# Patient Record
Sex: FEMALE | Race: WHITE | NOT HISPANIC OR LATINO | Employment: OTHER | URBAN - METROPOLITAN AREA
[De-identification: names, ages, dates, MRNs, and addresses within clinical notes are randomized per-mention and may not be internally consistent; named-entity substitution may affect disease eponyms.]

---

## 2017-10-19 ENCOUNTER — OFFICE VISIT (OUTPATIENT)
Dept: FAMILY MEDICINE | Facility: CLINIC | Age: 67
End: 2017-10-19
Payer: COMMERCIAL

## 2017-10-19 VITALS
HEART RATE: 93 BPM | DIASTOLIC BLOOD PRESSURE: 66 MMHG | SYSTOLIC BLOOD PRESSURE: 104 MMHG | WEIGHT: 182 LBS | HEIGHT: 61 IN | BODY MASS INDEX: 34.36 KG/M2 | OXYGEN SATURATION: 96 % | TEMPERATURE: 98.7 F

## 2017-10-19 DIAGNOSIS — M79.10 MYALGIA: ICD-10-CM

## 2017-10-19 DIAGNOSIS — F33.1 MODERATE RECURRENT MAJOR DEPRESSION (H): ICD-10-CM

## 2017-10-19 DIAGNOSIS — Z23 NEED FOR PROPHYLACTIC VACCINATION WITH TETANUS-DIPHTHERIA (TD): ICD-10-CM

## 2017-10-19 DIAGNOSIS — G93.32 CHRONIC FATIGUE DISORDER: Primary | ICD-10-CM

## 2017-10-19 DIAGNOSIS — Z23 NEED FOR PROPHYLACTIC VACCINATION AND INOCULATION AGAINST INFLUENZA: ICD-10-CM

## 2017-10-19 DIAGNOSIS — Z12.11 SCREEN FOR COLON CANCER: ICD-10-CM

## 2017-10-19 DIAGNOSIS — Z12.31 VISIT FOR SCREENING MAMMOGRAM: ICD-10-CM

## 2017-10-19 DIAGNOSIS — E78.5 HYPERLIPIDEMIA LDL GOAL <160: ICD-10-CM

## 2017-10-19 LAB
ALBUMIN SERPL-MCNC: 3.1 G/DL (ref 3.4–5)
ALP SERPL-CCNC: 102 U/L (ref 40–150)
ALT SERPL W P-5'-P-CCNC: 26 U/L (ref 0–50)
ANION GAP SERPL CALCULATED.3IONS-SCNC: 6 MMOL/L (ref 3–14)
AST SERPL W P-5'-P-CCNC: 12 U/L (ref 0–45)
BASOPHILS # BLD AUTO: 0 10E9/L (ref 0–0.2)
BASOPHILS NFR BLD AUTO: 0.4 %
BILIRUB SERPL-MCNC: 0.5 MG/DL (ref 0.2–1.3)
BUN SERPL-MCNC: 9 MG/DL (ref 7–30)
CALCIUM SERPL-MCNC: 9.1 MG/DL (ref 8.5–10.1)
CHLORIDE SERPL-SCNC: 102 MMOL/L (ref 94–109)
CHOLEST SERPL-MCNC: 154 MG/DL
CO2 SERPL-SCNC: 29 MMOL/L (ref 20–32)
CREAT SERPL-MCNC: 0.72 MG/DL (ref 0.52–1.04)
CRP SERPL-MCNC: 53 MG/L (ref 0–8)
DIFFERENTIAL METHOD BLD: NORMAL
EOSINOPHIL # BLD AUTO: 0.1 10E9/L (ref 0–0.7)
EOSINOPHIL NFR BLD AUTO: 0.8 %
ERYTHROCYTE [DISTWIDTH] IN BLOOD BY AUTOMATED COUNT: 12.8 % (ref 10–15)
ERYTHROCYTE [SEDIMENTATION RATE] IN BLOOD BY WESTERGREN METHOD: 58 MM/H (ref 0–30)
GFR SERPL CREATININE-BSD FRML MDRD: 81 ML/MIN/1.7M2
GLUCOSE SERPL-MCNC: 92 MG/DL (ref 70–99)
HCT VFR BLD AUTO: 39.7 % (ref 35–47)
HDLC SERPL-MCNC: 40 MG/DL
HGB BLD-MCNC: 12.8 G/DL (ref 11.7–15.7)
LDLC SERPL CALC-MCNC: 98 MG/DL
LYMPHOCYTES # BLD AUTO: 1.8 10E9/L (ref 0.8–5.3)
LYMPHOCYTES NFR BLD AUTO: 24.9 %
MAGNESIUM SERPL-MCNC: 1.9 MG/DL (ref 1.6–2.3)
MCH RBC QN AUTO: 30.5 PG (ref 26.5–33)
MCHC RBC AUTO-ENTMCNC: 32.2 G/DL (ref 31.5–36.5)
MCV RBC AUTO: 95 FL (ref 78–100)
MONOCYTES # BLD AUTO: 0.7 10E9/L (ref 0–1.3)
MONOCYTES NFR BLD AUTO: 9 %
NEUTROPHILS # BLD AUTO: 4.8 10E9/L (ref 1.6–8.3)
NEUTROPHILS NFR BLD AUTO: 64.9 %
NONHDLC SERPL-MCNC: 114 MG/DL
PLATELET # BLD AUTO: 356 10E9/L (ref 150–450)
POTASSIUM SERPL-SCNC: 3.9 MMOL/L (ref 3.4–5.3)
PROT SERPL-MCNC: 7.2 G/DL (ref 6.8–8.8)
RBC # BLD AUTO: 4.19 10E12/L (ref 3.8–5.2)
SODIUM SERPL-SCNC: 137 MMOL/L (ref 133–144)
TRIGL SERPL-MCNC: 82 MG/DL
TSH SERPL DL<=0.005 MIU/L-ACNC: 1.35 MU/L (ref 0.4–4)
WBC # BLD AUTO: 7.3 10E9/L (ref 4–11)

## 2017-10-19 PROCEDURE — 85652 RBC SED RATE AUTOMATED: CPT | Performed by: FAMILY MEDICINE

## 2017-10-19 PROCEDURE — 99203 OFFICE O/P NEW LOW 30 MIN: CPT | Mod: 25 | Performed by: FAMILY MEDICINE

## 2017-10-19 PROCEDURE — 82306 VITAMIN D 25 HYDROXY: CPT | Performed by: FAMILY MEDICINE

## 2017-10-19 PROCEDURE — 86140 C-REACTIVE PROTEIN: CPT | Performed by: FAMILY MEDICINE

## 2017-10-19 PROCEDURE — 80061 LIPID PANEL: CPT | Performed by: FAMILY MEDICINE

## 2017-10-19 PROCEDURE — 36415 COLL VENOUS BLD VENIPUNCTURE: CPT | Performed by: FAMILY MEDICINE

## 2017-10-19 PROCEDURE — 80050 GENERAL HEALTH PANEL: CPT | Performed by: FAMILY MEDICINE

## 2017-10-19 PROCEDURE — 90471 IMMUNIZATION ADMIN: CPT | Performed by: FAMILY MEDICINE

## 2017-10-19 PROCEDURE — 90686 IIV4 VACC NO PRSV 0.5 ML IM: CPT | Performed by: FAMILY MEDICINE

## 2017-10-19 PROCEDURE — 83735 ASSAY OF MAGNESIUM: CPT | Performed by: FAMILY MEDICINE

## 2017-10-19 RX ORDER — FLUOXETINE 20 MG/1
TABLET, FILM COATED ORAL EVERY MORNING
COMMUNITY
End: 2017-12-08 | Stop reason: DRUGHIGH

## 2017-10-19 RX ORDER — FLUTICASONE PROPIONATE 50 MCG
SPRAY, SUSPENSION (ML) NASAL
COMMUNITY
Start: 2013-09-16 | End: 2017-12-08

## 2017-10-19 RX ORDER — BUPROPION HYDROCHLORIDE 75 MG/1
75 TABLET ORAL 2 TIMES DAILY
Qty: 60 TABLET | Refills: 3 | Status: SHIPPED | OUTPATIENT
Start: 2017-10-19 | End: 2017-11-06

## 2017-10-19 ASSESSMENT — PAIN SCALES - GENERAL: PAINLEVEL: SEVERE PAIN (6)

## 2017-10-19 NOTE — NURSING NOTE
"Chief Complaint   Patient presents with     Establish Care     Transfer of care from [facility] in [place]       Initial /66 (BP Location: Right arm, Patient Position: Chair, Cuff Size: Adult Large)  Pulse 93  Temp 98.7  F (37.1  C) (Oral)  Ht 5' 1.25\" (1.556 m)  Wt 182 lb (82.6 kg)  SpO2 96%  Breastfeeding? No  BMI 34.11 kg/m2 Estimated body mass index is 34.11 kg/(m^2) as calculated from the following:    Height as of this encounter: 5' 1.25\" (1.556 m).    Weight as of this encounter: 182 lb (82.6 kg).  Medication Reconciliation: complete     Karan Bailey CMA    "

## 2017-10-19 NOTE — NURSING NOTE

## 2017-10-19 NOTE — PATIENT INSTRUCTIONS
How to contact your care team: [phone] Pharmacy [phone]   MD MORIS ANDERSON PA-C CHRIS JONES, PA-C NAM HO, MD JONATHAN BATES, MD ARVIN VOCAL, MD    Clinic hours M-Th 7am-7pm Fri 7am-5pm.   Urgent care M-F 11am-9pm  Sat/Sun 9am-5pm.   Pharmacy   Mon-Th:  8:00am-8pm   Fri:  8:00am-6:00pm  Sat/Sun  8:00am-5:00 pm

## 2017-10-19 NOTE — LETTER
October 23, 2017      Marixa Ann  [address] [place] [address]            Dear Marixa Ann    Your test results are attached.     The tests for inflammation are high and we need to check to see what is causing this. Please schedule a follow up appointment.     The blood sugar is normal and you do not have diabetes. The kidney and liver tests were normal. The thyroid test is normal. Your magnesium and vitamin D levels are good.     Please call to set up a follow up appointment for additional lab work and to discuss the results.       Enclosed is a copy of the results.  It was a pleasure to see you at your last appointment.    If you have any questions or concerns, please call myself or my nurse at [phone].      Sincerely,      Jackie Kovacs MD, MPH /BRITT Russo MA      Results for orders placed or performed in visit on 10/19/17   Lipid panel reflex to direct LDL   Result Value Ref Range    Cholesterol 154 <200 mg/dL    Triglycerides 82 <150 mg/dL    HDL Cholesterol 40 (L) >49 mg/dL    LDL Cholesterol Calculated 98 <100 mg/dL    Non HDL Cholesterol 114 <130 mg/dL   Comprehensive metabolic panel   Result Value Ref Range    Sodium 137 133 - 144 mmol/L    Potassium 3.9 3.4 - 5.3 mmol/L    Chloride 102 94 - 109 mmol/L    Carbon Dioxide 29 20 - 32 mmol/L    Anion Gap 6 3 - 14 mmol/L    Glucose 92 70 - 99 mg/dL    Urea Nitrogen 9 7 - 30 mg/dL    Creatinine 0.72 0.52 - 1.04 mg/dL    GFR Estimate 81 >60 mL/min/1.7m2    GFR Estimate If Black >90 >60 mL/min/1.7m2    Calcium 9.1 8.5 - 10.1 mg/dL    Bilirubin Total 0.5 0.2 - 1.3 mg/dL    Albumin 3.1 (L) 3.4 - 5.0 g/dL    Protein Total 7.2 6.8 - 8.8 g/dL    Alkaline Phosphatase 102 40 - 150 U/L    ALT 26 0 - 50 U/L    AST 12 0 - 45 U/L   TSH with free T4 reflex   Result Value Ref Range    TSH 1.35 0.40 - 4.00 mU/L   Vitamin D Deficiency   Result Value Ref Range    Vitamin D Deficiency screening 34 20 - 75 ug/L   Magnesium   Result Value Ref  Range    Magnesium 1.9 1.6 - 2.3 mg/dL   CBC with platelets differential   Result Value Ref Range    WBC 7.3 4.0 - 11.0 10e9/L    RBC Count 4.19 3.8 - 5.2 10e12/L    Hemoglobin 12.8 11.7 - 15.7 g/dL    Hematocrit 39.7 35.0 - 47.0 %    MCV 95 78 - 100 fl    MCH 30.5 26.5 - 33.0 pg    MCHC 32.2 31.5 - 36.5 g/dL    RDW 12.8 10.0 - 15.0 %    Platelet Count 356 150 - 450 10e9/L    Diff Method Automated Method     % Neutrophils 64.9 %    % Lymphocytes 24.9 %    % Monocytes 9.0 %    % Eosinophils 0.8 %    % Basophils 0.4 %    Absolute Neutrophil 4.8 1.6 - 8.3 10e9/L    Absolute Lymphocytes 1.8 0.8 - 5.3 10e9/L    Absolute Monocytes 0.7 0.0 - 1.3 10e9/L    Absolute Eosinophils 0.1 0.0 - 0.7 10e9/L    Absolute Basophils 0.0 0.0 - 0.2 10e9/L   CRP inflammation   Result Value Ref Range    CRP Inflammation 53.0 (H) 0.0 - 8.0 mg/L   Erythrocyte sedimentation rate auto   Result Value Ref Range    Sed Rate 58 (H) 0 - 30 mm/h

## 2017-10-19 NOTE — MR AVS SNAPSHOT
After Visit Summary   10/19/2017    Marixa Ann    MRN: [id number]           Patient Information     Date Of Birth          1950        Visit Information        Provider Department      10/19/2017 1:00 PM Jackie Kovacs MD [facility]        Today's Diagnoses     Chronic fatigue disorder    -  1    Myalgia        Hyperlipidemia LDL goal <160        Moderate recurrent major depression (H)        Screen for colon cancer        Visit for screening mammogram        Need for prophylactic vaccination and inoculation against influenza        Need for prophylactic vaccination with tetanus-diphtheria (TD)          Care Instructions    How to contact your care team: [phone] Pharmacy [phone]   MD MORIS ANDERSON PAOCTAVIO LAI MD JONATHAN BATES, MD ARVIN VOCAL, MD    Clinic hours M-Th 7am-7pm Fri 7am-5pm.   Urgent care M-F 11am-9pm  Sat/Sun 9am-5pm.   Pharmacy   Mon-Th:  8:00am-8pm   Fri:  8:00am-6:00pm  Sat/Sun  8:00am-5:00 pm               Follow-ups after your visit        Future tests that were ordered for you today     Open Future Orders        Priority Expected Expires Ordered    MA SCREENING DIGITAL BILAT - Future  (s+30) Routine  10/19/2018 10/19/2017    Fecal colorectal cancer screen FIT - Future (S+30) Routine 11/9/2017 11/18/2017 10/19/2017            Who to contact     If you have questions or need follow up information about today's clinic visit or your schedule please contact [facility] directly at [phone].  Normal or non-critical lab and imaging results will be communicated to you by MyChart, letter or phone within 4 business days after the clinic has received the results. If you do not hear from us within 7 days, please contact the clinic through MyChart or phone. If you have a critical or abnormal lab result, we will notify you by phone as soon as possible.  Submit refill requests  "through [employer] or call your pharmacy and they will forward the refill request to us. Please allow 3 business days for your refill to be completed.          Additional Information About Your Visit        [employer] Information     [employer] lets you send messages to your doctor, view your test results, renew your prescriptions, schedule appointments and more. To sign up, go to [email or web address] . Click on \"Log in\" on the left side of the screen, which will take you to the Welcome page. Then click on \"Sign up Now\" on the right side of the page.     You will be asked to enter the access code listed below, as well as some personal information. Please follow the directions to create your username and password.     Your access code is: [email or web address]  Expires: 2018  2:10 PM     Your access code will  in 90 days. If you need help or a new code, please call your [facility] clinic or [phone].        Care EveryWhere ID     This is your Care EveryWhere ID. This could be used by other organizations to access your [facility] medical records  [id number]        Your Vitals Were     Pulse Temperature Height Pulse Oximetry Breastfeeding? BMI (Body Mass Index)    93 98.7  F (37.1  C) (Oral) 5' 1.25\" (1.556 m) 96% No 34.11 kg/m2       Blood Pressure from Last 3 Encounters:   10/19/17 104/66    Weight from Last 3 Encounters:   10/19/17 182 lb (82.6 kg)              We Performed the Following          ADMIN VACCINE, FIRST [97191]     CBC with platelets differential     Comprehensive metabolic panel     CRP inflammation     Erythrocyte sedimentation rate auto     HC FLU VAC PRESRV FREE QUAD SPLIT VIR 3+YRS IM  [[id number]]     Lipid panel reflex to direct LDL     Magnesium     TSH with free T4 reflex     Vitamin D Deficiency          Today's Medication Changes          These changes are accurate as of: 10/19/17  2:10 PM.  If you have any questions, ask your nurse or doctor.               Start taking these medicines.        " Dose/Directions    buPROPion 75 MG tablet   Commonly known as:  WELLBUTRIN   Used for:  Moderate recurrent major depression (H)   Started by:  Jackie Kovacs MD        Dose:  75 mg   Take 1 tablet (75 mg) by mouth 2 times daily   Quantity:  60 tablet   Refills:  3            Where to get your medicines      These medications were sent to [id number] - [place], [place] - [address]  [address], [facility] [address]     Phone:  [phone]     buPROPion 75 MG tablet                Primary Care Provider Fax #    Provider Not In System [phone]                Equal Access to Services     [facility]: Hadii kenya anguiano hadkandi Somelecio, waaxda luqadaha, qaybta kaalmada adeegyada, susan saucedo . So [facility] [phone].    ATENCIÓN: Si habla español, tiene a lombardo disposición servicios gratuitos de asistencia lingüística. [facility] [phone].    We comply with applicable federal civil rights laws and [place] laws. We do not discriminate on the basis of race, color, national origin, age, disability, sex, sexual orientation, or gender identity.            Thank you!     Thank you for choosing [facility]  for your care. Our goal is always to provide you with excellent care. Hearing back from our patients is one way we can continue to improve our services. Please take a few minutes to complete the written survey that you may receive in the mail after your visit with us. Thank you!             Your Updated Medication List - Protect others around you: Learn how to safely use, store and throw away your medicines at www.disposemymeds.org.          This list is accurate as of: 10/19/17  2:10 PM.  Always use your most recent med list.                   Brand Name Dispense Instructions for use Diagnosis    buPROPion 75 MG tablet    WELLBUTRIN    60 tablet    Take 1 tablet (75 mg) by mouth 2 times daily    Moderate recurrent major depression (H)        FLUoxetine 20 MG tablet      Take by mouth every morning        fluticasone 50 MCG/ACT spray    FLONASE          MULTIVITAMIN PO           RANITIDINE HCL PO      Take by mouth daily        VITAMIN B-COMPLEX PO      Take by mouth daily        VITAMIN D-3 PO
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
## 2017-10-20 LAB — DEPRECATED CALCIDIOL+CALCIFEROL SERPL-MC: 34 UG/L (ref 20–75)

## 2017-10-22 NOTE — PROGRESS NOTES
Dear Marixa Ann,    Your test results are attached.    The tests for inflammation are high and we need to check to see what is causing this. Please schedule a follow up appointment.    The blood sugar is normal and you do not have diabetes. The kidney and liver tests were normal. The thyroid test is normal. Your magnesium and vitamin D levels are good.     Please call to set up a follow up appointment for additional lab work and to discuss the results.     Please call me if you have any questions about these test results or about your care.    Sincerely,    Jackie Kovacs MD

## 2017-11-06 ENCOUNTER — OFFICE VISIT (OUTPATIENT)
Dept: FAMILY MEDICINE | Facility: CLINIC | Age: 67
End: 2017-11-06
Payer: MEDICARE

## 2017-11-06 VITALS
OXYGEN SATURATION: 96 % | HEART RATE: 98 BPM | HEIGHT: 61 IN | TEMPERATURE: 98.6 F | DIASTOLIC BLOOD PRESSURE: 69 MMHG | WEIGHT: 177 LBS | BODY MASS INDEX: 33.42 KG/M2 | SYSTOLIC BLOOD PRESSURE: 117 MMHG

## 2017-11-06 DIAGNOSIS — M13.0 POLYARTICULAR ARTHRITIS: Primary | ICD-10-CM

## 2017-11-06 DIAGNOSIS — F33.1 MODERATE RECURRENT MAJOR DEPRESSION (H): ICD-10-CM

## 2017-11-06 DIAGNOSIS — G93.32 CHRONIC FATIGUE DISORDER: ICD-10-CM

## 2017-11-06 LAB — URATE SERPL-MCNC: 2.6 MG/DL (ref 2.6–6)

## 2017-11-06 PROCEDURE — 99214 OFFICE O/P EST MOD 30 MIN: CPT | Performed by: FAMILY MEDICINE

## 2017-11-06 PROCEDURE — 36415 COLL VENOUS BLD VENIPUNCTURE: CPT | Performed by: FAMILY MEDICINE

## 2017-11-06 PROCEDURE — 84550 ASSAY OF BLOOD/URIC ACID: CPT | Performed by: FAMILY MEDICINE

## 2017-11-06 PROCEDURE — 86038 ANTINUCLEAR ANTIBODIES: CPT | Performed by: FAMILY MEDICINE

## 2017-11-06 PROCEDURE — 86431 RHEUMATOID FACTOR QUANT: CPT | Performed by: FAMILY MEDICINE

## 2017-11-06 PROCEDURE — 86618 LYME DISEASE ANTIBODY: CPT | Performed by: FAMILY MEDICINE

## 2017-11-06 RX ORDER — FLUOXETINE 10 MG/1
10 TABLET, FILM COATED ORAL DAILY
Qty: 30 TABLET | Refills: 0 | Status: SHIPPED | OUTPATIENT
Start: 2017-11-06 | End: 2017-12-21

## 2017-11-06 RX ORDER — BUPROPION HYDROCHLORIDE 100 MG/1
100 TABLET ORAL 2 TIMES DAILY
Qty: 60 TABLET | Refills: 3 | Status: SHIPPED | OUTPATIENT
Start: 2017-11-06 | End: 2018-07-03 | Stop reason: SINTOL

## 2017-11-06 ASSESSMENT — ANXIETY QUESTIONNAIRES
1. FEELING NERVOUS, ANXIOUS, OR ON EDGE: NEARLY EVERY DAY
6. BECOMING EASILY ANNOYED OR IRRITABLE: SEVERAL DAYS
GAD7 TOTAL SCORE: 14
3. WORRYING TOO MUCH ABOUT DIFFERENT THINGS: NEARLY EVERY DAY
7. FEELING AFRAID AS IF SOMETHING AWFUL MIGHT HAPPEN: NEARLY EVERY DAY
2. NOT BEING ABLE TO STOP OR CONTROL WORRYING: MORE THAN HALF THE DAYS
5. BEING SO RESTLESS THAT IT IS HARD TO SIT STILL: NOT AT ALL
IF YOU CHECKED OFF ANY PROBLEMS ON THIS QUESTIONNAIRE, HOW DIFFICULT HAVE THESE PROBLEMS MADE IT FOR YOU TO DO YOUR WORK, TAKE CARE OF THINGS AT HOME, OR GET ALONG WITH OTHER PEOPLE: EXTREMELY DIFFICULT

## 2017-11-06 ASSESSMENT — PATIENT HEALTH QUESTIONNAIRE - PHQ9
SUM OF ALL RESPONSES TO PHQ QUESTIONS 1-9: 21
5. POOR APPETITE OR OVEREATING: MORE THAN HALF THE DAYS

## 2017-11-06 ASSESSMENT — PAIN SCALES - GENERAL: PAINLEVEL: SEVERE PAIN (6)

## 2017-11-06 NOTE — NURSING NOTE
"Chief Complaint   Patient presents with     Results     10/19/17 bloodwork further discuss     Mass     Possible cyst       Initial /69 (BP Location: Left arm, Patient Position: Chair, Cuff Size: Adult Large)  Pulse 98  Temp 98.6  F (37  C) (Oral)  Ht 5' 1.25\" (1.556 m)  Wt 177 lb (80.3 kg)  SpO2 96%  Breastfeeding? No  BMI 33.17 kg/m2 Estimated body mass index is 33.17 kg/(m^2) as calculated from the following:    Height as of this encounter: 5' 1.25\" (1.556 m).    Weight as of this encounter: 177 lb (80.3 kg).  Medication Reconciliation: complete     Karan Bailey CMA    "

## 2017-11-06 NOTE — MR AVS SNAPSHOT
After Visit Summary   11/6/2017    Marixa Ann    MRN: [id number]           Patient Information     Date Of Birth          1950        Visit Information        Provider Department      11/6/2017 1:40 PM Jackie Kovacs MD [facility]        Today's Diagnoses     Polyarticular arthritis    -  1    Chronic fatigue disorder        Moderate recurrent major depression (H)          Care Instructions    At [facility], we strive to deliver an exceptional experience to you, every time we see you.  If you receive a survey in the mail, please send us back your thoughts. We really do value your feedback.    Based on your medical history, these are the current health maintenance/preventive care services that you are due for (some may have been done at this visit.)  Health Maintenance Due   Topic Date Due     TETANUS IMMUNIZATION (SYSTEM ASSIGNED)  10/20/1970     DEPRESSION ACTION PLAN Q1 YR  10/20/1970     PHQ-9 Q6 MONTHS  10/20/1970     HEPATITIS C SCREENING  10/20/1970     COLON CANCER SCREEN (SYSTEM ASSIGNED)  10/20/2002     ADVANCE DIRECTIVE PLANNING Q5 YRS  10/20/2007     PNEUMOCOCCAL (1 of 2 - PCV13) 10/20/2017     FALL RISK ASSESSMENT  10/20/2017     DEXA SCAN SCREENING (SYSTEM ASSIGNED)  10/20/2017         Suggested websites for health information:  Www.Ryan.Imagiin. : Up to date and easily searchable information on multiple topics.  Www.medlineplus.gov : medication info, interactive tutorials, watch real surgeries online  Www.familydoctor.org : good info from the Academy of Family Physicians  Www.cdc.gov : public health info, travel advisories, epidemics (H1N1)  Www.aap.org : children's health info, normal development, vaccinations  Www.health.state.[place].us : [place] dept of health, public health issues in [place], N1N1    Your care team:                            Family Medicine Internal Medicine   MD Kyle Mancera MD Shantel Branch-Fleming, MD  "   Roselia Kauffman MD Pediatrics   OCTAVIO Ruiz, JOSEPH Delgado APRN MD Alisa Zuniga MD Deborah Mielke, MD Kim Thein, APRN [facility]      Clinic hours: Monday - Thursday 7 am-7 pm; Fridays 7 am-5 pm.   Urgent care: Monday - Friday 11 am-9 pm; Saturday and Sunday 9 am-5 pm.  Pharmacy : Monday -Thursday 8 am-8 pm; Friday 8 am-6 pm; Saturday and Sunday 9 am-5 pm.     Clinic: [phone]   Pharmacy: [phone]            Follow-ups after your visit        Follow-up notes from your care team     Return in about 2 months (around 1/6/2018) for medication follow up.      Who to contact     If you have questions or need follow up information about today's clinic visit or your schedule please contact [facility] directly at [phone].  Normal or non-critical lab and imaging results will be communicated to you by NetManagehart, letter or phone within 4 business days after the clinic has received the results. If you do not hear from us within 7 days, please contact the clinic through NetManagehart or phone. If you have a critical or abnormal lab result, we will notify you by phone as soon as possible.  Submit refill requests through Deeplink or call your pharmacy and they will forward the refill request to us. Please allow 3 business days for your refill to be completed.          Additional Information About Your Visit        Deeplink Information     Deeplink lets you send messages to your doctor, view your test results, renew your prescriptions, schedule appointments and more. To sign up, go to [email or web address] . Click on \"Log in\" on the left side of the screen, which will take you to the Welcome page. Then click on \"Sign up Now\" on the right side of the page.     You will be asked to enter the access code listed below, as well as some personal information. Please follow the directions to create your username and password.     Your access " "code is: [email or web address]  Expires: 2018  1:10 PM     Your access code will  in 90 days. If you need help or a new code, please call your [facility] clinic or [phone].        Care EveryWhere ID     This is your Care EveryWhere ID. This could be used by other organizations to access your [facility] medical records  [id number]        Your Vitals Were     Pulse Temperature Height Pulse Oximetry Breastfeeding? BMI (Body Mass Index)    98 98.6  F (37  C) (Oral) 5' 1.25\" (1.556 m) 96% No 33.17 kg/m2       Blood Pressure from Last 3 Encounters:   17 117/69   10/19/17 104/66    Weight from Last 3 Encounters:   17 177 lb (80.3 kg)   10/19/17 182 lb (82.6 kg)              We Performed the Following     Anti Nuclear Kim IgG by IFA with Reflex     Lyme Disease Kim with reflex to WB Serum     Rheumatoid factor     Uric acid          Today's Medication Changes          These changes are accurate as of: 17  8:18 PM.  If you have any questions, ask your nurse or doctor.               These medicines have changed or have updated prescriptions.        Dose/Directions    buPROPion 100 MG tablet   Commonly known as:  WELLBUTRIN   This may have changed:    - medication strength  - how much to take   Used for:  Moderate recurrent major depression (H)   Changed by:  Jackie Kovacs MD        Dose:  100 mg   Take 1 tablet (100 mg) by mouth 2 times daily   Quantity:  60 tablet   Refills:  3       * FLUoxetine 20 MG tablet   This may have changed:  Another medication with the same name was added. Make sure you understand how and when to take each.   Changed by:  Jackie Kovacs MD        Take by mouth every morning   Refills:  0       * FLUoxetine 10 MG tablet   Commonly known as:  PROzac   This may have changed:  You were already taking a medication with the same name, and this prescription was added. Make sure you understand how and when to take each.   Used for:  Moderate recurrent major depression (H) "   Changed by:  Jackie Kovacs MD        Dose:  10 mg   Take 1 tablet (10 mg) by mouth daily   Quantity:  30 tablet   Refills:  0       * Notice:  This list has 2 medication(s) that are the same as other medications prescribed for you. Read the directions carefully, and ask your doctor or other care provider to review them with you.         Where to get your medicines      These medications were sent to [id number] - [place], [place] - [address]  [address], [facility] [address]     Phone:  [phone]     buPROPion 100 MG tablet    FLUoxetine 10 MG tablet                Primary Care Provider Office Phone # Fax #    [facility] [phone]       [address]  [facility] [address]        Equal Access to Services     [facility] : Hadii kenya anguiano hadasho Sojaydaali, waaxda luqadaha, qaybta kaalmada adeegyada, susan saucedo . So [facility] [phone].    ATENCIÓN: Si habla español, tiene a lombardo disposición servicios gratuitos de asistencia lingüística. [facility] [phone].    We comply with applicable federal civil rights laws and [place] laws. We do not discriminate on the basis of race, color, national origin, age, disability, sex, sexual orientation, or gender identity.            Thank you!     Thank you for choosing [facility]  for your care. Our goal is always to provide you with excellent care. Hearing back from our patients is one way we can continue to improve our services. Please take a few minutes to complete the written survey that you may receive in the mail after your visit with us. Thank you!             Your Updated Medication List - Protect others around you: Learn how to safely use, store and throw away your medicines at www.disposemymeds.org.          This list is accurate as of: 11/6/17  8:18 PM.  Always use your most recent med list.                   Brand Name Dispense  Instructions for use Diagnosis    buPROPion 100 MG tablet    WELLBUTRIN    60 tablet    Take 1 tablet (100 mg) by mouth 2 times daily    Moderate recurrent major depression (H)       * FLUoxetine 20 MG tablet      Take by mouth every morning        * FLUoxetine 10 MG tablet    PROzac    30 tablet    Take 1 tablet (10 mg) by mouth daily    Moderate recurrent major depression (H)       fluticasone 50 MCG/ACT spray    FLONASE          MULTIVITAMIN PO           RANITIDINE HCL PO      Take by mouth daily        VITAMIN B-COMPLEX PO      Take by mouth daily        VITAMIN D-3 PO           * Notice:  This list has 2 medication(s) that are the same as other medications prescribed for you. Read the directions carefully, and ask your doctor or other care provider to review them with you.

## 2017-11-06 NOTE — PROGRESS NOTES
SUBJECTIVE:   Marixa Ann is a 67 year old female who presents to clinic today for the following health issues:    Concern:  Lab results follow up - 10/19/17 tests for inflammation high, further discuss    Concern - Leg problem  Onset: x2weeks    Description:   Patient complains of a Baker's cyst behind left knee that popped 10/29/17. New swelling side of right knee concerns there may be a possible cyst. Planning to get a left knee replacement in the near future.     Intensity: moderate    Progression of Symptoms:  Worsening, pain currently 6/10. Worst pain 10/10    Accompanying Signs & Symptoms:  Bruising, swelling    Previous history of similar problem:   yes    Precipitating factors:   Worsened by: Pressure and overuse    Alleviating factors:  Improved by: None    Therapies Tried and outcome: Tylenol extra strength max 6 tabs per day with mild relief, Resting      RESPIRATORY SYMPTOMS      Duration: 1 week    Description  nasal congestion and cough    Severity: moderate    Accompanying signs and symptoms: None    History (predisposing factors):  none    Precipitating or alleviating factors: None    Therapies tried and outcome:  rest and fluids    Depression and Anxiety Follow-Up    Status since last visit: No change    Other associated symptoms:None    Complicating factors:     Significant life event: No     Current substance abuse: None    PHQ-9 Score and MyChart F/U Questions 11/6/2017   Total Score 21   Q9: Suicide Ideation Not at all     DONOVAN-7 SCORE 11/6/2017   Total Score 14       PHQ-9  English  PHQ-9   Any Language  GAD7  Suicide Assessment Five-step Evaluation and Treatment (SAFE-T)    Chronic Pain Follow-Up       Type / Location of Pain: knees and other joints but mostly in legs  Analgesia/pain control:       Recent changes:  same      Overall control: Tolerable with discomfort  Activity level/function:      Daily activities:  Able to do light housework, cooking    Work:  Unable to work  Adverse  effects:  No  Adherance    Taking medication as directed?  Yes    Participating in other treatments: yes  Risk Factors:    Sleep:  Fair    Mood/anxiety:  No change    Recent family or social stressors:  none noted    Other aggravating factors: sedentary lifestyle  PHQ-9 SCORE 11/6/2017   Total Score 21     DONOVAN-7 SCORE 11/6/2017   Total Score 14     Encounter-Level CSA:     There are no encounter-level csa.            Problem list and histories reviewed & adjusted, as indicated.  Additional history: as documented    Patient Active Problem List   Diagnosis     Pain in joint, shoulder region     Moderate recurrent major depression (H)     Hyperlipidemia LDL goal <160     Myalgia     Chronic fatigue disorder     Past Surgical History:   Procedure Laterality Date     APPENDECTOMY  1967       Social History   Substance Use Topics     Smoking status: Never Smoker     Smokeless tobacco: Never Used     Alcohol use Not on file     History reviewed. No pertinent family history.      Current Outpatient Prescriptions   Medication Sig Dispense Refill     buPROPion (WELLBUTRIN) 100 MG tablet Take 1 tablet (100 mg) by mouth 2 times daily 60 tablet 3     FLUoxetine (PROZAC) 10 MG tablet Take 1 tablet (10 mg) by mouth daily 30 tablet 0     Multiple Vitamins-Minerals (MULTIVITAMIN PO)        Cholecalciferol (VITAMIN D-3 PO)        FLUoxetine 20 MG tablet Take by mouth every morning       fluticasone (FLONASE) 50 MCG/ACT spray        RANITIDINE HCL PO Take by mouth daily       B Complex Vitamins (VITAMIN B-COMPLEX PO) Take by mouth daily       [DISCONTINUED] buPROPion (WELLBUTRIN) 75 MG tablet Take 1 tablet (75 mg) by mouth 2 times daily 60 tablet 3     Allergies   Allergen Reactions     Dust Mites      Recent Labs   Lab Test  10/19/17   1412   LDL  98   HDL  40*   TRIG  82   ALT  26   CR  0.72   GFRESTIMATED  81   GFRESTBLACK  >90   POTASSIUM  3.9   TSH  1.35      BP Readings from Last 3 Encounters:   11/06/17 117/69   10/19/17 104/66  "   Wt Readings from Last 3 Encounters:   11/06/17 177 lb (80.3 kg)   10/19/17 182 lb (82.6 kg)                  Labs reviewed in EPIC          Reviewed and updated as needed this visit by clinical staffTobacco  Allergies  Meds  Med Hx  Surg Hx  Fam Hx  Soc Hx      Reviewed and updated as needed this visit by Provider         ROS:  Constitutional, HEENT, cardiovascular, pulmonary, gi and gu systems are negative, except as otherwise noted.      OBJECTIVE:   /69 (BP Location: Left arm, Patient Position: Chair, Cuff Size: Adult Large)  Pulse 98  Temp 98.6  F (37  C) (Oral)  Ht 5' 1.25\" (1.556 m)  Wt 177 lb (80.3 kg)  SpO2 96%  Breastfeeding? No  BMI 33.17 kg/m2  Body mass index is 33.17 kg/(m^2).  GENERAL: acutely ill, alert, well nourished, well hydrated, no distress, with cough  HENT: ear canals- normal; TMs- normal; Nose- rhinorrhea ; Mouth- no ulcers, no lesions, throat is erythematous without exudate. Sinuses without tenderness to percussion.   NECK: no tenderness, negative anterior cervical adenopathy, no asymmetry, no masses, no stiffness; thyroid- normal to palpation  RESP: lungs clear to auscultation - no rales, no rhonchi, some expiratory wheezes  CV: regular rates and rhythm, normal S1 S2, no S3 or S4 and no murmur, no click or rub -  ABDOMEN: soft, no tenderness, no  hepatosplenomegaly, no masses, normal bowel sounds  MS: extremities- no gross deformities noted, no edema  SKIN: no suspicious lesions, no rashes  PSYCH: Alert and oriented times 3; affect- normal      Diagnostic Test Results:  Results for orders placed or performed in visit on 11/06/17   Uric acid   Result Value Ref Range    Uric Acid 2.6 2.6 - 6.0 mg/dL       ASSESSMENT/PLAN:         Tobacco Cessation:   reports that she has never smoked. She has never used smokeless tobacco.      BMI:   Estimated body mass index is 33.17 kg/(m^2) as calculated from the following:    Height as of this encounter: 5' 1.25\" (1.556 m).    Weight " as of this encounter: 177 lb (80.3 kg).   Weight management plan: Discussed healthy diet and exercise guidelines and patient will follow up in 3 months in clinic to re-evaluate.            ICD-10-CM    1. Polyarticular arthritis- seems mostly in larger joints and not hands M13.0 Lyme Disease Kim with reflex to WB Serum     Rheumatoid factor     Uric acid     Anti Nuclear Kim IgG by IFA with Reflex   2. Chronic fatigue disorder R53.82 Has been going on for many years   3. Moderate recurrent major depression (H) F33.1 buPROPion (WELLBUTRIN) 100 MG tablet- increase from 75 mg twice a day to see if this continues to help with energy levels     FLUoxetine (PROZAC) 10 MG tablet- decrease to 10 mg. Tends to make her feels drunk.        CONSULTATION/REFERRAL to rheumatology depending on lab results.   FUTURE LABS:       - Schedule non-fasting labs in 2 months  FUTURE APPOINTMENTS:       - Follow-up visit in 2-3 months or sooner if any questions or concerns.   Work on weight loss  Regular exercise  See Patient Instructions    Jackie Kovacs MD  [facility]

## 2017-11-06 NOTE — PATIENT INSTRUCTIONS
At [facility], we strive to deliver an exceptional experience to you, every time we see you.  If you receive a survey in the mail, please send us back your thoughts. We really do value your feedback.    Based on your medical history, these are the current health maintenance/preventive care services that you are due for (some may have been done at this visit.)  Health Maintenance Due   Topic Date Due     TETANUS IMMUNIZATION (SYSTEM ASSIGNED)  10/20/1970     DEPRESSION ACTION PLAN Q1 YR  10/20/1970     PHQ-9 Q6 MONTHS  10/20/1970     HEPATITIS C SCREENING  10/20/1970     COLON CANCER SCREEN (SYSTEM ASSIGNED)  10/20/2002     ADVANCE DIRECTIVE PLANNING Q5 YRS  10/20/2007     PNEUMOCOCCAL (1 of 2 - PCV13) 10/20/2017     FALL RISK ASSESSMENT  10/20/2017     DEXA SCAN SCREENING (SYSTEM ASSIGNED)  10/20/2017         Suggested websites for health information:  Www.RamTiger Fitness.WyzAnt.com : Up to date and easily searchable information on multiple topics.  Www.medlineplus.gov : medication info, interactive tutorials, watch real surgeries online  Www.familydoctor.org : good info from the Academy of Family Physicians  Www.cdc.gov : public health info, travel advisories, epidemics (H1N1)  Www.aap.org : children's health info, normal development, vaccinations  Www.health.state.[place].us : [place] dept of health, public health issues in [place], N1N1    Your care team:                            Family Medicine Internal Medicine   MD Kyle Mancera MD Shantel Branch-Fleming, MD Katya Georgiev PA-C Nam Ho, MD Pediatrics   OCTAVIO Ruiz, MD Alisa Keen CNP, MD Deborah Mielke, MD Kim Thein, APRN CNP      Clinic hours: Monday - Thursday 7 am-7 pm; Fridays 7 am-5 pm.   Urgent care: Monday - Friday 11 am-9 pm; Saturday and Sunday 9 am-5 pm.  Pharmacy : Monday -Thursday 8 am-8 pm; Friday 8 am-6 pm; Saturday and Sunday 9 am-5 pm.     Clinic: [phone]   Pharmacy: [phone]

## 2017-11-06 NOTE — LETTER
November 8, 2017      Marixa Ann  [address] [facility] [address]        Dear ,    We are writing to inform you of your test results.    Your test results are attached. I am happy to let you know that they are stable and your medications can stay the same.     The tests show no rheumatoid arthritis, Lupus, lyme disease or gout. Please follow up if your symptoms are still bothering you.     Resulted Orders   Lyme Disease Kim with reflex to WB Serum   Result Value Ref Range    Lyme Disease Antibodies Serum 0.02 0.00 - 0.89      Comment:      Negative, Absence of detectable Borrelia burdorferi antibodies. A negative   result does not exclude the possibility of Borrelia burgdorferi infection. If   early Lyme disease is suspected, a second sample should be collected and   tested 2 to 4 weeks later.     Rheumatoid factor   Result Value Ref Range    Rheumatoid Factor <20 <20 IU/mL   Uric acid   Result Value Ref Range    Uric Acid 2.6 2.6 - 6.0 mg/dL   Anti Nuclear Kim IgG by IFA with Reflex   Result Value Ref Range    BARTOLO interpretation Negative NEG^Negative      Comment:                                         Reference range:  <1:40  NEGATIVE  1:40 - 1:80  BORDERLINE POSITIVE  >1:80 POSITIVE         If you have any questions or concerns, please call the clinic at the number listed above.       Sincerely,      Jackie Kovacs MD

## 2017-11-07 LAB
ANA SER QL IF: NEGATIVE
B BURGDOR IGG+IGM SER QL: 0.02 (ref 0–0.89)
RHEUMATOID FACT SER NEPH-ACNC: <20 IU/ML (ref 0–20)

## 2017-11-07 ASSESSMENT — ANXIETY QUESTIONNAIRES: GAD7 TOTAL SCORE: 14

## 2017-11-08 NOTE — PROGRESS NOTES
Dear Marixa Ann,    Your test results are attached. I am happy to let you know that they are stable and your medications can stay the same.    The tests show no rheumatoid arthritis, Lupus, lyme disease or gout. Please follow up if your symptoms are still bothering you.  Please call me if you have any questions about these test results or about your care.    Sincerely,    Jackie Kovacs MD

## 2017-11-30 ENCOUNTER — TELEPHONE (OUTPATIENT)
Dept: FAMILY MEDICINE | Facility: CLINIC | Age: 67
End: 2017-11-30

## 2017-11-30 DIAGNOSIS — M13.0 POLYARTICULAR ARTHRITIS: Primary | ICD-10-CM

## 2017-11-30 NOTE — TELEPHONE ENCOUNTER
Reason for Call:  Other     Detailed comments: Patient had labs done on 10/23/2017 and the results stated she had high inflammation. She then had labs done on 11/8/2017 to find out why there was high inflammation and received her results that do not explain anything about the inflammation. Patient would like to get a call about this.    Phone Number Patient can be reached at: Home number on file [phone] (home)    Best Time: any    Can we leave a detailed message on this number? YES    Call taken on 11/30/2017 at 3:17 PM by Dayana Huizar

## 2017-12-07 ENCOUNTER — TELEPHONE (OUTPATIENT)
Dept: FAMILY MEDICINE | Facility: CLINIC | Age: 67
End: 2017-12-07

## 2017-12-07 NOTE — TELEPHONE ENCOUNTER
"Called and spoke with patient. Below message per Dr. Kovacs reviewed with patient. Patient preferring to see [facility] [place] [phone]   [email or web address] instead of with Manuel BELLO. Scheduling phone number given to set up appointment. Patient agreed with plan.    During call, patient reported that she has had a dry cough for almost 2 months and it is not getting better. Patient is coughing so severely at times that she feels like she is going to pass out. Patient also reports urinary leakage when coughing, stating \"it is so embarrassing\". Cough started a few days after initial visit with Dr. Kovacs on 10/19/17. Patient reports coughing up clear mucus with cough. Patient denies any shortness of breath, fever, chest pain, and wheezing. Patient states it \"feels like a lump is in my chest\". Patient has been drinking lots of fluids-water and tea, and resting, all without relief of symptoms. Patient has not tried cough suppressants or throat drops as afraid may interact with medications she is taking.   Writer encouraged patient to follow up with PCP or alternative provider regarding ongoing cough and it's severity. Patient declining to see anyone else other than Dr. Kovacs as she knows her and has established with her. Patient declining to see any male physicians. Patient wanting to see Dr. Kovacs tomorrow if possible, doesn't want to wait any longer. PCP booked tomorrow, only same day available.    Huddled with provider. Ok to take same day appointment slot tomorrow. Patient contacted and scheduled for 10:40 am.  FYI to provider.    Padma Ragsdale RN  [place] [facility]      "

## 2017-12-07 NOTE — TELEPHONE ENCOUNTER
This writer attempted to contact Marixa on 12/07/17      Reason for call discuss below message from Dr. Kovacs and left message to return call.      If patient calls back:   Patient contacted by clinic RN team. Inform patient that someone from the team will contact them, document that pt called and route to care team. .        Padma Ragsdale RN

## 2017-12-07 NOTE — TELEPHONE ENCOUNTER
..Reason for Call:  Other     Detailed comments: Patient called and said she was returning a call from  A nurse here.    Phone Number Patient can be reached at: Home number on file [phone] (home)    Best Time: anytime    Can we leave a detailed message on this number? YES    Call taken on 12/7/2017 at 11:48 AM by Beltran Blas

## 2017-12-07 NOTE — TELEPHONE ENCOUNTER
I put in a rheumatology referral to our clinic and and outside group. I am not sure why there is increased inflammation, but further work up will be needed for this. Marixa can also schedule follow up with me or our internist Dr. Garcia to see what may be going on.   Jackie Kovacs MD

## 2017-12-08 ENCOUNTER — RADIANT APPOINTMENT (OUTPATIENT)
Dept: GENERAL RADIOLOGY | Facility: CLINIC | Age: 67
End: 2017-12-08
Attending: FAMILY MEDICINE
Payer: COMMERCIAL

## 2017-12-08 ENCOUNTER — OFFICE VISIT (OUTPATIENT)
Dept: FAMILY MEDICINE | Facility: CLINIC | Age: 67
End: 2017-12-08
Payer: COMMERCIAL

## 2017-12-08 VITALS
SYSTOLIC BLOOD PRESSURE: 110 MMHG | BODY MASS INDEX: 33.23 KG/M2 | OXYGEN SATURATION: 95 % | TEMPERATURE: 98.5 F | DIASTOLIC BLOOD PRESSURE: 70 MMHG | HEIGHT: 61 IN | HEART RATE: 96 BPM | WEIGHT: 176 LBS

## 2017-12-08 DIAGNOSIS — R05.3 PERSISTENT COUGH: ICD-10-CM

## 2017-12-08 DIAGNOSIS — J18.9 PNEUMONIA OF LEFT LOWER LOBE DUE TO INFECTIOUS ORGANISM: Primary | ICD-10-CM

## 2017-12-08 PROCEDURE — 71020 XR CHEST 2 VW: CPT

## 2017-12-08 PROCEDURE — 99214 OFFICE O/P EST MOD 30 MIN: CPT | Performed by: FAMILY MEDICINE

## 2017-12-08 RX ORDER — AZITHROMYCIN 250 MG/1
TABLET, FILM COATED ORAL
Qty: 6 TABLET | Refills: 0 | Status: SHIPPED | OUTPATIENT
Start: 2017-12-08 | End: 2017-12-21

## 2017-12-08 RX ORDER — BENZONATATE 200 MG/1
200 CAPSULE ORAL 3 TIMES DAILY PRN
Qty: 21 CAPSULE | Refills: 3 | Status: SHIPPED | OUTPATIENT
Start: 2017-12-08 | End: 2017-12-12

## 2017-12-08 ASSESSMENT — PAIN SCALES - GENERAL: PAINLEVEL: SEVERE PAIN (7)

## 2017-12-08 NOTE — LETTER
December 11, 2017      Marixa Ann  [address] [address] [facility] [address]      Dear Marixa Ann,    Your test results are attached. I am happy to let you know that they are stable and your medications can stay the same.    The radiologist did not see a pneumonia. You do not need a follow up chest xray. Please continue to take the antibiotics until gone.     Resulted Orders   XR Chest 2 Views    Narrative    XR CHEST 2 VW 12/8/2017 11:29 AM    COMPARISON: None.    HISTORY: Persistent cough      Impression    IMPRESSION: Cardiac silhouette and pulmonary vasculature are within  normal limits. No focal airspace disease, pleural effusion or  pneumothorax.     BHARGAVI LING MD       Please call me if you have any questions about these test results or about your care.    Sincerely,    Jackie Kovacs MD/ENE

## 2017-12-08 NOTE — MR AVS SNAPSHOT
After Visit Summary   12/8/2017    Marixa Ann    MRN: [id number]           Patient Information     Date Of Birth          1950        Visit Information        Provider Department      12/8/2017 10:40 AM Jackie Kovacs MD [facility]        Today's Diagnoses     Pneumonia of left lower lobe due to infectious organism (H)    -  1    Persistent cough          Care Instructions      Treating Pneumonia  Pneumonia is an infection of one or both of the lungs. Pneumonia:    Is usually caused by either a virus or a bacteria    Can be very serious, especially in infants, young children, and older adults. It s also serious for those with other long-term health problems or weakened immune systems.    Is sometimes treated at home and sometimes in the hospital    Antibiotic medicines  Antibiotics may be prescribed for pneumonia caused by bacteria. They may be pills (oral medicines), or shots (injections). Or they may be given by IV (intravenously) into a vein. If you are taking oral medicines at home:    Fill your prescription and start taking your medicine as soon as you can.    You will likely start to feel better in a day or 2, but don t stop taking the antibiotic.    Use a pill organizer to help you remember to take your medicine.    Let your healthcare provider know if you have side effects.    Take your medicine exactly as directed on the label. Talk to your provider or pharmacist if you have any questions.  Antiviral medicines  Antiviral medicine may be prescribed for pneumonia caused by a virus. For example, antiviral medicine may be prescribed for pneumonia caused by the flu virus. Antibiotics do not work against viruses. If you are taking antiviral medicine at home:    Fill your prescription and start taking your medicine as soon as you can.    Talk with your provider or pharmacist about possible side effects.    Take the medicine exactly as instructed.  To relieve  symptoms  There are many medicines that can help relieve symptoms of pneumonia. Some are prescription and some are over-the-counter.  Your healthcare provider may recommend:    Acetaminophen or ibuprofen to lower your fever and to lessen headache or other pain    Cough medicine to loosen mucus or to reduce coughing  Make sure you check with your healthcare provider or pharmacist before taking any over-the-counter medicines.  Special treatments  If you are hospitalized for pneumonia, you may have other therapies, including:    Inhaled medicines to help with breathing or chest congestion    Supplemental oxygen to increase low oxygen levels  Drink fluids and eat healthy  You should eat healthy to help your body fight the infection. Drinking a lot of fluids helps to replace fluids lost from fever and to loosen mucus in your chest.    Diet. Make healthy food choices, including fruits and vegetables, lean meats and other proteins, 100% whole grain and low- or no-fat dairy products.    Fluids. Drink at least 6 to 8 tall glasses a day. Water and 100% fruit or vegetable juice are best.  Get plenty of rest and sleep  You may be more tired than usual for a while. It is important to get enough sleep at night. It s also important to rest during the day. Talk with your healthcare provider if coughing or other symptoms are interfering with your sleep.  Preventing the spread of germs  The best thing you can do to prevent spreading germs is to wash your hands often. You should:    Rub your hand with soap and water for 20 to 30 seconds.    Clean in between your fingers, the backs of your hands, and around your nails.    Dry your hands on a separate towel or use paper towels.  You should also:    Keep alcohol-based hand  nearby.    Make sure you also clean surfaces that you touch. Use a product that kills all types of germs.    Stay away from others until you are feeling better.  When to call your healthcare provider  Call  "your healthcare provider if you have any of the following:    Symptoms get worse    Fever continues    Shortness of breath gets worse    Increased mucus or mucus that is darker in color    Coughing gets worse    Lips or fingers are bluish in color    Side effects from your medicine   Date Last Reviewed: 12/1/2016 2000-2017 [employer]. [address] [place] [address]. All rights reserved. This information is not intended as a substitute for professional medical care. Always follow your healthcare professional's instructions.                Follow-ups after your visit        Follow-up notes from your care team     Return in about 6 weeks (around 1/19/2018) for recheck.      Who to contact     If you have questions or need follow up information about today's clinic visit or your schedule please contact [facility] directly at [phone].  Normal or non-critical lab and imaging results will be communicated to you by Maginehart, letter or phone within 4 business days after the clinic has received the results. If you do not hear from us within 7 days, please contact the clinic through Maginehart or phone. If you have a critical or abnormal lab result, we will notify you by phone as soon as possible.  Submit refill requests through Lamiecco or call your pharmacy and they will forward the refill request to us. Please allow 3 business days for your refill to be completed.          Additional Information About Your Visit        Maginehart Information     Lamiecco lets you send messages to your doctor, view your test results, renew your prescriptions, schedule appointments and more. To sign up, go to [email or web address] . Click on \"Log in\" on the left side of the screen, which will take you to the Welcome page. Then click on \"Sign up Now\" on the right side of the page.     You will be asked to enter the access code listed below, as well as some personal information. Please follow the " "directions to create your username and password.     Your access code is: [email or web address]  Expires: 2018  1:10 PM     Your access code will  in 90 days. If you need help or a new code, please call your [place] clinic or [phone].        Care EveryWhere ID     This is your Care EveryWhere ID. This could be used by other organizations to access your [place] medical records  [id number]        Your Vitals Were     Pulse Temperature Height Pulse Oximetry Breastfeeding? BMI (Body Mass Index)    96 98.5  F (36.9  C) (Oral) 5' 1.25\" (1.556 m) 95% No 32.98 kg/m2       Blood Pressure from Last 3 Encounters:   17 110/70   17 117/69   10/19/17 104/66    Weight from Last 3 Encounters:   17 176 lb (79.8 kg)   17 177 lb (80.3 kg)   10/19/17 182 lb (82.6 kg)                 Today's Medication Changes          These changes are accurate as of: 17 11:53 AM.  If you have any questions, ask your nurse or doctor.               Start taking these medicines.        Dose/Directions    azithromycin 250 MG tablet   Commonly known as:  ZITHROMAX   Used for:  Pneumonia of left lower lobe due to infectious organism (H)   Started by:  Jackie Kovacs MD        Two tablets first day, then one tablet daily for four days.   Quantity:  6 tablet   Refills:  0       benzonatate 200 MG capsule   Commonly known as:  TESSALON   Used for:  Persistent cough   Started by:  Jackie Kovacs MD        Dose:  200 mg   Take 1 capsule (200 mg) by mouth 3 times daily as needed for cough   Quantity:  21 capsule   Refills:  3         These medicines have changed or have updated prescriptions.        Dose/Directions    FLUoxetine 10 MG tablet   Commonly known as:  PROzac   This may have changed:  Another medication with the same name was removed. Continue taking this medication, and follow the directions you see here.   Used for:  Moderate recurrent major depression (H)   Changed by:  Jackie Kovacs MD     "    Dose:  10 mg   Take 1 tablet (10 mg) by mouth daily   Quantity:  30 tablet   Refills:  0            Where to get your medicines      These medications were sent to [id number] - [place], [place] - [address]  [address], [facility] [address]     Phone:  [phone]     azithromycin 250 MG tablet    benzonatate 200 MG capsule                Primary Care Provider Office Phone # Fax #    [facility] [phone]       [address]  [facility] [address]        Equal Access to Services     [facility]: Hadii aad ku hadasho Soomaali, waaxda luqadaha, qaybta kaalmada adeegyada, waxangela idiin hayaan maria luisa kharajack saucedo . So [facility] [phone].    ATENCIÓN: Si habla español, tiene a lombardo disposición servicios gratuitos de asistencia lingüística. Llame al [phone].    We comply with applicable federal civil rights laws and [place] laws. We do not discriminate on the basis of race, color, national origin, age, disability, sex, sexual orientation, or gender identity.            Thank you!     Thank you for choosing [facility]  for your care. Our goal is always to provide you with excellent care. Hearing back from our patients is one way we can continue to improve our services. Please take a few minutes to complete the written survey that you may receive in the mail after your visit with us. Thank you!             Your Updated Medication List - Protect others around you: Learn how to safely use, store and throw away your medicines at www.disposemymeds.org.          This list is accurate as of: 12/8/17 11:53 AM.  Always use your most recent med list.                   Brand Name Dispense Instructions for use Diagnosis    azithromycin 250 MG tablet    ZITHROMAX    6 tablet    Two tablets first day, then one tablet daily for four days.    Pneumonia of left lower lobe due to infectious organism (H)       benzonatate 200 MG capsule     TESSALON    21 capsule    Take 1 capsule (200 mg) by mouth 3 times daily as needed for cough    Persistent cough       buPROPion 100 MG tablet    WELLBUTRIN    60 tablet    Take 1 tablet (100 mg) by mouth 2 times daily    Moderate recurrent major depression (H)       FLUoxetine 10 MG tablet    PROzac    30 tablet    Take 1 tablet (10 mg) by mouth daily    Moderate recurrent major depression (H)       MULTIVITAMIN PO           RANITIDINE HCL PO      Take by mouth daily        VITAMIN B-COMPLEX PO      Take by mouth daily        VITAMIN D-3 PO

## 2017-12-08 NOTE — PATIENT INSTRUCTIONS
Treating Pneumonia  Pneumonia is an infection of one or both of the lungs. Pneumonia:    Is usually caused by either a virus or a bacteria    Can be very serious, especially in infants, young children, and older adults. It s also serious for those with other long-term health problems or weakened immune systems.    Is sometimes treated at home and sometimes in the hospital    Antibiotic medicines  Antibiotics may be prescribed for pneumonia caused by bacteria. They may be pills (oral medicines), or shots (injections). Or they may be given by IV (intravenously) into a vein. If you are taking oral medicines at home:    Fill your prescription and start taking your medicine as soon as you can.    You will likely start to feel better in a day or 2, but don t stop taking the antibiotic.    Use a pill organizer to help you remember to take your medicine.    Let your healthcare provider know if you have side effects.    Take your medicine exactly as directed on the label. Talk to your provider or pharmacist if you have any questions.  Antiviral medicines  Antiviral medicine may be prescribed for pneumonia caused by a virus. For example, antiviral medicine may be prescribed for pneumonia caused by the flu virus. Antibiotics do not work against viruses. If you are taking antiviral medicine at home:    Fill your prescription and start taking your medicine as soon as you can.    Talk with your provider or pharmacist about possible side effects.    Take the medicine exactly as instructed.  To relieve symptoms  There are many medicines that can help relieve symptoms of pneumonia. Some are prescription and some are over-the-counter.  Your healthcare provider may recommend:    Acetaminophen or ibuprofen to lower your fever and to lessen headache or other pain    Cough medicine to loosen mucus or to reduce coughing  Make sure you check with your healthcare provider or pharmacist before taking any over-the-counter  medicines.  Special treatments  If you are hospitalized for pneumonia, you may have other therapies, including:    Inhaled medicines to help with breathing or chest congestion    Supplemental oxygen to increase low oxygen levels  Drink fluids and eat healthy  You should eat healthy to help your body fight the infection. Drinking a lot of fluids helps to replace fluids lost from fever and to loosen mucus in your chest.    Diet. Make healthy food choices, including fruits and vegetables, lean meats and other proteins, 100% whole grain and low- or no-fat dairy products.    Fluids. Drink at least 6 to 8 tall glasses a day. Water and 100% fruit or vegetable juice are best.  Get plenty of rest and sleep  You may be more tired than usual for a while. It is important to get enough sleep at night. It s also important to rest during the day. Talk with your healthcare provider if coughing or other symptoms are interfering with your sleep.  Preventing the spread of germs  The best thing you can do to prevent spreading germs is to wash your hands often. You should:    Rub your hand with soap and water for 20 to 30 seconds.    Clean in between your fingers, the backs of your hands, and around your nails.    Dry your hands on a separate towel or use paper towels.  You should also:    Keep alcohol-based hand  nearby.    Make sure you also clean surfaces that you touch. Use a product that kills all types of germs.    Stay away from others until you are feeling better.  When to call your healthcare provider  Call your healthcare provider if you have any of the following:    Symptoms get worse    Fever continues    Shortness of breath gets worse    Increased mucus or mucus that is darker in color    Coughing gets worse    Lips or fingers are bluish in color    Side effects from your medicine   Date Last Reviewed: 12/1/2016 2000-2017 [employer]. [address] [place] [address], [place] [address]. All rights reserved.  This information is not intended as a substitute for professional medical care. Always follow your healthcare professional's instructions.

## 2017-12-08 NOTE — NURSING NOTE
"Chief Complaint   Patient presents with     Cough     [id number]       Initial /70 (BP Location: Right arm, Patient Position: Chair, Cuff Size: Adult Regular)  Pulse 96  Temp 98.5  F (36.9  C) (Oral)  Ht 5' 1.25\" (1.556 m)  Wt 176 lb (79.8 kg)  SpO2 95%  Breastfeeding? No  BMI 32.98 kg/m2 Estimated body mass index is 32.98 kg/(m^2) as calculated from the following:    Height as of this encounter: 5' 1.25\" (1.556 m).    Weight as of this encounter: 176 lb (79.8 kg).  Medication Reconciliation: complete     Karan Bailey CMA    "

## 2017-12-08 NOTE — PROGRESS NOTES
"  SUBJECTIVE:   Marixa Ann is a 67 year old female who presents to clinic today for the following health issues:    Acute Illness   Acute illness concerns: Cough  Onset: 2 months    Fever: no     Chills/Sweats: YES- chills    Headache (location?): YES- from coughing feels like head is going to explode    Sinus Pressure:YES-accupressure    Conjunctivitis:  no    Ear Pain: no    Rhinorrhea: YES    Congestion: YES    Sore Throat: YES     Cough: YES    Wheeze: no     Decreased Appetite: YES    Nausea: no     Vomiting: no     Diarrhea:  no     Dysuria/Freq.: no     Fatigue/Achiness: YES    Other: Dry heeve and feels like going to pass out sometimes    Sick/Strep Exposure: thinks she may have caught it from someone from clinic trip to visit since started getting sick after visit     Therapies Tried and outcome: Accupressure for sinus pressure    12-7-2017 phone message  During call, patient reported that she has had a dry cough for almost 2 months and it is not getting better. Patient is coughing so severely at times that she feels like she is going to pass out. Patient also reports urinary leakage when coughing, stating \"it is so embarrassing\". Cough started a few days after initial visit with Dr. Kovacs on 10/19/17. Patient reports coughing up clear mucus with cough. Patient denies any shortness of breath, fever, chest pain, and wheezing. Patient states it \"feels like a lump is in my chest\". Patient has been drinking lots of fluids-water and tea, and resting, all without relief of symptoms. Patient has not tried cough suppressants or throat drops as afraid may interact with medications she is taking.   Writer encouraged patient to follow up with PCP or alternative provider regarding ongoing cough and it's severity. Patient declining to see anyone else other than Dr. Kovacs as she knows her and has established with her. Patient declining to see any male physicians. Patient wanting to see Dr. Kovacs tomorrow if " possible, doesn't want to wait any longer. PCP booked tomorrow, only same day available.          Problem list and histories reviewed & adjusted, as indicated.  Additional history:     Patient Active Problem List   Diagnosis     Pain in joint, shoulder region     Moderate recurrent major depression (H)     Hyperlipidemia LDL goal <160     Myalgia     Chronic fatigue disorder     Past Surgical History:   Procedure Laterality Date     APPENDECTOMY  1967       Social History   Substance Use Topics     Smoking status: Never Smoker     Smokeless tobacco: Never Used     Alcohol use Not on file     No family history on file.      Current Outpatient Prescriptions   Medication Sig Dispense Refill     azithromycin (ZITHROMAX) 250 MG tablet Two tablets first day, then one tablet daily for four days. 6 tablet 0     benzonatate (TESSALON) 200 MG capsule Take 1 capsule (200 mg) by mouth 3 times daily as needed for cough 21 capsule 3     buPROPion (WELLBUTRIN) 100 MG tablet Take 1 tablet (100 mg) by mouth 2 times daily 60 tablet 3     FLUoxetine (PROZAC) 10 MG tablet Take 1 tablet (10 mg) by mouth daily 30 tablet 0     Multiple Vitamins-Minerals (MULTIVITAMIN PO)        Cholecalciferol (VITAMIN D-3 PO)        RANITIDINE HCL PO Take by mouth daily       B Complex Vitamins (VITAMIN B-COMPLEX PO) Take by mouth daily       [DISCONTINUED] FLUoxetine 20 MG tablet Take by mouth every morning       Allergies   Allergen Reactions     Dust Mites      Recent Labs   Lab Test  10/19/17   1412   LDL  98   HDL  40*   TRIG  82   ALT  26   CR  0.72   GFRESTIMATED  81   GFRESTBLACK  >90   POTASSIUM  3.9   TSH  1.35      BP Readings from Last 3 Encounters:   12/08/17 110/70   11/06/17 117/69   10/19/17 104/66    Wt Readings from Last 3 Encounters:   12/08/17 176 lb (79.8 kg)   11/06/17 177 lb (80.3 kg)   10/19/17 182 lb (82.6 kg)                  Labs reviewed in EPIC          Reviewed and updated as needed this visit by clinical staffTobacco   "Allergies  Meds       Reviewed and updated as needed this visit by Provider         ROS:  Constitutional, HEENT, cardiovascular, pulmonary, gi and gu systems are negative, except as otherwise noted.      OBJECTIVE:   /70 (BP Location: Right arm, Patient Position: Chair, Cuff Size: Adult Regular)  Pulse 96  Temp 98.5  F (36.9  C) (Oral)  Ht 5' 1.25\" (1.556 m)  Wt 176 lb (79.8 kg)  SpO2 95%  Breastfeeding? No  BMI 32.98 kg/m2  Body mass index is 32.98 kg/(m^2).  GENERAL: acutely ill, alert, well nourished, well hydrated, moderate distress when coughing with spasm, with cough  HENT: ear canals- normal; TMs- normal; Nose- rhinorrhea ; Mouth- no ulcers, no lesions, throat is erythematous without exudate. Sinuses without tenderness to percussion.   NECK: no tenderness, negative anterior cervical adenopathy, no asymmetry, no masses, no stiffness; thyroid- normal to palpation  RESP: lungs clear to auscultation - no rales, no rhonchi, some expiratory wheezes  CV: regular rates and rhythm, normal S1 S2, no S3 or S4 and no murmur, no click or rub -  ABDOMEN: soft, no tenderness, no  hepatosplenomegaly, no masses, normal bowel sounds  MS: extremities- no gross deformities noted, no edema  SKIN: no suspicious lesions, no rashes  PSYCH: Alert and oriented times 3; affect- normal     Diagnostic Test Results:  No results found for this or any previous visit (from the past 24 hour(s)).     Chest xray shows a left lower lobe pneumonia.     ASSESSMENT/PLAN:         Tobacco Cessation:   reports that she has never smoked. She has never used smokeless tobacco.      BMI:   Estimated body mass index is 32.98 kg/(m^2) as calculated from the following:    Height as of this encounter: 5' 1.25\" (1.556 m).    Weight as of this encounter: 176 lb (79.8 kg).               ICD-10-CM    1. Pneumonia of left lower lobe due to infectious organism (H) J18.1 azithromycin (ZITHROMAX) 250 MG tablet   2. Persistent cough R05 XR Chest 2 " Views- Left lower lobe pneumonia.      benzonatate (TESSALON) 200 MG capsule three times a day as needed for cough. Symptomatic treatment with rest, fluids, tylenol and OTC cold medications as needed. Call if symptoms worse or do not improve for further evaluation and treatment. If better repeat x ray in 6 weeks.        FUTURE APPOINTMENTS:       - Follow-up visit in 6 weeks to repeat chest xray. Call in 3-4 days if not improving to change antibiotics   See Patient Instructions    Jackie Kovacs MD  [facility]     out of season (available sept 1 thru apr 2 only)

## 2017-12-11 NOTE — PROGRESS NOTES
Dear Marixa Ann,    Your test results are attached. I am happy to let you know that they are stable and your medications can stay the same.    The radiologist did not see a pneumonia. You do not need a follow up chest xray. Please continue to take the antibiotics until gone.     Please call me if you have any questions about these test results or about your care.    Sincerely,    Jackie Kovacs MD

## 2017-12-12 ENCOUNTER — TELEPHONE (OUTPATIENT)
Dept: FAMILY MEDICINE | Facility: CLINIC | Age: 67
End: 2017-12-12

## 2017-12-12 DIAGNOSIS — J18.9 PNEUMONIA OF LEFT LOWER LOBE DUE TO INFECTIOUS ORGANISM: Primary | ICD-10-CM

## 2017-12-12 DIAGNOSIS — R05.3 PERSISTENT COUGH: ICD-10-CM

## 2017-12-12 RX ORDER — BENZONATATE 200 MG/1
200 CAPSULE ORAL 3 TIMES DAILY PRN
Qty: 21 CAPSULE | Refills: 3 | Status: SHIPPED | OUTPATIENT
Start: 2017-12-12 | End: 2018-05-31

## 2017-12-12 NOTE — TELEPHONE ENCOUNTER
I ran this by our internist who thinks we should get a CT scan to see what is going on in the lungs. I will put an order to have this done this week at [facility]. If this shows a pneumonia, then we will use Levaquin.  Please call to schedule your MRI scan or CT scan at [facility] by calling [phone].   Jackie Kovacs MD

## 2017-12-12 NOTE — TELEPHONE ENCOUNTER
Reason for Call:  Medication or medication refill:    Do you use a [facility]?  Name of the pharmacy and phone number for the current request:  [id number] - [place], [place] - [address]    Name of the medication requested: benzonatate (TESSALON) 200 MG capsule    Other request: Also requesting for a new antibiotic azithromycin (ZITHROMAX) 250 MG tablet is not working she has taken them all and still feels the same.     Can we leave a detailed message on this number? YES    Phone number patient can be reached at: Home number on file [phone] (home)    Best Time: Any    Call taken on 12/12/2017 at 11:09 AM by Bailee Mondragon

## 2017-12-13 ENCOUNTER — RADIANT APPOINTMENT (OUTPATIENT)
Dept: CT IMAGING | Facility: CLINIC | Age: 67
End: 2017-12-13
Attending: FAMILY MEDICINE
Payer: COMMERCIAL

## 2017-12-13 DIAGNOSIS — R05.3 PERSISTENT COUGH: ICD-10-CM

## 2017-12-13 DIAGNOSIS — E04.2 NON-TOXIC MULTINODULAR GOITER: ICD-10-CM

## 2017-12-13 DIAGNOSIS — J18.9 PNEUMONIA OF LEFT LOWER LOBE DUE TO INFECTIOUS ORGANISM: ICD-10-CM

## 2017-12-13 DIAGNOSIS — K76.9 LIVER LESION: Primary | ICD-10-CM

## 2017-12-13 LAB — RADIOLOGIST FLAGS: NORMAL

## 2017-12-13 PROCEDURE — 71250 CT THORAX DX C-: CPT | Performed by: RADIOLOGY

## 2017-12-14 ENCOUNTER — TELEPHONE (OUTPATIENT)
Dept: FAMILY MEDICINE | Facility: CLINIC | Age: 67
End: 2017-12-14

## 2017-12-14 PROBLEM — K76.9 LIVER LESION: Status: ACTIVE | Noted: 2017-12-14

## 2017-12-14 PROBLEM — E04.2 NON-TOXIC MULTINODULAR GOITER: Status: ACTIVE | Noted: 2017-12-14

## 2017-12-14 NOTE — TELEPHONE ENCOUNTER
Re: CHEST CT  Radiology flagged for liver lesion and lung nodules  Follow up advised    Thank you

## 2017-12-15 ENCOUNTER — TELEPHONE (OUTPATIENT)
Dept: FAMILY MEDICINE | Facility: CLINIC | Age: 67
End: 2017-12-15

## 2017-12-15 NOTE — TELEPHONE ENCOUNTER
Michelle Mccarty contacted Marixa on 12/15/17 and left a message. If patient calls back please schedule appointment as soon as possible.

## 2017-12-15 NOTE — TELEPHONE ENCOUNTER
Result note completed. MRI scan ordered for follow up of liver lesions. Schedule follow up for cough and other symptoms.  Jackie Kovacs MD

## 2017-12-15 NOTE — LETTER
December 15, 2017      Marixa Ann  [id number] [facility] [address]        Dear Marixa Ann,     At [facility] we care about your health and are committed to providing quality patient care.    Which includes staying current on preventive cancer screenings.  You can increase your chances of finding and treating cancers through regular screenings.      Our records indicate you may be due for the following preventive screening(s):    Colonoscopy    Colonoscopy is recommended every ten years for everyone age 50 and older. Please take a moment to read over the enclosed information packet about colon cancer screening. We strongly urge our patient's to consider having a colonoscopy done, which is the best screening test available and only needs to be done every 10 years if normal. If you are unwilling or unable to have a colonoscopy then we recommend the annual stool testing for blood. This test is called a FIT test and it looks for blood in the stool.     To schedule an appointment or discuss this screening further, you may contact us by phone at the [facility] at [phone] or online through the patient portal/[email or web address]    If you have had any of the screenings listed above at another facility, please call us so that we may update your chart.      Your partners in health,      Quality Committee at [facility]/ENE

## 2017-12-15 NOTE — TELEPHONE ENCOUNTER
Panel Management Review      BP Readings from Last 1 Encounters:   12/08/17 110/70    , No results found for: A1C, 12/14/2017  Last Office Visit with this department: 12/14/2017    Fail List measure: colonoscopy      Patient is due/failing the following:   FIT    Action needed:   Colon cancer screening    Type of outreach:    Sent letter.    Questions for provider review:    None                                                                                                                                    Michelle Mccarty MA      Chart routed to  .

## 2017-12-15 NOTE — PROGRESS NOTES
Please call patient with results (If unable to reach patient, this may be sent as a letter instead):    Dear Marixa Ann,     The CT scan shows multiple nodules in the thyroid and some in the liver. There is no pneumonia or significant nodules in the lungs. They recommend an MRI to find out what is causing the lesions in the liver. I will put in an order for this.       Jackie Kovacs MD

## 2017-12-15 NOTE — TELEPHONE ENCOUNTER
Patient took Dr Littlejohn first available which is not until thurs 12/21  Call to advise if she should be seen earlier and help her work that in    Thank you    [phone]  Leave confirmation if she doesn't

## 2017-12-21 ENCOUNTER — OFFICE VISIT (OUTPATIENT)
Dept: FAMILY MEDICINE | Facility: CLINIC | Age: 67
End: 2017-12-21
Payer: COMMERCIAL

## 2017-12-21 VITALS
BODY MASS INDEX: 32.85 KG/M2 | DIASTOLIC BLOOD PRESSURE: 73 MMHG | WEIGHT: 174 LBS | OXYGEN SATURATION: 94 % | HEART RATE: 100 BPM | SYSTOLIC BLOOD PRESSURE: 115 MMHG | TEMPERATURE: 98.2 F | HEIGHT: 61 IN

## 2017-12-21 DIAGNOSIS — G93.32 CHRONIC FATIGUE DISORDER: ICD-10-CM

## 2017-12-21 DIAGNOSIS — R05.3 PERSISTENT COUGH FOR 3 WEEKS OR LONGER: Primary | ICD-10-CM

## 2017-12-21 DIAGNOSIS — Q44.6 CYSTIC DISEASE OF LIVER: ICD-10-CM

## 2017-12-21 PROCEDURE — 99214 OFFICE O/P EST MOD 30 MIN: CPT | Performed by: FAMILY MEDICINE

## 2017-12-21 RX ORDER — ALBUTEROL SULFATE 90 UG/1
2 AEROSOL, METERED RESPIRATORY (INHALATION) EVERY 6 HOURS PRN
Qty: 1 INHALER | Refills: 3 | Status: SHIPPED | OUTPATIENT
Start: 2017-12-21 | End: 2018-05-31

## 2017-12-21 RX ORDER — CODEINE PHOSPHATE AND GUAIFENESIN 10; 100 MG/5ML; MG/5ML
1 SOLUTION ORAL EVERY 4 HOURS PRN
Qty: 120 ML | Refills: 3 | Status: SHIPPED | OUTPATIENT
Start: 2017-12-21 | End: 2018-05-31

## 2017-12-21 ASSESSMENT — PAIN SCALES - GENERAL: PAINLEVEL: EXTREME PAIN (8)

## 2017-12-21 NOTE — PATIENT INSTRUCTIONS
At [facility], we strive to deliver an exceptional experience to you, every time we see you.  If you receive a survey in the mail, please send us back your thoughts. We really do value your feedback.    Based on your medical history, these are the current health maintenance/preventive care services that you are due for (some may have been done at this visit.)  Health Maintenance Due   Topic Date Due     TETANUS IMMUNIZATION (SYSTEM ASSIGNED)  10/20/1970     DEPRESSION ACTION PLAN Q1 YR  10/20/1970     HEPATITIS C SCREENING  10/20/1970     COLON CANCER SCREEN (SYSTEM ASSIGNED)  10/20/2002     ADVANCE DIRECTIVE PLANNING Q5 YRS  10/20/2007     DEXA SCAN SCREENING (SYSTEM ASSIGNED)  10/20/2017     PNEUMOCOCCAL (1 of 2 - PCV13) 10/20/2017         Suggested websites for health information:  Www.Jumping Nuts.[employer] : Up to date and easily searchable information on multiple topics.  Www.[employer].gov : medication info, interactive tutorials, watch real surgeries online  Www.familydoctor.org : good info from the Academy of Family Physicians  Www.cdc.gov : public health info, travel advisories, epidemics (H1N1)  Www.aap.org : children's health info, normal development, vaccinations  Www.health.[facility].us : [place] dept of health, public health issues in [place], N1N1    Your care team:                            Family Medicine Internal Medicine   MD Kyle Mancera MD Shantel Branch-Fleming, MD Katya Georgiev PA-C Nam Ho, MD Pediatrics   OCTAVIO Ruiz, MD Alisa Keen CNP, MD Deborah Mielke, MD Kim Thein, APRRODNEY [facility]      Clinic hours: Monday - Thursday 7 am-7 pm; Fridays 7 am-5 pm.   Urgent care: Monday - Friday 11 am-9 pm; Saturday and Sunday 9 am-5 pm.  Pharmacy : Monday -Thursday 8 am-8 pm; Friday 8 am-6 pm; Saturday and Sunday 9 am-5 pm.     Clinic: [phone]   Pharmacy: [phone]    Cough, Chronic, Uncertain  Cause (Adult)    Everyone has had a cough as part of the common cold, flu, or bronchitis. This kind of cough occurs along with an achy feeling, low-grade fever, nasal and sinus congestion, and a scratchy or sore throat. This usually gets better in 2 to 3 weeks. A cough that lasts longer than 3 weeks may be due to other causes.  If your cough does not improve over the next 2 weeks, further testing may be needed. Follow up with your healthcare provider as advised. Cough suppressants may be recommended. Based on your exam today, the exact cause of your cough is not certain. Below are some common causes for persistent cough.  Smokers cough  Smoker s cough doesn t go away. If you continue to smoke, it only gets worse. The cough is from irritation in the air passages. Talk to your healthcare provider about quitting. Medicines or nicotine-replacement products, like gum or the patch, may make quitting easier.  Postnasal drip  A cough that is worse at night may be due to postnasal drip. Excess mucus in the nose drains from the back of your nose to your throat. This triggers the cough reflex. Postnasal drip may be due to a sinus infection or allergy. Common allergens include dust, tobacco smoke (both inhaled and secondhand smoke), environmental pollutants, pollen, mold, pets, cleaning agents, room deodorizers, and chemical fumes. Over-the-counter antihistamines or decongestants may be helpful for allergies. A sinus infection may requires antibiotic treatment. See your healthcare provider if symptoms continue.  Medicines  Certain prescribed medicines can cause a chronic cough in some people:    ACE inhibitors for high blood pressure. These include benazepril, captopril, enalapril, fosinopril, lisinopril, quinapril, ramipril, and others.    Beta-blockers for high blood pressure and other conditions. These include propranolol, atenolol, metoprolol, nadolol, and others.  Let your healthcare provider know if you are taking any of  these.  Asthma  Cough may be the only sign of mild asthma. You may have tests to find out if asthma is causing your cough. You may also take asthma medicine on a trial basis.  Acid reflux (heartburn, GERD)  The esophagus is the tube that carries food from the mouth to the stomach. A valve at its lower end prevents stomach acids from flowing upward. If this valve does not work properly, acid from the stomach enters the esophagus. This may cause a burning pain in the upper abdomen or lower chest, belching, or cough. Symptoms are often worse when lying flat. Avoid eating or drinking before bedtime. Try using extra pillows to raise your upper body, or place 4-inch blocks under the head of your bed. You may try an over-the-counter antacid or an acid-blocking medicine such as famotidine, cimetidine, ranitidine, esomeprazole, lansoprazole, or omeprazole. Stronger medicines for this condition can be prescribed by your healthcare provider.  Follow-up care  Follow up with your healthcare provider, or as advised, if your cough does not improve. Further testing may be needed.  Note: If an X-ray was taken, a specialist will review it. You will be notified of any new findings that may affect your care.  When to seek medical advice  Call your healthcare provider right away if any of these occur:    Mild wheezing or difficulty breathing    Fever of 100.4 F (38 C) or higher, or as directed by your healthcare provider    Unexpected weight loss    Coughing up large amounts of colored sputum    Night sweats (sheets and pajamas get soaking wet)  Call 911, or get immediate medical care  Contact emergency services right away if any of these occur:    Coughing up blood    Moderate to severe trouble breathing or wheezing  Date Last Reviewed: 9/13/2015 2000-2017 [employer]. [address] [place] [address], [place] [address]. All rights reserved. This information is not intended as a substitute for professional medical care. Always  follow your healthcare professional's instructions.

## 2017-12-21 NOTE — PROGRESS NOTES
SUBJECTIVE:   Marixa Ann is a 67 year old female who presents to clinic today for the following health issues:    Concern - Cough  Onset: Persistent    Description:   Patient following up on cough and imaging results. She wants to discuss about reason for MRI order and next plan. Questions if related to tuberculosis and whooping cough. CT normal and no signs of TB or infection. May have post viral cough but nothing acute. Still having severe coughing spasms. Worse laying down or after eating.     Intensity: severe    Progression of Symptoms:  same    Accompanying Signs & Symptoms:  Passing out for few seconds    Previous history of similar problem:   yes    Precipitating factors:   Worsened by: None    Alleviating factors:  Improved by: None    Therapies Tried and outcome: Antibiotic      Hyperlipidemia Follow-Up      Rate your low fat/cholesterol diet?: good    Taking statin?  No    Other lipid medications/supplements?:  none    Depression and Anxiety Follow-Up    Status since last visit: No change    Other associated symptoms:None    Complicating factors:     Significant life event: No     Current substance abuse: None    PHQ-9 Score and MyChart F/U Questions 11/6/2017   Total Score 21   Q9: Suicide Ideation Not at all     DONOVAN-7 SCORE 11/6/2017   Total Score 14       PHQ-9  English  PHQ-9   Any Language  GAD7  Suicide Assessment Five-step Evaluation and Treatment (SAFE-T)    Hepatic cysts seen on chest CT scan but these are old and seen on CT in 2013, stable 2015 and do not need follow up. Multinodular goiter with FNA previously assessed and should be followed up with a repeat ultrasound.        Problem list and histories reviewed & adjusted, as indicated.  Additional history: as documented    Patient Active Problem List   Diagnosis     Pain in joint, shoulder region     Moderate recurrent major depression (H)     Hyperlipidemia LDL goal <160     Myalgia     Chronic fatigue disorder     Liver lesion      Non-toxic multinodular goiter     Persistent cough for 3 weeks or longer     Cystic disease of liver     Past Surgical History:   Procedure Laterality Date     APPENDECTOMY  1967       Social History   Substance Use Topics     Smoking status: Never Smoker     Smokeless tobacco: Never Used     Alcohol use Not on file     History reviewed. No pertinent family history.      Current Outpatient Prescriptions   Medication Sig Dispense Refill     albuterol (PROAIR HFA/PROVENTIL HFA/VENTOLIN HFA) 108 (90 BASE) MCG/ACT Inhaler Inhale 2 puffs into the lungs every 6 hours as needed for shortness of breath / dyspnea or wheezing 1 Inhaler 3     guaiFENesin-codeine (ROBITUSSIN AC) 100-10 MG/5ML SOLN solution Take 5 mLs by mouth every 4 hours as needed for cough 120 mL 3     benzonatate (TESSALON) 200 MG capsule Take 1 capsule (200 mg) by mouth 3 times daily as needed for cough 21 capsule 3     buPROPion (WELLBUTRIN) 100 MG tablet Take 1 tablet (100 mg) by mouth 2 times daily 60 tablet 3     Multiple Vitamins-Minerals (MULTIVITAMIN PO)        Cholecalciferol (VITAMIN D-3 PO)        RANITIDINE HCL PO Take by mouth daily       B Complex Vitamins (VITAMIN B-COMPLEX PO) Take by mouth daily       Allergies   Allergen Reactions     Dust Mites      Recent Labs   Lab Test  10/19/17   1412   LDL  98   HDL  40*   TRIG  82   ALT  26   CR  0.72   GFRESTIMATED  81   GFRESTBLACK  >90   POTASSIUM  3.9   TSH  1.35      BP Readings from Last 3 Encounters:   12/21/17 115/73   12/08/17 110/70   11/06/17 117/69    Wt Readings from Last 3 Encounters:   12/21/17 174 lb (78.9 kg)   12/08/17 176 lb (79.8 kg)   11/06/17 177 lb (80.3 kg)                  Labs reviewed in EPIC          Reviewed and updated as needed this visit by clinical staffTobacco  Allergies  Med Hx  Surg Hx  Fam Hx  Soc Hx      Reviewed and updated as needed this visit by Provider         ROS:  Constitutional, HEENT, cardiovascular, pulmonary, gi and gu systems are negative,  "except as otherwise noted.      OBJECTIVE:   /73 (BP Location: Right arm, Patient Position: Chair, Cuff Size: Adult Regular)  Pulse 100  Temp 98.2  F (36.8  C) (Oral)  Ht 5' 1.25\" (1.556 m)  Wt 174 lb (78.9 kg)  SpO2 94%  Breastfeeding? No  BMI 32.61 kg/m2  Body mass index is 32.61 kg/(m^2).  GENERAL: healthy, alert, well nourished, well hydrated, no distress, obese, coughing spasms   HENT: ear canals- normal; TMs- normal; Nose- normal; Mouth- no ulcers, no lesions, throat is clear with no erythema or exudate.   NECK: no tenderness, no adenopathy, no asymmetry, no masses, no stiffness; thyroid- normal to palpation  RESP: lungs clear to auscultation - no rales, no rhonchi, no wheezes, paroxysms of cough when taking a deep breath.   CV: regular rates and rhythm, normal S1 S2, no S3 or S4 and no murmur, no click or rub -  ABDOMEN: soft, no tenderness, no  hepatosplenomegaly, no masses, normal bowel sounds  MS: extremities- no gross deformities noted, no edema  SKIN: no suspicious lesions, no rashes  NEURO: strength and tone- normal, sensory exam- grossly normal, mentation- intact, speech- normal, reflexes- symmetric  BACK: no CVA tenderness, no paralumbar tenderness  PSYCH: Alert and oriented times 3; speech- coherent , normal rate and volume; able to articulate logical thoughts, able to abstract reason, no tangential thoughts, no hallucinations or delusions, affect- normal     Diagnostic Test Results:  Results for orders placed or performed in visit on 12/13/17   CT Chest w/o Contrast   Result Value Ref Range    Radiologist flags Liver lesion, lung nodules     Narrative    EXAMINATION: CT CHEST W/O CONTRAST, 12/13/2017 9:47 AM    CLINICAL HISTORY: ; Persistent cough; Pneumonia of left lower lobe due  to infectious organism (H)    COMPARISON: Radiograph 12/8/2015.    TECHNIQUE: CT imaging obtained through the chest without contrast.  Coronal and axial MIP reformatted images obtained.     CONTRAST:  " "none.    FINDINGS:    Lines and tubes: None.    Mediastinum: Multinodular thyroid, largest of which measures 3.2 cm on  the right. Central tracheobronchial tree is patent. Heart size is  normal. No pericardial effusion.  Normal thoracic vasculature. No  thoracic lymphadenopathy. Small hiatal hernia.    Lungs: 3 mm pulmonary nodule in the superior lingula (series 6, image  154), and a 2 mm pulmonary nodule in the superior right lower lobe  (series 6, image 135). No consolidation. No pleural effusion or  pneumothorax.    Bones and soft tissues: No suspicious bone findings.     Upper abdomen: Limited. Multiple hypodense liver lesions. The  incompletely visualized 1.8 cm lesion in the left liver lobe  demonstrates indeterminant density (series 2, image 57); the remaining  lesions are either too small to characterize by CT or have fluid  density consistent with simple hepatic cysts.      Impression    IMPRESSION:   1. No findings of active infection.  2. Indeterminate liver lesion. Recommend MRI with contrast for further  characterization.  3. Small hiatal hernia.  4. Two sub-4 mm pulmonary nodules. Per the Fleischner guidelines,  follow up can be considered in 12 months if the patient is at high  risk for developing lung cancer.    [Recommend Follow Up: Liver lesion, lung nodules]    This report will be copied to the [facility] to ensure a  provider acknowledges the finding.     I have personally reviewed the examination and initial interpretation  and I agree with the findings.    KARUNA ELLIS MD       ASSESSMENT/PLAN:         BMI:   Estimated body mass index is 32.61 kg/(m^2) as calculated from the following:    Height as of this encounter: 5' 1.25\" (1.556 m).    Weight as of this encounter: 174 lb (78.9 kg).           1. Persistent cough for 3 weeks or longer  Severe coughing without clear etiology. May be having reflux or post viral persistent coughing. Will try albuterol inhaler and codeine to see if " symptoms can be controlled. Lung specialist referral.   - albuterol (PROAIR HFA/PROVENTIL HFA/VENTOLIN HFA) 108 (90 BASE) MCG/ACT Inhaler; Inhale 2 puffs into the lungs every 6 hours as needed for shortness of breath / dyspnea or wheezing  Dispense: 1 Inhaler; Refill: 3  - PULMONARY MEDICINE REFERRAL  - guaiFENesin-codeine (ROBITUSSIN AC) 100-10 MG/5ML SOLN solution; Take 5 mLs by mouth every 4 hours as needed for cough  Dispense: 120 mL; Refill: 3    2. Chronic fatigue disorder  Has elevated inflammatory markers and fibromyalgia symptoms. Recommend rheumatology evaluation and treatment to make sure there are no other mixed connective tissue disorders.     3. Cystic disease of liver  Stable over past 4 years.       CONSULTATION/REFERRAL to orthopedics for knee, rheumatology and pulmonary  FUTURE LABS:       - Schedule fasting labs in 3 months  FUTURE APPOINTMENTS:       - Follow-up visit in 3 months or sooner if any questions or concerns.   Work on weight loss  Regular exercise  See Patient Instructions    Jackie Kovacs MD  [facility]

## 2017-12-21 NOTE — MR AVS SNAPSHOT
After Visit Summary   12/21/2017    Marixa Ann    MRN: [id number]           Patient Information     Date Of Birth          1950        Visit Information        Provider Department      12/21/2017 3:20 PM Jackie Kovacs MD [facility]        Today's Diagnoses     Persistent cough for 3 weeks or longer    -  1    Chronic fatigue disorder        Cystic disease of liver          Care Instructions    At [facility], we strive to deliver an exceptional experience to you, every time we see you.  If you receive a survey in the mail, please send us back your thoughts. We really do value your feedback.    Based on your medical history, these are the current health maintenance/preventive care services that you are due for (some may have been done at this visit.)  Health Maintenance Due   Topic Date Due     TETANUS IMMUNIZATION (SYSTEM ASSIGNED)  10/20/1970     DEPRESSION ACTION PLAN Q1 YR  10/20/1970     HEPATITIS C SCREENING  10/20/1970     COLON CANCER SCREEN (SYSTEM ASSIGNED)  10/20/2002     ADVANCE DIRECTIVE PLANNING Q5 YRS  10/20/2007     DEXA SCAN SCREENING (SYSTEM ASSIGNED)  10/20/2017     PNEUMOCOCCAL (1 of 2 - PCV13) 10/20/2017         Suggested websites for health information:  Www.[employer].net : Up to date and easily searchable information on multiple topics.  Www.medlineplus.gov : medication info, interactive tutorials, watch real surgeries online  Www.familydoctor.org : good info from the Academy of Family Physicians  Www.cdc.gov : public health info, travel advisories, epidemics (H1N1)  Www.aap.org : children's health info, normal development, vaccinations  Www.health.state.[place].us : [place] dept of health, public health issues in [place], N1N1    Your care team:                            Family Medicine Internal Medicine   MD Kyle Mancera MD Shantel Branch-Fleming, MD Katya Georgiev PA-C Nam Ho, MD Pediatrics   Emigdio Robert,  OCTAVIO Ralph, JOSEPH Shayia Danny APRN MD Alisa Zuniga MD Deborah Mielke, MD Kim Thein, APRN CNP      Clinic hours: Monday - Thursday 7 am-7 pm; Fridays 7 am-5 pm.   Urgent care: Monday - Friday 11 am-9 pm; Saturday and Sunday 9 am-5 pm.  Pharmacy : Monday -Thursday 8 am-8 pm; Friday 8 am-6 pm; Saturday and Sunday 9 am-5 pm.     Clinic: [phone]   Pharmacy: [phone]    Cough, Chronic, Uncertain Cause (Adult)    Everyone has had a cough as part of the common cold, flu, or bronchitis. This kind of cough occurs along with an achy feeling, low-grade fever, nasal and sinus congestion, and a scratchy or sore throat. This usually gets better in 2 to 3 weeks. A cough that lasts longer than 3 weeks may be due to other causes.  If your cough does not improve over the next 2 weeks, further testing may be needed. Follow up with your healthcare provider as advised. Cough suppressants may be recommended. Based on your exam today, the exact cause of your cough is not certain. Below are some common causes for persistent cough.  Smokers cough  Smoker s cough doesn t go away. If you continue to smoke, it only gets worse. The cough is from irritation in the air passages. Talk to your healthcare provider about quitting. Medicines or nicotine-replacement products, like gum or the patch, may make quitting easier.  Postnasal drip  A cough that is worse at night may be due to postnasal drip. Excess mucus in the nose drains from the back of your nose to your throat. This triggers the cough reflex. Postnasal drip may be due to a sinus infection or allergy. Common allergens include dust, tobacco smoke (both inhaled and secondhand smoke), environmental pollutants, pollen, mold, pets, cleaning agents, room deodorizers, and chemical fumes. Over-the-counter antihistamines or decongestants may be helpful for allergies. A sinus infection may requires antibiotic treatment. See your healthcare  provider if symptoms continue.  Medicines  Certain prescribed medicines can cause a chronic cough in some people:    ACE inhibitors for high blood pressure. These include benazepril, captopril, enalapril, fosinopril, lisinopril, quinapril, ramipril, and others.    Beta-blockers for high blood pressure and other conditions. These include propranolol, atenolol, metoprolol, nadolol, and others.  Let your healthcare provider know if you are taking any of these.  Asthma  Cough may be the only sign of mild asthma. You may have tests to find out if asthma is causing your cough. You may also take asthma medicine on a trial basis.  Acid reflux (heartburn, GERD)  The esophagus is the tube that carries food from the mouth to the stomach. A valve at its lower end prevents stomach acids from flowing upward. If this valve does not work properly, acid from the stomach enters the esophagus. This may cause a burning pain in the upper abdomen or lower chest, belching, or cough. Symptoms are often worse when lying flat. Avoid eating or drinking before bedtime. Try using extra pillows to raise your upper body, or place 4-inch blocks under the head of your bed. You may try an over-the-counter antacid or an acid-blocking medicine such as famotidine, cimetidine, ranitidine, esomeprazole, lansoprazole, or omeprazole. Stronger medicines for this condition can be prescribed by your healthcare provider.  Follow-up care  Follow up with your healthcare provider, or as advised, if your cough does not improve. Further testing may be needed.  Note: If an X-ray was taken, a specialist will review it. You will be notified of any new findings that may affect your care.  When to seek medical advice  Call your healthcare provider right away if any of these occur:    Mild wheezing or difficulty breathing    Fever of 100.4 F (38 C) or higher, or as directed by your healthcare provider    Unexpected weight loss    Coughing up large amounts of colored  sputum    Night sweats (sheets and pajamas get soaking wet)  Call 911, or get immediate medical care  Contact emergency services right away if any of these occur:    Coughing up blood    Moderate to severe trouble breathing or wheezing  Date Last Reviewed: 9/13/2015 2000-2017 [employer]. [address] [place] [address]. All rights reserved. This information is not intended as a substitute for professional medical care. Always follow your healthcare professional's instructions.                Follow-ups after your visit        Additional Services     PULMONARY MEDICINE REFERRAL       Your provider has referred you to: [facility]: [facility] (Adults & Pediatrics) - [place] [phone]   [email or web address]    Please be aware that coverage of these services is subject to the terms and limitations of your health insurance plan.  Call member services at your health plan with any benefit or coverage questions.      Please bring the following with you to your appointment:    (1) Any X-Rays, CTs or MRIs which have been performed.  Contact the facility where they were done to arrange for  prior to your scheduled appointment.    (2) List of current medications   (3) This referral request   (4) Any documents/labs given to you for this referral                  Follow-up notes from your care team     Return in about 3 months (around 3/21/2018).      Who to contact     If you have questions or need follow up information about today's clinic visit or your schedule please contact [facility] directly at [phone].  Normal or non-critical lab and imaging results will be communicated to you by MyChart, letter or phone within 4 business days after the clinic has received the results. If you do not hear from us within 7 days, please contact the clinic through MyChart or phone. If you have a critical or abnormal  "lab result, we will notify you by phone as soon as possible.  Submit refill requests through [employer] or call your pharmacy and they will forward the refill request to us. Please allow 3 business days for your refill to be completed.          Additional Information About Your Visit        Results Scorecardhart Information     [employer] lets you send messages to your doctor, view your test results, renew your prescriptions, schedule appointments and more. To sign up, go to [email or web address] . Click on \"Log in\" on the left side of the screen, which will take you to the Welcome page. Then click on \"Sign up Now\" on the right side of the page.     You will be asked to enter the access code listed below, as well as some personal information. Please follow the directions to create your username and password.     Your access code is: [email or web address]  Expires: 2018  1:10 PM     Your access code will  in 90 days. If you need help or a new code, please call your [facility] clinic or [phone].        Care EveryWhere ID     This is your Care EveryWhere ID. This could be used by other organizations to access your [facility] medical records  [id number]        Your Vitals Were     Pulse Temperature Height Pulse Oximetry Breastfeeding? BMI (Body Mass Index)    100 98.2  F (36.8  C) (Oral) 5' 1.25\" (1.556 m) 94% No 32.61 kg/m2       Blood Pressure from Last 3 Encounters:   17 115/73   17 110/70   17 117/69    Weight from Last 3 Encounters:   17 174 lb (78.9 kg)   17 176 lb (79.8 kg)   17 177 lb (80.3 kg)              We Performed the Following     PULMONARY MEDICINE REFERRAL          Today's Medication Changes          These changes are accurate as of: 17  4:06 PM.  If you have any questions, ask your nurse or doctor.               Start taking these medicines.        Dose/Directions    albuterol 108 (90 BASE) MCG/ACT Inhaler   Commonly known as:  PROAIR HFA/PROVENTIL HFA/VENTOLIN HFA   Used " for:  Persistent cough for 3 weeks or longer   Started by:  Jackie Kovacs MD        Dose:  2 puff   Inhale 2 puffs into the lungs every 6 hours as needed for shortness of breath / dyspnea or wheezing   Quantity:  1 Inhaler   Refills:  3       guaiFENesin-codeine 100-10 MG/5ML Soln solution   Commonly known as:  ROBITUSSIN AC   Used for:  Persistent cough for 3 weeks or longer   Started by:  Jackie Kovacs MD        Dose:  1 tsp.   Take 5 mLs by mouth every 4 hours as needed for cough   Quantity:  120 mL   Refills:  3            Where to get your medicines      These medications were sent to [id number] - [place], [place] - [address]  [address], [facility] [address]     Phone:  [phone]     albuterol 108 (90 BASE) MCG/ACT Inhaler         Some of these will need a paper prescription and others can be bought over the counter.  Ask your nurse if you have questions.     Bring a paper prescription for each of these medications     guaiFENesin-codeine 100-10 MG/5ML Soln solution                Primary Care Provider Office Phone # Fax #    [facility] [phone]       [address]  [facility] [address]        Equal Access to Services     IMAN BROWN AH: Hadii aad ku hadasho Soomaali, waaxda luqadaha, qaybta kaalmada adeegyada, waxay idiin hayaan adeeg kharajack baeza. So [facility] [phone].    ATENCIÓN: Si habla español, tiene a lombardo disposición servicios gratjacobos de asistencia lingüística. Llame al [phone].    We comply with applicable federal civil rights laws and [place] laws. We do not discriminate on the basis of race, color, national origin, age, disability, sex, sexual orientation, or gender identity.            Thank you!     Thank you for choosing [facility]  for your care. Our goal is always to provide you with excellent care. Hearing back from our patients is one way we can continue to improve our  services. Please take a few minutes to complete the written survey that you may receive in the mail after your visit with us. Thank you!             Your Updated Medication List - Protect others around you: Learn how to safely use, store and throw away your medicines at www.disposemymeds.org.          This list is accurate as of: 12/21/17  4:06 PM.  Always use your most recent med list.                   Brand Name Dispense Instructions for use Diagnosis    albuterol 108 (90 BASE) MCG/ACT Inhaler    PROAIR HFA/PROVENTIL HFA/VENTOLIN HFA    1 Inhaler    Inhale 2 puffs into the lungs every 6 hours as needed for shortness of breath / dyspnea or wheezing    Persistent cough for 3 weeks or longer       benzonatate 200 MG capsule    TESSALON    21 capsule    Take 1 capsule (200 mg) by mouth 3 times daily as needed for cough    Persistent cough       buPROPion 100 MG tablet    WELLBUTRIN    60 tablet    Take 1 tablet (100 mg) by mouth 2 times daily    Moderate recurrent major depression (H)       guaiFENesin-codeine 100-10 MG/5ML Soln solution    ROBITUSSIN AC    120 mL    Take 5 mLs by mouth every 4 hours as needed for cough    Persistent cough for 3 weeks or longer       MULTIVITAMIN PO           RANITIDINE HCL PO      Take by mouth daily        VITAMIN B-COMPLEX PO      Take by mouth daily        VITAMIN D-3 PO

## 2018-02-07 ENCOUNTER — TRANSFERRED RECORDS (OUTPATIENT)
Dept: HEALTH INFORMATION MANAGEMENT | Facility: CLINIC | Age: 68
End: 2018-02-07

## 2018-03-22 ENCOUNTER — TRANSFERRED RECORDS (OUTPATIENT)
Dept: HEALTH INFORMATION MANAGEMENT | Facility: CLINIC | Age: 68
End: 2018-03-22

## 2018-04-12 ENCOUNTER — TELEPHONE (OUTPATIENT)
Dept: FAMILY MEDICINE | Facility: CLINIC | Age: 68
End: 2018-04-12

## 2018-04-12 NOTE — TELEPHONE ENCOUNTER
Panel Management Review      BP Readings from Last 1 Encounters:   12/21/17 115/73      Last Office Visit with this department: 12/21/2017    Fail List measure: colonoscopy      Patient is due/failing the following:   COLONOSCOPY    Action needed:   Call/letter    Type of outreach:    Sent letter.    Questions for provider review:    None                                                                                                                                    Rubia Evangelista MA      Chart routed to  .

## 2018-04-12 NOTE — LETTER
[address] [address]  [phone]  Dept: [phone]      April 12, 2018      Marixa Ann  [address] [facility] [address]        Dear Marixa Ann,     At [facility] we care about your health and are committed to providing quality patient care.    Which includes staying current on preventive cancer screenings.  You can increase your chances of finding and treating cancers through regular screenings.      Our records indicate you may be due for the following preventive screening(s):    Colonoscopy    Colonoscopy is recommended every ten years for everyone age 50 and older. We strongly urge our patient's to consider having a colonoscopy done, which is the best screening test available and only needs to be done every 10 years if normal. If you are unwilling or unable to have a colonoscopy then we recommend the annual stool testing for blood. This test is called a FIT test and it looks for blood in the stool.     To schedule an appointment or discuss this screening further, you may contact us by phone at the [facility] at [phone] or online through the patient portal/[email or web address] @ [email or web address]    If you have had any of the screenings listed above at another facility, please call us so that we may update your chart.      Your partners in health,      Quality Committee at [facility]

## 2018-04-20 ENCOUNTER — OFFICE VISIT (OUTPATIENT)
Dept: FAMILY MEDICINE | Facility: CLINIC | Age: 68
End: 2018-04-20
Payer: COMMERCIAL

## 2018-04-20 DIAGNOSIS — Z23 NEED FOR PROPHYLACTIC VACCINATION AGAINST STREPTOCOCCUS PNEUMONIAE (PNEUMOCOCCUS): ICD-10-CM

## 2018-04-20 DIAGNOSIS — Z78.0 ASYMPTOMATIC POSTMENOPAUSAL STATUS: ICD-10-CM

## 2018-04-20 DIAGNOSIS — E78.5 HYPERLIPIDEMIA LDL GOAL <160: ICD-10-CM

## 2018-04-20 DIAGNOSIS — G93.32 CHRONIC FATIGUE DISORDER: ICD-10-CM

## 2018-04-20 DIAGNOSIS — Z13.5 SCREENING FOR EYE CONDITION: ICD-10-CM

## 2018-04-20 DIAGNOSIS — F33.1 MODERATE RECURRENT MAJOR DEPRESSION (H): ICD-10-CM

## 2018-04-20 DIAGNOSIS — Z11.59 NEED FOR HEPATITIS C SCREENING TEST: ICD-10-CM

## 2018-04-20 DIAGNOSIS — M19.90 INFLAMMATORY ARTHRITIS: ICD-10-CM

## 2018-04-20 DIAGNOSIS — Z00.00 NORMAL PHYSICAL EXAM, ROUTINE: Primary | ICD-10-CM

## 2018-04-20 PROCEDURE — 90670 PCV13 VACCINE IM: CPT | Performed by: FAMILY MEDICINE

## 2018-04-20 PROCEDURE — G0009 ADMIN PNEUMOCOCCAL VACCINE: HCPCS | Performed by: FAMILY MEDICINE

## 2018-04-20 PROCEDURE — 99397 PER PM REEVAL EST PAT 65+ YR: CPT | Mod: 25 | Performed by: FAMILY MEDICINE

## 2018-04-20 RX ORDER — PREDNISONE 20 MG/1
20 TABLET ORAL DAILY
Qty: 30 TABLET | Refills: 0
Start: 2018-04-20 | End: 2018-05-31

## 2018-04-20 RX ORDER — BUPROPION HYDROCHLORIDE 150 MG/1
TABLET, EXTENDED RELEASE ORAL
Qty: 60 TABLET | Refills: 3 | Status: SHIPPED | OUTPATIENT
Start: 2018-04-20 | End: 2018-05-31

## 2018-04-20 NOTE — MR AVS SNAPSHOT
After Visit Summary   4/20/2018    Marixa Ann    MRN: [id number]           Patient Information     Date Of Birth          1950        Visit Information        Provider Department      4/20/2018 3:40 PM Jackie Kovacs MD [facility]        Today's Diagnoses     Normal physical exam, routine    -  1    Asymptomatic postmenopausal status        Need for hepatitis C screening test        Need for prophylactic vaccination against Streptococcus pneumoniae (pneumococcus)        Chronic fatigue disorder        Moderate recurrent major depression (H)        Hyperlipidemia LDL goal <160        Inflammatory arthritis        Screening for eye condition          Care Instructions    At [facility], we strive to deliver an exceptional experience to you, every time we see you.  If you receive a survey in the mail, please send us back your thoughts. We really do value your feedback.    Based on your medical history, these are the current health maintenance/preventive care services that you are due for (some may have been done at this visit.)  Health Maintenance Due   Topic Date Due     TETANUS IMMUNIZATION (SYSTEM ASSIGNED)  10/20/1970     DEPRESSION ACTION PLAN Q1 YR  10/20/1970     HIV SCREEN (SYSTEM ASSIGNED)  10/20/1970     HEPATITIS C SCREENING  10/20/1970     COLON CANCER SCREEN (SYSTEM ASSIGNED)  10/20/2002     ADVANCE DIRECTIVE PLANNING Q5 YRS  10/20/2007     DEXA SCAN SCREENING (SYSTEM ASSIGNED)  10/20/2017     PNEUMOCOCCAL (1 of 2 - PCV13) 10/20/2017     PHQ-9 Q6 MONTHS  05/06/2018         Suggested websites for health information:  Www.Privepass.Denator : Up to date and easily searchable information on multiple topics.  Www.medlineplus.gov : medication info, interactive tutorials, watch real surgeries online  Www.familydoctor.org : good info from the Academy of Family Physicians  Www.cdc.gov : public health info, travel advisories, epidemics  (H1N1)  Www.aap.org : children's health info, normal development, vaccinations  Www.health.[facility].[place].us : [place] dept of health, public health issues in [place], N1N1    Your care team:                            Family Medicine Internal Medicine   MD Kyle Mancera MD Shantel Branch-Fleming, MD Katya Georgiev PA-C Nam Ho, MD Pediatrics   OCTAVIO Ruiz, JOSEPH Delgado APRN MD Alisa Zuniga MD Deborah Mielke, MD Kim Thein, APRN CNP      Clinic hours: Monday - Thursday 7 am-7 pm; Fridays 7 am-5 pm.   Urgent care: Monday - Friday 11 am-9 pm; Saturday and Sunday 9 am-5 pm.  Pharmacy : Monday -Thursday 8 am-8 pm; Friday 8 am-6 pm; Saturday and Sunday 9 am-5 pm.     Clinic: [phone]   Pharmacy: [phone]      Preventive Health Recommendations  Female Ages 65 +    Yearly exam:     See your health care provider every year in order to  o Review health changes.   o Discuss preventive care.    o Review your medicines if your doctor has prescribed any.      You no longer need a yearly Pap test unless you've had an abnormal Pap test in the past 10 years. If you have vaginal symptoms, such as bleeding or discharge, be sure to talk with your provider about a Pap test.      Every 1 to 2 years, have a mammogram.  If you are over 69, talk with your health care provider about whether or not you want to continue having screening mammograms.      Every 10 years, have a colonoscopy. Or, have a yearly FIT test (stool test). These exams will check for colon cancer.       Have a cholesterol test every 5 years, or more often if your doctor advises it.       Have a diabetes test (fasting glucose) every three years. If you are at risk for diabetes, you should have this test more often.       At age 65, have a bone density scan (DEXA) to check for osteoporosis (brittle bone disease).    Shots:    Get a flu shot each year.    Get a tetanus shot every 10 years.    Talk  to your doctor about your pneumonia vaccines. There are now two you should receive - Pneumovax (PPSV 23) and Prevnar (PCV 13).    Talk to your doctor about the shingles vaccine.    Talk to your doctor about the hepatitis B vaccine.    Nutrition:     Eat at least 5 servings of fruits and vegetables each day.      Eat whole-grain bread, whole-wheat pasta and brown rice instead of white grains and rice.      Talk to your provider about Calcium and Vitamin D.     Lifestyle    Exercise at least 150 minutes a week (30 minutes a day, 5 days a week). This will help you control your weight and prevent disease.      Limit alcohol to one drink per day.      No smoking.       Wear sunscreen to prevent skin cancer.       See your dentist twice a year for an exam and cleaning.      See your eye doctor every 1 to 2 years to screen for conditions such as glaucoma, macular degeneration, cataracts, etc           Follow-ups after your visit        Follow-up notes from your care team     Return in about 6 months (around 10/20/2018) for recheck.      Future tests that were ordered for you today     Open Future Orders        Priority Expected Expires Ordered    Hepatitis C Screen Reflex to HCV RNA Quant and Genotype Routine  4/20/2019 4/20/2018    DEXA HIP/PELVIS/SPINE - Future Routine  4/20/2019 4/20/2018            Who to contact     If you have questions or need follow up information about today's clinic visit or your schedule please contact [facility] directly at [phone].  Normal or non-critical lab and imaging results will be communicated to you by MyChart, letter or phone within 4 business days after the clinic has received the results. If you do not hear from us within 7 days, please contact the clinic through MyChart or phone. If you have a critical or abnormal lab result, we will notify you by phone as soon as possible.  Submit refill requests through [employer] or call your pharmacy and they will forward  "the refill request to us. Please allow 3 business days for your refill to be completed.          Additional Information About Your Visit        [employer] Information     [employer] lets you send messages to your doctor, view your test results, renew your prescriptions, schedule appointments and more. To sign up, go to [email or web address] . Click on \"Log in\" on the left side of the screen, which will take you to the Welcome page. Then click on \"Sign up Now\" on the right side of the page.     You will be asked to enter the access code listed below, as well as some personal information. Please follow the directions to create your username and password.     Your access code is: [email or web address]  Expires: 2018  8:33 PM     Your access code will  in 90 days. If you need help or a new code, please call your [address] clinic or [phone].        Care EveryWhere ID     This is your Care EveryWhere ID. This could be used by other organizations to access your [address] medical records  [id number]         Blood Pressure from Last 3 Encounters:   17 115/73   17 110/70   17 117/69    Weight from Last 3 Encounters:   17 174 lb (78.9 kg)   17 176 lb (79.8 kg)   17 177 lb (80.3 kg)              We Performed the Following     ADMIN: Vaccine, Initial (40889)     Pneumococcal vaccine 13 valent PCV13 IM (Prevnar) [33453]          Today's Medication Changes          These changes are accurate as of 18  8:33 PM.  If you have any questions, ask your nurse or doctor.               Start taking these medicines.        Dose/Directions    predniSONE 20 MG tablet   Commonly known as:  DELTASONE   Used for:  Inflammatory arthritis   Started by:  Jackie Kovacs MD        Dose:  20 mg   Take 1 tablet (20 mg) by mouth daily   Quantity:  30 tablet   Refills:  0         These medicines have changed or have updated prescriptions.        Dose/Directions    * buPROPion 100 MG tablet   Commonly " known as:  WELLBUTRIN   This may have changed:  Another medication with the same name was added. Make sure you understand how and when to take each.   Used for:  Moderate recurrent major depression (H)   Changed by:  Jackie Kovacs MD        Dose:  100 mg   Take 1 tablet (100 mg) by mouth 2 times daily   Quantity:  60 tablet   Refills:  3       * buPROPion 150 MG 12 hr tablet   Commonly known as:  WELLBUTRIN SR   This may have changed:  You were already taking a medication with the same name, and this prescription was added. Make sure you understand how and when to take each.   Used for:  Moderate recurrent major depression (H)   Changed by:  Jackie Kovacs MD        Take 1 tablet once daily and increase to 1 tablet twice daily after 4 to 7 days   Quantity:  60 tablet   Refills:  3       * Notice:  This list has 2 medication(s) that are the same as other medications prescribed for you. Read the directions carefully, and ask your doctor or other care provider to review them with you.         Where to get your medicines      These medications were sent to [id number] - [place], [place] - [address]  [address], [facility] [address]     Phone:  [phone]     buPROPion 150 MG 12 hr tablet         Some of these will need a paper prescription and others can be bought over the counter.  Ask your nurse if you have questions.     You don't need a prescription for these medications     predniSONE 20 MG tablet                Primary Care Provider Office Phone # Fax #    [facility] [phone]       [address]  [facility] [address]        Equal Access to Services     [facility] AH: Hadii kenya ku hadasho Soomaali, waaxda luqadaha, qaybta kaalmada adeegyada, susan collier hayreta saucedo . So [facility] [phone].    ATENCIÓN: Si habla español, tiene a lombardo disposición servicios gratuitos de asistencia lingüística. Llame al  [phone].    We comply with applicable federal civil rights laws and [place] laws. We do not discriminate on the basis of race, color, national origin, age, disability, sex, sexual orientation, or gender identity.            Thank you!     Thank you for choosing [facility]  for your care. Our goal is always to provide you with excellent care. Hearing back from our patients is one way we can continue to improve our services. Please take a few minutes to complete the written survey that you may receive in the mail after your visit with us. Thank you!             Your Updated Medication List - Protect others around you: Learn how to safely use, store and throw away your medicines at www.disposemymeds.org.          This list is accurate as of 4/20/18  8:33 PM.  Always use your most recent med list.                   Brand Name Dispense Instructions for use Diagnosis    albuterol 108 (90 Base) MCG/ACT Inhaler    PROAIR HFA/PROVENTIL HFA/VENTOLIN HFA    1 Inhaler    Inhale 2 puffs into the lungs every 6 hours as needed for shortness of breath / dyspnea or wheezing    Persistent cough for 3 weeks or longer       benzonatate 200 MG capsule    TESSALON    21 capsule    Take 1 capsule (200 mg) by mouth 3 times daily as needed for cough    Persistent cough       * buPROPion 100 MG tablet    WELLBUTRIN    60 tablet    Take 1 tablet (100 mg) by mouth 2 times daily    Moderate recurrent major depression (H)       * buPROPion 150 MG 12 hr tablet    WELLBUTRIN SR    60 tablet    Take 1 tablet once daily and increase to 1 tablet twice daily after 4 to 7 days    Moderate recurrent major depression (H)       guaiFENesin-codeine 100-10 MG/5ML Soln solution    ROBITUSSIN AC    120 mL    Take 5 mLs by mouth every 4 hours as needed for cough    Persistent cough for 3 weeks or longer       MULTIVITAMIN PO           predniSONE 20 MG tablet    DELTASONE    30 tablet    Take 1 tablet (20 mg) by mouth daily     Inflammatory arthritis       RANITIDINE HCL PO      Take by mouth daily        VITAMIN B-COMPLEX PO      Take by mouth daily        VITAMIN D-3 PO           * Notice:  This list has 2 medication(s) that are the same as other medications prescribed for you. Read the directions carefully, and ask your doctor or other care provider to review them with you.

## 2018-04-20 NOTE — NURSING NOTE
Screening Questionnaire for Adult Immunization    Are you sick today?   No   Do you have allergies to medications, food, a vaccine component or latex?   No   Have you ever had a serious reaction after receiving a vaccination?   No   Do you have a long-term health problem with heart disease, lung disease, asthma, kidney disease, metabolic disease (e.g. diabetes), anemia, or other blood disorder?   No   Do you have cancer, leukemia, HIV/AIDS, or any other immune system problem?   No   In the past 3 months, have you taken medications that affect  your immune system, such as prednisone, other steroids, or anticancer drugs; drugs for the treatment of rheumatoid arthritis, Crohn s disease, or psoriasis; or have you had radiation treatments?   Yes   Have you had a seizure, or a brain or other nervous system problem?   No   During the past year, have you received a transfusion of blood or blood     products, or been given immune (gamma) globulin or antiviral drug?   No   For women: Are you pregnant or is there a chance you could become        pregnant during the next month?   No   Have you received any vaccinations in the past 4 weeks?   No     Immunization questionnaire was positive for at least one answer.  Notified Dr. Kovacs.        Per orders of Dr. Kovacs, injection of Prevnar 13 given by Sri Deng. Patient instructed to remain in clinic for 15 minutes afterwards, and to report any adverse reaction to me immediately.       Screening performed by Sri Deng on 4/20/2018 at 4:51 PM.

## 2018-04-20 NOTE — PROGRESS NOTES
SUBJECTIVE:   Marixa Ann is a 67 year old female who presents for Preventive Visit.      Are you in the first 12 months of your Medicare Part B coverage?  Yes,  Visual Acuity:  Right Eye: 20/20   Left Eye: 20/20  Both Eyes: 20/20- eye exam with optometrist.     Healthy Habits:    Do you get at least three servings of calcium containing foods daily (dairy, green leafy vegetables, etc.)? yes    Amount of exercise or daily activities, outside of work: 7 day(s) per week    Problems taking medications regularly No    Medication side effects: No    Have you had an eye exam in the past two years? no    Do you see a dentist twice per year? no    Do you have sleep apnea, excessive snoring or daytime drowsiness?yes      Ability to successfully perform activities of daily living: Yes, no assistance needed    Home safety:  none identified     Hearing impairment: Yes, Feel that people are mumbling or not speaking clearly.    Fall risk:           COGNITIVE SCREEN  1) Repeat 3 items (Banana, Sunrise, Chair)    2) Clock draw: NORMAL  3) 3 item recall: Recalls 3 objects  Results: 3 items recalled: COGNITIVE IMPAIRMENT LESS LIKELY    Mini-CogTM Copyright S Vishal. Licensed by the author for use in [facility]; reprinted with permission (soob@[facility]). All rights reserved.            Hyperlipidemia Follow-Up      Rate your low fat/cholesterol diet?: good    Taking statin?  No    Other lipid medications/supplements?:  none    Hypertension Follow-up      Outpatient blood pressures are not being checked.    Low Salt Diet: no added salt    Depression and Anxiety Follow-Up    Status since last visit: No change    Other associated symptoms:None    Complicating factors:     Significant life event: No     Current substance abuse: None    PHQ-9 11/6/2017 4/20/2018   Total Score 21 0   Q9: Suicide Ideation Not at all Not at all     DONOVAN-7 SCORE 11/6/2017   Total Score 14       PHQ-9  English  PHQ-9   Any  Language  DONOVAN-7  Suicide Assessment Five-step Evaluation and Treatment (SAFE-T)    Reviewed and updated as needed this visit by clinical staff  Tobacco  Allergies  Meds  Med Hx  Surg Hx  Fam Hx  Soc Hx        Reviewed and updated as needed this visit by Provider        Social History   Substance Use Topics     Smoking status: Never Smoker     Smokeless tobacco: Never Used     Alcohol use Not on file       If you drink alcohol do you typically have >3 drinks per day or >7 drinks per week? No                        Today's PHQ-2 Score:   PHQ-2 ( 1999 Pfizer) 4/20/2018 11/6/2017   Q1: Little interest or pleasure in doing things 0 3   Q2: Feeling down, depressed or hopeless 0 3   PHQ-2 Score 0 6       Do you feel safe in your environment - Yes    Do you have a Health Care Directive?: No: Advance care planning reviewed with patient; information given to patient to review.    Current providers sharing in care for this patient include:   Patient Care Team:  [facility], [facility] as PCP - General    The following health maintenance items are reviewed in Epic and correct as of today:  Health Maintenance   Topic Date Due     TETANUS IMMUNIZATION (SYSTEM ASSIGNED)  10/20/1970     DEPRESSION ACTION PLAN Q1 YR  10/20/1970     HIV SCREEN (SYSTEM ASSIGNED)  10/20/1970     HEPATITIS C SCREENING  10/20/1970     COLON CANCER SCREEN (SYSTEM ASSIGNED)  10/20/2002     ADVANCE DIRECTIVE PLANNING Q5 YRS  10/20/2007     DEXA SCAN SCREENING (SYSTEM ASSIGNED)  10/20/2017     PHQ-9 Q6 MONTHS  05/06/2018     MAMMO SCREEN Q2 YR (SYSTEM ASSIGNED)  08/02/2018     FALL RISK ASSESSMENT  12/08/2018     PNEUMOCOCCAL (2 of 2 - PPSV23) 04/20/2019     LIPID SCREEN Q5 YR FEMALE (SYSTEM ASSIGNED)  10/19/2022     INFLUENZA VACCINE  Completed     Labs reviewed in EPIC  BP Readings from Last 3 Encounters:   12/21/17 115/73   12/08/17 110/70   11/06/17 117/69    Wt Readings from Last 3 Encounters:   12/21/17 174 lb (78.9 kg)   12/08/17 176 lb  (79.8 kg)   11/06/17 177 lb (80.3 kg)                  Patient Active Problem List   Diagnosis     Pain in joint, shoulder region     Moderate recurrent major depression (H)     Hyperlipidemia LDL goal <160     Myalgia     Chronic fatigue disorder     Liver lesion     Non-toxic multinodular goiter     Persistent cough for 3 weeks or longer     Cystic disease of liver     Inflammatory arthritis     Past Surgical History:   Procedure Laterality Date     APPENDECTOMY  1967       Social History   Substance Use Topics     Smoking status: Never Smoker     Smokeless tobacco: Never Used     Alcohol use Not on file     History reviewed. No pertinent family history.      Current Outpatient Prescriptions   Medication Sig Dispense Refill     albuterol (PROAIR HFA/PROVENTIL HFA/VENTOLIN HFA) 108 (90 BASE) MCG/ACT Inhaler Inhale 2 puffs into the lungs every 6 hours as needed for shortness of breath / dyspnea or wheezing 1 Inhaler 3     B Complex Vitamins (VITAMIN B-COMPLEX PO) Take by mouth daily       benzonatate (TESSALON) 200 MG capsule Take 1 capsule (200 mg) by mouth 3 times daily as needed for cough 21 capsule 3     buPROPion (WELLBUTRIN SR) 150 MG 12 hr tablet Take 1 tablet once daily and increase to 1 tablet twice daily after 4 to 7 days 60 tablet 3     buPROPion (WELLBUTRIN) 100 MG tablet Take 1 tablet (100 mg) by mouth 2 times daily 60 tablet 3     Cholecalciferol (VITAMIN D-3 PO)        guaiFENesin-codeine (ROBITUSSIN AC) 100-10 MG/5ML SOLN solution Take 5 mLs by mouth every 4 hours as needed for cough 120 mL 3     Multiple Vitamins-Minerals (MULTIVITAMIN PO)        predniSONE (DELTASONE) 20 MG tablet Take 1 tablet (20 mg) by mouth daily 30 tablet 0     RANITIDINE HCL PO Take by mouth daily       Allergies   Allergen Reactions     Dust Mites      Recent Labs   Lab Test  10/19/17   1412   LDL  98   HDL  40*   TRIG  82   ALT  26   CR  0.72   GFRESTIMATED  81   GFRESTBLACK  >90   POTASSIUM  3.9   TSH  1.35     "    Pneumonia Vaccine:Adults age 65+ who received Pneumovax (PPSV23) at 65 years or older: Should be given PCV13 > 1 year after their most recent PPSV23  Mammogram Screening: Patient over age 50, mutual decision to screen reflected in health maintenance.    ROS:  Constitutional, HEENT, cardiovascular, pulmonary, gi and gu systems are negative, except as otherwise noted.    OBJECTIVE:   There were no vitals taken for this visit. Estimated body mass index is 32.61 kg/(m^2) as calculated from the following:    Height as of 12/21/17: 5' 1.25\" (1.556 m).    Weight as of 12/21/17: 174 lb (78.9 kg).  EXAM:   GENERAL: elderly, alert, well nourished, well hydrated, no distress  HENT: ear canals- normal; TMs- normal; Nose- normal; Mouth- no ulcers, no lesions, missing dentition  NECK: no tenderness, no adenopathy, no asymmetry, no masses, no stiffness; thyroid- normal to palpation  RESP: lungs clear to auscultation - no rales, no rhonchi, no wheezes  CV: regular rates and rhythm, normal S1 S2, no S3 or S4 and no murmur, no click or rub, normal pulses  ABDOMEN: soft, no tenderness, no  hepatosplenomegaly, no masses, normal bowel sounds  MS: extremities- no gross deformities noted, no edema  SKIN: no suspicious lesions, no rashes, age related skin changes with seborrheic keratosis and no actinic keratosis.    NEURO: strength and tone- normal, sensory exam- grossly normal, reflexes- symmetric  BACK: no CVA tenderness, no paralumbar tenderness  MENTAL STATUS EXAM:  Appearance/Behavior: no apparent distress, neatly groomed, dressed appropriately for weather, appears stated age and is not frail-appearing  Speech: normal  Mood/Affect: normal affect  Insight: Excellent      ASSESSMENT / PLAN:       ICD-10-CM    1. Normal physical exam, routine Z00.00 Doing well except inflammatory arthritis and is following with rheumatology for this. Recently started on prednisone. Refused eye exam and will have this done at ophthalmology now that " "she has insurance.    2. Asymptomatic postmenopausal status Z78.0 DEXA HIP/PELVIS/SPINE - Future   3. Need for hepatitis C screening test Z11.59 Hepatitis C Screen Reflex to HCV RNA Quant and Genotype        4. Need for prophylactic vaccination against Streptococcus pneumoniae (pneumococcus) Z23 Pneumococcal vaccine 13 valent PCV13 IM (Prevnar) [83039]     ADMIN: Vaccine, Initial (30289)   5. Chronic fatigue disorder R53.82 Stable    6. Moderate recurrent major depression (H) F33.1 buPROPion (WELLBUTRIN SR) 150 MG 12 hr tablet twice a day    7. Hyperlipidemia LDL goal <160 E78.5 Stable    8. Inflammatory arthritis M19.90 predniSONE (DELTASONE) 20 MG tablet daily and follow up next week   9. Screening for eye condition Z13.5 Patient will schedule herself       End of Life Planning:  Patient currently has an advanced directive: Yes.  Practitioner is supportive of decision.    COUNSELING:  Reviewed preventive health counseling, as reflected in patient instructions       Regular exercise       Healthy diet/nutrition       Osteoporosis Prevention/Bone Health        Estimated body mass index is 32.61 kg/(m^2) as calculated from the following:    Height as of 12/21/17: 5' 1.25\" (1.556 m).    Weight as of 12/21/17: 174 lb (78.9 kg).  Weight management plan: Discussed healthy diet and exercise guidelines and patient will follow up in 3 months in clinic to re-evaluate.     reports that she has never smoked. She has never used smokeless tobacco.      Appropriate preventive services were discussed with this patient, including applicable screening as appropriate for cardiovascular disease, diabetes, osteopenia/osteoporosis, and glaucoma.  As appropriate for age/gender, discussed screening for colorectal cancer, prostate cancer, breast cancer, and cervical cancer. Checklist reviewing preventive services available has been given to the patient.    Reviewed patients plan of care and provided an AVS. The Basic Care Plan (routine " screening as documented in Health Maintenance) for Marixa meets the Care Plan requirement. This Care Plan has been established and reviewed with the Patient.    Counseling Resources:  ATP IV Guidelines  Pooled Cohorts Equation Calculator  Breast Cancer Risk Calculator  FRAX Risk Assessment  ICSI Preventive Guidelines  Dietary Guidelines for Americans, 2010  USDA's MyPlate  ASA Prophylaxis  Lung CA Screening    Jackie Kovacs MD  [facility]

## 2018-04-20 NOTE — PATIENT INSTRUCTIONS
At [facility], we strive to deliver an exceptional experience to you, every time we see you.  If you receive a survey in the mail, please send us back your thoughts. We really do value your feedback.    Based on your medical history, these are the current health maintenance/preventive care services that you are due for (some may have been done at this visit.)  Health Maintenance Due   Topic Date Due     TETANUS IMMUNIZATION (SYSTEM ASSIGNED)  10/20/1970     DEPRESSION ACTION PLAN Q1 YR  10/20/1970     HIV SCREEN (SYSTEM ASSIGNED)  10/20/1970     HEPATITIS C SCREENING  10/20/1970     COLON CANCER SCREEN (SYSTEM ASSIGNED)  10/20/2002     ADVANCE DIRECTIVE PLANNING Q5 YRS  10/20/2007     DEXA SCAN SCREENING (SYSTEM ASSIGNED)  10/20/2017     PNEUMOCOCCAL (1 of 2 - PCV13) 10/20/2017     PHQ-9 Q6 MONTHS  05/06/2018         Suggested websites for health information:  Www.[employer] : Up to date and easily searchable information on multiple topics.  Www.medlineplus.gov : medication info, interactive tutorials, watch real surgeries online  Www.familydoctor.org : good info from the Academy of Family Physicians  Www.cdc.gov : public health info, travel advisories, epidemics (H1N1)  Www.aap.org : children's health info, normal development, vaccinations  Www.health.[facility].[place].us : [place] dept of health, public health issues in [place], N1N1    Your care team:                            Family Medicine Internal Medicine   MD Kyle Mancera MD Shantel Branch-Fleming, MD Katya Georgiev PA-C Nam Ho, MD Pediatrics   OCTAVIO Ruiz, MD Alisa Keen CNP, MD Deborah Mielke, MD Kim Thein, MARRY CNP      Clinic hours: Monday - Thursday 7 am-7 pm; Fridays 7 am-5 pm.   Urgent care: Monday - Friday 11 am-9 pm; Saturday and Sunday 9 am-5 pm.  Pharmacy : Monday -Thursday 8 am-8 pm; Friday 8 am-6 pm; Saturday and Sunday 9 am-5 pm.      Clinic: [phone]   Pharmacy: [phone]      Preventive Health Recommendations  Female Ages 65 +    Yearly exam:     See your health care provider every year in order to  o Review health changes.   o Discuss preventive care.    o Review your medicines if your doctor has prescribed any.      You no longer need a yearly Pap test unless you've had an abnormal Pap test in the past 10 years. If you have vaginal symptoms, such as bleeding or discharge, be sure to talk with your provider about a Pap test.      Every 1 to 2 years, have a mammogram.  If you are over 69, talk with your health care provider about whether or not you want to continue having screening mammograms.      Every 10 years, have a colonoscopy. Or, have a yearly FIT test (stool test). These exams will check for colon cancer.       Have a cholesterol test every 5 years, or more often if your doctor advises it.       Have a diabetes test (fasting glucose) every three years. If you are at risk for diabetes, you should have this test more often.       At age 65, have a bone density scan (DEXA) to check for osteoporosis (brittle bone disease).    Shots:    Get a flu shot each year.    Get a tetanus shot every 10 years.    Talk to your doctor about your pneumonia vaccines. There are now two you should receive - Pneumovax (PPSV 23) and Prevnar (PCV 13).    Talk to your doctor about the shingles vaccine.    Talk to your doctor about the hepatitis B vaccine.    Nutrition:     Eat at least 5 servings of fruits and vegetables each day.      Eat whole-grain bread, whole-wheat pasta and brown rice instead of white grains and rice.      Talk to your provider about Calcium and Vitamin D.     Lifestyle    Exercise at least 150 minutes a week (30 minutes a day, 5 days a week). This will help you control your weight and prevent disease.      Limit alcohol to one drink per day.      No smoking.       Wear sunscreen to prevent skin cancer.       See your  dentist twice a year for an exam and cleaning.      See your eye doctor every 1 to 2 years to screen for conditions such as glaucoma, macular degeneration, cataracts, etc

## 2018-04-24 ENCOUNTER — RADIANT APPOINTMENT (OUTPATIENT)
Dept: BONE DENSITY | Facility: CLINIC | Age: 68
End: 2018-04-24
Attending: FAMILY MEDICINE
Payer: COMMERCIAL

## 2018-04-24 DIAGNOSIS — Z78.0 ASYMPTOMATIC POSTMENOPAUSAL STATUS: ICD-10-CM

## 2018-04-24 PROCEDURE — 77080 DXA BONE DENSITY AXIAL: CPT | Performed by: RADIOLOGY

## 2018-04-25 ASSESSMENT — PATIENT HEALTH QUESTIONNAIRE - PHQ9: SUM OF ALL RESPONSES TO PHQ QUESTIONS 1-9: 0

## 2018-04-25 NOTE — PROGRESS NOTES
Please call patient with results (If unable to reach patient, this may be sent as a letter instead):    The bone density scan shows osteoporosis in both hips. This puts you at increased risk for fractures. Please schedule follow up for discussion of medication options to treat osteoporosis. Risk of fracture in the next 10 years is 15%. There can also be loss of height from osteoporosis.     Dear Marixa Ann,       Jackie Kovacs MD

## 2018-04-27 ENCOUNTER — TRANSFERRED RECORDS (OUTPATIENT)
Dept: HEALTH INFORMATION MANAGEMENT | Facility: CLINIC | Age: 68
End: 2018-04-27

## 2018-05-31 ENCOUNTER — OFFICE VISIT (OUTPATIENT)
Dept: FAMILY MEDICINE | Facility: CLINIC | Age: 68
End: 2018-05-31
Payer: COMMERCIAL

## 2018-05-31 VITALS
TEMPERATURE: 98 F | HEIGHT: 61 IN | WEIGHT: 173 LBS | BODY MASS INDEX: 32.66 KG/M2 | HEART RATE: 92 BPM | SYSTOLIC BLOOD PRESSURE: 107 MMHG | RESPIRATION RATE: 20 BRPM | DIASTOLIC BLOOD PRESSURE: 68 MMHG | OXYGEN SATURATION: 97 %

## 2018-05-31 DIAGNOSIS — K21.00 GASTROESOPHAGEAL REFLUX DISEASE WITH ESOPHAGITIS: ICD-10-CM

## 2018-05-31 DIAGNOSIS — Z01.818 PREOP GENERAL PHYSICAL EXAM: Primary | ICD-10-CM

## 2018-05-31 DIAGNOSIS — E78.5 HYPERLIPIDEMIA LDL GOAL <160: ICD-10-CM

## 2018-05-31 DIAGNOSIS — K44.9 HIATAL HERNIA: ICD-10-CM

## 2018-05-31 DIAGNOSIS — H25.9 AGE-RELATED CATARACT OF BOTH EYES, UNSPECIFIED AGE-RELATED CATARACT TYPE: ICD-10-CM

## 2018-05-31 DIAGNOSIS — F33.1 MODERATE RECURRENT MAJOR DEPRESSION (H): ICD-10-CM

## 2018-05-31 DIAGNOSIS — G93.32 CHRONIC FATIGUE DISORDER: ICD-10-CM

## 2018-05-31 PROCEDURE — 99214 OFFICE O/P EST MOD 30 MIN: CPT | Performed by: FAMILY MEDICINE

## 2018-05-31 ASSESSMENT — PAIN SCALES - GENERAL: PAINLEVEL: WORST PAIN (10)

## 2018-05-31 NOTE — PATIENT INSTRUCTIONS
At [facility], we strive to deliver an exceptional experience to you, every time we see you.  If you receive a survey in the mail, please send us back your thoughts. We really do value your feedback.    Based on your medical history, these are the current health maintenance/preventive care services that you are due for (some may have been done at this visit.)  Health Maintenance Due   Topic Date Due     TETANUS IMMUNIZATION (SYSTEM ASSIGNED)  10/20/1970     DEPRESSION ACTION PLAN Q1 YR  10/20/1970     HIV SCREEN (SYSTEM ASSIGNED)  10/20/1970     HEPATITIS C SCREENING  10/20/1970     COLON CANCER SCREEN (SYSTEM ASSIGNED)  10/20/2002     ADVANCE DIRECTIVE PLANNING Q5 YRS  10/20/2007       Suggested websites for health information:  Www.[facility].org : Up to date and easily searchable information on multiple topics.  Www.medlineplus.gov : medication info, interactive tutorials, watch real surgeries online  Www.familydoctor.org : good info from the Academy of Family Physicians  Www.cdc.gov : public health info, travel advisories, epidemics (H1N1)  Www.aap.org : children's health info, normal development, vaccinations  Www.health.state[place]us : [place] dept of health, public health issues in [place], N1N1    Your care team:                            Family Medicine Internal Medicine   MD Kyle Mancera MD Shantel Branch-Fleming, MD Katya Georgiev PA-C Megan Hill, APRRODNEY Kauffman MD Pediatrics   OCTAVIO Ruiz, MD Kayley Millan APRN CNP   MD Alisa Encinas MD Deborah Mielke, MD Kim Thein, APRN [facility]      Clinic hours: Monday - Thursday 7 am-7 pm; Fridays 7 am-5 pm.   Urgent care: Monday - Friday 11 am-9 pm; Saturday and Sunday 9 am-5 pm.  Pharmacy : Monday -Thursday 8 am-8 pm; Friday 8 am-6 pm; Saturday and Sunday 9 am-5 pm.     Clinic: [phone]   Pharmacy: [phone]      Before Your Surgery      Call your surgeon if  there is any change in your health. This includes signs of a cold or flu (such as a sore throat, runny nose, cough, rash or fever).    Do not smoke, drink alcohol or take over the counter medicine (unless your surgeon or primary care doctor tells you to) for the 24 hours before and after surgery.    If you take prescribed drugs: Follow your doctor s orders about which medicines to take and which to stop until after surgery.    Eating and drinking prior to surgery: follow the instructions from your surgeon    Take a shower or bath the night before surgery. Use the soap your surgeon gave you to gently clean your skin. If you do not have soap from your surgeon, use your regular soap. Do not shave or scrub the surgery site.  Wear clean pajamas and have clean sheets on your bed.

## 2018-05-31 NOTE — PROGRESS NOTES
[facility]  [address] [address]  [phone]  Dept: [phone]    PRE-OP EVALUATION:  Today's date: 2018    Marixa Ann (: 1950) presents for pre-operative evaluation assessment as requested by Dr. Logan Daley.  She requires evaluation and anesthesia risk assessment prior to undergoing surgery/procedure for treatment of bilateral cataracts .      Proposed Surgery/ Procedure: Cataract Surgery  Date of Surgery/ Procedure: 18 Left eye, Right eye TBD  Time of Surgery/ Procedure: 10:15  Hospital/Surgical Facility: [facility]  Fax number for surgical facility: [phone]  Primary Physician: [facility]  Type of Anesthesia Anticipated:     Patient has a Health Care Directive or Living Will:  NO    1. NO - Do you have a history of heart attack, stroke, stent, bypass or surgery on an artery in the head, neck, heart or legs?  2. YES - DO YOU EVER HAVE ANY PAIN OR DISCOMFORT IN YOUR CHEST? GERD, hiatal hernia with cough  3. NO - Do you have a history of  Heart Failure?  4. NO - Are you troubled by shortness of breath when: walking on the level, up a slight hill or at night?  5. NO - Do you currently have a cold, bronchitis or other respiratory infection?  6. YES - DO YOU HAVE A COUGH, SHORTNESS OF BREATH OR WHEEZING? Allergies  7. NO - Do you sometimes get pains in the calves of your legs when you walk?  8. NO - Do you or anyone in your family have previous history of blood clots?  9. NO - Do you or does anyone in your family have a serious bleeding problem such as prolonged bleeding following surgeries or cuts?  10. NO - Have you ever had problems with anemia or been told to take iron pills?  11. NO - Have you had any abnormal blood loss such as black, tarry or bloody stools, or abnormal vaginal bleeding?  12. YES - HAVE YOU EVER HAD A BLOOD TRANSFUSION? Appendectomy  13. NO - Have you or any of your  relatives ever had problems with anesthesia?  14. YES - DO YOU HAVE SLEEP APNEA, EXCESSIVE SNORING OR DAYTIME DROWSINESS? Daytime drowsiness   15. NO - Do you have any prosthetic heart valves?  16. NO - Do you have prosthetic joints?  17. NO - Is there any chance that you may be pregnant?      HPI:     HPI related to upcoming procedure: bilateral cataracts with decreased vision and risk for closed angle glaucoma.       DEPRESSION - Patient has a long history of Depression of moderate severity requiring medication for control with recent symptoms being stable..Current symptoms of depression include none.                                                                                                                                                                                    .  HYPERLIPIDEMIA - Patient has a long history of significant Hyperlipidemia requiring medication for treatment with recent good control. Patient reports no problems or side effects with the medication.                                                                                                                                                       .    MEDICAL HISTORY:     Patient Active Problem List    Diagnosis Date Noted     Inflammatory arthritis 04/20/2018     Priority: Medium     Persistent cough for 3 weeks or longer 12/21/2017     Priority: Medium     Cystic disease of liver 12/21/2017     Priority: Medium     Liver lesion 12/14/2017     Priority: Medium     Non-toxic multinodular goiter 12/14/2017     Priority: Medium     Moderate recurrent major depression (H) 10/19/2017     Priority: Medium     Hyperlipidemia LDL goal <160 10/19/2017     Priority: Medium     Myalgia 10/19/2017     Priority: Medium     Chronic fatigue disorder 10/19/2017     Priority: Medium     Pain in joint, shoulder region 06/24/2008     Priority: Medium      No past medical history on file.  Past Surgical History:   Procedure Laterality Date     APPENDECTOMY  " 1967     Current Outpatient Prescriptions   Medication Sig Dispense Refill     albuterol (PROAIR HFA/PROVENTIL HFA/VENTOLIN HFA) 108 (90 BASE) MCG/ACT Inhaler Inhale 2 puffs into the lungs every 6 hours as needed for shortness of breath / dyspnea or wheezing 1 Inhaler 3     B Complex Vitamins (VITAMIN B-COMPLEX PO) Take by mouth daily       benzonatate (TESSALON) 200 MG capsule Take 1 capsule (200 mg) by mouth 3 times daily as needed for cough 21 capsule 3     buPROPion (WELLBUTRIN SR) 150 MG 12 hr tablet Take 1 tablet once daily and increase to 1 tablet twice daily after 4 to 7 days 60 tablet 3     buPROPion (WELLBUTRIN) 100 MG tablet Take 1 tablet (100 mg) by mouth 2 times daily 60 tablet 3     Cholecalciferol (VITAMIN D-3 PO)        guaiFENesin-codeine (ROBITUSSIN AC) 100-10 MG/5ML SOLN solution Take 5 mLs by mouth every 4 hours as needed for cough 120 mL 3     Multiple Vitamins-Minerals (MULTIVITAMIN PO)        predniSONE (DELTASONE) 20 MG tablet Take 1 tablet (20 mg) by mouth daily 30 tablet 0     RANITIDINE HCL PO Take by mouth daily       OTC products: None, except as noted above    Allergies   Allergen Reactions     Dust Mites       Latex Allergy: NO    Social History   Substance Use Topics     Smoking status: Never Smoker     Smokeless tobacco: Never Used     Alcohol use Not on file     History   Drug Use No       REVIEW OF SYSTEMS:   Constitutional, neuro, ENT, endocrine, pulmonary, cardiac, gastrointestinal, genitourinary, musculoskeletal, integument and psychiatric systems are negative, except as otherwise noted.    EXAM:   /68 (BP Location: Right arm, Patient Position: Chair, Cuff Size: Adult Regular)  Pulse 92  Temp 98  F (36.7  C) (Oral)  Resp 20  Ht 5' 1\" (1.549 m)  Wt 173 lb (78.5 kg)  LMP 05/31/2003 (Approximate)  SpO2 97%  Breastfeeding? No  BMI 32.69 kg/m2    GENERAL APPEARANCE: healthy, alert and no distress     EYES: EOMI, PERRL     HENT: ear canals and TM's normal and nose and " mouth without ulcers or lesions     NECK: no adenopathy, no asymmetry, masses, or scars and thyroid normal to palpation     RESP: lungs clear to auscultation - no rales, rhonchi or wheezes     CV: regular rates and rhythm, normal S1 S2, no S3 or S4 and no murmur, click or rub     ABDOMEN:  soft, nontender, no HSM or masses and bowel sounds normal     MS: extremities normal- no gross deformities noted, no evidence of inflammation in joints, FROM in all extremities.     SKIN: no suspicious lesions or rashes     NEURO: Normal strength and tone, sensory exam grossly normal, mentation intact and speech normal     PSYCH: mentation appears normal. and affect normal/bright     LYMPHATICS: No cervical adenopathy    DIAGNOSTICS:   No labs or EKG required for low risk surgery (cataract, skin procedure, breast biopsy, etc)    Recent Labs   Lab Test  10/19/17   1412   HGB  12.8   PLT  356   NA  137   POTASSIUM  3.9   CR  0.72        IMPRESSION:   Reason for surgery/procedure: bilateral cataracts .      Proposed Surgery/ Procedure: Cataract Surgery  Diagnosis/reason for consult: cardiac and anesthesia risk assessment     The proposed surgical procedure is considered LOW risk.    REVISED CARDIAC RISK INDEX  The patient has the following serious cardiovascular risks for perioperative complications such as (MI, PE, VFib and 3  AV Block):  No serious cardiac risks  INTERPRETATION: 0 risks: Class I (very low risk - 0.4% complication rate)    The patient has the following additional risks for perioperative complications:  No identified additional risks      ICD-10-CM    1. Preop general physical exam Z01.818 Approved for surgery and anesthesia   2. Age-related cataract of both eyes, unspecified age-related cataract type H25.9 Cataract removal and lens implant   3. Hyperlipidemia LDL goal <160 E78.5 Stable    4. Moderate recurrent major depression (H) F33.1 Doing very well   5. Chronic fatigue disorder R53.82 Followed by rheumatologist  with stable labs   6. Gastroesophageal reflux disease with esophagitis K21.0 Chronic symptoms managed with medication    7. Hiatal hernia K44.9 Stable        RECOMMENDATIONS:         --Patient is to take all scheduled medications on the day of surgery EXCEPT for modifications listed below.    APPROVAL GIVEN to proceed with proposed procedure, without further diagnostic evaluation       Signed Electronically by: Jackie Kovacs MD    Copy of this evaluation report is provided to requesting physician.    [place] Preop Guidelines    Revised Cardiac Risk Index

## 2018-05-31 NOTE — MR AVS SNAPSHOT
After Visit Summary   5/31/2018    Marixa Ann    MRN: [id number]           Patient Information     Date Of Birth          1950        Visit Information        Provider Department      5/31/2018 1:40 PM Jackie Kovacs MD [facility]        Today's Diagnoses     Preop general physical exam    -  1    Age-related cataract of both eyes, unspecified age-related cataract type        Hyperlipidemia LDL goal <160        Moderate recurrent major depression (H)        Chronic fatigue disorder        Gastroesophageal reflux disease with esophagitis        Hiatal hernia          Care Instructions    At [facility], we strive to deliver an exceptional experience to you, every time we see you.  If you receive a survey in the mail, please send us back your thoughts. We really do value your feedback.    Based on your medical history, these are the current health maintenance/preventive care services that you are due for (some may have been done at this visit.)  Health Maintenance Due   Topic Date Due     TETANUS IMMUNIZATION (SYSTEM ASSIGNED)  10/20/1970     DEPRESSION ACTION PLAN Q1 YR  10/20/1970     HIV SCREEN (SYSTEM ASSIGNED)  10/20/1970     HEPATITIS C SCREENING  10/20/1970     COLON CANCER SCREEN (SYSTEM ASSIGNED)  10/20/2002     ADVANCE DIRECTIVE PLANNING Q5 YRS  10/20/2007       Suggested websites for health information:  Www.[employer].LINY[employer]KS : Up to date and easily searchable information on multiple topics.  Www.medlineplus.gov : medication info, interactive tutorials, watch real surgeries online  Www.familydoctor.org : good info from the Academy of Family Physicians  Www.cdc.gov : public health info, travel advisories, epidemics (H1N1)  Www.aap.org : children's health info, normal development, vaccinations  Www.health.[place] : [place] dept of health, public health issues in [place], N1N1    Your care team:                            Family Medicine Internal  Medicine   MD Kyle Mancera MD Shantel Branch-Fleming, MD Katya Georgiev PA-C Megan Hill, APRN JOSEPH Kauffman, MD Pediatrics   OCTAVIO Ruiz, MD Kayley Millan APRN CNP   MD Alisa Encinas MD Deborah Mielke, MD Alyssa Chowdhury, [facility]      Clinic hours: Monday - Thursday 7 am-7 pm; Fridays 7 am-5 pm.   Urgent care: Monday - Friday 11 am-9 pm; Saturday and Sunday 9 am-5 pm.  Pharmacy : Monday -Thursday 8 am-8 pm; Friday 8 am-6 pm; Saturday and Sunday 9 am-5 pm.     Clinic: [phone]   Pharmacy: [phone]      Before Your Surgery      Call your surgeon if there is any change in your health. This includes signs of a cold or flu (such as a sore throat, runny nose, cough, rash or fever).    Do not smoke, drink alcohol or take over the counter medicine (unless your surgeon or primary care doctor tells you to) for the 24 hours before and after surgery.    If you take prescribed drugs: Follow your doctor s orders about which medicines to take and which to stop until after surgery.    Eating and drinking prior to surgery: follow the instructions from your surgeon    Take a shower or bath the night before surgery. Use the soap your surgeon gave you to gently clean your skin. If you do not have soap from your surgeon, use your regular soap. Do not shave or scrub the surgery site.  Wear clean pajamas and have clean sheets on your bed.           Follow-ups after your visit        Follow-up notes from your care team     Return in about 3 months (around 8/31/2018) for medication follow up, Physical Exam.      Who to contact     If you have questions or need follow up information about today's clinic visit or your schedule please contact [facility] [place] directly at [phone].  Normal or non-critical lab and imaging results will be communicated to you by MyChart, letter or phone within 4 business days after the clinic has  "received the results. If you do not hear from us within 7 days, please contact the clinic through Casagem or phone. If you have a critical or abnormal lab result, we will notify you by phone as soon as possible.  Submit refill requests through Casagem or call your pharmacy and they will forward the refill request to us. Please allow 3 business days for your refill to be completed.          Additional Information About Your Visit        Casagem Information     Casagem lets you send messages to your doctor, view your test results, renew your prescriptions, schedule appointments and more. To sign up, go to [email or web address] . Click on \"Log in\" on the left side of the screen, which will take you to the Welcome page. Then click on \"Sign up Now\" on the right side of the page.     You will be asked to enter the access code listed below, as well as some personal information. Please follow the directions to create your username and password.     Your access code is: [email or web address]  Expires: 2018  8:33 PM     Your access code will  in 90 days. If you need help or a new code, please call your [place] clinic or [phone].        Care EveryWhere ID     This is your Care EveryWhere ID. This could be used by other organizations to access your [place] medical records  [id number]        Your Vitals Were     Pulse Temperature Respirations Height Last Period Pulse Oximetry    92 98  F (36.7  C) (Oral) 20 5' 1\" (1.549 m) 2003 (Approximate) 97%    Breastfeeding? BMI (Body Mass Index)                No 32.69 kg/m2           Blood Pressure from Last 3 Encounters:   18 107/68   17 115/73   17 110/70    Weight from Last 3 Encounters:   18 173 lb (78.5 kg)   17 174 lb (78.9 kg)   17 176 lb (79.8 kg)              Today, you had the following     No orders found for display         Today's Medication Changes          These changes are accurate as of 18  2:27 PM.  If you have " any questions, ask your nurse or doctor.               These medicines have changed or have updated prescriptions.        Dose/Directions    buPROPion 100 MG tablet   Commonly known as:  WELLBUTRIN   This may have changed:  Another medication with the same name was removed. Continue taking this medication, and follow the directions you see here.   Used for:  Moderate recurrent major depression (H)   Changed by:  Jackie Kovacs MD        Dose:  100 mg   Take 1 tablet (100 mg) by mouth 2 times daily   Quantity:  60 tablet   Refills:  3         Stop taking these medicines if you haven't already. Please contact your care team if you have questions.     albuterol 108 (90 Base) MCG/ACT Inhaler   Commonly known as:  PROAIR HFA/PROVENTIL HFA/VENTOLIN HFA   Stopped by:  Jackie Kovacs MD           benzonatate 200 MG capsule   Commonly known as:  TESSALON   Stopped by:  Jackie Kovacs MD           guaiFENesin-codeine 100-10 MG/5ML Soln solution   Commonly known as:  ROBITUSSIN AC   Stopped by:  Jackie Kovacs MD           predniSONE 20 MG tablet   Commonly known as:  DELTASONE   Stopped by:  Jackie Kovacs MD                    Primary Care Provider Office Phone # Fax #    [facility] [phone]       [address] [facility] [address]        Equal Access to Services     [facility] AH: Hadii aad ku hadasho Soomaali, waaxda luqadaha, qaybta kaalmada adeegyada, waxay idiin hayaan maria luisa saucedo . So [facility] [phone].    ATENCIÓN: Si habla español, tiene a lombardo disposición servicios gratuitos de asistencia lingüística. Llame al [phone].    We comply with applicable federal civil rights laws and [place] laws. We do not discriminate on the basis of race, color, national origin, age, disability, sex, sexual orientation, or gender identity.            Thank you!     Thank you for choosing [facility]  for your care. Our goal is  always to provide you with excellent care. Hearing back from our patients is one way we can continue to improve our services. Please take a few minutes to complete the written survey that you may receive in the mail after your visit with us. Thank you!             Your Updated Medication List - Protect others around you: Learn how to safely use, store and throw away your medicines at www.disposemymeds.org.          This list is accurate as of 5/31/18  2:27 PM.  Always use your most recent med list.                   Brand Name Dispense Instructions for use Diagnosis    buPROPion 100 MG tablet    WELLBUTRIN    60 tablet    Take 1 tablet (100 mg) by mouth 2 times daily    Moderate recurrent major depression (H)       MULTIVITAMIN PO           RANITIDINE HCL PO      Take by mouth daily        VITAMIN B-COMPLEX PO      Take by mouth daily        VITAMIN D-3 PO

## 2018-07-03 ENCOUNTER — OFFICE VISIT (OUTPATIENT)
Dept: FAMILY MEDICINE | Facility: CLINIC | Age: 68
End: 2018-07-03
Payer: COMMERCIAL

## 2018-07-03 VITALS
WEIGHT: 176 LBS | RESPIRATION RATE: 14 BRPM | BODY MASS INDEX: 33.23 KG/M2 | TEMPERATURE: 98.7 F | OXYGEN SATURATION: 97 % | HEIGHT: 61 IN | SYSTOLIC BLOOD PRESSURE: 104 MMHG | DIASTOLIC BLOOD PRESSURE: 71 MMHG | HEART RATE: 92 BPM

## 2018-07-03 DIAGNOSIS — G93.32 CHRONIC FATIGUE DISORDER: ICD-10-CM

## 2018-07-03 DIAGNOSIS — R05.3 PERSISTENT COUGH FOR 3 WEEKS OR LONGER: ICD-10-CM

## 2018-07-03 DIAGNOSIS — H25.9 AGE-RELATED CATARACT OF BOTH EYES, UNSPECIFIED AGE-RELATED CATARACT TYPE: ICD-10-CM

## 2018-07-03 DIAGNOSIS — F33.1 MODERATE RECURRENT MAJOR DEPRESSION (H): ICD-10-CM

## 2018-07-03 DIAGNOSIS — E78.5 HYPERLIPIDEMIA LDL GOAL <160: ICD-10-CM

## 2018-07-03 DIAGNOSIS — K21.00 GASTROESOPHAGEAL REFLUX DISEASE WITH ESOPHAGITIS: ICD-10-CM

## 2018-07-03 DIAGNOSIS — Z01.818 PREOP GENERAL PHYSICAL EXAM: Primary | ICD-10-CM

## 2018-07-03 PROCEDURE — 99214 OFFICE O/P EST MOD 30 MIN: CPT | Performed by: FAMILY MEDICINE

## 2018-07-03 NOTE — PATIENT INSTRUCTIONS
Before Your Surgery      Call your surgeon if there is any change in your health. This includes signs of a cold or flu (such as a sore throat, runny nose, cough, rash or fever).    Do not smoke, drink alcohol or take over the counter medicine (unless your surgeon or primary care doctor tells you to) for the 24 hours before and after surgery.    If you take prescribed drugs: Follow your doctor s orders about which medicines to take and which to stop until after surgery.    Eating and drinking prior to surgery: follow the instructions from your surgeon    Take a shower or bath the night before surgery. Use the soap your surgeon gave you to gently clean your skin. If you do not have soap from your surgeon, use your regular soap. Do not shave or scrub the surgery site.  Wear clean pajamas and have clean sheets on your bed.     At [facility], we strive to deliver an exceptional experience to you, every time we see you.  If you receive a survey in the mail, please send us back your thoughts. We really do value your feedback.    Based on your medical history, these are the current health maintenance/preventive care services that you are due for (some may have been done at this visit.)  Health Maintenance Due   Topic Date Due     TETANUS IMMUNIZATION (SYSTEM ASSIGNED)  10/20/1970     DEPRESSION ACTION PLAN Q1 YR  10/20/1970     HIV SCREEN (SYSTEM ASSIGNED)  10/20/1970     HEPATITIS C SCREENING  10/20/1970     COLON CANCER SCREEN (SYSTEM ASSIGNED)  10/20/2002     ADVANCE DIRECTIVE PLANNING Q5 YRS  10/20/2007     MAMMO SCREEN Q2 YR (SYSTEM ASSIGNED)  08/02/2018       Suggested websites for health information:  Www.[employer].org : Up to date and easily searchable information on multiple topics.  Www.medlineplus.gov : medication info, interactive tutorials, watch real surgeries online  Www.familydoctor.org : good info from the Academy of Family Physicians  Www.cdc.gov : public health info, travel  advisories, epidemics (H1N1)  Www.aap.org : children's health info, normal development, vaccinations  Www.health.[facility].[place].us : [place] dept of health, public health issues in [place], N1N1    Your care team:                            Family Medicine Internal Medicine   MD Kyle Mancera MD Shantel Branch-Fleming, MD Katya Georgiev PA-C Megan Hill, APRN JOSEPH Kauffman MD Pediatrics   Emigdio Robert, OCTAVIO Ralph, MD Kayley Millan APRN CNP   MD Alisa Encinas MD Deborah Mielke, MD Kim Thein, APRN [facility]      Clinic hours: Monday - Thursday 7 am-7 pm; Fridays 7 am-5 pm.   Urgent care: Monday - Friday 11 am-9 pm; Saturday and Sunday 9 am-5 pm.  Pharmacy : Monday -Thursday 8 am-8 pm; Friday 8 am-6 pm; Saturday and Sunday 9 am-5 pm.     Clinic: [phone]   Pharmacy: [phone]

## 2018-07-03 NOTE — MR AVS SNAPSHOT
After Visit Summary   7/3/2018    Marixa Ann    MRN: [id number]           Patient Information     Date Of Birth          1950        Visit Information        Provider Department      7/3/2018 11:20 AM Jackie Kovacs MD [facility]        Today's Diagnoses     Preop general physical exam    -  1    Age-related cataract of both eyes, unspecified age-related cataract type        Hyperlipidemia LDL goal <160        Moderate recurrent major depression (H)        Chronic fatigue disorder        Gastroesophageal reflux disease with esophagitis        Persistent cough for 3 weeks or longer          Care Instructions      Before Your Surgery      Call your surgeon if there is any change in your health. This includes signs of a cold or flu (such as a sore throat, runny nose, cough, rash or fever).    Do not smoke, drink alcohol or take over the counter medicine (unless your surgeon or primary care doctor tells you to) for the 24 hours before and after surgery.    If you take prescribed drugs: Follow your doctor s orders about which medicines to take and which to stop until after surgery.    Eating and drinking prior to surgery: follow the instructions from your surgeon    Take a shower or bath the night before surgery. Use the soap your surgeon gave you to gently clean your skin. If you do not have soap from your surgeon, use your regular soap. Do not shave or scrub the surgery site.  Wear clean pajamas and have clean sheets on your bed.     At [facility], we strive to deliver an exceptional experience to you, every time we see you.  If you receive a survey in the mail, please send us back your thoughts. We really do value your feedback.    Based on your medical history, these are the current health maintenance/preventive care services that you are due for (some may have been done at this visit.)  Health Maintenance Due   Topic Date Due     TETANUS  IMMUNIZATION (SYSTEM ASSIGNED)  10/20/1970     DEPRESSION ACTION PLAN Q1 YR  10/20/1970     HIV SCREEN (SYSTEM ASSIGNED)  10/20/1970     HEPATITIS C SCREENING  10/20/1970     COLON CANCER SCREEN (SYSTEM ASSIGNED)  10/20/2002     ADVANCE DIRECTIVE PLANNING Q5 YRS  10/20/2007     MAMMO SCREEN Q2 YR (SYSTEM ASSIGNED)  08/02/2018       Suggested websites for health information:  Www.[employer].org : Up to date and easily searchable information on multiple topics.  Www.medlineplus.gov : medication info, interactive tutorials, watch real surgeries online  Www.familydoctor.org : good info from the Academy of Family Physicians  Www.cdc.gov : public health info, travel advisories, epidemics (H1N1)  Www.aap.org : children's health info, normal development, vaccinations  Www.health.[facility].[place].us : [place] dept of health, public health issues in [place], N1N1    Your care team:                            Family Medicine Internal Medicine   MD Kyle Mancera MD Shantel Branch-Fleming, MD Katya Georgiev PA-C Megan Hill, APRN JOSEPH Kauffman MD Pediatrics   Emigdio Robert, PAKAYDEN Ralph, MD Kayley Millan APRN CNP   MD Alisa Encinas MD Deborah Mielke, MD Kim Thein, APRN [facility]      Clinic hours: Monday - Thursday 7 am-7 pm; Fridays 7 am-5 pm.   Urgent care: Monday - Friday 11 am-9 pm; Saturday and Sunday 9 am-5 pm.  Pharmacy : Monday -Thursday 8 am-8 pm; Friday 8 am-6 pm; Saturday and Sunday 9 am-5 pm.     Clinic: [phone]   Pharmacy: [phone]              Follow-ups after your visit        Follow-up notes from your care team     Return in about 3 months (around 10/3/2018) for medication follow up.      Who to contact     If you have questions or need follow up information about today's clinic visit or your schedule please contact [facility] [place] directly at [phone].  Normal or non-critical lab and imaging results will be communicated to you by  "MyChart, letter or phone within 4 business days after the clinic has received the results. If you do not hear from us within 7 days, please contact the clinic through WeGushhart or phone. If you have a critical or abnormal lab result, we will notify you by phone as soon as possible.  Submit refill requests through HiWiFi or call your pharmacy and they will forward the refill request to us. Please allow 3 business days for your refill to be completed.          Additional Information About Your Visit        WeGushharNimbix Information     HiWiFi lets you send messages to your doctor, view your test results, renew your prescriptions, schedule appointments and more. To sign up, go to [email or web address] . Click on \"Log in\" on the left side of the screen, which will take you to the Welcome page. Then click on \"Sign up Now\" on the right side of the page.     You will be asked to enter the access code listed below, as well as some personal information. Please follow the directions to create your username and password.     Your access code is: [email or web address]  Expires: 2018  8:33 PM     Your access code will  in 90 days. If you need help or a new code, please call your [place] clinic or [phone].        Care EveryWhere ID     This is your Care EveryWhere ID. This could be used by other organizations to access your [place] medical records  [id number]        Your Vitals Were     Pulse Temperature Respirations Height Last Period Pulse Oximetry    92 98.7  F (37.1  C) (Oral) 14 5' 1\" (1.549 m) 2003 (Approximate) 97%    BMI (Body Mass Index)                   33.25 kg/m2            Blood Pressure from Last 3 Encounters:   18 104/71   18 107/68   17 115/73    Weight from Last 3 Encounters:   18 176 lb (79.8 kg)   18 173 lb (78.5 kg)   17 174 lb (78.9 kg)              Today, you had the following     No orders found for display         Today's Medication Changes          These " changes are accurate as of 7/3/18 12:07 PM.  If you have any questions, ask your nurse or doctor.               Stop taking these medicines if you haven't already. Please contact your care team if you have questions.     buPROPion 100 MG tablet   Commonly known as:  WELLBUTRIN                    Primary Care Provider Office Phone # Fax #    [facility] [phone]       [address]  [facility] [address]        Equal Access to Services     IMAN BROWN : Hadii aad ku hadasho Soomaali, waaxda luqadaha, qaybta kaalmada adeegyada, waxay idiin hayaan adeeg kharash la'reta . So [facility] [phone].    ATENCIÓN: Si habla español, tiene a lombardo disposición servicios gratuitos de asistencia lingüística. LlUC Health [phone].    We comply with applicable federal civil rights laws and [place] laws. We do not discriminate on the basis of race, color, national origin, age, disability, sex, sexual orientation, or gender identity.            Thank you!     Thank you for choosing [facility]  for your care. Our goal is always to provide you with excellent care. Hearing back from our patients is one way we can continue to improve our services. Please take a few minutes to complete the written survey that you may receive in the mail after your visit with us. Thank you!             Your Updated Medication List - Protect others around you: Learn how to safely use, store and throw away your medicines at www.disposemymeds.org.          This list is accurate as of 7/3/18 12:07 PM.  Always use your most recent med list.                   Brand Name Dispense Instructions for use Diagnosis    MULTIVITAMIN PO           RANITIDINE HCL PO      Take by mouth daily        VITAMIN B-COMPLEX PO      Take by mouth daily        VITAMIN D-3 PO

## 2018-07-03 NOTE — PROGRESS NOTES
[facility]  [address] [facility] [address]  [phone]  Dept: [phone]    PRE-OP EVALUATION:  Today's date: 7/3/2018    Marixa Ann (: 1950) presents for pre-operative evaluation assessment as requested by Dr. Logan Daley.  She requires evaluation and anesthesia risk assessment prior to undergoing surgery/procedure for treatment of bilateral cataracts .    Proposed Surgery/ Procedure: Cataract removal and lens implant left eye first  Date of Surgery/ Procedure: 07/10/2018   Time of Surgery/ Procedure: 7:00 am    Hospital/Surgical Facility: [facility]  Fax number for surgical facility:   Primary Physician: Rivera Archbold - [facility]  Type of Anesthesia Anticipated: Unknown    Patient has stopped Bupropion-patient has also noticed vision getting blurred since starting that medication. Has been diagnosis with angle closure glaucoma by surgeon.     Patient has a Health Care Directive or Living Will:  NO    1. NO - Do you have a history of heart attack, stroke, stent, bypass or surgery on an artery in the head, neck, heart or legs?  2. NO - Do you ever have any pain or discomfort in your chest?  3. NO - Do you have a history of  Heart Failure?  4. NO - Are you troubled by shortness of breath when: walking on the level, up a slight hill or at night?  5. NO - Do you currently have a cold, bronchitis or other respiratory infection?  6. NO - Do you have a cough, shortness of breath or wheezing?  7. NO - Do you sometimes get pains in the calves of your legs when you walk?  8. NO - Do you or anyone in your family have previous history of blood clots?  9. NO - Do you or does anyone in your family have a serious bleeding problem such as prolonged bleeding following surgeries or cuts?  10. NO - Have you ever had problems with anemia or been told to take iron pills?  11. NO - Have you had any abnormal blood loss such as black, tarry or bloody stools, or  abnormal vaginal bleeding?  12. NO - Have you ever had a blood transfusion?  13. NO - Have you or any of your relatives ever had problems with anesthesia?  14. NO - Do you have sleep apnea, excessive snoring or daytime drowsiness?  15. NO - Do you have any prosthetic heart valves?  16. NO - Do you have prosthetic joints?  17. NO - Is there any chance that you may be pregnant?      HPI:     HPI related to upcoming procedure: decreased vision due to cataracts both eyes. Previous surgery had to be canceled due to equipment problems and this surgery has been rescheduled.       DEPRESSION - Patient has a long history of Depression of moderate severity requiring medication for control with recent symptoms being unchanged..Current symptoms of depression include depressed mood, insomnia.                                                                                                                                                                                    .  SLEEP PROBLEM - Patient has a longstanding history of fatigue.. Patient has tried OTC medications with limited success.                                                                                                                                         .    MEDICAL HISTORY:     Patient Active Problem List    Diagnosis Date Noted     Age-related cataract of both eyes, unspecified age-related cataract type 07/03/2018     Priority: Medium     Gastroesophageal reflux disease with esophagitis 05/31/2018     Priority: Medium     Hiatal hernia 05/31/2018     Priority: Medium     Inflammatory arthritis 04/20/2018     Priority: Medium     Persistent cough for 3 weeks or longer 12/21/2017     Priority: Medium     Cystic disease of liver 12/21/2017     Priority: Medium     Liver lesion 12/14/2017     Priority: Medium     Non-toxic multinodular goiter 12/14/2017     Priority: Medium     Moderate recurrent major depression (H) 10/19/2017     Priority: Medium      "Hyperlipidemia LDL goal <160 10/19/2017     Priority: Medium     Myalgia 10/19/2017     Priority: Medium     Chronic fatigue disorder 10/19/2017     Priority: Medium     Pain in joint, shoulder region 06/24/2008     Priority: Medium      No past medical history on file.  Past Surgical History:   Procedure Laterality Date     APPENDECTOMY  1967     Current Outpatient Prescriptions   Medication Sig Dispense Refill     B Complex Vitamins (VITAMIN B-COMPLEX PO) Take by mouth daily       Cholecalciferol (VITAMIN D-3 PO)        Multiple Vitamins-Minerals (MULTIVITAMIN PO)        RANITIDINE HCL PO Take by mouth daily       OTC products: None, except as noted above    Allergies   Allergen Reactions     Dust Mites       Latex Allergy: NO    Social History   Substance Use Topics     Smoking status: Never Smoker     Smokeless tobacco: Never Used     Alcohol use Not on file     History   Drug Use No       REVIEW OF SYSTEMS:   Constitutional, neuro, ENT, endocrine, pulmonary, cardiac, gastrointestinal, genitourinary, musculoskeletal, integument and psychiatric systems are negative, except as otherwise noted. Persistent cough from GERD.    EXAM:   /71 (BP Location: Right arm, Patient Position: Chair, Cuff Size: Adult Regular)  Pulse 92  Temp 98.7  F (37.1  C) (Oral)  Resp 14  Ht 5' 1\" (1.549 m)  Wt 176 lb (79.8 kg)  LMP 05/31/2003 (Approximate)  SpO2 97%  BMI 33.25 kg/m2    GENERAL APPEARANCE: healthy, alert and no distress     EYES: EOMI, PERRL     HENT: ear canals and TM's normal and nose and mouth without ulcers or lesions     NECK: no adenopathy, no asymmetry, masses, or scars and thyroid normal to palpation     RESP: lungs clear to auscultation - no rales, rhonchi or wheezes     CV: regular rates and rhythm, normal S1 S2, no S3 or S4 and no murmur, click or rub     ABDOMEN:  soft, nontender, no HSM or masses and bowel sounds normal     MS: extremities normal- no gross deformities noted, no evidence of " inflammation in joints, FROM in all extremities.     SKIN: no suspicious lesions or rashes     NEURO: Normal strength and tone, sensory exam grossly normal, mentation intact and speech normal     PSYCH: mentation appears normal. and affect normal/bright     LYMPHATICS: No cervical adenopathy    DIAGNOSTICS:   No labs or EKG required for low risk surgery (cataract, skin procedure, breast biopsy, etc)    Recent Labs   Lab Test  10/19/17   1412   HGB  12.8   PLT  356   NA  137   POTASSIUM  3.9   CR  0.72        IMPRESSION:   Reason for surgery/procedure: bilateral cataracts/ Left cataract removal and lens implant followed by right at a later date  Diagnosis/reason for consult: cardiac and anesthesia risk assessment     The proposed surgical procedure is considered LOW risk.    REVISED CARDIAC RISK INDEX  The patient has the following serious cardiovascular risks for perioperative complications such as (MI, PE, VFib and 3  AV Block):  No serious cardiac risks  INTERPRETATION: 0 risks: Class I (very low risk - 0.4% complication rate)    The patient has the following additional risks for perioperative complications:  No identified additional risks      ICD-10-CM    1. Preop general physical exam Z01.818 Approved for procedure and anesthesia   2. Age-related cataract of both eyes, unspecified age-related cataract type H25.9 Cataract surgery   3. Hyperlipidemia LDL goal <160 E78.5 Well controlled on medications    4. Moderate recurrent major depression (H) F33.1 Stable but off medication due to side effects   5. Chronic fatigue disorder R53.82 Stable    6. Gastroesophageal reflux disease with esophagitis K21.0 Taking ranitidine for reduction of symptoms but also is causing chronic cough    7. Persistent cough for 3 weeks or longer R05 Will take Claritin prior to procedure to help prevent coughing.        RECOMMENDATIONS:     --Consult hospital rounder / IM to assist post-op medical management    --Patient is to take all  scheduled medications on the day of surgery EXCEPT for modifications listed below.    APPROVAL GIVEN to proceed with proposed procedure, without further diagnostic evaluation       Signed Electronically by: Jackie Kovacs MD    Copy of this evaluation report is provided to requesting physician.    [facility] Preop Guidelines    Revised Cardiac Risk Index

## 2018-09-04 ENCOUNTER — TELEPHONE (OUTPATIENT)
Dept: FAMILY MEDICINE | Facility: CLINIC | Age: 68
End: 2018-09-04

## 2018-09-04 NOTE — TELEPHONE ENCOUNTER
"Called and spoke with patient. Discussed message below. Patient wondering about follow up from last fall when had lab work done and had elevated ESR. Patient was referred to Rheumatology and was seen by Dr. Galloway with [facility] in April 2018 and was told no further follow up was needed. Patient has concerns about this and what she should do next. Patient also mentioning that she has been told that she needs to have left knee replacement surgery because her knee is \"bone on bone\". Patient was not happy with previous Ortho provider and would like to discuss a consult with another physician regarding her knee. Patient has questions about having knee surgery after cataract surgery this summer and wondering how long she should wait in between.  RN advised that best plan would be for her to schedule an OV with PCP to discuss all these concerns further. Patient agreed with this plan. RN assisted patient in scheduling OV for 9/13/18 at 10:40 am with Dr. Kovacs. No further questions at this time from patient.    Padma Ragsdale RN  [facility] Triage    "

## 2018-09-04 NOTE — TELEPHONE ENCOUNTER
Reason for call:  Patient reporting a symptom    Symptom or request: Symptoms    Duration (how long have symptoms been present): on going    Have you been treated for this before? Yes    Additional comments: Pt calling for she would like advisement on the blood inflammation that has been swelling in her leg. She would like to know if she needs to schedule an apt or be seen by another provider.    Phone Number patient can be reached at:  Home number on file [phone] (home)    Best Time:  anytime    Can we leave a detailed message on this number:  YES    Call taken on 9/4/2018 at 10:08 AM by Yinka Díaz

## 2018-09-13 ENCOUNTER — OFFICE VISIT (OUTPATIENT)
Dept: FAMILY MEDICINE | Facility: CLINIC | Age: 68
End: 2018-09-13
Payer: COMMERCIAL

## 2018-09-13 VITALS
BODY MASS INDEX: 33.62 KG/M2 | OXYGEN SATURATION: 98 % | TEMPERATURE: 98.2 F | DIASTOLIC BLOOD PRESSURE: 71 MMHG | WEIGHT: 178.1 LBS | HEART RATE: 91 BPM | SYSTOLIC BLOOD PRESSURE: 107 MMHG | HEIGHT: 61 IN | RESPIRATION RATE: 16 BRPM

## 2018-09-13 DIAGNOSIS — H53.8 BLURRED VISION: ICD-10-CM

## 2018-09-13 DIAGNOSIS — M17.0 PRIMARY OSTEOARTHRITIS OF BOTH KNEES: Primary | ICD-10-CM

## 2018-09-13 DIAGNOSIS — Z12.31 VISIT FOR SCREENING MAMMOGRAM: ICD-10-CM

## 2018-09-13 DIAGNOSIS — R70.0 ELEVATED SED RATE: ICD-10-CM

## 2018-09-13 DIAGNOSIS — F33.1 MODERATE RECURRENT MAJOR DEPRESSION (H): ICD-10-CM

## 2018-09-13 DIAGNOSIS — F90.0 ATTENTION DEFICIT HYPERACTIVITY DISORDER (ADHD), PREDOMINANTLY INATTENTIVE TYPE: ICD-10-CM

## 2018-09-13 DIAGNOSIS — H25.9 AGE-RELATED CATARACT OF BOTH EYES, UNSPECIFIED AGE-RELATED CATARACT TYPE: ICD-10-CM

## 2018-09-13 DIAGNOSIS — G93.32 CHRONIC FATIGUE DISORDER: ICD-10-CM

## 2018-09-13 PROBLEM — M19.90 INFLAMMATORY ARTHRITIS: Status: RESOLVED | Noted: 2018-04-20 | Resolved: 2018-09-13

## 2018-09-13 PROBLEM — R05.3 PERSISTENT COUGH FOR 3 WEEKS OR LONGER: Status: RESOLVED | Noted: 2017-12-21 | Resolved: 2018-09-13

## 2018-09-13 LAB
CRP SERPL-MCNC: 35.6 MG/L (ref 0–8)
ERYTHROCYTE [DISTWIDTH] IN BLOOD BY AUTOMATED COUNT: 14.3 % (ref 10–15)
ERYTHROCYTE [SEDIMENTATION RATE] IN BLOOD BY WESTERGREN METHOD: 47 MM/H (ref 0–30)
HCT VFR BLD AUTO: 40.7 % (ref 35–47)
HGB BLD-MCNC: 12.8 G/DL (ref 11.7–15.7)
MCH RBC QN AUTO: 28.9 PG (ref 26.5–33)
MCHC RBC AUTO-ENTMCNC: 31.4 G/DL (ref 31.5–36.5)
MCV RBC AUTO: 92 FL (ref 78–100)
PLATELET # BLD AUTO: 322 10E9/L (ref 150–450)
RBC # BLD AUTO: 4.43 10E12/L (ref 3.8–5.2)
WBC # BLD AUTO: 8.8 10E9/L (ref 4–11)

## 2018-09-13 PROCEDURE — 99214 OFFICE O/P EST MOD 30 MIN: CPT | Performed by: FAMILY MEDICINE

## 2018-09-13 PROCEDURE — 85027 COMPLETE CBC AUTOMATED: CPT | Performed by: FAMILY MEDICINE

## 2018-09-13 PROCEDURE — 36415 COLL VENOUS BLD VENIPUNCTURE: CPT | Performed by: FAMILY MEDICINE

## 2018-09-13 PROCEDURE — 85652 RBC SED RATE AUTOMATED: CPT | Performed by: FAMILY MEDICINE

## 2018-09-13 PROCEDURE — 86140 C-REACTIVE PROTEIN: CPT | Performed by: FAMILY MEDICINE

## 2018-09-13 RX ORDER — DEXTROAMPHETAMINE SACCHARATE, AMPHETAMINE ASPARTATE, DEXTROAMPHETAMINE SULFATE AND AMPHETAMINE SULFATE 2.5; 2.5; 2.5; 2.5 MG/1; MG/1; MG/1; MG/1
10 TABLET ORAL DAILY
Qty: 30 TABLET | Refills: 0 | Status: SHIPPED | OUTPATIENT
Start: 2018-09-13 | End: 2018-09-27

## 2018-09-13 ASSESSMENT — ANXIETY QUESTIONNAIRES
5. BEING SO RESTLESS THAT IT IS HARD TO SIT STILL: NOT AT ALL
GAD7 TOTAL SCORE: 10
7. FEELING AFRAID AS IF SOMETHING AWFUL MIGHT HAPPEN: MORE THAN HALF THE DAYS
1. FEELING NERVOUS, ANXIOUS, OR ON EDGE: MORE THAN HALF THE DAYS
3. WORRYING TOO MUCH ABOUT DIFFERENT THINGS: MORE THAN HALF THE DAYS
IF YOU CHECKED OFF ANY PROBLEMS ON THIS QUESTIONNAIRE, HOW DIFFICULT HAVE THESE PROBLEMS MADE IT FOR YOU TO DO YOUR WORK, TAKE CARE OF THINGS AT HOME, OR GET ALONG WITH OTHER PEOPLE: EXTREMELY DIFFICULT
2. NOT BEING ABLE TO STOP OR CONTROL WORRYING: MORE THAN HALF THE DAYS
6. BECOMING EASILY ANNOYED OR IRRITABLE: MORE THAN HALF THE DAYS

## 2018-09-13 ASSESSMENT — PAIN SCALES - GENERAL: PAINLEVEL: WORST PAIN (10)

## 2018-09-13 ASSESSMENT — PATIENT HEALTH QUESTIONNAIRE - PHQ9: 5. POOR APPETITE OR OVEREATING: NOT AT ALL

## 2018-09-13 NOTE — PROGRESS NOTES
SUBJECTIVE:   Marixa Ann is a 67 year old female who presents to clinic today for the following health issues:    Had cataract surgery and still has blurred vision from 10 feet in. Can't do anything at this time. Seeing a therapist to help with her depression and anxiety issues.     Knee Follow Up- [facility] has diagnosed severe osteoarthritis of left knee and recommended replacement in the past.       Description:   Location of pain:  Left knee and Right knee  Character of pain: sharp and constant  Pain radiation: Does not radiate  Since last visit, pain is:  unchanged  New numbness or weakness in legs, not attributed to pain:  no     Intensity: Currently 10/10    History:   Pain interferes with job: Not applicable,   Therapies tried without relief: ibuprofen and Rheumatology and pillow in the knee  Therapies tried with relief: none       Additional: Fatigue medications follow up.        Accompanying Signs & Symptoms:  Risk of Fracture:  None  Risk of Cauda Equina:  None  Risk of Infection:  None  Risk of Cancer:  None        Amount of exercise or physical activity: None    Problems taking medications regularly: No    Medication side effects: none    Diet: regular (no restrictions)        Hyperlipidemia Follow-Up      Rate your low fat/cholesterol diet?: good    Taking statin?  No    Other lipid medications/supplements?:  none    Depression and Anxiety Follow-Up    Status since last visit: No change    Other associated symptoms:None    Complicating factors:     Significant life event: Yes-  Eye surgery failed to improve her vision and she is having trouble seeing close up.      Current substance abuse: None    PHQ-9 11/6/2017 4/20/2018 9/13/2018   Total Score 21 0 23   Q9: Suicide Ideation Not at all Not at all More than half the days     DONOVAN-7 SCORE 11/6/2017 9/13/2018   Total Score 14 10     Patient is seeing a therapist now. No active plans  PHQ-9  English  PHQ-9   Any  Language  DONOVAN-7  Suicide Assessment Five-step Evaluation and Treatment (SAFE-T)    Problem list and histories reviewed & adjusted, as indicated.  Additional history: as documented    Patient Active Problem List   Diagnosis     Pain in joint, shoulder region     Moderate recurrent major depression (H)     Hyperlipidemia LDL goal <160     Myalgia     Chronic fatigue disorder     Liver lesion     Non-toxic multinodular goiter     Cystic disease of liver     Gastroesophageal reflux disease with esophagitis     Hiatal hernia     Age-related cataract of both eyes, unspecified age-related cataract type     Primary osteoarthritis of both knees     Past Surgical History:   Procedure Laterality Date     APPENDECTOMY  1967       Social History   Substance Use Topics     Smoking status: Never Smoker     Smokeless tobacco: Never Used     Alcohol use Not on file     No family history on file.      Current Outpatient Prescriptions   Medication Sig Dispense Refill     amphetamine-dextroamphetamine (ADDERALL) 10 MG per tablet Take 1 tablet (10 mg) by mouth daily 30 tablet 0     B Complex Vitamins (VITAMIN B-COMPLEX PO) Take by mouth daily       Cholecalciferol (VITAMIN D-3 PO)        Multiple Vitamins-Minerals (MULTIVITAMIN PO)        RANITIDINE HCL PO Take by mouth daily       Allergies   Allergen Reactions     Dust Mites      Recent Labs   Lab Test  10/19/17   1412   LDL  98   HDL  40*   TRIG  82   ALT  26   CR  0.72   GFRESTIMATED  81   GFRESTBLACK  >90   POTASSIUM  3.9   TSH  1.35      BP Readings from Last 3 Encounters:   09/13/18 107/71   07/03/18 104/71   05/31/18 107/68    Wt Readings from Last 3 Encounters:   09/13/18 80.8 kg (178 lb 1.6 oz)   07/03/18 79.8 kg (176 lb)   05/31/18 78.5 kg (173 lb)                  Labs reviewed in EPIC    Reviewed and updated as needed this visit by clinical staff  Tobacco  Allergies  Meds       Reviewed and updated as needed this visit by Provider         ROS:  Constitutional, HEENT,  "cardiovascular, pulmonary, gi and gu systems are negative, except as otherwise noted.    OBJECTIVE:     /71 (BP Location: Left arm, Patient Position: Chair, Cuff Size: Adult Regular)  Pulse 91  Temp 98.2  F (36.8  C) (Oral)  Resp 16  Ht 1.549 m (5' 1\")  Wt 80.8 kg (178 lb 1.6 oz)  LMP 05/31/2003 (Approximate)  SpO2 98%  Breastfeeding? No  BMI 33.65 kg/m2  Body mass index is 33.65 kg/(m^2).  GENERAL: healthy, alert, well nourished, well hydrated, no distress, obese  HENT: ear canals- normal; TMs- normal; Nose- normal; Mouth- no ulcers, no lesions, throat is clear with no erythema or exudate.   NECK: no tenderness, no adenopathy, no asymmetry, no masses, no stiffness; thyroid- normal to palpation  RESP: lungs clear to auscultation - no rales, no rhonchi, no wheezes  CV: regular rates and rhythm, normal S1 S2, no S3 or S4 and no murmur, no click or rub -  ABDOMEN: soft, no tenderness, no  hepatosplenomegaly, no masses, normal bowel sounds  MS: extremities- no gross deformities noted, no edema  SKIN: no suspicious lesions, no rashes  NEURO: strength and tone- normal, sensory exam- grossly normal, mentation- intact, speech- normal, reflexes- symmetric  BACK: no CVA tenderness, no paralumbar tenderness  PSYCH: Alert and oriented times 3; speech- coherent , normal rate and volume; able to articulate logical thoughts, able to abstract reason, no tangential thoughts, no hallucinations or delusions, affect- anxious    Diagnostic Test Results:  Results for orders placed or performed in visit on 09/13/18 (from the past 24 hour(s))   CBC with platelets   Result Value Ref Range    WBC 8.8 4.0 - 11.0 10e9/L    RBC Count 4.43 3.8 - 5.2 10e12/L    Hemoglobin 12.8 11.7 - 15.7 g/dL    Hematocrit 40.7 35.0 - 47.0 %    MCV 92 78 - 100 fl    MCH 28.9 26.5 - 33.0 pg    MCHC 31.4 (L) 31.5 - 36.5 g/dL    RDW 14.3 10.0 - 15.0 %    Platelet Count 322 150 - 450 10e9/L   Erythrocyte sedimentation rate auto   Result Value Ref " "Range    Sed Rate 47 (H) 0 - 30 mm/h       ASSESSMENT/PLAN:         Tobacco Cessation:   reports that she has never smoked. She has never used smokeless tobacco.      BMI:   Estimated body mass index is 33.65 kg/(m^2) as calculated from the following:    Height as of this encounter: 1.549 m (5' 1\").    Weight as of this encounter: 80.8 kg (178 lb 1.6 oz).   Weight management plan: Discussed healthy diet and exercise guidelines and patient will follow up in 3 months in clinic to re-evaluate.        ICD-10-CM    1. Primary osteoarthritis of both knees M17.0 ORTHOPEDICS ADULT REFERRAL for evaluation and treatment knee pain   2. Moderate recurrent major depression (H) F33.1 Seeing therapist who thinks that she should try Adderall for symptoms of ADHD.    3. Visit for screening mammogram Z12.31 Scheduled at [facility]    4. Attention deficit hyperactivity disorder (ADHD), predominantly inattentive type F90.0 amphetamine-dextroamphetamine (ADDERALL) 10 MG per tablet one a day and reassess in 1-2 months   5. Chronic fatigue disorder R53.82 CBC with platelets     CRP inflammation     Erythrocyte sedimentation rate auto   6. Blurred vision H53.8 OPHTHALMOLOGY ADULT REFERRAL   7. Age-related cataract of both eyes, unspecified age-related cataract type H25.9 NO improved after surgery       CONSULTATION/REFERRAL to orthopedics for bilateral knee pain and osteoarthritis   FUTURE LABS:       - Schedule fasting labs in 3 months  FUTURE APPOINTMENTS:       - Follow-up visit in 2-3 months or sooner if any questions or concerns.   Work on weight loss  Regular exercise  See Patient Instructions    Jackie Kovacs MD  [facility]    "

## 2018-09-13 NOTE — PATIENT INSTRUCTIONS
At [facility], we strive to deliver an exceptional experience to you, every time we see you.  If you receive a survey in the mail, please send us back your thoughts. We really do value your feedback.    Based on your medical history, these are the current health maintenance/preventive care services that you are due for (some may have been done at this visit.)  Health Maintenance Due   Topic Date Due     TETANUS IMMUNIZATION (SYSTEM ASSIGNED)  10/20/1970     DEPRESSION ACTION PLAN Q1 YR  10/20/1970     HIV SCREEN (SYSTEM ASSIGNED)  10/20/1970     HEPATITIS C SCREENING  10/20/1970     COLON CANCER SCREEN (SYSTEM ASSIGNED)  10/20/2002     ADVANCE DIRECTIVE PLANNING Q5 YRS  10/20/2007     INFLUENZA VACCINE (1) 09/01/2018     MAMMO SCREEN Q2 YR (SYSTEM ASSIGNED)  08/02/2018       Suggested websites for health information:  Www.HelpHive.Globevestor : Up to date and easily searchable information on multiple topics.  Www.Zaplox.gov : medication info, interactive tutorials, watch real surgeries online  Www.familydoctor.org : good info from the Academy of Family Physicians  Www.cdc.gov : public health info, travel advisories, epidemics (H1N1)  Www.aap.org : children's health info, normal development, vaccinations  Www.health.[facility].[place].us : [place] dept of health, public health issues in [place], N1N1    Your care team:                            Family Medicine Internal Medicine   MD Kyle Mancera MD Shantel Branch-Fleming, MD Katya Georgiev PA-C Megan Hill, MARRY Kauffman MD Pediatrics   OCTAVIO Ruiz, MD Kayley Millan APRN CNP   MD Alisa Encinas MD Deborah Mielke, MD Kim Thein, APRN CNP      Clinic hours: Monday - Thursday 7 am-7 pm; Fridays 7 am-5 pm.   Urgent care: Monday - Friday 11 am-9 pm; Saturday and Sunday 9 am-5 pm.  Pharmacy : Monday -Thursday 8 am-8 pm; Friday 8 am-6 pm; Saturday and Sunday 9 am-5 pm.     Clinic:  [phone]   Pharmacy: [phone]      Attention-Deficit/Hyperactivity Disorder (ADHD) in Adults  You ve always had trouble concentrating. Your mind wanders, and it s hard to finish tasks. As a result, you didn t do well in school. And now, you often struggle with your job. Sometimes this makes you rodriguez or depressed. These may be symptoms of attention-deficit/hyperactivity disorder (ADHD). To find out more, talk to your healthcare provider. He or she can offer guidance and support.  Symptoms  of ADHD in adults  For an adult to be diagnosed with ADHD, the symptoms must have been present since childhood. The symptoms may include:    Trouble thinking things through    Low self-esteem    Depression    Trouble holding a job    Memory problems    Problems with a marriage or relationship    Lack of discipline   What is ADHD?  Attention-deficit/hyperactivity disorder makes it hard to focus your mind. You may daydream a lot. And you may be restless much of the time. As a result, you may have trouble with detailed or boring work. And it may be hard to stick with one project for very long. You also may forget things. Or, you may miss key points during a lecture or meeting. You may even have trouble sitting through a movie or concert. At times, you may feel frustrated or angry. This can affect your relationships with others.  Who does it affect?  ADHD starts in childhood. Sometimes, your symptoms may improve as you get older. But they also may persist into your adult years. ADHD is often thought of as a  kid s problem.  That s why it s often missed in adults. In fact, many parents learn they have ADHD when their children are diagnosed.  What causes it?  The exact cause of ADHD isn t known. The disorder does run in families. Having one parent with ADHD makes it more likely you ll have it too. And the part of your brain that controls attention may be involved. Certain brain chemicals that are out of balance may also play  a role.  What can be done?  The first step is finding out if you really have ADHD. Your doctor will use special guidelines to diagnose the disorder. Most adults with ADHD are greatly helped by therapy and coaching. In some cases, your doctor may also prescribe medicine to ease your symptoms.  Resources    National Resource Center on AD/HDwww.qhry8lbra.org    Attention Deficit Disorder Associationwww.add.org   Date Last Reviewed: 1/1/2017 2000-2017 [employer]. [address] [place] [address]. All rights reserved. This information is not intended as a substitute for professional medical care. Always follow your healthcare professional's instructions.        Depression  Depression is one of the most common mental health problems today. It is not just a state of unhappiness or sadness. It is a true disease. The cause seems to be related to a decrease in chemicals that transmit signals in the brain. Having a family history of depression, alcoholism, or suicide increases the risk. Chronic illness, chronic pain, migraine headaches, and high emotional stress also increase the risk.  Depression is something we tend to recognize in others, but may have a hard time seeing in ourselves. It can show in many physical and emotional ways:    Loss of appetite    Overeating    Not being able to sleep    Sleeping too much    Tiredness not related to physical exertion    Restlessness or irritability    Slowness of movement or speech    Feeling depressed or withdrawn    Loss of interest in things you once enjoyed    Trouble concentrating, poor memory, trouble making decisions    Thoughts of harming or killing oneself, or thoughts that life is not worth living    Low self-esteem  The treatment for depression may include both medicine and psychotherapy. Antidepressants can reduce suffering and can improve the ability to function during the depressed period. Therapy can offer emotional support and help you  understand emotional factors that may be causing the depression.  Home care    Ongoing care and support help people manage this disease. Find a healthcare provider and therapist who meet your needs. Seek help when you feel like you may be getting ill.    Be kind to yourself. Make it a point to do things that you enjoy (gardening, walking in nature, going to a movie). Reward yourself for small successes.    Take care of your physical body. Eat a balanced diet (low in saturated fat and high in fruits and vegetables). Exercise at least 3 times a week for 30 minutes. Even mild-moderate exercise (like brisk walking) can make you feel better.    Don't drink alcohol, which can make depression worse.    Take medicine as prescribed.    Tell each of your healthcare providers about all of the prescription and over-the-counter medicines, vitamins, and supplements you take. Certain supplements interact with medicines and can result in dangerous side effects. Ask your pharmacist when you have questions about medicine interactions.    Talk with your family and trusted friends about your feelings and thoughts. Ask them to help you recognize behavior changes early so you can get help and, if needed, medicine can be adjusted.  Follow-up care  Follow up with your healthcare provider, or as advised.  Call 911  Call 911 if you:    Have suicidal thoughts, a suicide plan, and the means to carry out the plan; or serious thoughts of hurting someone else     Have trouble breathing    Are very confused    Feel very drowsy or have trouble awakening    Faint or lose consciousness    Have new chest pain that becomes more severe, lasts longer, or spreads into your shoulder, arm, neck, jaw, or back  When to seek medical advice  Call your healthcare provider right away if any of these happen:    Feeling extreme depression, fear, anxiety, or anger toward yourself or others    Feeling out of control    Feeling that you may try to harm yourself or  another    Hearing voices that others do not hear    Seeing things that others do not see    Can t sleep or eat for 3 days in a row    Friends or family express concern over your behavior and ask you to seek help  Date Last Reviewed: 10/1/2017    7229-5312 [employer]. [address] [place] [address]. All rights reserved. This information is not intended as a substitute for professional medical care. Always follow your healthcare professional's instructions.

## 2018-09-13 NOTE — NURSING NOTE
addressed. Mammogram decline will do elsewhere, call back with results. Healthcare packet decline    Ashely Glez MA

## 2018-09-13 NOTE — LETTER
September 17, 2018        Marixa Ann  [address] [facility] [address]        Dear Marixa,    Your test results are attached. I am happy to let you know that they are stable.    The tests for inflammation are still high but better than 1 year ago. The rheumatologist recommended a bone scan and I will put in a referral for this. Otherwise your tests look good.     Please call to schedule your Bone scan at [facility] by calling [phone].     Please call me if you have any questions about these test results or about your care.    Sincerely,      Jackie Kovacs MD/austen    Resulted Orders   CBC with platelets   Result Value Ref Range    WBC 8.8 4.0 - 11.0 10e9/L    RBC Count 4.43 3.8 - 5.2 10e12/L    Hemoglobin 12.8 11.7 - 15.7 g/dL    Hematocrit 40.7 35.0 - 47.0 %    MCV 92 78 - 100 fl    MCH 28.9 26.5 - 33.0 pg    MCHC 31.4 (L) 31.5 - 36.5 g/dL    RDW 14.3 10.0 - 15.0 %    Platelet Count 322 150 - 450 10e9/L   CRP inflammation   Result Value Ref Range    CRP Inflammation 35.6 (H) 0.0 - 8.0 mg/L   Erythrocyte sedimentation rate auto   Result Value Ref Range    Sed Rate 47 (H) 0 - 30 mm/h

## 2018-09-13 NOTE — MR AVS SNAPSHOT
After Visit Summary   9/13/2018    Marixa Ann    MRN: [id number]           Patient Information     Date Of Birth          1950        Visit Information        Provider Department      9/13/2018 10:40 AM Jackie Kovacs MD [facility]        Today's Diagnoses     Moderate recurrent major depression (H)    -  1    Visit for screening mammogram        Attention deficit hyperactivity disorder (ADHD), predominantly inattentive type        Primary osteoarthritis of both knees        Chronic fatigue disorder        Blurred vision          Care Instructions    At [facility], we strive to deliver an exceptional experience to you, every time we see you.  If you receive a survey in the mail, please send us back your thoughts. We really do value your feedback.    Based on your medical history, these are the current health maintenance/preventive care services that you are due for (some may have been done at this visit.)  Health Maintenance Due   Topic Date Due     TETANUS IMMUNIZATION (SYSTEM ASSIGNED)  10/20/1970     DEPRESSION ACTION PLAN Q1 YR  10/20/1970     HIV SCREEN (SYSTEM ASSIGNED)  10/20/1970     HEPATITIS C SCREENING  10/20/1970     COLON CANCER SCREEN (SYSTEM ASSIGNED)  10/20/2002     ADVANCE DIRECTIVE PLANNING Q5 YRS  10/20/2007     INFLUENZA VACCINE (1) 09/01/2018     MAMMO SCREEN Q2 YR (SYSTEM ASSIGNED)  08/02/2018       Suggested websites for health information:  Www.[place].org : Up to date and easily searchable information on multiple topics.  Www.medlineplus.gov : medication info, interactive tutorials, watch real surgeries online  Www.familydoctor.org : good info from the Academy of Family Physicians  Www.cdc.gov : public health info, travel advisories, epidemics (H1N1)  Www.aap.org : children's health info, normal development, vaccinations  Www.health.state.[place].us : [place] dept of health, public health issues in [place], N1N1    Your care team:                             Family Medicine Internal Medicine   MD Kyle Mancera MD Shantel Branch-Fleming, MD Katya Georgiev PA-C Megan Hill, APRRODNEY Kauffman MD Pediatrics   OCTAVIO Ruiz, MD Kayley Millan APRN CNP   MD Alisa Encinas MD Deborah Mielke, MD Kim Thein, APRN [facility]      Clinic hours: Monday - Thursday 7 am-7 pm; Fridays 7 am-5 pm.   Urgent care: Monday - Friday 11 am-9 pm; Saturday and Sunday 9 am-5 pm.  Pharmacy : Monday -Thursday 8 am-8 pm; Friday 8 am-6 pm; Saturday and Sunday 9 am-5 pm.     Clinic: [phone]   Pharmacy: [phone]      Attention-Deficit/Hyperactivity Disorder (ADHD) in Adults  You ve always had trouble concentrating. Your mind wanders, and it s hard to finish tasks. As a result, you didn t do well in school. And now, you often struggle with your job. Sometimes this makes you rodriguez or depressed. These may be symptoms of attention-deficit/hyperactivity disorder (ADHD). To find out more, talk to your healthcare provider. He or she can offer guidance and support.  Symptoms  of ADHD in adults  For an adult to be diagnosed with ADHD, the symptoms must have been present since childhood. The symptoms may include:    Trouble thinking things through    Low self-esteem    Depression    Trouble holding a job    Memory problems    Problems with a marriage or relationship    Lack of discipline   What is ADHD?  Attention-deficit/hyperactivity disorder makes it hard to focus your mind. You may daydream a lot. And you may be restless much of the time. As a result, you may have trouble with detailed or boring work. And it may be hard to stick with one project for very long. You also may forget things. Or, you may miss key points during a lecture or meeting. You may even have trouble sitting through a movie or concert. At times, you may feel frustrated or angry. This can affect your relationships with  others.  Who does it affect?  ADHD starts in childhood. Sometimes, your symptoms may improve as you get older. But they also may persist into your adult years. ADHD is often thought of as a  kid s problem.  That s why it s often missed in adults. In fact, many parents learn they have ADHD when their children are diagnosed.  What causes it?  The exact cause of ADHD isn t known. The disorder does run in families. Having one parent with ADHD makes it more likely you ll have it too. And the part of your brain that controls attention may be involved. Certain brain chemicals that are out of balance may also play a role.  What can be done?  The first step is finding out if you really have ADHD. Your doctor will use special guidelines to diagnose the disorder. Most adults with ADHD are greatly helped by therapy and coaching. In some cases, your doctor may also prescribe medicine to ease your symptoms.  Resources    National Resource Center on AD/HDwww.xkux9sfnz.org    Attention Deficit Disorder Associationwww.add.org   Date Last Reviewed: 1/1/2017 2000-2017 [employer]. [address] [place] [address]. All rights reserved. This information is not intended as a substitute for professional medical care. Always follow your healthcare professional's instructions.        Depression  Depression is one of the most common mental health problems today. It is not just a state of unhappiness or sadness. It is a true disease. The cause seems to be related to a decrease in chemicals that transmit signals in the brain. Having a family history of depression, alcoholism, or suicide increases the risk. Chronic illness, chronic pain, migraine headaches, and high emotional stress also increase the risk.  Depression is something we tend to recognize in others, but may have a hard time seeing in ourselves. It can show in many physical and emotional ways:    Loss of appetite    Overeating    Not being able to  sleep    Sleeping too much    Tiredness not related to physical exertion    Restlessness or irritability    Slowness of movement or speech    Feeling depressed or withdrawn    Loss of interest in things you once enjoyed    Trouble concentrating, poor memory, trouble making decisions    Thoughts of harming or killing oneself, or thoughts that life is not worth living    Low self-esteem  The treatment for depression may include both medicine and psychotherapy. Antidepressants can reduce suffering and can improve the ability to function during the depressed period. Therapy can offer emotional support and help you understand emotional factors that may be causing the depression.  Home care    Ongoing care and support help people manage this disease. Find a healthcare provider and therapist who meet your needs. Seek help when you feel like you may be getting ill.    Be kind to yourself. Make it a point to do things that you enjoy (gardening, walking in nature, going to a movie). Reward yourself for small successes.    Take care of your physical body. Eat a balanced diet (low in saturated fat and high in fruits and vegetables). Exercise at least 3 times a week for 30 minutes. Even mild-moderate exercise (like brisk walking) can make you feel better.    Don't drink alcohol, which can make depression worse.    Take medicine as prescribed.    Tell each of your healthcare providers about all of the prescription and over-the-counter medicines, vitamins, and supplements you take. Certain supplements interact with medicines and can result in dangerous side effects. Ask your pharmacist when you have questions about medicine interactions.    Talk with your family and trusted friends about your feelings and thoughts. Ask them to help you recognize behavior changes early so you can get help and, if needed, medicine can be adjusted.  Follow-up care  Follow up with your healthcare provider, or as advised.  Call 911  Call 911 if  you:    Have suicidal thoughts, a suicide plan, and the means to carry out the plan; or serious thoughts of hurting someone else     Have trouble breathing    Are very confused    Feel very drowsy or have trouble awakening    Faint or lose consciousness    Have new chest pain that becomes more severe, lasts longer, or spreads into your shoulder, arm, neck, jaw, or back  When to seek medical advice  Call your healthcare provider right away if any of these happen:    Feeling extreme depression, fear, anxiety, or anger toward yourself or others    Feeling out of control    Feeling that you may try to harm yourself or another    Hearing voices that others do not hear    Seeing things that others do not see    Can t sleep or eat for 3 days in a row    Friends or family express concern over your behavior and ask you to seek help  Date Last Reviewed: 10/1/2017    0817-4086 [employer]. [address] [place] [address]. All rights reserved. This information is not intended as a substitute for professional medical care. Always follow your healthcare professional's instructions.                Follow-ups after your visit        Additional Services     OPHTHALMOLOGY ADULT REFERRAL       Your provider has referred you to: [place]: [facility] [phone]   [email or web address]  UMP: [facility] [phone]   [email or web address]    Please be aware that coverage of these services is subject to the terms and limitations of your health insurance plan.  Call member services at your health plan with any benefit or coverage questions.      Please bring the following with you to your appointment:    (1) Any X-Rays, CTs or MRIs which have been performed.  Contact the facility where they were done to arrange for  prior to your scheduled appointment.    (2) List of current  medications  (3) This referral request   (4) Any documents/labs given to you for this referral            ORTHOPEDICS ADULT REFERRAL       Your provider has referred you to: FMG: [facility] [phone]    [email or web address]    Please be aware that coverage of these services is subject to the terms and limitations of your health insurance plan.  Call member services at your health plan with any benefit or coverage questions.      Please bring the following to your appointment:    >>   Any x-rays, CTs or MRIs which have been performed.  Contact the facility where they were done to arrange for  prior to your scheduled appointment.    >>   List of current medications   >>   This referral request   >>   Any documents/labs given to you for this referral                  Follow-up notes from your care team     Return in about 4 weeks (around 10/11/2018) for medication follow up.      Who to contact     If you have questions or need follow up information about today's clinic visit or your schedule please contact [facility] directly at [phone].  Normal or non-critical lab and imaging results will be communicated to you by MyChart, letter or phone within 4 business days after the clinic has received the results. If you do not hear from us within 7 days, please contact the clinic through [employer] or phone. If you have a critical or abnormal lab result, we will notify you by phone as soon as possible.  Submit refill requests through [employer] or call your pharmacy and they will forward the refill request to us. Please allow 3 business days for your refill to be completed.          Additional Information About Your Visit        [employer] Information     [employer] lets you send messages to your doctor, view your test results, renew your prescriptions, schedule appointments and more. To sign up, go to [email or web address] . Click  "on \"Log in\" on the left side of the screen, which will take you to the Welcome page. Then click on \"Sign up Now\" on the right side of the page.     You will be asked to enter the access code listed below, as well as some personal information. Please follow the directions to create your username and password.     Your access code is: [email or web address]  Expires: 2018 11:37 AM     Your access code will  in 90 days. If you need help or a new code, please call your [facility] clinic or [phone].        Care EveryWhere ID     This is your Care EveryWhere ID. This could be used by other organizations to access your [facility] medical records  [id number]        Your Vitals Were     Pulse Temperature Respirations Height Last Period Pulse Oximetry    91 98.2  F (36.8  C) (Oral) 16 5' 1\" (1.549 m) 2003 (Approximate) 98%    Breastfeeding? BMI (Body Mass Index)                No 33.65 kg/m2           Blood Pressure from Last 3 Encounters:   18 107/71   18 104/71   18 107/68    Weight from Last 3 Encounters:   18 178 lb 1.6 oz (80.8 kg)   18 176 lb (79.8 kg)   18 173 lb (78.5 kg)              We Performed the Following     CBC with platelets     CRP inflammation     Erythrocyte sedimentation rate auto     OPHTHALMOLOGY ADULT REFERRAL     ORTHOPEDICS ADULT REFERRAL          Today's Medication Changes          These changes are accurate as of 18 11:37 AM.  If you have any questions, ask your nurse or doctor.               Start taking these medicines.        Dose/Directions    amphetamine-dextroamphetamine 10 MG per tablet   Commonly known as:  ADDERALL   Used for:  Attention deficit hyperactivity disorder (ADHD), predominantly inattentive type   Started by:  Jackie Kovacs MD        Dose:  10 mg   Take 1 tablet (10 mg) by mouth daily   Quantity:  30 tablet   Refills:  0            Where to get your medicines      Some of these will need a paper prescription and others " can be bought over the counter.  Ask your nurse if you have questions.     Bring a paper prescription for each of these medications     amphetamine-dextroamphetamine 10 MG per tablet                Primary Care Provider Office Phone # Fax #    [facility] [phone]       [address]  [facility] [address]        Equal Access to Services     IMAN BROWN : Hadii aad ku hadasho Soomaali, waaxda luqadaha, qaybta kaalmada adeegyada, waxay idiin hayaan adeeg kharash la'reta . So [facility] [phone].    ATENCIÓN: Si habla español, tiene a lombardo disposición servicios gratuitos de asistencia lingüística. Gilberto al [phone].    We comply with applicable federal civil rights laws and [place] laws. We do not discriminate on the basis of race, color, national origin, age, disability, sex, sexual orientation, or gender identity.            Thank you!     Thank you for choosing [facility]  for your care. Our goal is always to provide you with excellent care. Hearing back from our patients is one way we can continue to improve our services. Please take a few minutes to complete the written survey that you may receive in the mail after your visit with us. Thank you!             Your Updated Medication List - Protect others around you: Learn how to safely use, store and throw away your medicines at www.disposemymeds.org.          This list is accurate as of 9/13/18 11:37 AM.  Always use your most recent med list.                   Brand Name Dispense Instructions for use Diagnosis    amphetamine-dextroamphetamine 10 MG per tablet    ADDERALL    30 tablet    Take 1 tablet (10 mg) by mouth daily    Attention deficit hyperactivity disorder (ADHD), predominantly inattentive type       MULTIVITAMIN PO           RANITIDINE HCL PO      Take by mouth daily        VITAMIN B-COMPLEX PO      Take by mouth daily        VITAMIN D-3 PO

## 2018-09-14 ASSESSMENT — PATIENT HEALTH QUESTIONNAIRE - PHQ9: SUM OF ALL RESPONSES TO PHQ QUESTIONS 1-9: 23

## 2018-09-14 ASSESSMENT — ANXIETY QUESTIONNAIRES: GAD7 TOTAL SCORE: 10

## 2018-09-15 NOTE — PROGRESS NOTES
Dear Marixa Ann,    Your test results are attached. I am happy to let you know that they are stable.    The tests for inflammation are still high but better than 1 year ago. The rheumatologist recommended a bone scan and I will put in a referral for this. Otherwise your tests look good.     Please call to schedule your Bone scan at [facility] by calling [phone].     Please call me if you have any questions about these test results or about your care.    Sincerely,    Jackie Kovacs MD

## 2018-09-26 ENCOUNTER — TELEPHONE (OUTPATIENT)
Dept: FAMILY MEDICINE | Facility: CLINIC | Age: 68
End: 2018-09-26

## 2018-09-26 NOTE — TELEPHONE ENCOUNTER
Reason for Call:  Other     Detailed comments: Savannah is a mental health therapist is calling to discuss patient.    Phone Number Patient can be reached at: Other phone number:    Mental Health/Savannah Lew (Other) [phone]         Best Time: any    Can we leave a detailed message on this number? YES    Call taken on 9/26/2018 at 12:43 PM by Dayana Huizar

## 2018-09-27 ENCOUNTER — TELEPHONE (OUTPATIENT)
Dept: FAMILY MEDICINE | Facility: CLINIC | Age: 68
End: 2018-09-27

## 2018-09-27 DIAGNOSIS — F90.0 ATTENTION DEFICIT HYPERACTIVITY DISORDER (ADHD), PREDOMINANTLY INATTENTIVE TYPE: ICD-10-CM

## 2018-09-27 RX ORDER — DEXTROAMPHETAMINE SACCHARATE, AMPHETAMINE ASPARTATE, DEXTROAMPHETAMINE SULFATE AND AMPHETAMINE SULFATE 2.5; 2.5; 2.5; 2.5 MG/1; MG/1; MG/1; MG/1
10 TABLET ORAL DAILY
Qty: 30 TABLET | Refills: 0 | Status: SHIPPED | OUTPATIENT
Start: 2018-09-27 | End: 2018-10-25

## 2018-09-27 NOTE — TELEPHONE ENCOUNTER
Requested Prescriptions   Pending Prescriptions Disp Refills     amphetamine-dextroamphetamine (ADDERALL) 10 MG per tablet 30 tablet 0     Sig: Take 1 tablet (10 mg) by mouth daily    There is no refill protocol information for this order        Routing refill request to provider for review/approval because:  Drug not on the [facility] refill protocol     Oriana Cesar RN, BSN

## 2018-09-27 NOTE — TELEPHONE ENCOUNTER
...Reason for Call:  prescription    Detailed comments: Patient called said she need a refill on ADDERALL     Phone Number Patient can be reached at: Home number on file [phone] (home)    Best Time: ANYTIME    Can we leave a detailed message on this number? YES    Call taken on 9/27/2018 at 12:01 PM by Beltran Blas

## 2018-09-27 NOTE — TELEPHONE ENCOUNTER
This writer attempted to contact Marixa on 09/27/18      Reason for call Rx  and left detailed message.      If patient calls back:   Relay message Percocet Rx ready for  at , bring photo ID, (read verbatim), document that pt called and close encounter        Karan Bailey

## 2018-09-27 NOTE — TELEPHONE ENCOUNTER
Written rx will be deliver to the  this afternoon for pickup after 3pm.  Gilberto Bae,  [facility]    Please call patient  to let them know the above info.  And remind the person who is picking up to bring their photo ID.  Gilberto Bae,  [facility]     NOTE: If patient/gaurdian calls the clinic before the care teams gets a chance to call them, please let pt know the above information regarding pickup times, remind them to bring a photo ID and close the encounter.  Gilberto Bae,  [facility]    FYI:  Anything completed after 2:00p will not be delivered until the next business day after 11a.   Gilberto Bae,  for [facility].

## 2018-09-28 NOTE — TELEPHONE ENCOUNTER
Number called and message left with my cell phone number to call back. Discussed ADHD diagnosis and chronic fatigue. Will start low dose Adderall and follow up for symptoms in 1 month.  Jackie Kovacs MD

## 2018-09-28 NOTE — TELEPHONE ENCOUNTER
Please try her back today she would like to talk to you    Try her cell [phone] after 1pm    Thank you

## 2018-10-25 ENCOUNTER — OFFICE VISIT (OUTPATIENT)
Dept: FAMILY MEDICINE | Facility: CLINIC | Age: 68
End: 2018-10-25
Payer: COMMERCIAL

## 2018-10-25 VITALS
OXYGEN SATURATION: 97 % | WEIGHT: 176.8 LBS | HEIGHT: 61 IN | RESPIRATION RATE: 16 BRPM | HEART RATE: 96 BPM | SYSTOLIC BLOOD PRESSURE: 111 MMHG | DIASTOLIC BLOOD PRESSURE: 73 MMHG | TEMPERATURE: 97.3 F | BODY MASS INDEX: 33.38 KG/M2

## 2018-10-25 DIAGNOSIS — F33.1 MODERATE RECURRENT MAJOR DEPRESSION (H): ICD-10-CM

## 2018-10-25 DIAGNOSIS — M94.9 DISORDER OF BONE AND CARTILAGE: ICD-10-CM

## 2018-10-25 DIAGNOSIS — M89.9 DISORDER OF BONE AND CARTILAGE: ICD-10-CM

## 2018-10-25 DIAGNOSIS — F90.0 ATTENTION DEFICIT HYPERACTIVITY DISORDER (ADHD), PREDOMINANTLY INATTENTIVE TYPE: Primary | ICD-10-CM

## 2018-10-25 DIAGNOSIS — Z12.11 SCREEN FOR COLON CANCER: ICD-10-CM

## 2018-10-25 DIAGNOSIS — Z23 NEED FOR PROPHYLACTIC VACCINATION AND INOCULATION AGAINST INFLUENZA: ICD-10-CM

## 2018-10-25 DIAGNOSIS — Z23 NEED FOR PROPHYLACTIC VACCINATION WITH TETANUS-DIPHTHERIA (TD): ICD-10-CM

## 2018-10-25 DIAGNOSIS — Z12.31 VISIT FOR SCREENING MAMMOGRAM: ICD-10-CM

## 2018-10-25 PROCEDURE — G0008 ADMIN INFLUENZA VIRUS VAC: HCPCS | Performed by: FAMILY MEDICINE

## 2018-10-25 PROCEDURE — 90662 IIV NO PRSV INCREASED AG IM: CPT | Performed by: FAMILY MEDICINE

## 2018-10-25 PROCEDURE — 99214 OFFICE O/P EST MOD 30 MIN: CPT | Mod: 25 | Performed by: FAMILY MEDICINE

## 2018-10-25 RX ORDER — DEXTROAMPHETAMINE SACCHARATE, AMPHETAMINE ASPARTATE, DEXTROAMPHETAMINE SULFATE AND AMPHETAMINE SULFATE 2.5; 2.5; 2.5; 2.5 MG/1; MG/1; MG/1; MG/1
10 TABLET ORAL 2 TIMES DAILY
Qty: 60 TABLET | Refills: 0 | Status: SHIPPED | OUTPATIENT
Start: 2018-10-25 | End: 2018-11-21

## 2018-10-25 ASSESSMENT — PAIN SCALES - GENERAL: PAINLEVEL: NO PAIN (0)

## 2018-10-25 NOTE — PROGRESS NOTES
SUBJECTIVE:   Marixa Ann is a 68 year old female who presents to clinic today for the following health issues:      Medication Followup of amphetamine-dextroamphetamine (ADDERALL) 10 MG per tablet    Taking Medication as prescribed: yes    Side Effects:  None    Medication Helping Symptoms:  Yes    Wears off in 4-5 hours then crashes a bit.        Depression and Anxiety Follow-Up    Status since last visit: Working with her therapist and this is helping    Other associated symptoms:None    Complicating factors:     Significant life event: No     Current substance abuse: None    PHQ 11/6/2017 4/20/2018 9/13/2018   PHQ-9 Total Score 21 0 23   Q9: Suicide Ideation Not at all Not at all More than half the days     DONOVAN-7 SCORE 11/6/2017 9/13/2018   Total Score 14 10       PHQ-9  English  PHQ-9   Any Language  DONOVAN-7  Suicide Assessment Five-step Evaluation and Treatment (SAFE-T)    Problem list and histories reviewed & adjusted, as indicated.  Additional history: as documented    Patient Active Problem List   Diagnosis     Pain in joint, shoulder region     Moderate recurrent major depression (H)     Hyperlipidemia LDL goal <160     Myalgia     Chronic fatigue disorder     Liver lesion     Non-toxic multinodular goiter     Cystic disease of liver     Gastroesophageal reflux disease with esophagitis     Hiatal hernia     Age-related cataract of both eyes, unspecified age-related cataract type     Primary osteoarthritis of both knees     Attention deficit hyperactivity disorder (ADHD), predominantly inattentive type     Past Surgical History:   Procedure Laterality Date     APPENDECTOMY  1967       Social History   Substance Use Topics     Smoking status: Never Smoker     Smokeless tobacco: Never Used     Alcohol use Not on file     No family history on file.      Current Outpatient Prescriptions   Medication Sig Dispense Refill     amphetamine-dextroamphetamine (ADDERALL) 10 MG per tablet Take 1 tablet (10 mg) by  "mouth 2 times daily 60 tablet 0     B Complex Vitamins (VITAMIN B-COMPLEX PO) Take by mouth daily       Cholecalciferol (VITAMIN D-3 PO)        Multiple Vitamins-Minerals (MULTIVITAMIN PO)        RANITIDINE HCL PO Take by mouth daily       Allergies   Allergen Reactions     Dust Mites      Recent Labs   Lab Test  10/19/17   1412   LDL  98   HDL  40*   TRIG  82   ALT  26   CR  0.72   GFRESTIMATED  81   GFRESTBLACK  >90   POTASSIUM  3.9   TSH  1.35      BP Readings from Last 3 Encounters:   10/25/18 111/73   09/13/18 107/71   07/03/18 104/71    Wt Readings from Last 3 Encounters:   10/25/18 176 lb 12.8 oz (80.2 kg)   09/13/18 178 lb 1.6 oz (80.8 kg)   07/03/18 176 lb (79.8 kg)                  Labs reviewed in EPIC    Reviewed and updated as needed this visit by clinical staff  Allergies       Reviewed and updated as needed this visit by Provider         ROS:  Constitutional, HEENT, cardiovascular, pulmonary, gi and gu systems are negative, except as otherwise noted.    OBJECTIVE:     /73 (BP Location: Left arm, Patient Position: Chair, Cuff Size: Adult Regular)  Pulse 96  Temp 97.3  F (36.3  C) (Oral)  Resp 16  Ht 5' 1\" (1.549 m)  Wt 176 lb 12.8 oz (80.2 kg)  LMP 05/31/2003 (Approximate)  SpO2 97%  Breastfeeding? No  BMI 33.41 kg/m2  Body mass index is 33.41 kg/(m^2).  GENERAL: healthy, alert and no distress  EYES: Eyes grossly normal to inspection, conjunctivae and sclerae normal  MS: no gross musculoskeletal defects noted, no edema  SKIN: no suspicious lesions or rashes  NEURO: Normal strength and tone, gait and speech normal  PSYCH: mentation appears normal, affect normal/bright     Diagnostic Test Results:  none     ASSESSMENT/PLAN:         Tobacco Cessation:   reports that she has never smoked. She has never used smokeless tobacco.      BMI:   Estimated body mass index is 33.41 kg/(m^2) as calculated from the following:    Height as of this encounter: 5' 1\" (1.549 m).    Weight as of this " encounter: 176 lb 12.8 oz (80.2 kg).   Weight management plan: Discussed healthy diet and exercise guidelines and patient will follow up in 3 months in clinic to re-evaluate.        ICD-10-CM    1. Attention deficit hyperactivity disorder (ADHD), predominantly inattentive type F90.0 amphetamine-dextroamphetamine (ADDERALL) 10 MG per tablet increased to twice a day to help cover for more of the day. She can hold the afternoon dose if not going out to her classes.    2. Screen for colon cancer Z12.11 Recommend follow up colonoscopy and will call [place] [facility].    3. Visit for screening mammogram Z12.31 MA SCREENING DIGITAL BILAT - Future  (s+30)   4. Need for prophylactic vaccination and inoculation against influenza Z23 FLU VACCINE, INCREASED ANTIGEN, PRESV FREE, AGE 65+ [72612]     Vaccine Administration, Initial [48546]   5. Need for prophylactic vaccination with tetanus-diphtheria (Td) Z23 Recommend follow up tetanus and does not want this today.    6. Disorder of bone and cartilage M89.9 DX Hip/Pelvis/Spine    M94.9    7. Moderate recurrent major depression (H) F33.1 Feels better.        CONSULTATION/REFERRAL to therapist for follow up as scheduled  FUTURE LABS:       - Schedule fasting labs in 3 months  FUTURE APPOINTMENTS:       - Follow-up visit in 3 months or sooner if any questions or concerns.   Regular exercise  See Patient Instructions    Jackie Kovacs MD  [facility]      Injectable Influenza Immunization Documentation    1.  Is the person to be vaccinated sick today?   No    2. Does the person to be vaccinated have an allergy to a component   of the vaccine?   No  Egg Allergy Algorithm Link    3. Has the person to be vaccinated ever had a serious reaction   to influenza vaccine in the past?   No    4. Has the person to be vaccinated ever had Guillain-Barré syndrome?   No    Form completed by Ashely Glez MA

## 2018-10-25 NOTE — PATIENT INSTRUCTIONS
At [facility], we strive to deliver an exceptional experience to you, every time we see you.  If you receive a survey in the mail, please send us back your thoughts. We really do value your feedback.    Your care team:                            Family Medicine Internal Medicine   MD Kyle Mancera MD Shantel Branch-Fleming, MD Katya Georgiev PA-C Megan Hill, APRN CNP    Chia Kauffman MD Pediatrics   Emigdio Robert, OCTAVIO Ralph, MD Kayley Millan APRN CNP   MD Alisa Encinas MD Deborah Mielke, MD Alyssa Chowdhury, APRN [facility]      Clinic hours: Monday - Thursday 7 am-7 pm; Fridays 7 am-5 pm.   Urgent care: Monday - Friday 11 am-9 pm; Saturday and Sunday 9 am-5 pm.  Pharmacy : Monday -Thursday 8 am-8 pm; Friday 8 am-6 pm; Saturday and Sunday 9 am-5 pm.     Clinic: [phone]   Pharmacy: [phone]        Attention-Deficit/Hyperactivity Disorder (ADHD) in Adults  You ve always had trouble concentrating. Your mind wanders, and it s hard to finish tasks. As a result, you didn t do well in school. And now, you often struggle with your job. Sometimes this makes you rodriguez or depressed. These may be symptoms of attention-deficit/hyperactivity disorder (ADHD). To find out more, talk to your healthcare provider. He or she can offer guidance and support.  Symptoms  of ADHD in adults  For an adult to be diagnosed with ADHD, the symptoms must have been present since childhood. The symptoms may include:    Trouble thinking things through    Low self-esteem    Depression    Trouble holding a job    Memory problems    Problems with a marriage or relationship    Lack of discipline   What is ADHD?  Attention-deficit/hyperactivity disorder makes it hard to focus your mind. You may daydream a lot. And you may be restless much of the time. As a result, you may have trouble with detailed or boring work. And it may be hard to stick with one project for very  long. You also may forget things. Or, you may miss key points during a lecture or meeting. You may even have trouble sitting through a movie or concert. At times, you may feel frustrated or angry. This can affect your relationships with others.  Who does it affect?  ADHD starts in childhood. Sometimes, your symptoms may improve as you get older. But they also may persist into your adult years. ADHD is often thought of as a  kid s problem.  That s why it s often missed in adults. In fact, many parents learn they have ADHD when their children are diagnosed.  What causes it?  The exact cause of ADHD isn t known. The disorder does run in families. Having one parent with ADHD makes it more likely you ll have it too. And the part of your brain that controls attention may be involved. Certain brain chemicals that are out of balance may also play a role.  What can be done?  The first step is finding out if you really have ADHD. Your doctor will use special guidelines to diagnose the disorder. Most adults with ADHD are greatly helped by therapy and coaching. In some cases, your doctor may also prescribe medicine to ease your symptoms.  Resources    National Resource Center on AD/HDwww.lmat9iupf.org    Attention Deficit Disorder Associationwww.add.org   Date Last Reviewed: 1/1/2017 2000-2017 The [employer]. [address] [place] [address], [place] [address]. All rights reserved. This information is not intended as a substitute for professional medical care. Always follow your healthcare professional's instructions.

## 2018-10-25 NOTE — MR AVS SNAPSHOT
After Visit Summary   10/25/2018    Marixa Ann    MRN: [id number]           Patient Information     Date Of Birth          1950        Visit Information        Provider Department      10/25/2018 1:00 PM Jackie Kovacs MD [facility]        Today's Diagnoses     Attention deficit hyperactivity disorder (ADHD), predominantly inattentive type    -  1    Screen for colon cancer        Visit for screening mammogram        Need for prophylactic vaccination and inoculation against influenza        Need for prophylactic vaccination with tetanus-diphtheria (Td)        Disorder of bone and cartilage        Moderate recurrent major depression (H)          Care Instructions    At [facility], we strive to deliver an exceptional experience to you, every time we see you.  If you receive a survey in the mail, please send us back your thoughts. We really do value your feedback.    Your care team:                            Family Medicine Internal Medicine   MD Kyle Mancera MD Shantel Branch-Fleming, MD Katya Georgiev PA-C Megan Hill, APRN CNP    Chai Kauffman MD Pediatrics   OCTAVIO Ruiz, MD Kayley Millan APRN CNP   MD Alisa Encinas MD Deborah Mielke, MD Kim Thein, APRN [facility]      Clinic hours: Monday - Thursday 7 am-7 pm; Fridays 7 am-5 pm.   Urgent care: Monday - Friday 11 am-9 pm; Saturday and Sunday 9 am-5 pm.  Pharmacy : Monday -Thursday 8 am-8 pm; Friday 8 am-6 pm; Saturday and Sunday 9 am-5 pm.     Clinic: [phone]   Pharmacy: [phone]        Attention-Deficit/Hyperactivity Disorder (ADHD) in Adults  You ve always had trouble concentrating. Your mind wanders, and it s hard to finish tasks. As a result, you didn t do well in school. And now, you often struggle with your job. Sometimes this makes you rodriguez or depressed. These may be symptoms of  attention-deficit/hyperactivity disorder (ADHD). To find out more, talk to your healthcare provider. He or she can offer guidance and support.  Symptoms  of ADHD in adults  For an adult to be diagnosed with ADHD, the symptoms must have been present since childhood. The symptoms may include:    Trouble thinking things through    Low self-esteem    Depression    Trouble holding a job    Memory problems    Problems with a marriage or relationship    Lack of discipline   What is ADHD?  Attention-deficit/hyperactivity disorder makes it hard to focus your mind. You may daydream a lot. And you may be restless much of the time. As a result, you may have trouble with detailed or boring work. And it may be hard to stick with one project for very long. You also may forget things. Or, you may miss key points during a lecture or meeting. You may even have trouble sitting through a movie or concert. At times, you may feel frustrated or angry. This can affect your relationships with others.  Who does it affect?  ADHD starts in childhood. Sometimes, your symptoms may improve as you get older. But they also may persist into your adult years. ADHD is often thought of as a  kid s problem.  That s why it s often missed in adults. In fact, many parents learn they have ADHD when their children are diagnosed.  What causes it?  The exact cause of ADHD isn t known. The disorder does run in families. Having one parent with ADHD makes it more likely you ll have it too. And the part of your brain that controls attention may be involved. Certain brain chemicals that are out of balance may also play a role.  What can be done?  The first step is finding out if you really have ADHD. Your doctor will use special guidelines to diagnose the disorder. Most adults with ADHD are greatly helped by therapy and coaching. In some cases, your doctor may also prescribe medicine to ease your symptoms.  Resources    National Resource Center on  "AD/HDwww.jski0vvmi.org    Attention Deficit Disorder Associationwww.add.org   Date Last Reviewed: 1/1/2017 2000-2017 [employer]. [address] [place] [address]. All rights reserved. This information is not intended as a substitute for professional medical care. Always follow your healthcare professional's instructions.                Follow-ups after your visit        Follow-up notes from your care team     Return in about 3 months (around 1/25/2019) for medication follow up.      Future tests that were ordered for you today     Open Future Orders        Priority Expected Expires Ordered    DX Hip/Pelvis/Spine Routine  10/25/2019 10/25/2018    MA SCREENING DIGITAL BILAT - Future  (s+30) Routine  10/25/2019 10/25/2018            Who to contact     If you have questions or need follow up information about today's clinic visit or your schedule please contact [facility] directly at [phone].  Normal or non-critical lab and imaging results will be communicated to you by [employer], letter or phone within 4 business days after the clinic has received the results. If you do not hear from us within 7 days, please contact the clinic through [employer]am[employer] or phone. If you have a critical or abnormal lab result, we will notify you by phone as soon as possible.  Submit refill requests through [employer] or call your pharmacy and they will forward the refill request to us. Please allow 3 business days for your refill to be completed.          Additional Information About Your Visit        [employer] Information     [employer] lets you send messages to your doctor, view your test results, renew your prescriptions, schedule appointments and more. To sign up, go to [email or web address] . Click on \"Log in\" on the left side of the screen, which will take you to the Welcome page. Then click on \"Sign up Now\" on the right side of the page.     You will be asked to enter the access code listed " "below, as well as some personal information. Please follow the directions to create your username and password.     Your access code is: [email or web address]  Expires: 2018 11:37 AM     Your access code will  in 90 days. If you need help or a new code, please call your [facility] clinic or [phone].        Care EveryWhere ID     This is your Care EveryWhere ID. This could be used by other organizations to access your [facility] medical records  [id number]        Your Vitals Were     Pulse Temperature Respirations Height Last Period Pulse Oximetry    96 97.3  F (36.3  C) (Oral) 16 5' 1\" (1.549 m) 2003 (Approximate) 97%    Breastfeeding? BMI (Body Mass Index)                No 33.41 kg/m2           Blood Pressure from Last 3 Encounters:   10/25/18 111/73   18 107/71   18 104/71    Weight from Last 3 Encounters:   10/25/18 176 lb 12.8 oz (80.2 kg)   18 178 lb 1.6 oz (80.8 kg)   18 176 lb (79.8 kg)              We Performed the Following     DEPRESSION ACTION PLAN (DAP)     FLU VACCINE, INCREASED ANTIGEN, PRESV FREE, AGE 65+ [73620]     Vaccine Administration, Initial [07712]          Today's Medication Changes          These changes are accurate as of 10/25/18  1:51 PM.  If you have any questions, ask your nurse or doctor.               These medicines have changed or have updated prescriptions.        Dose/Directions    amphetamine-dextroamphetamine 10 MG per tablet   Commonly known as:  ADDERALL   This may have changed:  when to take this   Used for:  Attention deficit hyperactivity disorder (ADHD), predominantly inattentive type   Changed by:  Jackie Kovacs MD        Dose:  10 mg   Take 1 tablet (10 mg) by mouth 2 times daily   Quantity:  60 tablet   Refills:  0            Where to get your medicines      Some of these will need a paper prescription and others can be bought over the counter.  Ask your nurse if you have questions.     Bring a paper prescription for each of " these medications     amphetamine-dextroamphetamine 10 MG per tablet                Primary Care Provider Office Phone # Fax #    Jackie Alyssa Kovacs [phone]       [id number] [place]  [facility] [address]        Equal Access to Services     IMAN BROWN : Hadii aad ku hadasho Soomaali, waaxda luqadaha, qaybta kaalmada adeegyada, waxay idiin hayaan adeeg kharash labrendan baeza. So [facility] [phone].    ATENCIÓN: Si habla español, tiene a lombardo disposición servicios gratuitos de asistencia lingüística. Llame al [phone].    We comply with applicable federal civil rights laws and [place] laws. We do not discriminate on the basis of race, color, national origin, age, disability, sex, sexual orientation, or gender identity.            Thank you!     Thank you for choosing [facility]  for your care. Our goal is always to provide you with excellent care. Hearing back from our patients is one way we can continue to improve our services. Please take a few minutes to complete the written survey that you may receive in the mail after your visit with us. Thank you!             Your Updated Medication List - Protect others around you: Learn how to safely use, store and throw away your medicines at www.disposemymeds.org.          This list is accurate as of 10/25/18  1:51 PM.  Always use your most recent med list.                   Brand Name Dispense Instructions for use Diagnosis    amphetamine-dextroamphetamine 10 MG per tablet    ADDERALL    60 tablet    Take 1 tablet (10 mg) by mouth 2 times daily    Attention deficit hyperactivity disorder (ADHD), predominantly inattentive type       MULTIVITAMIN PO           RANITIDINE HCL PO      Take by mouth daily        VITAMIN B-COMPLEX PO      Take by mouth daily        VITAMIN D-3 PO

## 2018-11-16 ENCOUNTER — OFFICE VISIT (OUTPATIENT)
Dept: FAMILY MEDICINE | Facility: CLINIC | Age: 68
End: 2018-11-16
Payer: COMMERCIAL

## 2018-11-16 ENCOUNTER — TELEPHONE (OUTPATIENT)
Dept: FAMILY MEDICINE | Facility: CLINIC | Age: 68
End: 2018-11-16

## 2018-11-16 VITALS
DIASTOLIC BLOOD PRESSURE: 75 MMHG | SYSTOLIC BLOOD PRESSURE: 119 MMHG | RESPIRATION RATE: 16 BRPM | TEMPERATURE: 98.1 F | WEIGHT: 176 LBS | OXYGEN SATURATION: 97 % | HEART RATE: 98 BPM | BODY MASS INDEX: 33.23 KG/M2 | HEIGHT: 61 IN

## 2018-11-16 DIAGNOSIS — S29.012A MUSCLE STRAIN OF RIGHT UPPER BACK, INITIAL ENCOUNTER: Primary | ICD-10-CM

## 2018-11-16 PROCEDURE — 99213 OFFICE O/P EST LOW 20 MIN: CPT | Performed by: NURSE PRACTITIONER

## 2018-11-16 RX ORDER — CYCLOBENZAPRINE HCL 5 MG
5 TABLET ORAL DAILY PRN
Qty: 20 TABLET | Refills: 0 | Status: SHIPPED | OUTPATIENT
Start: 2018-11-16 | End: 2019-05-29

## 2018-11-16 ASSESSMENT — PAIN SCALES - GENERAL: PAINLEVEL: WORST PAIN (10)

## 2018-11-16 NOTE — PATIENT INSTRUCTIONS
Ice or heat 15 minutes 4 times daily  Gentle stretching (see handout)  Movement is key for early relief of this type of pain  You can take the cyclobenzaprine to help loosen up the muscles-no driving or operating machinery while taking.  If worsening or not improving in 1 week, follow up with primary care provider, sooner if needed.  If you develop loss of bowel or bladder, saddle anesthesia, or foot drop, or worsening headache go to emergency department.      At [facility], we strive to deliver an exceptional experience to you, every time we see you.  If you receive a survey in the mail, please send us back your thoughts. We really do value your feedback.    Based on your medical history, these are the current health maintenance/preventive care services that you are due for (some may have been done at this visit.)  Health Maintenance Due   Topic Date Due     TETANUS IMMUNIZATION (SYSTEM ASSIGNED)  10/20/1970     HEPATITIS C SCREENING  10/20/1970     COLON CANCER SCREEN (SYSTEM ASSIGNED)  10/20/2002     ADVANCE DIRECTIVE PLANNING Q5 YRS  10/20/2007     MAMMO SCREEN Q2 YR (SYSTEM ASSIGNED)  08/02/2018     FALL RISK ASSESSMENT  12/08/2018         Suggested websites for health information:  Www.[employer].org : Up to date and easily searchable information on multiple topics.  Www.medlineplus.gov : medication info, interactive tutorials, watch real surgeries online  Www.familydoctor.org : good info from the Academy of Family Physicians  Www.cdc.gov : public health info, travel advisories, epidemics (H1N1)  Www.aap.org : children's health info, normal development, vaccinations  Www.health.state.[place].us : [place] dept of health, public health issues in [place], N1N1    Your care team:                            Family Medicine Internal Medicine   MD Kyle Mancera MD Shantel Branch-Fleming, MD Katya Georgiev PA-C Nam Ho, MD Pediatrics   OCTAVIO Ruiz, JOSEPH Delgado  MD Alisa Cardenas CNP, MD Deborah Mielke, MD Kim Thein, APRN CNP      Clinic hours: Monday - Thursday 7 am-7 pm; Fridays 7 am-5 pm.   Urgent care: Monday - Friday 11 am-9 pm; Saturday and Sunday 9 am-5 pm.  Pharmacy : Monday -Thursday 8 am-8 pm; Friday 8 am-6 pm; Saturday and Sunday 9 am-5 pm.     Clinic: [phone]   Pharmacy: [phone]    Back Pain (Acute or Chronic)    Back pain is one of the most common problems. The good news is that most people feel better in 1 to 2 weeks, and most of the rest in 1 to 2 months. Most people can remain active.  People experience and describe pain differently; not everyone is the same.    The pain can be sharp, stabbing, shooting, aching, cramping or burning.    Movement, standing, bending, lifting, sitting, or walking may worsen pain.    It can be localized to one spot or area, or it can be more generalized.    It can spread or radiate upwards, to the front, or go down your arms or legs (sciatica).    It can cause muscle spasm.  Most of the time, mechanical problems with the muscles or spine cause the pain. Mechanical problems are usually caused by an injury to the muscles or ligaments. While illness can cause back pain, it is usually not caused by a serious illness. Mechanical problems include:     Physical activity such as sports, exercise, work, or normal activity    Overexertion, lifting, pushing, pulling incorrectly or too aggressively    Sudden twisting, bending, or stretching from an accident, or accidental movement    Poor posture    Stretching or moving wrong, without noticing pain at the time    Poor coordination, lack of regular exercise (check with your doctor about this)    Spinal disc disease or arthritis    Stress  Pain can also be related to pregnancy, or illness like appendicitis, bladder or kidney infections, pelvic infections, and many other things.  Acute back pain usually gets better in 1 to 2 weeks. Back pain related to  disk disease, arthritis in the spinal joints or spinal stenosis (narrowing of the spinal canal) can become chronic and last for months or years.  Unless you had a physical injury (for example, a car accident or fall) X-rays are usually not needed for the initial evaluation of back pain. If pain continues and does not respond to medical treatment, X-rays and other tests may be needed.  Home care  Try these home care recommendations:    When in bed, try to find a position of comfort. A firm mattress is best. Try lying flat on your back with pillows under your knees. You can also try lying on your side with your knees bent up towards your chest and a pillow between your knees.    At first, do not try to stretch out the sore spots. If there is a strain, it is not like the good soreness you get after exercising without an injury. In this case, stretching may make it worse.    Avoid prolong sitting, long car rides, or travel. This puts more stress on the lower back than standing or walking.    During the first 24 to 72 hours after an acute injury or flare up of chronic back pain, apply an ice pack to the painful area for 20 minutes and then remove it for 20 minutes. Do this over a period of 60 to 90 minutes or several times a day. This will reduce swelling and pain. Wrap the ice pack in a thin towel or plastic to protect your skin.    You can start with ice, then switch to heat. Heat (hot shower, hot bath, or heating pad) reduces pain and works well for muscle spasms. Heat can be applied to the painful area for 20 minutes then remove it for 20 minutes. Do this over a period of 60 to 90 minutes or several times a day. Do not sleep on a heating pad. It can lead to skin burns or tissue damage.    You can alternate ice and heat therapy. Talk with your doctor about the best treatment for your back pain.    Therapeutic massage can help relax the back muscles without stretching them.    Be aware of safe lifting methods and do  not lift anything without stretching first.  Medicines  Talk to your doctor before using medicine, especially if you have other medical problems or are taking other medicines.    You may use over-the-counter medicine as directed on the bottle to control pain, unless another pain medicine was prescribed. If you have chronic conditions like diabetes, liver or kidney disease, stomach ulcers, or gastrointestinal bleeding, or are taking blood thinners, talk to your doctor before taking any medicine.    Be careful if you are given a prescription medicines, narcotics, or medicine for muscle spasms. They can cause drowsiness, affect your coordination, reflexes, and judgement. Do not drive or operate heavy machinery.  Follow-up care  Follow up with your healthcare provider, or as advised.   A radiologist will review any X-rays that were taken. Your provide will notify you of any new findings that may affect your care.  Call 911  Call emergency services if any of the following occur:    Trouble breathing    Confusion    Very drowsy or trouble awakening    Fainting or loss of consciousness    Rapid or very slow heart rate    Loss of bowel or bladder control  When to seek medical advice  Call your healthcare provider right away if any of these occur:     Pain becomes worse or spreads to your legs    Weakness or numbness in one or both legs    Numbness in the groin or genital area  Date Last Reviewed: 7/1/2016 2000-2017 [employer]. [address] [place] [address], [place] [address]. All rights reserved. This information is not intended as a substitute for professional medical care. Always follow your healthcare professional's instructions.        Cyclobenzaprine tablets  Brand Names: Fexmid, Flexeril  What is this medicine?  CYCLOBENZAPRINE (sye kloe ALEX za preen) is a muscle relaxer. It is used to treat muscle pain, spasms, and stiffness.  How should I use this medicine?  Take this medicine by mouth with a glass of  water. Follow the directions on the prescription label. If this medicine upsets your stomach, take it with food or milk. Take your medicine at regular intervals. Do not take it more often than directed.  Talk to your pediatrician regarding the use of this medicine in children. Special care may be needed.  What side effects may I notice from receiving this medicine?  Side effects that you should report to your doctor or health care professional as soon as possible:    allergic reactions like skin rash, itching or hives, swelling of the face, lips, or tongue    breathing problems    chest pain    fast, irregular heartbeat    hallucinations    seizures    unusually weak or tired  Side effects that usually do not require medical attention (report to your doctor or health care professional if they continue or are bothersome):    headache    nausea, vomiting  What may interact with this medicine?  Do not take this medicine with any of the following medications:    certain medicines for fungal infections like fluconazole, itraconazole, ketoconazole, posaconazole, voriconazole    cisapride    dofetilide    dronedarone    halofantrine    levomethadyl    MAOIs like Carbex, Eldepryl, Marplan, Nardil, and Parnate    narcotic medicines for cough    pimozide    thioridazine    ziprasidone  This medicine may also interact with the following medications:    alcohol    antihistamines for allergy, cough and cold    certain medicines for anxiety or sleep    certain medicines for cancer    certain medicines for depression like amitriptyline, fluoxetine, sertraline    certain medicines for infection like alfuzosin, chloroquine, clarithromycin, levofloxacin, mefloquine, pentamidine, troleandomycin    certain medicines for irregular heart beat    certain medicines for seizures like phenobarbital, primidone    contrast dyes    general anesthetics like halothane, isoflurane, methoxyflurane, propofol    local anesthetics like lidocaine,  pramoxine, tetracaine    medicines that relax muscles for surgery    narcotic medicines for pain    other medicines that prolong the QT interval (cause an abnormal heart rhythm)    phenothiazines like chlorpromazine, mesoridazine, prochlorperazine  What if I miss a dose?  If you miss a dose, take it as soon as you can. If it is almost time for your next dose, take only that dose. Do not take double or extra doses.  Where should I keep my medicine?  Keep out of the reach of children.  Store at room temperature between 15 and 30 degrees C (59 and 86 degrees F). Keep container tightly closed. Throw away any unused medicine after the expiration date.  What should I tell my health care provider before I take this medicine?  They need to know if you have any of these conditions:    heart disease, irregular heartbeat, or previous heart attack    liver disease    thyroid problem    an unusual or allergic reaction to cyclobenzaprine, tricyclic antidepressants, lactose, other medicines, foods, dyes, or preservatives    pregnant or trying to get pregnant    breast-feeding  What should I watch for while using this medicine?  Tell your doctor or health care professional if your symptoms do not start to get better or if they get worse.  You may get drowsy or dizzy. Do not drive, use machinery, or do anything that needs mental alertness until you know how this medicine affects you. Do not stand or sit up quickly, especially if you are an older patient. This reduces the risk of dizzy or fainting spells. Alcohol may interfere with the effect of this medicine. Avoid alcoholic drinks.  If you are taking another medicine that also causes drowsiness, you may have more side effects. Give your health care provider a list of all medicines you use. Your doctor will tell you how much medicine to take. Do not take more medicine than directed. Call emergency for help if you have problems breathing or unusual sleepiness.  Your mouth may get  dry. Chewing sugarless gum or sucking hard candy, and drinking plenty of water may help. Contact your doctor if the problem does not go away or is severe.  NOTE:This sheet is a summary. It may not cover all possible information. If you have questions about this medicine, talk to your doctor, pharmacist, or health care provider. Copyright  2018 [employer]

## 2018-11-16 NOTE — TELEPHONE ENCOUNTER
Informed patient needs to be seen. Scheduled patient for today with Rhiannon Higuera.    EDE Ragland MA

## 2018-11-16 NOTE — TELEPHONE ENCOUNTER
Patient should make an appointment in clinic for evaluation of the back before any medications are prescribed.    Chai Kauffman MD (covering Dr. Kovacs)

## 2018-11-16 NOTE — PROGRESS NOTES
SUBJECTIVE:   Marixa Ann is a 68 year old female who presents to clinic today for the following health issues:      Back Pain       Duration: 3 weeks        Specific cause: none    Description:   Location of pain: all over  Character of pain: dull ache, burning and intermittent  Pain radiation:none  New numbness or weakness in legs, not attributed to pain:  no     Intensity: Currently 10/10    History:   Pain interferes with job: YES  History of back problems: Yes  Any previous MRI or X-rays: Yes- at [facility].  Date 9219-5440  Sees a specialist for back pain:  No  Therapies tried without relief: acetaminophen (Tylenol), heat, rest and stretch    Alleviating factors:   Improved by: none      Precipitating factors:  Worsened by: Standing and Sitting          Accompanying Signs & Symptoms:  Risk of Fracture:  Age >64  Risk of Cauda Equina:  None  Risk of Infection:  None  Risk of Cancer:  None  Risk of Ankylosing Spondylitis:  Onset at age <35, male, AND morning back stiffness. no         68 year old female presents with the above concerns. She recently starting a painting class which has brought a lot of happiness to her; however, she has also noticed that her right mid/upper back has been getting very sore and spasming. She will often paint and then rest but it has gotten to the point where rest doesn't help and the pain and tightness is inhibiting her ability to paint. She has tried standing, sitting on a chair and stool and nothing is helping anymore. No loss of bowel or bladder. No saddle anesthesia. She's tried tylenol 500-1000 mg with 3000 mg/day. She has also tried stretching and carpal tunnel exercises. Requesting something to help muscles relax. Doesn't want to go to physical therapy yet.    Problem list and histories reviewed & adjusted, as indicated.  Additional history: as documented    Patient Active Problem List   Diagnosis     Pain in joint, shoulder region     Moderate recurrent major  "depression (H)     Hyperlipidemia LDL goal <160     Myalgia     Chronic fatigue disorder     Liver lesion     Non-toxic multinodular goiter     Cystic disease of liver     Gastroesophageal reflux disease with esophagitis     Hiatal hernia     Age-related cataract of both eyes, unspecified age-related cataract type     Primary osteoarthritis of both knees     Attention deficit hyperactivity disorder (ADHD), predominantly inattentive type     Past Surgical History:   Procedure Laterality Date     APPENDECTOMY  1967       Social History   Substance Use Topics     Smoking status: Never Smoker     Smokeless tobacco: Never Used     Alcohol use Not on file     History reviewed. No pertinent family history.      Current Outpatient Prescriptions   Medication Sig Dispense Refill     amphetamine-dextroamphetamine (ADDERALL) 10 MG per tablet Take 1 tablet (10 mg) by mouth 2 times daily 60 tablet 0     B Complex Vitamins (VITAMIN B-COMPLEX PO) Take by mouth daily       Cholecalciferol (VITAMIN D-3 PO)        cyclobenzaprine (FLEXERIL) 5 MG tablet Take 1 tablet (5 mg) by mouth daily as needed for muscle spasms 20 tablet 0     Multiple Vitamins-Minerals (MULTIVITAMIN PO)        RANITIDINE HCL PO Take by mouth daily       Allergies   Allergen Reactions     Dust Mites        Reviewed and updated as needed this visit by clinical staff  Tobacco  Allergies  Meds  Problems  Med Hx  Surg Hx  Fam Hx  Soc Hx        Reviewed and updated as needed this visit by Provider  Allergies  Meds  Problems         ROS:  Constitutional, HEENT, cardiovascular, pulmonary, GI, , musculoskeletal, neuro, skin, endocrine and psych systems are negative, except as otherwise noted.    OBJECTIVE:     /75 (BP Location: Right arm)  Pulse 98  Temp 98.1  F (36.7  C)  Resp 16  Ht 5' 1\" (1.549 m)  Wt 176 lb (79.8 kg)  LMP 05/31/2003 (Approximate)  SpO2 97%  Breastfeeding? No  BMI 33.25 kg/m2  Body mass index is 33.25 kg/(m^2).  GENERAL: " healthy, alert and no distress  NECK: no adenopathy, no asymmetry, masses, or scars and thyroid normal to palpation  RESP: lungs clear to auscultation - no rales, rhonchi or wheezes  CV: regular rate and rhythm, normal S1 S2, no S3 or S4, no murmur, click or rub, no peripheral edema and peripheral pulses strong  MS: no gross musculoskeletal defects noted, no edema  SKIN: no suspicious lesions or rashes  NEURO: Normal strength and tone, mentation intact and speech normal  BACK: no CVA tenderness, no paralumbar tenderness  PSYCH: mentation appears normal, affect normal/bright    Diagnostic Test Results:  none     ASSESSMENT/PLAN:     1. Muscle strain of right upper back, initial encounter  No red flags. Likely due to muscle deconditioning from inactivity for so long until recent reinvigoration from Adderall. Declines physical therapy right now. She will try ice/heat and use flexeril only sparingly. Chose this over methocarbamol due to beers criteria recommendation. Follow up with Dr. Kovacs in 2-4 weeks, sooner if needed  - cyclobenzaprine (FLEXERIL) 5 MG tablet; Take 1 tablet (5 mg) by mouth daily as needed for muscle spasms  Dispense: 20 tablet; Refill: 0    See Patient Instructions    Ice or heat 15 minutes 4 times daily  Gentle stretching (see handout)  Movement is key for early relief of this type of pain  You can take the cyclobenzaprine to help loosen up the muscles-no driving or operating machinery while taking.  If worsening or not improving in 1 week, follow up with primary care provider, sooner if needed.  If you develop loss of bowel or bladder, saddle anesthesia, or foot drop, or worsening headache go to emergency department.    The benefits, risks and potential side effects were discussed in detail. Black box warnings discussed as relevant. All patient questions were answered. The patient was instructed to follow up immediately if any adverse reactions develop.    Patient verbalizes understanding and  agrees with plan of care. Patient stable for discharge.      MARRY Pedraza CNP  [facility]

## 2018-11-16 NOTE — TELEPHONE ENCOUNTER
...Reason for Call:   prescription    Detailed comments: Patient called said she pulled a muscle in her back, she was wondering if she could have a script sent to [facility] in [place]. Please send a something that wont cause an interaction with the ADDERALL.    Phone Number Patient can be reached at: Home number on file [phone] (home)    Best Time: ANYTIME    Can we leave a detailed message on this number? YES    Call taken on 11/16/2018 at 11:50 AM by Beltran Blas

## 2018-11-16 NOTE — MR AVS SNAPSHOT
After Visit Summary   11/16/2018    Marixa Ann    MRN: [id number]           Patient Information     Date Of Birth          1950        Visit Information        Provider Department      11/16/2018 2:00 PM Rhiannon Higuera APRN CNP [facility]        Today's Diagnoses     Muscle strain of right upper back, initial encounter    -  1      Care Instructions    Ice or heat 15 minutes 4 times daily  Gentle stretching (see handout)  Movement is key for early relief of this type of pain  You can take the cyclobenzaprine to help loosen up the muscles-no driving or operating machinery while taking.  If worsening or not improving in 1 week, follow up with primary care provider, sooner if needed.  If you develop loss of bowel or bladder, saddle anesthesia, or foot drop, or worsening headache go to emergency department.      At [facility], we strive to deliver an exceptional experience to you, every time we see you.  If you receive a survey in the mail, please send us back your thoughts. We really do value your feedback.    Based on your medical history, these are the current health maintenance/preventive care services that you are due for (some may have been done at this visit.)  Health Maintenance Due   Topic Date Due     TETANUS IMMUNIZATION (SYSTEM ASSIGNED)  10/20/1970     HEPATITIS C SCREENING  10/20/1970     COLON CANCER SCREEN (SYSTEM ASSIGNED)  10/20/2002     ADVANCE DIRECTIVE PLANNING Q5 YRS  10/20/2007     MAMMO SCREEN Q2 YR (SYSTEM ASSIGNED)  08/02/2018     FALL RISK ASSESSMENT  12/08/2018         Suggested websites for health information:  Www.AllClear ID.Vibrow : Up to date and easily searchable information on multiple topics.  Www.medlineplus.gov : medication info, interactive tutorials, watch real surgeries online  Www.familydoctor.org : good info from the Academy of Family Physicians  Www.cdc.gov : public health info, travel advisories, epidemics  (H1N1)  Www.aap.org : children's health info, normal development, vaccinations  Www.health.[facility].[place].us : [place] dept of health, public health issues in [place], N1N1    Your care team:                            Family Medicine Internal Medicine   MD Kyle Mancera MD Shantel Branch-Fleming, MD Katya Georgiev PA-C Nam Ho, MD Pediatrics   OCTAVIO Ruiz, JOSEPH Delgado APRN MD Alisa Zuniga MD Deborah Mielke, MD Kim Thein, APRN [facility]      Clinic hours: Monday - Thursday 7 am-7 pm; Fridays 7 am-5 pm.   Urgent care: Monday - Friday 11 am-9 pm; Saturday and Sunday 9 am-5 pm.  Pharmacy : Monday -Thursday 8 am-8 pm; Friday 8 am-6 pm; Saturday and Sunday 9 am-5 pm.     Clinic: [phone]   Pharmacy: [phone]    Back Pain (Acute or Chronic)    Back pain is one of the most common problems. The good news is that most people feel better in 1 to 2 weeks, and most of the rest in 1 to 2 months. Most people can remain active.  People experience and describe pain differently; not everyone is the same.    The pain can be sharp, stabbing, shooting, aching, cramping or burning.    Movement, standing, bending, lifting, sitting, or walking may worsen pain.    It can be localized to one spot or area, or it can be more generalized.    It can spread or radiate upwards, to the front, or go down your arms or legs (sciatica).    It can cause muscle spasm.  Most of the time, mechanical problems with the muscles or spine cause the pain. Mechanical problems are usually caused by an injury to the muscles or ligaments. While illness can cause back pain, it is usually not caused by a serious illness. Mechanical problems include:     Physical activity such as sports, exercise, work, or normal activity    Overexertion, lifting, pushing, pulling incorrectly or too aggressively    Sudden twisting, bending, or stretching from an accident, or accidental movement    Poor  posture    Stretching or moving wrong, without noticing pain at the time    Poor coordination, lack of regular exercise (check with your doctor about this)    Spinal disc disease or arthritis    Stress  Pain can also be related to pregnancy, or illness like appendicitis, bladder or kidney infections, pelvic infections, and many other things.  Acute back pain usually gets better in 1 to 2 weeks. Back pain related to disk disease, arthritis in the spinal joints or spinal stenosis (narrowing of the spinal canal) can become chronic and last for months or years.  Unless you had a physical injury (for example, a car accident or fall) X-rays are usually not needed for the initial evaluation of back pain. If pain continues and does not respond to medical treatment, X-rays and other tests may be needed.  Home care  Try these home care recommendations:    When in bed, try to find a position of comfort. A firm mattress is best. Try lying flat on your back with pillows under your knees. You can also try lying on your side with your knees bent up towards your chest and a pillow between your knees.    At first, do not try to stretch out the sore spots. If there is a strain, it is not like the good soreness you get after exercising without an injury. In this case, stretching may make it worse.    Avoid prolong sitting, long car rides, or travel. This puts more stress on the lower back than standing or walking.    During the first 24 to 72 hours after an acute injury or flare up of chronic back pain, apply an ice pack to the painful area for 20 minutes and then remove it for 20 minutes. Do this over a period of 60 to 90 minutes or several times a day. This will reduce swelling and pain. Wrap the ice pack in a thin towel or plastic to protect your skin.    You can start with ice, then switch to heat. Heat (hot shower, hot bath, or heating pad) reduces pain and works well for muscle spasms. Heat can be applied to the painful area for  20 minutes then remove it for 20 minutes. Do this over a period of 60 to 90 minutes or several times a day. Do not sleep on a heating pad. It can lead to skin burns or tissue damage.    You can alternate ice and heat therapy. Talk with your doctor about the best treatment for your back pain.    Therapeutic massage can help relax the back muscles without stretching them.    Be aware of safe lifting methods and do not lift anything without stretching first.  Medicines  Talk to your doctor before using medicine, especially if you have other medical problems or are taking other medicines.    You may use over-the-counter medicine as directed on the bottle to control pain, unless another pain medicine was prescribed. If you have chronic conditions like diabetes, liver or kidney disease, stomach ulcers, or gastrointestinal bleeding, or are taking blood thinners, talk to your doctor before taking any medicine.    Be careful if you are given a prescription medicines, narcotics, or medicine for muscle spasms. They can cause drowsiness, affect your coordination, reflexes, and judgement. Do not drive or operate heavy machinery.  Follow-up care  Follow up with your healthcare provider, or as advised.   A radiologist will review any X-rays that were taken. Your provide will notify you of any new findings that may affect your care.  Call 911  Call emergency services if any of the following occur:    Trouble breathing    Confusion    Very drowsy or trouble awakening    Fainting or loss of consciousness    Rapid or very slow heart rate    Loss of bowel or bladder control  When to seek medical advice  Call your healthcare provider right away if any of these occur:     Pain becomes worse or spreads to your legs    Weakness or numbness in one or both legs    Numbness in the groin or genital area  Date Last Reviewed: 7/1/2016 2000-2017 [employer]. [address] [place] [address], [place] [address]. All rights reserved. This  information is not intended as a substitute for professional medical care. Always follow your healthcare professional's instructions.        Cyclobenzaprine tablets  Brand Names: Fexmid, Flexeril  What is this medicine?  CYCLOBENZAPRINE (sye kloe ALEX za preen) is a muscle relaxer. It is used to treat muscle pain, spasms, and stiffness.  How should I use this medicine?  Take this medicine by mouth with a glass of water. Follow the directions on the prescription label. If this medicine upsets your stomach, take it with food or milk. Take your medicine at regular intervals. Do not take it more often than directed.  Talk to your pediatrician regarding the use of this medicine in children. Special care may be needed.  What side effects may I notice from receiving this medicine?  Side effects that you should report to your doctor or health care professional as soon as possible:    allergic reactions like skin rash, itching or hives, swelling of the face, lips, or tongue    breathing problems    chest pain    fast, irregular heartbeat    hallucinations    seizures    unusually weak or tired  Side effects that usually do not require medical attention (report to your doctor or health care professional if they continue or are bothersome):    headache    nausea, vomiting  What may interact with this medicine?  Do not take this medicine with any of the following medications:    certain medicines for fungal infections like fluconazole, itraconazole, ketoconazole, posaconazole, voriconazole    cisapride    dofetilide    dronedarone    halofantrine    levomethadyl    MAOIs like Carbex, Eldepryl, Marplan, Nardil, and Parnate    narcotic medicines for cough    pimozide    thioridazine    ziprasidone  This medicine may also interact with the following medications:    alcohol    antihistamines for allergy, cough and cold    certain medicines for anxiety or sleep    certain medicines for cancer    certain medicines for depression like  amitriptyline, fluoxetine, sertraline    certain medicines for infection like alfuzosin, chloroquine, clarithromycin, levofloxacin, mefloquine, pentamidine, troleandomycin    certain medicines for irregular heart beat    certain medicines for seizures like phenobarbital, primidone    contrast dyes    general anesthetics like halothane, isoflurane, methoxyflurane, propofol    local anesthetics like lidocaine, pramoxine, tetracaine    medicines that relax muscles for surgery    narcotic medicines for pain    other medicines that prolong the QT interval (cause an abnormal heart rhythm)    phenothiazines like chlorpromazine, mesoridazine, prochlorperazine  What if I miss a dose?  If you miss a dose, take it as soon as you can. If it is almost time for your next dose, take only that dose. Do not take double or extra doses.  Where should I keep my medicine?  Keep out of the reach of children.  Store at room temperature between 15 and 30 degrees C (59 and 86 degrees F). Keep container tightly closed. Throw away any unused medicine after the expiration date.  What should I tell my health care provider before I take this medicine?  They need to know if you have any of these conditions:    heart disease, irregular heartbeat, or previous heart attack    liver disease    thyroid problem    an unusual or allergic reaction to cyclobenzaprine, tricyclic antidepressants, lactose, other medicines, foods, dyes, or preservatives    pregnant or trying to get pregnant    breast-feeding  What should I watch for while using this medicine?  Tell your doctor or health care professional if your symptoms do not start to get better or if they get worse.  You may get drowsy or dizzy. Do not drive, use machinery, or do anything that needs mental alertness until you know how this medicine affects you. Do not stand or sit up quickly, especially if you are an older patient. This reduces the risk of dizzy or fainting spells. Alcohol may interfere  "with the effect of this medicine. Avoid alcoholic drinks.  If you are taking another medicine that also causes drowsiness, you may have more side effects. Give your health care provider a list of all medicines you use. Your doctor will tell you how much medicine to take. Do not take more medicine than directed. Call emergency for help if you have problems breathing or unusual sleepiness.  Your mouth may get dry. Chewing sugarless gum or sucking hard candy, and drinking plenty of water may help. Contact your doctor if the problem does not go away or is severe.  NOTE:This sheet is a summary. It may not cover all possible information. If you have questions about this medicine, talk to your doctor, pharmacist, or health care provider. Copyright  2018 [employer]                Follow-ups after your visit        Who to contact     If you have questions or need follow up information about today's clinic visit or your schedule please contact [facility] directly at [phone].  Normal or non-critical lab and imaging results will be communicated to you by zervedhart, letter or phone within 4 business days after the clinic has received the results. If you do not hear from us within 7 days, please contact the clinic through [employer] or phone. If you have a critical or abnormal lab result, we will notify you by phone as soon as possible.  Submit refill requests through Marfeel or call your pharmacy and they will forward the refill request to us. Please allow 3 business days for your refill to be completed.          Additional Information About Your Visit        Marfeel Information     Marfeel lets you send messages to your doctor, view your test results, renew your prescriptions, schedule appointments and more. To sign up, go to [email or web address] . Click on \"Log in\" on the left side of the screen, which will take you to the Welcome page. Then click on \"Sign up Now\" on the right side of the page.     You " "will be asked to enter the access code listed below, as well as some personal information. Please follow the directions to create your username and password.     Your access code is: [email or web address]  Expires: 2018 10:37 AM     Your access code will  in 90 days. If you need help or a new code, please call your [facility] clinic or [phone].        Care EveryWhere ID     This is your Care EveryWhere ID. This could be used by other organizations to access your [facility] medical records  [id number]        Your Vitals Were     Pulse Temperature Respirations Height Last Period Pulse Oximetry    98 98.1  F (36.7  C) 16 5' 1\" (1.549 m) 2003 (Approximate) 97%    Breastfeeding? BMI (Body Mass Index)                No 33.25 kg/m2           Blood Pressure from Last 3 Encounters:   18 119/75   10/25/18 111/73   18 107/71    Weight from Last 3 Encounters:   18 176 lb (79.8 kg)   10/25/18 176 lb 12.8 oz (80.2 kg)   18 178 lb 1.6 oz (80.8 kg)              Today, you had the following     No orders found for display         Today's Medication Changes          These changes are accurate as of 18  2:48 PM.  If you have any questions, ask your nurse or doctor.               Start taking these medicines.        Dose/Directions    cyclobenzaprine 5 MG tablet   Commonly known as:  FLEXERIL   Used for:  Muscle strain of right upper back, initial encounter   Started by:  Rhiannon Higuera APRN CNP        Dose:  5 mg   Take 1 tablet (5 mg) by mouth daily as needed for muscle spasms   Quantity:  20 tablet   Refills:  0            Where to get your medicines      These medications were sent to [id number] - [place], [place] - [address]  [address] [id number]     Phone:  [phone]     cyclobenzaprine 5 MG tablet                Primary Care Provider Office Phone # Fax #    Jackie Alyssa Kovacs [phone]       Mariah [address]TE[address]  [facility] [address]        Equal Access to Services     IMAN BROWN : Hadneel aad ku hadkathieseb Somelecio, waaxda luqadaha, qaybta kajoeyjennifer crow, susan lemusvietjack baeza. So [facility] [phone].    ATENCIÓN: Si habla español, tiene a lombardo disposición servicios gratuitos de asistencia lingüística. Llame al [phone].    We comply with applicable federal civil rights laws and [place] laws. We do not discriminate on the basis of race, color, national origin, age, disability, sex, sexual orientation, or gender identity.            Thank you!     Thank you for choosing [facility]  for your care. Our goal is always to provide you with excellent care. Hearing back from our patients is one way we can continue to improve our services. Please take a few minutes to complete the written survey that you may receive in the mail after your visit with us. Thank you!             Your Updated Medication List - Protect others around you: Learn how to safely use, store and throw away your medicines at www.disposemymeds.org.          This list is accurate as of 11/16/18  2:48 PM.  Always use your most recent med list.                   Brand Name Dispense Instructions for use Diagnosis    amphetamine-dextroamphetamine 10 MG per tablet    ADDERALL    60 tablet    Take 1 tablet (10 mg) by mouth 2 times daily    Attention deficit hyperactivity disorder (ADHD), predominantly inattentive type       cyclobenzaprine 5 MG tablet    FLEXERIL    20 tablet    Take 1 tablet (5 mg) by mouth daily as needed for muscle spasms    Muscle strain of right upper back, initial encounter       MULTIVITAMIN PO           RANITIDINE HCL PO      Take by mouth daily        VITAMIN B-COMPLEX PO      Take by mouth daily        VITAMIN D-3 PO

## 2018-11-21 ENCOUNTER — TELEPHONE (OUTPATIENT)
Dept: FAMILY MEDICINE | Facility: CLINIC | Age: 68
End: 2018-11-21

## 2018-11-21 DIAGNOSIS — F90.0 ATTENTION DEFICIT HYPERACTIVITY DISORDER (ADHD), PREDOMINANTLY INATTENTIVE TYPE: ICD-10-CM

## 2018-11-21 NOTE — TELEPHONE ENCOUNTER
Reason for Call:  Medication or medication refill:    Do you use a [facility]?  Name of the pharmacy and phone number for the current request:  Written rx    Name of the medication requested: amphetamine-dextroamphetamine (ADDERALL) 10 MG per tablet    Other request: call when ready for     Can we leave a detailed message on this number? YES    Phone number patient can be reached at: Home number on file [phone] (home)    Best Time: anytime     Call taken on 11/21/2018 at 2:27 PM by Tara White

## 2018-11-21 NOTE — TELEPHONE ENCOUNTER
Requested Prescriptions   Pending Prescriptions Disp Refills     amphetamine-dextroamphetamine (ADDERALL) 10 MG tablet    Last Written Prescription Date:  10/25/18  Last Fill Quantity: 60,  # refills: 0   Last Office Visit with [facility], P or [facility] prescribing provider:  11/16/18   Future Office Visit:      60 tablet 0     Sig: Take 1 tablet (10 mg) by mouth 2 times daily    There is no refill protocol information for this order              Deni Faarax  [facility]

## 2018-11-23 RX ORDER — DEXTROAMPHETAMINE SACCHARATE, AMPHETAMINE ASPARTATE, DEXTROAMPHETAMINE SULFATE AND AMPHETAMINE SULFATE 2.5; 2.5; 2.5; 2.5 MG/1; MG/1; MG/1; MG/1
10 TABLET ORAL 2 TIMES DAILY
Qty: 60 TABLET | Refills: 0 | Status: SHIPPED | OUTPATIENT
Start: 2018-11-23 | End: 2018-12-20

## 2018-11-23 NOTE — TELEPHONE ENCOUNTER
Routing refill request to provider for review/approval because:  Drug not on the [facility] refill protocol   Thao Lam RN

## 2018-11-27 NOTE — TELEPHONE ENCOUNTER
Written rx will be deliver to the  this afternoon for pickup after 3p.  Gilberto Bae,  [facility]    Please call patient  to let them know the above info.  And remind the person who is picking up to bring their photo ID.  Gilberto Bae,  [facility]     NOTE: If patient/gaurdian calls the clinic before the care teams gets a chance to call them, please let pt know the above information regarding pickup times, remind them to bring a photo ID and close the encounter.  Gilberto Bae,  [facility]    FYI:  Anything completed after 2:00p will not be delivered until the next business day after 11a.   Gilberto Bae,  [facility].

## 2018-11-27 NOTE — TELEPHONE ENCOUNTER
Called and patient is notified of this writer's note below.  Gilberto Bae,  [facility]

## 2018-12-03 ENCOUNTER — RADIANT APPOINTMENT (OUTPATIENT)
Dept: MAMMOGRAPHY | Facility: CLINIC | Age: 68
End: 2018-12-03
Attending: FAMILY MEDICINE
Payer: COMMERCIAL

## 2018-12-03 DIAGNOSIS — Z12.31 VISIT FOR SCREENING MAMMOGRAM: ICD-10-CM

## 2018-12-03 PROCEDURE — 77067 SCR MAMMO BI INCL CAD: CPT | Performed by: STUDENT IN AN ORGANIZED HEALTH CARE EDUCATION/TRAINING PROGRAM

## 2018-12-17 ENCOUNTER — ANCILLARY PROCEDURE (OUTPATIENT)
Dept: MAMMOGRAPHY | Facility: CLINIC | Age: 68
End: 2018-12-17
Attending: PHYSICIAN ASSISTANT
Payer: COMMERCIAL

## 2018-12-17 ENCOUNTER — ANCILLARY PROCEDURE (OUTPATIENT)
Dept: ULTRASOUND IMAGING | Facility: CLINIC | Age: 68
End: 2018-12-17
Attending: PHYSICIAN ASSISTANT
Payer: COMMERCIAL

## 2018-12-17 DIAGNOSIS — R92.8 ABNORMAL MAMMOGRAM: ICD-10-CM

## 2018-12-17 PROCEDURE — 77065 DX MAMMO INCL CAD UNI: CPT | Mod: RT | Performed by: RADIOLOGY

## 2018-12-17 PROCEDURE — 76642 ULTRASOUND BREAST LIMITED: CPT | Mod: RT | Performed by: RADIOLOGY

## 2018-12-20 ENCOUNTER — MYC REFILL (OUTPATIENT)
Dept: FAMILY MEDICINE | Facility: CLINIC | Age: 68
End: 2018-12-20

## 2018-12-20 ENCOUNTER — TELEPHONE (OUTPATIENT)
Dept: FAMILY MEDICINE | Facility: CLINIC | Age: 68
End: 2018-12-20

## 2018-12-20 DIAGNOSIS — F90.0 ATTENTION DEFICIT HYPERACTIVITY DISORDER (ADHD), PREDOMINANTLY INATTENTIVE TYPE: ICD-10-CM

## 2018-12-20 RX ORDER — DEXTROAMPHETAMINE SACCHARATE, AMPHETAMINE ASPARTATE, DEXTROAMPHETAMINE SULFATE AND AMPHETAMINE SULFATE 2.5; 2.5; 2.5; 2.5 MG/1; MG/1; MG/1; MG/1
10 TABLET ORAL 2 TIMES DAILY
Qty: 60 TABLET | Refills: 0 | Status: SHIPPED | OUTPATIENT
Start: 2018-12-20 | End: 2019-01-26

## 2018-12-20 NOTE — TELEPHONE ENCOUNTER
Patient is scheduled to see me on 12-. We will discuss results of mammogram and follow up at that time.  Jackie Kovacs MD

## 2018-12-21 ENCOUNTER — OFFICE VISIT (OUTPATIENT)
Dept: FAMILY MEDICINE | Facility: CLINIC | Age: 68
End: 2018-12-21
Payer: COMMERCIAL

## 2018-12-21 VITALS
RESPIRATION RATE: 20 BRPM | SYSTOLIC BLOOD PRESSURE: 119 MMHG | BODY MASS INDEX: 32.47 KG/M2 | WEIGHT: 172 LBS | OXYGEN SATURATION: 98 % | HEART RATE: 100 BPM | HEIGHT: 61 IN | TEMPERATURE: 98.2 F | DIASTOLIC BLOOD PRESSURE: 75 MMHG

## 2018-12-21 DIAGNOSIS — M81.0 AGE-RELATED OSTEOPOROSIS WITHOUT CURRENT PATHOLOGICAL FRACTURE: Primary | ICD-10-CM

## 2018-12-21 DIAGNOSIS — R92.8 ABNORMAL MAMMOGRAM: ICD-10-CM

## 2018-12-21 DIAGNOSIS — E78.5 HYPERLIPIDEMIA LDL GOAL <160: ICD-10-CM

## 2018-12-21 DIAGNOSIS — F41.0 PANIC ATTACK: ICD-10-CM

## 2018-12-21 DIAGNOSIS — F90.0 ATTENTION DEFICIT HYPERACTIVITY DISORDER (ADHD), PREDOMINANTLY INATTENTIVE TYPE: ICD-10-CM

## 2018-12-21 DIAGNOSIS — F33.1 MODERATE RECURRENT MAJOR DEPRESSION (H): ICD-10-CM

## 2018-12-21 DIAGNOSIS — M25.511 ACUTE PAIN OF RIGHT SHOULDER: ICD-10-CM

## 2018-12-21 PROBLEM — E04.2 NON-TOXIC MULTINODULAR GOITER: Status: RESOLVED | Noted: 2017-12-14 | Resolved: 2018-12-21

## 2018-12-21 PROBLEM — M79.10 MYALGIA: Status: RESOLVED | Noted: 2017-10-19 | Resolved: 2018-12-21

## 2018-12-21 PROCEDURE — 99214 OFFICE O/P EST MOD 30 MIN: CPT | Performed by: FAMILY MEDICINE

## 2018-12-21 RX ORDER — LORAZEPAM 1 MG/1
1 TABLET ORAL EVERY 6 HOURS PRN
Qty: 4 TABLET | Refills: 0 | Status: SHIPPED | OUTPATIENT
Start: 2018-12-21 | End: 2019-02-08

## 2018-12-21 ASSESSMENT — MIFFLIN-ST. JEOR: SCORE: 1257.57

## 2018-12-21 ASSESSMENT — PAIN SCALES - GENERAL: PAINLEVEL: MODERATE PAIN (5)

## 2018-12-21 NOTE — PROGRESS NOTES
SUBJECTIVE:   Marixa Ann is a 68 year old female who presents to clinic today for the following health issues:    Concern - Discuss mammogram results and requesting medication for anxiety for upcoming biopsy appointment    Chronic Pain Follow-Up     Discuss bone density scan done April 2018 and plan. Osteoporosis and bisphosphonate's have been recommended. Patient would like to wait until she finds out about possible breast cancer and treatment. Not associated with pain in shoulder or arthritis. Increases risk of fractures discussed.    Type / Location of Pain: Shoulder pain  Analgesia/pain control:       Recent changes:  worse      Overall control: Tolerable with discomfort  Activity level/function:      Daily activities:  Able to do light housework, cooking    Work:  not applicable  Adverse effects:  No  Adherance    Taking medication as directed?  Yes    Participating in other treatments: no  Risk Factors:    Sleep:  Poor    Mood/anxiety:  slightly worsened due to biopsy    Recent family or social stressors:  none noted    Other aggravating factors: lifting arm, prolonged use of arm increase shoulder/neck pain  PHQ-9 SCORE 11/6/2017 4/20/2018 9/13/2018   PHQ-9 Total Score 21 0 23     DONOVAN-7 SCORE 11/6/2017 9/13/2018   Total Score 14 10     Encounter-Level CSA:    There are no encounter-level csa.     Patient-Level CSA:    There are no patient-level csa.             Hyperlipidemia Follow-Up      Rate your low fat/cholesterol diet?: good    Taking statin?  No    Other lipid medications/supplements?:  none      Problem list and histories reviewed & adjusted, as indicated.  Additional history: as documented    Patient Active Problem List   Diagnosis     Pain in joint, shoulder region     Moderate recurrent major depression (H)     Hyperlipidemia LDL goal <160     Chronic fatigue disorder     Liver lesion     Cystic disease of liver     Gastroesophageal reflux disease with esophagitis     Hiatal hernia      Age-related cataract of both eyes, unspecified age-related cataract type     Primary osteoarthritis of both knees     Attention deficit hyperactivity disorder (ADHD), predominantly inattentive type     Past Surgical History:   Procedure Laterality Date     APPENDECTOMY  1967       Social History     Tobacco Use     Smoking status: Never Smoker     Smokeless tobacco: Never Used   Substance Use Topics     Alcohol use: Not on file     History reviewed. No pertinent family history.      Current Outpatient Medications   Medication Sig Dispense Refill     amphetamine-dextroamphetamine (ADDERALL) 10 MG tablet Take 1 tablet (10 mg) by mouth 2 times daily 60 tablet 0     B Complex Vitamins (VITAMIN B-COMPLEX PO) Take by mouth daily       Cholecalciferol (VITAMIN D-3 PO)        cyclobenzaprine (FLEXERIL) 5 MG tablet Take 1 tablet (5 mg) by mouth daily as needed for muscle spasms 20 tablet 0     LORazepam (ATIVAN) 1 MG tablet Take 1 tablet (1 mg) by mouth every 6 hours as needed for anxiety Take one hour before procedure 4 tablet 0     Multiple Vitamins-Minerals (MULTIVITAMIN PO)        RANITIDINE HCL PO Take by mouth daily       Allergies   Allergen Reactions     Dust Mites      Recent Labs   Lab Test 10/19/17  1412   LDL 98   HDL 40*   TRIG 82   ALT 26   CR 0.72   GFRESTIMATED 81   GFRESTBLACK >90   POTASSIUM 3.9   TSH 1.35      BP Readings from Last 3 Encounters:   12/21/18 119/75   11/16/18 119/75   10/25/18 111/73    Wt Readings from Last 3 Encounters:   12/21/18 78 kg (172 lb)   11/16/18 79.8 kg (176 lb)   10/25/18 80.2 kg (176 lb 12.8 oz)                  Labs reviewed in EPIC    Reviewed and updated as needed this visit by clinical staff  Allergies  Meds       Reviewed and updated as needed this visit by Provider         ROS:  Constitutional, HEENT, cardiovascular, pulmonary, gi and gu systems are negative, except as otherwise noted.    OBJECTIVE:     /75 (BP Location: Right arm, Patient Position: Chair, Cuff  "Size: Adult Regular)   Pulse 100   Temp 98.2  F (36.8  C) (Oral)   Resp 20   Ht 1.549 m (5' 1\")   Wt 78 kg (172 lb)   LMP 05/31/2003 (Approximate)   SpO2 98%   BMI 32.50 kg/m    Body mass index is 32.5 kg/m .  GENERAL: healthy, alert, well nourished, well hydrated, no distress, obese  HENT: ear canals- normal; TMs- normal; Nose- normal; Mouth- no ulcers, no lesions, throat is clear with no erythema or exudate.   NECK: no tenderness, no adenopathy, no asymmetry, no masses, no stiffness; thyroid- normal to palpation  RESP: lungs clear to auscultation - no rales, no rhonchi, no wheezes  CV: regular rates and rhythm, normal S1 S2, no S3 or S4 and no murmur, no click or rub -  ABDOMEN: soft, no tenderness, no  hepatosplenomegaly, no masses, normal bowel sounds  MS: extremities- no gross deformities noted, no edema  SKIN: no suspicious lesions, no rashes  NEURO: strength and tone- normal, sensory exam- grossly normal, mentation- intact, speech- normal, reflexes- symmetric  BACK: no CVA tenderness, no paralumbar tenderness  PSYCH: Alert and oriented times 3; speech- coherent , normal rate and volume; able to articulate logical thoughts, able to abstract reason, no tangential thoughts, no hallucinations or delusions, affect- normal     Diagnostic Test Results:  Results for orders placed or performed in visit on 12/17/18   US Breast Right Limited 1-3 Quadrants    Narrative    Examinations: MA DIAGNOSTIC DIGITAL RIGHT, US BREAST RIGHT LIMITED 1-3  QUADRANTS, 12/17/2018 4:00 PM    Comparisons: 12/3/2018    History/Family History: Right breast screening callback. History of  left breast biopsy. Family history for breast cancer in cousin.    Breast Density: Scattered fibroglandular densities  Technique: Standard mammographic views were performed spot  compression.    Findings:     Mammogram:  In the right medial breast at posterior depth, there is an irregular  mass with spiculated margins. It measures about 17 mm in " "greatest  diameter.    Ultrasound:  Targeted, real-time ultrasound evaluation was performed by the  technologist and radiologist.  In the right breast at 2:00, 9 cm from nipple, there is an irregular  hypoechoic mass with angular margins. There is internal blood flow. It  measures 12 x 9 x 14 mm. Evaluation of the right axilla revealed  normal appearing lymph nodes.      Impression    IMPRESSION: BI-RADS CATEGORY: 5 - Highly Suggestive of Malignancy.    RECOMMENDED FOLLOW-UP: Biopsy. Contrast mammogram followed by right  breast ultrasound-guided biopsy.    The finding and recommendation were discussed with the patient, and  her , at the time of evaluation.    The patient was given the results of the examination.    DANIELA MEYERS MD       ASSESSMENT/PLAN:         Tobacco Cessation:   reports that  has never smoked. she has never used smokeless tobacco.      BMI:   Estimated body mass index is 32.5 kg/m  as calculated from the following:    Height as of this encounter: 1.549 m (5' 1\").    Weight as of this encounter: 78 kg (172 lb).   Weight management plan: Discussed healthy diet and exercise guidelines        ICD-10-CM    1. Age-related osteoporosis without current pathological fracture M81.0 Discussed Fosamax treatment for osteoporosis. Will wait until biopsy results come back next week. If chemotherapy is needed, would like to make sure no side effects. Does have a history of heart burn. Recommend Vitamin D and increased calcium in diet or Tums for calcium.   2. Acute pain of right shoulder M25.511 More muscle strain than joint issue. Discussed heat and stretching. Avoid overuse activities.    3. Abnormal mammogram R92.8 Biopsy scheduled for lesion that has high suspicion for cancer.    4. Panic attack F41.0 LORazepam (ATIVAN) 1 MG tablet- may take 1 hour before procedure to avoid panic attack.   5. Moderate recurrent major depression (H) F33.1 Stable but difficult time of year and potential diagnosis of " cancer is hard.    6. Hyperlipidemia LDL goal <160 E78.5 Stable    7. Attention deficit hyperactivity disorder (ADHD), predominantly inattentive type F90.0 OK to continue Adderall as needed for now.        FURTHER TESTING:       - Breast biopsy scheduled for next week.   CONSULTATION/REFERRAL to oncology as determined by biopsy results.  FUTURE LABS:       - Schedule fasting labs in 3 months  FUTURE APPOINTMENTS:       - Follow-up visit in 3 months or sooner if any questions or concerns.   Work on weight loss  Regular exercise  See Patient Instructions    Jackie Kovacs MD  [facility]

## 2018-12-21 NOTE — PATIENT INSTRUCTIONS
[facility] to deliver an exceptional experience to you, every time we see you.  If you receive a survey in the mail, please send us back your thoughts. We really do value your feedback.    Based on your medical history, these are the current health maintenance/preventive care services that you are due for (some may have been done at this visit.)  Health Maintenance Due   Topic Date Due     HEPATITIS C SCREENING  10/20/1970     DTAP/TDAP/TD IMMUNIZATION (1 - Tdap) 10/20/1977     COLON CANCER SCREEN (SYSTEM ASSIGNED)  10/20/2002     ZOSTER IMMUNIZATION (1 of 2) 10/20/2002     ADVANCE DIRECTIVE PLANNING Q5 YRS  10/20/2007     FALL RISK ASSESSMENT  12/08/2018         Suggested websites for health information:  Www.[employer] : Up to date and easily searchable information on multiple topics.  Www.[employer] ([employer]gov : medication info, interactive tutorials, watch real surgeries online  Www.familydoctor.org : good info from the Academy of Family Physicians  Www.cdc.gov : public health info, travel advisories, epidemics (H1N1)  Www.aap.org : children's health info, normal development, vaccinations  Www.health.[facility].[place].us : [place] dept of health, public health issues in [place], N1N1    Your care team:                            Family Medicine Internal Medicine   MD Kyle Mancera MD Shantel Branch-Fleming, MD Katya Georgiev PA-C Nam Ho, MD Pediatrics   OCTAVIO Ruiz, MD Alisa Keen CNP, MD Deborah Mielke, MD Kim Thein, APRRODNEY CNP      Clinic hours: Monday - Thursday 7 am-7 pm; Fridays 7 am-5 pm.   Urgent care: Monday - Friday 11 am-9 pm; Saturday and Sunday 9 am-5 pm.  Pharmacy : Monday -Thursday 8 am-8 pm; Friday 8 am-6 pm; Saturday and Sunday 9 am-5 pm.     Clinic: [phone]   Pharmacy: [phone]    Patient Education        Patient Education     Osteoporosis Medicines:  Bisphosphonates  Depending on your needs, your healthcare provider may prescribe medicines to prevent or treat osteoporosis.    Bisphosphonates  Several medicines make up the class of drugs known as bisphosphonates. Bisphosphonates are the most common type of medicine used to help prevent and treat bone loss. Bisphosphonates are given in pill or injectable form as an IV infusion. They must be taken exactly as directed. Benefits may include:    Reducing bone loss    Increasing bone density in the hip and spine    Reducing risk of fractures in the spine, hip, and wrist  Side effects may include:    Heartburn    Nausea    Belly (abdominal) pain    Bone or muscle pain  Taking bisphosphonates pills  Always read medicine information closely. You should not take bisphosphonates if you currently have upper gastrointestinal disease. Certain bisphosphonates must be taken:    On an empty stomach.    With a full glass of water (8 oz.) first thing in the morning.    At least 30 minutes to 1 hour before any food, drink, or other medicines.    While sitting or standing. You should not lie down for at least 30 minutes after taking the medicine.  Newer medicines can be taken weekly or monthly. Talk with your healthcare provider to find out which one is right for you.   Date Last Reviewed: 5/1/2018 2000-2018 [employer]. [address] [place] [address]. All rights reserved. This information is not intended as a substitute for professional medical care. Always follow your healthcare professional's instructions.

## 2018-12-26 ENCOUNTER — ANCILLARY PROCEDURE (OUTPATIENT)
Dept: MAMMOGRAPHY | Facility: CLINIC | Age: 68
End: 2018-12-26
Attending: PHYSICIAN ASSISTANT
Payer: COMMERCIAL

## 2018-12-26 ENCOUNTER — ANCILLARY PROCEDURE (OUTPATIENT)
Dept: ULTRASOUND IMAGING | Facility: CLINIC | Age: 68
End: 2018-12-26
Attending: PHYSICIAN ASSISTANT
Payer: COMMERCIAL

## 2018-12-26 DIAGNOSIS — R92.8 ABNORMAL MAMMOGRAM: ICD-10-CM

## 2018-12-26 PROCEDURE — 77066 DX MAMMO INCL CAD BI: CPT

## 2018-12-26 PROCEDURE — 88360 TUMOR IMMUNOHISTOCHEM/MANUAL: CPT

## 2018-12-26 PROCEDURE — 00000158 ZZHCL STATISTIC H-FISH PROCESS B/S

## 2018-12-26 PROCEDURE — 00000159 ZZHCL STATISTIC H-SEND OUTS PREP

## 2018-12-26 PROCEDURE — 88305 TISSUE EXAM BY PATHOLOGIST: CPT

## 2018-12-26 PROCEDURE — 19083 BX BREAST 1ST LESION US IMAG: CPT | Mod: RT

## 2018-12-26 PROCEDURE — 88377 M/PHMTRC ALYS ISHQUANT/SEMIQ: CPT

## 2018-12-26 RX ORDER — IOPAMIDOL 755 MG/ML
100 INJECTION, SOLUTION INTRAVASCULAR ONCE
Status: COMPLETED | OUTPATIENT
Start: 2018-12-26 | End: 2018-12-26

## 2018-12-26 RX ADMIN — IOPAMIDOL 100 ML: 755 INJECTION, SOLUTION INTRAVASCULAR at 08:36

## 2018-12-27 ENCOUNTER — TELEPHONE (OUTPATIENT)
Dept: FAMILY MEDICINE | Facility: CLINIC | Age: 68
End: 2018-12-27

## 2018-12-27 LAB — COPATH REPORT: NORMAL

## 2018-12-27 NOTE — TELEPHONE ENCOUNTER
Hi Dr. Kovacs,     This result was sent to the [facility] Provider pool in error. Because the result is a BI-RADS CATEGORY: 5, I have forwarded this result to you both in telephone encounter and in the results section to assure you have been notified.     Sincerely,     Andreas Bartlett Physician Reading Date Result Priority   Alona Pitts MD 12/26/2018       Physician Responsible for MQSA Outcome Reason    Alona Pitts MD Signed       Narrative & Impression     Examination: Bilateral dual-energy contrast-enhanced digital  diagnostic mammography with computer aided detection     Comparison: 12/17/2018, 12/3/2018     History/Family History: Suspicious mass right breast     Technique: Following the uneventful administration of intravenous  contrast, dual energy digital mammography was performed.     BREAST DENSITY: Heterogeneously dense     Findings: Avidly enhancing spiculated mass in the medial right breast  corresponding to the mammographic and ultrasound finding measuring 1.9  cm. No other suspicious area of enhancement is seen.                                                                      IMPRESSION: BI-RADS CATEGORY: 5 - Highly Suggestive of  Malignancy-Appropriate Action Should Be Take.     RECOMMENDED FOLLOW-UP: Biopsy. Ultrasound-guided core needle biopsy of  the right breast to follow.

## 2018-12-28 ENCOUNTER — TELEPHONE (OUTPATIENT)
Dept: MAMMOGRAPHY | Facility: CLINIC | Age: 68
End: 2018-12-28

## 2018-12-28 LAB — COPATH REPORT: NORMAL

## 2018-12-28 NOTE — TELEPHONE ENCOUNTER
RECORDS STATUS - ALL OTHER DIAGNOSIS      RECORDS RECEIVED FROM: Epic    DATE RECEIVED: December 28, 2018     NOTES STATUS DETAILS   OFFICE NOTE from referring provider [facility]     OFFICE NOTE from medical oncologist Epic     DISCHARGE SUMMARY from hospital [facility]     DISCHARGE REPORT from the ER [facility]     OPERATIVE REPORT None per pt     MEDICATION LIST [facility]     CLINICAL TRIAL TREATMENTS TO DATE None per pt     LABS     PATHOLOGY REPORTS None per pt     ANYTHING RELATED TO DIAGNOSIS     GENONOMIC TESTING     TYPE:     IMAGING (NEED IMAGES & REPORT)     CT SCANS     MRI     MAMMO     ULTRASOUND     PET

## 2018-12-28 NOTE — TELEPHONE ENCOUNTER
Spoke to Marixa today about her new diagnosis of Invasive Mammary Carcinoma found during her biopsy earlier this week.  We discussed the Radiologist's recommendation of surgical and oncology consultation.  Due to the size of the area, she will be meeting with Dr. Gema Diamond on Monday, December 31st at 3pm at the [facility] to discuss surgical treatment options.  Per Marixa's request an informational e-mail was sent to her with helpful websites to research her new diagnosis, link to Dr. Diamond's profile, appointment information, and additional helpful links for her.  Marixa verbalized understanding and all questions and concerns were answered at this time.

## 2018-12-30 NOTE — PROGRESS NOTES
[facility] Breast Surgery Consultation    HPI:   Marixa Ann is a 68 year old female who is seen in consultation at the request of Dr. Kovacs for evaluation of newly diagnosed right breast cancer. She had a screening mammogram on 12/3/2018 which revealed an asymmetry on the right breast. She then had diagnostic imaging which revealed a 1.2cm hypoechoic mass at 2:00, 9cm FN with spiculated margins.  She then had a contrast enhanced mammogram which revealed a 1.9cm enhancing mass in the medial right breast. She had an axillary ultrasound which revealed benign nodes. She had a biopsy of the mass which revealed a grade 1, ER/OR positive, her 2 negative invasive ductal carcinoma.     She reports no concerns prior to her mammogram on either breast. She did have a prior left breast biopsy 20 years ago which was benign. No other breast concerns. She has significant anxiety and ADHD. She spent a great deal of time explaining her anxiety and ways that she saud.     Hormonal history:  menarche 14yo, no children,  Post-menopausal, a few years? OCP use, no HRT, no fertility treatment.     Family history of breast cancer: Yes - maternal cousin in her 50s  Family history of ovarian cancer:  No  Family history of colon cancer: No  Family history of prostate cancer: No    Imaging:     Recent Results (from the past 744 hour(s))   MA SCREENING DIGITAL BILAT - Future  (s+30)    Narrative    Examination: Bilateral digital screening mammography with computer  aided detection.    Comparison: 8/2/2016 and 5/17/2012    History/Family history: No symptoms, routine screening. Cousin with  breast cancer, unknown age at diagnosis.    BREAST DENSITY: Scattered fibroglandular densities.    COMMENTS: Possible developing asymmetry right breast around 4:00  position posterior depth. No significant change on the left.      Impression    IMPRESSION: BI-RADS CATEGORY: 0 - Need Additional Imaging Evaluation  and/or Prior Mammograms for  Comparison.    RECOMMENDED FOLLOW-UP: Diagnostic Mammogram and Ultrasound    Right breast diagnostic mammogram and possible right breast  ultrasound.    Results to be sent to the patient.     I have personally reviewed the examination and initial interpretation  and I agree with the findings.    FORTINO BARRERA MD   MA Diagnostic Digital Right    Narrative    Examinations: MA DIAGNOSTIC DIGITAL RIGHT, US BREAST RIGHT LIMITED 1-3  QUADRANTS, 12/17/2018 4:00 PM    Comparisons: 12/3/2018    History/Family History: Right breast screening callback. History of  left breast biopsy. Family history for breast cancer in cousin.    Breast Density: Scattered fibroglandular densities  Technique: Standard mammographic views were performed spot  compression.    Findings:     Mammogram:  In the right medial breast at posterior depth, there is an irregular  mass with spiculated margins. It measures about 17 mm in greatest  diameter.    Ultrasound:  Targeted, real-time ultrasound evaluation was performed by the  technologist and radiologist.  In the right breast at 2:00, 9 cm from nipple, there is an irregular  hypoechoic mass with angular margins. There is internal blood flow. It  measures 12 x 9 x 14 mm. Evaluation of the right axilla revealed  normal appearing lymph nodes.      Impression    IMPRESSION: BI-RADS CATEGORY: 5 - Highly Suggestive of Malignancy.    RECOMMENDED FOLLOW-UP: Biopsy. Contrast mammogram followed by right  breast ultrasound-guided biopsy.    The finding and recommendation were discussed with the patient, and  her , at the time of evaluation.    The patient was given the results of the examination.    DANIELA MEYERS MD   US Breast Right Limited 1-3 Quadrants    Narrative    Examinations: MA DIAGNOSTIC DIGITAL RIGHT, US BREAST RIGHT LIMITED 1-3  QUADRANTS, 12/17/2018 4:00 PM    Comparisons: 12/3/2018    History/Family History: Right breast screening callback. History of  left breast biopsy. Family history for  breast cancer in cousin.    Breast Density: Scattered fibroglandular densities  Technique: Standard mammographic views were performed spot  compression.    Findings:     Mammogram:  In the right medial breast at posterior depth, there is an irregular  mass with spiculated margins. It measures about 17 mm in greatest  diameter.    Ultrasound:  Targeted, real-time ultrasound evaluation was performed by the  technologist and radiologist.  In the right breast at 2:00, 9 cm from nipple, there is an irregular  hypoechoic mass with angular margins. There is internal blood flow. It  measures 12 x 9 x 14 mm. Evaluation of the right axilla revealed  normal appearing lymph nodes.      Impression    IMPRESSION: BI-RADS CATEGORY: 5 - Highly Suggestive of Malignancy.    RECOMMENDED FOLLOW-UP: Biopsy. Contrast mammogram followed by right  breast ultrasound-guided biopsy.    The finding and recommendation were discussed with the patient, and  her , at the time of evaluation.    The patient was given the results of the examination.    DANIELA MEYERS MD MA Contrast Enhanced Digital Mammogram    Narrative    Examination: Bilateral dual-energy contrast-enhanced digital  diagnostic mammography with computer aided detection    Comparison: 12/17/2018, 12/3/2018    History/Family History: Suspicious mass right breast    Technique: Following the uneventful administration of intravenous  contrast, dual energy digital mammography was performed.    BREAST DENSITY: Heterogeneously dense    Findings: Avidly enhancing spiculated mass in the medial right breast  corresponding to the mammographic and ultrasound finding measuring 1.9  cm. No other suspicious area of enhancement is seen.      Impression    IMPRESSION: BI-RADS CATEGORY: 5 - Highly Suggestive of  Malignancy-Appropriate Action Should Be Take.    RECOMMENDED FOLLOW-UP: Biopsy. Ultrasound-guided core needle biopsy of  the right breast to follow.    JEFF LARSEN MD   US  "Breast Biopsy Core Needle Right    Addendum: 12/28/2018    Addendum: Pathology results of invasive mammary carcinoma and atypical  ductal hyperplasia are concordant with imaging.    Recommendations: Surgical consultation.    JEFF LARSEN MD      Narrative    Ultrasound guided right breast biopsy.    Comparisons: 12/17/2018    FINDINGS: Procedure, risks, benefits and alternatives were discussed  with the patient and the patient gave written and verbal consent.  Aseptic technique was utilized. Approximately 10 cc of 1% lidocaine  were utilized for local anesthesia. Under ultrasound guidance, a 14g  core needle biopsy system was utilized to sample lesion located at the  2:00 position right breast. A BiomarC (shaped liked a \"barbell\") clip  was placed. Pressure was held over this area for approximately 15  minutes. A dressing was placed and care instructions were discussed  with the patient.    Post biopsy mammogram demonstrates clip deployment.      Impression    IMPRESSION:    Uncomplicated ultrasound guided core needle biopsy  right breast.    JEFF LARSEN MD   MA Post Procedure Right    Addendum: 12/28/2018    Addendum: Pathology results of invasive mammary carcinoma and atypical  ductal hyperplasia are concordant with imaging.    Recommendations: Surgical consultation.    JEFF LARSEN MD      Narrative    Ultrasound guided right breast biopsy.    Comparisons: 12/17/2018    FINDINGS: Procedure, risks, benefits and alternatives were discussed  with the patient and the patient gave written and verbal consent.  Aseptic technique was utilized. Approximately 10 cc of 1% lidocaine  were utilized for local anesthesia. Under ultrasound guidance, a 14g  core needle biopsy system was utilized to sample lesion located at the  2:00 position right breast. A BiomarC (shaped liked a \"barbell\") clip  was placed. Pressure was held over this area for approximately 15  minutes. A dressing was placed and care " instructions were discussed  with the patient.    Post biopsy mammogram demonstrates clip deployment.      Impression    IMPRESSION:    Uncomplicated ultrasound guided core needle biopsy  right breast.    JEFF LARSEN MD       Percutaneous core needle biopsy, right:   SPECIMEN(S):   Breast needle biopsy, right, 2 o'clock, 9 cm from nipple     FINAL DIAGNOSIS:   Right breast, 2 o'clock, 9 cm from nipple, core needle biopsy:   -INVASIVE MAMMARY CARCINOMA OF NO SPECIAL TYPE (INVASIVE DUCTAL   CARCINOMA), NURYS GRADE 1   -Calcifications associated with invasive carcinoma   -Other findings: atypical ductal hyperplasia   -Invasive carcinoma is estrogen receptor positive and progesterone   receptor positive by immunohistochemistry   -HER2 FISH result will be reported separately by cytogenetics   -See comment     Past Medical History:  Reviewed  Anxiety  ADHD  GERD      Current Outpatient Medications:      amphetamine-dextroamphetamine (ADDERALL) 10 MG tablet, Take 1 tablet (10 mg) by mouth 2 times daily, Disp: 60 tablet, Rfl: 0     B Complex Vitamins (VITAMIN B-COMPLEX PO), Take by mouth daily, Disp: , Rfl:      Cholecalciferol (VITAMIN D-3 PO), , Disp: , Rfl:      cyclobenzaprine (FLEXERIL) 5 MG tablet, Take 1 tablet (5 mg) by mouth daily as needed for muscle spasms, Disp: 20 tablet, Rfl: 0     LORazepam (ATIVAN) 1 MG tablet, Take 1 tablet (1 mg) by mouth every 6 hours as needed for anxiety Take one hour before procedure, Disp: 4 tablet, Rfl: 0     Multiple Vitamins-Minerals (MULTIVITAMIN PO), , Disp: , Rfl:      RANITIDINE HCL PO, Take by mouth daily, Disp: , Rfl:     Past Surgical History:  Past Surgical History:   Procedure Laterality Date     APPENDECTOMY  1967    Cataracts surgery     Allergies   Allergen Reactions     Dust Mites         Social History:  Social History     Socioeconomic History     Marital status:      Spouse name: Not on file     Number of children: Not on file     Years of  "education: Not on file     Highest education level: Not on file   Social Needs     Financial resource strain: Not on file     Food insecurity - worry: Not on file     Food insecurity - inability: Not on file     Transportation needs - medical: Not on file     Transportation needs - non-medical: Not on file   Occupational History     Not on file   Tobacco Use     Smoking status: Never Smoker     Smokeless tobacco: Never Used   Substance and Sexual Activity     Alcohol use: Not on file     Drug use: No     Sexual activity: No     Partners: Male     Birth control/protection: None   Other Topics Concern     Parent/sibling w/ CABG, MI or angioplasty before 65F 55M? No   Social History Narrative     Not on file        ROS:  The 10 point review of systems is negative other than noted in the HPI and above.    PE:  Vitals: /70 (BP Location: Left arm, Patient Position: Sitting, Cuff Size: Adult Regular)   Pulse 60   Temp 97.9  F (36.6  C) (Oral)   Ht 1.549 m (5' 1\")   Wt 78 kg (172 lb)   LMP 05/31/2003 (Approximate)   BMI 32.50 kg/m    General appearance: well-nourished, sitting comfortably, no apparent distress  Psych: normal affect, pleasant  HEENT:  Head normocephalic and atraumatic, pupils equal and round, conjunctivae clear, mucous membranes moist, external ears and nose normal  Neck: Supple without thyromegaly or masses  Lungs: Respirations unlabored  Lymphatic: No cervical, or supraclavicular lymphadenopathy  Extremities: Without edema  Musculoskeletal:  Normal station and gait  Neurologic: nonfocal, grossly intact times four extremities, alert and oriented times three  Psychiatric: Mood and affect are appropriate  Skin: Without lesions or rashes    Breast:  A bilateral breast exam was performed in the supine position.. Bilateral breasts were palpated in a circumferential clockwise fashion including the supraclavicular and axillary areas.   Ecchymosis on right upper inner breast. No palpable mass. Breasts " are otherwise symmetrical with no skin or nipple changes.  Contour is normal.Parenchyma is soft.    Lymph:       No supraclavicular/infraclavicular adenopathy.   Axillary adenopathy: none    Assessment:  Right  breast invasive ductal carcinoma, grade 1, ER +, TX +, [id number] - measuring 1.9 cm at 2:00 and 9 cm from the nipple.      Plan:  Marixa is a 68yof who unfortunately has newly diagnosed right breast cancer.  I reviewed the imaging and pathology reports with her and her  and explained the findings.  We talked about the fact that this is invasive ductal carcinoma  that is relatively small in size and was found on screening mammogram.  We also talked about the fact that the tumor has favorable features (strongly ER/TX positive and no lymphvascular invasion) and should be quite treatable.    We next discussed the surgical options for treatment.  I described the procedures for lumpectomy with sentinel lymph node biopsy and mastectomy with sentinel lymph node biopsy, possible axillary node dissection including the details of the procedures, the risks, anesthesia and expected recovery.      I reviewed the data regarding lumpectomy and radiation vs mastectomy that shows that the local recurrence risk is slightly higher for lumpectomy and radiation vs mastectomy (3-5% vs. 1-2%), but the survival at 20 years is the same.  I advised  that lymph node biopsy is recommended whenever we are dealing with invasive breast cancer and described the procedure for sentinel lymph node biopsy.  We talked about the risk for lymphedema which is small with removal of only a few nodes, but certainly not zero.  I also explained that since this is a very small tumor and was not palpable on clinical breast exam prior to the biopsy, I would ask radiology to place a radioactive seed pre-operatively for localization.    We talked about post-lumpectomy radiation, the course and usual side effects. We discussed that with lumpectomy,  radiation is typically recommended to decrease risk of recurrence. It may be necessary following mastectomy depending on final pathology and if pancho involvement is present.     She would like to proceed with scheduling a wire localized right breast lumpectomy with SLNB at [place]. She believes her visit with Dr. Kovacs on 12/21 should suffice for a pre-operative H&P. Rehana with review and call her Wednesday to assist in scheduling.     Time spent with the patient with greater that 50% of the time in discussion was 60 minutes.    Gema Diamond MD      Please route or send letter to:  Primary Care Provider (PCP) and Referring Provider

## 2018-12-31 ENCOUNTER — OFFICE VISIT (OUTPATIENT)
Dept: SURGERY | Facility: CLINIC | Age: 68
End: 2018-12-31
Payer: COMMERCIAL

## 2018-12-31 VITALS
SYSTOLIC BLOOD PRESSURE: 140 MMHG | HEART RATE: 60 BPM | BODY MASS INDEX: 32.47 KG/M2 | TEMPERATURE: 97.9 F | WEIGHT: 172 LBS | DIASTOLIC BLOOD PRESSURE: 70 MMHG | HEIGHT: 61 IN

## 2018-12-31 DIAGNOSIS — C50.211 MALIGNANT NEOPLASM OF UPPER-INNER QUADRANT OF RIGHT BREAST IN FEMALE, ESTROGEN RECEPTOR POSITIVE (H): Primary | ICD-10-CM

## 2018-12-31 DIAGNOSIS — Z17.0 MALIGNANT NEOPLASM OF UPPER-INNER QUADRANT OF RIGHT BREAST IN FEMALE, ESTROGEN RECEPTOR POSITIVE (H): Primary | ICD-10-CM

## 2018-12-31 PROCEDURE — 99205 OFFICE O/P NEW HI 60 MIN: CPT | Performed by: SURGERY

## 2018-12-31 RX ORDER — ACETAMINOPHEN 325 MG/1
325-650 TABLET ORAL EVERY 6 HOURS PRN
COMMUNITY

## 2018-12-31 RX ORDER — CALCIUM CARBONATE 500 MG/1
1 TABLET, CHEWABLE ORAL 2 TIMES DAILY
COMMUNITY

## 2018-12-31 ASSESSMENT — MIFFLIN-ST. JEOR: SCORE: 1257.57

## 2018-12-31 NOTE — Clinical Note
Looking at next Thursday as an option. Can we do at 11am at [place] [place] or 2:30pm? If not possible on 1/10, then 1/17 is fine. Thanks!

## 2018-12-31 NOTE — NURSING NOTE
Breast Patients    BREAST PATIENTS (ALL)    1-Do you have any of the following symptoms? Lump(s) or Mass(es)  2-In which breast are you having the symptoms? right  3-Do you use hormones?  No  4-Have you had a Mammogram? Yes   5-Have you ever had a breast cyst drained? No  6-Have you ever had a breast biopsy? Yes  Side: right  Date: 12/17/2018  7-Have you ever had a Breast Cancer? Yes  Side: right  Date: 12/17/2018   8-Is there a history of Breast Cancer in your family? Yes   Relationship to you:  Maternal  Cousin-50  9-Have you ever had Ovarian Cancer? No  10-Is there a history of Ovarian Cancer in your family? No  11-Summarize your caffeine intake (i.e. coffee, tea, chocolate, soda etc.): 1- 1-1/2 cups coffee a day, and sometimes a coke.    BREAST PATIENTS (FEMALE)    12-What age did your periods begin? 15  13-Date your last menstrual period began? 92  14-Number of full-term pregnancies: 0  15-Your age when your first child was born? N/A  16-Did you nurse your children? No  17-Are you pregnant now? No  18-Have you begun menopause? Yes  Age Menopause began:  47 -52  19-Have you had either ovary removed?No  20-Do you have breast implants? No  Lyndsay Alvarado, RN, BSN

## 2019-01-02 ENCOUNTER — DOCUMENTATION ONLY (OUTPATIENT)
Dept: MAMMOGRAPHY | Facility: CLINIC | Age: 69
End: 2019-01-02

## 2019-01-02 ENCOUNTER — TELEPHONE (OUTPATIENT)
Dept: SURGERY | Facility: CLINIC | Age: 69
End: 2019-01-02

## 2019-01-02 DIAGNOSIS — C50.911 MALIGNANT NEOPLASM OF RIGHT FEMALE BREAST, UNSPECIFIED ESTROGEN RECEPTOR STATUS, UNSPECIFIED SITE OF BREAST (H): Primary | ICD-10-CM

## 2019-01-02 NOTE — TELEPHONE ENCOUNTER
Called patient after recent surgical consult with Dr. Gema Diamond on 12/31/2018.     I reviewed information with patient, and went over the plan of care. The plan is for patient to meet with her primary care provider- Dr. Kovacs on 1/4/2019 for a preop exam, and then undergo a wire localized rt. Breast lumpectomy and rt. [place] lymph node biopsy with Dr. Gema Diamond @ [facility] on 1/10/2019.      I informed Marixa that I will be mailing her some patient education materials from the [employer] titled:  What You Need To Know About Breast-Conserving Surgery, and Exercises after Breast Surgery.  Informed patient to call me with any questions or concerns, otherwise I will reach out to patient a few days prior to her surgery.  Patient verbalized understanding and agrees with the plan of care.  Lyndsay Alvarado, RN, BSN

## 2019-01-02 NOTE — TELEPHONE ENCOUNTER
Type of surgery: wire localized right breast lumpectomy and right SLN biopsy  Location of surgery: [facility]  Date and time of surgery: 01/10/19 12:30pm  Surgeon: Dr Diamond  Pre-Op Appt Date: pt to schedule  Post-Op Appt Date: pt to schedule   Packet sent out: Yes  Pre-cert/Authorization completed:  Not Applicable  Date: 01/02/19    [facility]  MAC anesthesia

## 2019-01-02 NOTE — TELEPHONE ENCOUNTER
Breast Nurse Care Coordination:  I introduced self to patient, and her -Fam, and explained my role of breast nurse coordinator. I accompanied Marixa to her surgical consultation on 12/31/2018 with Dr. Gema Diamond at the [facility].      Shawna was given the new breast cancer patient packet which includes educational material and support resources such as [employer] [facility], [employer], [facility], Fighting Cancer through Diet and Lifestyle, [employer], [employer], [facility] Cancer Support Group, and the [facility] Breast Cancer Support Group.  I also enclosed a copy of her Right breast biopsy pathology report(12/26/2018).       At the end of the consultation, we reviewed Marixa's plan of care and education.  The plan is for patient to meet with her primary care provider- Dr. Kovacs on 1/4/2019 for a preop exam, and then undergo a wire localized rt. Breast lumpectomy and rt. [place] lymph node biopsy with Dr. Gema Diamond @ [facility] on 1/10/2019.       I answered her questions and encouraged patient to call me back with any future questions or concerns.  I also informed patient that I will give her a call a few days prior to her surgery to go over the wire localization procedure and to check in if she has any questions regarding her upcoming surgery.   Marixa has my contact information, and knows to contact me in the future with any questions or concerns.     Lyndsay Alvarado, RN, BSN  Breast Nurse Coordinator  [facility]  [phone]

## 2019-01-03 ENCOUNTER — PRE VISIT (OUTPATIENT)
Dept: ONCOLOGY | Facility: CLINIC | Age: 69
End: 2019-01-03

## 2019-01-04 ENCOUNTER — OFFICE VISIT (OUTPATIENT)
Dept: FAMILY MEDICINE | Facility: CLINIC | Age: 69
End: 2019-01-04
Payer: COMMERCIAL

## 2019-01-04 VITALS
HEART RATE: 96 BPM | BODY MASS INDEX: 32.47 KG/M2 | OXYGEN SATURATION: 97 % | TEMPERATURE: 98 F | HEIGHT: 61 IN | DIASTOLIC BLOOD PRESSURE: 77 MMHG | SYSTOLIC BLOOD PRESSURE: 119 MMHG | RESPIRATION RATE: 18 BRPM | WEIGHT: 172 LBS

## 2019-01-04 DIAGNOSIS — F41.9 ANXIETY DISORDER, UNSPECIFIED TYPE: ICD-10-CM

## 2019-01-04 DIAGNOSIS — C50.211 MALIGNANT NEOPLASM OF UPPER-INNER QUADRANT OF RIGHT BREAST IN FEMALE, ESTROGEN RECEPTOR POSITIVE (H): ICD-10-CM

## 2019-01-04 DIAGNOSIS — G93.32 CHRONIC FATIGUE DISORDER: ICD-10-CM

## 2019-01-04 DIAGNOSIS — F33.1 MODERATE RECURRENT MAJOR DEPRESSION (H): ICD-10-CM

## 2019-01-04 DIAGNOSIS — Z01.818 PREOP GENERAL PHYSICAL EXAM: Primary | ICD-10-CM

## 2019-01-04 DIAGNOSIS — F90.0 ATTENTION DEFICIT HYPERACTIVITY DISORDER (ADHD), PREDOMINANTLY INATTENTIVE TYPE: ICD-10-CM

## 2019-01-04 DIAGNOSIS — Z17.0 MALIGNANT NEOPLASM OF UPPER-INNER QUADRANT OF RIGHT BREAST IN FEMALE, ESTROGEN RECEPTOR POSITIVE (H): ICD-10-CM

## 2019-01-04 LAB
ANION GAP SERPL CALCULATED.3IONS-SCNC: 5 MMOL/L (ref 3–14)
BUN SERPL-MCNC: 11 MG/DL (ref 7–30)
CALCIUM SERPL-MCNC: 9.1 MG/DL (ref 8.5–10.1)
CHLORIDE SERPL-SCNC: 106 MMOL/L (ref 94–109)
CO2 SERPL-SCNC: 30 MMOL/L (ref 20–32)
CREAT SERPL-MCNC: 0.69 MG/DL (ref 0.52–1.04)
CRP SERPL-MCNC: 22.2 MG/L (ref 0–8)
ERYTHROCYTE [DISTWIDTH] IN BLOOD BY AUTOMATED COUNT: 14.6 % (ref 10–15)
ERYTHROCYTE [SEDIMENTATION RATE] IN BLOOD BY WESTERGREN METHOD: 40 MM/H (ref 0–30)
GFR SERPL CREATININE-BSD FRML MDRD: >90 ML/MIN/{1.73_M2}
GLUCOSE SERPL-MCNC: 91 MG/DL (ref 70–99)
HCT VFR BLD AUTO: 41 % (ref 35–47)
HGB BLD-MCNC: 12.8 G/DL (ref 11.7–15.7)
MAGNESIUM SERPL-MCNC: 2 MG/DL (ref 1.6–2.3)
MCH RBC QN AUTO: 28.8 PG (ref 26.5–33)
MCHC RBC AUTO-ENTMCNC: 31.2 G/DL (ref 31.5–36.5)
MCV RBC AUTO: 92 FL (ref 78–100)
PLATELET # BLD AUTO: 338 10E9/L (ref 150–450)
POTASSIUM SERPL-SCNC: 4 MMOL/L (ref 3.4–5.3)
RBC # BLD AUTO: 4.45 10E12/L (ref 3.8–5.2)
SODIUM SERPL-SCNC: 141 MMOL/L (ref 133–144)
WBC # BLD AUTO: 8.1 10E9/L (ref 4–11)

## 2019-01-04 PROCEDURE — 80048 BASIC METABOLIC PNL TOTAL CA: CPT | Performed by: FAMILY MEDICINE

## 2019-01-04 PROCEDURE — 99214 OFFICE O/P EST MOD 30 MIN: CPT | Performed by: FAMILY MEDICINE

## 2019-01-04 PROCEDURE — 85652 RBC SED RATE AUTOMATED: CPT | Performed by: FAMILY MEDICINE

## 2019-01-04 PROCEDURE — 36415 COLL VENOUS BLD VENIPUNCTURE: CPT | Performed by: FAMILY MEDICINE

## 2019-01-04 PROCEDURE — 86140 C-REACTIVE PROTEIN: CPT | Performed by: FAMILY MEDICINE

## 2019-01-04 PROCEDURE — 85027 COMPLETE CBC AUTOMATED: CPT | Performed by: FAMILY MEDICINE

## 2019-01-04 PROCEDURE — 83735 ASSAY OF MAGNESIUM: CPT | Performed by: FAMILY MEDICINE

## 2019-01-04 PROCEDURE — 93000 ELECTROCARDIOGRAM COMPLETE: CPT | Performed by: FAMILY MEDICINE

## 2019-01-04 ASSESSMENT — MIFFLIN-ST. JEOR: SCORE: 1257.57

## 2019-01-04 ASSESSMENT — PAIN SCALES - GENERAL: PAINLEVEL: NO PAIN (1)

## 2019-01-04 NOTE — PROGRESS NOTES
[address] [address]  [phone]  Dept: [phone]    PRE-OP EVALUATION:  Today's date: 2019    Marixa Ann (: 1950) presents for pre-operative evaluation assessment as requested by Dr. Gema Diamond.  She requires evaluation and anesthesia risk assessment prior to undergoing surgery/procedure for treatment of Right breast cancer .    Proposed Surgery/ Procedure: Wire Localized Right Breast Lumpectomy with Right SLNB  Date of Surgery/ Procedure: 1/10/19  Time of Surgery/ Procedure: 12:30pm  Hospital/Surgical Facility: [facility]  Fax number for surgical facility: NA  Primary Physician: Jackie Kovacs  Type of Anesthesia Anticipated: MAC    Patient has a Health Care Directive or Living Will:  NO    1. NO - Do you have a history of heart attack, stroke, stent, bypass or surgery on an artery in the head, neck, heart or legs?  2. NO - Do you ever have any pain or discomfort in your chest?  3. NO - Do you have a history of  Heart Failure?  4. NO - Are you troubled by shortness of breath when: walking on the level, up a slight hill or at night?  5. YES - DO YOU CURRENTLY HAVE A COLD, BRONCHITIS OR OTHER RESPIRATORY INFECTION? Recovering from a upper respiratory infection with some cough  6. YES - DO YOU HAVE A COUGH, SHORTNESS OF BREATH OR WHEEZING? Recent cold  7. NO - Do you sometimes get pains in the calves of your legs when you walk?  8. NO - Do you or anyone in your family have previous history of blood clots?  9. NO - Do you or does anyone in your family have a serious bleeding problem such as prolonged bleeding following surgeries or cuts?  10. NO - Have you ever had problems with anemia or been told to take iron pills?  11. NO - Have you had any abnormal blood loss such as black, tarry or bloody stools, or abnormal vaginal bleeding?  12. NO - Have you ever had a blood transfusion?  13. NO - Have you or  any of your relatives ever had problems with anesthesia?  14. YES - DO YOU HAVE SLEEP APNEA, EXCESSIVE SNORING OR DAYTIME DROWSINESS? Some chronic fatigue symptoms and insomnia.   15. NO - Do you have any prosthetic heart valves?  16. NO - Do you have prosthetic joints?  17. NO - Is there any chance that you may be pregnant?      HPI:     HPI related to upcoming procedure: newly diagnosed right breast cancer. She had a screening mammogram on 12/3/2018 which revealed an asymmetry on the right breast. She then had diagnostic imaging which revealed a 1.2cm hypoechoic mass at 2:00, 9cm FN with spiculated margins.  She then had a contrast enhanced mammogram which revealed a 1.9cm enhancing mass in the medial right breast. She had an axillary ultrasound which revealed benign nodes. She had a biopsy of the mass which revealed a grade 1, ER/MN positive, her 2 negative invasive ductal carcinoma. Surgery evaluation and recommendations for lumpectomy and radiation treatment agreed upon by patient.       DEPRESSION - Patient has a long history of Depression of moderate severity requiring medication for control with recent symptoms being stable..Current symptoms of depression include none.                                                                                                                                                                                    .    MEDICAL HISTORY:     Patient Active Problem List    Diagnosis Date Noted     Malignant neoplasm of upper-inner quadrant of right breast in female, estrogen receptor positive (H) 01/04/2019     Priority: Medium     Attention deficit hyperactivity disorder (ADHD), predominantly inattentive type 10/25/2018     Priority: Medium     Primary osteoarthritis of both knees 09/13/2018     Priority: Medium     Age-related cataract of both eyes, unspecified age-related cataract type 07/03/2018     Priority: Medium     Gastroesophageal reflux disease with esophagitis  05/31/2018     Priority: Medium     Hiatal hernia 05/31/2018     Priority: Medium     Cystic disease of liver 12/21/2017     Priority: Medium     Liver lesion 12/14/2017     Priority: Medium     Moderate recurrent major depression (H) 10/19/2017     Priority: Medium     Hyperlipidemia LDL goal <160 10/19/2017     Priority: Medium     Chronic fatigue disorder 10/19/2017     Priority: Medium     Pain in joint, shoulder region 06/24/2008     Priority: Medium      History reviewed. No pertinent past medical history.  Past Surgical History:   Procedure Laterality Date     APPENDECTOMY  1967     Current Outpatient Medications   Medication Sig Dispense Refill     acetaminophen (TYLENOL) 325 MG tablet Take 325-650 mg by mouth every 6 hours as needed for mild pain       amphetamine-dextroamphetamine (ADDERALL) 10 MG tablet Take 1 tablet (10 mg) by mouth 2 times daily 60 tablet 0     B Complex Vitamins (VITAMIN B-COMPLEX PO) Take by mouth daily       calcium carbonate (TUMS) 500 MG chewable tablet Take 1 chew tab by mouth 2 times daily       Cholecalciferol (VITAMIN D-3 PO)        cyclobenzaprine (FLEXERIL) 5 MG tablet Take 1 tablet (5 mg) by mouth daily as needed for muscle spasms 20 tablet 0     LORazepam (ATIVAN) 1 MG tablet Take 1 tablet (1 mg) by mouth every 6 hours as needed for anxiety Take one hour before procedure 4 tablet 0     Multiple Vitamins-Minerals (MULTIVITAMIN PO)        RANITIDINE HCL PO Take by mouth daily       OTC products: None, except as noted above    Allergies   Allergen Reactions     Dust Mites      Milk [Lac Bovis] Diarrhea      Latex Allergy: NO    Social History     Tobacco Use     Smoking status: Never Smoker     Smokeless tobacco: Never Used   Substance Use Topics     Alcohol use: Not on file     History   Drug Use No       REVIEW OF SYSTEMS:   Constitutional, neuro, ENT, endocrine, pulmonary, cardiac, gastrointestinal, genitourinary, musculoskeletal, integument and psychiatric systems are  "negative, except as otherwise noted.    EXAM:   /77 (BP Location: Right arm, Patient Position: Chair, Cuff Size: Adult Regular)   Pulse 96   Temp 98  F (36.7  C) (Oral)   Resp 18   Ht 1.549 m (5' 1\")   Wt 78 kg (172 lb)   LMP 05/31/2003 (Approximate)   SpO2 97%   BMI 32.50 kg/m      GENERAL APPEARANCE: healthy, alert and no distress     EYES: EOMI, PERRL     HENT: ear canals and TM's normal and nose and mouth without ulcers or lesions     NECK: no adenopathy, no asymmetry, masses, or scars and thyroid normal to palpation     RESP: lungs clear to auscultation - no rales, rhonchi or wheezes     CV: regular rates and rhythm, normal S1 S2, no S3 or S4 and no murmur, click or rub     ABDOMEN:  soft, nontender, no HSM or masses and bowel sounds normal     MS: extremities normal- no gross deformities noted, no evidence of inflammation in joints, FROM in all extremities.     SKIN: no suspicious lesions or rashes     NEURO: Normal strength and tone, sensory exam grossly normal, mentation intact and speech normal     PSYCH: mentation appears normal. and affect normal/bright     LYMPHATICS: No cervical adenopathy    DIAGNOSTICS:   EKG: appears normal, NSR, normal axis, normal intervals except short VT interval, no acute ST/T changes c/w ischemia, no LVH by voltage criteria, unchanged from previous tracings    Recent Labs   Lab Test 09/13/18  1141 10/19/17  1412   HGB 12.8 12.8    356   NA  --  137   POTASSIUM  --  3.9   CR  --  0.72        IMPRESSION:   Reason for surgery/procedure: Right breast cancer .    Proposed Surgery/ Procedure: Wire Localized Right Breast Lumpectomy with Right SLNB  Diagnosis/reason for consult: cardiac and anesthesia risk assessment     The proposed surgical procedure is considered LOW risk.    REVISED CARDIAC RISK INDEX  The patient has the following serious cardiovascular risks for perioperative complications such as (MI, PE, VFib and 3  AV Block):  No serious cardiac " risks  INTERPRETATION: 0 risks: Class I (very low risk - 0.4% complication rate)    The patient has the following additional risks for perioperative complications:  No identified additional risks      ICD-10-CM    1. Preop general physical exam Z01.818 EKG 12-lead complete w/read - Clinics- short MN interval. Approved for surgery and anesthesia   2. Malignant neoplasm of upper-inner quadrant of right breast in female, estrogen receptor positive (H) C50.211 Lumpectomy and lymph node biopsy followed by radiation    Z17.0    3. Moderate recurrent major depression (H) F33.1 stable   4. Chronic fatigue disorder R53.82 Basic metabolic panel     CBC with platelets     CRP inflammation     Erythrocyte sedimentation rate auto     Magnesium   5. Attention deficit hyperactivity disorder (ADHD), predominantly inattentive type F90.0 On Adderall    6. Anxiety disorder, unspecified type F41.9 Plans to take dose of Lorazepam 1 hour before procedure.        RECOMMENDATIONS:         --Patient is to take all scheduled medications on the day of surgery EXCEPT for modifications listed below.    APPROVAL GIVEN to proceed with proposed procedure, without further diagnostic evaluation       Signed Electronically by: Jackie Kovacs MD    Copy of this evaluation report is provided to requesting physician.    [place] Preop Guidelines    Revised Cardiac Risk Index

## 2019-01-04 NOTE — PATIENT INSTRUCTIONS
At [facility], we strive to deliver an exceptional experience to you, every time we see you.  If you receive a survey in the mail, please send us back your thoughts. We really do value your feedback.    Based on your medical history, these are the current health maintenance/preventive care services that you are due for (some may have been done at this visit.)  Health Maintenance Due   Topic Date Due     HEPATITIS C SCREENING  10/20/1970     DTAP/TDAP/TD IMMUNIZATION (1 - Tdap) 10/20/1977     COLON CANCER SCREEN (SYSTEM ASSIGNED)  10/20/2002     ZOSTER IMMUNIZATION (1 of 2) 10/20/2002     ADVANCE DIRECTIVE PLANNING Q5 YRS  10/20/2007     FALL RISK ASSESSMENT  12/08/2018         Suggested websites for health information:  Www.[employer] : Up to date and easily searchable information on multiple topics.  Www.[employer].gov : medication info, interactive tutorials, watch real surgeries online  Www.familydoctor.org : good info from the Academy of Family Physicians  Www.cdc.gov : public health info, travel advisories, epidemics (H1N1)  Www.aap.org : children's health info, normal development, vaccinations  Www.health[facility].us : [place] dept of health, public health issues in [place], N1N1    Your care team:                            Family Medicine Internal Medicine   MD Kyle Mancera MD Shantel Branch-Fleming, MD Katya Georgiev PA-C Nam Ho, MD Pediatrics   OCTAVIO Ruiz, MD Alisa Keen CNP, MD Deborah Mielke, MD Kim Thein, APRN CNP      Clinic hours: Monday - Thursday 7 am-7 pm; Fridays 7 am-5 pm.   Urgent care: Monday - Friday 11 am-9 pm; Saturday and Sunday 9 am-5 pm.  Pharmacy : Monday -Thursday 8 am-8 pm; Friday 8 am-6 pm; Saturday and Sunday 9 am-5 pm.     Clinic: [phone]   Pharmacy: [phone]    Before Your Surgery      Call your surgeon if there is any change in your health. This includes  signs of a cold or flu (such as a sore throat, runny nose, cough, rash or fever).    Do not smoke, drink alcohol or take over the counter medicine (unless your surgeon or primary care doctor tells you to) for the 24 hours before and after surgery.    If you take prescribed drugs: Follow your doctor s orders about which medicines to take and which to stop until after surgery.    Eating and drinking prior to surgery: follow the instructions from your surgeon    Take a shower or bath the night before surgery. Use the soap your surgeon gave you to gently clean your skin. If you do not have soap from your surgeon, use your regular soap. Do not shave or scrub the surgery site.  Wear clean pajamas and have clean sheets on your bed.

## 2019-01-05 NOTE — RESULT ENCOUNTER NOTE
Dear Marixa    Your test results are attached. I am happy to let you know that they are stable.    The blood sugar is normal and you do not have diabetes. The kidneys are healthy. The tests for inflammation are getting better. Magnesium is in normal range.     Please contact me by MyChart if you have any questions about your labs or management.    Jackie Kovacs MD

## 2019-01-08 ENCOUNTER — TELEPHONE (OUTPATIENT)
Dept: MAMMOGRAPHY | Facility: CLINIC | Age: 69
End: 2019-01-08

## 2019-01-08 NOTE — TELEPHONE ENCOUNTER
Breast Nurse Care Coordination follow up call placed to patient prior to her upcoming breast surgery.  Patient was recently diagnosed with Rt. Breast Cancer and is scheduled for a Right Breast Mastectomy with Rt. SLNBX with Dr. Gema Diamond on 1/10/2019 @ [facility].     I informed patient that she will be getting a call in the next day or two from someone in the surgery department who will go over all the details on when to stop eating, drinking and all the details on checking in for surgery.      We briefly discussed the wire localization procedure, and I informed patient to bring her supportive sports bra, and loose comfortable clothing the day of surgery.      I answered patients questions and informed Marixa to call me back with other questions or concerns.  Patient verbalized understanding.  Lyndsay Alvarado, RN, BSN

## 2019-01-09 ENCOUNTER — ANESTHESIA EVENT (OUTPATIENT)
Dept: SURGERY | Facility: AMBULATORY SURGERY CENTER | Age: 69
End: 2019-01-09

## 2019-01-10 ENCOUNTER — ANESTHESIA (OUTPATIENT)
Dept: SURGERY | Facility: AMBULATORY SURGERY CENTER | Age: 69
End: 2019-01-10

## 2019-01-10 ENCOUNTER — OFFICE VISIT (OUTPATIENT)
Dept: SURGERY | Facility: PHYSICIAN GROUP | Age: 69
End: 2019-01-10
Payer: COMMERCIAL

## 2019-01-10 ENCOUNTER — ANCILLARY PROCEDURE (OUTPATIENT)
Dept: ULTRASOUND IMAGING | Facility: CLINIC | Age: 69
End: 2019-01-10
Payer: COMMERCIAL

## 2019-01-10 ENCOUNTER — ANCILLARY PROCEDURE (OUTPATIENT)
Dept: MAMMOGRAPHY | Facility: CLINIC | Age: 69
End: 2019-01-10
Payer: COMMERCIAL

## 2019-01-10 ENCOUNTER — ANCILLARY PROCEDURE (OUTPATIENT)
Dept: NUCLEAR MEDICINE | Facility: CLINIC | Age: 69
End: 2019-01-10
Payer: COMMERCIAL

## 2019-01-10 ENCOUNTER — HOSPITAL ENCOUNTER (OUTPATIENT)
Facility: AMBULATORY SURGERY CENTER | Age: 69
Discharge: HOME | End: 2019-01-10
Attending: SURGERY | Admitting: SURGERY
Payer: COMMERCIAL

## 2019-01-10 VITALS
HEART RATE: 74 BPM | RESPIRATION RATE: 16 BRPM | DIASTOLIC BLOOD PRESSURE: 56 MMHG | SYSTOLIC BLOOD PRESSURE: 98 MMHG | TEMPERATURE: 97.5 F | OXYGEN SATURATION: 93 %

## 2019-01-10 DIAGNOSIS — G89.18 POST-OP PAIN: Primary | ICD-10-CM

## 2019-01-10 DIAGNOSIS — C50.911 MALIGNANT NEOPLASM OF RIGHT FEMALE BREAST, UNSPECIFIED ESTROGEN RECEPTOR STATUS, UNSPECIFIED SITE OF BREAST (H): ICD-10-CM

## 2019-01-10 PROCEDURE — G8916 PT W IV AB GIVEN ON TIME: HCPCS

## 2019-01-10 PROCEDURE — 38792 RA TRACER ID OF SENTINL NODE: CPT | Performed by: RADIOLOGY

## 2019-01-10 PROCEDURE — 19285 PERQ DEV BREAST 1ST US IMAG: CPT | Mod: RT | Performed by: STUDENT IN AN ORGANIZED HEALTH CARE EDUCATION/TRAINING PROGRAM

## 2019-01-10 PROCEDURE — G8907 PT DOC NO EVENTS ON DISCHARG: HCPCS

## 2019-01-10 PROCEDURE — 38525 BIOPSY/REMOVAL LYMPH NODES: CPT | Mod: 51 | Performed by: SURGERY

## 2019-01-10 PROCEDURE — A9520 TC99 TILMANOCEPT DIAG 0.5MCI: HCPCS | Performed by: SURGERY

## 2019-01-10 PROCEDURE — 19301 PARTIAL MASTECTOMY: CPT | Mod: RT | Performed by: SURGERY

## 2019-01-10 PROCEDURE — 76098 X-RAY EXAM SURGICAL SPECIMEN: CPT | Mod: RT | Performed by: STUDENT IN AN ORGANIZED HEALTH CARE EDUCATION/TRAINING PROGRAM

## 2019-01-10 PROCEDURE — 88307 TISSUE EXAM BY PATHOLOGIST: CPT | Performed by: SURGERY

## 2019-01-10 PROCEDURE — 38500 BIOPSY/REMOVAL LYMPH NODES: CPT | Mod: RT

## 2019-01-10 PROCEDURE — 19301 PARTIAL MASTECTOMY: CPT | Mod: RT

## 2019-01-10 RX ORDER — DEXAMETHASONE SODIUM PHOSPHATE 4 MG/ML
INJECTION, SOLUTION INTRA-ARTICULAR; INTRALESIONAL; INTRAMUSCULAR; INTRAVENOUS; SOFT TISSUE PRN
Status: DISCONTINUED | OUTPATIENT
Start: 2019-01-10 | End: 2019-01-10

## 2019-01-10 RX ORDER — PROPOFOL 10 MG/ML
INJECTION, EMULSION INTRAVENOUS CONTINUOUS PRN
Status: DISCONTINUED | OUTPATIENT
Start: 2019-01-10 | End: 2019-01-10

## 2019-01-10 RX ORDER — HYDROCODONE BITARTRATE AND ACETAMINOPHEN 5; 325 MG/1; MG/1
1-2 TABLET ORAL EVERY 4 HOURS PRN
Qty: 15 TABLET | Refills: 0 | Status: SHIPPED | OUTPATIENT
Start: 2019-01-10 | End: 2019-02-08

## 2019-01-10 RX ORDER — CEFAZOLIN SODIUM 2 G/100ML
2 INJECTION, SOLUTION INTRAVENOUS
Status: COMPLETED | OUTPATIENT
Start: 2019-01-10 | End: 2019-01-10

## 2019-01-10 RX ORDER — ONDANSETRON 2 MG/ML
4 INJECTION INTRAMUSCULAR; INTRAVENOUS EVERY 30 MIN PRN
Status: DISCONTINUED | OUTPATIENT
Start: 2019-01-10 | End: 2019-01-11 | Stop reason: HOSPADM

## 2019-01-10 RX ORDER — ALBUTEROL SULFATE 0.83 MG/ML
2.5 SOLUTION RESPIRATORY (INHALATION) EVERY 4 HOURS PRN
Status: DISCONTINUED | OUTPATIENT
Start: 2019-01-10 | End: 2019-01-11 | Stop reason: HOSPADM

## 2019-01-10 RX ORDER — PROPOFOL 10 MG/ML
INJECTION, EMULSION INTRAVENOUS PRN
Status: DISCONTINUED | OUTPATIENT
Start: 2019-01-10 | End: 2019-01-10

## 2019-01-10 RX ORDER — CEFAZOLIN SODIUM 1 G/3ML
1 INJECTION, POWDER, FOR SOLUTION INTRAMUSCULAR; INTRAVENOUS SEE ADMIN INSTRUCTIONS
Status: DISCONTINUED | OUTPATIENT
Start: 2019-01-10 | End: 2019-01-11 | Stop reason: HOSPADM

## 2019-01-10 RX ORDER — NALOXONE HYDROCHLORIDE 0.4 MG/ML
.1-.4 INJECTION, SOLUTION INTRAMUSCULAR; INTRAVENOUS; SUBCUTANEOUS
Status: DISCONTINUED | OUTPATIENT
Start: 2019-01-10 | End: 2019-01-11 | Stop reason: HOSPADM

## 2019-01-10 RX ORDER — FENTANYL CITRATE 50 UG/ML
INJECTION, SOLUTION INTRAMUSCULAR; INTRAVENOUS PRN
Status: DISCONTINUED | OUTPATIENT
Start: 2019-01-10 | End: 2019-01-10

## 2019-01-10 RX ORDER — MEPERIDINE HYDROCHLORIDE 25 MG/ML
12.5 INJECTION INTRAMUSCULAR; INTRAVENOUS; SUBCUTANEOUS
Status: DISCONTINUED | OUTPATIENT
Start: 2019-01-10 | End: 2019-01-11 | Stop reason: HOSPADM

## 2019-01-10 RX ORDER — GABAPENTIN 300 MG/1
300 CAPSULE ORAL ONCE
Status: COMPLETED | OUTPATIENT
Start: 2019-01-10 | End: 2019-01-10

## 2019-01-10 RX ORDER — LIDOCAINE HYDROCHLORIDE 20 MG/ML
INJECTION, SOLUTION INFILTRATION; PERINEURAL PRN
Status: DISCONTINUED | OUTPATIENT
Start: 2019-01-10 | End: 2019-01-10

## 2019-01-10 RX ORDER — ONDANSETRON 2 MG/ML
INJECTION INTRAMUSCULAR; INTRAVENOUS PRN
Status: DISCONTINUED | OUTPATIENT
Start: 2019-01-10 | End: 2019-01-10

## 2019-01-10 RX ORDER — HYDROCODONE BITARTRATE AND ACETAMINOPHEN 5; 325 MG/1; MG/1
1 TABLET ORAL
Status: DISCONTINUED | OUTPATIENT
Start: 2019-01-10 | End: 2019-01-11 | Stop reason: HOSPADM

## 2019-01-10 RX ORDER — FENTANYL CITRATE 50 UG/ML
25-50 INJECTION, SOLUTION INTRAMUSCULAR; INTRAVENOUS
Status: DISCONTINUED | OUTPATIENT
Start: 2019-01-10 | End: 2019-01-11 | Stop reason: HOSPADM

## 2019-01-10 RX ORDER — LIDOCAINE 40 MG/G
CREAM TOPICAL
Status: DISCONTINUED | OUTPATIENT
Start: 2019-01-10 | End: 2019-01-11 | Stop reason: HOSPADM

## 2019-01-10 RX ORDER — SODIUM CHLORIDE, SODIUM LACTATE, POTASSIUM CHLORIDE, CALCIUM CHLORIDE 600; 310; 30; 20 MG/100ML; MG/100ML; MG/100ML; MG/100ML
INJECTION, SOLUTION INTRAVENOUS CONTINUOUS
Status: DISCONTINUED | OUTPATIENT
Start: 2019-01-10 | End: 2019-01-11 | Stop reason: HOSPADM

## 2019-01-10 RX ORDER — BUPIVACAINE HYDROCHLORIDE 2.5 MG/ML
INJECTION, SOLUTION INFILTRATION; PERINEURAL PRN
Status: DISCONTINUED | OUTPATIENT
Start: 2019-01-10 | End: 2019-01-10 | Stop reason: HOSPADM

## 2019-01-10 RX ORDER — HYDROMORPHONE HYDROCHLORIDE 1 MG/ML
.3-.5 INJECTION, SOLUTION INTRAMUSCULAR; INTRAVENOUS; SUBCUTANEOUS EVERY 10 MIN PRN
Status: DISCONTINUED | OUTPATIENT
Start: 2019-01-10 | End: 2019-01-11 | Stop reason: HOSPADM

## 2019-01-10 RX ORDER — OXYCODONE HYDROCHLORIDE 5 MG/1
5-10 TABLET ORAL EVERY 4 HOURS PRN
Status: DISCONTINUED | OUTPATIENT
Start: 2019-01-10 | End: 2019-01-11 | Stop reason: HOSPADM

## 2019-01-10 RX ORDER — ISOSULFAN BLUE 50 MG/5ML
INJECTION, SOLUTION SUBCUTANEOUS PRN
Status: DISCONTINUED | OUTPATIENT
Start: 2019-01-10 | End: 2019-01-10 | Stop reason: HOSPADM

## 2019-01-10 RX ORDER — ONDANSETRON 4 MG/1
4 TABLET, ORALLY DISINTEGRATING ORAL EVERY 30 MIN PRN
Status: DISCONTINUED | OUTPATIENT
Start: 2019-01-10 | End: 2019-01-11 | Stop reason: HOSPADM

## 2019-01-10 RX ORDER — ACETAMINOPHEN 325 MG/1
975 TABLET ORAL ONCE
Status: COMPLETED | OUTPATIENT
Start: 2019-01-10 | End: 2019-01-10

## 2019-01-10 RX ORDER — DEXAMETHASONE SODIUM PHOSPHATE 4 MG/ML
4 INJECTION, SOLUTION INTRA-ARTICULAR; INTRALESIONAL; INTRAMUSCULAR; INTRAVENOUS; SOFT TISSUE EVERY 10 MIN PRN
Status: DISCONTINUED | OUTPATIENT
Start: 2019-01-10 | End: 2019-01-11 | Stop reason: HOSPADM

## 2019-01-10 RX ADMIN — Medication 1 MEQ: at 10:30

## 2019-01-10 RX ADMIN — ONDANSETRON 4 MG: 2 INJECTION INTRAMUSCULAR; INTRAVENOUS at 13:03

## 2019-01-10 RX ADMIN — GABAPENTIN 300 MG: 300 CAPSULE ORAL at 11:33

## 2019-01-10 RX ADMIN — ONDANSETRON 4 MG: 4 TABLET, ORALLY DISINTEGRATING ORAL at 15:16

## 2019-01-10 RX ADMIN — Medication 100 MCG: at 13:17

## 2019-01-10 RX ADMIN — Medication 150 MCG: at 12:43

## 2019-01-10 RX ADMIN — FENTANYL CITRATE 50 MCG: 50 INJECTION, SOLUTION INTRAMUSCULAR; INTRAVENOUS at 12:21

## 2019-01-10 RX ADMIN — SODIUM CHLORIDE, SODIUM LACTATE, POTASSIUM CHLORIDE, CALCIUM CHLORIDE: 600; 310; 30; 20 INJECTION, SOLUTION INTRAVENOUS at 12:09

## 2019-01-10 RX ADMIN — OXYCODONE HYDROCHLORIDE 5 MG: 5 TABLET ORAL at 14:17

## 2019-01-10 RX ADMIN — DEXAMETHASONE SODIUM PHOSPHATE 4 MG: 4 INJECTION, SOLUTION INTRA-ARTICULAR; INTRALESIONAL; INTRAMUSCULAR; INTRAVENOUS; SOFT TISSUE at 12:28

## 2019-01-10 RX ADMIN — PROPOFOL 200 MG: 10 INJECTION, EMULSION INTRAVENOUS at 12:21

## 2019-01-10 RX ADMIN — CEFAZOLIN SODIUM 2 G: 2 INJECTION, SOLUTION INTRAVENOUS at 12:18

## 2019-01-10 RX ADMIN — LIDOCAINE HYDROCHLORIDE 3 ML: 20 INJECTION, SOLUTION INFILTRATION; PERINEURAL at 12:21

## 2019-01-10 RX ADMIN — Medication 100 MCG: at 12:29

## 2019-01-10 RX ADMIN — Medication 100 MCG: at 12:38

## 2019-01-10 RX ADMIN — Medication 100 MCG: at 13:11

## 2019-01-10 RX ADMIN — Medication 150 MCG: at 13:04

## 2019-01-10 RX ADMIN — ACETAMINOPHEN 975 MG: 325 TABLET ORAL at 11:32

## 2019-01-10 RX ADMIN — Medication 150 MCG: at 12:56

## 2019-01-10 RX ADMIN — Medication 150 MCG: at 13:26

## 2019-01-10 RX ADMIN — PROPOFOL 150 MCG/KG/MIN: 10 INJECTION, EMULSION INTRAVENOUS at 12:24

## 2019-01-10 NOTE — ANESTHESIA PREPROCEDURE EVALUATION
Anesthesia Pre-Procedure Evaluation    Patient: Marixa Ann   MRN:     [id number] Gender:   female   Age:    68 year old :      1950        Preoperative Diagnosis: RIGHT BREAST CANCER   Procedure(s):  WIRE LOCALIZED RIGHT BREAST LUMPECTOMY WITH RIGHT SLNB     History reviewed. No pertinent past medical history.   Past Surgical History:   Procedure Laterality Date     APPENDECTOMY  1967          Anesthesia Evaluation     .             ROS/MED HX    ENT/Pulmonary:  - neg pulmonary ROS     Neurologic:  - neg neurologic ROS     Cardiovascular:  - neg cardiovascular ROS   (+) Dyslipidemia, ----. : . . . :. .       METS/Exercise Tolerance:     Hematologic:  - neg hematologic  ROS       Musculoskeletal:  - neg musculoskeletal ROS (+) arthritis, , , -       GI/Hepatic:  - neg GI/hepatic ROS   (+) GERD hiatal hernia,       Renal/Genitourinary:  - ROS Renal section negative       Endo:  - neg endo ROS       Psychiatric:  - neg psychiatric ROS   (+) psychiatric history depression      Infectious Disease:  - neg infectious disease ROS       Malignancy:      - no malignancy   Other:    - neg other ROS                     PHYSICAL EXAM:   Mental Status/Neuro: A/A/O   Airway: Facies: Feasible  Mallampati: I  Mouth/Opening: Full  TM distance: > 6 cm  Neck ROM: Full   Respiratory: Auscultation: CTAB     Resp. Rate: Normal     Resp. Effort: Normal      CV: Rhythm: Regular  Rate: Age appropriate  Heart: Normal Sounds   Comments:      Dental: Normal                  Lab Results   Component Value Date    WBC 8.1 2019    HGB 12.8 2019    HCT 41.0 2019     2019    CRP 22.2 (H) 2019    SED 40 (H) 2019     2019    POTASSIUM 4.0 2019    CHLORIDE 106 2019    CO2 30 2019    BUN 11 2019    CR 0.69 2019    GLC 91 2019    JONATHON 9.1 2019    MAG 2.0 2019    ALBUMIN 3.1 (L) 10/19/2017    PROTTOTAL 7.2 10/19/2017    ALT 26 10/19/2017     "AST 12 10/19/2017    ALKPHOS 102 10/19/2017    BILITOTAL 0.5 10/19/2017    TSH 1.35 10/19/2017       Preop Vitals  BP Readings from Last 3 Encounters:   01/10/19 96/55   01/04/19 119/77   12/31/18 140/70    Pulse Readings from Last 3 Encounters:   01/04/19 96   12/31/18 60   12/21/18 100      Resp Readings from Last 3 Encounters:   01/10/19 16   01/04/19 18   12/21/18 20    SpO2 Readings from Last 3 Encounters:   01/10/19 98%   01/04/19 97%   12/21/18 98%      Temp Readings from Last 1 Encounters:   01/10/19 36.7  C (98.1  F) (Temporal)    Ht Readings from Last 1 Encounters:   01/04/19 1.549 m (5' 1\")      Wt Readings from Last 1 Encounters:   01/04/19 78 kg (172 lb)    Estimated body mass index is 32.5 kg/m  as calculated from the following:    Height as of 1/4/19: 1.549 m (5' 1\").    Weight as of 1/4/19: 78 kg (172 lb).     LDA:  Peripheral IV 12/26/18 Left (Active)   Number of days: 15       Peripheral IV 01/10/19 Left Lower forearm (Active)   Site Assessment WDL 1/10/2019 11:44 AM   Line Status Saline locked 1/10/2019 11:44 AM   Phlebitis Scale 0-->no symptoms 1/10/2019 11:44 AM   Dressing Intervention New dressing  1/10/2019 11:44 AM   Number of days: 0            Assessment:   ASA SCORE: 2    NPO Status: > 6 hours since completed Solid Foods   Documentation: H&P complete; Preop Testing complete; Consents complete   Proceeding: Proceed without further delay  Tobacco Use:  Active user of Tobacco     Plan:   Anes. Type:  General   Pre-Induction: Midazolam IV; Acetaminophen PO   Induction:  IV (Standard)   Airway: Oral ETT   Access/Monitoring: PIV   Maintenance: Balanced   Emergence: Procedure Site   Logistics: Same Day Surgery     Postop Pain/Sedation Strategy:  Standard-Options: Opioids PRN     PONV Management:  Adult Risk Factors: Female, Postop Opioids  Prevention: Ondansetron; Dexamethasone     CONSENT: Direct conversation   Plan and risks discussed with: Patient   Blood Products: Consent Deferred (Minimal " Blood Loss)                         Andrzej Hernandez MD

## 2019-01-10 NOTE — ANESTHESIA CARE TRANSFER NOTE
Patient: Marixa Ann    Procedure(s):  WIRE LOCALIZED RIGHT BREAST LUMPECTOMY WITH RIGHT SLNB    Diagnosis: RIGHT BREAST CANCER  Diagnosis Additional Information: No value filed.    Anesthesia Type:   General, ETT     Note:  Airway :Nasal Cannula  Patient transferred to:PACU  Comments: Prior to extubation, oropharynx gently suctioned, awake extubation, good aeration, SpO2 98%, equal bilateral breath sounds.  Transported to PACU on room air.  Patient awake, alert, SpO2 94% on O2 3lpm/NC in PACU.  No apparent anesthesia complications.          Vitals: (Last set prior to Anesthesia Care Transfer)    CRNA VITALS  1/10/2019 1320 - 1/10/2019 1400      1/10/2019             EKG:  NSR                Electronically Signed By: MARRY Jane CRNA  January 10, 2019  2:00 PM

## 2019-01-10 NOTE — DISCHARGE INSTRUCTIONS
[facility] - [facility]  Discharge Instructions: Post-Operative Breast Surgery    ACTIVITY    Take frequent short walks and increase your activity gradually.      Avoid strenuous physical activity or heavy lifting greater than 15-20 lbs. for 1-2 weeks with arm on the surgery side.  You may climb stairs.    Gentle rotation and stretching of your arms and shoulders will prevent joint stiffness.    You may drive without restrictions when you are not using any prescription pain medication and feel comfortable in a car.    You may return to work/school when you are comfortable without any prescription pain medication.    WOUND CARE    You may remove your outer dressing and shower 48 hours after the surgery.  Pat your incisions dry and leave them open to air.  Re-apply dressing (Band-Aids or gauze/tape) as needed for drainage.    You have steri-strips (looks like white tape) on your incisions.  You may peel off the steri-strips 2 weeks after your surgery if they have not peeled off on their own.      Do not soak your incisions in a tub or pool for 2 weeks.     Do not apply any lotions, creams, or ointments to your incisions.    A ridge under your incisions is normal and will gradually resolve.    Wear a supportive bra for 1-2 weeks, day and night.    DIET    Start with liquids, then gradually resume your regular diet as tolerated.     Drink plenty of liquids to stay hydrated.    PAIN    Expect some tenderness and discomfort at the incision site(s).  Use the prescribed pain medication at your discretion.  Expect gradual resolution of your pain over several days.    You may take ibuprofen with food (unless you have been told not to) instead of or in addition to your prescribed pain medication.  If you are taking Norco or Percocet, do not take any additional acetaminophen/APAP/Tylenol.    Do not drink alcohol or drive while you are taking pain medications.    You may apply ice to your incisions in  20 minute intervals as needed for the next 48 hours.      EXPECTATIONS    Pain medications can cause constipation.  Limit use when possible.  Take over the counter stool softener/stimulant, such as Colace or Senna, 1-2 times a day with plenty of water.  You may take a mild over the counter laxative, such as Miralax or a suppository, as needed.      You may discontinue these medications once you are having regular bowel movements and/or are no longer taking your narcotic pain medication.    Blue dye may have been used during your surgery to locate lymph nodes and can cause your urine to be blue/green for several days after surgery.  This is not a cause for concern and will resolve on its own.     RETURN APPOINTMENT    Follow up with your surgeon, Dr. Diamond, in 2 weeks.  Please call the office at [phone] to schedule your appointment.      CALL OUR OFFICE -[phone] IF YOU HAVE:     Chills or fever above 101 F.    Increased redness, warmth, or drainage at your incisions.    Significant bleeding.    Pain not relieved by your pain medication or rest.    Increasing pain after the first 48 hours.    Any other concerns or questions.    Revised October 2018      [facility]  Same-Day Surgery   Adult Discharge Orders & Instructions   For 24 hours after surgery  1. Get plenty of rest.  A responsible adult must stay with you for at least 24 hours after you leave the hospital.   2. Do not drive or use heavy equipment.  If you have weakness or tingling, don't drive or use heavy equipment until this feeling goes away.  3. Do not drink alcohol.  4. Avoid strenuous or risky activities.  Ask for help when climbing stairs.   5. You may feel lightheaded.  IF so, sit for a few minutes before standing.  Have someone help you get up.   6. If you have nausea (feel sick to your stomach): Drink only clear liquids such as apple juice, ginger ale, broth or 7-Up.  Rest may also help.  Be sure to drink  enough fluids.  Move to a regular diet as you feel able.  7. You may have a slight fever. Call the doctor if your fever is over 100 F (37.7 C) (taken under the tongue) or lasts longer than 24 hours.  8. You may have a dry mouth, a sore throat, muscle aches or trouble sleeping.  These should go away after 24 hours.  9. Do not make important or legal decisions.   Call your doctor for any of the followin.  Signs of infection (fever, growing tenderness at the surgery site, a large amount of drainage or bleeding, severe pain, foul-smelling drainage, redness, swelling).  2. It has been over 8 to 10 hours since surgery and you are still not able to urinate (pass water).  3.  Headache for over 24 hours.

## 2019-01-10 NOTE — OP NOTE
[facility] Operative Note      Pre-operative diagnosis: Right breast invasive ductal carcinoma   Post-operative diagnosis: Right breast invasive ductal carcinoma     Procedure: 1.  RIGHT WIRE LOCALIZED PARTIAL MASTECTOMY  2.  RIGHT SENTINEL LYMPH NODE BIOPSY  3.  INTRAOPERATIVE INJECTION OF METHYLENE BLUE TRACER     Surgeon: Gema Diamond MD   Assistant(s):  Krish Mcfarland PA-C       Anesthesia: General    Estimated blood loss:   15 cc     Specimens: ID Type Source Tests Collected by Time Destination   A : Right wire localized breast lumpectomy (painted per protocol) Tissue Breast, Right SURGICAL PATHOLOGY EXAM Gema Diamond MD 1/10/2019 12:52 PM    B : Right axillary sentinel lymph node #1 Tissue Lymph Node, [place] SURGICAL PATHOLOGY EXAM Gema Diamond MD 1/10/2019  1:06 PM    C : Right axillary sentinel lymph node #2 Tissue Lymph Node, [place] SURGICAL PATHOLOGY EXAM Gema Diamond MD 1/10/2019  1:15 PM    D : Right axillary sentinel lymph node #3 Tissue Lymph Node, [place] SURGICAL PATHOLOGY EXAM Gema Diamond MD 1/10/2019  1:18 PM         INDICATION:  Marixa is a 68yof with [facility] s/f anxiety who presented after a screening mammogram on 12/3/2018 revealed an asymmetry on the right breast. She then had diagnostic imaging which revealed a 1.2cm hypoechoic mass at 2:00, 9cm FN with spiculated margins.  She then had a contrast enhanced mammogram which revealed a 1.9cm enhancing mass in the medial right breast. She had an axillary ultrasound which revealed benign nodes. She had a biopsy of the mass which revealed a grade 1, ER/CA positive, her 2 negative invasive ductal carcinoma. She elected to proceed with wire localized right breast partial mastectomy with SLNB.     DESCRIPTION OF PROCEDURE: The patient was placed on the table in supine position. General anesthetic  was induced. Perioperative antibiotics were given. 2ml of lymphazurin blue dye was injected in subareolar position  within the deep dermal lymphatics. The right breast and axilla were prepped and draped in standard sterile fashion.   We used the wire placed in the [facility] as well as the post-wire mammograms to localize the area of interest. We made a transverse incision centered at the 2 o'clock position on the right breast on the upper inner aspect of her breast. We carried the incision down using electrocautery into the breast tissue and excised the area of interest, including the wire.  The mass was removed in its entirety with some surrounding benign appearing breast tissue. The posterior margin included pectoral fascia and only muscle remained. Once the mass was removed, it was oriented with Vizcaino dyes and sent to radiology who confirmed both the wire location and biopsy clip in the specimen. The specimen was bisected prior to being placed in formalin and sent to pathology.  The wound was then examined for bleeding and hemostasis was achieved using electrocautery.   Clips were placed at the 12, 3, 6, and 9 o'clock positions of the lumpectomy cavity as well as posterior and anterior.  The skin was closed with a deep dermal 3-0 vicryl and running 4-0 Monocryl subcuticular suture and steri strips.    We than began the sentinel lymph node biopsy. The patient had been injected with a radionuclear pharmaceutical preoperatively.  I injected 2ml of lymphazurin blue in the deep dermal layer behind the areola. We used the Neoprobe to localize an area of increased activity in the axilla. We made an incision in the skin overlying that area of activity. The incision was carried down into the subcutaneous tissue and into the axillary space with the Bovie electrocautery. We then localized an area of increased activity, and isolated a lymph node from surrounding tissues. The lymph node had a count of 68.  This was passed off the field as right axillary sentinel lymph node #1. There were two more small nodes excised, the highest  count was 706. These were also sent to pathology. Her nodes were very deep in her axilla along the chest wall.  The remainder of the axilla had no foci of increased radioactivity. There were no clinically positive nodes upon thorough evaluation by palpation of the axilla.   Hemostasis was assured.  I closed the clavipectoral fascia with interrupted 3-0 vicryl sutures. We then closed the incision with interrupted subcutaneous 3-0 Vicryl sutures, a running 4-0 Monocryl subcuticular suture and Steri strips. The nodes were sent to pathology for routine evaluation. The patient tolerated the procedure well.  Sponge and instrument counts were correct    Gema Diamond MD  [facility]  [phone]

## 2019-01-11 NOTE — ANESTHESIA POSTPROCEDURE EVALUATION
Anesthesia POST Procedure Evaluation    Patient: Marixa Ann   MRN:     [id number] Gender:   female   Age:    68 year old :      1950        Preoperative Diagnosis: RIGHT BREAST CANCER   Procedure(s):  WIRE LOCALIZED RIGHT BREAST LUMPECTOMY WITH RIGHT SLNB   Postop Comments: No value filed.       Anesthesia Type:  General    Reportable Event: NO     PAIN: Uncomplicated   Sign Out status: Comfortable, Well controlled pain     PONV: No PONV   Sign Out status:  No Nausea or Vomiting     Neuro/Psych: Uneventful perioperative course   Sign Out Status: Preoperative baseline; Age appropriate mentation     Airway/Resp.: Uneventful perioperative course   Sign Out Status: Non labored breathing, age appropriate RR; Resp. Status within EXPECTED Parameters     CV: Uneventful perioperative course   Sign Out status: Appropriate BP and perfusion indices; Appropriate HR/Rhythm     Disposition:   Sign Out in:  PACU  Disposition:  Phase II; Home  Recovery Course: Uneventful  Follow-Up: Not required           Last Anesthesia Record Vitals:  CRNA VITALS  1/10/2019 1320 - 1/10/2019 1420      1/10/2019             EKG:  NSR          Last PACU/Preop Vitals:  Vitals:    01/10/19 1430 01/10/19 1445 01/10/19 1500   BP:  98/51 98/56   Pulse:  70 74   Resp: 16 18 16   Temp:      SpO2:  95% 93%         Electronically Signed By: Andrzej Hernandez MD, 2019, 2:51 PM

## 2019-01-14 ENCOUNTER — TELEPHONE (OUTPATIENT)
Dept: MAMMOGRAPHY | Facility: CLINIC | Age: 69
End: 2019-01-14

## 2019-01-14 ENCOUNTER — TELEPHONE (OUTPATIENT)
Dept: FAMILY MEDICINE | Facility: CLINIC | Age: 69
End: 2019-01-14

## 2019-01-14 DIAGNOSIS — E04.1 THYROID NODULE: Primary | ICD-10-CM

## 2019-01-14 PROBLEM — F41.9 ANXIETY DISORDER, UNSPECIFIED TYPE: Status: ACTIVE | Noted: 2019-01-14

## 2019-01-14 NOTE — TELEPHONE ENCOUNTER
Called and spoke with patient. Informed of results and recommendation per Dr. Kovacs regarding thyroid nodule. Patient is asking if this was same nodule as was found back in 2015. She had an FNA done of nodules on thyroid at this time through [facility]. Records in Care Everywhere. Patient states she was told nodules were benign and not to worry. Patient wondering if this is the same nodule and if US is necessary. Would like provider to review these previous results, if has not already and confirm if still would like patient to proceed with US. Call patient back with outcome.    Padma Ragsdale RN  [facility] Triage

## 2019-01-14 NOTE — TELEPHONE ENCOUNTER
Thyroid nodule seen on CT scan in emergency department. Follow up thyroid ultrasound recommended. I put in a referral for an ultrasound. There is no rush to have this done and the order will be good for the rest of the year. Recommend having it done some time in the next 1-2 months.  Jackie Kovacs MD     Please schedule your DEXA scan or ultrasound by calling [facility] at [phone].

## 2019-01-14 NOTE — TELEPHONE ENCOUNTER
Breast Nurse Care Coordination post op call placed to patient today.    Marixa is s/p Wire Localized Right Breast Lumpectomy, Right sentinel lymph node biopsy, on 01/10/2019.  Pathology results are still pending. Informed Dr. Gema Diamond will be in touch with patient once pathology has been finalized.    Patient reports good pain control with tylenol 2-3 times per day. She has removed her dressing and the incision is clean, dry, pink and intact. Steri strips are in place.   Patient denies fevers.     Patient is tolerating fluids, eating well balanced meals, and urinating with no difficulty. She is gradually increasing her activity.    I informed patient to call me with any questions or concerns.    Patient verbalized understanding and agrees with the plan of care.    Lyndsay Alvarado RN BSN  Breast Nurse Coordinator  [facility]  [phone]

## 2019-01-16 ENCOUNTER — TELEPHONE (OUTPATIENT)
Dept: SURGERY | Facility: PHYSICIAN GROUP | Age: 69
End: 2019-01-16

## 2019-01-16 LAB — COPATH REPORT: NORMAL

## 2019-01-16 NOTE — TELEPHONE ENCOUNTER
I reviewed these results. They were benign, but we should still do a follow up thyroid us to make sure there has not been any increased size in the nodules or new nodules. It is hard to tell from the CT report.   Jackie Kovacs MD

## 2019-01-16 NOTE — TELEPHONE ENCOUNTER
Called and spoke with patient. Discussed comments below per provider. Encouraged patient to continue with recommended US for reasons noted below per provider. Patient understanding, agreed with plans. Will call and schedule thyroid US at earliest convenience. Central imaging scheduling line given #[phone].  ([facility] imaging # below is no longer valid)  No further questions at this time from patient.    Padma Ragsdale RN  [facility] Triage

## 2019-01-16 NOTE — TELEPHONE ENCOUNTER
I called Marixa with her pathology report. She is doing well. Her pathology revealed a 2.3cm invasive ductal carcinoma, ER/MN positive. Her 2 negative. Margins were negative and nodes were negative. I will have Lyndsay order an oncotype for her. I will see her in clinic next week. She is having a thyroid ultrasound and I will review in clinic with her.     Gema Diamond MD  [facility]  [phone]

## 2019-01-17 ENCOUNTER — DOCUMENTATION ONLY (OUTPATIENT)
Dept: MAMMOGRAPHY | Facility: CLINIC | Age: 69
End: 2019-01-17

## 2019-01-17 ENCOUNTER — MEDICAL CORRESPONDENCE (OUTPATIENT)
Dept: HEALTH INFORMATION MANAGEMENT | Facility: CLINIC | Age: 69
End: 2019-01-17

## 2019-01-17 NOTE — TELEPHONE ENCOUNTER
Oncotype DX Breast ordered today through [facility].  Request for specimen # from the [facility] Pathology Department.  Lyndsay Alvarado RN, BSN

## 2019-01-18 ENCOUNTER — ANCILLARY PROCEDURE (OUTPATIENT)
Dept: ULTRASOUND IMAGING | Facility: CLINIC | Age: 69
End: 2019-01-18
Payer: COMMERCIAL

## 2019-01-18 DIAGNOSIS — E04.1 THYROID NODULE: ICD-10-CM

## 2019-01-18 LAB — RADIOLOGIST FLAGS: NORMAL

## 2019-01-18 PROCEDURE — 76536 US EXAM OF HEAD AND NECK: CPT | Performed by: STUDENT IN AN ORGANIZED HEALTH CARE EDUCATION/TRAINING PROGRAM

## 2019-01-20 NOTE — RESULT ENCOUNTER NOTE
Dear Marixa    The thyroid scan shows a persistent thyroid nodule on the right side. Dr. Diamond will review the result with you at your visit this coming week. You may need a follow up fine needle biopsy and I can put the order in for this. I will forward these results to her office also.         Please contact me by Tel[facility]ort[facility]hart if you have any questions about your labs or management.    Jackie Kovacs MD

## 2019-01-24 ENCOUNTER — TELEPHONE (OUTPATIENT)
Dept: ONCOLOGY | Facility: CLINIC | Age: 69
End: 2019-01-24

## 2019-01-24 ENCOUNTER — OFFICE VISIT (OUTPATIENT)
Dept: SURGERY | Facility: CLINIC | Age: 69
End: 2019-01-24
Payer: COMMERCIAL

## 2019-01-24 ENCOUNTER — TELEPHONE (OUTPATIENT)
Dept: SURGERY | Facility: CLINIC | Age: 69
End: 2019-01-24

## 2019-01-24 DIAGNOSIS — E04.1 THYROID NODULE: Primary | ICD-10-CM

## 2019-01-24 DIAGNOSIS — C50.911 HORMONE RECEPTOR POSITIVE BREAST CANCER, RIGHT (H): ICD-10-CM

## 2019-01-24 PROCEDURE — 99024 POSTOP FOLLOW-UP VISIT: CPT | Performed by: SURGERY

## 2019-01-24 NOTE — TELEPHONE ENCOUNTER
ONCOLOGY INTAKE: Records Information      APPT INFORMATION:  Referring provider:  Dara  Referring provider s clinic:  [place] [facility]  Reason for visit/diagnosis:  Hormone receptor positive breast cancer, right (H) [C50.911]    Were the records received with the referral (via [facility])? No, internal referral    Has patient been seen for any external appt for this diagnosis (enter clinic/location)? No

## 2019-01-24 NOTE — LETTER
1/24/2019         RE: Marixa Ann  [address] [address] [facility] [address]        Dear Colleague,    Thank you for referring your patient, Marixa Ann, to the [facility]. Please see a copy of my visit note below.    [facility] Breast Surgery Postoperative Note    S: Marixa returns for two week follow up after right breast lumpectomy and SLNB. She reports she is doing well since her surgery. She has had minimal pain. She is very worried about her thyroid as the ultrasound revealed a larger nodule. She is also very worried about breast cancer recurrence. She tells me she has had 11 months of fatigue, some days she cannot get out of bed and that she is concerned about radiation worsening this.     Breasts: right breast lumpectomy incision is healing well. Axillary incision healing well. No erythema or induration.     Pathology:   FINAL DIAGNOSIS:   A. Breast, right, wire localized lumpectomy:   -INVASIVE MAMMARY CARCINOMA OF NO SPECIAL TYPE (INVASIVE DUCTAL   CARCINOMA), NURYS GRADE 1, size 24 mm   -Ductal carcinoma in situ (DCIS), nuclear grade 2, cribriform type   -DCIS is admixed with and adjacent to invasive carcinoma, and comprises   <10% of the tumor volume   -No lymphovascular invasion identified   -Margins are uninvolved by invasive carcinoma   -Invasive carcinoma is 2 mm from the nearest (inferior) margin, 2.5 mm   from the anterior and superior margins,   3.5 mm from the lateral margin, 5 mm from the posterior margin, and >5 mm   from the medial margin   -Margins are uninvolved by DCIS   -DCIS is 2 mm from the nearest (posterior) margin, 3.5 mm from the   inferior margin, 4 mm from the lateral   margin, 5 mm from the anterior margin, and >5 mm from the superior and   medial margins   -Other findings: fibrocystic change (including microcysts with apocrine   metaplasia) and usual ductal   hyperplasia   -Calcifications associated with invasive carcinoma and benign  acini   -Prior core biopsy site changes   -Invasive carcinoma is estrogen receptor positive (98%, strong intensity)   and progesterone receptor positive   (94%, strong intensity) by immunohistochemistry and is HER2 not amplified   by FISH (HER2:CEN17 ratio 1.2, see   report [id number]) (performed on prior core biopsy, see report [id number])   -See below for tumor synoptic     B. Lymph node, right axillary, sentinel #1, excision:   -One benign lymph node (0/1)     C. Lymph node, right axillary, sentinel #2, excision:   -One benign lymph node (0/1)     D. Lymph node, right axillary, sentinel #3, excision:   -One benign lymph node (0/1)     A/P  Marixa Ann is recovering from a right breast partial mastectomy with SLNB for a grade 1 invasive ductal carcinoma, 2.4cm with DCIS present. Margins are negative. ER/SD positive, Her 2 negative with three benign sentinel nodes. She is doing well. She did have a thyroid ultrasound as she has a history of a nodule on the right and prior aspiration in 2015. On her ultrasound on 1/18 there is a 4.5cm nodule on the right and repeat FNA is recommended. Orders were placed, I would have her follow up with Dr. Romero at [facility] going forward in regard to her thyroid nodule.     In regard to her breast cancer, referrals were placed to oncology and radiation oncology.   Oncotype has been ordered and results are pending. I would like to see Marixa trevino in 6 months for clinical breast exam and follow up right mammogram to establish a new baseline.      Thank you for the opportunity to help in her care.    Gema Diamond  [facility]  [phone]    Please route or send letter to:  Primary Care Provider (PCP) and Referring Provider      Again, thank you for allowing me to participate in the care of your patient.        Sincerely,        Gema Diamond MD

## 2019-01-24 NOTE — PATIENT INSTRUCTIONS
Preventive Care:    Colorectal Cancer Screening: During our visit today, we discussed that it is recommended you receive colorectal cancer screening. Please call or make an appointment with your primary care provider to discuss this. You may also call the [facility] scheduling line ([phone]) to set up a colonoscopy appointment.

## 2019-01-24 NOTE — TELEPHONE ENCOUNTER
Dr. Diamond is referring patient to Radiation Oncology. She would like the patient to see Dr. Evangelista if possible. Please call patient to schedule.    Joy Westbrook LPN

## 2019-01-24 NOTE — Clinical Note
Marixa is a breast cancer pt of mine that will likely follow up with you regarding a 4.5cm thyroid nodule. She is scheduled for FNA soon. Kiara - can you assist in reaching out to Marixa after FNA results are back to schedule her with [facility]? Thanks, Gema

## 2019-01-24 NOTE — NURSING NOTE
Marixa Ann's goals for this visit include: lumpectomy post op  She requests these members of her care team be copied on today's visit information: no    PCP: Jackie Kovacs    Referring Provider:  No referring provider defined for this encounter.    LMP 05/31/2003 (Approximate)     Do you need any medication refills at today's visit? No    Joy Westbrook LPN

## 2019-01-24 NOTE — PROGRESS NOTES
[facility] Breast Surgery Postoperative Note    S: Marixa returns for two week follow up after right breast lumpectomy and SLNB. She reports she is doing well since her surgery. She has had minimal pain. She is very worried about her thyroid as the ultrasound revealed a larger nodule. She is also very worried about breast cancer recurrence. She tells me she has had 11 months of fatigue, some days she cannot get out of bed and that she is concerned about radiation worsening this.     Breasts: right breast lumpectomy incision is healing well. Axillary incision healing well. No erythema or induration.     Pathology:   FINAL DIAGNOSIS:   A. Breast, right, wire localized lumpectomy:   -INVASIVE MAMMARY CARCINOMA OF NO SPECIAL TYPE (INVASIVE DUCTAL   CARCINOMA), NURYS GRADE 1, size 24 mm   -Ductal carcinoma in situ (DCIS), nuclear grade 2, cribriform type   -DCIS is admixed with and adjacent to invasive carcinoma, and comprises   <10% of the tumor volume   -No lymphovascular invasion identified   -Margins are uninvolved by invasive carcinoma   -Invasive carcinoma is 2 mm from the nearest (inferior) margin, 2.5 mm   from the anterior and superior margins,   3.5 mm from the lateral margin, 5 mm from the posterior margin, and >5 mm   from the medial margin   -Margins are uninvolved by DCIS   -DCIS is 2 mm from the nearest (posterior) margin, 3.5 mm from the   inferior margin, 4 mm from the lateral   margin, 5 mm from the anterior margin, and >5 mm from the superior and   medial margins   -Other findings: fibrocystic change (including microcysts with apocrine   metaplasia) and usual ductal   hyperplasia   -Calcifications associated with invasive carcinoma and benign acini   -Prior core biopsy site changes   -Invasive carcinoma is estrogen receptor positive (98%, strong intensity)   and progesterone receptor positive   (94%, strong intensity) by immunohistochemistry and is HER2 not amplified   by FISH (HER2:CEN17 ratio  1.2, see   report [id number]) (performed on prior core biopsy, see report [id number])   -See below for tumor synoptic     B. Lymph node, right axillary, sentinel #1, excision:   -One benign lymph node (0/1)     C. Lymph node, right axillary, sentinel #2, excision:   -One benign lymph node (0/1)     D. Lymph node, right axillary, sentinel #3, excision:   -One benign lymph node (0/1)     A/P  Marixa Ann is recovering from a right breast partial mastectomy with SLNB for a grade 1 invasive ductal carcinoma, 2.4cm with DCIS present. Margins are negative. ER/[place] positive, Her 2 negative with three benign sentinel nodes. She is doing well. She did have a thyroid ultrasound as she has a history of a nodule on the right and prior aspiration in 2015. On her ultrasound on 1/18 there is a 4.5cm nodule on the right and repeat FNA is recommended. Orders were placed, I would have her follow up with Dr. Romero at [facility] going forward in regard to her thyroid nodule.     In regard to her breast cancer, referrals were placed to oncology and radiation oncology.   Oncotype has been ordered and results are pending. I would like to see Marixa trevino in 6 months for clinical breast exam and follow up right mammogram to establish a new baseline.      Thank you for the opportunity to help in her care.    Gema Diamond  [facility]  [phone]    Please route or send letter to:  Primary Care Provider (PCP) and Referring Provider

## 2019-01-26 ENCOUNTER — MYC REFILL (OUTPATIENT)
Dept: FAMILY MEDICINE | Facility: CLINIC | Age: 69
End: 2019-01-26

## 2019-01-26 DIAGNOSIS — F90.0 ATTENTION DEFICIT HYPERACTIVITY DISORDER (ADHD), PREDOMINANTLY INATTENTIVE TYPE: ICD-10-CM

## 2019-01-28 NOTE — TELEPHONE ENCOUNTER
Requested Prescriptions   Pending Prescriptions Disp Refills     amphetamine-dextroamphetamine (ADDERALL) 10 MG tablet 60 tablet 0     Sig: Take 1 tablet (10 mg) by mouth 2 times daily    There is no refill protocol information for this order          amphetamine-dextroamphetamine (ADDERALL) 10 MG tablet      Last Written Prescription Date:  12/20/18  Last Fill Quantity: 60,   # refills: 0  Last Office Visit: 1/4/19  Future Office visit:       Routing refill request to provider for review/approval because:  Drug not on the [facility], P or [facility] refill protocol or controlled substance

## 2019-01-29 RX ORDER — DEXTROAMPHETAMINE SACCHARATE, AMPHETAMINE ASPARTATE, DEXTROAMPHETAMINE SULFATE AND AMPHETAMINE SULFATE 2.5; 2.5; 2.5; 2.5 MG/1; MG/1; MG/1; MG/1
10 TABLET ORAL 2 TIMES DAILY
Qty: 60 TABLET | Refills: 0 | Status: SHIPPED | OUTPATIENT
Start: 2019-01-29 | End: 2019-02-27

## 2019-01-30 NOTE — TELEPHONE ENCOUNTER
Bringing Rx for the Adderall to the  by 1:00 pm today, 1/30/19. Sent patient a My Chart message regarding this.  Chana Crespo MA/  For [employer]

## 2019-01-31 ENCOUNTER — ANCILLARY PROCEDURE (OUTPATIENT)
Dept: ULTRASOUND IMAGING | Facility: CLINIC | Age: 69
End: 2019-01-31
Payer: COMMERCIAL

## 2019-01-31 ENCOUNTER — CARE COORDINATION (OUTPATIENT)
Dept: ONCOLOGY | Facility: CLINIC | Age: 69
End: 2019-01-31

## 2019-01-31 ENCOUNTER — ONCOLOGY VISIT (OUTPATIENT)
Dept: ONCOLOGY | Facility: CLINIC | Age: 69
End: 2019-01-31
Payer: COMMERCIAL

## 2019-01-31 VITALS
WEIGHT: 172.25 LBS | BODY MASS INDEX: 32.52 KG/M2 | OXYGEN SATURATION: 95 % | HEART RATE: 96 BPM | SYSTOLIC BLOOD PRESSURE: 87 MMHG | TEMPERATURE: 98.1 F | DIASTOLIC BLOOD PRESSURE: 47 MMHG | HEIGHT: 61 IN | RESPIRATION RATE: 16 BRPM

## 2019-01-31 DIAGNOSIS — C50.211 MALIGNANT NEOPLASM OF UPPER-INNER QUADRANT OF RIGHT BREAST IN FEMALE, ESTROGEN RECEPTOR POSITIVE (H): Primary | ICD-10-CM

## 2019-01-31 DIAGNOSIS — Z17.0 MALIGNANT NEOPLASM OF UPPER-INNER QUADRANT OF RIGHT BREAST IN FEMALE, ESTROGEN RECEPTOR POSITIVE (H): Primary | ICD-10-CM

## 2019-01-31 DIAGNOSIS — E04.1 THYROID NODULE: ICD-10-CM

## 2019-01-31 PROCEDURE — 88173 CYTOPATH EVAL FNA REPORT: CPT | Performed by: RADIOLOGY

## 2019-01-31 PROCEDURE — 99205 OFFICE O/P NEW HI 60 MIN: CPT | Performed by: INTERNAL MEDICINE

## 2019-01-31 PROCEDURE — 00000102 ZZHCL STATISTIC CYTO WRIGHT STAIN TC: Performed by: RADIOLOGY

## 2019-01-31 PROCEDURE — 10005 FNA BX W/US GDN 1ST LES: CPT | Performed by: RADIOLOGY

## 2019-01-31 ASSESSMENT — MIFFLIN-ST. JEOR: SCORE: 1258.44

## 2019-01-31 ASSESSMENT — PAIN SCALES - GENERAL: PAINLEVEL: NO PAIN (1)

## 2019-01-31 NOTE — LETTER
1/31/2019         RE: Marixa Ann  [id number] [facility] [address]        Dear Colleague,    Thank you for referring your patient, Marixa Ann, to the [facility]. Please see a copy of my visit note below.    Visit Date:   01/31/2019      Dear Dr. Diamond:      Thank you for asking me to see your patient, Marixa Ann, in consultation for further evaluation and management of her recently diagnosed right breast cancer.  Please see the enclosed note for details of our visit on 01/31/2019, as well as recommendations.      HISTORY OF PRESENT ILLNESS:  Marixa Ann is a 68-year-old female who presents to the [facility] for further evaluation of right breast cancer.  The patient's history dates back to 12/03/2018 when she was undergoing a routine screening mammogram and was found to have asymmetry in the right breast.  She went on to have diagnostic imaging and was found to have a 1.2 cm hypoechoic mass at the 2 o'clock position 9 cm from the nipple with spiculated margins.  Contrast-enhanced mammogram showed a 1.9 cm enhancing mass in the medial right breast.  Axillary ultrasound showed benign lymph nodes.  Biopsy was performed which did confirm a grade 1 invasive ductal carcinoma, ER/OK positive, HER-2 negative.  The patient went on to meet with Dr. Gema Diamond, who discussed surgical options.  The patient elected for lumpectomy.  She was taken to the operating theater on 01/10/2019 and underwent right wire-localized partial mastectomy with sentinel node biopsy.  Final pathology revealed invasive ductal carcinoma, grade 1, 2.4 cm.  DCIS present, nuclear grade 2, cribriform type.  No lymphovascular invasion identified.  Margins uninvolved.  ER/OK positive, HER-2 negative.  One sentinel node negative.  Stage is pT2N0.        On today's visit, the patient states that prior to her mammogram she had not self-palpated  any breast masses, noted any skin changes.  Overall, her surgery went well.  She does have some pain at the lumpectomy site.  Otherwise, denies any fevers, chills, nausea, vomiting, chest pain, shortness of breath, cough.  No abdominal discomfort.  No new palpable masses.  Does have chronic fatigue, is quite anxious to hear about the next steps.  Remainder of comprehensive review of systems is negative.      PAST MEDICAL HISTORY:   1.  Newly diagnosed breast cancer, see above.   2.  Anxiety.   3.  ADHD.   4.  GERD.   5.  Fatigue an ongoing issue.   6. LAVONNE exposure     ALLERGIES:  VICODIN CAUSES ANAPHYLAXIS.      FAMILY HISTORY:  Father  of leukemia at 73.        GYNECOLOGIC HISTORY:  Menarche at 15.  Menopause between 40s and 50s.   0.  No prior history of hormone replacement therapy or fertility drugs.      SOCIAL HISTORY:   and accompanied by her , Isaac, today.  Does not have any children, has 2 dogs.  Denies tobacco or alcohol use.  Did computer work.      PHYSICAL EXAMINATION:   VITAL SIGNS:  Blood pressure 87/47, pulse 96, respirations 16, temperature 98.1, pulse ox 95% on room air.  Weight 172 pounds, BMI 32.56.   GENERAL:  Comfortable, in no acute distress, pleasant.   HEENT:  Atraumatic, normocephalic.  Pupils are equal, round and reactive.  Sclerae anicteric.  Oropharynx with moist mucous membranes.  No lesions, ulcers, exudate.   NECK:  Supple, full range of motion.  Trachea midline.   HEART:  Regular rate and rhythm.  Normal S1, S2.  No murmurs or gallop.  No edema.   LUNGS:  Clear to auscultation bilaterally.  No crackles or wheezes.  Normal respiratory effort.     ABDOMEN:  Positive bowel sounds.  Soft, nontender, distended, no hepatomegaly.   EXTREMITIES:  No cyanosis.  Warm.   MUSCULOSKELETAL:  No bony tenderness.   LYMPH:  No cervical, supraclavicular or axillary nodes palpable.   SKIN:  No petechia or rashes.   NEUROLOGIC:  Alert and oriented.   BREASTS:  Examined in the  "upright and supine position.  Left breast:  No dominant masses.  Nipple everted without discharge.  Right breast:  No dominant masses.  Firmness in the right axillary site.  Nipple everted without discharge.        IMAGING:  As stated in the HPI.        PATHOLOGY:  As stated in the HPI.      ASSESSMENT AND PLAN:  Marixa Ann is a 68-year-old female with newly diagnosed stage I, pT2N0 invasive ductal carcinoma of the right breast, ER/SC positive and HER-2 negative.  I discussed at length with the patient in the presence of her  regarding her findings to date.  I explained the patient is considered a stage I disease based on tumor size and negative lymph nodes.  The patient did have Oncotype DX sent off.  Unfortunately, this will probably not be back for another 10 days.  Based on the results, we would decide whether she is a candidate for chemotherapy or not.  Based on the pathology report we have right now, I think it is unlikely she will require chemotherapy; however, I would not make our final recommendations until we get the Oncotype.  In any case, we would recommend radiation and a referral has already been placed.  We will help schedule that appointment.  Following radiation, we would recommend endocrine therapy; since the patient is postmenopausal, would recommend an aromatase inhibitor.  Ideally would recommend an aromatase inhibitor; however, the patient did have a DXA scan in 04/2018 which showed osteoporosis; therefore, we would recommend tamoxifen instead.  I went on to review the potential side effects and toxicities including but not limited to hot flashes, vaginal dryness, weight gain, swelling, increases for uterine cancer and increases for thromboembolic events which can be fatal.  The patient had some concerns since she states that she is \"LAVONNE child.\"  Therefore, she does have routine pelvic exams.  I did explain to the patient that while she is on tamoxifen we would again encourage annual " pelvic exams as well as annual eye exams because of the risk of cataracts.  The patient feels she needs some time to think about it.  She did ask a number of questions regarding the aromatase inhibitor.  We did review the risks and side effects of this as well.  In any case, since the patient would not start this until after radiation, she will be given educational material to review prior to her next visit.      PLAN:   1.  Will await Oncotype DX results.  Once we have these results, we will contact the patient.  If she requires chemotherapy, she will return to clinic.  However, if she does not, she should proceed directly to radiation.   2.  The patient has had a radiation referral and we will help schedule this appointment.   3.  The patient was given educational material regarding tamoxifen.   4.  The patient should follow up with her primary regarding possibly starting a bisphosphonate.      At the end of discussion all questions addressed to the best of my ability.  The patient has verbalized understanding and agreed with plan.      Dr. Diamond, thank you for giving me the opportunity to participate in this delightful patient's care.  If you have any questions, please feel free to contact me.      Sincerely,         RIDDHI COHEN MD       Addendum: Oncotype RS- 4. Would not recommend chemotherapy. Patient to proceed with radiation. Return to see us 1-2 weeks after radiation completed.        D: 2019   T: 2019   MT: SELMA      Name:     JAILYN GUAN   MRN:      [id number]        Account:      [id number]   :      1950           Visit Date:   2019      Document: [id number]       cc: Jackie Diamond MD       Again, thank you for allowing me to participate in the care of your patient.        Sincerely,        Riddhi Cohen MD

## 2019-01-31 NOTE — NURSING NOTE
"Oncology Rooming Note    January 31, 2019 10:35 AM   Marixa Ann is a 68 year old female who presents for:    Chief Complaint   Patient presents with     Oncology Clinic Visit     New     Initial Vitals: BP (!) 87/47 (BP Location: Left arm)   Pulse 96   Temp 98.1  F (36.7  C) (Oral)   Resp 16   Ht 1.549 m (5' 0.98\")   Wt 78.1 kg (172 lb 4 oz)   LMP 05/31/2003 (Approximate)   SpO2 95%   BMI 32.56 kg/m   Estimated body mass index is 32.56 kg/m  as calculated from the following:    Height as of this encounter: 1.549 m (5' 0.98\").    Weight as of this encounter: 78.1 kg (172 lb 4 oz). Body surface area is 1.83 meters squared.  No Pain (1) Comment: Data Unavailable   Patient's last menstrual period was 05/31/2003 (approximate).  Allergies reviewed: Yes  Medications reviewed: Yes    Medications: Medication refills not needed today.  Pharmacy name entered into [facility]:    [id number] - [place], [place] - [address]  WRITTEN PRESCRIPTION REQUESTED      5 minutes for nursing intake (face to face time)     Gema Escalona LPN            "

## 2019-01-31 NOTE — PATIENT INSTRUCTIONS
Preventive Care:    Colorectal Cancer Screening: During our visit today, we discussed that it is recommended you receive colorectal cancer screening. Please call or make an appointment with your primary care provider to discuss this. You may also call the [facility] scheduling line ([phone]) to set up a colonoscopy appointment.

## 2019-01-31 NOTE — PROGRESS NOTES
RN met with patient and spouse in clinic for Tamoxifen teaching. [employer] printout on Tamoxifen (dated 1/31/19) was reviewed and provided to patient to take home. Discussed possible side effects, including but not limited to: hot flashes, skin flushing, vaginal discharge or vaginal dryness, swelling (particularly in the feet, ankles, or hands), changes in libido, mood changes, abnormal menstrual bleeding, mild nausea, weight gain, or elevation of liver enzymes.  Discussed rare but serious possible side effects of developing a secondary uterine cancer, blood clots (DVT or PE), or retinal deposits/cataracts.  Educated patient to have regular eye exams for continued eye health.  Educated patient to report any abnormal vaginal bleeding right away, as well as any signs/symptoms of blood clot (DVT - sudden onset of redness, swelling, and tenderness in an extremity) or vision changes.  Educated patient to seek immediate medical attention for sudden onset of shortness of breath and/or chest pain, which could be indicative of pulmonary embolism.       Patient and spouse verbalized understanding of all instructions and education provided.  Patient will plan to follow up with Dr. Cohen approximately 2 weeks after completing Radiation Therapy treaments, to discuss starting on the Tamoxifen at this time.  Patient will be called once we receive the results of her Oncotype testing, and understands that the final Oncotype score may possibly change the treatment plan.  Encouraged patient/spouse to call with any questions or concerns.     Maikol Milan RN, BSN, OCN  Oncology Care Coordinator  [facility]

## 2019-02-02 ENCOUNTER — TRANSFERRED RECORDS (OUTPATIENT)
Dept: HEALTH INFORMATION MANAGEMENT | Facility: CLINIC | Age: 69
End: 2019-02-02

## 2019-02-03 NOTE — PROGRESS NOTES
Visit Date:   01/31/2019      Dear Dr. Diamond:      Thank you for asking me to see your patient, Marixa Ann, in consultation for further evaluation and management of her recently diagnosed right breast cancer.  Please see the enclosed note for details of our visit on 01/31/2019, as well as recommendations.      HISTORY OF PRESENT ILLNESS:  Marixa Ann is a 68-year-old female who presents to the [facility] for further evaluation of right breast cancer.  The patient's history dates back to 12/03/2018 when she was undergoing a routine screening mammogram and was found to have asymmetry in the right breast.  She went on to have diagnostic imaging and was found to have a 1.2 cm hypoechoic mass at the 2 o'clock position 9 cm from the nipple with spiculated margins.  Contrast-enhanced mammogram showed a 1.9 cm enhancing mass in the medial right breast.  Axillary ultrasound showed benign lymph nodes.  Biopsy was performed which did confirm a grade 1 invasive ductal carcinoma, ER/OK positive, HER-2 negative.  The patient went on to meet with Dr. Gema Diamond, who discussed surgical options.  The patient elected for lumpectomy.  She was taken to the operating theater on 01/10/2019 and underwent right wire-localized partial mastectomy with sentinel node biopsy.  Final pathology revealed invasive ductal carcinoma, grade 1, 2.4 cm.  DCIS present, nuclear grade 2, cribriform type.  No lymphovascular invasion identified.  Margins uninvolved.  ER/OK positive, HER-2 negative.  One sentinel node negative.  Stage is pT2N0.        On today's visit, the patient states that prior to her mammogram she had not self-palpated any breast masses, noted any skin changes.  Overall, her surgery went well.  She does have some pain at the lumpectomy site.  Otherwise, denies any fevers, chills, nausea, vomiting, chest pain, shortness of breath, cough.  No abdominal discomfort.  No new  palpable masses.  Does have chronic fatigue, is quite anxious to hear about the next steps.  Remainder of comprehensive review of systems is negative.      PAST MEDICAL HISTORY:   1.  Newly diagnosed breast cancer, see above.   2.  Anxiety.   3.  ADHD.   4.  GERD.   5.  Fatigue an ongoing issue.   6. LAVONNE exposure     ALLERGIES:  VICODIN CAUSES ANAPHYLAXIS.      FAMILY HISTORY:  Father  of leukemia at 73.        GYNECOLOGIC HISTORY:  Menarche at 15.  Menopause between 40s and 50s.   0.  No prior history of hormone replacement therapy or fertility drugs.      SOCIAL HISTORY:   and accompanied by her , Isaac, today.  Does not have any children, has 2 dogs.  Denies tobacco or alcohol use.  Did computer work.      PHYSICAL EXAMINATION:   VITAL SIGNS:  Blood pressure 87/47, pulse 96, respirations 16, temperature 98.1, pulse ox 95% on room air.  Weight 172 pounds, BMI 32.56.   GENERAL:  Comfortable, in no acute distress, pleasant.   HEENT:  Atraumatic, normocephalic.  Pupils are equal, round and reactive.  Sclerae anicteric.  Oropharynx with moist mucous membranes.  No lesions, ulcers, exudate.   NECK:  Supple, full range of motion.  Trachea midline.   HEART:  Regular rate and rhythm.  Normal S1, S2.  No murmurs or gallop.  No edema.   LUNGS:  Clear to auscultation bilaterally.  No crackles or wheezes.  Normal respiratory effort.     ABDOMEN:  Positive bowel sounds.  Soft, nontender, distended, no hepatomegaly.   EXTREMITIES:  No cyanosis.  Warm.   MUSCULOSKELETAL:  No bony tenderness.   LYMPH:  No cervical, supraclavicular or axillary nodes palpable.   SKIN:  No petechia or rashes.   NEUROLOGIC:  Alert and oriented.   BREASTS:  Examined in the upright and supine position.  Left breast:  No dominant masses.  Nipple everted without discharge.  Right breast:  No dominant masses.  Firmness in the right axillary site.  Nipple everted without discharge.        IMAGING:  As stated in the HPI.       "  PATHOLOGY:  As stated in the HPI.      ASSESSMENT AND PLAN:  Marixa Ann is a 68-year-old female with newly diagnosed stage I, pT2N0 invasive ductal carcinoma of the right breast, ER/NC positive and HER-2 negative.  I discussed at length with the patient in the presence of her  regarding her findings to date.  I explained the patient is considered a stage I disease based on tumor size and negative lymph nodes.  The patient did have Oncotype DX sent off.  Unfortunately, this will probably not be back for another 10 days.  Based on the results, we would decide whether she is a candidate for chemotherapy or not.  Based on the pathology report we have right now, I think it is unlikely she will require chemotherapy; however, I would not make our final recommendations until we get the Oncotype.  In any case, we would recommend radiation and a referral has already been placed.  We will help schedule that appointment.  Following radiation, we would recommend endocrine therapy; since the patient is postmenopausal, would recommend an aromatase inhibitor.  Ideally would recommend an aromatase inhibitor; however, the patient did have a DXA scan in 04/2018 which showed osteoporosis; therefore, we would recommend tamoxifen instead.  I went on to review the potential side effects and toxicities including but not limited to hot flashes, vaginal dryness, weight gain, swelling, increases for uterine cancer and increases for thromboembolic events which can be fatal.  The patient had some concerns since she states that she is \"LAVONNE child.\"  Therefore, she does have routine pelvic exams.  I did explain to the patient that while she is on tamoxifen we would again encourage annual pelvic exams as well as annual eye exams because of the risk of cataracts.  The patient feels she needs some time to think about it.  She did ask a number of questions regarding the aromatase inhibitor.  We did review the risks and side effects of " this as well.  In any case, since the patient would not start this until after radiation, she will be given educational material to review prior to her next visit.      PLAN:   1.  Will await Oncotype DX results.  Once we have these results, we will contact the patient.  If she requires chemotherapy, she will return to clinic.  However, if she does not, she should proceed directly to radiation.   2.  The patient has had a radiation referral and we will help schedule this appointment.   3.  The patient was given educational material regarding tamoxifen.   4.  The patient should follow up with her primary regarding possibly starting a bisphosphonate.      At the end of discussion all questions addressed to the best of my ability.  The patient has verbalized understanding and agreed with plan.      Dr. Diamond, thank you for giving me the opportunity to participate in this delightful patient's care.  If you have any questions, please feel free to contact me.      Sincerely,         RIDDHI CHO MD       Addendum: Oncotype RS- 4. Would not recommend chemotherapy. Patient to proceed with radiation. Return to see us 1-2 weeks after radiation completed.        D: 2019   T: 2019   MT: NTS      Name:     JAILYN GUAN   MRN:      [id number]        Account:      [id number]   :      1950           Visit Date:   2019      Document: [id number]       cc: Jackie Diamond MD

## 2019-02-04 ENCOUNTER — TELEPHONE (OUTPATIENT)
Dept: MAMMOGRAPHY | Facility: CLINIC | Age: 69
End: 2019-02-04

## 2019-02-04 ENCOUNTER — PRE VISIT (OUTPATIENT)
Dept: RADIATION ONCOLOGY | Facility: CLINIC | Age: 69
End: 2019-02-04

## 2019-02-04 LAB
COPATH REPORT: NORMAL
LAB SCANNED RESULT: NORMAL

## 2019-02-04 NOTE — TELEPHONE ENCOUNTER
Date of appointment: 2/8/19   Diagnosis/reason for appointment:Breast Cancer  Referring provider/facility:Dr. Diamond  Who called:Work queue      Previous /radiation (if known):No    Records in Epic    Additional information:

## 2019-02-04 NOTE — TELEPHONE ENCOUNTER
Breast Nurse Care Coordination:  Follow up call placed to patient today to assess her needs and check in on how she is feeling a doing since recovering from her right breast lumpectomy on 1/10/2019 with Dr. Diamond.    Patient states she is doing quite well, and is very pleased with how her surgery and recovery has gone.  Patient states she has had the most energy she can remember in the past 3-4 years.      Patient states she had met with her oncologist- Dr. Sherita Cohen in , and she will be calling today to set up her Radiation Oncology visit in .      I informed patient that her Breast Oncotype Dx Recurrence Score Result is 4, so therefore treatment with chemotherapy would not be recommended due to the lack of benefit for her.    I informed patient to call me back with any questions or concerns.  Patient verbalized understanding and agrees with the plan of care. Lyndsay Alvarado, RN, BSN

## 2019-02-05 NOTE — PROGRESS NOTES
RADIATION ONCOLOGY CONSULT NOTE    Date of Visit: 2019  Patient Name: Marixa Ann  MRN: [id number]  : 1950    Marixa Ann is being seen today for initial consultation at the request of Dr. Gema Diamond for consideration of radiation therapy.    HISTORY OF PRESENT ILLNESS:  Ms. Ann is a 68 year old female with R breast cancer.    She states she had a benign L breast biopsy in the .     12/3/18: Screening MMG showed a possible developing asymmetry in the R breast, 4:00, post depth, no suspicious findings in L breast, BIRADS 0.    18: Dx MMG/US of R breast showed a 12 x 9 x 14 mm hypoechoic mass in the R breast at 2:00, 9 cm from nipple, normal axillary LN, 17 mm on MMG with spiculated margins, BIRADS 5.    18: Contrast MMG showed an avidly enhancing spiculated mass in the medial R breast measuring 1.9 cm, no other suspicious areas of enhancement. BIRADS 5.    18: R breast core biopsy showed IDC, G1, with ADH, ER/GA+, HER2 nonamplified.    18: She met with Dr. Diamond in surgery, who discussed surgical management options.    1/10/19: She underwent R wire localized lumpectomy with Dr. 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    Oncotype DX was 4. Adjuvant chemotherapy was not recommended.    19: FNA of R thyroid nodule was benign.    She met with Dr. Cohen in medical oncology, who did discuss hormonal therapy with tamoxifen due to osteoporosis, and possible bisphosphonate therapy.    Today she states she is doing well overall and has recovered from her operation. She denies any pain, swelling, or decreased ROM. She has chronic fatigue/fibromyalgia, ADHD, and hearing loss. She is postmenopausal. She is quite anxious. She had a cousin who  of breast  cancer in her 60s. Her father had leukemia. She has no other family history of cancer. She is here today with her .    CHEMOTHERAPY HISTORY: none    RADIATION THERAPY HISTORY:  none    PAST MEDICAL/SURGICAL HISTORY:  Past Medical History:   Diagnosis Date     ADHD      Breast cancer (H) 12/26/2018    Right Breast     Chronic fatigue      Fibromyalgia      Hard of hearing      Osteoporosis      Past Surgical History:   Procedure Laterality Date     APPENDECTOMY  1967     BIOPSY BREAST NEEDLE LOCALIZATION Right 1/10/2019    Procedure: WIRE LOCALIZED RIGHT BREAST LUMPECTOMY WITH RIGHT SLNB;  Surgeon: Gema Diamond MD;  Location: [facility] OR     BREAST BIOPSY, CORE RT/LT Right 12/26/2018     CATARACT IOL, RT/LT Bilateral 2018    with lens implants per patient     THYROID BIOPSY  01/31/2019       ALLERGIES:  Allergies as of 02/08/2019 - Reviewed 02/08/2019   Allergen Reaction Noted     Vicodin [hydrocodone-acetaminophen] Anaphylaxis 01/24/2019     Dust mites  10/19/2017     Milk [lac bovis] Diarrhea 12/31/2018       MEDICATIONS:  Current Outpatient Medications   Medication Sig Dispense Refill     acetaminophen (TYLENOL) 325 MG tablet Take 325-650 mg by mouth every 6 hours as needed for mild pain       amphetamine-dextroamphetamine (ADDERALL) 10 MG tablet Take 1 tablet (10 mg) by mouth 2 times daily 60 tablet 0     B Complex Vitamins (VITAMIN B-COMPLEX PO) Take by mouth daily       calcium carbonate (TUMS) 500 MG chewable tablet Take 1 chew tab by mouth 2 times daily       magnesium oxide 200 MG TABS Take 100 mg by mouth once as needed       Multiple Vitamins-Minerals (MULTIVITAMIN PO)        RANITIDINE HCL PO Take by mouth as needed        Cholecalciferol (VITAMIN D-3 PO)        cyclobenzaprine (FLEXERIL) 5 MG tablet Take 1 tablet (5 mg) by mouth daily as needed for muscle spasms (Patient not taking: Reported on 2/8/2019) 20 tablet 0        FAMILY HISTORY:  Family History   Problem Relation Age of Onset     Leukemia  "Father        SOCIAL HISTORY:  Social History     Socioeconomic History     Marital status:      Spouse name: Not on file     Number of children: Not on file     Years of education: Not on file     Highest education level: Not on file   Social Needs     Financial resource strain: Not on file     Food insecurity - worry: Not on file     Food insecurity - inability: Not on file     Transportation needs - medical: Not on file     Transportation needs - non-medical: Not on file   Occupational History     Not on file   Tobacco Use     Smoking status: Never Smoker     Smokeless tobacco: Never Used   Substance and Sexual Activity     Alcohol use: No     Frequency: Never     Drug use: No     Sexual activity: No     Partners: Male     Birth control/protection: None   Other Topics Concern     Parent/sibling w/ CABG, MI or angioplasty before 65F 55M? No   Social History Narrative     Not on file       REVIEW OF SYSTEMS: A 10-point review of systems was obtained. Pertinent findings are noted in the HPI and are otherwise unremarkable.     PHYSICAL EXAM:  VITALS: /62 (BP Location: Left arm, Patient Position: Chair, Cuff Size: Adult Regular)   Pulse 90   Temp 97.9  F (36.6  C) (Oral)   Resp 18   Ht 1.549 m (5' 0.98\")   Wt 78 kg (172 lb)   LMP 05/31/2003 (Approximate)   SpO2 99%   BMI 32.52 kg/m    GEN: appears well, in no acute distress  HEENT: normocephalic and atraumatic, EOMI, anicteric sclerae  CV: no LE edema, no JVD  RESP: normal respiration on room air, no stridor  BREAST: well healed RUIQ breast and axillary incisions. She declined a clinical breast exam today, but there are no obvious suspicious breast masses/lesions on inspection  ABDOMEN: soft, NT, ND  SKIN: normal color and turgor  MSK: moving all extremities well  LYMPHATICS: no R axillary, cervical or supraclavicular LAD  NEURO: CN II-XII grossly intact, no focal neurologic deficit  PSYCH: anxious but otherwise normal mood, affect, and " judgment    ECOG PERFORMANCE STATUS: 0    All pertinent laboratory, imaging, and pathology findings have been reviewed.     IMPRESSION AND RECOMMENDATIONS:  In summary, Ms. Ann is a 68 year old female with R breast cancer, G1, 2.4 cm, ER/NE+, neg margins, s/p lumpectomy. We reviewed her history. Standard of care for breast conservation therapy would include whole breast radiation based on multiple randomized trials and meta-analyses noting a benefit in local recurrence and breast-cancer mortality. We discussed that based on her risk features she would also be eligible for a shortened course of radiation (hypofractionation), which would involve 16 daily treatments. This has been found in randomized studies to be efficacious and without significant added toxicity in select patients as compared to standard fractionation. We also discussed that studies in elderly (age >65-70), good-risk patients have demonstrated a continued local control benefit of adjuvant radiation, but relatively low absolute risk of recurrence over a 5-10 year period and no significant improvement in overall survival (CALGB 9343, PRIME II). However, limitations of currently available data include short follow-up time. Thus, the decision to recommend adjuvant radiation should be made with consideration to individual patient's risk factors as well as overall health and anticipated life expectancy. Given her relatively young age and good health, I recommended she have adjuvant radiation therapy. She also wishes to have adjuvant RT. The risks, benefits, and logistics of radiation were reviewed. Acute toxicities include, but are not limited to, breast tenderness, skin changes, and fatigue. Long-term effects can include pneumonitis, permanent skin changes, cardiac toxicity, rib fracture, lymphedema, and secondary malignancy. She was given the opportunity to ask questions, which were answered to her satisfaction. I informed her that I would be leaving  the department next month, so I would be handing off her care to one of my colleagues afterwards. She verbalized understanding of the findings and diagnosis and was in agreement with the management plans as documented.    After this discussion, she decided to proceed with radiation therapy. Informed consent was obtained and CT simulation performed. We will plan on delivering hypofractionated radiation to the R breast (16 treatments), starting the week of 2/18. She will then follow up with medical oncology regarding hormonal therapy and possibly bisphosphonate therapy.    Beau Evangelista M.D.  Attending Physician  Radiation Oncology  Clinic Phone: [phone]  Pager #: [phone]

## 2019-02-08 ENCOUNTER — OFFICE VISIT (OUTPATIENT)
Dept: RADIATION ONCOLOGY | Facility: CLINIC | Age: 69
End: 2019-02-08
Payer: COMMERCIAL

## 2019-02-08 ENCOUNTER — APPOINTMENT (OUTPATIENT)
Dept: RADIATION ONCOLOGY | Facility: CLINIC | Age: 69
End: 2019-02-08
Payer: COMMERCIAL

## 2019-02-08 VITALS
TEMPERATURE: 97.9 F | SYSTOLIC BLOOD PRESSURE: 108 MMHG | HEIGHT: 61 IN | BODY MASS INDEX: 32.47 KG/M2 | WEIGHT: 172 LBS | DIASTOLIC BLOOD PRESSURE: 62 MMHG | RESPIRATION RATE: 18 BRPM | OXYGEN SATURATION: 99 % | HEART RATE: 90 BPM

## 2019-02-08 DIAGNOSIS — C50.211 MALIGNANT NEOPLASM OF UPPER-INNER QUADRANT OF RIGHT BREAST IN FEMALE, ESTROGEN RECEPTOR POSITIVE (H): Primary | ICD-10-CM

## 2019-02-08 DIAGNOSIS — Z17.0 MALIGNANT NEOPLASM OF UPPER-INNER QUADRANT OF RIGHT BREAST IN FEMALE, ESTROGEN RECEPTOR POSITIVE (H): Primary | ICD-10-CM

## 2019-02-08 PROCEDURE — 99205 OFFICE O/P NEW HI 60 MIN: CPT | Mod: 25 | Performed by: RADIOLOGY

## 2019-02-08 PROCEDURE — 77290 THER RAD SIMULAJ FIELD CPLX: CPT | Performed by: RADIOLOGY

## 2019-02-08 PROCEDURE — 77263 THER RADIOLOGY TX PLNG CPLX: CPT | Performed by: RADIOLOGY

## 2019-02-08 SDOH — HEALTH STABILITY: MENTAL HEALTH: HOW OFTEN DO YOU HAVE A DRINK CONTAINING ALCOHOL?: NEVER

## 2019-02-08 ASSESSMENT — MIFFLIN-ST. JEOR: SCORE: 1257.25

## 2019-02-08 ASSESSMENT — PAIN SCALES - GENERAL: PAINLEVEL: NO PAIN (1)

## 2019-02-08 NOTE — PATIENT INSTRUCTIONS
Preventive Care:    Breast Cancer Screening: During our visit today, we discussed that it is recommended you receive breast cancer screening. Please call or make an appointment with your primary care provider to discuss this with them. You may also call the [facility] scheduling line ([phone]) to set up a mammography appointment at the Breast Center within the [facility].        What to expect at your Simulation visit:    You will meet with a Radiation Therapist and other team members who will be doing a planning session called a  simulation  with you. This process will determine your daily treatment.    ~ You will lie on a flat table and have a treatment planning CT scan.  It is important during the scan to hold very still and breathe normally.    ~ Your therapist may construct a body mold to help you hold still for your treatments.    ~ If you are having treatment to the head or neck area you will be fitted with a plastic mesh mask that fits very snugly over your face and neck.     ~ Your therapist will be taking some digital photos that will go in your treatment chart.      ~Your therapist will make marks on your skin and take measurements. Your therapist may ask you about making small tattoos (a permanent small dot) over these marks.  These marks are used to position you daily for your radiation therapy treatments. Please do not wash off any marks until all of your radiation therapy treatments are complete unless you are instructed to do so by your therapist.    ~ Once the simulation is completed it can take from 3 to 10 business days before you start radiation therapy treatments.    ~ You may meet with a nurse who will go over management of treatment side effects and self care during your treatments. The nurse will help to plan care with other departments and physicians if needed.      Please contact [facility] RN with questions or concerns following today's  appointment: [phone].    Thank you!

## 2019-02-08 NOTE — NURSING NOTE
INITIAL PATIENT ASSESSMENT      Diagnosis: Breast cancer (right breast)    Prior radiation therapy: None    Prior chemotherapy: None    Prior hormonal therapy: Patient believes she may have taken oral birth control in her younger years, denies history of HRT.    Pain Eval:  Current history of pain associated with this visit:   Intensity: 1/10  Current: dull and aching  Location: right shoulder  Treatment: Tylenol po as needed    Psychosocial  Living arrangements: Lives at home in [place] with  Fam.  Fall Risk: independent   referral needs: Not needed    Advanced Directive: No, patient declines information packet  Implantable Cardiac Device: No  Authorization To Share Protected Health Information: YES - Date: form completed during clinic visit today, 2/8/2019, sent to HIM for scanning into medical chart    Onset of menarche: age 15  Onset of menopause: age 47  Abnormal vaginal bleeding/discharge: No  Are you pregnant? No  Reproductive note: Patient denies history of pregnancy.    Review of Systems     Constitutional: Positive for malaise/fatigue. Negative for chills, diaphoresis, fever and weight loss.        Patient reports she has been diagnosed with fibromyalgia and chronic fatigue.   HENT: Positive for congestion and hearing loss. Negative for ear discharge, ear pain, nosebleeds, sinus pain, sore throat and tinnitus.         Patient wears hearing aides.   Eyes: Positive for blurred vision. Negative for double vision, photophobia, pain, discharge and redness.        Patient reports slight blurred vision since her cataract procedure in 2018.   Respiratory: Negative.  Negative for stridor.    Cardiovascular: Negative.    Gastrointestinal: Negative.    Genitourinary: Negative.    Musculoskeletal: Positive for myalgias. Negative for back pain, falls and neck pain.   Skin: Negative.    Neurological: Negative.  Negative for weakness.   Endo/Heme/Allergies: Negative.    Psychiatric/Behavioral:  Negative for depression, hallucinations, memory loss, substance abuse and suicidal ideas. The patient is nervous/anxious. The patient does not have insomnia.         Patient reports she worries and stresses frequently, reports anxiety, patient also reports diagnosis of ADHD.       Nurse face-to-face time: Level 5:  over 15 min face to face time

## 2019-02-08 NOTE — LETTER
2019         RE: Marixa Ann  [address] [address] [facility] [id number]        Dear Colleague,    Thank you for referring your patient, Marixa Ann, to the [facility]. Please see a copy of my visit note below.    RADIATION ONCOLOGY CONSULT NOTE    Date of Visit: 2019  Patient Name: Marixa Ann  MRN: [id number]  : 1950    Marixa Ann is being seen today for initial consultation at the request of Dr. Gema Diamond for consideration of radiation therapy.    HISTORY OF PRESENT ILLNESS:  Ms. Ann is a 68 year old female with R breast cancer.    She states she had a benign L breast biopsy in the .     12/3/18: Screening MMG showed a possible developing asymmetry in the R breast, 4:00, post depth, no suspicious findings in L breast, BIRADS 0.    18: Dx MMG/US of R breast showed a 12 x 9 x 14 mm hypoechoic mass in the R breast at 2:00, 9 cm from nipple, normal axillary LN, 17 mm on MMG with spiculated margins, BIRADS 5.    18: Contrast MMG showed an avidly enhancing spiculated mass in the medial R breast measuring 1.9 cm, no other suspicious areas of enhancement. BIRADS 5.    18: R breast core biopsy showed IDC, G1, with ADH, ER/NV+, HER2 nonamplified.    18: She met with Dr. Diamond in surgery, who discussed surgical management options.    1/10/19: She underwent R wire localized lumpectomy with Dr. 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    Oncotype DX was 4. Adjuvant chemotherapy was not recommended.    19: FNA of R thyroid nodule was benign.    She met with Dr. Cohen in medical oncology, who did discuss hormonal therapy with tamoxifen due to osteoporosis, and possible bisphosphonate therapy.    Today she  states she is doing well overall and has recovered from her operation. She denies any pain, swelling, or decreased ROM. She has chronic fatigue/fibromyalgia, ADHD, and hearing loss. She is postmenopausal. She is quite anxious. She had a cousin who  of breast cancer in her 60s. Her father had leukemia. She has no other family history of cancer. She is here today with her .    CHEMOTHERAPY HISTORY: none    RADIATION THERAPY HISTORY:  none    PAST MEDICAL/SURGICAL HISTORY:  Past Medical History:   Diagnosis Date     ADHD      Breast cancer (H) 2018    Right Breast     Chronic fatigue      Fibromyalgia      Hard of hearing      Osteoporosis      Past Surgical History:   Procedure Laterality Date     APPENDECTOMY  1967     BIOPSY BREAST NEEDLE LOCALIZATION Right 1/10/2019    Procedure: WIRE LOCALIZED RIGHT BREAST LUMPECTOMY WITH RIGHT SLNB;  Surgeon: Gema Diamond MD;  Location: [facility] OR     BREAST BIOPSY, CORE RT/LT Right 2018     CATARACT IOL, RT/LT Bilateral 2018    with lens implants per patient     THYROID BIOPSY  2019       ALLERGIES:  Allergies as of 2019 - Reviewed 2019   Allergen Reaction Noted     Vicodin [hydrocodone-acetaminophen] Anaphylaxis 2019     Dust mites  10/19/2017     Milk [lac bovis] Diarrhea 2018       MEDICATIONS:  Current Outpatient Medications   Medication Sig Dispense Refill     acetaminophen (TYLENOL) 325 MG tablet Take 325-650 mg by mouth every 6 hours as needed for mild pain       amphetamine-dextroamphetamine (ADDERALL) 10 MG tablet Take 1 tablet (10 mg) by mouth 2 times daily 60 tablet 0     B Complex Vitamins (VITAMIN B-COMPLEX PO) Take by mouth daily       calcium carbonate (TUMS) 500 MG chewable tablet Take 1 chew tab by mouth 2 times daily       magnesium oxide 200 MG TABS Take 100 mg by mouth once as needed       Multiple Vitamins-Minerals (MULTIVITAMIN PO)        RANITIDINE HCL PO Take by mouth as needed        Cholecalciferol  "(VITAMIN D-3 PO)        cyclobenzaprine (FLEXERIL) 5 MG tablet Take 1 tablet (5 mg) by mouth daily as needed for muscle spasms (Patient not taking: Reported on 2/8/2019) 20 tablet 0        FAMILY HISTORY:  Family History   Problem Relation Age of Onset     Leukemia Father        SOCIAL HISTORY:  Social History     Socioeconomic History     Marital status:      Spouse name: Not on file     Number of children: Not on file     Years of education: Not on file     Highest education level: Not on file   Social Needs     Financial resource strain: Not on file     Food insecurity - worry: Not on file     Food insecurity - inability: Not on file     Transportation needs - medical: Not on file     Transportation needs - non-medical: Not on file   Occupational History     Not on file   Tobacco Use     Smoking status: Never Smoker     Smokeless tobacco: Never Used   Substance and Sexual Activity     Alcohol use: No     Frequency: Never     Drug use: No     Sexual activity: No     Partners: Male     Birth control/protection: None   Other Topics Concern     Parent/sibling w/ CABG, MI or angioplasty before 65F 55M? No   Social History Narrative     Not on file       REVIEW OF SYSTEMS: A 10-point review of systems was obtained. Pertinent findings are noted in the HPI and are otherwise unremarkable.     PHYSICAL EXAM:  VITALS: /62 (BP Location: Left arm, Patient Position: Chair, Cuff Size: Adult Regular)   Pulse 90   Temp 97.9  F (36.6  C) (Oral)   Resp 18   Ht 1.549 m (5' 0.98\")   Wt 78 kg (172 lb)   LMP 05/31/2003 (Approximate)   SpO2 99%   BMI 32.52 kg/m     GEN: appears well, in no acute distress  HEENT: normocephalic and atraumatic, EOMI, anicteric sclerae  CV: no LE edema, no JVD  RESP: normal respiration on room air, no stridor  BREAST: well healed RUIQ breast and axillary incisions. She declined a clinical breast exam today, but there are no obvious suspicious breast masses/lesions on " inspection  ABDOMEN: soft, NT, ND  SKIN: normal color and turgor  MSK: moving all extremities well  LYMPHATICS: no R axillary, cervical or supraclavicular LAD  NEURO: CN II-XII grossly intact, no focal neurologic deficit  PSYCH: anxious but otherwise normal mood, affect, and judgment    ECOG PERFORMANCE STATUS: 0    All pertinent laboratory, imaging, and pathology findings have been reviewed.     IMPRESSION AND RECOMMENDATIONS:  In summary, Ms. Ann is a 68 year old female with R breast cancer, G1, 2.4 cm, ER/NY+, neg margins, s/p lumpectomy. We reviewed her history. Standard of care for breast conservation therapy would include whole breast radiation based on multiple randomized trials and meta-analyses noting a benefit in local recurrence and breast-cancer mortality. We discussed that based on her risk features she would also be eligible for a shortened course of radiation (hypofractionation), which would involve 16 daily treatments. This has been found in randomized studies to be efficacious and without significant added toxicity in select patients as compared to standard fractionation. We also discussed that studies in elderly (age >65-70), good-risk patients have demonstrated a continued local control benefit of adjuvant radiation, but relatively low absolute risk of recurrence over a 5-10 year period and no significant improvement in overall survival (CALGB 9343, PRIME II). However, limitations of currently available data include short follow-up time. Thus, the decision to recommend adjuvant radiation should be made with consideration to individual patient's risk factors as well as overall health and anticipated life expectancy. Given her relatively young age and good health, I recommended she have adjuvant radiation therapy. She also wishes to have adjuvant RT. The risks, benefits, and logistics of radiation were reviewed. Acute toxicities include, but are not limited to, breast tenderness, skin changes,  and fatigue. Long-term effects can include pneumonitis, permanent skin changes, cardiac toxicity, rib fracture, lymphedema, and secondary malignancy. She was given the opportunity to ask questions, which were answered to her satisfaction. I informed her that I would be leaving the department next month, so I would be handing off her care to one of my colleagues afterwards. She verbalized understanding of the findings and diagnosis and was in agreement with the management plans as documented.    After this discussion, she decided to proceed with radiation therapy. Informed consent was obtained and CT simulation performed. We will plan on delivering hypofractionated radiation to the R breast (16 treatments), starting the week of 2/18. She will then follow up with medical oncology regarding hormonal therapy and possibly bisphosphonate therapy.    Beau Evangelista M.D.  Attending Physician  Radiation Oncology  Clinic Phone: [phone]  Pager #: [phone]            Again, thank you for allowing me to participate in the care of your patient.        Sincerely,        Beau Evangelista MD

## 2019-02-11 ENCOUNTER — TELEPHONE (OUTPATIENT)
Dept: RADIATION ONCOLOGY | Facility: CLINIC | Age: 69
End: 2019-02-11

## 2019-02-11 ENCOUNTER — TELEPHONE (OUTPATIENT)
Dept: ONCOLOGY | Facility: CLINIC | Age: 69
End: 2019-02-11

## 2019-02-11 NOTE — TELEPHONE ENCOUNTER
Contacted patient to notify of scheduled daily radiation treatments.  Informed patient of radiation treatment start date of 02/15/2019 at 1400.   Reviewed with patient first day of treatment will be 30 minutes and all remaining daily radiation treatments with be 10-20 minutes.  Informed patient that weekly visits with Dr. Evangelista or Dr. Copeland will be on Mondays during the course of radiation treatment.  Patient verbalized understanding of all information, confirmed appointment dates and times and appointment schedule mailed to patient s home.    Frida Maldonado MA

## 2019-02-11 NOTE — TELEPHONE ENCOUNTER
ONCOLOGY INTAKE: Records Information      APPT INFORMATION:  Referring provider:  Santiago  Referring provider s clinic:  [facility]  Reason for visit/diagnosis:  Breast Cancer    Were the records received with the referral (via [employer])? no    Has patient been seen for any external appt for this diagnosis (enter clinic/location)? no

## 2019-02-12 ENCOUNTER — CARE COORDINATION (OUTPATIENT)
Dept: ONCOLOGY | Facility: CLINIC | Age: 69
End: 2019-02-12

## 2019-02-12 ENCOUNTER — APPOINTMENT (OUTPATIENT)
Dept: RADIATION ONCOLOGY | Facility: CLINIC | Age: 69
End: 2019-02-12
Payer: COMMERCIAL

## 2019-02-12 PROCEDURE — 77300 RADIATION THERAPY DOSE PLAN: CPT | Performed by: RADIOLOGY

## 2019-02-12 PROCEDURE — 77334 RADIATION TREATMENT AID(S): CPT | Performed by: RADIOLOGY

## 2019-02-12 PROCEDURE — 77295 3-D RADIOTHERAPY PLAN: CPT | Performed by: RADIOLOGY

## 2019-02-12 NOTE — PROGRESS NOTES
Spoke with patient and confirmed with patient appointment to see Dr. Dan on Tuesday, March 5.  Patient states she likes to be upfront with her physicians and let them know she as ADHD and so she may communicate and process information differently or sometimes at a slower pace.  Advised that we appreciated her being upfront and also Radiation nurse, Luna sent a message to us regarding her ADHD.  She looks forward to meeting Dr. Dan and team in early March.

## 2019-02-14 NOTE — TELEPHONE ENCOUNTER
Patient scheduled with Radiation Oncology on 2/15/19.    Closing encounter.    Joy Westbrook LPN

## 2019-02-15 ENCOUNTER — APPOINTMENT (OUTPATIENT)
Dept: RADIATION ONCOLOGY | Facility: CLINIC | Age: 69
End: 2019-02-15
Payer: COMMERCIAL

## 2019-02-15 PROCEDURE — 77280 THER RAD SIMULAJ FIELD SMPL: CPT | Performed by: RADIOLOGY

## 2019-02-18 ENCOUNTER — APPOINTMENT (OUTPATIENT)
Dept: RADIATION ONCOLOGY | Facility: CLINIC | Age: 69
End: 2019-02-18
Payer: COMMERCIAL

## 2019-02-18 ENCOUNTER — OFFICE VISIT (OUTPATIENT)
Dept: RADIATION ONCOLOGY | Facility: CLINIC | Age: 69
End: 2019-02-18
Payer: COMMERCIAL

## 2019-02-18 VITALS
TEMPERATURE: 98.3 F | DIASTOLIC BLOOD PRESSURE: 62 MMHG | RESPIRATION RATE: 18 BRPM | SYSTOLIC BLOOD PRESSURE: 94 MMHG | OXYGEN SATURATION: 100 % | HEART RATE: 85 BPM | BODY MASS INDEX: 32.33 KG/M2 | WEIGHT: 171 LBS

## 2019-02-18 DIAGNOSIS — Z17.0 MALIGNANT NEOPLASM OF UPPER-INNER QUADRANT OF RIGHT BREAST IN FEMALE, ESTROGEN RECEPTOR POSITIVE (H): Primary | ICD-10-CM

## 2019-02-18 DIAGNOSIS — C50.211 MALIGNANT NEOPLASM OF UPPER-INNER QUADRANT OF RIGHT BREAST IN FEMALE, ESTROGEN RECEPTOR POSITIVE (H): Primary | ICD-10-CM

## 2019-02-18 PROCEDURE — 77412 RADIATION TX DELIVERY LVL 3: CPT | Performed by: RADIOLOGY

## 2019-02-18 PROCEDURE — 99207 ZZC DROP WITH A PROCEDURE: CPT | Performed by: RADIOLOGY

## 2019-02-18 ASSESSMENT — PAIN SCALES - GENERAL: PAINLEVEL: NO PAIN (0)

## 2019-02-18 NOTE — PATIENT INSTRUCTIONS
Please contact [facility] RN with questions or concerns following today's appointment: [phone].    Thank you!

## 2019-02-18 NOTE — PROGRESS NOTES
[facility] PHYSICIANS  SPECIALIZING IN BREAKTHROUGHS  Radiation Oncology    On Treatment Visit Note      Marixa Ann      Date: 2019   MRN: [id number]   : 1950  Diagnosis: breast cancer      Reason for Visit:  On Radiation Treatment Visit     Treatment Summary to Date  Treatment Site: right breast Current Dose: 266/4256 cGy Fractions:       Chemotherapy  Chemo concurrent with radx?: No(Dr. Cohen/Dr. Dan)    Subjective:   Began RT today. She feels that she has a cold and possible L ear infection. Otherwise no complaints.    Nursing ROS:   Nutrition Alteration  Diet Type: Patient's Preference  Nutrition Note: patient has signed up for nutrition class at [place], do believe she would benefit from one on one meeting with dietician as patient has multiple dietary questions related to chronic fatigue, radiation, high cholesterol, however patient reports insurance will not cover.  This RN sent in-basket message to Aria Gil with patient update and options for dietician visit with patient's insurance.  Skin  Skin Reaction: 0 - No changes  Skin Intervention: skin changes and skin cares reviewed, Aquaphor samples provided        Cardiovascular  Respiratory effort: 1 - Normal - without distress        Psychosocial  Mood - Anxiety: 2 - Moderate mood alteration  Mood - Depression: 0 - Normal  Pyschosocial Note: chronic fatigue at baseline  Pain Assessment  0-10 Pain Scale: 0      Objective:   BP 94/62 (BP Location: Left arm, Patient Position: Chair, Cuff Size: Adult Regular)   Pulse 85   Temp 98.3  F (36.8  C) (Oral)   Resp 18   Wt 77.6 kg (171 lb)   LMP 2003 (Approximate)   SpO2 100%   BMI 32.33 kg/m    NAD  No skin changes  Tympanic membranes clear and noninflamed bilat, no excess cerumen    Labs:  CBC RESULTS:   Recent Labs   Lab Test 19  1122   WBC 8.1   RBC 4.45   HGB 12.8   HCT 41.0   MCV 92   MCH 28.8   MCHC 31.2*   RDW 14.6         ELECTROLYTES:  Recent Labs   Lab Test 01/04/19  1122      POTASSIUM 4.0   CHLORIDE 106   JONATHON 9.1   CO2 30   BUN 11   CR 0.69   GLC 91       Assessment:    Tolerating radiation therapy well.  All questions and concerns addressed.    Plan:   1. Continue current therapy. I do not see any sign of ear infection and she is afebrile; I recommended against using hydrogen peroxide in her ear which she asked about. If symptoms persist, recommended she follow up with her PCP.   2. Use aquaphor on skin.      Mosaiq chart and setup information reviewed  Ports checked    Medication Review  Med list reviewed with patient?: Yes  Med list printed and given: Offered and declined    Educational Topic Discussed  Additional Instructions: patient scheduled for return visit with Dr. Dan on 3/5/2019 to establish oncology care  Education Instructions: radiation therapy side effects: fatigue, skin changes and skin cares      Beau Evangelista MD

## 2019-02-18 NOTE — NURSING NOTE
Teaching Flowsheet   Relevant Diagnosis: breast cancer  Teaching Topic: radiation therapy     Person(s) involved in teaching:   Patient     Motivation Level:  Asks Questions: Yes  Eager to Learn: Yes  Cooperative: Yes  Receptive (willing/able to accept information): Yes  Any cultural factors/[employer] beliefs that may influence understanding or compliance? No       Patient demonstrates understanding of the following:  Reason for the appointment, diagnosis and treatment plan: Yes  Knowledge of proper use of medications and conditions for which they are ordered (with special attention to potential side effects or drug interactions): Yes  Which situations necessitate calling provider and whom to contact: Yes       Teaching Concerns Addressed:   Comments: Radiation therapy side effects: fatigue, skin changes and skin cares     Proper use and care of  (medical equip, care aids, etc.): NA  Nutritional needs and diet plan: Yes  Pain management techniques: Yes  Wound Care: Yes  How and/when to access community resources: Yes     Instructional Materials Used/Given: Radiation therapy educational handouts: fatigue, skin changes and skin cares     Time spent with patient: 15 minutes.

## 2019-02-19 ENCOUNTER — APPOINTMENT (OUTPATIENT)
Dept: RADIATION ONCOLOGY | Facility: CLINIC | Age: 69
End: 2019-02-19
Payer: COMMERCIAL

## 2019-02-19 PROCEDURE — 77412 RADIATION TX DELIVERY LVL 3: CPT | Performed by: RADIOLOGY

## 2019-02-20 ENCOUNTER — APPOINTMENT (OUTPATIENT)
Dept: RADIATION ONCOLOGY | Facility: CLINIC | Age: 69
End: 2019-02-20
Payer: COMMERCIAL

## 2019-02-20 PROCEDURE — 77412 RADIATION TX DELIVERY LVL 3: CPT | Performed by: RADIOLOGY

## 2019-02-21 ENCOUNTER — APPOINTMENT (OUTPATIENT)
Dept: RADIATION ONCOLOGY | Facility: CLINIC | Age: 69
End: 2019-02-21
Payer: COMMERCIAL

## 2019-02-21 PROCEDURE — 77412 RADIATION TX DELIVERY LVL 3: CPT | Performed by: RADIOLOGY

## 2019-02-22 ENCOUNTER — APPOINTMENT (OUTPATIENT)
Dept: RADIATION ONCOLOGY | Facility: CLINIC | Age: 69
End: 2019-02-22
Payer: COMMERCIAL

## 2019-02-22 PROCEDURE — 77336 RADIATION PHYSICS CONSULT: CPT | Performed by: RADIOLOGY

## 2019-02-22 PROCEDURE — 77417 THER RADIOLOGY PORT IMAGE(S): CPT | Performed by: RADIOLOGY

## 2019-02-22 PROCEDURE — 77427 RADIATION TX MANAGEMENT X5: CPT | Performed by: RADIOLOGY

## 2019-02-22 PROCEDURE — 77412 RADIATION TX DELIVERY LVL 3: CPT | Performed by: RADIOLOGY

## 2019-02-25 ENCOUNTER — OFFICE VISIT (OUTPATIENT)
Dept: RADIATION ONCOLOGY | Facility: CLINIC | Age: 69
End: 2019-02-25
Payer: COMMERCIAL

## 2019-02-25 ENCOUNTER — APPOINTMENT (OUTPATIENT)
Dept: RADIATION ONCOLOGY | Facility: CLINIC | Age: 69
End: 2019-02-25
Payer: COMMERCIAL

## 2019-02-25 VITALS
BODY MASS INDEX: 32.52 KG/M2 | OXYGEN SATURATION: 99 % | SYSTOLIC BLOOD PRESSURE: 103 MMHG | DIASTOLIC BLOOD PRESSURE: 66 MMHG | HEART RATE: 99 BPM | WEIGHT: 172 LBS | TEMPERATURE: 97.3 F | RESPIRATION RATE: 18 BRPM

## 2019-02-25 DIAGNOSIS — C50.211 MALIGNANT NEOPLASM OF UPPER-INNER QUADRANT OF RIGHT BREAST IN FEMALE, ESTROGEN RECEPTOR POSITIVE (H): Primary | ICD-10-CM

## 2019-02-25 DIAGNOSIS — Z17.0 MALIGNANT NEOPLASM OF UPPER-INNER QUADRANT OF RIGHT BREAST IN FEMALE, ESTROGEN RECEPTOR POSITIVE (H): Primary | ICD-10-CM

## 2019-02-25 PROCEDURE — 99207 ZZC DROP WITH A PROCEDURE: CPT | Performed by: RADIOLOGY

## 2019-02-25 PROCEDURE — 77412 RADIATION TX DELIVERY LVL 3: CPT | Performed by: RADIOLOGY

## 2019-02-25 ASSESSMENT — PAIN SCALES - GENERAL: PAINLEVEL: NO PAIN (0)

## 2019-02-25 NOTE — PATIENT INSTRUCTIONS
Please contact [facility] RN with questions or concerns following today's appointment: [phone].    Thank you!

## 2019-02-25 NOTE — PROGRESS NOTES
[facility] PHYSICIANS  SPECIALIZING IN BREAKTHROUGHS  Radiation Oncology    On Treatment Visit Note      Marixa Ann      Date: 2019   MRN: [id number]   : 1950  Diagnosis: breast cancer      Reason for Visit:  On Radiation Treatment Visit     Treatment Summary to Date  Treatment Site: right breast Current Dose: 1596/4256 cGy Fractions:       Chemotherapy  Chemo concurrent with radx?: No(Dr. Cohen/Dr. Dan)    Subjective:     Has experienced increased fatigue this week.  No skin changes to report.    Nursing ROS:   Nutrition Alteration  Diet Type: Patient's Preference  Nutrition Note: dietician has been in contact with patient, scheduled for future nutrition class  Skin  Skin Reaction: 0 - No changes  Skin Intervention: patient using Aquaphor two times daily      Cardiovascular  Respiratory effort: 1 - Normal - without distress      Psychosocial  Mood - Anxiety: 2 - Moderate mood alteration  Mood - Depression: 0 - Normal  Pyschosocial Note: chronic fatigue at baseline  Pain Assessment  0-10 Pain Scale: 0      Objective:   /66 (BP Location: Left arm, Patient Position: Chair, Cuff Size: Adult Regular)   Pulse 99   Temp 97.3  F (36.3  C) (Oral)   Resp 18   Wt 78 kg (172 lb)   LMP 2003 (Approximate)   SpO2 99%   BMI 32.52 kg/m    Gen: Appears well, in no acute distress  Skin: No erythema    Labs:  CBC RESULTS:   Recent Labs   Lab Test 19  1122   WBC 8.1   RBC 4.45   HGB 12.8   HCT 41.0   MCV 92   MCH 28.8   MCHC 31.2*   RDW 14.6        ELECTROLYTES:  Recent Labs   Lab Test 19  1122      POTASSIUM 4.0   CHLORIDE 106   JONATHON 9.1   CO2 30   BUN 11   CR 0.69   GLC 91       Assessment:    Tolerating radiation therapy well.  All questions and concerns addressed.    Toxicities:  Fatigue: Grade 1: Fatigue relieved by rest  Dermatitis: Grade 0: No toxicity    Plan:   1. Continue current therapy.    2. Fatigue is more than her baseline.  Discussed  fluids and water intake.      Mosaiq chart and setup information reviewed  Ports checked    Medication Review  Med list reviewed with patient?: Yes  Med list printed and given: Offered and declined    Educational Topic Discussed  Additional Instructions: patient scheduled for return visit with Dr. Dan on 3/5/2019 to establish oncology care  Education Instructions: reviewed continued skin cares        Lynette Copeland MD    Please do not send letter to referring physician.

## 2019-02-26 ENCOUNTER — APPOINTMENT (OUTPATIENT)
Dept: RADIATION ONCOLOGY | Facility: CLINIC | Age: 69
End: 2019-02-26
Payer: COMMERCIAL

## 2019-02-26 PROCEDURE — 77412 RADIATION TX DELIVERY LVL 3: CPT | Performed by: RADIOLOGY

## 2019-02-27 ENCOUNTER — APPOINTMENT (OUTPATIENT)
Dept: RADIATION ONCOLOGY | Facility: CLINIC | Age: 69
End: 2019-02-27
Payer: COMMERCIAL

## 2019-02-27 ENCOUNTER — MYC REFILL (OUTPATIENT)
Dept: FAMILY MEDICINE | Facility: CLINIC | Age: 69
End: 2019-02-27

## 2019-02-27 DIAGNOSIS — F90.0 ATTENTION DEFICIT HYPERACTIVITY DISORDER (ADHD), PREDOMINANTLY INATTENTIVE TYPE: ICD-10-CM

## 2019-02-27 PROCEDURE — 77412 RADIATION TX DELIVERY LVL 3: CPT | Performed by: RADIOLOGY

## 2019-02-27 NOTE — TELEPHONE ENCOUNTER
Requested Prescriptions   Pending Prescriptions Disp Refills     amphetamine-dextroamphetamine (ADDERALL) 10 MG tablet 60 tablet 0     Sig: Take 1 tablet (10 mg) by mouth 2 times daily    There is no refill protocol information for this order          amphetamine-dextroamphetamine (ADDERALL) 10 MG tablet      Last Written Prescription Date:  1/29/19  Last Fill Quantity: 60,   # refills: 0  Last Office Visit: 1/4/19  Future Office visit:       Routing refill request to provider for review/approval because:  Drug not on the [facility], [facility] or [facility] refill protocol or controlled substance

## 2019-02-28 ENCOUNTER — APPOINTMENT (OUTPATIENT)
Dept: RADIATION ONCOLOGY | Facility: CLINIC | Age: 69
End: 2019-02-28
Payer: COMMERCIAL

## 2019-02-28 PROCEDURE — 77412 RADIATION TX DELIVERY LVL 3: CPT | Performed by: RADIOLOGY

## 2019-02-28 RX ORDER — DEXTROAMPHETAMINE SACCHARATE, AMPHETAMINE ASPARTATE, DEXTROAMPHETAMINE SULFATE AND AMPHETAMINE SULFATE 2.5; 2.5; 2.5; 2.5 MG/1; MG/1; MG/1; MG/1
10 TABLET ORAL 2 TIMES DAILY
Qty: 60 TABLET | Refills: 0 | Status: SHIPPED | OUTPATIENT
Start: 2019-02-28 | End: 2019-03-29

## 2019-03-01 ENCOUNTER — APPOINTMENT (OUTPATIENT)
Dept: RADIATION ONCOLOGY | Facility: CLINIC | Age: 69
End: 2019-03-01
Payer: COMMERCIAL

## 2019-03-01 PROCEDURE — 77412 RADIATION TX DELIVERY LVL 3: CPT | Performed by: RADIOLOGY

## 2019-03-01 PROCEDURE — 77427 RADIATION TX MANAGEMENT X5: CPT | Performed by: RADIOLOGY

## 2019-03-01 PROCEDURE — 77336 RADIATION PHYSICS CONSULT: CPT | Performed by: RADIOLOGY

## 2019-03-01 PROCEDURE — 77417 THER RADIOLOGY PORT IMAGE(S): CPT | Performed by: RADIOLOGY

## 2019-03-01 NOTE — TELEPHONE ENCOUNTER
Written rx is signed by Dr. Garcia and will be deliver to the  this afternoon for pickup after 3p.  Gilberto Bae,  [facility]

## 2019-03-03 NOTE — TELEPHONE ENCOUNTER
RECORDS STATUS - ALL OTHER DIAGNOSIS      RECORDS RECEIVED FROM: [facility] - in [facility] (internal referral)   DATE RECEIVED: 3/3/19   NOTES STATUS DETAILS   OFFICE NOTE from referring provider 1/31/19 In [facility]     OFFICE NOTE from medical oncologist 1/31/19 In [facility]     DISCHARGE SUMMARY from hospital In [facility] In [facility]     DISCHARGE REPORT from the ER In [facility] In [facility]     OPERATIVE REPORT 1/31/19 Thyroid Bx       1/10/19 [facility]         12/26/18 Breast Bx In Epic  In Epic  In Epic     MEDICATION LIST In Epic In [facility]     CLINICAL TRIAL TREATMENTS TO DATE In [facility] In [facility]     LABS     PATHOLOGY REPORTS 1/31/19 Thyroid Bx     1/10/19 [facility]         12/26/18 Breast Bx In  Epic                                                     In Epic                                                   In Epic          ANYTHING RELATED TO DIAGNOSIS In Epic In Epic     GENONOMIC TESTING     TYPE: In Epic In Epic   IMAGING (NEED IMAGES & REPORT)     CT SCANS 12/13/17 Chest In [facility]   MRI None None   MAMMO 1/10/19  12/26/18  12/17/18  12/3/18 In Epic  In [facility]  In Epic  In Epic   ULTRASOUND 1/31/19 Thyroid Bx  1/18/19 Thyroid  1/10/19 Breast  12/17/18 Breast In Epic  In Epic  In Epic  In Epic   PET None None

## 2019-03-04 ENCOUNTER — OFFICE VISIT (OUTPATIENT)
Dept: RADIATION ONCOLOGY | Facility: CLINIC | Age: 69
End: 2019-03-04
Payer: COMMERCIAL

## 2019-03-04 ENCOUNTER — APPOINTMENT (OUTPATIENT)
Dept: RADIATION ONCOLOGY | Facility: CLINIC | Age: 69
End: 2019-03-04
Payer: COMMERCIAL

## 2019-03-04 VITALS
RESPIRATION RATE: 18 BRPM | HEART RATE: 86 BPM | DIASTOLIC BLOOD PRESSURE: 58 MMHG | TEMPERATURE: 97.4 F | WEIGHT: 170 LBS | OXYGEN SATURATION: 100 % | SYSTOLIC BLOOD PRESSURE: 106 MMHG | BODY MASS INDEX: 32.14 KG/M2

## 2019-03-04 DIAGNOSIS — C50.211 MALIGNANT NEOPLASM OF UPPER-INNER QUADRANT OF RIGHT BREAST IN FEMALE, ESTROGEN RECEPTOR POSITIVE (H): Primary | ICD-10-CM

## 2019-03-04 DIAGNOSIS — Z17.0 MALIGNANT NEOPLASM OF UPPER-INNER QUADRANT OF RIGHT BREAST IN FEMALE, ESTROGEN RECEPTOR POSITIVE (H): Primary | ICD-10-CM

## 2019-03-04 PROCEDURE — 99207 ZZC DROP WITH A PROCEDURE: CPT | Performed by: RADIOLOGY

## 2019-03-04 PROCEDURE — 77412 RADIATION TX DELIVERY LVL 3: CPT | Performed by: RADIOLOGY

## 2019-03-04 ASSESSMENT — PAIN SCALES - GENERAL: PAINLEVEL: MILD PAIN (2)

## 2019-03-04 NOTE — PROGRESS NOTES
[facility] PHYSICIANS  SPECIALIZING IN BREAKTHROUGHS  Radiation Oncology    On Treatment Visit Note      Marixa Ann      Date: 3/4/2019   MRN: [id number]   : 1950  Diagnosis: breast cancer      Reason for Visit:  On Radiation Treatment Visit     Treatment Summary to Date  Treatment Site: right breast Current Dose: 2926/4256 cGy Fractions:       Chemotherapy  Chemo concurrent with radx?: No(Dr. Cohen/Dr. Dan)    Subjective:     Minimal skin changes this week.  However is having extreme fatigue, characterized by lying in bed for more than 50% of the day.  Also having difficulty with ADLs.  Currently prescribed to take Adderall 2 doses per day however is only taking 1.  Otherwise no new radiation therapy related complaints.    Nursing ROS:   Nutrition Alteration  Diet Type: Patient's Preference  Skin  Skin Note: Patient reports that her skin feel warm, intermittent nipple pain and redness.  Patient reports using aquaphor twice a day      Psychosocial  Pyschosocial Note: Patient states she feels like a zombie and all she does is sleep and pee.   Patient energy low all the time  Pain Assessment  0-10 Pain Scale: 2  Pain Note: Patient reports pain over whole body      Objective:   /58 (BP Location: Left arm, Patient Position: Sitting, Cuff Size: Adult Regular)   Pulse 86   Temp 97.4  F (36.3  C) (Oral)   Resp 18   Wt 77.1 kg (170 lb)   LMP 2003 (Approximate)   SpO2 100%   BMI 32.14 kg/m    Gen: Appears well, in no acute distress  Skin: Mild diffuse erythema over treatment field    Labs:  CBC RESULTS:   Recent Labs   Lab Test 19  1122   WBC 8.1   RBC 4.45   HGB 12.8   HCT 41.0   MCV 92   MCH 28.8   MCHC 31.2*   RDW 14.6        ELECTROLYTES:  Recent Labs   Lab Test 19  1122      POTASSIUM 4.0   CHLORIDE 106   JONATHON 9.1   CO2 30   BUN 11   CR 0.69   GLC 91       Assessment:    Tolerating radiation therapy well.  All questions and concerns  addressed.  Ms. Ann is fatigued however denies any signs/symptoms of depression or suicidal ideation.    Toxicities:  Fatigue: Grade 2: Fatigue not relieved by rest; limiting instrumental ADL  Pain: Grade 0: No toxicity  Dermatitis: Grade 1: Faint erythema or dry desquamation    Plan:   1. Continue current therapy.    2. Recommend taking dose of Adderall as previously prescribed.  3. Encouraged rest and hydration.  We will continue to follow closely.    Mosaiq chart and setup information reviewed  Ports checked    Medication Review  Med list reviewed with patient?: Yes  Med list printed and given: Offered and declined             Lynette Copeland MD    Please do not send letter to referring physician.

## 2019-03-05 ENCOUNTER — OFFICE VISIT (OUTPATIENT)
Dept: RADIATION ONCOLOGY | Facility: CLINIC | Age: 69
End: 2019-03-05
Payer: COMMERCIAL

## 2019-03-05 ENCOUNTER — ONCOLOGY VISIT (OUTPATIENT)
Dept: ONCOLOGY | Facility: CLINIC | Age: 69
End: 2019-03-05
Payer: COMMERCIAL

## 2019-03-05 ENCOUNTER — PRE VISIT (OUTPATIENT)
Dept: ONCOLOGY | Facility: CLINIC | Age: 69
End: 2019-03-05

## 2019-03-05 VITALS
TEMPERATURE: 98.2 F | HEIGHT: 61 IN | BODY MASS INDEX: 32.34 KG/M2 | SYSTOLIC BLOOD PRESSURE: 115 MMHG | WEIGHT: 171.31 LBS | HEART RATE: 109 BPM | OXYGEN SATURATION: 98 % | RESPIRATION RATE: 18 BRPM | DIASTOLIC BLOOD PRESSURE: 64 MMHG

## 2019-03-05 DIAGNOSIS — C50.211 MALIGNANT NEOPLASM OF UPPER-INNER QUADRANT OF RIGHT BREAST IN FEMALE, ESTROGEN RECEPTOR POSITIVE (H): Primary | ICD-10-CM

## 2019-03-05 DIAGNOSIS — Z17.0 MALIGNANT NEOPLASM OF UPPER-INNER QUADRANT OF RIGHT BREAST IN FEMALE, ESTROGEN RECEPTOR POSITIVE (H): Primary | ICD-10-CM

## 2019-03-05 DIAGNOSIS — M25.562 LEFT KNEE PAIN, UNSPECIFIED CHRONICITY: ICD-10-CM

## 2019-03-05 DIAGNOSIS — M81.8 OTHER OSTEOPOROSIS WITHOUT CURRENT PATHOLOGICAL FRACTURE: ICD-10-CM

## 2019-03-05 DIAGNOSIS — Z11.59 NEED FOR HEPATITIS C SCREENING TEST: ICD-10-CM

## 2019-03-05 LAB
ALBUMIN SERPL-MCNC: 3.3 G/DL (ref 3.4–5)
ALP SERPL-CCNC: 105 U/L (ref 40–150)
ALT SERPL W P-5'-P-CCNC: 21 U/L (ref 0–50)
AST SERPL W P-5'-P-CCNC: 12 U/L (ref 0–45)
BILIRUB DIRECT SERPL-MCNC: <0.1 MG/DL (ref 0–0.2)
BILIRUB SERPL-MCNC: 0.2 MG/DL (ref 0.2–1.3)
CRP SERPL-MCNC: 30.5 MG/L (ref 0–8)
D DIMER PPP FEU-MCNC: 1.8 UG/ML FEU (ref 0–0.5)
ERYTHROCYTE [SEDIMENTATION RATE] IN BLOOD BY WESTERGREN METHOD: 40 MM/H (ref 0–30)
PROT SERPL-MCNC: 7.3 G/DL (ref 6.8–8.8)

## 2019-03-05 PROCEDURE — 36415 COLL VENOUS BLD VENIPUNCTURE: CPT | Performed by: INTERNAL MEDICINE

## 2019-03-05 PROCEDURE — 80076 HEPATIC FUNCTION PANEL: CPT | Performed by: INTERNAL MEDICINE

## 2019-03-05 PROCEDURE — 99215 OFFICE O/P EST HI 40 MIN: CPT | Performed by: INTERNAL MEDICINE

## 2019-03-05 PROCEDURE — 82565 ASSAY OF CREATININE: CPT | Performed by: INTERNAL MEDICINE

## 2019-03-05 PROCEDURE — 86140 C-REACTIVE PROTEIN: CPT | Performed by: INTERNAL MEDICINE

## 2019-03-05 PROCEDURE — 86803 HEPATITIS C AB TEST: CPT | Performed by: FAMILY MEDICINE

## 2019-03-05 PROCEDURE — 85379 FIBRIN DEGRADATION QUANT: CPT | Performed by: INTERNAL MEDICINE

## 2019-03-05 PROCEDURE — 82306 VITAMIN D 25 HYDROXY: CPT | Performed by: INTERNAL MEDICINE

## 2019-03-05 PROCEDURE — 82310 ASSAY OF CALCIUM: CPT | Performed by: INTERNAL MEDICINE

## 2019-03-05 PROCEDURE — 85652 RBC SED RATE AUTOMATED: CPT | Performed by: INTERNAL MEDICINE

## 2019-03-05 PROCEDURE — 77412 RADIATION TX DELIVERY LVL 3: CPT | Performed by: RADIOLOGY

## 2019-03-05 ASSESSMENT — MIFFLIN-ST. JEOR: SCORE: 1254.19

## 2019-03-05 ASSESSMENT — PAIN SCALES - GENERAL: PAINLEVEL: NO PAIN (0)

## 2019-03-05 NOTE — LETTER
3/5/2019        RE: Marixa Ann  [id number] [facility] [id number]        Closing Chart       Sincerely,        Therapist Radiation

## 2019-03-05 NOTE — PROGRESS NOTES
Oncology Consultation:  Date on this visit: 3/5/2019    Marixa Ann  is referred by Dr.Sara Diamond for an oncology consultation. She requires evaluation for recent diagnosis of right sided breast cancer.    Primary Care Physician: Jackie Watts   Radiation Oncologist: Dr. Copeland    History Of Present Illness:  Ms. Ann is a 68 year old female with Hx of fibromyalgia and ADHD and chronic fatigue,  who presents for evaluation of recently diagnosed ER+CT+ HER2 Darlyn negative early stage right sided breast cancer. She was seen by Dr. Cohen once on 1/31 before the OncotypeDx results were available and wanted to see me for a second opinion and long term medical oncology follow-up care. She was diagnosed with breast cancer in 12/2018 when she was found to have an abnormality on a screening mammogram. This was done on 12/3/2019 and showed a possible developing asymmetry right breast around 4:00 position posterior depth in the right breast. A diagnostic mammogram confirmed a 17 mm mass in the right medial breast, posterior depth. R breast US showed In the right breast at 2:00, 9 cm from nipple, there was an irregular hypoechoic mass, 12 x 9 x 14 mm. Right axilla US scan revealed normal appearing lymph nodes. Contrast mammogram on 12/26/2018 showed a 1.9 cm enhancing mass in the right medial breast. She proceeded to undergo a core needle biopsy of the suspicious mass on 12/26/2019. this showed a Carol grade 1 invasive ductal carcinoma, ER positive in 98%, CT positive in 94% of cells, and HER-2/darlyn negative with HER2 to JCARLOS 17 ratio of 1.2.  She elected to undergo right lumpectomy and was taken to the operating room on January 10, 2019 by Dr. Diamond.  Pathology from the surgery demonstrated invasive ductal carcinoma, [place] grade 1, 2.4 cm, with associated intermediate grade DCIS.  No LVI was identified.  Margins were clear of invasive carcinoma and DCIS.  Three sentinel lymph nodes were negative  "for malignancy.  Oncotype Dx score returned low risk at 4, correlating with 3% risk of disease recurrence at 9 years with aromatase inhibitor or tamoxifen alone.  There was no benefit from chemotherapy.  She also had a DEXA scan in April 2018 which showed findings consistent with osteoporosis, with the most negative T score of -2.9 in the left femoral neck and a T score of -2.5 in the lumbar spine.  She is not on any biphosphonate so far.   She met with Dr. Copeland from radiation oncology, and has been undergoing radiation to the right chest wall, scheduled to complete on March 11, 2019.   She is on vitamin D and calcium supplementation.  She has a history of ADHD and anxiety and is on Adderall.  She has a history of bilateral cataract surgery and reports she has an artificial lens in place in both eyes.  She reports her vision in the right eye has been more cloudy recently.  She has had this happen to her left eye in the past per patient and had it cleaned out, per patient. She thinks her ophthalmologist is Dr. Santiago with [facility] but I was only able to find Dr. Haider Garces on [facility] website.  She also reports she has advanced osteoarthritis in her both knees and had an injection in her left knee before.  She anticipates she needs bilateral knee surgery especially knee replacement on the left which has been her most painful joint.  She reports she is a \"LAVONNE\" child, and is concerned about the risk  gynecologic malignancy.  She has not followed up with a gynecologist in the past couple of years.  She is very fatigued all the time and leads a sedentary lifestyle and walks very little during the day.  She also has varicose veins.  She was recently evaluated at [facility] in January 2019, with complaints of shortness of breath and cough.  This was 2 days following her right lumpectomy surgery, on 1/12/2019. At that time she had a negative troponin, normal creatinine and electrolytes, an " elevated d-dimer of 382 (upper limit of normal for reference range is 230).  She also had a CT pulmonary angiogram on January 12, 2019 which showed no evidence of pulmonary embolism.  There was mild cardiomegaly noted stable compared to CT from May 2015, stable hiatal hernia, and an asymmetric prominent right thyroid lobe with a dominant 4.1 cm thyroid nodule on the right side.  She then underwent a thyroid US (ordered by Dr. Kovacs) on 1/18/2019 which showed bilateral thyroid nodules with the largest nodule is on the right measuring up to 4.5 cm. Fine needle aspiration of the right thyroid nodule on 1/31/2019 showed findings c/w benign thyroid nodule.   She has been chronically fatigued for the last 10 years, worse in the last 4 years per patient. Her TSH was checked in 2017 and was within normal limits.   In a ddition, a complete 12 point  review of systems is negative.          Past Medical/Surgical History:  Past Medical History:   Diagnosis Date     ADHD      Breast cancer (H) 12/26/2018    Right Breast     Chronic fatigue      Fibromyalgia      Hard of hearing      Osteoporosis      Past Surgical History:   Procedure Laterality Date     APPENDECTOMY  1967     BIOPSY BREAST NEEDLE LOCALIZATION Right 1/10/2019    Procedure: WIRE LOCALIZED RIGHT BREAST LUMPECTOMY WITH RIGHT SLNB;  Surgeon: Gema Diamond MD;  Location: [facility] OR     BREAST BIOPSY, CORE RT/LT Right 12/26/2018     CATARACT IOL, RT/LT Bilateral 2018    with lens implants per patient     THYROID BIOPSY  01/31/2019       Allergies:  Allergies as of 03/05/2019 - Reviewed 03/05/2019   Allergen Reaction Noted     Vicodin [hydrocodone-acetaminophen] Anaphylaxis 01/24/2019     Dust mites  10/19/2017     Milk [lac bovis] Diarrhea 12/31/2018     Current Medications:  Current Outpatient Medications   Medication Sig Dispense Refill     acetaminophen (TYLENOL) 325 MG tablet Take 325-650 mg by mouth every 6 hours as needed for mild pain        "amphetamine-dextroamphetamine (ADDERALL) 10 MG tablet Take 1 tablet (10 mg) by mouth 2 times daily 60 tablet 0     B Complex Vitamins (VITAMIN B-COMPLEX PO) Take by mouth daily       calcium carbonate (TUMS) 500 MG chewable tablet Take 1 chew tab by mouth 2 times daily       Calcium-Magnesium-Zinc 333-133-5 MG TABS per tablet Take 1 tablet by mouth daily       Cholecalciferol (VITAMIN D-3 PO)        KRILL OIL PO Take by mouth daily       magnesium oxide 200 MG TABS Take 100 mg by mouth once as needed       Multiple Vitamins-Minerals (MULTIVITAMIN PO)        RANITIDINE HCL PO Take by mouth as needed        cyclobenzaprine (FLEXERIL) 5 MG tablet Take 1 tablet (5 mg) by mouth daily as needed for muscle spasms (Patient not taking: Reported on 2/8/2019) 20 tablet 0      Family History:  Family History   Problem Relation Age of Onset     Leukemia Father      Breast Cancer Cousin 50        Maternal Female 1st Cousin     Social History:  Social History     Socioeconomic History     Marital status:    Tobacco Use     Smoking status: Never Smoker     Smokeless tobacco: Never Used   Substance and Sexual Activity     Alcohol use: No     Frequency: Never     Drug use: No     Physical Exam:  /64 (BP Location: Left arm)   Pulse 109   Temp 98.2  F (36.8  C) (Oral)   Resp 18   Ht 1.549 m (5' 0.98\")   Wt 77.7 kg (171 lb 5 oz)   LMP 05/31/2003 (Approximate)   SpO2 98%   BMI 32.39 kg/m       GENERAL APPEARANCE: Obese middle-aged woman in no apparent distress     HENT: Mouth without ulcers or lesions     NECK: no adenopathy, no asymmetry or masses     LYMPHATICS: No cervical, supraclavicular, axillary lymphadenopathy     RESP: lungs clear to auscultation - no rales, rhonchi or wheezes     CARDIOVASCULAR: regular rates and rhythm, normal S1 S2, no S3 or S4 and no murmur.     ABDOMEN:  soft, nontender, no HSM or masses and bowel sounds normal     MUSCULOSKELETAL: extremities normal- no gross deformities noted, no " evidence of inflammation in joints, FROM in all extremities. No edema b/l LE.  Bilateral varicose veins right greater than left     SKIN: no suspicious lesions or rashes     PSYCHIATRIC: mentation appears normal and affect normal  Neurologic: Alert and oriented x3, gait intact  Breast: Right breast-incisions well-healed, skin changes consistent with radiation therapy but no skin desquamation.  No breast masses bilaterally.  No axillary lymphadenopathy bilaterally.  Laboratory/Imaging Studies  Results for orders placed or performed in visit on 03/05/19   Hepatic panel   Result Value Ref Range    Bilirubin Direct <0.1 0.0 - 0.2 mg/dL    Bilirubin Total 0.2 0.2 - 1.3 mg/dL    Albumin 3.3 (L) 3.4 - 5.0 g/dL    Protein Total 7.3 6.8 - 8.8 g/dL    Alkaline Phosphatase 105 40 - 150 U/L    ALT 21 0 - 50 U/L    AST 12 0 - 45 U/L   Erythrocyte sedimentation rate auto   Result Value Ref Range    Sed Rate 40 (H) 0 - 30 mm/h   CRP inflammation   Result Value Ref Range    CRP Inflammation 30.5 (H) 0.0 - 8.0 mg/L     Component      Latest Ref Rng & Units 1/4/2019   Sodium      133 - 144 mmol/L 141   Potassium      3.4 - 5.3 mmol/L 4.0   Chloride      94 - 109 mmol/L 106   Carbon Dioxide      20 - 32 mmol/L 30   Anion Gap      3 - 14 mmol/L 5   Glucose      70 - 99 mg/dL 91   Urea Nitrogen      7 - 30 mg/dL 11   Creatinine      0.52 - 1.04 mg/dL 0.69   GFR Estimate      >60 mL/min/1.73:m2 >90   GFR Estimate If Black      >60 mL/min/1.73:m2 >90   Calcium      8.5 - 10.1 mg/dL 9.1   WBC      4.0 - 11.0 10e9/L 8.1   RBC Count      3.8 - 5.2 10e12/L 4.45   Hemoglobin      11.7 - 15.7 g/dL 12.8   Hematocrit      35.0 - 47.0 % 41.0   MCV      78 - 100 fl 92   MCH      26.5 - 33.0 pg 28.8   MCHC      31.5 - 36.5 g/dL 31.2 (L)   RDW      10.0 - 15.0 % 14.6   Platelet Count      150 - 450 10e9/L 338     ASSESSMENT/PLAN:  Marixa is a very pleasant 68-year-old postmenopausal woman with recent diagnosis of prognostic stage IB (pT2N0)  [place] grade 1 ER+ VA+ Her2 negative invasive ductal right sided breast cancer, s/p R lumpectomy on 1/10/2019 and scheduled to complete right chest wall radiation on 3/11/2019. Oncotype Dx score returned low risk at 4, correlating with 3% risk of disease recurrence at 9 years with the use  aromatase inhibitor or tamoxifen alone. There is no benefit to systemic chemotherapy in this setting.    1. Breast cancer - we needed to make a decision regarding systemic hormonal therapy. She is not an ideal candidate for an aromatase inhibitor since she already has osteoporosis. However, she is also not a good candidate for Tamoxifen use due to her increased risk of DVT at baseline- with recently documented elevated d-dimer (she also has an elevated ESR and CRP), sedentary lifestyle, obesity and varicose veins. Additionally, she is planning to undergo b/l knee replacement in the near future which would put her at a further increased risk of DVT in the post-op period.  At this time we'll repeat d-dimer and other inflammatory markers such as CRP and ESR. These are not usually elevated in patients with  fibromyalgia. Also, check baseline LFTs.  The patient is very worried about increased risk of DVT with Tamoxifen. At the same time she is not comfortable with observation alone for her breast cancer (even though her risk of disease recurrence even without systemic hormonal therapy would be small, estimated on the order of 10% or less and we have reviewed that with the patient as well) and would like to be on systemic hormonal therapy to minimize her risk of distant breast cancer recurrence.  She does have baseline osteoporosis. We'll check her baseline vitamin D level. Calcium is within normal limits. I will communicate with Dr. Kovacs to see whether she is agreeable with Prolia therapy subq q 6 months for patient's osteoporosis. In that situation we could consider trying aromatase inhibitor with close DEXA scan monitoring.  "Aromatase inhibitor has less of a risk of an associated DVT than Tamoxifen. If patient's inflammatory markers and d-dimer are still elevated on repeat testing, then I think an aromatase inhibitor with concurrent aggressive osteoporosis treatment would be a better choice than Tamoxifen for this patient though neither is ideal.  We'll see the patient back in 3-4 weeks, after she completes radiation and the results of her workup are available, and after I have had a chance to discuss her case with Dr. Kovacs. We will make a final systemic hormonal therapy recommendations at that time. Marixa is comfortable with that plan.    2. LAVONNE daughter- She is a \"LAVONNE\" child although I did not find an increased risk of endometrial cancer in \"LAVONNE\" children such as would be the case with Tamoxifen use, but \"LAVONNE\" children have a higher risk of JENNY and cervicovaginal clear cell adenocarcinoma. Therefore, I do not think Tamoxifen use would be contraindicated in LAVONNE daughters should she need it in the future.  Yearly gynecologic examination is recommended for LAVONNE daughters and she is overdue, and we'll refer her to gynecologist for an annual pelvic exam.    3. B/L knee pain L>R, Hx of severe OA per patient- refer to orthopedic surgery  4. R eye blurry vision, Hx of b/l cataract surgery- refer to ophthalmology. She prefers to follow-up with her ophthalmologist at [facility]. Tamoxifen increases the risk of cataracts in the long term.  She will need routine serial ophthalmologic exams if she is ever on Tamoxifen in the future.     It was my pleasure to meet Marixa. She had multiple questions all of which were answered.  At the end of our visit patient verbalized understanding and concurred with the plan.    "

## 2019-03-05 NOTE — NURSING NOTE
"Oncology Rooming Note    March 5, 2019 1:02 PM   Marixa Ann is a 68 year old female who presents for:    Chief Complaint   Patient presents with     Oncology Clinic Visit     New Patient     Initial Vitals: /64 (BP Location: Left arm)   Pulse 109   Temp 98.2  F (36.8  C) (Oral)   Resp 18   Ht 1.549 m (5' 0.98\")   Wt 77.7 kg (171 lb 5 oz)   LMP 05/31/2003 (Approximate)   SpO2 98%   BMI 32.39 kg/m   Estimated body mass index is 32.39 kg/m  as calculated from the following:    Height as of this encounter: 1.549 m (5' 0.98\").    Weight as of this encounter: 77.7 kg (171 lb 5 oz). Body surface area is 1.83 meters squared.  No Pain (0) Comment: Data Unavailable   Patient's last menstrual period was 05/31/2003 (approximate).  Allergies reviewed: Yes  Medications reviewed: Yes    Medications: Medication refills not needed today.  Pharmacy name entered into [facility]:    [id number] - [place], [place] - [address]  WRITTEN PRESCRIPTION REQUESTED    Marialuisa Reed LPN              "

## 2019-03-05 NOTE — LETTER
3/5/2019         RE: Marixa Ann  [address] [facility] [address]        Dear Colleague,    Thank you for referring your patient, Marixa Ann, to the [facility]. Please see a copy of my visit note below.    Oncology Consultation:  Date on this visit: 3/5/2019    Marixa Ann  is referred by Dr.Sara Diamond for an oncology consultation. She requires evaluation for recent diagnosis of right sided breast cancer.    Primary Care Physician: Jackie Watts   Radiation Oncologist: Dr. Copeland    History Of Present Illness:  Ms. Ann is a 68 year old female with Hx of fibromyalgia and ADHD and chronic fatigue,  who presents for evaluation of recently diagnosed ER+WI+ HER2 Darlyn negative early stage right sided breast cancer. She was seen by Dr. Cohen once on 1/31 before the OncotypeDx results were available and wanted to see me for a second opinion and long term medical oncology follow-up care. She was diagnosed with breast cancer in 12/2018 when she was found to have an abnormality on a screening mammogram. This was done on 12/3/2019 and showed a possible developing asymmetry right breast around 4:00 position posterior depth in the right breast. A diagnostic mammogram confirmed a 17 mm mass in the right medial breast, posterior depth. R breast US showed In the right breast at 2:00, 9 cm from nipple, there was an irregular hypoechoic mass, 12 x 9 x 14 mm. Right axilla US scan revealed normal appearing lymph nodes. Contrast mammogram on 12/26/2018 showed a 1.9 cm enhancing mass in the right medial breast. She proceeded to undergo a core needle biopsy of the suspicious mass on 12/26/2019. this showed a Falkland grade 1 invasive ductal carcinoma, ER positive in 98%, WI positive in 94% of cells, and HER-2/darlyn negative with HER2 to JCARLOS 17 ratio of 1.2.  She elected to undergo right lumpectomy and was taken to the operating room on January 10, 2019 by   "Dara.  Pathology from the surgery demonstrated invasive ductal carcinoma, Carol grade 1, 2.4 cm, with associated intermediate grade DCIS.  No LVI was identified.  Margins were clear of invasive carcinoma and DCIS.  Three sentinel lymph nodes were negative for malignancy.  Oncotype Dx score returned low risk at 4, correlating with 3% risk of disease recurrence at 9 years with aromatase inhibitor or tamoxifen alone.  There was no benefit from chemotherapy.  She also had a DEXA scan in April 2018 which showed findings consistent with osteoporosis, with the most negative T score of -2.9 in the left femoral neck and a T score of -2.5 in the lumbar spine.  She is not on any biphosphonate so far.   She met with Dr. Copeland from radiation oncology, and has been undergoing radiation to the right chest wall, scheduled to complete on March 11, 2019.   She is on vitamin D and calcium supplementation.  She has a history of ADHD and anxiety and is on Adderall.  She has a history of bilateral cataract surgery and reports she has an artificial lens in place in both eyes.  She reports her vision in the right eye has been more cloudy recently.  She has had this happen to her left eye in the past per patient and had it cleaned out, per patient. She thinks her ophthalmologist is Dr. Santiago with [facility] but I was only able to find Dr. Haider Garces on [facility] website.  She also reports she has advanced osteoarthritis in her both knees and had an injection in her left knee before.  She anticipates she needs bilateral knee surgery especially knee replacement on the left which has been her most painful joint.  She reports she is a \"LAVONNE\" child, and is concerned about the risk  gynecologic malignancy.  She has not followed up with a gynecologist in the past couple of years.  She is very fatigued all the time and leads a sedentary lifestyle and walks very little during the day.  She also has varicose veins.  She was " recently evaluated at [facility] [place] in January 2019, with complaints of shortness of breath and cough.  This was 2 days following her right lumpectomy surgery, on 1/12/2019. At that time she had a negative troponin, normal creatinine and electrolytes, an elevated d-dimer of 382 (upper limit of normal for reference range is 230).  She also had a CT pulmonary angiogram on January 12, 2019 which showed no evidence of pulmonary embolism.  There was mild cardiomegaly noted stable compared to CT from May 2015, stable hiatal hernia, and an asymmetric prominent right thyroid lobe with a dominant 4.1 cm thyroid nodule on the right side.  She then underwent a thyroid US (ordered by Dr. Kovacs) on 1/18/2019 which showed bilateral thyroid nodules with the largest nodule is on the right measuring up to 4.5 cm. Fine needle aspiration of the right thyroid nodule on 1/31/2019 showed findings c/w benign thyroid nodule.   She has been chronically fatigued for the last 10 years, worse in the last 4 years per patient. Her TSH was checked in 2017 and was within normal limits.   In a ddition, a complete 12 point  review of systems is negative.          Past Medical/Surgical History:  Past Medical History:   Diagnosis Date     ADHD      Breast cancer (H) 12/26/2018    Right Breast     Chronic fatigue      Fibromyalgia      Hard of hearing      Osteoporosis      Past Surgical History:   Procedure Laterality Date     APPENDECTOMY  1967     BIOPSY BREAST NEEDLE LOCALIZATION Right 1/10/2019    Procedure: WIRE LOCALIZED RIGHT BREAST LUMPECTOMY WITH RIGHT SLNB;  Surgeon: Gema Diamond MD;  Location: [facility] OR     BREAST BIOPSY, CORE RT/LT Right 12/26/2018     CATARACT IOL, RT/LT Bilateral 2018    with lens implants per patient     THYROID BIOPSY  01/31/2019       Allergies:  Allergies as of 03/05/2019 - Reviewed 03/05/2019   Allergen Reaction Noted     Vicodin [hydrocodone-acetaminophen] Anaphylaxis 01/24/2019     Dust mites   "10/19/2017     Milk [lac bovis] Diarrhea 12/31/2018     Current Medications:  Current Outpatient Medications   Medication Sig Dispense Refill     acetaminophen (TYLENOL) 325 MG tablet Take 325-650 mg by mouth every 6 hours as needed for mild pain       amphetamine-dextroamphetamine (ADDERALL) 10 MG tablet Take 1 tablet (10 mg) by mouth 2 times daily 60 tablet 0     B Complex Vitamins (VITAMIN B-COMPLEX PO) Take by mouth daily       calcium carbonate (TUMS) 500 MG chewable tablet Take 1 chew tab by mouth 2 times daily       Calcium-Magnesium-Zinc 333-133-5 MG TABS per tablet Take 1 tablet by mouth daily       Cholecalciferol (VITAMIN D-3 PO)        KRILL OIL PO Take by mouth daily       magnesium oxide 200 MG TABS Take 100 mg by mouth once as needed       Multiple Vitamins-Minerals (MULTIVITAMIN PO)        RANITIDINE HCL PO Take by mouth as needed        cyclobenzaprine (FLEXERIL) 5 MG tablet Take 1 tablet (5 mg) by mouth daily as needed for muscle spasms (Patient not taking: Reported on 2/8/2019) 20 tablet 0      Family History:  Family History   Problem Relation Age of Onset     Leukemia Father      Breast Cancer Cousin 50        Maternal Female 1st Cousin     Social History:  Social History     Socioeconomic History     Marital status:    Tobacco Use     Smoking status: Never Smoker     Smokeless tobacco: Never Used   Substance and Sexual Activity     Alcohol use: No     Frequency: Never     Drug use: No     Physical Exam:  /64 (BP Location: Left arm)   Pulse 109   Temp 98.2  F (36.8  C) (Oral)   Resp 18   Ht 1.549 m (5' 0.98\")   Wt 77.7 kg (171 lb 5 oz)   LMP 05/31/2003 (Approximate)   SpO2 98%   BMI 32.39 kg/m       GENERAL APPEARANCE: Obese middle-aged woman in no apparent distress     HENT: Mouth without ulcers or lesions     NECK: no adenopathy, no asymmetry or masses     LYMPHATICS: No cervical, supraclavicular, axillary lymphadenopathy     RESP: lungs clear to auscultation - no rales, " rhonchi or wheezes     CARDIOVASCULAR: regular rates and rhythm, normal S1 S2, no S3 or S4 and no murmur.     ABDOMEN:  soft, nontender, no HSM or masses and bowel sounds normal     MUSCULOSKELETAL: extremities normal- no gross deformities noted, no evidence of inflammation in joints, FROM in all extremities. No edema b/l LE.  Bilateral varicose veins right greater than left     SKIN: no suspicious lesions or rashes     PSYCHIATRIC: mentation appears normal and affect normal  Neurologic: Alert and oriented x3, gait intact  Breast: Right breast-incisions well-healed, skin changes consistent with radiation therapy but no skin desquamation.  No breast masses bilaterally.  No axillary lymphadenopathy bilaterally.  Laboratory/Imaging Studies  Results for orders placed or performed in visit on 03/05/19   Hepatic panel   Result Value Ref Range    Bilirubin Direct <0.1 0.0 - 0.2 mg/dL    Bilirubin Total 0.2 0.2 - 1.3 mg/dL    Albumin 3.3 (L) 3.4 - 5.0 g/dL    Protein Total 7.3 6.8 - 8.8 g/dL    Alkaline Phosphatase 105 40 - 150 U/L    ALT 21 0 - 50 U/L    AST 12 0 - 45 U/L   Erythrocyte sedimentation rate auto   Result Value Ref Range    Sed Rate 40 (H) 0 - 30 mm/h   CRP inflammation   Result Value Ref Range    CRP Inflammation 30.5 (H) 0.0 - 8.0 mg/L     Component      Latest Ref Rng & Units 1/4/2019   Sodium      133 - 144 mmol/L 141   Potassium      3.4 - 5.3 mmol/L 4.0   Chloride      94 - 109 mmol/L 106   Carbon Dioxide      20 - 32 mmol/L 30   Anion Gap      3 - 14 mmol/L 5   Glucose      70 - 99 mg/dL 91   Urea Nitrogen      7 - 30 mg/dL 11   Creatinine      0.52 - 1.04 mg/dL 0.69   GFR Estimate      >60 mL/min/1.73:m2 >90   GFR Estimate If Black      >60 mL/min/1.73:m2 >90   Calcium      8.5 - 10.1 mg/dL 9.1   WBC      4.0 - 11.0 10e9/L 8.1   RBC Count      3.8 - 5.2 10e12/L 4.45   Hemoglobin      11.7 - 15.7 g/dL 12.8   Hematocrit      35.0 - 47.0 % 41.0   MCV      78 - 100 fl 92   MCH      26.5 - 33.0 pg 28.8    MCHC      31.5 - 36.5 g/dL 31.2 (L)   RDW      10.0 - 15.0 % 14.6   Platelet Count      150 - 450 10e9/L 338     ASSESSMENT/PLAN:  Marixa is a very pleasant 68-year-old postmenopausal woman with recent diagnosis of prognostic stage IB (pT2N0) Convoy grade 1 ER+ OH+ Her2 negative invasive ductal right sided breast cancer, s/p R lumpectomy on 1/10/2019 and scheduled to complete right chest wall radiation on 3/11/2019. Oncotype Dx score returned low risk at 4, correlating with 3% risk of disease recurrence at 9 years with the use  aromatase inhibitor or tamoxifen alone. There is no benefit to systemic chemotherapy in this setting.    1. Breast cancer - we needed to make a decision regarding systemic hormonal therapy. She is not an ideal candidate for an aromatase inhibitor since she already has osteoporosis. However, she is also not a good candidate for Tamoxifen use due to her increased risk of DVT at baseline- with recently documented elevated d-dimer (she also has an elevated ESR and CRP), sedentary lifestyle, obesity and varicose veins. Additionally, she is planning to undergo b/l knee replacement in the near future which would put her at a further increased risk of DVT in the post-op period.  At this time we'll repeat d-dimer and other inflammatory markers such as CRP and ESR. These are not usually elevated in patients with  fibromyalgia. Also, check baseline LFTs.  The patient is very worried about increased risk of DVT with Tamoxifen. At the same time she is not comfortable with observation alone for her breast cancer (even though her risk of disease recurrence even without systemic hormonal therapy would be small, estimated on the order of 10% or less and we have reviewed that with the patient as well) and would like to be on systemic hormonal therapy to minimize her risk of distant breast cancer recurrence.  She does have baseline osteoporosis. We'll check her baseline vitamin D level. Calcium is within  "normal limits. I will communicate with Dr. Kovacs to see whether she is agreeable with Prolia therapy subq q 6 months for patient's osteoporosis. In that situation we could consider trying aromatase inhibitor with close DEXA scan monitoring. Aromatase inhibitor has less of a risk of an associated DVT than Tamoxifen. If patient's inflammatory markers and d-dimer are still elevated on repeat testing, then I think an aromatase inhibitor with concurrent aggressive osteoporosis treatment would be a better choice than Tamoxifen for this patient though neither is ideal.  We'll see the patient back in 3-4 weeks, after she completes radiation and the results of her workup are available, and after I have had a chance to discuss her case with Dr. Kovacs. We will make a final systemic hormonal therapy recommendations at that time. Marixa is comfortable with that plan.    2. LAVONNE daughter- She is a \"LAVONNE\" child although I did not find an increased risk of endometrial cancer in \"LAVONNE\" children such as would be the case with Tamoxifen use, but \"LAVONNE\" children have a higher risk of JENNY and cervicovaginal clear cell adenocarcinoma. Therefore, I do not think Tamoxifen use would be contraindicated in LAVONNE daughters should she need it in the future.  Yearly gynecologic examination is recommended for LAVONNE daughters and she is overdue, and we'll refer her to gynecologist for an annual pelvic exam.    3. B/L knee pain L>R, Hx of severe OA per patient- refer to orthopedic surgery  4. R eye blurry vision, Hx of b/l cataract surgery- refer to ophthalmology. She prefers to follow-up with her ophthalmologist at [facility]. Tamoxifen increases the risk of cataracts in the long term.  She will need routine serial ophthalmologic exams if she is ever on Tamoxifen in the future.     It was my pleasure to meet Marixa. She had multiple questions all of which were answered.  At the end of our visit patient verbalized understanding and concurred with " the plan.      Again, thank you for allowing me to participate in the care of your patient.        Sincerely,        Sil Dan MD, MD

## 2019-03-06 ENCOUNTER — APPOINTMENT (OUTPATIENT)
Dept: RADIATION ONCOLOGY | Facility: CLINIC | Age: 69
End: 2019-03-06
Payer: COMMERCIAL

## 2019-03-06 DIAGNOSIS — Z17.0 MALIGNANT NEOPLASM OF UPPER-INNER QUADRANT OF RIGHT BREAST IN FEMALE, ESTROGEN RECEPTOR POSITIVE (H): ICD-10-CM

## 2019-03-06 DIAGNOSIS — M81.8 OTHER OSTEOPOROSIS WITHOUT CURRENT PATHOLOGICAL FRACTURE: ICD-10-CM

## 2019-03-06 DIAGNOSIS — C50.211 MALIGNANT NEOPLASM OF UPPER-INNER QUADRANT OF RIGHT BREAST IN FEMALE, ESTROGEN RECEPTOR POSITIVE (H): ICD-10-CM

## 2019-03-06 LAB — HCV AB SERPL QL IA: NONREACTIVE

## 2019-03-06 PROCEDURE — 77412 RADIATION TX DELIVERY LVL 3: CPT | Performed by: RADIOLOGY

## 2019-03-07 ENCOUNTER — TELEPHONE (OUTPATIENT)
Dept: FAMILY MEDICINE | Facility: CLINIC | Age: 69
End: 2019-03-07

## 2019-03-07 ENCOUNTER — APPOINTMENT (OUTPATIENT)
Dept: RADIATION ONCOLOGY | Facility: CLINIC | Age: 69
End: 2019-03-07
Payer: COMMERCIAL

## 2019-03-07 ENCOUNTER — ONCOLOGY VISIT (OUTPATIENT)
Dept: ONCOLOGY | Facility: CLINIC | Age: 69
End: 2019-03-07
Payer: COMMERCIAL

## 2019-03-07 ENCOUNTER — DOCUMENTATION ONLY (OUTPATIENT)
Dept: SPIRITUAL SERVICES | Facility: CLINIC | Age: 69
End: 2019-03-07

## 2019-03-07 ENCOUNTER — PATIENT OUTREACH (OUTPATIENT)
Dept: ONCOLOGY | Facility: CLINIC | Age: 69
End: 2019-03-07

## 2019-03-07 DIAGNOSIS — C50.211 MALIGNANT NEOPLASM OF UPPER-INNER QUADRANT OF RIGHT BREAST IN FEMALE, ESTROGEN RECEPTOR POSITIVE (H): ICD-10-CM

## 2019-03-07 DIAGNOSIS — Z12.11 SPECIAL SCREENING FOR MALIGNANT NEOPLASMS, COLON: Primary | ICD-10-CM

## 2019-03-07 DIAGNOSIS — M81.8 OTHER OSTEOPOROSIS WITHOUT CURRENT PATHOLOGICAL FRACTURE: ICD-10-CM

## 2019-03-07 DIAGNOSIS — M17.0 PRIMARY OSTEOARTHRITIS OF BOTH KNEES: Primary | ICD-10-CM

## 2019-03-07 DIAGNOSIS — Z17.0 MALIGNANT NEOPLASM OF UPPER-INNER QUADRANT OF RIGHT BREAST IN FEMALE, ESTROGEN RECEPTOR POSITIVE (H): ICD-10-CM

## 2019-03-07 PROBLEM — Z91.89 DES EXPOSURE IN UTERO: Status: ACTIVE | Noted: 2019-03-07

## 2019-03-07 PROBLEM — M81.0 OSTEOPOROSIS: Status: ACTIVE | Noted: 2019-03-07

## 2019-03-07 LAB
CALCIUM SERPL-MCNC: 9.6 MG/DL (ref 8.5–10.1)
CREAT SERPL-MCNC: 0.63 MG/DL (ref 0.52–1.04)
DEPRECATED CALCIDIOL+CALCIFEROL SERPL-MC: 23 UG/L (ref 20–75)
GFR SERPL CREATININE-BSD FRML MDRD: >90 ML/MIN/{1.73_M2}

## 2019-03-07 PROCEDURE — 77412 RADIATION TX DELIVERY LVL 3: CPT | Performed by: RADIOLOGY

## 2019-03-07 PROCEDURE — 99214 OFFICE O/P EST MOD 30 MIN: CPT | Performed by: INTERNAL MEDICINE

## 2019-03-07 NOTE — TELEPHONE ENCOUNTER
Colonoscopy referral to [facility] for female provider. We also have a female provider at [facility] if this is preferred.  Jackie Kovacs MD

## 2019-03-07 NOTE — LETTER
3/7/2019         RE: Marixa Ann  [address] [facility] [id number]        Dear Colleague,    Thank you for referring your patient, Marixa Ann, to the [facility]. Please see a copy of my visit note below.    Oncology Follow-up visit:  Date on this visit: 3/9/2019    Surgeon: Dr.Sara Diamond  Primary Care Physician: Jackie Watts   Radiation Oncologist: Dr. Copeland    Diagnosis:  prognostic stage IB (pT2N0) Carol grade 1 ER+ GA+ Her2 negative invasive ductal right sided breast cancer    Oncologic History:  1. Breast Cancer - She was diagnosed with breast cancer in 12/2018 when she was found to have an abnormality on a screening mammogram (12/3/2019): showed a possible developing asymmetry right breast around 4:00 position posterior depth in the right breast. A diagnostic mammogram confirmed a 17 mm mass in the right medial breast, posterior depth. R breast US showed In the right breast at 2:00, 9 cm from nipple, there was an irregular hypoechoic mass, 12 x 9 x 14 mm. Right axilla US scan revealed normal appearing lymph nodes. Contrast mammogram on 12/26/2018 showed a 1.9 cm enhancing mass in the right medial breast. She proceeded to undergo a core needle biopsy of the suspicious mass on 12/26/2019. this showed a [place]cc[place] grade 1 invasive ductal carcinoma, ER positive in 98%, GA positive in 94% of cells, and HER-2/yesica negative with HER2 to JCARLOS 17 ratio of 1.2.  She is s/p right lumpectomy on January 10, 2019 by Dr. Diamond.  Pathology from the surgery demonstrated invasive ductal carcinoma, Carol grade 1, 2.4 cm, with associated intermediate grade DCIS.  No LVI was identified.  Margins were clear of invasive carcinoma and DCIS.  Three sentinel lymph nodes were negative for malignancy.  Oncotype Dx score returned low risk at 4, correlating with 3% risk of disease recurrence at 9 years with aromatase inhibitor or tamoxifen alone.  There was no  benefit from chemotherapy. She met with Dr. Copeland from radiation oncology, and has been undergoing radiation to the right chest wall, scheduled to complete on March 11, 2019.      2. Bone Health -  She  had a DEXA scan in April 2018 which showed findings consistent with osteoporosis, with the most negative T score of -2.9 in the left femoral neck and a T score of -2.5 in the lumbar spine.      3. LAVONNE daughter  4. Thyroid nodule- incidentally noted on CTPA in 01/2019 which showed asymmetric prominent right thyroid lobe with a dominant 4.1 cm thyroid nodule on the right side. She then underwent a thyroid US (ordered by Dr. Kovacs) on 1/18/2019 which showed bilateral thyroid nodules with the largest nodule is on the right measuring up to 4.5 cm. Fine needle aspiration of the right thyroid nodule on 1/31/2019 showed findings c/w benign thyroid nodule.       History Of Present Illness:  Ms. Ann is a 68 year old female with Hx of fibromyalgia and ADHD and chronic fatigue, with recently diagnosed prognostic stage IB ER+MO+ HER2 Darlyn negative Bertha grade 1 IDC of  right  Breast, currently undergoing right chest wall radiation, who requested to be seen today since she has had significant anxiety after finding out that her inflammatory markers- ESR, CRP and d-dimer are still elevated. She is here with her .   She has a history of ADHD and anxiety and is on Adderall.     At our last visit on 3/5/19 we have repeated a d-dimer in f/up of an elevated d-dimer noted during her ED visit to [facility] in 01/2019 for evaluation of SOB. At that time, CTPA showed no e/o PE. She also had ESR and CRP recently tested and on repeat these were elevated as below:  Results for JAILYN ANN (MRN [id number]) as of 3/9/2019 16:57   Ref. Range 9/13/2018 11:41 1/4/2019 11:22 3/5/2019 14:05   Sed Rate Latest Ref Range: 0 - 30 mm/h 47 (H) 40 (H) 40 (H)   Results for JAILYN ANN (MRN [id number]) as of 3/9/2019 16:57   Ref. Range  9/13/2018 11:41 1/4/2019 11:22 3/5/2019 14:05   CRP Inflammation Latest Ref Range: 0.0 - 8.0 mg/L 35.6 (H) 22.2 (H) 30.5 (H)     D-dimer is also still elevated as below:  Results for JAILYN GUAN (MRN [id number]) as of 3/9/2019 16:57   Ref. Range 3/5/2019 14:05   D-Dimer Latest Ref Range: 0.0 - 0.50 ug/ml FEU 1.8 (H)   It was also elevated at [facility] when checked on on 1/12/2019: d-dimer of 382 (upper limit of normal for reference range is 230).   We were going to refer the patient to rheumatology but she reports that she has already seen a rheumatologist for elevated inflammatory markers. On review of her records, we were able to see that she was evaluated by Dr. Maggy Galloway, a rheumatologist at [place], in March 2018, for an elevated ESR of 58 and CRP of 53. At that time she was recommended to proceed with abdomen/pelvis CT for evaluation of a possible occult malignancy and she proceeded with it on 4/4/2018 and there were no findings suspicious for malignancy. Her chest CT (CTPA) on 1/12/2019 also showed no findings concerning for malignancy. She has never had a screening colonoscopy. Jailyn has a lot of anxiety about her lab results and is worried there could be other yet undetected malignancy in her body.   Other complaints are fatigue and pain in her knees L>R, both chronic complaints. She has been tolerating radiation therapy well.  She has been chronically fatigued for the last 10 years, worse in the last 4 years per patient.    In a ddition, a complete 12 point  review of systems is negative.          Past Medical/Surgical History:  Past Medical History:   Diagnosis Date     ADHD      Breast cancer (H) 12/26/2018    Right Breast     Chronic fatigue      Fibromyalgia      Hard of hearing      Osteoporosis    She has a history of bilateral cataract surgery and reports she has an artificial lens in place in both eyes.     Past Surgical History:   Procedure Laterality Date     APPENDECTOMY  1967      BIOPSY BREAST NEEDLE LOCALIZATION Right 1/10/2019    Procedure: WIRE LOCALIZED RIGHT BREAST LUMPECTOMY WITH RIGHT SLNB;  Surgeon: Gema Diamond MD;  Location: [facility] OR     BREAST BIOPSY, CORE RT/LT Right 12/26/2018     CATARACT IOL, RT/LT Bilateral 2018    with lens implants per patient     THYROID BIOPSY  01/31/2019     FHx and SocHx reviewed.  Allergies:  Allergies as of 03/07/2019 - Reviewed 03/05/2019   Allergen Reaction Noted     Vicodin [hydrocodone-acetaminophen] Anaphylaxis 01/24/2019     Dust mites  10/19/2017     Milk [lac bovis] Diarrhea 12/31/2018     Current Medications:  Current Outpatient Medications   Medication Sig Dispense Refill     acetaminophen (TYLENOL) 325 MG tablet Take 325-650 mg by mouth every 6 hours as needed for mild pain       amphetamine-dextroamphetamine (ADDERALL) 10 MG tablet Take 1 tablet (10 mg) by mouth 2 times daily 60 tablet 0     B Complex Vitamins (VITAMIN B-COMPLEX PO) Take by mouth daily       calcium carbonate (TUMS) 500 MG chewable tablet Take 1 chew tab by mouth 2 times daily       Calcium-Magnesium-Zinc 333-133-5 MG TABS per tablet Take 1 tablet by mouth daily       Cholecalciferol (VITAMIN D-3 PO)        cyclobenzaprine (FLEXERIL) 5 MG tablet Take 1 tablet (5 mg) by mouth daily as needed for muscle spasms (Patient not taking: Reported on 2/8/2019) 20 tablet 0     KRILL OIL PO Take by mouth daily       magnesium oxide 200 MG TABS Take 100 mg by mouth once as needed       Multiple Vitamins-Minerals (MULTIVITAMIN PO)        RANITIDINE HCL PO Take by mouth as needed         Physical Exam:  Vital signs checked with radiation therapy visit today and stable.    LMP 05/31/2003 (Approximate)     GENERAL APPEARANCE: Obese middle-aged woman, crying. Present with .       PSYCHIATRIC: mentation appears normal and affect is sad  Neurologic: Alert and oriented x3  Extremeties: No edema b/l LE   Laboratory/Imaging Studies  Results for orders placed or performed in visit on  03/05/19   Hepatic panel   Result Value Ref Range    Bilirubin Direct <0.1 0.0 - 0.2 mg/dL    Bilirubin Total 0.2 0.2 - 1.3 mg/dL    Albumin 3.3 (L) 3.4 - 5.0 g/dL    Protein Total 7.3 6.8 - 8.8 g/dL    Alkaline Phosphatase 105 40 - 150 U/L    ALT 21 0 - 50 U/L    AST 12 0 - 45 U/L   D dimer quantitative   Result Value Ref Range    D Dimer 1.8 (H) 0.0 - 0.50 ug/ml FEU   Erythrocyte sedimentation rate auto   Result Value Ref Range    Sed Rate 40 (H) 0 - 30 mm/h   CRP inflammation   Result Value Ref Range    CRP Inflammation 30.5 (H) 0.0 - 8.0 mg/L     Lab Results   Component Value Date    WBC 8.1 01/04/2019     Lab Results   Component Value Date    RBC 4.45 01/04/2019     Lab Results   Component Value Date    HGB 12.8 01/04/2019     Lab Results   Component Value Date    HCT 41.0 01/04/2019     No components found for: MCT  Lab Results   Component Value Date    MCV 92 01/04/2019     Lab Results   Component Value Date    MCH 28.8 01/04/2019     Lab Results   Component Value Date    MCHC 31.2 01/04/2019     Lab Results   Component Value Date    RDW 14.6 01/04/2019     Lab Results   Component Value Date     01/04/2019       ASSESSMENT/PLAN:  Marixa is a very pleasant 68-year-old postmenopausal woman with recent diagnosis of prognostic stage IB (pT2N0) [place] grade 1 ER+ UT+ Her2 negative invasive ductal right sided breast cancer, s/p R lumpectomy on 1/10/2019 and scheduled to complete right chest wall radiation on 3/11/2019. Oncotype Dx score returned low risk at 4, correlating with 3% risk of disease recurrence at 9 years with the use  aromatase inhibitor or tamoxifen alone. There is no benefit to systemic chemotherapy in this setting.    1. Breast cancer - I have communicated with Dr. Kovacs repeatedly regarding this patient's situation. The patient is very apprehensive about increased risk of DVT if we were to use Tamoxifen (Tamoxifen would be generally recommended in a patient with osteoporosis over an  aromatase inhibitor). I agree that Marixa is not a good candidate for Tamoxifen use due to her increased risk of DVT at baseline- repeatedly elevated d-dimer level and also persistently elevated inflammatory markers ( ESR and CRP) as well as her sedentary lifestyle, obesity and varicose veins in b/l LE.  Marixa is still interested in adjuvant systemic therapy and minimizing her risk of breast cancer recurrence is a priority to her. We have discussed aromatase inhibitors and potential side effects. Dr. Kovacs and I agree that the use of an aromatase inhibitor (anastrozole) in Marixa's situation could be attempted with an aggressive treatment of her underlying osteoporosis and close bone mineral density monitoring - please see #2. There is some additional lab workup we are waiting for to confirm that she is a candidate for Prolia use for treatment of osteoporosis and we'll plan to see her back next week, after she is done with radiation therapy, to administer Prolia and to discuss starting aromatase inhibitor in more detail. Marixa is agreeable with this plan.    2. Osteoporosis- vitamin D level pending. Would like it to be in the normal range for Prolia use. Recheck calcium and creatinine level. Continue calcium and vitamin D supplementation. We'll plan to use  Prolia 60 mg subq q 6 months for treatment of patient's osteoporosis. We'll monitor DEXA scan closely, next after a year of being on aromatase inhibitor therapy.    3. Persistently elevated ESR and CRP- seen by Dr. Galloway for evaluation of these elevated markers in the past. She was not felt to have an active rheumatologic disease although underlying polymyalgia is difficult to exclude entirely due to underlying depression, chronic fatigue and fibromyalgia. There has been no e/o occult malignancy on CT chest or CT of the chest, abdomen and pelvis. However, she has never had a screening colonoscopy and today I recommended she proceed with it to complete  her age appropriate cancer screening.  She would like a female provider to perform colonoscopy. Per my communication with Dr. Kovacs, her office will help the patient arrange that.    4. LAVONNE daughter-  Yearly gynecologic examination is recommended for LAVONNE daughters and she is overdue, and we referred her to gynecologist for an annual pelvic exam.    5. B/L knee pain L>R, Hx of severe OA per patient- she was referred to orthopedic surgery    All of Marixa's and her 's questions were answered.  At the end of our visit patient verbalized understanding and concurred with the plan.      Again, thank you for allowing me to participate in the care of your patient.        Sincerely,        Sil Dan MD, MD

## 2019-03-07 NOTE — PROGRESS NOTES
Oncology Follow-up visit:  Date on this visit: 3/9/2019    Surgeon: Dr.Sara Diamond  Primary Care Physician: Jackie Watts   Radiation Oncologist: Dr. Copeland    Diagnosis:  prognostic stage IB (pT2N0) Almira grade 1 ER+ WY+ Her2 negative invasive ductal right sided breast cancer    Oncologic History:  1. Breast Cancer - She was diagnosed with breast cancer in 12/2018 when she was found to have an abnormality on a screening mammogram (12/3/2019): showed a possible developing asymmetry right breast around 4:00 position posterior depth in the right breast. A diagnostic mammogram confirmed a 17 mm mass in the right medial breast, posterior depth. R breast US showed In the right breast at 2:00, 9 cm from nipple, there was an irregular hypoechoic mass, 12 x 9 x 14 mm. Right axilla US scan revealed normal appearing lymph nodes. Contrast mammogram on 12/26/2018 showed a 1.9 cm enhancing mass in the right medial breast. She proceeded to undergo a core needle biopsy of the suspicious mass on 12/26/2019. this showed a Almira grade 1 invasive ductal carcinoma, ER positive in 98%, WY positive in 94% of cells, and HER-2/yesica negative with HER2 to JCARLOS 17 ratio of 1.2.  She is s/p right lumpectomy on January 10, 2019 by Dr. Diamond.  Pathology from the surgery demonstrated invasive ductal carcinoma, Carol grade 1, 2.4 cm, with associated intermediate grade DCIS.  No LVI was identified.  Margins were clear of invasive carcinoma and DCIS.  Three sentinel lymph nodes were negative for malignancy.  Oncotype Dx score returned low risk at 4, correlating with 3% risk of disease recurrence at 9 years with aromatase inhibitor or tamoxifen alone.  There was no benefit from chemotherapy. She met with Dr. Copeland from radiation oncology, and has been undergoing radiation to the right chest wall, scheduled to complete on March 11, 2019.      2. Bone Health -  She  had a DEXA scan in April 2018 which showed findings  consistent with osteoporosis, with the most negative T score of -2.9 in the left femoral neck and a T score of -2.5 in the lumbar spine.      3. LAVONNE daughter  4. Thyroid nodule- incidentally noted on CTPA in 01/2019 which showed asymmetric prominent right thyroid lobe with a dominant 4.1 cm thyroid nodule on the right side. She then underwent a thyroid US (ordered by Dr. Kovacs) on 1/18/2019 which showed bilateral thyroid nodules with the largest nodule is on the right measuring up to 4.5 cm. Fine needle aspiration of the right thyroid nodule on 1/31/2019 showed findings c/w benign thyroid nodule.       History Of Present Illness:  Ms. Ann is a 68 year old female with Hx of fibromyalgia and ADHD and chronic fatigue, with recently diagnosed prognostic stage IB ER+VA+ HER2 Darlyn negative Carol grade 1 IDC of  right  Breast, currently undergoing right chest wall radiation, who requested to be seen today since she has had significant anxiety after finding out that her inflammatory markers- ESR, CRP and d-dimer are still elevated. She is here with her .   She has a history of ADHD and anxiety and is on Adderall.     At our last visit on 3/5/19 we have repeated a d-dimer in f/up of an elevated d-dimer noted during her ED visit to [facility] in 01/2019 for evaluation of SOB. At that time, CTPA showed no e/o PE. She also had ESR and CRP recently tested and on repeat these were elevated as below:  Results for JAILYN ANN (MRN [id number]) as of 3/9/2019 16:57   Ref. Range 9/13/2018 11:41 1/4/2019 11:22 3/5/2019 14:05   Sed Rate Latest Ref Range: 0 - 30 mm/h 47 (H) 40 (H) 40 (H)   Results for JAILYN ANN (MRN [id number]) as of 3/9/2019 16:57   Ref. Range 9/13/2018 11:41 1/4/2019 11:22 3/5/2019 14:05   CRP Inflammation Latest Ref Range: 0.0 - 8.0 mg/L 35.6 (H) 22.2 (H) 30.5 (H)     D-dimer is also still elevated as below:  Results for JAILYN ANN (MRN [id number]) as of 3/9/2019 16:57   Ref. Range  3/5/2019 14:05   D-Dimer Latest Ref Range: 0.0 - 0.50 ug/ml FEU 1.8 (H)   It was also elevated at [facility] when checked on on 1/12/2019: d-dimer of 382 (upper limit of normal for reference range is 230).   We were going to refer the patient to rheumatology but she reports that she has already seen a rheumatologist for elevated inflammatory markers. On review of her records, we were able to see that she was evaluated by Dr. Maggy Galloway, a rheumatologist at [facility] in March 2018, for an elevated ESR of 58 and CRP of 53. At that time she was recommended to proceed with abdomen/pelvis CT for evaluation of a possible occult malignancy and she proceeded with it on 4/4/2018 and there were no findings suspicious for malignancy. Her chest CT (CTPA) on 1/12/2019 also showed no findings concerning for malignancy. She has never had a screening colonoscopy. Marixa has a lot of anxiety about her lab results and is worried there could be other yet undetected malignancy in her body.   Other complaints are fatigue and pain in her knees L>R, both chronic complaints. She has been tolerating radiation therapy well.  She has been chronically fatigued for the last 10 years, worse in the last 4 years per patient.    In a ddition, a complete 12 point  review of systems is negative.          Past Medical/Surgical History:  Past Medical History:   Diagnosis Date     ADHD      Breast cancer (H) 12/26/2018    Right Breast     Chronic fatigue      Fibromyalgia      Hard of hearing      Osteoporosis    She has a history of bilateral cataract surgery and reports she has an artificial lens in place in both eyes.     Past Surgical History:   Procedure Laterality Date     APPENDECTOMY  1967     BIOPSY BREAST NEEDLE LOCALIZATION Right 1/10/2019    Procedure: WIRE LOCALIZED RIGHT BREAST LUMPECTOMY WITH RIGHT SLNB;  Surgeon: Gema Diamond MD;  Location: [facility] OR     BREAST BIOPSY, CORE RT/LT Right 12/26/2018     CATARACT IOL, RT/LT Bilateral 2018     with lens implants per patient     THYROID BIOPSY  01/31/2019     FHx and SocHx reviewed.  Allergies:  Allergies as of 03/07/2019 - Reviewed 03/05/2019   Allergen Reaction Noted     Vicodin [hydrocodone-acetaminophen] Anaphylaxis 01/24/2019     Dust mites  10/19/2017     Milk [lac bovis] Diarrhea 12/31/2018     Current Medications:  Current Outpatient Medications   Medication Sig Dispense Refill     acetaminophen (TYLENOL) 325 MG tablet Take 325-650 mg by mouth every 6 hours as needed for mild pain       amphetamine-dextroamphetamine (ADDERALL) 10 MG tablet Take 1 tablet (10 mg) by mouth 2 times daily 60 tablet 0     B Complex Vitamins (VITAMIN B-COMPLEX PO) Take by mouth daily       calcium carbonate (TUMS) 500 MG chewable tablet Take 1 chew tab by mouth 2 times daily       Calcium-Magnesium-Zinc 333-133-5 MG TABS per tablet Take 1 tablet by mouth daily       Cholecalciferol (VITAMIN D-3 PO)        cyclobenzaprine (FLEXERIL) 5 MG tablet Take 1 tablet (5 mg) by mouth daily as needed for muscle spasms (Patient not taking: Reported on 2/8/2019) 20 tablet 0     KRILL OIL PO Take by mouth daily       magnesium oxide 200 MG TABS Take 100 mg by mouth once as needed       Multiple Vitamins-Minerals (MULTIVITAMIN PO)        RANITIDINE HCL PO Take by mouth as needed         Physical Exam:  Vital signs checked with radiation therapy visit today and stable.    LMP 05/31/2003 (Approximate)     GENERAL APPEARANCE: Obese middle-aged woman, crying. Present with .       PSYCHIATRIC: mentation appears normal and affect is sad  Neurologic: Alert and oriented x3  Extremeties: No edema b/l LE   Laboratory/Imaging Studies  Results for orders placed or performed in visit on 03/05/19   Hepatic panel   Result Value Ref Range    Bilirubin Direct <0.1 0.0 - 0.2 mg/dL    Bilirubin Total 0.2 0.2 - 1.3 mg/dL    Albumin 3.3 (L) 3.4 - 5.0 g/dL    Protein Total 7.3 6.8 - 8.8 g/dL    Alkaline Phosphatase 105 40 - 150 U/L    ALT 21 0 - 50  U/L    AST 12 0 - 45 U/L   D dimer quantitative   Result Value Ref Range    D Dimer 1.8 (H) 0.0 - 0.50 ug/ml FEU   Erythrocyte sedimentation rate auto   Result Value Ref Range    Sed Rate 40 (H) 0 - 30 mm/h   CRP inflammation   Result Value Ref Range    CRP Inflammation 30.5 (H) 0.0 - 8.0 mg/L     Lab Results   Component Value Date    WBC 8.1 01/04/2019     Lab Results   Component Value Date    RBC 4.45 01/04/2019     Lab Results   Component Value Date    HGB 12.8 01/04/2019     Lab Results   Component Value Date    HCT 41.0 01/04/2019     No components found for: MCT  Lab Results   Component Value Date    MCV 92 01/04/2019     Lab Results   Component Value Date    MCH 28.8 01/04/2019     Lab Results   Component Value Date    MCHC 31.2 01/04/2019     Lab Results   Component Value Date    RDW 14.6 01/04/2019     Lab Results   Component Value Date     01/04/2019       ASSESSMENT/PLAN:  Marixa is a very pleasant 68-year-old postmenopausal woman with recent diagnosis of prognostic stage IB (pT2N0) Ravenel grade 1 ER+ OK+ Her2 negative invasive ductal right sided breast cancer, s/p R lumpectomy on 1/10/2019 and scheduled to complete right chest wall radiation on 3/11/2019. Oncotype Dx score returned low risk at 4, correlating with 3% risk of disease recurrence at 9 years with the use  aromatase inhibitor or tamoxifen alone. There is no benefit to systemic chemotherapy in this setting.    1. Breast cancer - I have communicated with Dr. Kovacs repeatedly regarding this patient's situation. The patient is very apprehensive about increased risk of DVT if we were to use Tamoxifen (Tamoxifen would be generally recommended in a patient with osteoporosis over an aromatase inhibitor). I agree that Marixa is not a good candidate for Tamoxifen use due to her increased risk of DVT at baseline- repeatedly elevated d-dimer level and also persistently elevated inflammatory markers ( ESR and CRP) as well as her sedentary  lifestyle, obesity and varicose veins in b/l LE.  Marixa is still interested in adjuvant systemic therapy and minimizing her risk of breast cancer recurrence is a priority to her. We have discussed aromatase inhibitors and potential side effects. Dr. Kovacs and I agree that the use of an aromatase inhibitor (anastrozole) in Marixa's situation could be attempted with an aggressive treatment of her underlying osteoporosis and close bone mineral density monitoring - please see #2. There is some additional lab workup we are waiting for to confirm that she is a candidate for Prolia use for treatment of osteoporosis and we'll plan to see her back next week, after she is done with radiation therapy, to administer Prolia and to discuss starting aromatase inhibitor in more detail. Marixa is agreeable with this plan.    2. Osteoporosis- vitamin D level pending. Would like it to be in the normal range for Prolia use. Recheck calcium and creatinine level. Continue calcium and vitamin D supplementation. We'll plan to use  Prolia 60 mg subq q 6 months for treatment of patient's osteoporosis. We'll monitor DEXA scan closely, next after a year of being on aromatase inhibitor therapy.    3. Persistently elevated ESR and CRP- seen by Dr. Galloway for evaluation of these elevated markers in the past. She was not felt to have an active rheumatologic disease although underlying polymyalgia is difficult to exclude entirely due to underlying depression, chronic fatigue and fibromyalgia. There has been no e/o occult malignancy on CT chest or CT of the chest, abdomen and pelvis. However, she has never had a screening colonoscopy and today I recommended she proceed with it to complete her age appropriate cancer screening.  She would like a female provider to perform colonoscopy. Per my communication with Dr. Kovacs, her office will help the patient arrange that.    4. LAVONNE daughter-  Yearly gynecologic examination is recommended for LAVONNE  daughters and she is overdue, and we referred her to gynecologist for an annual pelvic exam.    5. B/L knee pain L>R, Hx of severe OA per patient- she was referred to orthopedic surgery    All of Marixa's and her 's questions were answered.  At the end of our visit patient verbalized understanding and concurred with the plan.    Addendum: vitamin D level =23. We'll recommend vitamin D 2000 IU daily along with calcium 5173-8808 mg PO daily. Plan is to start Prolia on 3/11 and start anastrozole at that time. Pt will be seen by our NP on 3/11.  Addendum: She received Prolia on March 11.  Patient was seen by Dr. Fam Atkins on March 25 and had knee x-rays which showed bilateral medial and PF compartment OA.  She received bilateral corticosteroid and hyaluronic acid injections.  She is interested in cancer rehabilitation therapy, including physical therapy for her knees and we will refer at this time.    Addendum: She was seen by Dr. Zeng on 4/29 and had a normal pelvic exam and a negative pap smear. Plan is to follow her annually by gyn team.
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
## 2019-03-07 NOTE — TELEPHONE ENCOUNTER
"SPIRITUAL [facility] SERVICES Left Message Note  [facility] Oncology  Care       Reason for Contact:  Checked in with Marixa Ann  per reported concerns  on the Oncology Distress Screening tool.    Intervention: Patient was here for a radiation treatment today. Staff announced that a  was here to see her.  Patient became very concerned and voiced that she did not want to be \"evangelized\" and declined a visit.     Plan: No SH follow up recommended.     Vivi Anderson  Staff   Phone   "

## 2019-03-07 NOTE — LETTER
3/7/2019        RE: Marixa Ann  [address] [facility] [address]        Oncology Follow-up visit:  Date on this visit: 3/9/2019    Surgeon: Dr.Sara Diamond  Primary Care Physician: Jackie Watts   Radiation Oncologist: Dr. Copeland    Diagnosis:  prognostic stage IB (pT2N0) Carol grade 1 ER+ LA+ Her2 negative invasive ductal right sided breast cancer    Oncologic History:  1. Breast Cancer - She was diagnosed with breast cancer in 12/2018 when she was found to have an abnormality on a screening mammogram (12/3/2019): showed a possible developing asymmetry right breast around 4:00 position posterior depth in the right breast. A diagnostic mammogram confirmed a 17 mm mass in the right medial breast, posterior depth. R breast US showed In the right breast at 2:00, 9 cm from nipple, there was an irregular hypoechoic mass, 12 x 9 x 14 mm. Right axilla US scan revealed normal appearing lymph nodes. Contrast mammogram on 12/26/2018 showed a 1.9 cm enhancing mass in the right medial breast. She proceeded to undergo a core needle biopsy of the suspicious mass on 12/26/2019. this showed a [place] grade 1 invasive ductal carcinoma, ER positive in 98%, LA positive in 94% of cells, and HER-2/yesica negative with HER2 to JCARLOS 17 ratio of 1.2.  She is s/p right lumpectomy on January 10, 2019 by Dr. Diamond.  Pathology from the surgery demonstrated invasive ductal carcinoma, Carol grade 1, 2.4 cm, with associated intermediate grade DCIS.  No LVI was identified.  Margins were clear of invasive carcinoma and DCIS.  Three sentinel lymph nodes were negative for malignancy.  Oncotype Dx score returned low risk at 4, correlating with 3% risk of disease recurrence at 9 years with aromatase inhibitor or tamoxifen alone.  There was no benefit from chemotherapy. She met with Dr. Copeland from radiation oncology, and has been undergoing radiation to the right chest wall, scheduled to complete on  March 11, 2019.      2. Bone Health -  She  had a DEXA scan in April 2018 which showed findings consistent with osteoporosis, with the most negative T score of -2.9 in the left femoral neck and a T score of -2.5 in the lumbar spine.      3. LAVONNE daughter  4. Thyroid nodule- incidentally noted on CTPA in 01/2019 which showed asymmetric prominent right thyroid lobe with a dominant 4.1 cm thyroid nodule on the right side. She then underwent a thyroid US (ordered by Dr. Kovacs) on 1/18/2019 which showed bilateral thyroid nodules with the largest nodule is on the right measuring up to 4.5 cm. Fine needle aspiration of the right thyroid nodule on 1/31/2019 showed findings c/w benign thyroid nodule.       History Of Present Illness:  Ms. Ann is a 68 year old female with Hx of fibromyalgia and ADHD and chronic fatigue, with recently diagnosed prognostic stage IB ER+OK+ HER2 Darlyn negative Carol grade 1 IDC of  right  Breast, currently undergoing right chest wall radiation, who requested to be seen today since she has had significant anxiety after finding out that her inflammatory markers- ESR, CRP and d-dimer are still elevated. She is here with her .   She has a history of ADHD and anxiety and is on Adderall.     At our last visit on 3/5/19 we have repeated a d-dimer in f/up of an elevated d-dimer noted during her ED visit to [facility] in 01/2019 for evaluation of SOB. At that time, CTPA showed no e/o PE. She also had ESR and CRP recently tested and on repeat these were elevated as below:  Results for JAILYN ANN (MRN [id number]) as of 3/9/2019 16:57   Ref. Range 9/13/2018 11:41 1/4/2019 11:22 3/5/2019 14:05   Sed Rate Latest Ref Range: 0 - 30 mm/h 47 (H) 40 (H) 40 (H)   Results for JAILYN ANN (MRN [id number]) as of 3/9/2019 16:57   Ref. Range 9/13/2018 11:41 1/4/2019 11:22 3/5/2019 14:05   CRP Inflammation Latest Ref Range: 0.0 - 8.0 mg/L 35.6 (H) 22.2 (H) 30.5 (H)     D-dimer is also still elevated as  below:  Results for JAILYN GUAN (MRN [id number]) as of 3/9/2019 16:57   Ref. Range 3/5/2019 14:05   D-Dimer Latest Ref Range: 0.0 - 0.50 ug/ml FEU 1.8 (H)   It was also elevated at [facility] when checked on on 1/12/2019: d-dimer of 382 (upper limit of normal for reference range is 230).   We were going to refer the patient to rheumatology but she reports that she has already seen a rheumatologist for elevated inflammatory markers. On review of her records, we were able to see that she was evaluated by Dr. Maggy Galloway, a rheumatologist at [place], in March 2018, for an elevated ESR of 58 and CRP of 53. At that time she was recommended to proceed with abdomen/pelvis CT for evaluation of a possible occult malignancy and she proceeded with it on 4/4/2018 and there were no findings suspicious for malignancy. Her chest CT (CTPA) on 1/12/2019 also showed no findings concerning for malignancy. She has never had a screening colonoscopy. Jailyn has a lot of anxiety about her lab results and is worried there could be other yet undetected malignancy in her body.   Other complaints are fatigue and pain in her knees L>R, both chronic complaints. She has been tolerating radiation therapy well.  She has been chronically fatigued for the last 10 years, worse in the last 4 years per patient.    In a ddition, a complete 12 point  review of systems is negative.          Past Medical/Surgical History:  Past Medical History:   Diagnosis Date     ADHD      Breast cancer (H) 12/26/2018    Right Breast     Chronic fatigue      Fibromyalgia      Hard of hearing      Osteoporosis    She has a history of bilateral cataract surgery and reports she has an artificial lens in place in both eyes.     Past Surgical History:   Procedure Laterality Date     APPENDECTOMY  1967     BIOPSY BREAST NEEDLE LOCALIZATION Right 1/10/2019    Procedure: WIRE LOCALIZED RIGHT BREAST LUMPECTOMY WITH RIGHT SLNB;  Surgeon: Gema Diamond MD;  Location:   OR     BREAST BIOPSY, CORE RT/LT Right 12/26/2018     CATARACT IOL, RT/LT Bilateral 2018    with lens implants per patient     THYROID BIOPSY  01/31/2019     FHx and SocHx reviewed.  Allergies:  Allergies as of 03/07/2019 - Reviewed 03/05/2019   Allergen Reaction Noted     Vicodin [hydrocodone-acetaminophen] Anaphylaxis 01/24/2019     Dust mites  10/19/2017     Milk [lac bovis] Diarrhea 12/31/2018     Current Medications:  Current Outpatient Medications   Medication Sig Dispense Refill     acetaminophen (TYLENOL) 325 MG tablet Take 325-650 mg by mouth every 6 hours as needed for mild pain       amphetamine-dextroamphetamine (ADDERALL) 10 MG tablet Take 1 tablet (10 mg) by mouth 2 times daily 60 tablet 0     B Complex Vitamins (VITAMIN B-COMPLEX PO) Take by mouth daily       calcium carbonate (TUMS) 500 MG chewable tablet Take 1 chew tab by mouth 2 times daily       Calcium-Magnesium-Zinc 333-133-5 MG TABS per tablet Take 1 tablet by mouth daily       Cholecalciferol (VITAMIN D-3 PO)        cyclobenzaprine (FLEXERIL) 5 MG tablet Take 1 tablet (5 mg) by mouth daily as needed for muscle spasms (Patient not taking: Reported on 2/8/2019) 20 tablet 0     KRILL OIL PO Take by mouth daily       magnesium oxide 200 MG TABS Take 100 mg by mouth once as needed       Multiple Vitamins-Minerals (MULTIVITAMIN PO)        RANITIDINE HCL PO Take by mouth as needed         Physical Exam:  Vital signs checked with radiation therapy visit today and stable.    LMP 05/31/2003 (Approximate)     GENERAL APPEARANCE: Obese middle-aged woman, crying. Present with .       PSYCHIATRIC: mentation appears normal and affect is sad  Neurologic: Alert and oriented x3  Extremeties: No edema b/l LE   Laboratory/Imaging Studies  Results for orders placed or performed in visit on 03/05/19   Hepatic panel   Result Value Ref Range    Bilirubin Direct <0.1 0.0 - 0.2 mg/dL    Bilirubin Total 0.2 0.2 - 1.3 mg/dL    Albumin 3.3 (L) 3.4 - 5.0 g/dL     Protein Total 7.3 6.8 - 8.8 g/dL    Alkaline Phosphatase 105 40 - 150 U/L    ALT 21 0 - 50 U/L    AST 12 0 - 45 U/L   D dimer quantitative   Result Value Ref Range    D Dimer 1.8 (H) 0.0 - 0.50 ug/ml FEU   Erythrocyte sedimentation rate auto   Result Value Ref Range    Sed Rate 40 (H) 0 - 30 mm/h   CRP inflammation   Result Value Ref Range    CRP Inflammation 30.5 (H) 0.0 - 8.0 mg/L     Lab Results   Component Value Date    WBC 8.1 01/04/2019     Lab Results   Component Value Date    RBC 4.45 01/04/2019     Lab Results   Component Value Date    HGB 12.8 01/04/2019     Lab Results   Component Value Date    HCT 41.0 01/04/2019     No components found for: MCT  Lab Results   Component Value Date    MCV 92 01/04/2019     Lab Results   Component Value Date    MCH 28.8 01/04/2019     Lab Results   Component Value Date    MCHC 31.2 01/04/2019     Lab Results   Component Value Date    RDW 14.6 01/04/2019     Lab Results   Component Value Date     01/04/2019       ASSESSMENT/PLAN:  Marixa is a very pleasant 68-year-old postmenopausal woman with recent diagnosis of prognostic stage IB (pT2N0) [place] grade 1 ER+ AK+ Her2 negative invasive ductal right sided breast cancer, s/p R lumpectomy on 1/10/2019 and scheduled to complete right chest wall radiation on 3/11/2019. Oncotype Dx score returned low risk at 4, correlating with 3% risk of disease recurrence at 9 years with the use  aromatase inhibitor or tamoxifen alone. There is no benefit to systemic chemotherapy in this setting.    1. Breast cancer - I have communicated with Dr. Kovacs repeatedly regarding this patient's situation. The patient is very apprehensive about increased risk of DVT if we were to use Tamoxifen (Tamoxifen would be generally recommended in a patient with osteoporosis over an aromatase inhibitor). I agree that Marixa is not a good candidate for Tamoxifen use due to her increased risk of DVT at baseline- repeatedly elevated d-dimer level and  also persistently elevated inflammatory markers ( ESR and CRP) as well as her sedentary lifestyle, obesity and varicose veins in b/l LE.  Marixa is still interested in adjuvant systemic therapy and minimizing her risk of breast cancer recurrence is a priority to her. We have discussed aromatase inhibitors and potential side effects. Dr. Kovacs and I agree that the use of an aromatase inhibitor (anastrozole) in Marixa's situation could be attempted with an aggressive treatment of her underlying osteoporosis and close bone mineral density monitoring - please see #2. There is some additional lab workup we are waiting for to confirm that she is a candidate for Prolia use for treatment of osteoporosis and we'll plan to see her back next week, after she is done with radiation therapy, to administer Prolia and to discuss starting aromatase inhibitor in more detail. Marixa is agreeable with this plan.    2. Osteoporosis- vitamin D level pending. Would like it to be in the normal range for Prolia use. Recheck calcium and creatinine level. Continue calcium and vitamin D supplementation. We'll plan to use  Prolia 60 mg subq q 6 months for treatment of patient's osteoporosis. We'll monitor DEXA scan closely, next after a year of being on aromatase inhibitor therapy.    3. Persistently elevated ESR and CRP- seen by Dr. Galloway for evaluation of these elevated markers in the past. She was not felt to have an active rheumatologic disease although underlying polymyalgia is difficult to exclude entirely due to underlying depression, chronic fatigue and fibromyalgia. There has been no e/o occult malignancy on CT chest or CT of the chest, abdomen and pelvis. However, she has never had a screening colonoscopy and today I recommended she proceed with it to complete her age appropriate cancer screening.  She would like a female provider to perform colonoscopy. Per my communication with Dr. Kovacs, her office will help the patient  arrange that.    4. LAVONNE daughter-  Yearly gynecologic examination is recommended for LAVONNE daughters and she is overdue, and we referred her to gynecologist for an annual pelvic exam.    5. B/L knee pain L>R, Hx of severe OA per patient- she was referred to orthopedic surgery    All of Marixa's and her 's questions were answered.  At the end of our visit patient verbalized understanding and concurred with the plan.        Sincerely,        Sil Dan MD, MD

## 2019-03-07 NOTE — RESULT ENCOUNTER NOTE
Dear Marixa    Your test results are attached. I am happy to let you know that they are stable.    The test for hepatitis C is normal.    Please contact me by MyChart if you have any questions about your labs or management.    Jcakie Kovacs MD

## 2019-03-08 ENCOUNTER — APPOINTMENT (OUTPATIENT)
Dept: RADIATION ONCOLOGY | Facility: CLINIC | Age: 69
End: 2019-03-08
Payer: COMMERCIAL

## 2019-03-08 ENCOUNTER — TELEPHONE (OUTPATIENT)
Dept: ONCOLOGY | Facility: CLINIC | Age: 69
End: 2019-03-08

## 2019-03-08 ENCOUNTER — PATIENT OUTREACH (OUTPATIENT)
Dept: ONCOLOGY | Facility: CLINIC | Age: 69
End: 2019-03-08

## 2019-03-08 PROCEDURE — 77427 RADIATION TX MANAGEMENT X5: CPT | Performed by: RADIOLOGY

## 2019-03-08 PROCEDURE — 77412 RADIATION TX DELIVERY LVL 3: CPT | Performed by: RADIOLOGY

## 2019-03-08 PROCEDURE — 77336 RADIATION PHYSICS CONSULT: CPT | Performed by: RADIOLOGY

## 2019-03-08 NOTE — TELEPHONE ENCOUNTER
Marixa Ann is a 68 year old woman with diagnosis of breast cancer.  Contacted today to follow up on distress screen.      Marixa reports that she has her own therapist in the community that she follows with and does not feel any additional support is indicated at this time.      She requested that she not receive calls in the future from spiritual health or social work unless specifically requested.     MONIQUE Ritter, [facility]   Palliative Care    Pgr:[phone]  Ph: [phone]

## 2019-03-08 NOTE — PROGRESS NOTES
Wanted to add to note that patient has a therapist that she works with and she thought she had mentioned this on her initial visit.

## 2019-03-08 NOTE — PROGRESS NOTES
"Patient called into clinic to clarify why she had an appointment at 9:45 on 3/11 in the clinic.    Writer discussed with patient that this was with Lidia, our NP to review starting hormonal therapy after completion of radiation therapy for her breast cancer and to also receive the injection Prolia to help protect her bones (see note from yesterday).  She agreed with this plan.  Patient expressed concern that she had a visit from our  after her radiation treatment yesterday (this was flagged because of Oncology Distress Screening Tool) but did not want to see a  or any other support person.  Patient states, I thought I made this clear that she did not want a visit from anyone, as this makes her anxiety even higher, thinking that something is very wrong \"I thought maybe I was going to die.  Patient states she is not [employer]alism and is agnostic.  Writer explained to patient that we use a screening tool and the support staff gets flagged that this is a patient that may be in need of evaluation for emotional support.  Writer discussed with our  yesterday, and Vivi did explain how she did not get a chance to call the patient before hand.  I apologized to patient for causing her undue distress and that this was good information for us to have to consider when evaluating patients with this screening tool.  Advised would make our manager aware of this incident also.  In the end, patient was pleased with our conversation and felt at ease for the weekend.  About 10 minutes was spent on the telephone conversation.   "

## 2019-03-08 NOTE — TELEPHONE ENCOUNTER
This writer attempted to contact patient on 03/08/19      Reason for call referral and left message.        Stephany Mcgill MA

## 2019-03-11 ENCOUNTER — OFFICE VISIT (OUTPATIENT)
Dept: RADIATION ONCOLOGY | Facility: CLINIC | Age: 69
End: 2019-03-11
Payer: COMMERCIAL

## 2019-03-11 ENCOUNTER — INFUSION THERAPY VISIT (OUTPATIENT)
Dept: INFUSION THERAPY | Facility: CLINIC | Age: 69
End: 2019-03-11
Payer: COMMERCIAL

## 2019-03-11 ENCOUNTER — ONCOLOGY VISIT (OUTPATIENT)
Dept: ONCOLOGY | Facility: CLINIC | Age: 69
End: 2019-03-11
Payer: COMMERCIAL

## 2019-03-11 ENCOUNTER — APPOINTMENT (OUTPATIENT)
Dept: RADIATION ONCOLOGY | Facility: CLINIC | Age: 69
End: 2019-03-11
Payer: COMMERCIAL

## 2019-03-11 VITALS
DIASTOLIC BLOOD PRESSURE: 61 MMHG | OXYGEN SATURATION: 100 % | HEIGHT: 61 IN | RESPIRATION RATE: 18 BRPM | SYSTOLIC BLOOD PRESSURE: 104 MMHG | WEIGHT: 170.31 LBS | BODY MASS INDEX: 32.15 KG/M2 | TEMPERATURE: 98.4 F | HEART RATE: 110 BPM

## 2019-03-11 VITALS
DIASTOLIC BLOOD PRESSURE: 61 MMHG | TEMPERATURE: 98.4 F | RESPIRATION RATE: 18 BRPM | WEIGHT: 170.5 LBS | SYSTOLIC BLOOD PRESSURE: 104 MMHG | OXYGEN SATURATION: 100 % | BODY MASS INDEX: 32.23 KG/M2 | HEART RATE: 110 BPM

## 2019-03-11 DIAGNOSIS — Z17.0 MALIGNANT NEOPLASM OF UPPER-INNER QUADRANT OF RIGHT BREAST IN FEMALE, ESTROGEN RECEPTOR POSITIVE (H): ICD-10-CM

## 2019-03-11 DIAGNOSIS — C50.211 MALIGNANT NEOPLASM OF UPPER-INNER QUADRANT OF RIGHT BREAST IN FEMALE, ESTROGEN RECEPTOR POSITIVE (H): ICD-10-CM

## 2019-03-11 DIAGNOSIS — F90.0 ATTENTION DEFICIT HYPERACTIVITY DISORDER (ADHD), PREDOMINANTLY INATTENTIVE TYPE: Primary | ICD-10-CM

## 2019-03-11 DIAGNOSIS — Z17.0 MALIGNANT NEOPLASM OF UPPER-INNER QUADRANT OF RIGHT BREAST IN FEMALE, ESTROGEN RECEPTOR POSITIVE (H): Primary | ICD-10-CM

## 2019-03-11 DIAGNOSIS — C50.211 MALIGNANT NEOPLASM OF UPPER-INNER QUADRANT OF RIGHT BREAST IN FEMALE, ESTROGEN RECEPTOR POSITIVE (H): Primary | ICD-10-CM

## 2019-03-11 DIAGNOSIS — Z79.811 AROMATASE INHIBITOR USE: ICD-10-CM

## 2019-03-11 DIAGNOSIS — M81.8 OTHER OSTEOPOROSIS WITHOUT CURRENT PATHOLOGICAL FRACTURE: ICD-10-CM

## 2019-03-11 DIAGNOSIS — M81.8 OTHER OSTEOPOROSIS WITHOUT CURRENT PATHOLOGICAL FRACTURE: Primary | ICD-10-CM

## 2019-03-11 PROCEDURE — 77412 RADIATION TX DELIVERY LVL 3: CPT | Performed by: RADIOLOGY

## 2019-03-11 PROCEDURE — 99207 ZZC NO CHARGE NURSE ONLY: CPT

## 2019-03-11 PROCEDURE — 99215 OFFICE O/P EST HI 40 MIN: CPT | Mod: 25 | Performed by: NURSE PRACTITIONER

## 2019-03-11 PROCEDURE — 99207 ZZC DROP WITH A PROCEDURE: CPT | Performed by: RADIOLOGY

## 2019-03-11 PROCEDURE — 96372 THER/PROPH/DIAG INJ SC/IM: CPT | Performed by: NURSE PRACTITIONER

## 2019-03-11 RX ORDER — ANASTROZOLE 1 MG/1
1 TABLET ORAL DAILY
Qty: 30 TABLET | Refills: 1 | Status: SHIPPED | OUTPATIENT
Start: 2019-03-11 | End: 2019-04-10

## 2019-03-11 ASSESSMENT — PAIN SCALES - GENERAL
PAINLEVEL: NO PAIN (0)
PAINLEVEL: MODERATE PAIN (5)

## 2019-03-11 ASSESSMENT — MIFFLIN-ST. JEOR: SCORE: 1249.65

## 2019-03-11 NOTE — PROGRESS NOTES
[facility]  SPECIALIZING IN BREAKTHROUGHS  Radiation Oncology    On Treatment Visit Note      Marixa Ann      Date: 3/11/2019   MRN: [id number]   : 1950  Diagnosis: breast cancer      Reason for Visit:  On Radiation Treatment Visit     Treatment Summary to Date  Treatment Site: right breast Current Dose: 4256/4256 cGy Fractions:            Subjective:     Fatigue much improved this week.  Slightly more skin erythema.  In a very good mood today.    Nursing ROS:   Nutrition Alteration  Diet Type: Patient's Preference  Skin  Skin Note: Patient reports that her skin feels warm after radiation and slight redness on skin in treatment area.  Patient reports intermittent nipple pain and that her nipple is always hard.   Patient reports using aquaphor twice a day      Psychosocial  Pyschosocial Note: Patient reports low energy all the time  Pain Assessment  0-10 Pain Scale: 0  Pain Note: Patient reports no pain at today's visit, and range of motion is good.      Objective:   /61 (BP Location: Left arm, Patient Position: Sitting, Cuff Size: Adult Regular)   Pulse 110   Temp 98.4  F (36.9  C) (Oral)   Resp 18   Wt 77.3 kg (170 lb 8 oz)   LMP 2003 (Approximate)   SpO2 100%   BMI 32.23 kg/m    Gen: Appears well, in no acute distress  Skin: Mild diffuse erythema over treatment field    Labs:  CBC RESULTS:   Recent Labs   Lab Test 19  1122   WBC 8.1   RBC 4.45   HGB 12.8   HCT 41.0   MCV 92   MCH 28.8   MCHC 31.2*   RDW 14.6        ELECTROLYTES:  Recent Labs   Lab Test 19  1406 19  1122   NA  --  141   POTASSIUM  --  4.0   CHLORIDE  --  106   JONATHON 9.6 9.1   CO2  --  30   BUN  --  11   CR 0.63 0.69   GLC  --  91       Assessment:    Tolerating radiation therapy well.  All questions and concerns addressed.    Toxicities:  Fatigue: Grade 1: Fatigue relieved by rest  Pain: Grade 0: No toxicity  Dermatitis: Grade 1: Faint erythema or dry  desquamation    Plan:   1. Continue current therapy.    2. Skin care as previously directed  3. Follow up with Rad Onc in 2 months.      Mosaiq chart and setup information reviewed  Ports checked    Medication Review  Med list reviewed with patient?: Yes  Med list printed and given: Offered and declined             Lynette Copeland MD    Please do not send letter to referring physician.

## 2019-03-11 NOTE — NURSING NOTE
"Oncology Rooming Note    March 11, 2019 9:55 AM   Marixa Ann is a 68 year old female who presents for:    Chief Complaint   Patient presents with     Oncology Clinic Visit     Follow up     Initial Vitals: /61 (BP Location: Left arm)   Pulse 110   Temp 98.4  F (36.9  C) (Oral)   Resp 18   Ht 1.549 m (5' 0.98\")   Wt 77.3 kg (170 lb 5 oz)   LMP 05/31/2003 (Approximate)   SpO2 100%   BMI 32.20 kg/m   Estimated body mass index is 32.2 kg/m  as calculated from the following:    Height as of this encounter: 1.549 m (5' 0.98\").    Weight as of this encounter: 77.3 kg (170 lb 5 oz). Body surface area is 1.82 meters squared.  Moderate Pain (5) Comment: Data Unavailable   Patient's last menstrual period was 05/31/2003 (approximate).  Allergies reviewed: Yes  Medications reviewed: Yes    Medications: Medication refills not needed today.  Pharmacy name entered into [facility]:    [id number] - [place], [place] - [address]  WRITTEN PRESCRIPTION REQUESTED    Marialuisa Reed LPN              "

## 2019-03-11 NOTE — PROGRESS NOTES
Infusion Nursing Note:  Marixa Ann presents today for Prolia.    Patient seen by provider today: Yes: Lidia Parham NP   present during visit today: Not Applicable.    Note: Pt's first time to infusion, first Prolia, offered pharmacist to meet with pt but pt states she is too overwhelmed today.  Will ask to meet with pharmacist next appt if she has questions.      Intravenous Access:  No Intravenous access/labs at this visit.    Treatment Conditions:  Lab Results   Component Value Date    HGB 12.8 01/04/2019     Lab Results   Component Value Date    WBC 8.1 01/04/2019      Lab Results   Component Value Date    ANEU 4.8 10/19/2017     Lab Results   Component Value Date     01/04/2019      Lab Results   Component Value Date     01/04/2019                   Lab Results   Component Value Date    POTASSIUM 4.0 01/04/2019           Lab Results   Component Value Date    MAG 2.0 01/04/2019            Lab Results   Component Value Date    CR 0.63 03/05/2019                   Lab Results   Component Value Date    JONATHON 9.6 03/05/2019                Lab Results   Component Value Date    BILITOTAL 0.2 03/05/2019           Lab Results   Component Value Date    ALBUMIN 3.3 03/05/2019                    Lab Results   Component Value Date    ALT 21 03/05/2019           Lab Results   Component Value Date    AST 12 03/05/2019       Results reviewed, labs MET treatment parameters, ok to proceed with treatment.      Post Infusion Assessment:  Patient tolerated injection without incident.  Site patent and intact, free from redness, edema or discomfort.    Discharge Plan:   Patient discharged in stable condition accompanied by: .  Departure Mode: Ambulatory.  Pt will RTC 9/12/19 for Prolia.      Franco Dia RN

## 2019-03-11 NOTE — PROGRESS NOTES
Oncology Follow Up Visit: March 11, 2019    Oncologist: Dr Sil Dan  PCP: Jackie Kovacs    Diagnosis: Stage IB Right Breast Cancer  Marixa Ann is a 67 yo female who had abnormality at 2-4 oclock level of right breaston screening mammogram in 12/2018. Final review proved a 12 x 9 x14 mm invasive ductal carcinoma at 2 oclock to the right breast, Carol grade 1, ER/LA positive, Her2 negative with 0/3 SLN positive and edges free of disease.   Oncotype score =4 or 3% risk of disease at 9 years with hormone therapy.   Treatment:   1/10/2019 Right Lumpectomy with SLN biopsy  3/11/2019 completed right breast radiation post lumpectomy.   - choice made to not use Tamoxifen due to risk for DVT(repeatedly elevated d-dimer as well as elevated ESR and CRP) so started pt on Aromatase inhibitor with close bone evaluation    Interval History: Ms. Ann comes to clinic with hu prior to start of aromatase inhibitor and start of prolia after completion of XRT for her breast cancer.  Pt is known to have high anxiety, ADHD and she admits this and asks many questions. She is reporting that she is continuing having pain from fibromyalgia arthritis- worse in the knees. She reports eating poorly with little appetite - using premier protein drinks for much of her nutrition. She finishes XRT today and states she is tired but no issues noted with the radiation and feels this has gone very well.    Rest of comprehensive and complete ROS is reviewed and is negative.   Past Medical History:   Diagnosis Date     ADHD      Breast cancer (H) 12/26/2018    Right Breast     Chronic fatigue      Fibromyalgia      Hard of hearing      Osteoporosis      Current Outpatient Medications   Medication     acetaminophen (TYLENOL) 325 MG tablet     amphetamine-dextroamphetamine (ADDERALL) 10 MG tablet     B Complex Vitamins (VITAMIN B-COMPLEX PO)     calcium carbonate (TUMS) 500 MG chewable tablet     Calcium-Magnesium-Zinc 333-133-5  "MG TABS per tablet     Cholecalciferol (VITAMIN D-3 PO)     cyclobenzaprine (FLEXERIL) 5 MG tablet     KRILL OIL PO     magnesium oxide 200 MG TABS     Multiple Vitamins-Minerals (MULTIVITAMIN PO)     RANITIDINE HCL PO     No current facility-administered medications for this visit.      Allergies   Allergen Reactions     Vicodin [Hydrocodone-Acetaminophen] Anaphylaxis     Dust Mites      Milk [Lac Bovis] Diarrhea       Physical Exam:/61 (BP Location: Left arm)   Pulse 110   Temp 98.4  F (36.9  C) (Oral)   Resp 18   Ht 1.549 m (5' 0.98\")   Wt 77.3 kg (170 lb 5 oz)   LMP 05/31/2003 (Approximate)   SpO2 100%   BMI 32.20 kg/m     ECOG PS- 0  Constitutional: Alert, obese but moving well though obvious early knee discomfort when starting to walk. and in no distress as seated.   ENT: Eyes bright, No mouth sores  Respiratory- breathing easy, no distress - talking in full sentences  Breasts:mild redness at site of XRT- did not palpate today.  MS: Muscle tone normal, extremities normal with no edema.   Psych: Mentation appears normal and affect anxious and easy to confuse - smiling    Laboratory Results:   Results for orders placed or performed in visit on 03/06/19   Vitamin D deficiency screening   Result Value Ref Range    Vitamin D Deficiency screening 23 20 - 75 ug/L     Assessment and Plan:   Stage 1B Right Breast Cancer- Pt has now completed Right Lumpectomy and SLN biopsy with Dr Diamond in 12/2018 and will complete Radiation therapy with Dr Copeland today. Her Oncotype DX score was 4 so no chemotherapy was recommended.   This provider had an extensive visit with this pt due to ADHD and anxiety and shared written education handouts of both anastrozole and Prolia and highlighted several must know issues with each drug and took significant time to explain administration and side effects of the drugs and answer all questions. Pt state she would like to go ahead with both anastrozole and Prolia today.   She " will return in 6 weeks for medication review with no labs necessary.   Treatment summary was presented today and explained.  Osteoporosis- 4/2018 DEXA result showing T score of -2.9. Though normally we would not put on her Aromatase inhibitor but she is known to have elevated D Dimer with repeat and elevated inflammation markers with CRP and ESR elevations so decision was made to start her with AI instead of Tamoxifen due to DVT risk. Today we reviewed administration and side effects of Prolia and pt will receive her first dosing. She is taking daily calcium and additional vitamin D- has calcium supplement and uses the premier protein drinks with whey base.she will need to add small dose of vitamin D - suggested adding one vitamin D gummy daily to get in adequate vitamin D of [id number] international unit(s) daily.    Encouraged weight bearing exercises to strengthen bones. She will receive the Prolia every 6 months- assured her we will remind her of timing for the Prolia.   This was a 40 min visit with > 50% in counseling and coordinating care including education and management of concerns.    Lidia Parham,CNP

## 2019-03-12 PROBLEM — Z79.811 AROMATASE INHIBITOR USE: Status: ACTIVE | Noted: 2019-03-12

## 2019-03-25 ENCOUNTER — OFFICE VISIT (OUTPATIENT)
Dept: ORTHOPEDICS | Facility: CLINIC | Age: 69
End: 2019-03-25
Payer: COMMERCIAL

## 2019-03-25 ENCOUNTER — ANCILLARY PROCEDURE (OUTPATIENT)
Dept: GENERAL RADIOLOGY | Facility: CLINIC | Age: 69
End: 2019-03-25
Attending: FAMILY MEDICINE
Payer: COMMERCIAL

## 2019-03-25 VITALS — BODY MASS INDEX: 32.1 KG/M2 | HEIGHT: 61 IN | WEIGHT: 170 LBS

## 2019-03-25 DIAGNOSIS — M25.562 LEFT KNEE PAIN, UNSPECIFIED CHRONICITY: ICD-10-CM

## 2019-03-25 DIAGNOSIS — C50.211 MALIGNANT NEOPLASM OF UPPER-INNER QUADRANT OF RIGHT BREAST IN FEMALE, ESTROGEN RECEPTOR POSITIVE (H): ICD-10-CM

## 2019-03-25 DIAGNOSIS — M17.0 BILATERAL PRIMARY OSTEOARTHRITIS OF KNEE: ICD-10-CM

## 2019-03-25 DIAGNOSIS — M25.562 LEFT KNEE PAIN, UNSPECIFIED CHRONICITY: Primary | ICD-10-CM

## 2019-03-25 DIAGNOSIS — M81.8 OTHER OSTEOPOROSIS WITHOUT CURRENT PATHOLOGICAL FRACTURE: ICD-10-CM

## 2019-03-25 DIAGNOSIS — Z17.0 MALIGNANT NEOPLASM OF UPPER-INNER QUADRANT OF RIGHT BREAST IN FEMALE, ESTROGEN RECEPTOR POSITIVE (H): ICD-10-CM

## 2019-03-25 PROCEDURE — 99213 OFFICE O/P EST LOW 20 MIN: CPT | Mod: 25 | Performed by: FAMILY MEDICINE

## 2019-03-25 PROCEDURE — 20610 DRAIN/INJ JOINT/BURSA W/O US: CPT | Mod: 50 | Performed by: FAMILY MEDICINE

## 2019-03-25 PROCEDURE — 73562 X-RAY EXAM OF KNEE 3: CPT | Mod: RT | Performed by: RADIOLOGY

## 2019-03-25 RX ORDER — TRIAMCINOLONE ACETONIDE 40 MG/ML
80 INJECTION, SUSPENSION INTRA-ARTICULAR; INTRAMUSCULAR ONCE
Status: ACTIVE | OUTPATIENT
Start: 2019-03-25

## 2019-03-25 ASSESSMENT — PAIN SCALES - GENERAL: PAINLEVEL: MILD PAIN (3)

## 2019-03-25 ASSESSMENT — MIFFLIN-ST. JEOR: SCORE: 1248.17

## 2019-03-25 NOTE — PATIENT INSTRUCTIONS
Thanks for coming today.  Ortho/Sports Medicine Clinic  [address] [place] [address]    To schedule future appointments in Ortho Clinic, you may call [phone].    To schedule ordered imaging by your Provider: Call [facility] at [phone]    [facility] available online at:   [email or web address]    Please call if any further questions or concerns [phone] and ask for the Orthopedic Department. Clinic hours 8 am to 5 pm.    Return to clinic if symptoms worsen.

## 2019-03-25 NOTE — PROGRESS NOTES
"      [facility]  3/25/2019    Marixa Ann's chief complaint for this visit includes:  Chief Complaint   Patient presents with     Consult     left knee pain, no know injury, long standing pain, recent cancer treatments.      PCP: Jackie Kovacs    Referring Provider:  Sil Dan MD  [address] [place] [address]    Ht 1.549 m (5' 0.98\")   Wt 77.1 kg (170 lb)   LMP 05/31/2003 (Approximate)   BMI 32.14 kg/m    Mild Pain (3)       Reason for visit:     What part of your body is injured / painful?  left knee    What caused the injury /pain? No inciting event     How long ago did your injury occur or pain begin? several years ago    What are your most bothersome symptoms? Pain    How would you characterize your symptom?  aching    What makes your symptoms better? Rest    What makes your symptoms worse? Standing, Walking and Movement    Have you been previously seen for this problem? Yes, seen [facility]    Medical History:    Any recent changes to your medical history? No    Any new medication prescribed since last visit? No    Have you had surgery on this body part before? No      Review of Systems:    Do you have fever, chills, weight loss? No    Do you have any vision problems? Yes, cloudy     Do you have any chest pain or edema? No    Do you have any shortness of breath or wheezing?  No    Do you have stomach problems? No    Do you have any numbness or focal weakness? No    Do you have diabetes? No    Do you have problems with bleeding or clotting? Yes, D-dimmer is high    Do you have an rashes or other skin lesions? No         "

## 2019-03-25 NOTE — LETTER
"    3/25/2019         RE: Marixa Ann  [address] [facility] [address]        Dear Colleague,    Thank you for referring your patient, Marixa Ann, to the [facility]. Please see a copy of my visit note below.          [facility]  3/25/2019    Marixa Ann's chief complaint for this visit includes:  Chief Complaint   Patient presents with     Consult     left knee pain, no know injury, long standing pain, recent cancer treatments.      PCP: Jackie Kovacs    Referring Provider:  Sil Dan MD  [address] [place] [address]    Ht 1.549 m (5' 0.98\")   Wt 77.1 kg (170 lb)   LMP 05/31/2003 (Approximate)   BMI 32.14 kg/m     Mild Pain (3)       Reason for visit:     What part of your body is injured / painful?  left knee    What caused the injury /pain? No inciting event     How long ago did your injury occur or pain begin? several years ago    What are your most bothersome symptoms? Pain    How would you characterize your symptom?  aching    What makes your symptoms better? Rest    What makes your symptoms worse? Standing, Walking and Movement    Have you been previously seen for this problem? Yes, seen [facility]    Medical History:    Any recent changes to your medical history? No    Any new medication prescribed since last visit? No    Have you had surgery on this body part before? No      Review of Systems:    Do you have fever, chills, weight loss? No    Do you have any vision problems? Yes, cloudy     Do you have any chest pain or edema? No    Do you have any shortness of breath or wheezing?  No    Do you have stomach problems? No    Do you have any numbness or focal weakness? No    Do you have diabetes? No    Do you have problems with bleeding or clotting? Yes, D-dimmer is high    Do you have an rashes or other skin lesions? No           CHIEF COMPLAINT:  Consult (left knee pain, no know injury, long standing pain, recent cancer " treatments. )       HISTORY OF PRESENT ILLNESS  Ms. Ann is a pleasant 68 year old year old female who presents to clinic today with left knee pain.  She;s seen at the request of Dr. Dan.  Irena's left knee has been bothering her for years.  More so over the past few weeks to month with no clear injury.  She points to the inferior aspect of her patellar region, pain feels deep inside.  She feels similar symptoms in her right knee, these are.  She does have swelling at times, difficulty climbing steps.  Did seek care for her Irena left knee in the past.  She was seen and treated at [facility], she believes with a corticosteroid injection.  This was over a year ago.    Irena has a complicated medical history.  She has a history of breast cancer as well as osteoporosis.      Additional history: as documented    MEDICAL HISTORY  Patient Active Problem List   Diagnosis     Pain in joint, shoulder region     Moderate recurrent major depression (H)     Hyperlipidemia LDL goal <160     Chronic fatigue disorder     Liver lesion     Cystic disease of liver     Gastroesophageal reflux disease with esophagitis     Hiatal hernia     Age-related cataract of both eyes, unspecified age-related cataract type     Primary osteoarthritis of both knees     Attention deficit hyperactivity disorder (ADHD), predominantly inattentive type     Malignant neoplasm of upper-inner quadrant of right breast in female, estrogen receptor positive (H)     Thyroid nodule     Anxiety disorder, unspecified type     Osteoporosis     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     Aromatase inhibitor use       Current Outpatient Medications   Medication Sig Dispense Refill     acetaminophen (TYLENOL) 325 MG tablet Take 325-650 mg by mouth every 6 hours as needed for mild pain       amphetamine-dextroamphetamine (ADDERALL) 10 MG tablet Take 1 tablet (10 mg) by mouth 2 times daily 60 tablet 0     anastrozole (ARIMIDEX) 1 MG tablet Take 1 tablet (1 mg) by mouth  "daily 30 tablet 1     B Complex Vitamins (VITAMIN B-COMPLEX PO) Take by mouth daily       calcium carbonate (TUMS) 500 MG chewable tablet Take 1 chew tab by mouth 2 times daily       Calcium-Magnesium-Zinc 333-133-5 MG TABS per tablet Take 1 tablet by mouth daily       Cholecalciferol (VITAMIN D-3 PO)        cyclobenzaprine (FLEXERIL) 5 MG tablet Take 1 tablet (5 mg) by mouth daily as needed for muscle spasms (Patient not taking: Reported on 3/11/2019) 20 tablet 0     KRILL OIL PO Take by mouth daily       magnesium oxide 200 MG TABS Take 100 mg by mouth once as needed       Multiple Vitamins-Minerals (MULTIVITAMIN PO)        RANITIDINE HCL PO Take by mouth as needed          Allergies   Allergen Reactions     Vicodin [Hydrocodone-Acetaminophen] Anaphylaxis     Dust Mites      Milk [Lac Bovis] Diarrhea       Family History   Problem Relation Age of Onset     Leukemia Father      Breast Cancer Cousin 50        Maternal Female 1st Cousin       Additional medical/Social/Surgical histories reviewed in [facility] and updated as appropriate.     REVIEW OF SYSTEMS (3/25/2019)  CONSTITUTIONAL: Denies fever and weight loss  EYES: Denies acute vision changes  ENT: Denies hearing changes or difficulty swallowing  CARDIAC: Denies chest pain or edema  RESPIRATORY: Denies dyspnea, cough or wheeze  GASTROINTESTINAL: Denies abdominal pain, nausea, vomiting  MUSCULOSKELETAL: See HPI  SKIN: Denies any recent rash or lesion  NEUROLOGICAL: Denies numbness or focal weakness  PSYCHIATRIC: No history of psychiatric symptoms or problems  ENDOCRINE: Denies current diagnosis of diabetes  HEMATOLOGY: Denies episodes of easy bleeding      PHYSICAL EXAM  Vitals:    03/25/19 0954   Weight: 77.1 kg (170 lb)   Height: 1.549 m (5' 0.98\")     General  - normal appearance, in no obvious distress  CV  - normal popliteal pulse  Pulm  - normal respiratory pattern, non-labored  Musculoskeletal - left and right knee  - stance: mildly antalgic gait, genu " varum  - inspection: generalized swelling, trace effusion  - palpation: medial joint line tenderness, patella and patellar tendon non-tender, normal popliteal pulse  - ROM: 100 degrees flexion, 0 degrees extension, painful active ROM  - strength: 5/5 in flexion, 5/5 in extension  - neuro: no sensory or motor deficit  - special tests:  (-) Lachman  (-) anterior drawer  (-) Crescencio  (-) varus at 0 and 30 degrees flexion  (-) valgus at 0 and 30 degrees flexion  (-) compression test  (-) patellar apprehension  Neuro  - no sensory or motor deficit, grossly normal coordination, normal muscle tone  Skin  - no ecchymosis, erythema, warmth, or induration, no obvious rash  Psych  - interactive, appropriate, normal mood and affect             ASSESSMENT & PLAN  Ms. Ann is a 68 year old year old female who presents to clinic today with left knee pain.    I ordered and reviewed an xray of her knees which show bilateral medial and PF compartment OA.     Irena and I had a good discussion centering around the spectrum of treatment options for osteoarthritis that preclude surgery.  We discussed nonoperative treatment with pain relievers, icing, low impact exercise, and weight loss.  We also talked about the role of injection therapy with corticosteroids and hyaluronic acid, as well as the potential role of biologics.    After some discussion I did inject her knees today (see procedure note).  I do think that physical therapy is in her best interest, I am referring her.  I also recommended over the shelf inserts for.    In the future, if her pain worsens I would consider braces or MR imaging.    It was a pleasure seeing Marixa today.    PROCEDURE    Knee Injections - Intraarticular  The patient was informed of the risks and the benefits of the procedure and a written consent was signed.  The patient s left knee was prepped with chlorhexidine in sterile fashion.   40 mg of triamcinolone suspension was drawn up into a 5 mL syringe  with 2 mL of 1% lidocaine and 2 mL of 0.5% marcaine.  Injection was performed using substerile technique.  A 1.5-inch 25-gauge needle was used to enter the lateral aspect of the left knee.  Injection performed successfully without difficulty.  There were no complications. The patient tolerated the procedure well. There was negligible bleeding.   The patient s right knee was prepped with chlorhexidine in sterile fashion.   40 mg of triamcinolone suspension was drawn up into a 5 mL syringe with 2 mL of 1% lidocaine and 2 mL of 0.5% marcaine.  Injection was performed using substerile technique.  A 1.5-inch 25-gauge needle was used to enter the lateral aspect of the right knee.  Injection performed successfully without difficulty.  There were no complications. The patient tolerated the procedure well. There was negligible bleeding.   The patient was instructed to ice the knee upon leaving clinic and refrain from overuse over the next 3 days.   The patient was instructed to call or go to the emergency room with any unusual pain, swelling, redness, or if otherwise concerned.  Kenalog: NDC [id number]        Fam Atkins DO, [facility]  Primary Care Sports Medicine      Again, thank you for allowing me to participate in the care of your patient.        Sincerely,        Fam Atkins DO

## 2019-03-25 NOTE — PROGRESS NOTES
CHIEF COMPLAINT:  Consult (left knee pain, no know injury, long standing pain, recent cancer treatments. )       HISTORY OF PRESENT ILLNESS  Ms. Ann is a pleasant 68 year old year old female who presents to clinic today with left knee pain.  She;s seen at the request of Dr. Dan.  Irena's left knee has been bothering her for years.  More so over the past few weeks to month with no clear injury.  She points to the inferior aspect of her patellar region, pain feels deep inside.  She feels similar symptoms in her right knee, these are.  She does have swelling at times, difficulty climbing steps.  Did seek care for her Irena left knee in the past.  She was seen and treated at [facility], she believes with a corticosteroid injection.  This was over a year ago.    Irena has a complicated medical history.  She has a history of breast cancer as well as osteoporosis.      Additional history: as documented    MEDICAL HISTORY  Patient Active Problem List   Diagnosis     Pain in joint, shoulder region     Moderate recurrent major depression (H)     Hyperlipidemia LDL goal <160     Chronic fatigue disorder     Liver lesion     Cystic disease of liver     Gastroesophageal reflux disease with esophagitis     Hiatal hernia     Age-related cataract of both eyes, unspecified age-related cataract type     Primary osteoarthritis of both knees     Attention deficit hyperactivity disorder (ADHD), predominantly inattentive type     Malignant neoplasm of upper-inner quadrant of right breast in female, estrogen receptor positive (H)     Thyroid nodule     Anxiety disorder, unspecified type     Osteoporosis     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     Aromatase inhibitor use       Current Outpatient Medications   Medication Sig Dispense Refill     acetaminophen (TYLENOL) 325 MG tablet Take 325-650 mg by mouth every 6 hours as needed for mild pain       amphetamine-dextroamphetamine (ADDERALL) 10 MG tablet Take 1 tablet (10 mg) by mouth 2  "times daily 60 tablet 0     anastrozole (ARIMIDEX) 1 MG tablet Take 1 tablet (1 mg) by mouth daily 30 tablet 1     B Complex Vitamins (VITAMIN B-COMPLEX PO) Take by mouth daily       calcium carbonate (TUMS) 500 MG chewable tablet Take 1 chew tab by mouth 2 times daily       Calcium-Magnesium-Zinc 333-133-5 MG TABS per tablet Take 1 tablet by mouth daily       Cholecalciferol (VITAMIN D-3 PO)        cyclobenzaprine (FLEXERIL) 5 MG tablet Take 1 tablet (5 mg) by mouth daily as needed for muscle spasms (Patient not taking: Reported on 3/11/2019) 20 tablet 0     KRILL OIL PO Take by mouth daily       magnesium oxide 200 MG TABS Take 100 mg by mouth once as needed       Multiple Vitamins-Minerals (MULTIVITAMIN PO)        RANITIDINE HCL PO Take by mouth as needed          Allergies   Allergen Reactions     Vicodin [Hydrocodone-Acetaminophen] Anaphylaxis     Dust Mites      Milk [Lac Bovis] Diarrhea       Family History   Problem Relation Age of Onset     Leukemia Father      Breast Cancer Cousin 50        Maternal Female 1st Cousin       Additional medical/Social/Surgical histories reviewed in [facility] and updated as appropriate.     REVIEW OF SYSTEMS (3/25/2019)  CONSTITUTIONAL: Denies fever and weight loss  EYES: Denies acute vision changes  ENT: Denies hearing changes or difficulty swallowing  CARDIAC: Denies chest pain or edema  RESPIRATORY: Denies dyspnea, cough or wheeze  GASTROINTESTINAL: Denies abdominal pain, nausea, vomiting  MUSCULOSKELETAL: See HPI  SKIN: Denies any recent rash or lesion  NEUROLOGICAL: Denies numbness or focal weakness  PSYCHIATRIC: No history of psychiatric symptoms or problems  ENDOCRINE: Denies current diagnosis of diabetes  HEMATOLOGY: Denies episodes of easy bleeding      PHYSICAL EXAM  Vitals:    03/25/19 0954   Weight: 77.1 kg (170 lb)   Height: 1.549 m (5' 0.98\")     General  - normal appearance, in no obvious distress  CV  - normal popliteal pulse  Pulm  - normal respiratory pattern, " non-labored  Musculoskeletal - left and right knee  - stance: mildly antalgic gait, genu varum  - inspection: generalized swelling, trace effusion  - palpation: medial joint line tenderness, patella and patellar tendon non-tender, normal popliteal pulse  - ROM: 100 degrees flexion, 0 degrees extension, painful active ROM  - strength: 5/5 in flexion, 5/5 in extension  - neuro: no sensory or motor deficit  - special tests:  (-) Lachman  (-) anterior drawer  (-) Crescencio  (-) varus at 0 and 30 degrees flexion  (-) valgus at 0 and 30 degrees flexion  (-) compression test  (-) patellar apprehension  Neuro  - no sensory or motor deficit, grossly normal coordination, normal muscle tone  Skin  - no ecchymosis, erythema, warmth, or induration, no obvious rash  Psych  - interactive, appropriate, normal mood and affect             ASSESSMENT & PLAN  Ms. Ann is a 68 year old year old female who presents to clinic today with left knee pain.    I ordered and reviewed an xray of her knees which show bilateral medial and PF compartment OA.     Irena and I had a good discussion centering around the spectrum of treatment options for osteoarthritis that preclude surgery.  We discussed nonoperative treatment with pain relievers, icing, low impact exercise, and weight loss.  We also talked about the role of injection therapy with corticosteroids and hyaluronic acid, as well as the potential role of biologics.    After some discussion I did inject her knees today (see procedure note).  I do think that physical therapy is in her best interest, I am referring her.  I also recommended over the shelf inserts for.    In the future, if her pain worsens I would consider braces or MR imaging.    It was a pleasure seeing Marixa today.    PROCEDURE    Knee Injections - Intraarticular  The patient was informed of the risks and the benefits of the procedure and a written consent was signed.  The patient s left knee was prepped with chlorhexidine  in sterile fashion.   40 mg of triamcinolone suspension was drawn up into a 5 mL syringe with 2 mL of 1% lidocaine and 2 mL of 0.5% marcaine.  Injection was performed using substerile technique.  A 1.5-inch 25-gauge needle was used to enter the lateral aspect of the left knee.  Injection performed successfully without difficulty.  There were no complications. The patient tolerated the procedure well. There was negligible bleeding.   The patient s right knee was prepped with chlorhexidine in sterile fashion.   40 mg of triamcinolone suspension was drawn up into a 5 mL syringe with 2 mL of 1% lidocaine and 2 mL of 0.5% marcaine.  Injection was performed using substerile technique.  A 1.5-inch 25-gauge needle was used to enter the lateral aspect of the right knee.  Injection performed successfully without difficulty.  There were no complications. The patient tolerated the procedure well. There was negligible bleeding.   The patient was instructed to ice the knee upon leaving clinic and refrain from overuse over the next 3 days.   The patient was instructed to call or go to the emergency room with any unusual pain, swelling, redness, or if otherwise concerned.  Kenalog: NDC [id number]        Fam Atkins DO, [facility]  Primary Care Sports Medicine

## 2019-03-27 ENCOUNTER — ONCOLOGY VISIT (OUTPATIENT)
Dept: RADIATION ONCOLOGY | Facility: CLINIC | Age: 69
End: 2019-03-27

## 2019-03-28 NOTE — PROCEDURES
Radiotherapy Treatment Summary          Date of Report: 2019     PATIENT: JAILYN ANN  MEDICAL RECORD NO: [id number]  : 1950     DIAGNOSIS: C50.211 Malignant neoplasm of upper-inner quadrant of right female breast  INTENT OF RADIOTHERAPY: Cure  PATHOLOGY/STAGE: Ms. Ann is a 68 year old female with R breast cancer, G1, 2.4 cm, ER/NE+, neg margins,   s/p lumpectomy. Her pathologic stage is [id number] and she recently completed adjuvant radiation therapy to the right   breast.                                   Details of the treatments summarized below are found in records kept in the Department of Radiation Oncology at   [facility].     Treatment Summary:  Radiation Oncology - Course: 1 Protocol:   Treatment Site Current Dose Modality   From  To Elapsed Days Fx.  1 R breast             4,256 cGy 10 X     2/18/2019  3/11/2019  21 16          Dose per Fraction:       Total Dose:        266cGy   4256cGy to the whole breast         COMMENTS:                      (Acute Toxicity Profile by CTC v5.0)  Fatigue: Grade 2: Fatigue not relieved by rest; limiting instrumental ADL. Patient has a longstanding history of excessive fatigue and fibromyalgia.  Fatigue went back to her baseline after patient resumed her previously prescribed Aderall   Dermatitis: Grade 1: Faint erythema or dry desquamation relieved with skin creams     PAIN MANAGEMENT:                           Pain: Grade 0: No toxicity; No reported pain while on treatment     FOLLOW UP PLAN:                         Follow up with Rad Onc in 2 months     Staff Physician: Beau Evangelista M.D.  Physicist: Ludy Mccarty MS     CC: Sil Dan MD              Radiation Oncology [address] [address] [place] [address] Phone: [phone]

## 2019-03-29 ENCOUNTER — MYC REFILL (OUTPATIENT)
Dept: FAMILY MEDICINE | Facility: CLINIC | Age: 69
End: 2019-03-29

## 2019-03-29 DIAGNOSIS — F90.0 ATTENTION DEFICIT HYPERACTIVITY DISORDER (ADHD), PREDOMINANTLY INATTENTIVE TYPE: ICD-10-CM

## 2019-04-01 ENCOUNTER — HOSPITAL ENCOUNTER (OUTPATIENT)
Dept: OCCUPATIONAL THERAPY | Facility: CLINIC | Age: 69
Setting detail: THERAPIES SERIES
End: 2019-04-01
Attending: INTERNAL MEDICINE
Payer: COMMERCIAL

## 2019-04-01 ENCOUNTER — PATIENT OUTREACH (OUTPATIENT)
Dept: ONCOLOGY | Facility: CLINIC | Age: 69
End: 2019-04-01

## 2019-04-01 ENCOUNTER — HOSPITAL ENCOUNTER (OUTPATIENT)
Dept: PHYSICAL THERAPY | Facility: CLINIC | Age: 69
Setting detail: THERAPIES SERIES
End: 2019-04-01
Attending: INTERNAL MEDICINE
Payer: COMMERCIAL

## 2019-04-01 DIAGNOSIS — M17.0 PRIMARY OSTEOARTHRITIS OF BOTH KNEES: ICD-10-CM

## 2019-04-01 DIAGNOSIS — C50.211 MALIGNANT NEOPLASM OF UPPER-INNER QUADRANT OF RIGHT BREAST IN FEMALE, ESTROGEN RECEPTOR POSITIVE (H): ICD-10-CM

## 2019-04-01 DIAGNOSIS — Z17.0 MALIGNANT NEOPLASM OF UPPER-INNER QUADRANT OF RIGHT BREAST IN FEMALE, ESTROGEN RECEPTOR POSITIVE (H): ICD-10-CM

## 2019-04-01 PROCEDURE — 97110 THERAPEUTIC EXERCISES: CPT | Mod: GO | Performed by: OCCUPATIONAL THERAPIST

## 2019-04-01 PROCEDURE — 97110 THERAPEUTIC EXERCISES: CPT | Mod: GP | Performed by: PHYSICAL THERAPIST

## 2019-04-01 PROCEDURE — 97535 SELF CARE MNGMENT TRAINING: CPT | Mod: GO | Performed by: OCCUPATIONAL THERAPIST

## 2019-04-01 PROCEDURE — 97166 OT EVAL MOD COMPLEX 45 MIN: CPT | Mod: GO | Performed by: OCCUPATIONAL THERAPIST

## 2019-04-01 PROCEDURE — 97162 PT EVAL MOD COMPLEX 30 MIN: CPT | Mod: GP | Performed by: PHYSICAL THERAPIST

## 2019-04-01 RX ORDER — DEXTROAMPHETAMINE SACCHARATE, AMPHETAMINE ASPARTATE, DEXTROAMPHETAMINE SULFATE AND AMPHETAMINE SULFATE 2.5; 2.5; 2.5; 2.5 MG/1; MG/1; MG/1; MG/1
10 TABLET ORAL 2 TIMES DAILY
Qty: 60 TABLET | Refills: 0 | Status: SHIPPED | OUTPATIENT
Start: 2019-04-01 | End: 2019-05-01

## 2019-04-01 ASSESSMENT — 6 MINUTE WALK TEST (6MWT)
TOTAL DISTANCE WALKED (FT): 1044
TOTAL DISTANCE WALKED (METERS): 318

## 2019-04-01 NOTE — IP AVS SNAPSHOT
MRN:[id number]                      After Visit Summary   4/1/2019    Marixa Ann    MRN: [id number]           Visit Information        Provider Department      4/1/2019  1:30 PM Marixa Oakes, PT [facility]        Your next 10 appointments already scheduled    Apr 01, 2019  3:15 PM CDT  Cancer Rehab Eval with Teri Mckenzie, OT  [facility] ([facility]) [address] [address] [address]  [phone]   Apr 18, 2019 10:30 AM CDT  Office Visit with Indira Zeng, DO  [facility] ([facility]) [address] [place] [address]  [phone]   Bring a current list of meds and any records pertaining to this visit. For Physicals, please bring immunization records and any forms needing to be filled out. Please arrive 10 minutes early to complete paperwork.   Apr 22, 2019 10:45 AM CDT  Return Visit with MARRY Mathews CNP  [facility] ([facility]) [address] [place] [address]  [phone]   May 13, 2019  2:45 PM CDT  Return Visit with Alexandra Copeland MD  [facility]) [address] [place] [address]  [phone]   Jul 25, 2019 10:30 AM CDT  Return Visit with Gema Diamond MD  [facility]) [address] [place] [address]  [phone]        Further instructions from your care team       Lets try to work on you daily exercise level.    If you ride the st bike for 10 minutes that is your exercise for the day.     Then the next day walking is your exercise. You can walk at [employer] or grocery store or [facility] center (for > 5 minutes).     Tuesday (studio arts)  and Thursday (chair yoga) at [facility]    Every other Wednesday at therapist. Need to do exercise in the AM prior to  the appt.     Shoes in the house will help your knees. No particular brand is recommended since these will be your house shoes. Wear the super feet.     Irena Oakes DPT, MPT, NCS    MyChart Information    MyChart gives you secure access to your electronic health record. If you see a primary care provider, you can also send messages to your care team and make appointments. If you have questions, please call your primary care clinic.  If you do not have a primary care provider, please call [phone] and they will assist you.       Care EveryWhere ID    This is your Care EveryWhere ID. This could be used by other organizations to access your [facility] medical records  [id number]       Equal Access to Services    IMAN BROWN : Ana Hdz, kate davidson, tra crow, susan baeza. So [facility] [phone].    ATENCIÓN: Si habla español, tiene a lombardo disposición servicios gratuitos de asistencia lingüística. Llame al [phone].    We comply with applicable federal civil rights laws and [place] laws. We do not discriminate on the basis of race, color, national origin, age, disability, sex, sexual orientation, or gender identity.

## 2019-04-01 NOTE — PROGRESS NOTES
Contacted patient regarding her questions regarding referral to a dentist.  Her dentist is retiring and she wasn't sure if she wanted to see the daughter of her dentist, who was taking over the practice. Patient had not been seen by a dentist for 1.5 yeas.    Patient was seeking a recommendation from Dr. Dan.  Per Dr. Dan, she feels as long as she sees a DDS, which is probably the dentist's daughter's qualifications, and does not need any tooth extractions at this time, she is ok to proceed with Prolia.  She would just need a regular checkup with the dentist.  Most dentists are now aware of Prolia's potential side effects (or other biophosphonates).  Irena can also let her dentist know that she is on Prolia.  Dr. TADEO did not have any specific dentist recommendation.  Patient was satisfied with this.  Writer advised that she have a dental evaluation in near future.

## 2019-04-01 NOTE — DISCHARGE INSTRUCTIONS
Lets try to work on your daily exercise level.    If you ride the st bike for 10 minutes that is your exercise for the day.     Then the next day walking is your exercise. You can walk at [employer] or grocery store or  community center (for > 5 minutes).     Tuesday (studio arts)  and Thursday (chair yoga) at [facility]    Every other Wednesday at therapist. Need to do exercise in the PM after this appt. and after a nap.     Shoes in the house will help your knees. No particular brand is recommended since these will be your house shoes. Wear the super feet.     Irena Oakes DPT, MPT, NCS

## 2019-04-01 NOTE — TELEPHONE ENCOUNTER
Requested Prescriptions   Pending Prescriptions Disp Refills     amphetamine-dextroamphetamine (ADDERALL) 10 MG tablet 60 tablet 0     Sig: Take 1 tablet (10 mg) by mouth 2 times daily    There is no refill protocol information for this order        Routing refill request to provider for review/approval because:  Drug not on the [facility] refill protocol       Oriana Cesar RN, BSN

## 2019-04-01 NOTE — PROGRESS NOTES
[facility]      Outpatient Physical Therapy Evaluation  PLAN OF TREATMENT FOR OUTPATIENT REHABILITATION  (COMPLETE FOR INITIAL CLAIMS ONLY)  Patient's Last Name, First Name, M.I.  YOB: 1950  Marixa Ann                        Provider's Name  [facility] Medical Record No.  [id number]                               Onset Date:  Order date 3/26/2019   Start of Care Date: 4/1/2019     Type: Physical Therapy Medical Diagnosis:  bilateral knee osteoarthritis, cancer rehab s/p breast cancer on right                        Therapy Diagnosis:  Chronic fatigue, chronic LBP, bilateral knee pain, limited activity tolerance    Visits from SOC:  1   _________________________________________________________________________________  Plan of Treatment:      Frequency/Duration: see weekly for first month then decrease to every other week. Up to 3 months    Goals: Increase gait speed by .1 m/s for long walks to improve community mobility, HEP for knee strengthening, increase activity level at home including stairs 3 times a week.  _________________________________________________________________________________    I CERTIFY THE NEED FOR THESE SERVICES FURNISHED UNDER        THIS PLAN OF TREATMENT AND WHILE UNDER MY CARE     (Physician co-signature of this document indicates review and certification of the therapy plan).                Certification date from: 4/1/2019  Certification date to: 6/29/2019    Referring Provider: DR Corado

## 2019-04-02 NOTE — PROGRESS NOTES
[facility]          OUTPATIENT OCCUPATIONAL THERAPY  EVALUATION  PLAN OF TREATMENT FOR OUTPATIENT REHABILITATION  (COMPLETE FOR INITIAL CLAIMS ONLY)  Patient's Last Name, First Name, M.I.  YOB: 1950  Marixa Ann                        Provider's Name  [facility] Medical Record No.  [id number]                               Onset Date:     03/07/19   Start of Care Date:     04/01/19   Type:     ___PT   _X_OT   ___SLP Medical Diagnosis:     H/o chronic fatigue, osteoporosis, right breast cancer with lumpectomy and SLND 1/10/19, radiation that ended ~2 weeks ago. Pt presents with muscle fatigue and back, right shoulder pain impacting IADL including painting.                          OT Diagnosis:     decreased IND with IADLs Visits from SOC:  1   _________________________________________________________________________________  Plan of Treatment/Functional Goals:  ADL training, IADL training, Self care/Home management, Strengthening, Stretching, Therapeutic activities                    Goals  Goal Identifier: energy conservation   Goal Description: Pt will report use of 2 new energy conservation strategies and techniques to complete laundry and to engage in painting for longer durations of time.    Target Date: 04/22/19     Goal Identifier: pain  Goal Description: Due to compliance with home stretching and exercise program, pt will report pain in posterior RUQ as <2/10 at two consecutive tx sessions.  Target Date: 04/22/19                                                                                  Therapy Frequency: 1x/wk     Predicted Duration of Therapy Intervention (days/wks): x2 weeks  Teri Mckenzie, OT          I CERTIFY THE NEED FOR THESE SERVICES FURNISHED UNDER        THIS PLAN OF TREATMENT AND WHILE UNDER MY CARE     (Physician co-signature of this  document indicates review and certification of the therapy plan).                 ,    Certification date from: 04/01/19, Certification date to: 04/22/19               Referring Physician:  Sil Dan MD     Initial Assessment        See Epic Evaluation      Start Of Care Date: 04/01/19

## 2019-04-02 NOTE — PROGRESS NOTES
" 04/01/19 1446   Quick Adds   Quick Adds Certification   Type of Visit Initial Outpatient Occupational Therapy Evaluation       Present No   General Information   Start Of Care Date 04/01/19   Referring Physician Sil Dan MD   Orders Evaluate and treat as indicated   Other Orders \"Cancer Rehab\"   Orders Date 03/07/19   Medical Diagnosis H/o chronic fatigue, osteoporosis, right breast cancer with lumpectomy and SLND 1/10/19, radiation that ended ~2 weeks ago. Pt presents with muscle fatigue and back, right shoulder pain impacting IADL including painting.   Onset of Illness/Injury or Date of Surgery 03/07/19   Surgical/Medical History Reviewed Yes   Comments/Observations Pt is seeing PT to address LE strength and endurance.   Role/Living Environment   Current Community Support Family/friend caregiver  (spouse works full time)   Patient role/Employment history Retired   Community/Avocational Activities water color painting, iPad   Current Living Environment House   Number of Stairs to Enter Home flight of stairs down to basement/laundry but does not have to go downstairs   Primary Bathroom Location/Comments main level   Additional Bathroom Location/Comments has a shower chair if needed   Home/Community Accessibility Comments bedroom and bathroom are on main level   Prior Level - Transfers Independent   Prior Level - Ambulation Independent   Prior Level - ADLS Independent   Prior Responsibilities - IADL Medication management;Driving   Prior Level Comments Spouse used to complete most IADLs but has injured his hip and has difficulty going up/down stairs   Patient/family Goals Statement To be able to paint for 1 hour at a time.   Pain   Patient currently in pain Yes   Pain location neck, shoulder and back on right side inferior to scapula   Pain rating 4/10   Pain description comment at rest   System Outcome Measures   FACIT Fatigue Subscale (score out of 52). The higher the score, the " better the QOL. 10   Cognitive Status Examination   Orientation Orientation to person, place and time   Level of Consciousness Alert   Follows Commands and Answers Questions 100% of the time   Visual Perception   Visual Perception Wears glasses   Visual Perception Comments Pt reported cataract surgery that has resulted in blurriness.   Sensation   Sensation Comments occassional numbness in right digits   Posture   Posture Forward head position   Range of Motion (ROM)   ROM Comments BUE AROM WNL for all joints   Strength   Strength Comments generalized weakness and fatigue   Hand Strength   Hand Dominance Right   Coordination   Fine Motor Coordination Pt reported no concerns   Functional Mobility   Ambulation IND, slow   Transfer Skill   Level of [place]: Transfers independent   Bathing   Level of [place] - Bathing independent   Upper Body Dressing   Level of [place]: Dress Upper Body independent   Lower Body Dressing   Level of [place]: Dress Lower Body independent   Toileting   Level of [place]: Toilet independent   Grooming   Level of [place]: Grooming independent   Eating/Self-Feeding   Level of [place]: Eating independent   Activity Tolerance   Activity Tolerance poor   Planned Therapy Interventions   Planned Therapy Interventions ADL training;IADL training;Self care/Home management;Strengthening;Stretching;Therapeutic activities   Adult OT Eval Goals   OT Eval Goals (Adult) 1;2;3   OT Goal 1   Goal Identifier energy conservation    Goal Description Pt will report use of 2 new energy conservation strategies and techniques to complete laundry and to engage in painting for longer durations of time.     Target Date 04/22/19   OT Goal 2   Goal Identifier pain   Goal Description Due to compliance with home stretching and exercise program, pt will report pain in posterior RUQ as <2/10 at two consecutive tx sessions.   Target Date 04/22/19   Clinical Impression   Criteria for Skilled  Therapeutic Interventions Met Yes, treatment indicated   OT Diagnosis decreased IND with IADLs   Assessment of Occupational Performance 3-5 Performance Deficits   Identified Performance Deficits extensive PMH, chronic fatigue, unable to work, limited engagement in painting water colors   Clinical Decision Making (Complexity) Moderate complexity   Therapy Frequency 1x/wk   Predicted Duration of Therapy Intervention (days/wks) x2 weeks   Risks and Benefits of Treatment have been explained. Yes   Patient, Family & other staff in agreement with plan of care Yes   Clinical Impression Comments Pt presents with decreased engagement in IADLs due to pain in right posterior UQ and chronic fatigue upon cancer-related fatigue. Pt will benefit from continued skilled OT intervention to address fatigue and pain to engage in painting for longer durations of time.     Therapy Certification   Certification date from 04/01/19   Certification date to 04/22/19   Total Evaluation Time   OT Eval, Moderate Complexity Minutes (80248) 25

## 2019-04-11 ENCOUNTER — MYC REFILL (OUTPATIENT)
Dept: ONCOLOGY | Facility: CLINIC | Age: 69
End: 2019-04-11

## 2019-04-11 DIAGNOSIS — C50.211 MALIGNANT NEOPLASM OF UPPER-INNER QUADRANT OF RIGHT BREAST IN FEMALE, ESTROGEN RECEPTOR POSITIVE (H): ICD-10-CM

## 2019-04-11 DIAGNOSIS — Z17.0 MALIGNANT NEOPLASM OF UPPER-INNER QUADRANT OF RIGHT BREAST IN FEMALE, ESTROGEN RECEPTOR POSITIVE (H): ICD-10-CM

## 2019-04-11 RX ORDER — ANASTROZOLE 1 MG/1
1 TABLET ORAL DAILY
Qty: 90 TABLET | Refills: 1 | Status: CANCELLED | OUTPATIENT
Start: 2019-04-11

## 2019-04-17 ENCOUNTER — HOSPITAL ENCOUNTER (OUTPATIENT)
Dept: PHYSICAL THERAPY | Facility: CLINIC | Age: 69
Setting detail: THERAPIES SERIES
End: 2019-04-17
Attending: INTERNAL MEDICINE
Payer: COMMERCIAL

## 2019-04-17 PROCEDURE — 97110 THERAPEUTIC EXERCISES: CPT | Mod: GP | Performed by: PHYSICAL THERAPIST

## 2019-04-22 ENCOUNTER — ONCOLOGY VISIT (OUTPATIENT)
Dept: ONCOLOGY | Facility: CLINIC | Age: 69
End: 2019-04-22
Attending: NURSE PRACTITIONER
Payer: COMMERCIAL

## 2019-04-22 VITALS
TEMPERATURE: 98.5 F | WEIGHT: 170.44 LBS | OXYGEN SATURATION: 98 % | DIASTOLIC BLOOD PRESSURE: 71 MMHG | SYSTOLIC BLOOD PRESSURE: 104 MMHG | HEART RATE: 108 BPM | RESPIRATION RATE: 20 BRPM | BODY MASS INDEX: 32.18 KG/M2 | HEIGHT: 61 IN

## 2019-04-22 DIAGNOSIS — Z17.0 MALIGNANT NEOPLASM OF UPPER-INNER QUADRANT OF RIGHT BREAST IN FEMALE, ESTROGEN RECEPTOR POSITIVE (H): ICD-10-CM

## 2019-04-22 DIAGNOSIS — F90.0 ATTENTION DEFICIT HYPERACTIVITY DISORDER (ADHD), PREDOMINANTLY INATTENTIVE TYPE: ICD-10-CM

## 2019-04-22 DIAGNOSIS — M81.8 OTHER OSTEOPOROSIS WITHOUT CURRENT PATHOLOGICAL FRACTURE: ICD-10-CM

## 2019-04-22 DIAGNOSIS — Z79.811 USE OF AROMATASE INHIBITORS: Primary | ICD-10-CM

## 2019-04-22 DIAGNOSIS — C50.211 MALIGNANT NEOPLASM OF UPPER-INNER QUADRANT OF RIGHT BREAST IN FEMALE, ESTROGEN RECEPTOR POSITIVE (H): ICD-10-CM

## 2019-04-22 PROCEDURE — 99214 OFFICE O/P EST MOD 30 MIN: CPT | Performed by: NURSE PRACTITIONER

## 2019-04-22 ASSESSMENT — MIFFLIN-ST. JEOR: SCORE: 1250.22

## 2019-04-22 ASSESSMENT — PAIN SCALES - GENERAL: PAINLEVEL: SEVERE PAIN (7)

## 2019-04-22 NOTE — NURSING NOTE
"Oncology Rooming Note    April 22, 2019 10:35 AM   Marixa Ann is a 68 year old female who presents for:    Chief Complaint   Patient presents with     Oncology Clinic Visit     Follow up     Initial Vitals: /71 (BP Location: Left arm)   Pulse 108   Temp 98.5  F (36.9  C) (Oral)   Resp 20   Ht 1.549 m (5' 0.98\")   Wt 77.3 kg (170 lb 7 oz)   LMP 05/31/2003 (Approximate)   SpO2 98%   BMI 32.22 kg/m   Estimated body mass index is 32.22 kg/m  as calculated from the following:    Height as of this encounter: 1.549 m (5' 0.98\").    Weight as of this encounter: 77.3 kg (170 lb 7 oz). Body surface area is 1.82 meters squared.  Severe Pain (7) Comment: Data Unavailable   Patient's last menstrual period was 05/31/2003 (approximate).  Allergies reviewed: Yes  Medications reviewed: Yes    Medications: Medication refills not needed today.  Pharmacy name entered into [facility]:    [id number] - [place], [place] - [address]  WRITTEN PRESCRIPTION REQUESTED      Gema Escalona LPN            "

## 2019-04-22 NOTE — PROGRESS NOTES
Oncology Follow Up Visit: April 22, 2019     Oncologist: Dr Sil Dan  PCP: Jackie Kovacs    Diagnosis: Stage IB Right Breast Cancer  Marixa Ann is a 69 yo female who had abnormality at 2-4 oclock level of right breaston screening mammogram in 12/2018. Final review proved a 12 x 9 x14 mm invasive ductal carcinoma at 2 oclock to the right breast, Moseley grade 1, ER/MI positive, Her2 negative with 0/3 SLN positive and edges free of disease.   Oncotype score =4 or 3% risk of disease at 9 years with hormone therapy.   Treatment:   1/10/2019 Right Lumpectomy with SLN biopsy  3/11/2019 completed right breast radiation post lumpectomy.   - choice made to not use Tamoxifen due to risk for DVT(repeatedly elevated d-dimer as well as elevated ESR and CRP) so started pt on Aromatase inhibitor with close bone evaluation    Interval History: Ms. Ann comes to clinic alone for follow up to her aromatase inhibitor use for her breast cancer. Pt reports her vision has noted some blurriness and went to ophthalmologist to be told she has the start of macular changes- pt is afraid that the AI or Tamoxifen would hasten the process( mother became blind from this). She otherwise denies adverse side effects but has noted few hot flashes though no new joint pain- has chronic pains with previous issues. She is participating in chair yoga and Physical therapy she is also aware that she has chronic fatigue and irregular bowels . She has known ADHD and is taking Adderall.    Rest of comprehensive and complete ROS is reviewed and is negative.   Past Medical History:   Diagnosis Date     ADHD      Breast cancer (H) 12/26/2018    Right Breast     Chronic fatigue      Fibromyalgia      Hard of hearing      Osteoporosis      Current Outpatient Medications   Medication     acetaminophen (TYLENOL) 325 MG tablet     amphetamine-dextroamphetamine (ADDERALL) 10 MG tablet     anastrozole (ARIMIDEX) 1 MG tablet     B Complex  "Vitamins (VITAMIN B-COMPLEX PO)     calcium carbonate (TUMS) 500 MG chewable tablet     Calcium-Magnesium-Zinc 333-133-5 MG TABS per tablet     Cholecalciferol (VITAMIN D-3 PO)     cyclobenzaprine (FLEXERIL) 5 MG tablet     KRILL OIL PO     magnesium oxide 200 MG TABS     Multiple Vitamins-Minerals (MULTIVITAMIN PO)     RANITIDINE HCL PO     Current Facility-Administered Medications   Medication     triamcinolone (KENALOG-40) injection 80 mg     Allergies   Allergen Reactions     Vicodin [Hydrocodone-Acetaminophen] Anaphylaxis     Dust Mites      Milk [Lac Bovis] Diarrhea       Physical Exam:/71 (BP Location: Left arm)   Pulse 108   Temp 98.5  F (36.9  C) (Oral)   Resp 20   Ht 1.549 m (5' 0.98\")   Wt 77.3 kg (170 lb 7 oz)   LMP 05/31/2003 (Approximate)   SpO2 98%   BMI 32.22 kg/m     ECOG PS- 0  Constitutional: Alert, obese but moving well- in no distress as seated.   ENT: Eyes bright, No mouth sores  Respiratory- breathing easy, no distress - talking in full sentences  Breasts:redness is now resolved.   MS: Muscle tone normal, extremities normal with no edema.   Psych: Mentation appears normal and affect anxious- asking many questions    Laboratory Results: No labs taken today.   Assessment and Plan:   Stage 1B Right Breast Cancer- Pt has now completed Right Lumpectomy and SLN biopsy, Radiation therapy. She began endocrine therapy with    anastrozole since has high risk for DVTs and therefore moved to AI. Today she is sharing that she is concerned that the medication is causing macular degeneration-reviewed that it may hasten cataracts but is not listed as affecting this. Suggested she should meet with another ophthalmologist and get a second opinion.   She will continue the anastrozole.   She will return in 3 months for review with no labs necessary. Will get hepatic panel with next visit in July.   Osteoporosis- 4/2018 DEXA result showing T score of -2.9 or osteoporosis. Pt was started on Prolia. " Today she verifies she is on adequate calcium and vitamin D daily.  Encouraged weight bearing exercises to strengthen bones. She will receive the Prolia every 6 months-next due 9/12/2019.   Anxiety with ADHD- Pt on Adderall.Admits she does ask many questions but is needing much reassurance.   This was a 30 min visit with > 50% in counseling and coordinating care including education and management of concerns.    Lidia Parham,CNP

## 2019-04-22 NOTE — LETTER
4/22/2019         RE: Marixa Ann  [address] [facility] [address]        Dear Colleague,    Thank you for referring your patient, Marixa Ann, to the [facility]. Please see a copy of my visit note below.    Oncology Follow Up Visit: April 22, 2019     Oncologist: Dr Sil Dan  PCP: Jackie Kovacs    Diagnosis: Stage IB Right Breast Cancer  Marixa Ann is a 69 yo female who had abnormality at 2-4 oclock level of right breaston screening mammogram in 12/2018. Final review proved a 12 x 9 x14 mm invasive ductal carcinoma at 2 oclock to the right breast, Carol grade 1, ER/AK positive, Her2 negative with 0/3 SLN positive and edges free of disease.   Oncotype score =4 or 3% risk of disease at 9 years with hormone therapy.   Treatment:   1/10/2019 Right Lumpectomy with SLN biopsy  3/11/2019 completed right breast radiation post lumpectomy.   - choice made to not use Tamoxifen due to risk for DVT(repeatedly elevated d-dimer as well as elevated ESR and CRP) so started pt on Aromatase inhibitor with close bone evaluation    Interval History: Ms. Ann comes to clinic alone for follow up to her aromatase inhibitor use for her breast cancer. Pt reports her vision has noted some blurriness and went to ophthalmologist to be told she has the start of macular changes- pt is afraid that the AI or Tamoxifen would hasten the process( mother became blind from this). She otherwise denies adverse side effects but has noted few hot flashes though no new joint pain- has chronic pains with previous issues. She is participating in chair yoga and Physical therapy she is also aware that she has chronic fatigue and irregular bowels . She has known ADHD and is taking Adderall.    Rest of comprehensive and complete ROS is reviewed and is negative.   Past Medical History:   Diagnosis Date     ADHD      Breast cancer (H) 12/26/2018    Right Breast     Chronic fatigue       "Fibromyalgia      Hard of hearing      Osteoporosis      Current Outpatient Medications   Medication     acetaminophen (TYLENOL) 325 MG tablet     amphetamine-dextroamphetamine (ADDERALL) 10 MG tablet     anastrozole (ARIMIDEX) 1 MG tablet     B Complex Vitamins (VITAMIN B-COMPLEX PO)     calcium carbonate (TUMS) 500 MG chewable tablet     Calcium-Magnesium-Zinc 333-133-5 MG TABS per tablet     Cholecalciferol (VITAMIN D-3 PO)     cyclobenzaprine (FLEXERIL) 5 MG tablet     KRILL OIL PO     magnesium oxide 200 MG TABS     Multiple Vitamins-Minerals (MULTIVITAMIN PO)     RANITIDINE HCL PO     Current Facility-Administered Medications   Medication     triamcinolone (KENALOG-40) injection 80 mg     Allergies   Allergen Reactions     Vicodin [Hydrocodone-Acetaminophen] Anaphylaxis     Dust Mites      Milk [Lac Bovis] Diarrhea       Physical Exam:/71 (BP Location: Left arm)   Pulse 108   Temp 98.5  F (36.9  C) (Oral)   Resp 20   Ht 1.549 m (5' 0.98\")   Wt 77.3 kg (170 lb 7 oz)   LMP 05/31/2003 (Approximate)   SpO2 98%   BMI 32.22 kg/m      ECOG PS- 0  Constitutional: Alert, obese but moving well- in no distress as seated.   ENT: Eyes bright, No mouth sores  Respiratory- breathing easy, no distress - talking in full sentences  Breasts:redness is now resolved.   MS: Muscle tone normal, extremities normal with no edema.   Psych: Mentation appears normal and affect anxious- asking many questions    Laboratory Results: No labs taken today.   Assessment and Plan:   Stage 1B Right Breast Cancer- Pt has now completed Right Lumpectomy and SLN biopsy, Radiation therapy. She began endocrine therapy with    anastrozole since has high risk for DVTs and therefore moved to . Today she is sharing that she is concerned that the medication is causing macular degeneration-reviewed that it may hasten cataracts but is not listed as affecting this. Suggested she should meet with another ophthalmologist and get a second opinion. "   She will continue the anastrozole.   She will return in 3 months for review with no labs necessary. Will get hepatic panel with next visit in July.   Osteoporosis- 4/2018 DEXA result showing T score of -2.9 or osteoporosis. Pt was started on Prolia. Today she verifies she is on adequate calcium and vitamin D daily.  Encouraged weight bearing exercises to strengthen bones. She will receive the Prolia every 6 months-next due 9/12/2019.   Anxiety with ADHD- Pt on Adderall.Admits she does ask many questions but is needing much reassurance.   This was a 30 min visit with > 50% in counseling and coordinating care including education and management of concerns.    Lidia Parham CNP    Again, thank you for allowing me to participate in the care of your patient.        Sincerely,        Lidia Parham, OCHOA, APRN CNP

## 2019-04-24 ENCOUNTER — HOSPITAL ENCOUNTER (OUTPATIENT)
Dept: PHYSICAL THERAPY | Facility: CLINIC | Age: 69
Setting detail: THERAPIES SERIES
End: 2019-04-24
Attending: INTERNAL MEDICINE
Payer: COMMERCIAL

## 2019-04-24 PROCEDURE — 97110 THERAPEUTIC EXERCISES: CPT | Mod: GP | Performed by: PHYSICAL THERAPIST

## 2019-04-24 NOTE — DISCHARGE INSTRUCTIONS
Getting out of the house is exercise for you. Try to do more walking with grocery cart for exercise.     I think doing the stationary bike three times a week for 10-20 minutes is good exercise.    Chair yoga for 15-30 minutes a couple times a week.     Irena Oakes DPT, MPT, NCS

## 2019-04-24 NOTE — IP AVS SNAPSHOT
MRN:[id number]                      After Visit Summary   4/24/2019    Marixa Ann    MRN: [id number]           Visit Information        Provider Department      4/24/2019 11:15 AM Marixa Oakes [facility]        Your next 10 appointments already scheduled    Apr 25, 2019  2:40 PM CDT  [facility] [address] [facility] [id number] [phone]   Bring a current list of meds and any records pertaining to this visit.  For Physicals, please bring immunization records and any forms needing to be filled out. Please arrive 10 minutes early to complete paperwork.     Apr 29, 2019  1:30 PM CDT  [facility] Visit with Indira Zeng DO  [facility] [address] [facility] [address]  [phone]   Bring a current list of meds and any records pertaining to this visit.  For Physicals, please bring immunization records and any forms needing to be filled out. Please arrive 10 minutes early to complete paperwork.     May 01, 2019  1:00 PM CDT  Cancer Rehab Treatment with Marixa Okaes PT  [facility] ([facility] [address] [facility] [address]  [phone]      May 13, 2019  2:45 PM CDT  Return Visit with Alexandra Copeland MD  [facility] [address] [facility] [address]  [phone]      Jul 25, 2019 10:30 AM CDT  Return Visit with Gema Diamond MD  [facility] ([facility]) [address] [facility] [address]  [phone]      Jul 26, 2019  2:15 PM CDT  LAB with LAB ONC [facility] ([facility]) [address] [facility] [address]  [phone]   Please do not eat 10-12 hours before your  appointment if you are coming in fasting for labs on lipids, cholesterol, or glucose (sugar). Does not apply to pregnant women. Water, tea and black coffee (with nothing added) is okay. Do not drink other fluids, diet soda or gum. If you have concerns about taking your medications, please send a message by clicking on Secure Messaging, Message Your Care Team.     Jul 26, 2019  3:00 PM CDT  [facility] with Sil Dan [facility]  [facility] [address] [place] [address] [phone]           Further instructions from your care team       Getting out of the house is exercise for you. Try to do more walking with grocery cart for exercise.     I think doing the stationary bike three times a week for 10-20 minutes is good exercise.    Chair yoga for 15-30 minutes a couple times a week.     Irena Oakes DPT, MPT, NCS        MyChart Information    [email or web address] gives you secure access to your electronic health record. If you see a primary care provider, you can also send messages to your care team and make appointments. If you have questions, please call your primary care clinic.  If you do not have a primary care provider, please call [phone] and they will assist you.       Care EveryWhere ID    This is your Care EveryWhere ID. This could be used by other organizations to access your [place] medical records  [id number]       Equal Access to Services    IMAN BROWN : Hadii kenya angel Somelecio, waaxda luqadaha, qaybta kaalmasusan mcgill. So [facility] [phone].    ATENCIÓN: Si habla español, tiene a lombardo disposición servicios gratuitos de asistencia lingüística. Gilberto al [phone].    We comply with applicable federal civil rights laws and [place] laws. We do not discriminate on the basis of race, color, national origin, age, disability, sex, sexual orientation, or gender identity.

## 2019-04-25 ENCOUNTER — OFFICE VISIT (OUTPATIENT)
Dept: FAMILY MEDICINE | Facility: CLINIC | Age: 69
End: 2019-04-25
Payer: COMMERCIAL

## 2019-04-25 VITALS
HEIGHT: 61 IN | DIASTOLIC BLOOD PRESSURE: 77 MMHG | BODY MASS INDEX: 31.91 KG/M2 | SYSTOLIC BLOOD PRESSURE: 115 MMHG | TEMPERATURE: 98.4 F | WEIGHT: 169 LBS | OXYGEN SATURATION: 95 % | HEART RATE: 114 BPM | RESPIRATION RATE: 18 BRPM

## 2019-04-25 DIAGNOSIS — F41.9 ANXIETY DISORDER, UNSPECIFIED TYPE: ICD-10-CM

## 2019-04-25 DIAGNOSIS — F33.1 MODERATE RECURRENT MAJOR DEPRESSION (H): ICD-10-CM

## 2019-04-25 DIAGNOSIS — Z12.11 SCREEN FOR COLON CANCER: ICD-10-CM

## 2019-04-25 DIAGNOSIS — F90.0 ATTENTION DEFICIT HYPERACTIVITY DISORDER (ADHD), PREDOMINANTLY INATTENTIVE TYPE: ICD-10-CM

## 2019-04-25 DIAGNOSIS — G93.32 CHRONIC FATIGUE DISORDER: ICD-10-CM

## 2019-04-25 DIAGNOSIS — E78.5 HYPERLIPIDEMIA LDL GOAL <160: ICD-10-CM

## 2019-04-25 DIAGNOSIS — H35.30 MACULAR DEGENERATION (SENILE) OF RETINA: Primary | ICD-10-CM

## 2019-04-25 PROCEDURE — 99214 OFFICE O/P EST MOD 30 MIN: CPT | Performed by: FAMILY MEDICINE

## 2019-04-25 ASSESSMENT — ANXIETY QUESTIONNAIRES
2. NOT BEING ABLE TO STOP OR CONTROL WORRYING: NEARLY EVERY DAY
7. FEELING AFRAID AS IF SOMETHING AWFUL MIGHT HAPPEN: NEARLY EVERY DAY
IF YOU CHECKED OFF ANY PROBLEMS ON THIS QUESTIONNAIRE, HOW DIFFICULT HAVE THESE PROBLEMS MADE IT FOR YOU TO DO YOUR WORK, TAKE CARE OF THINGS AT HOME, OR GET ALONG WITH OTHER PEOPLE: VERY DIFFICULT
1. FEELING NERVOUS, ANXIOUS, OR ON EDGE: NOT AT ALL
3. WORRYING TOO MUCH ABOUT DIFFERENT THINGS: NOT AT ALL
6. BECOMING EASILY ANNOYED OR IRRITABLE: MORE THAN HALF THE DAYS
GAD7 TOTAL SCORE: 8
5. BEING SO RESTLESS THAT IT IS HARD TO SIT STILL: NOT AT ALL

## 2019-04-25 ASSESSMENT — PAIN SCALES - GENERAL: PAINLEVEL: NO PAIN (0)

## 2019-04-25 ASSESSMENT — MIFFLIN-ST. JEOR: SCORE: 1243.7

## 2019-04-25 ASSESSMENT — PATIENT HEALTH QUESTIONNAIRE - PHQ9
SUM OF ALL RESPONSES TO PHQ QUESTIONS 1-9: 22
5. POOR APPETITE OR OVEREATING: NOT AT ALL
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
## 2019-04-25 NOTE — PATIENT INSTRUCTIONS
At [facility], we strive to deliver an exceptional experience to you, every time we see you.  If you receive a survey in the mail, please send us back your thoughts. We really do value your feedback.    Based on your medical history, these are the current health maintenance/preventive care services that you are due for (some may have been done at this visit.)  Health Maintenance Due   Topic Date Due     DTAP/TDAP/TD IMMUNIZATION (1 - Tdap) 10/20/1977     COLON CANCER SCREEN (SYSTEM ASSIGNED)  10/20/2002     ZOSTER IMMUNIZATION (1 of 2) 10/20/2002     ADVANCE DIRECTIVE PLANNING Q5 YRS  10/20/2007     PHQ-9 Q6 MONTHS  03/13/2019     MEDICARE ANNUAL WELLNESS VISIT  04/20/2019         Suggested websites for health information:  Www.[employer] : Up to date and easily searchable information on multiple topics.  Www.[employer].gov : medication info, interactive tutorials, watch real surgeries online  Www.familydoctor.org : good info from the Academy of Family Physicians  Www.cdc.gov : public health info, travel advisories, epidemics (H1N1)  Www.aap.org : children's health info, normal development, vaccinations  Www.health.[facility].[place].us : [place] dept of health, public health issues in [place], N1N1    Your care team:                            Family Medicine Internal Medicine   MD Kyle Mancera MD Shantel Branch-Fleming, MD Katya Georgiev PA-C Nam Ho, MD Pediatrics   OCTAVIO Ruiz, MD Alisa Keen CNP, MD Deborah Mielke, MD Kim Thein, APRN CNP      Clinic hours: Monday - Thursday 7 am-7 pm; Fridays 7 am-5 pm.   Urgent care: Monday - Friday 11 am-9 pm; Saturday and Sunday 9 am-5 pm.  Pharmacy : Monday -Thursday 8 am-8 pm; Friday 8 am-6 pm; Saturday and Sunday 9 am-5 pm.     Clinic: [phone]   Pharmacy: [phone]      Patient Education     What Is Age-Related Macular Degeneration (AMD)?    Age-related  macular degeneration (AMD) is an eye disease. AMD is the leading cause of vision loss in adults over age 50. It almost always affects both eyes. The macula (the part of the eye that controls your central, detailed vision) becomes damaged. Central vision becomes limited. However, side vision remains clear. There are two types of macular degeneration: dry and wet.     Dry macular degeneration       Wet macular degeneration      Dry macular degeneration  Dry is the most common type of macular degeneration. In the early stages, changes in vision may be hard to notice. Over time, your central vision may slowly worsen. You may notice wavy lines and blank spots in the center of your vision. Colors may look dim. There is no way to restore vision lost from dry macular degeneration. But you need to monitor it because it can turn into wet macular degeneration.  Wet macular degeneration  Wet macular degeneration is less common, but more serious. Vision loss may be faster and more noticeable. You may suddenly see dark spots, blank spots, wavy lines, and dim colors in the center of your vision. If wet macular degeneration is caught early, certain treatments, such as injections, photodynamic therapy, or laser surgery, may help to slow further vision loss or even improve vision.  Date Last Reviewed: 10/1/2017    3587-3640 [employer]. [address] [place] [address], [place] [address]. All rights reserved. This information is not intended as a substitute for professional medical care. Always follow your healthcare professional's instructions.

## 2019-04-26 ASSESSMENT — ANXIETY QUESTIONNAIRES: GAD7 TOTAL SCORE: 8

## 2019-04-28 ENCOUNTER — MYC MEDICAL ADVICE (OUTPATIENT)
Dept: ORTHOPEDICS | Facility: CLINIC | Age: 69
End: 2019-04-28

## 2019-04-28 ENCOUNTER — TELEPHONE (OUTPATIENT)
Dept: FAMILY MEDICINE | Facility: CLINIC | Age: 69
End: 2019-04-28

## 2019-04-28 DIAGNOSIS — H35.30 MACULAR DEGENERATION (SENILE) OF RETINA: Primary | ICD-10-CM

## 2019-04-29 ENCOUNTER — OFFICE VISIT (OUTPATIENT)
Dept: OBGYN | Facility: CLINIC | Age: 69
End: 2019-04-29
Attending: INTERNAL MEDICINE
Payer: COMMERCIAL

## 2019-04-29 VITALS
SYSTOLIC BLOOD PRESSURE: 107 MMHG | WEIGHT: 170.1 LBS | HEART RATE: 109 BPM | BODY MASS INDEX: 33.4 KG/M2 | HEIGHT: 60 IN | DIASTOLIC BLOOD PRESSURE: 64 MMHG | OXYGEN SATURATION: 98 %

## 2019-04-29 DIAGNOSIS — M62.838 SPASM OF MUSCLE: Primary | ICD-10-CM

## 2019-04-29 DIAGNOSIS — C50.211 MALIGNANT NEOPLASM OF UPPER-INNER QUADRANT OF RIGHT BREAST IN FEMALE, ESTROGEN RECEPTOR POSITIVE (H): ICD-10-CM

## 2019-04-29 DIAGNOSIS — Z91.89 DES EXPOSURE IN UTERO: ICD-10-CM

## 2019-04-29 DIAGNOSIS — Z17.0 MALIGNANT NEOPLASM OF UPPER-INNER QUADRANT OF RIGHT BREAST IN FEMALE, ESTROGEN RECEPTOR POSITIVE (H): ICD-10-CM

## 2019-04-29 PROCEDURE — G0123 SCREEN CERV/VAG THIN LAYER: HCPCS | Performed by: OBSTETRICS & GYNECOLOGY

## 2019-04-29 PROCEDURE — 99202 OFFICE O/P NEW SF 15 MIN: CPT | Performed by: OBSTETRICS & GYNECOLOGY

## 2019-04-29 PROCEDURE — 87624 HPV HI-RISK TYP POOLED RSLT: CPT | Performed by: OBSTETRICS & GYNECOLOGY

## 2019-04-29 RX ORDER — CYCLOBENZAPRINE HCL 5 MG
5 TABLET ORAL DAILY PRN
Qty: 20 TABLET | Refills: 1 | Status: SHIPPED | OUTPATIENT
Start: 2019-04-29 | End: 2020-07-09

## 2019-04-29 ASSESSMENT — MIFFLIN-ST. JEOR: SCORE: 1237.04
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
## 2019-04-29 NOTE — TELEPHONE ENCOUNTER
This writer attempted to contact pt on 04/29/19      Reason for call New occupational therapy order placed and unable to leave message.      If patient calls back:   Relay message New order was placed for occupational therapy low vision assessment, are there any questions?, (read verbatim), document that pt called and close encounter        Chanell Remy

## 2019-04-29 NOTE — TELEPHONE ENCOUNTER
New order placed for low vision assessment with occupational therapy. Please call patient to see if she has any questions.   Jackie Kovacs MD

## 2019-04-29 NOTE — PATIENT INSTRUCTIONS
If you have any questions regarding your visit, Please contact your care team.    [facility]: [phone]      [facility] HOURS TELEPHONE NUMBER   Indira DO Azucena.    RADHA Tracy -    SALENA Fraga       Monday, Wednesday, Thursday and Friday, [place]  8:30a.m-5:00 p.m   [facility]  [address] [place] [address]  [phone] ask for [facility]    Imaging [phone]       Urgent Care locations:    [facility] Saturday and Sunday   9 am - 5 pm    Monday-Friday   12 pm - 8 pm  Saturday and Sunday   9 am - 5 pm   [phone]     [facility] Labor and Delivery:  [phone]    If you need a medication refill, please contact your pharmacy. Please allow 3 business days for your refill to be completed.  As always, Thank you for trusting us with your healthcare needs!

## 2019-05-01 ENCOUNTER — MYC REFILL (OUTPATIENT)
Dept: FAMILY MEDICINE | Facility: CLINIC | Age: 69
End: 2019-05-01

## 2019-05-01 DIAGNOSIS — F90.0 ATTENTION DEFICIT HYPERACTIVITY DISORDER (ADHD), PREDOMINANTLY INATTENTIVE TYPE: ICD-10-CM

## 2019-05-01 LAB
COPATH REPORT: NORMAL
PAP: NORMAL

## 2019-05-02 ENCOUNTER — TELEPHONE (OUTPATIENT)
Dept: ORTHOPEDICS | Facility: CLINIC | Age: 69
End: 2019-05-02

## 2019-05-02 LAB
FINAL DIAGNOSIS: NORMAL
HPV HR 12 DNA CVX QL NAA+PROBE: NEGATIVE
HPV16 DNA SPEC QL NAA+PROBE: NEGATIVE
HPV18 DNA SPEC QL NAA+PROBE: NEGATIVE
SPECIMEN DESCRIPTION: NORMAL
SPECIMEN SOURCE CVX/VAG CYTO: NORMAL

## 2019-05-02 RX ORDER — DEXTROAMPHETAMINE SACCHARATE, AMPHETAMINE ASPARTATE, DEXTROAMPHETAMINE SULFATE AND AMPHETAMINE SULFATE 2.5; 2.5; 2.5; 2.5 MG/1; MG/1; MG/1; MG/1
10 TABLET ORAL 2 TIMES DAILY
Qty: 60 TABLET | Refills: 0 | Status: SHIPPED | OUTPATIENT
Start: 2019-05-02 | End: 2019-05-29

## 2019-05-02 NOTE — TELEPHONE ENCOUNTER
Financial Counselor Review for Hylan (gel) injection:    Procedure to be performed (include CPT code):Yes DRAIN/INJECT LARGE JOINT/BURSA - CPT: 84795    Diagnosis code (include ICD-10 code): M17.0    Medication order (include J code):Yes     Synvisc One (Hylan G-F 20) - .014  Euflexxa -   Supartz -   Gel One -     Coverage and patient financial responsibility information:YES    Does patient need to be contacted by Financial Counselor:YES, only if Pt is required to pay anything    Note: Do not use abbreviations and route encounter to [facility] [place] [[id number]]

## 2019-05-02 NOTE — TELEPHONE ENCOUNTER
Written rx is put on provider's desk to sign next week.  Gilberto Bae,  For [facility]

## 2019-05-02 NOTE — TELEPHONE ENCOUNTER
Requested Prescriptions   Pending Prescriptions Disp Refills     amphetamine-dextroamphetamine (ADDERALL) 10 MG tablet          Last Written Prescription Date:  04/01/19  Last Fill Quantity: 60,   # refills: 0  Last Office Visit: 04/25/19-Fermín  Future Office visit:    Next 5 appointments (look out 90 days)    May 13, 2019  2:45 PM CDT  Return Visit with Alexandra Copeland MD  [facility]) [address] [place] [id number]  [phone]   Jul 25, 2019 10:30 AM CDT  Return Visit with Gema Diamond MD  [facility]) [address] [place] [id number]  [phone]   Jul 26, 2019  3:00 PM CDT  Return Visit with Sil Dan MD  [facility]) [address] [place] [address]  [phone]           Routing refill request to provider for review/approval because:  Drug not on the [facility], UMP or [facility] refill protocol or controlled substance 60 tablet 0     Sig: Take 1 tablet (10 mg) by mouth 2 times daily       There is no refill protocol information for this order

## 2019-05-07 NOTE — TELEPHONE ENCOUNTER
Written rx is signed and will be at the  today after 3p for patient to pickup.  Gilberto Bae,  For [facility]

## 2019-05-09 NOTE — TELEPHONE ENCOUNTER
Checked with benefits at [employer] which follows Medicare guidelines and will cover Euflexxa and Gel One with no pa needed. Will be covered at 80/20 till out of pocket maximum of $3400 and of which $811.20 has been met.   Call ref. # [id number]     Called and left vm to call us back 5/9

## 2019-05-10 ENCOUNTER — TELEPHONE (OUTPATIENT)
Dept: RADIATION ONCOLOGY | Facility: CLINIC | Age: 69
End: 2019-05-10

## 2019-05-10 NOTE — TELEPHONE ENCOUNTER
Attempted to contact patient for a reminder call regarding their appointment with Dr. Copeland on 05/13/2019 at 1445 at [facility].  Voicemail was left with appointment time, date and contact information for rescheduling if needed.          Frida Maldonado MA

## 2019-05-13 ENCOUNTER — OFFICE VISIT (OUTPATIENT)
Dept: RADIATION ONCOLOGY | Facility: CLINIC | Age: 69
End: 2019-05-13
Payer: COMMERCIAL

## 2019-05-13 VITALS
SYSTOLIC BLOOD PRESSURE: 112 MMHG | DIASTOLIC BLOOD PRESSURE: 66 MMHG | BODY MASS INDEX: 33.16 KG/M2 | TEMPERATURE: 98.3 F | WEIGHT: 171.2 LBS | OXYGEN SATURATION: 94 % | HEART RATE: 105 BPM | RESPIRATION RATE: 18 BRPM

## 2019-05-13 DIAGNOSIS — Z17.0 MALIGNANT NEOPLASM OF UPPER-INNER QUADRANT OF RIGHT BREAST IN FEMALE, ESTROGEN RECEPTOR POSITIVE (H): Primary | ICD-10-CM

## 2019-05-13 DIAGNOSIS — C50.211 MALIGNANT NEOPLASM OF UPPER-INNER QUADRANT OF RIGHT BREAST IN FEMALE, ESTROGEN RECEPTOR POSITIVE (H): Primary | ICD-10-CM

## 2019-05-13 PROCEDURE — 99024 POSTOP FOLLOW-UP VISIT: CPT | Performed by: RADIOLOGY

## 2019-05-13 ASSESSMENT — PAIN SCALES - GENERAL: PAINLEVEL: MILD PAIN (3)

## 2019-05-13 NOTE — NURSING NOTE
FOLLOW-UP VISIT    Patient Name: Marixa Ann      : 1950     Age: 68 year old        ______________________________________________________________________________     Chief Complaint   Patient presents with     Cancer     Radiation Oncology return visit with Dr. Copeland     /66 (BP Location: Left arm, Patient Position: Sitting, Cuff Size: Adult Regular)   Pulse 105   Temp 98.3  F (36.8  C) (Oral)   Resp 18   Wt 77.7 kg (171 lb 3.2 oz)   LMP 2003 (Approximate)   SpO2 94%   BMI 33.16 kg/m       Date Radiation Completed: 4,256 cGy to right breast completed on 2019    Pain  Current pain:   Intensity: 3/10  Current: aching  Location: Right shoulder and wrist  Treatment: Flexeril and Tylenol as needed    Labs  Other Labs: Yes: 2019    Imaging  None        Other Appointments:     MD Name: Dr. Diamond Appointment Date: 2019   MD Name: Dr. Dan Appointment Date: 2019   MD Name: Dr. Copeland Appointment Date: 2019   Other Appointment Notes: Lab 2019          Nurse face-to-face time: Level 2:  5 min face to face time

## 2019-05-13 NOTE — PATIENT INSTRUCTIONS
Preventive Care:    Colorectal Cancer Screening: During our visit today, we discussed that it is recommended you receive colorectal cancer screening. Please call or make an appointment with your primary care provider to discuss this. You may also call the [facility] scheduling line ([phone]) to set up a colonoscopy appointment.    Please contact [facility] RN with questions or concerns following today's appointment: [phone].    Thank you!

## 2019-05-14 NOTE — PROGRESS NOTES
Dear Colleagues,  Today Marixa Ann was seen in follow up      IDENTIFICATION: Ms. Ann is a 68 year old female with R breast cancer, G1, 2.4 cm, ER/ND+, neg margins, s/p lumpectomy. Her pathologic stage is [id number] and she completed adjuvant radiation therapy to the right breast on 3/11/19.   INTERVAL HISTORY:  Marixa Ann was last seen in our clinic during her last week of treatment. While on treatment she had complaints of fatigue not relieved by rest (grade 2).  Patient has a longstanding history of excessive fatigue and fibromyalgia.  Fatigue went back to her baseline after patient resumed her previously prescribed Aderall.  She also developed diffuse skin erythema (grade 1) that was managed with skin creams.  Post treatment she states that all of her symptoms have resolved. She returns today in follow up and has no complaints. Specifically denies n/v/ha/sob/cp  REVIEW OF SYSTEMS: As per HPI, a 14-point review of systems is otherwise negative.      PHYSICAL EXAMINATION:  /66 (BP Location: Left arm, Patient Position: Sitting, Cuff Size: Adult Regular)   Pulse 105   Temp 98.3  F (36.8  C) (Oral)   Resp 18   Wt 77.7 kg (171 lb 3.2 oz)   LMP 05/31/2003 (Approximate)   SpO2 94%   BMI 33.16 kg/m    GENERAL Well-appearing woman in no acute distress.  HEENT Normocephalic, atraumatic.  Sclerae anicteric.  CVR  Regular rate and rhythm.  No murmurs, rubs, or gallops.  LUNGS Clear to auscultation bilaterally.  BREASTS Breasts are examined in the supine position.  Hyperpigmentation and dry skin noted in prior treatment field.  No erythema or desquamation. Scar well healed.  LYMPH deffered.  EXT  No clubbing, cyanosis or edema.    NEURO Gait within normal limits.  SKIN  Warm and well perfused.  See breast exam  PSYCH:         Orientation: Alert, attentive, and oriented to time, place, and person                         Affect: Affect was appropriate to the situation and showed normal range and  stability. No anxious mood                         Speech: Speech was clear with normal fluency, rate, tone, and volume.                         Memory: Although not formally assessed, immediate, recent, and remote memory appeared to be intact.                          Insight and Judgement:  demonstrates adequate awareness of the issues discussed.                         Thought: Thought patterns were coherent and logical.      IMAGING: No new imaging.        IMPRESSION/PLAN:  Marixa Ann is 68 year old female with R breast cancer, G1, 2.4 cm, ER/GA+, neg margins, s/p lumpectomy. Her pathologic stage is [id number] and she completed adjuvant radiation therapy to the right breast on 3/11/19.  She is being seen here in follow up. She has had resolution of her acute radiation induced side effects and only has some residual skin dryness and hyperpigmentation within the treatment field.  This is to be expected. She should continue to use sunblock and moisturizer in the previously treated area generously.   Additional problem list to be addressed in the following manner:  1) Systemic/hormonal treatment in postmenopausal woman: ollow up with Med Onc for discussion/management regarding hormonal therapy. Scans per Med Onc.  2) Follow up with Surg Onc as previously directed.    3) Follow up with Rad Onc in 3 months.  There was ample time for questions and all were answered to the patient's satisfaction. Thank you for allowing me to participate in the care of this pleasant patient. If you have any questions, please do not hesitate to contact my office.      Sincerely,  Lynette Copeland MD  Adjunct   Dept of Radiation Oncology  [facility]

## 2019-05-23 ENCOUNTER — TRANSFERRED RECORDS (OUTPATIENT)
Dept: HEALTH INFORMATION MANAGEMENT | Facility: CLINIC | Age: 69
End: 2019-05-23

## 2019-05-29 ENCOUNTER — OFFICE VISIT (OUTPATIENT)
Dept: FAMILY MEDICINE | Facility: CLINIC | Age: 69
End: 2019-05-29
Payer: COMMERCIAL

## 2019-05-29 ENCOUNTER — MYC REFILL (OUTPATIENT)
Dept: FAMILY MEDICINE | Facility: CLINIC | Age: 69
End: 2019-05-29

## 2019-05-29 VITALS
WEIGHT: 168 LBS | RESPIRATION RATE: 16 BRPM | HEIGHT: 60 IN | SYSTOLIC BLOOD PRESSURE: 100 MMHG | OXYGEN SATURATION: 98 % | DIASTOLIC BLOOD PRESSURE: 71 MMHG | BODY MASS INDEX: 32.98 KG/M2 | TEMPERATURE: 98.6 F | HEART RATE: 111 BPM

## 2019-05-29 DIAGNOSIS — R31.29 MICROSCOPIC HEMATURIA: ICD-10-CM

## 2019-05-29 DIAGNOSIS — C50.211 MALIGNANT NEOPLASM OF UPPER-INNER QUADRANT OF RIGHT BREAST IN FEMALE, ESTROGEN RECEPTOR POSITIVE (H): ICD-10-CM

## 2019-05-29 DIAGNOSIS — Z23 ENCOUNTER FOR IMMUNIZATION: ICD-10-CM

## 2019-05-29 DIAGNOSIS — F90.0 ATTENTION DEFICIT HYPERACTIVITY DISORDER (ADHD), PREDOMINANTLY INATTENTIVE TYPE: ICD-10-CM

## 2019-05-29 DIAGNOSIS — Z90.49 STATUS POST LAPAROSCOPIC CHOLECYSTECTOMY: Primary | ICD-10-CM

## 2019-05-29 DIAGNOSIS — Z17.0 MALIGNANT NEOPLASM OF UPPER-INNER QUADRANT OF RIGHT BREAST IN FEMALE, ESTROGEN RECEPTOR POSITIVE (H): ICD-10-CM

## 2019-05-29 LAB
ALBUMIN SERPL-MCNC: 3.1 G/DL (ref 3.4–5)
ALBUMIN UR-MCNC: NEGATIVE MG/DL
ALP SERPL-CCNC: 86 U/L (ref 40–150)
ALT SERPL W P-5'-P-CCNC: 40 U/L (ref 0–50)
ANION GAP SERPL CALCULATED.3IONS-SCNC: 4 MMOL/L (ref 3–14)
APPEARANCE UR: CLEAR
AST SERPL W P-5'-P-CCNC: 18 U/L (ref 0–45)
BACTERIA #/AREA URNS HPF: ABNORMAL /HPF
BILIRUB SERPL-MCNC: 0.4 MG/DL (ref 0.2–1.3)
BILIRUB UR QL STRIP: NEGATIVE
BUN SERPL-MCNC: 15 MG/DL (ref 7–30)
CALCIUM SERPL-MCNC: 9.3 MG/DL (ref 8.5–10.1)
CHLORIDE SERPL-SCNC: 105 MMOL/L (ref 94–109)
CO2 SERPL-SCNC: 30 MMOL/L (ref 20–32)
COLOR UR AUTO: YELLOW
CREAT SERPL-MCNC: 0.64 MG/DL (ref 0.52–1.04)
ERYTHROCYTE [DISTWIDTH] IN BLOOD BY AUTOMATED COUNT: 14.2 % (ref 10–15)
GFR SERPL CREATININE-BSD FRML MDRD: >90 ML/MIN/{1.73_M2}
GLUCOSE SERPL-MCNC: 103 MG/DL (ref 70–99)
GLUCOSE UR STRIP-MCNC: NEGATIVE MG/DL
HCT VFR BLD AUTO: 39.7 % (ref 35–47)
HGB BLD-MCNC: 12.9 G/DL (ref 11.7–15.7)
HGB UR QL STRIP: ABNORMAL
KETONES UR STRIP-MCNC: NEGATIVE MG/DL
LEUKOCYTE ESTERASE UR QL STRIP: NEGATIVE
MCH RBC QN AUTO: 29.9 PG (ref 26.5–33)
MCHC RBC AUTO-ENTMCNC: 32.5 G/DL (ref 31.5–36.5)
MCV RBC AUTO: 92 FL (ref 78–100)
NITRATE UR QL: NEGATIVE
NON-SQ EPI CELLS #/AREA URNS LPF: ABNORMAL /LPF
PH UR STRIP: 5.5 PH (ref 5–7)
PLATELET # BLD AUTO: 351 10E9/L (ref 150–450)
POTASSIUM SERPL-SCNC: 3.9 MMOL/L (ref 3.4–5.3)
PROT SERPL-MCNC: 7.2 G/DL (ref 6.8–8.8)
RBC # BLD AUTO: 4.32 10E12/L (ref 3.8–5.2)
RBC #/AREA URNS AUTO: ABNORMAL /HPF
SODIUM SERPL-SCNC: 139 MMOL/L (ref 133–144)
SOURCE: ABNORMAL
SP GR UR STRIP: 1.01 (ref 1–1.03)
UROBILINOGEN UR STRIP-ACNC: 0.2 EU/DL (ref 0.2–1)
WBC # BLD AUTO: 8.3 10E9/L (ref 4–11)
WBC #/AREA URNS AUTO: ABNORMAL /HPF

## 2019-05-29 PROCEDURE — 36415 COLL VENOUS BLD VENIPUNCTURE: CPT | Performed by: PREVENTIVE MEDICINE

## 2019-05-29 PROCEDURE — 99214 OFFICE O/P EST MOD 30 MIN: CPT | Mod: 25 | Performed by: PREVENTIVE MEDICINE

## 2019-05-29 PROCEDURE — 85027 COMPLETE CBC AUTOMATED: CPT | Performed by: PREVENTIVE MEDICINE

## 2019-05-29 PROCEDURE — G0009 ADMIN PNEUMOCOCCAL VACCINE: HCPCS | Performed by: PREVENTIVE MEDICINE

## 2019-05-29 PROCEDURE — 80053 COMPREHEN METABOLIC PANEL: CPT | Performed by: PREVENTIVE MEDICINE

## 2019-05-29 PROCEDURE — 90732 PPSV23 VACC 2 YRS+ SUBQ/IM: CPT | Performed by: PREVENTIVE MEDICINE

## 2019-05-29 PROCEDURE — 81001 URINALYSIS AUTO W/SCOPE: CPT | Performed by: PREVENTIVE MEDICINE

## 2019-05-29 ASSESSMENT — PATIENT HEALTH QUESTIONNAIRE - PHQ9
SUM OF ALL RESPONSES TO PHQ QUESTIONS 1-9: 7
5. POOR APPETITE OR OVEREATING: NOT AT ALL

## 2019-05-29 ASSESSMENT — ANXIETY QUESTIONNAIRES
3. WORRYING TOO MUCH ABOUT DIFFERENT THINGS: SEVERAL DAYS
GAD7 TOTAL SCORE: 5
2. NOT BEING ABLE TO STOP OR CONTROL WORRYING: SEVERAL DAYS
7. FEELING AFRAID AS IF SOMETHING AWFUL MIGHT HAPPEN: SEVERAL DAYS
5. BEING SO RESTLESS THAT IT IS HARD TO SIT STILL: NOT AT ALL
6. BECOMING EASILY ANNOYED OR IRRITABLE: SEVERAL DAYS
1. FEELING NERVOUS, ANXIOUS, OR ON EDGE: SEVERAL DAYS
IF YOU CHECKED OFF ANY PROBLEMS ON THIS QUESTIONNAIRE, HOW DIFFICULT HAVE THESE PROBLEMS MADE IT FOR YOU TO DO YOUR WORK, TAKE CARE OF THINGS AT HOME, OR GET ALONG WITH OTHER PEOPLE: SOMEWHAT DIFFICULT

## 2019-05-29 ASSESSMENT — MIFFLIN-ST. JEOR: SCORE: 1227.51

## 2019-05-29 ASSESSMENT — PAIN SCALES - GENERAL: PAINLEVEL: MILD PAIN (2)

## 2019-05-29 NOTE — PROGRESS NOTES
Subjective     Marixa Ann is a 68 year old female who presents to clinic today for the following health issues:  Feels super tired  Has not had a good bowel movement  Last BM small was yesterday  No blood in the BM  Urine was dark  No emesis  Fatigue+  Was sweating with minimal exertion   Slight cough  Would like to update her Pneumonia vaccine  Has been using Miralax and stool softener   No pain with urination, some increase in frequency     HPI       Hospital Follow-up Visit:    Hospital/Nursing Home/IP Rehab Facility: [facility]  Date of Admission: 05/23/2019  Date of Discharge: 05/24/2019  Reason(s) for Admission: Abdominal pain, admitted for Biliary colic and underwent Laparoscopic Cholecystectomy             Problems taking medications regularly:  Yes, Stopped medication due to surgery       Medication changes since discharge: None       Problems adhering to non-medication therapy:  None    Summary of hospitalization:  CareEverywhere information obtained and reviewed  Diagnostic Tests/Treatments reviewed.  Follow up needed: none  Other Healthcare Providers Involved in Patient s Care:         None  Update since discharge: improved.     Post Discharge Medication Reconciliation: discharge medications reconciled, continue medications without change.  Plan of care communicated with patient     Coding guidelines for this visit:  Type of Medical   Decision Making Face-to-Face Visit       within 7 Days of discharge Face-to-Face Visit        within 14 days of discharge   Moderate Complexity [id number]   High Complexity [id number]          The following is a summary from Care Everywhere:    CONSULTATION ASSESSMENT:  Biliary colic, possible early cholecystitis     PLAN/RECOMMENDATIONS:   Based on the patient's history and physical exam findings, including CT evidence of gallstones, I believe this patient would benefit from cholecystectomy. She appears to have a large stone in the body of the  gallbladder, but also a stone at/near the neck of the gallbladder which may be causing an early cholecystitis. Gave patient pathophysiology of GB disease. Discussed recommendation for a cholecystectomy. Benefits and risks discussed including bleeding, infection, change to an open approach, trocar injury to other organs, and CBD injury that would require further surgeries. Plan to proceed with laparoscopic cholecystectomy with Dr. Rehman this morning at 1100h. The patient wishes to proceed as scheduled. Questions answered.        Patient Active Problem List   Diagnosis     Pain in joint, shoulder region     Moderate recurrent major depression (H)     Hyperlipidemia LDL goal <160     Chronic fatigue disorder     Liver lesion     Cystic disease of liver     Gastroesophageal reflux disease with esophagitis     Hiatal hernia     Age-related cataract of both eyes, unspecified age-related cataract type     Primary osteoarthritis of both knees     Attention deficit hyperactivity disorder (ADHD), predominantly inattentive type     Malignant neoplasm of upper-inner quadrant of right breast in female, estrogen receptor positive (H)     Thyroid nodule     Anxiety disorder, unspecified type     Osteoporosis     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     Aromatase inhibitor use     Cervical cancer screening     Past Surgical History:   Procedure Laterality Date     APPENDECTOMY  1967     BIOPSY BREAST NEEDLE LOCALIZATION Right 1/10/2019    Procedure: WIRE LOCALIZED RIGHT BREAST LUMPECTOMY WITH RIGHT SLNB;  Surgeon: Gema Diamond MD;  Location: [facility] OR     BREAST BIOPSY, CORE RT/LT Right 12/26/2018     CATARACT IOL, RT/LT Bilateral 2018    with lens implants per patient     THYROID BIOPSY  01/31/2019       Social History     Tobacco Use     Smoking status: Never Smoker     Smokeless tobacco: Never Used   Substance Use Topics     Alcohol use: No     Frequency: Never     Family History   Problem Relation Age of Onset     Leukemia Father      Breast  "Cancer Cousin 50        Maternal Female 1st Cousin         Current Outpatient Medications   Medication Sig Dispense Refill     acetaminophen (TYLENOL) 325 MG tablet Take 325-650 mg by mouth every 6 hours as needed for mild pain       amphetamine-dextroamphetamine (ADDERALL) 10 MG tablet Take 1 tablet (10 mg) by mouth 2 times daily 60 tablet 0     anastrozole (ARIMIDEX) 1 MG tablet Take 1 tablet (1 mg) by mouth daily 90 tablet 1     B Complex Vitamins (VITAMIN B-COMPLEX PO) Take by mouth daily       calcium carbonate (TUMS) 500 MG chewable tablet Take 1 chew tab by mouth 2 times daily       Calcium-Magnesium-Zinc 333-133-5 MG TABS per tablet Take 1 tablet by mouth daily       Cholecalciferol (VITAMIN D-3 PO)        cyclobenzaprine (FLEXERIL) 5 MG tablet Take 1 tablet (5 mg) by mouth daily as needed for muscle spasms 20 tablet 1     denosumab (PROLIA) 60 MG/ML SOSY injection        KRILL OIL PO Take by mouth daily       magnesium oxide 200 MG TABS Take 100 mg by mouth once as needed       Multiple Vitamins-Minerals (MULTIVITAMIN PO)        RANITIDINE HCL PO Take by mouth as needed        Allergies   Allergen Reactions     Vicodin [Hydrocodone-Acetaminophen] Anaphylaxis     Dust Mites      Milk [Lac Bovis] Diarrhea     BP Readings from Last 3 Encounters:   05/29/19 100/71   05/13/19 112/66   04/29/19 107/64    Wt Readings from Last 3 Encounters:   05/29/19 76.2 kg (168 lb)   05/13/19 77.7 kg (171 lb 3.2 oz)   04/29/19 77.2 kg (170 lb 1.6 oz)              Reviewed and updated as needed this visit by Provider  Tobacco  Allergies  Meds  Problems  Med Hx  Surg Hx  Fam Hx         Review of Systems   ROS COMP: Constitutional, HEENT, cardiovascular, pulmonary, gi and gu systems are negative, except as otherwise noted.      Objective    /71 (BP Location: Left arm, Patient Position: Chair, Cuff Size: Adult Regular)   Pulse 111   Temp 98.6  F (37  C) (Oral)   Resp 16   Ht 1.53 m (5' 0.25\")   Wt 76.2 kg (168 lb) "   LMP 05/31/2003 (Approximate)   SpO2 98%   Breastfeeding? No   BMI 32.54 kg/m    Body mass index is 32.54 kg/m .  Physical Exam     GENERAL APPEARANCE: healthy, alert and no distress  EYES: Eyes grossly normal to inspection and conjunctivae and sclerae normal  HENT: nose and mouth without ulcers or lesions  NECK: no adenopathy and trachea midline and normal to palpation  RESP: lungs clear to auscultation - no rales, rhonchi or wheezes  CV: regular rates and rhythm, normal S1 S2, no S3 or S4 and no murmur, click or rub  ABDOMEN: soft, non-tender and no rebound or guarding, incisions appear to be healing, no erythema or drainage, bowel sounds + some bruising on the abdomen.   MS: extremities normal- no gross deformities noted and peripheral pulses normal  SKIN: no suspicious lesions or rashes  NEURO: Normal strength and tone, mentation intact and speech normal  PSYCH: mentation appears normal      Diagnostic Test Results:  Labs reviewed in Epic  Results for orders placed or performed in visit on 05/29/19 (from the past 24 hour(s))   UA reflex to Microscopic and Culture   Result Value Ref Range    Color Urine Yellow     Appearance Urine Clear     Glucose Urine Negative NEG^Negative mg/dL    Bilirubin Urine Negative NEG^Negative    Ketones Urine Negative NEG^Negative mg/dL    Specific Gravity Urine 1.015 1.003 - 1.035    Blood Urine Moderate (A) NEG^Negative    pH Urine 5.5 5.0 - 7.0 pH    Protein Albumin Urine Negative NEG^Negative mg/dL    Urobilinogen Urine 0.2 0.2 - 1.0 EU/dL    Nitrite Urine Negative NEG^Negative    Leukocyte Esterase Urine Negative NEG^Negative    Source Midstream Urine    CBC with platelets   Result Value Ref Range    WBC 8.3 4.0 - 11.0 10e9/L    RBC Count 4.32 3.8 - 5.2 10e12/L    Hemoglobin 12.9 11.7 - 15.7 g/dL    Hematocrit 39.7 35.0 - 47.0 %    MCV 92 78 - 100 fl    MCH 29.9 26.5 - 33.0 pg    MCHC 32.5 31.5 - 36.5 g/dL    RDW 14.2 10.0 - 15.0 %    Platelet Count 351 150 - 450 10e9/L  "  Urine Microscopic   Result Value Ref Range    WBC Urine 0 - 5 OTO5^0 - 5 /HPF    RBC Urine 5-10 (A) OTO2^O - 2 /HPF    Squamous Epithelial /LPF Urine Few FEW^Few /LPF    Bacteria Urine Few (A) NEG^Negative /HPF           Assessment & Plan     Marixa was seen today for abdominal pain.    Diagnoses and all orders for this visit:    Status post laparoscopic cholecystectomy  -     UA reflex to Microscopic and Culture  -     Comprehensive metabolic panel  -     CBC with platelets  -     Urine Microscopic  -Mentions fatigue and increased sweating  -UA does not show an infection, some blood seen, would recommend rechecking UA in 3-4 weeks   -Has had some constipation which may be causing abdominal discomfort. Advised prune juice, increased fiber, continue Miralax and stool softener. If not better in 2 days, please contact us     Malignant neoplasm of upper-inner quadrant of right breast in female, estrogen receptor positive (H)  -     CBC with platelets  -followed by Oncology     Encounter for immunization  -     ADMIN: Vaccine, Initial (24713)    Other orders  -     Pneumococcal vaccine 23 valent PPSV23  (Pneumovax) [37854]         BMI:   Estimated body mass index is 32.54 kg/m  as calculated from the following:    Height as of this encounter: 1.53 m (5' 0.25\").    Weight as of this encounter: 76.2 kg (168 lb).       See Patient Instructions    Return in about 1 week (around 6/5/2019), or if symptoms worsen or fail to improve.    Kamila Campos MD MPH    [facility]      "

## 2019-05-29 NOTE — NURSING NOTE
Screening Questionnaire for Adult Immunization    Are you sick today?   Don't Know   Do you have allergies to medications, food, a vaccine component or latex?   Yes   Have you ever had a serious reaction after receiving a vaccination?   No   Do you have a long-term health problem with heart disease, lung disease, asthma, kidney disease, metabolic disease (e.g. diabetes), anemia, or other blood disorder?   Don't Know   Do you have cancer, leukemia, HIV/AIDS, or any other immune system problem?   No   In the past 3 months, have you taken medications that affect  your immune system, such as prednisone, other steroids, or anticancer drugs; drugs for the treatment of rheumatoid arthritis, Crohn s disease, or psoriasis; or have you had radiation treatments?   Yes   Have you had a seizure, or a brain or other nervous system problem?   No   During the past year, have you received a transfusion of blood or blood     products, or been given immune (gamma) globulin or antiviral drug?   No   For women: Are you pregnant or is there a chance you could become        pregnant during the next month?   No   Have you received any vaccinations in the past 4 weeks?   Yes     Immunization questionnaire was positive for at least one answer.  Notified Dr. Campos.        Per orders of Dr. Campos, injection of PSV-23 given by Sri Deng. Patient instructed to remain in clinic for 15 minutes afterwards, and to report any adverse reaction to me immediately.       Screening performed by Sri Deng on 5/29/2019 at 11:12 AM.

## 2019-05-29 NOTE — PATIENT INSTRUCTIONS
At [facility], we strive to deliver an exceptional experience to you, every time we see you.  If you receive a survey in the mail, please send us back your thoughts. We really do value your feedback.    Based on your medical history, these are the current health maintenance/preventive care services that you are due for (some may have been done at this visit.)  Health Maintenance Due   Topic Date Due     ADVANCED DIRECTIVE PLANNING  1950     COLONOSCOPY  10/20/1962     DTAP/TDAP/TD IMMUNIZATION (1 - Tdap) 10/20/1977     ZOSTER IMMUNIZATION (1 of 2) 10/20/2002     MEDICARE ANNUAL WELLNESS VISIT  04/20/2019         Suggested websites for health information:  Www.[facility].org : Up to date and easily searchable information on multiple topics.  Www.medlineplus.gov : medication info, interactive tutorials, watch real surgeries online  Www.familydoctor.org : good info from the Academy of Family Physicians  Www.cdc.gov : public health info, travel advisories, epidemics (H1N1)  Www.aap.org : children's health info, normal development, vaccinations  Www.health.[facility].[place].us : [place] dept of health, public health issues in [place], N1N1    Your care team:                            Family Medicine Internal Medicine   MD Kyle Mancera MD Shantel Branch-Fleming, MD Katya Georgiev PA-C Nam Ho, MD Pediatrics   OCTAVIO Ruiz, MD Alisa Keen CNP, MD Deborah Mielke, MD Kim Thein, APRN CNP      Clinic hours: Monday - Thursday 7 am-7 pm; Fridays 7 am-5 pm.   Urgent care: Monday - Friday 11 am-9 pm; Saturday and Sunday 9 am-5 pm.  Pharmacy : Monday -Thursday 8 am-8 pm; Friday 8 am-6 pm; Saturday and Sunday 9 am-5 pm.     Clinic: [phone]   Pharmacy: [phone]

## 2019-05-29 NOTE — RESULT ENCOUNTER NOTE
Marixa,     Basic blood count did not show anemia or infection.  Urine sample did not show any infections. There was some blood seen, I would recommend we recheck this in 3-4 weeks. Lab orders are in the system and you can come in for a lab only visit.   Other labs are pending.     Please do not hesitate to call us at [phone] if you have any questions or concerns.    Thank you,    Kamila Campos MD MPH

## 2019-05-30 RX ORDER — DEXTROAMPHETAMINE SACCHARATE, AMPHETAMINE ASPARTATE, DEXTROAMPHETAMINE SULFATE AND AMPHETAMINE SULFATE 2.5; 2.5; 2.5; 2.5 MG/1; MG/1; MG/1; MG/1
10 TABLET ORAL 2 TIMES DAILY
Qty: 60 TABLET | Refills: 0 | Status: SHIPPED | OUTPATIENT
Start: 2019-05-30 | End: 2019-06-27

## 2019-05-30 ASSESSMENT — ANXIETY QUESTIONNAIRES: GAD7 TOTAL SCORE: 5

## 2019-05-30 NOTE — TELEPHONE ENCOUNTER
Requested Prescriptions   Pending Prescriptions Disp Refills     amphetamine-dextroamphetamine (ADDERALL) 10 MG tablet        Last Written Prescription Date:  05/02/19  Last Fill Quantity: 60,   # refills: 0  Last Office Visit: 05/29/19-Andres  Future Office visit:    Next 5 appointments (look out 90 days)    Jul 25, 2019 10:30 AM CDT  Return Visit with Gema Diamond MD  [facility]) [address] [place] [id number]  [phone]   Jul 26, 2019  3:00 PM CDT  Return Visit with Sil Dan MD  [facility]) [address] [place] [id number] [phone]   Aug 13, 2019  2:45 PM CDT  Return Visit with Alexandra Copeland MD  [facility]) [address] [place] [id number] [phone]           Routing refill request to provider for review/approval because:  Drug not on the [facility], U[facility] or [facility] refill protocol or controlled substance 60 tablet 0     Sig: Take 1 tablet (10 mg) by mouth 2 times daily       There is no refill protocol information for this order

## 2019-05-30 NOTE — RESULT ENCOUNTER NOTE
Marixa,     Electrolytes, non fasting glucose, kidney and liver function tests are normal.     Please do not hesitate to call us at [phone] if you have any questions or concerns.    Thank you,    Kamila Campos MD MPH

## 2019-06-06 ENCOUNTER — TRANSFERRED RECORDS (OUTPATIENT)
Dept: HEALTH INFORMATION MANAGEMENT | Facility: CLINIC | Age: 69
End: 2019-06-06

## 2019-06-17 ENCOUNTER — TELEPHONE (OUTPATIENT)
Dept: RADIATION ONCOLOGY | Facility: CLINIC | Age: 69
End: 2019-06-17

## 2019-06-17 NOTE — TELEPHONE ENCOUNTER
I left a voicemail for patient regarding upcoming appointment on 08/13/2019 this appointment needs to be reschedule due to office remodeling, Dr. Copeland will only be seeing patients on Wednesdays during this time.      Frida Maldonado MA

## 2019-06-17 NOTE — TELEPHONE ENCOUNTER
Patient's upcoming appointment was changed to 08/14/2019 at 1000 with Dr. Copeland due to office remodeling, patient repeated information and had no further questions at this time.  Appointment information was mailed to patients home address per patient request.      Frida Maldonado MA

## 2019-06-26 ENCOUNTER — TELEPHONE (OUTPATIENT)
Dept: FAMILY MEDICINE | Facility: CLINIC | Age: 69
End: 2019-06-26

## 2019-06-26 NOTE — TELEPHONE ENCOUNTER
Reason for Call:  Other     Detailed comments: Patient has a lump under her arm,  by the left breast where she had cancer, and asking if she could be worked into Dr Kovacs schedule as soon as possible?    Phone Number Patient can be reached at: Home number on file [phone] (home)    Best Time: any    Can we leave a detailed message on this number? YES    Call taken on 6/26/2019 at 11:30 AM by Dayana Huizar

## 2019-06-26 NOTE — TELEPHONE ENCOUNTER
Patient is scheduled for tomorrow at 2:40p with Dr. Kovacs.  Gilberto Bae,  [facility]    Please call patient and let patient know of the appointment.  Gilberto Bae,  [facility]

## 2019-06-27 ENCOUNTER — OFFICE VISIT (OUTPATIENT)
Dept: FAMILY MEDICINE | Facility: CLINIC | Age: 69
End: 2019-06-27
Payer: COMMERCIAL

## 2019-06-27 VITALS
HEART RATE: 104 BPM | WEIGHT: 168 LBS | TEMPERATURE: 98.1 F | RESPIRATION RATE: 16 BRPM | BODY MASS INDEX: 32.98 KG/M2 | HEIGHT: 60 IN | OXYGEN SATURATION: 99 % | DIASTOLIC BLOOD PRESSURE: 77 MMHG | SYSTOLIC BLOOD PRESSURE: 121 MMHG

## 2019-06-27 DIAGNOSIS — Z17.0 MALIGNANT NEOPLASM OF UPPER-INNER QUADRANT OF RIGHT BREAST IN FEMALE, ESTROGEN RECEPTOR POSITIVE (H): Primary | ICD-10-CM

## 2019-06-27 DIAGNOSIS — E78.5 HYPERLIPIDEMIA LDL GOAL <160: ICD-10-CM

## 2019-06-27 DIAGNOSIS — F33.1 MODERATE RECURRENT MAJOR DEPRESSION (H): ICD-10-CM

## 2019-06-27 DIAGNOSIS — Z90.49 HISTORY OF CHOLECYSTECTOMY: ICD-10-CM

## 2019-06-27 DIAGNOSIS — G93.32 CHRONIC FATIGUE DISORDER: ICD-10-CM

## 2019-06-27 DIAGNOSIS — C50.211 MALIGNANT NEOPLASM OF UPPER-INNER QUADRANT OF RIGHT BREAST IN FEMALE, ESTROGEN RECEPTOR POSITIVE (H): Primary | ICD-10-CM

## 2019-06-27 DIAGNOSIS — F90.0 ATTENTION DEFICIT HYPERACTIVITY DISORDER (ADHD), PREDOMINANTLY INATTENTIVE TYPE: ICD-10-CM

## 2019-06-27 DIAGNOSIS — I89.0 LYMPHEDEMA OF RIGHT UPPER EXTREMITY: ICD-10-CM

## 2019-06-27 DIAGNOSIS — Z79.811 AROMATASE INHIBITOR USE: ICD-10-CM

## 2019-06-27 PROCEDURE — 99214 OFFICE O/P EST MOD 30 MIN: CPT | Performed by: FAMILY MEDICINE

## 2019-06-27 RX ORDER — DEXTROAMPHETAMINE SACCHARATE, AMPHETAMINE ASPARTATE, DEXTROAMPHETAMINE SULFATE AND AMPHETAMINE SULFATE 2.5; 2.5; 2.5; 2.5 MG/1; MG/1; MG/1; MG/1
10 TABLET ORAL 2 TIMES DAILY
Qty: 60 TABLET | Refills: 0 | Status: SHIPPED | OUTPATIENT
Start: 2019-06-27 | End: 2019-08-08

## 2019-06-27 ASSESSMENT — PAIN SCALES - GENERAL: PAINLEVEL: MODERATE PAIN (5)

## 2019-06-27 ASSESSMENT — MIFFLIN-ST. JEOR: SCORE: 1227.51

## 2019-06-27 NOTE — PROGRESS NOTES
Subjective     Marixa Ann is a 68 year old female who presents to clinic today for the following health issues:    HPI   Concern - pain in right axilla and neck with swelling.   Onset: 2 weeks after gall bladder surgery     Description:   Lump near right underarm near the incision. Possible lump on left breast as well. Patient states that she has been having some problems with her right arm. When patient went in to get gall bladder removed, blood pressure was taken on right arm, patient is worried for possible lymphedema. Gardening, weeding and lifting box of paints may have been too much. Laying on stomach on left but when turns to right feels bulky and uncomfortable.     Intensity: 5/10    Progression of Symptoms:  worsening    Accompanying Signs & Symptoms:  Sore right breast, sore neck     Previous history of similar problem:   yes    Precipitating factors:   Worsened by: possible radiation     Alleviating factors:  Improved by: none     Therapies Tried and outcome: none   Hyperlipidemia Follow-Up      Are you having any of the following symptoms? (Select all that apply)  No complaints of shortness of breath, chest pain or pressure.  No increased sweating or nausea with activity.  No left-sided neck or arm pain.  No complaints of pain in calves when walking 1-2 blocks.    Are you regularly taking any medication or supplement to lower your cholesterol?   No    Are you having muscle aches or other side effects that you think could be caused by your cholesterol lowering medication?  No      Anxiety Follow-Up    How are you doing with your anxiety since your last visit? Worsened due to surgeries, breast cancer and increased pain.    Are you having other symptoms that might be associated with anxiety? No    Have you had a significant life event? Health Concerns     Are you feeling depressed? No    Do you have any concerns with your use of alcohol or other drugs? No    Social History     Tobacco Use     Smoking  status: Never Smoker     Smokeless tobacco: Never Used   Substance Use Topics     Alcohol use: No     Frequency: Never     Drug use: No     DONOVAN-7 SCORE 9/13/2018 4/25/2019 5/29/2019   Total Score 10 8 5     PHQ 9/13/2018 4/25/2019 5/29/2019   PHQ-9 Total Score 23 22 7   Q9: Thoughts of better off dead/self-harm past 2 weeks More than half the days Nearly every day Not at all           Patient Active Problem List   Diagnosis     Pain in joint, shoulder region     Moderate recurrent major depression (H)     Hyperlipidemia LDL goal <160     Chronic fatigue disorder     Liver lesion     Cystic disease of liver     Gastroesophageal reflux disease with esophagitis     Hiatal hernia     Age-related cataract of both eyes, unspecified age-related cataract type     Primary osteoarthritis of both knees     Attention deficit hyperactivity disorder (ADHD), predominantly inattentive type     Malignant neoplasm of upper-inner quadrant of right breast in female, estrogen receptor positive (H)     Thyroid nodule     Anxiety disorder, unspecified type     Osteoporosis     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     Aromatase inhibitor use     Cervical cancer screening     History of cholecystectomy     Past Surgical History:   Procedure Laterality Date     APPENDECTOMY  1967     BIOPSY BREAST NEEDLE LOCALIZATION Right 1/10/2019    Procedure: WIRE LOCALIZED RIGHT BREAST LUMPECTOMY WITH RIGHT SLNB;  Surgeon: Gema Diamond MD;  Location: [facility]     BREAST BIOPSY, CORE RT/LT Right 12/26/2018     CATARACT IOL, RT/LT Bilateral 2018    with lens implants per patient     THYROID BIOPSY  01/31/2019       Social History     Tobacco Use     Smoking status: Never Smoker     Smokeless tobacco: Never Used   Substance Use Topics     Alcohol use: No     Frequency: Never     Family History   Problem Relation Age of Onset     Leukemia Father      Breast Cancer Cousin 50        Maternal Female 1st Cousin         Current Outpatient Medications   Medication Sig  Dispense Refill     acetaminophen (TYLENOL) 325 MG tablet Take 325-650 mg by mouth every 6 hours as needed for mild pain       amphetamine-dextroamphetamine (ADDERALL) 10 MG tablet Take 1 tablet (10 mg) by mouth 2 times daily 60 tablet 0     anastrozole (ARIMIDEX) 1 MG tablet Take 1 tablet (1 mg) by mouth daily 90 tablet 1     B Complex Vitamins (VITAMIN B-COMPLEX PO) Take by mouth daily       calcium carbonate (TUMS) 500 MG chewable tablet Take 1 chew tab by mouth 2 times daily       Calcium-Magnesium-Zinc 333-133-5 MG TABS per tablet Take 1 tablet by mouth daily       Cholecalciferol (VITAMIN D-3 PO)        cyclobenzaprine (FLEXERIL) 5 MG tablet Take 1 tablet (5 mg) by mouth daily as needed for muscle spasms 20 tablet 1     denosumab (PROLIA) 60 MG/ML SOSY injection        KRILL OIL PO Take by mouth daily       magnesium oxide 200 MG TABS Take 100 mg by mouth once as needed       Multiple Vitamins-Minerals (MULTIVITAMIN PO)        RANITIDINE HCL PO Take by mouth as needed        Allergies   Allergen Reactions     Vicodin [Hydrocodone-Acetaminophen] Anaphylaxis     Dust Mites      Milk [Lac Bovis] Diarrhea     Recent Labs   Lab Test 05/29/19  1112 03/05/19  1406 03/05/19  1405 01/04/19  1122 10/19/17  1412   LDL  --   --   --   --  98   HDL  --   --   --   --  40*   TRIG  --   --   --   --  82   ALT 40  --  21  --  26   CR 0.64 0.63  --  0.69 0.72   GFRESTIMATED >90 >90  --  >90 81   GFRESTBLACK >90 >90  --  >90 >90   POTASSIUM 3.9  --   --  4.0 3.9   TSH  --   --   --   --  1.35      BP Readings from Last 3 Encounters:   06/27/19 121/77   05/29/19 100/71   05/13/19 112/66    Wt Readings from Last 3 Encounters:   06/27/19 76.2 kg (168 lb)   05/29/19 76.2 kg (168 lb)   05/13/19 77.7 kg (171 lb 3.2 oz)                      Reviewed and updated as needed this visit by Provider  Problems         Review of Systems   ROS COMP: Constitutional, HEENT, cardiovascular, pulmonary, gi and gu systems are negative, except as  "otherwise noted.      Objective    /77 (BP Location: Right arm, Patient Position: Chair, Cuff Size: Adult Regular)   Pulse 104   Temp 98.1  F (36.7  C) (Oral)   Resp 16   Ht 1.53 m (5' 0.25\")   Wt 76.2 kg (168 lb)   LMP 05/31/2003 (Approximate)   SpO2 99%   BMI 32.54 kg/m    Body mass index is 32.54 kg/m .  Physical Exam   GENERAL: healthy, alert, well nourished, well hydrated, no distress, obese  HENT: ear canals- normal; TMs- normal; Nose- normal; Mouth- no ulcers, no lesions, throat is clear with no erythema or exudate.   NECK: no tenderness, no adenopathy, no asymmetry, no masses, no stiffness; thyroid- normal to palpation  RESP: lungs clear to auscultation - no rales, no rhonchi, no wheezes  CV: regular rates and rhythm, normal S1 S2, no S3 or S4 and no murmur, no click or rub -  ABDOMEN: soft, no tenderness, no  hepatosplenomegaly, no masses, normal bowel sounds  MS: extremities- no gross deformities noted, no edema  SKIN: no suspicious lesions, no rashes  NEURO: strength and tone- normal, sensory exam- grossly normal, mentation- intact, speech- normal, reflexes- symmetric  BACK: no CVA tenderness, no paralumbar tenderness  PSYCH: Alert and oriented times 3; speech- coherent , normal rate and volume; able to articulate logical thoughts, able to abstract reason, no tangential thoughts, no hallucinations or delusions, affect- anxious  BREAST: Normal appearance symmetric with no retractions, no palpable lesions or masses, normal nipples with no discharge, no axillary adenopathy. Some mild post radiation changes in right breast without any lumps or lesions. Nipple has some crusting. Tender over incision area. Some fibrotic changes.             Assessment & Plan       ICD-10-CM    1. Malignant neoplasm of upper-inner quadrant of right breast in female, estrogen receptor positive (H) C50.211 No physical signs of recurrence but may be having post radiation changes. Will have follow up with surgery and " "oncology.    Z17.0    2. History of cholecystectomy Z90.49 Recent surgery and still having some back pain   3. Aromatase inhibitor use Z79.811 Wonders if symptoms may be caused by medication.    4. Attention deficit hyperactivity disorder (ADHD), predominantly inattentive type F90.0 amphetamine-dextroamphetamine (ADDERALL) 10 MG tablet twice a day as needed. Helps to keep symptoms under control   5. Moderate recurrent major depression (H) F33.1 Stable    6. Hyperlipidemia LDL goal <160 E78.5 stable   7. Chronic fatigue disorder R53.82 No worsening of symptoms    8. Lymphedema of right upper extremity- mild and would like to be evaluated.  I89.0 LYMPHEDEMA THERAPY REFERRAL        BMI:   Estimated body mass index is 32.54 kg/m  as calculated from the following:    Height as of this encounter: 1.53 m (5' 0.25\").    Weight as of this encounter: 76.2 kg (168 lb).   Weight management plan: Discussed healthy diet and exercise guidelines        CONSULTATION/REFERRAL to surgery and oncology as scheduled for breast cancer follow up   FUTURE LABS:       - Schedule fasting labs in 3 months  FUTURE APPOINTMENTS:       - Follow-up visit in 3 months or sooner if any questions or concerns.   Regular exercise  See Patient Instructions    Return in about 3 months (around 9/27/2019) for medication follow up, recheck, Lab Work.    Jackie Kovacs MD  [facility]        "

## 2019-06-27 NOTE — PATIENT INSTRUCTIONS
At [facility], we strive to deliver an exceptional experience to you, every time we see you.  If you receive a survey in the mail, please send us back your thoughts. We really do value your feedback.    Based on your medical history, these are the current health maintenance/preventive care services that you are due for (some may have been done at this visit.)  Health Maintenance Due   Topic Date Due     ADVANCE CARE PLANNING  1950     COLONOSCOPY  10/20/1962     DTAP/TDAP/TD IMMUNIZATION (1 - Tdap) 10/20/1977     ZOSTER IMMUNIZATION (1 of 2) 10/20/2002     MEDICARE ANNUAL WELLNESS VISIT  04/20/2019         Suggested websites for health information:  Www.[employer].org : Up to date and easily searchable information on multiple topics.  Www.medlineplus.gov : medication info, interactive tutorials, watch real surgeries online  Www.familydoctor.org : good info from the Academy of Family Physicians  Www.cdc.gov : public health info, travel advisories, epidemics (H1N1)  Www.aap.org : children's health info, normal development, vaccinations  Www.health.Replaced by [facility].[place].us : [place] dept of health, public health issues in [place], N1N1    Your care team:                            Family Medicine Internal Medicine   MD Kyle Mancera MD Shantel Branch-Fleming, MD Katya Georgiev PA-C Nam Ho, MD Pediatrics   OCTAVIO Ruiz, JOSEPH Delgado APRMD Alisa Love CNP, MD Deborah Mielke, MD Kim Thein, APRN [facility]      Clinic hours: Monday - Thursday 7 am-7 pm; Fridays 7 am-5 pm.   Urgent care: Monday - Friday 11 am-9 pm; Saturday and Sunday 9 am-5 pm.  Pharmacy : Monday -Thursday 8 am-8 pm; Friday 8 am-6 pm; Saturday and Sunday 9 am-5 pm.     Clinic: [phone]   Pharmacy: [phone]    Patient Education     Lymphedema  The lymphatic system is made up of lymph vessels and lymph nodes, which carry a fluid called lymph. Lymph consists of waste from  the cells. This fluid drains through lymph vessels under the skin to nearby lymph nodes. Lymph nodes filter waste products from the cells and kill any bacteria present before returning the lymph fluid to your blood circulation.  When the lymph vessels are damaged, lymph fluid cannot drain from tissues. This causes the lymph fluid to back up leading to swelling. This most often affects the arms or legs. Signs of lymphedema include heaviness, stiffness, or aching in an arm or leg. The limb may swell. The skin might look red. Shoes and rings may feel tight. Ankles and wrists might become less flexible.  The most common cause of damage to the lymph system is surgery or radiation for breast or testicular cancer. Other causes include repeated skin infections (cellulitis), burns, or injury to the arms or legs. It can take many years for symptoms of lymphedema to appear. Once present, lymphedema can become a chronic condition. This means the problem can be managed but not cured.   Treatment often includes use of compression garments, massage, and special exercises. Talk to your healthcare provider about these therapies and best treatment plan for you.  Home care  You can help keep the condition from getting worse. Follow all instructions you have been given. Do your exercises and wear your compression garments as recommended. Also, care for yourself as instructed.     Small skin injuries like a cut, burn, or insect bite are more likely to cause a skin infection. Take special care to avoid injury. If you have any signs of infection, call your healthcare provider right away.    Take care of your skin and nails. Moisturize dry skin. Wear protective gloves when doing chores such as gardening.     Don't wear tight clothing or jewelry on the affected arm or leg. Avoid carrying bags or other weight on the affected arm.    Save with an electric razor instead of a razor blade.    If at all possible, don t have blood pressure taken,  get injections, or have blood drawn in the affected arm.    If a leg is involved, don t cross your legs when sitting. Don't go barefoot.    Avoid hot tubs, steam rooms, and saunas.  If you are at risk for lymphedema but have not developed it, these tips can help also help prevent it. Follow your healthcare provider's instructions.  Follow-up care  Follow up with your healthcare provider, or as advised.  Lymphedema can change the appearance of your body. This can be emotionally difficult to adjust to. You may benefit from a support group where practical advice and emotional support is offered. Individual counseling is another option.  When to seek medical advice  Call your healthcare provider right away if any of these occur:    Swelling worsens    Rash, blistering, or other skin changes on the affected limb    Area of skin becomes red, painful, or warm to the touch    A wound increases in pain, becomes warm, drains pus, or radiates red streaks    Fever of 100.4 F (38 C) or higher, or as directed by your healthcare provider  Date Last Reviewed: 10/1/2016    6426-4811 [employer]. [address] [place] [address]. All rights reserved. This information is not intended as a substitute for professional medical care. Always follow your healthcare professional's instructions.

## 2019-07-05 ENCOUNTER — HOSPITAL ENCOUNTER (OUTPATIENT)
Dept: OCCUPATIONAL THERAPY | Facility: CLINIC | Age: 69
Setting detail: THERAPIES SERIES
End: 2019-07-05
Attending: FAMILY MEDICINE
Payer: COMMERCIAL

## 2019-07-05 DIAGNOSIS — I89.0 LYMPHEDEMA OF RIGHT UPPER EXTREMITY: ICD-10-CM

## 2019-07-05 PROCEDURE — 97110 THERAPEUTIC EXERCISES: CPT | Mod: GO

## 2019-07-05 PROCEDURE — 97166 OT EVAL MOD COMPLEX 45 MIN: CPT | Mod: GO

## 2019-07-05 PROCEDURE — 97535 SELF CARE MNGMENT TRAINING: CPT | Mod: GO

## 2019-07-05 NOTE — PROGRESS NOTES
"   07/05/19 1115   Quick Adds   Quick Adds Certification   Rehab Discipline   Discipline OT   Type of Visit   Type of visit Initial Edema Evaluation   General Information   Start of care 07/05/19   Referring physician Jackie Kovacs MD   Orders Evaluate and treat as indicated   Order date 06/27/19   Medical diagnosis lymphedema of right upper extremity   Edema onset 06/27/19   Edema etiology comments Patient s/p R lumpectomy with SLND (1/10/2019); XRT completed 3/11/2019, now on endocrine therapy   Pertinent history of current problem (PT: include personal factors and/or comorbidities that impact the POC; OT: include additional occupational profile info) Pertinent PMH includes recurrent major depression, chronic fatigue, cystic liver disease, OA of b/l knees, ADHD, anxiety disorder, 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 No   Have you fallen and had an injury in the past year? No   System Outcome Measures   Outcome Measures Lymphedema   Lymphedema Life Impact Scale (score range 0-72). A higher score indicates greater impairment. 18   Subjective Report   Patient report of symptoms \"I just feel like this all started after they took my blood pressure at [facility] before surgery... things just feel full, kind of painful\"   Pain   Patient currently in pain Yes   Pain comments Patient has chronic pain and fibromyalgia with chronic fatigue; rates pain today at 4/10 from R lat neck extending to fingers, R post flank inferior to scap   Cognitive Status   Orientation Orientation to person, place and time   Level of consciousness Alert " "  Follows commands and answers questions 100% of the time   Cognitive status comments Patient hyper-verbal and tangential this visit, needed frequent re-direction \"it's my ADHD, I hop around.  Just tell me to stop or interrupt me when you need to\"   Edema Exam / Assessment   Skin condition comments Skin intact, good condition with mild swelling in R axilla, lat trunk, and post humeral area, no pitting.  Scars adhered R axilla and lumpectomy site.   Girth Measurements   Girth Measurements Other  (deferred for ed)   Range of Motion   ROM comments BUE AROM WFL   Activities of Daily Living   Activities of Daily Living mod I 2/2 chronic fatigue, spouse assists with IADL, driving   Sensory   Sensory perception comments patient reports parasthesias R hand, radial nerve pattern   Planned Edema Interventions   Planned edema interventions Manual lymph drainage;Exercises;Precautions to prevent infection / exacerbation;Education;Skin care / precautions;Scar mobilization;Soft tissue mobilization;Myofascial release;Home management program development   Planned edema intervention comments recommend 1-2x/week/4 weeks   Clinical Impression   Criteria for skilled therapeutic intervention met Yes   Therapy diagnosis mild lymphedema 2/2 breast CA treatment   Influenced by the following impairments / conditions Stage 1   Assessment of Occupational Performance 3-5 Performance Deficits   Identified Performance Deficits ADHD causing inability to focus on provided information, chronic pain, impaired I/ADL, limited understanding of lymphedema symptom management   Clinical Decision Making (Complexity) Moderate complexity   Treatment Frequency 1x/week;2x/week   Treatment duration 4 weeks   Patient / family and/or staff in agreement with plan of care Yes   Risks and benefits of therapy have been explained Yes   Education Assessment   Preferred learning style Other  (patient prefers to have all information in written format)   Goals   Edema Eval " Goals 1;2;3   Goal 1   Goal identifier volume   Goal description For decreased risk of infection, skin breakdown/wounds & progressive soft-tissue fibrosis volume RUQ will be reduced to approximately that of LUQ.   Target date 09/05/19   Goal 2   Goal identifier home program   Goal description For long-term home mgmt chronic lymphedema pt/caregiver independent in home program including self-MLD, HEP, scar massage, use of compression as indicated.   Target date 09/05/19   Goal 3   Goal identifier precautions   Goal description Pt will independently verbalize the signs/symptoms of lymphedema, precautions and how to obtain a future lymphedema referral if edema persists to preserve skin integrity, prevent infection and preserve functional mobility.      Target date 09/05/19   Total Evaluation Time   OT Eval, Moderate Complexity Minutes (22592) 20   Certification   Certification date from 07/05/19   Certification date to 10/05/19   Medical Diagnosis lymphedema of right upper extremity

## 2019-07-05 NOTE — PROGRESS NOTES
[facility]        OUTPATIENT OCCUPATIONAL THERAPY EDEMA EVALUATION  PLAN OF TREATMENT FOR OUTPATIENT REHABILITATION  (COMPLETE FOR INITIAL CLAIMS ONLY)  Patient's Last Name, First Name, Marixa Guardado                           Provider s Name:   [facility] Medical Record No.  [id number]     Start of Care Date:  07/05/19   Onset Date:  06/27/19   Type:  OT   Medical Diagnosis:  lymphedema of right upper extremity   Therapy Diagnosis:  mild lymphedema 2/2 breast CA treatment Visits from SOC:  1                                     __________________________________________________________________________________   Plan of Treatment/Functional Goals:    Manual lymph drainage, Exercises, Precautions to prevent infection / exacerbation, Education, Skin care / precautions, Scar mobilization, Soft tissue mobilization, Myofascial release, Home management program development  recommend 1-2x/week/4 weeks     GOALS  1. Goal description: For decreased risk of infection, skin breakdown/wounds & progressive soft-tissue fibrosis volume RUQ will be reduced to approximately that of LUQ.       Target date: 09/05/19  2. Goal description: For long-term home mgmt chronic lymphedema pt/caregiver independent in home program including self-MLD, HEP, scar massage, use of compression as indicated.       Target date: 09/05/19  3. Goal description: Pt will independently verbalize the signs/symptoms of lymphedema, precautions and how to obtain a future lymphedema referral if edema persists to preserve skin integrity, prevent infection and preserve functional mobility.          Target date: 09/05/19              Treatment Frequency: 1x/week, 2x/week   Treatment duration: 4 weeks    Katie Saleh, OT                                    I CERT[facility] THE [facility]  UNDER        THIS PLAN OF TREATMENT AND WHILE UNDER MY CARE     (Physician co-signature of this document indicates review and certification of the therapy plan).                   Certification date from: 07/05/19       Certification date to: 10/05/19           Referring physician: Jackie Kovacs MD   Initial Assessment  See Epic Evaluation- Start of care: 07/05/19

## 2019-07-11 ENCOUNTER — HOSPITAL ENCOUNTER (OUTPATIENT)
Dept: OCCUPATIONAL THERAPY | Facility: CLINIC | Age: 69
Setting detail: THERAPIES SERIES
End: 2019-07-11
Attending: FAMILY MEDICINE
Payer: COMMERCIAL

## 2019-07-11 PROCEDURE — 97140 MANUAL THERAPY 1/> REGIONS: CPT | Mod: GO | Performed by: OCCUPATIONAL THERAPIST

## 2019-07-11 PROCEDURE — 97535 SELF CARE MNGMENT TRAINING: CPT | Mod: GO | Performed by: OCCUPATIONAL THERAPIST

## 2019-07-11 NOTE — ADDENDUM NOTE
Encounter addended by: Teri Mckenzie OT on: 7/11/2019 8:20 AM   Actions taken: Sign clinical note, Episode resolved

## 2019-07-11 NOTE — PROGRESS NOTES
Outpatient Occupational Therapy Discharge Note     Patient: Marixa Ann  : 1950    Beginning/End Dates of Reporting Period:  19 only  Referring Provider: Dr. Kovacs    Therapy Diagnosis: pain and fatigue impacting IADLs.    Client Self Report:   0      Goals:     Goal Identifier energy conservation    Goal Description Pt will report use of 2 new energy conservation strategies and techniques to complete laundry and to engage in painting for longer durations of time.     Target Date 19   Date Met      Progress:     Goal Identifier pain   Goal Description Due to compliance with home stretching and exercise program, pt will report pain in posterior RUQ as <2/10 at two consecutive tx sessions.   Target Date 19   Date Met      Progress:         Progress Toward Goals:   Not met as pt only seen for evaluation and did not return for tx.    Plan:  Discharge from therapy.    Discharge:    Reason for Discharge: Patient has failed to schedule further appointments.    Equipment Issued: 0    Discharge Plan: return to cancer rehab with new order if needed.

## 2019-07-18 ENCOUNTER — HOSPITAL ENCOUNTER (OUTPATIENT)
Dept: OCCUPATIONAL THERAPY | Facility: CLINIC | Age: 69
Setting detail: THERAPIES SERIES
End: 2019-07-18
Attending: FAMILY MEDICINE
Payer: COMMERCIAL

## 2019-07-18 PROCEDURE — 97535 SELF CARE MNGMENT TRAINING: CPT | Mod: GO | Performed by: OCCUPATIONAL THERAPIST

## 2019-07-18 PROCEDURE — 97140 MANUAL THERAPY 1/> REGIONS: CPT | Mod: GO | Performed by: OCCUPATIONAL THERAPIST

## 2019-07-23 ENCOUNTER — DOCUMENTATION ONLY (OUTPATIENT)
Dept: LAB | Facility: CLINIC | Age: 69
End: 2019-07-23

## 2019-07-25 ENCOUNTER — OFFICE VISIT (OUTPATIENT)
Dept: SURGERY | Facility: CLINIC | Age: 69
End: 2019-07-25
Payer: COMMERCIAL

## 2019-07-25 ENCOUNTER — ANCILLARY PROCEDURE (OUTPATIENT)
Dept: MAMMOGRAPHY | Facility: CLINIC | Age: 69
End: 2019-07-25
Attending: SURGERY
Payer: COMMERCIAL

## 2019-07-25 ENCOUNTER — ANCILLARY PROCEDURE (OUTPATIENT)
Dept: ULTRASOUND IMAGING | Facility: CLINIC | Age: 69
End: 2019-07-25
Attending: SURGERY
Payer: COMMERCIAL

## 2019-07-25 DIAGNOSIS — Z85.3 HISTORY OF RIGHT BREAST CANCER: Primary | ICD-10-CM

## 2019-07-25 DIAGNOSIS — Z85.3 HISTORY OF RIGHT BREAST CANCER: ICD-10-CM

## 2019-07-25 PROCEDURE — G0279 TOMOSYNTHESIS, MAMMO: HCPCS | Performed by: RADIOLOGY

## 2019-07-25 PROCEDURE — 99214 OFFICE O/P EST MOD 30 MIN: CPT | Performed by: SURGERY

## 2019-07-25 PROCEDURE — 77065 DX MAMMO INCL CAD UNI: CPT | Performed by: RADIOLOGY

## 2019-07-25 PROCEDURE — 76642 ULTRASOUND BREAST LIMITED: CPT | Mod: RT | Performed by: RADIOLOGY

## 2019-07-25 NOTE — PROGRESS NOTES
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 Margins are negative. ER/NM positive, Her 2 negative with three benign sentinel nodes. She had adjuvant radiation and completed this on 3/11/2019. She follows with Dr. Dan with oncology and is on prolia and aromatase inhibitor. She reports she has had pain in her right axilla extending into her breast since Memorial day when she had an emergent laparoscopic cholecystectomy. She has started seeing PT who is helping a great deal. She saw Dr. Kovacs who was concerned about possible lymphedema and a referral to lymphedema was placed. She is also worried about a rash on her posterior neck.     REVIEW OF SYSTEMS:  Constitutional:  Negative for chills, fatigue, fever and weight change.  Eyes:  Negative for blurred vision, eye pain and photophobia.  ENT:  Negative for hearing problems, ENT pain, congestion, rhinorrhea, epistaxis, hoarseness and dental problems.  Cardiovascular:  Negative for chest pain, palpitations, tachycardia, orthopnea and edema.  Respiratory:  Negative for cough, dyspnea and hemoptysis.  Gastrointestinal:  Negative for abdominal pain, heartburn, constipation, diarrhea and stool changes.  Musculoskeletal:  Negative for arthralgias, back pain and myalgias.  Integumentary/Breast:  See HPI.    Past Medical History:   Diagnosis Date     ADHD      Breast cancer (H) 12/26/2018    Right Breast     Chronic fatigue      Fibromyalgia      Hard of hearing      Osteoporosis      S/P radiation therapy     4,256 cGy to right breast completed on 03/11/2019. - [facility] with Dr. Copeland       Past Surgical History:   Procedure Laterality Date     APPENDECTOMY  1967     BIOPSY BREAST NEEDLE LOCALIZATION  Right 1/10/2019    Procedure: WIRE LOCALIZED RIGHT BREAST LUMPECTOMY WITH RIGHT SLNB;  Surgeon: Gema Diamond MD;  Location: [facility] OR     BREAST BIOPSY, CORE RT/LT Right 12/26/2018     CATARACT IOL, RT/LT Bilateral 2018    with lens implants per patient     THYROID BIOPSY  01/31/2019       Family History   Problem Relation Age of Onset     Leukemia Father      Breast Cancer Cousin 50        Maternal Female 1st Cousin       Social History     Tobacco Use     Smoking status: Never Smoker     Smokeless tobacco: Never Used   Substance Use Topics     Alcohol use: No     Frequency: Never       Patient Active Problem List   Diagnosis     Pain in joint, shoulder region     Moderate recurrent major depression (H)     Hyperlipidemia LDL goal <160     Chronic fatigue disorder     Liver lesion     Cystic disease of liver     Gastroesophageal reflux disease with esophagitis     Hiatal hernia     Age-related cataract of both eyes, unspecified age-related cataract type     Primary osteoarthritis of both knees     Attention deficit hyperactivity disorder (ADHD), predominantly inattentive type     Malignant neoplasm of upper-inner quadrant of right breast in female, estrogen receptor positive (H)     Thyroid nodule     Anxiety disorder, unspecified type     Osteoporosis     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     Aromatase inhibitor use     Cervical cancer screening     History of cholecystectomy     Allergies   Allergen Reactions     Vicodin [Hydrocodone-Acetaminophen] Anaphylaxis     Dust Mites      Milk [Lac Bovis] Diarrhea     Current Outpatient Medications   Medication Sig Dispense Refill     acetaminophen (TYLENOL) 325 MG tablet Take 325-650 mg by mouth every 6 hours as needed for mild pain       amphetamine-dextroamphetamine (ADDERALL) 10 MG tablet Take 1 tablet (10 mg) by mouth 2 times daily 60 tablet 0     anastrozole (ARIMIDEX) 1 MG tablet Take 1 tablet (1 mg) by mouth daily 90 tablet 1     B Complex Vitamins (VITAMIN B-COMPLEX PO)  Take by mouth daily       calcium carbonate (TUMS) 500 MG chewable tablet Take 1 chew tab by mouth 2 times daily       Calcium-Magnesium-Zinc 333-133-5 MG TABS per tablet Take 1 tablet by mouth daily       Cholecalciferol (VITAMIN D-3 PO)        cyclobenzaprine (FLEXERIL) 5 MG tablet Take 1 tablet (5 mg) by mouth daily as needed for muscle spasms 20 tablet 1     denosumab (PROLIA) 60 MG/ML SOSY injection        KRILL OIL PO Take by mouth daily       magnesium oxide 200 MG TABS Take 100 mg by mouth once as needed       Multiple Vitamins-Minerals (MULTIVITAMIN PO)        RANITIDINE HCL PO Take by mouth as needed        EXAM:  GENERAL: healthy, alert and no distress   BREAST:  The breasts appear symmetric with no overlying skin changes.  The nipples are normal bilaterally.  There is no dimpling or thickening of the skin. There is a well healed lumpectomy scar on the upper inner breast with some increased density deep to the scar. The axillary incision is well healed. She has no lymphedema on exam today. She has some tightness in the axilla with full ROM.   No mass is appreciated in either breast.  The breast tissue is generally soft. She has tenderness to palpation bilaterally.  There is no axillary or supraclavicular lymphadenopathy.  CARDIOVASCULAR:  RRR  RESPIRATORY: nonlabored breathing  NECK: Neck supple. No adenopathy. Thyroid symmetric, normal size,, Carotids without bruits.  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 Margins are negative. ER/WA positive, Her 2 negative with three benign sentinel nodes. She had adjuvant radiation and completed this on 3/11/2019. She follows with Dr. Dan with oncology and is on prolia and aromatase inhibitor.     She has no evidence  of lymphedema on exam today. I encouraged her to continue seeing PT to work on ROM with axillary tightness. She is also concerned she may have a rotator cuff injury - if PT not improving symptoms, consider seeing orthopedics for further evaluation. She had a diagnostic right breast mammogram and ultrasound today for 6 month follow up which was benign. She will see me back in 6 months for one year follow up. I recommend she have Dr. Kovacs evaluate the rash on her posterior neck or dermatology. It is not infected and there is no abscess. She is happy with our plan.       Gema Diamond MD  [facility]  [phone]        Please route or send letter to:  Primary Care Provider (PCP) and Referring Provider

## 2019-07-25 NOTE — LETTER
7/25/2019         RE: Marixa Ann  [id number] [address]        Dear Colleague,    Thank you for referring your patient, Marixa Ann, to [facility]. Please see a copy of my visit note below.    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 Margins are negative. ER/RI positive, Her 2 negative with three benign sentinel nodes. She had adjuvant radiation and completed this on 3/11/2019. She follows with Dr. Dan with oncology and is on prolia and aromatase inhibitor. She reports she has had pain in her right axilla extending into her breast since Memorial day when she had an emergent laparoscopic cholecystectomy. She has started seeing PT who is helping a great deal. She saw Dr. Kovacs who was concerned about possible lymphedema and a referral to lymphedema was placed. She is also worried about a rash on her posterior neck.     REVIEW OF SYSTEMS:  Constitutional:  Negative for chills, fatigue, fever and weight change.  Eyes:  Negative for blurred vision, eye pain and photophobia.  ENT:  Negative for hearing problems, ENT pain, congestion, rhinorrhea, epistaxis, hoarseness and dental problems.  Cardiovascular:  Negative for chest pain, palpitations, tachycardia, orthopnea and edema.  Respiratory:  Negative for cough, dyspnea and hemoptysis.  Gastrointestinal:  Negative for abdominal pain, heartburn, constipation, diarrhea and stool changes.  Musculoskeletal:  Negative for arthralgias, back pain and myalgias.  Integumentary/Breast:  See HPI.    Past Medical History:   Diagnosis Date     ADHD      Breast cancer (H) 12/26/2018    Right Breast     Chronic fatigue      Fibromyalgia      Hard of  hearing      Osteoporosis      S/P radiation therapy     4,256 cGy to right breast completed on 03/11/2019. [facility] with Dr. Copeland       Past Surgical History:   Procedure Laterality Date     APPENDECTOMY  1967     BIOPSY BREAST NEEDLE LOCALIZATION Right 1/10/2019    Procedure: WIRE LOCALIZED RIGHT BREAST LUMPECTOMY WITH RIGHT SLNB;  Surgeon: Gema Diamond MD;  Location: [facility] OR     BREAST BIOPSY, CORE RT/LT Right 12/26/2018     CATARACT IOL, RT/LT Bilateral 2018    with lens implants per patient     THYROID BIOPSY  01/31/2019       Family History   Problem Relation Age of Onset     Leukemia Father      Breast Cancer Cousin 50        Maternal Female 1st Cousin       Social History     Tobacco Use     Smoking status: Never Smoker     Smokeless tobacco: Never Used   Substance Use Topics     Alcohol use: No     Frequency: Never       Patient Active Problem List   Diagnosis     Pain in joint, shoulder region     Moderate recurrent major depression (H)     Hyperlipidemia LDL goal <160     Chronic fatigue disorder     Liver lesion     Cystic disease of liver     Gastroesophageal reflux disease with esophagitis     Hiatal hernia     Age-related cataract of both eyes, unspecified age-related cataract type     Primary osteoarthritis of both knees     Attention deficit hyperactivity disorder (ADHD), predominantly inattentive type     Malignant neoplasm of upper-inner quadrant of right breast in female, estrogen receptor positive (H)     Thyroid nodule     Anxiety disorder, unspecified type     Osteoporosis     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     Aromatase inhibitor use     Cervical cancer screening     History of cholecystectomy     Allergies   Allergen Reactions     Vicodin [Hydrocodone-Acetaminophen] Anaphylaxis     Dust Mites      Milk [Lac Bovis] Diarrhea     Current Outpatient Medications   Medication Sig Dispense Refill     acetaminophen (TYLENOL) 325 MG tablet Take 325-650 mg by mouth every 6 hours as needed for  mild pain       amphetamine-dextroamphetamine (ADDERALL) 10 MG tablet Take 1 tablet (10 mg) by mouth 2 times daily 60 tablet 0     anastrozole (ARIMIDEX) 1 MG tablet Take 1 tablet (1 mg) by mouth daily 90 tablet 1     B Complex Vitamins (VITAMIN B-COMPLEX PO) Take by mouth daily       calcium carbonate (TUMS) 500 MG chewable tablet Take 1 chew tab by mouth 2 times daily       Calcium-Magnesium-Zinc 333-133-5 MG TABS per tablet Take 1 tablet by mouth daily       Cholecalciferol (VITAMIN D-3 PO)        cyclobenzaprine (FLEXERIL) 5 MG tablet Take 1 tablet (5 mg) by mouth daily as needed for muscle spasms 20 tablet 1     denosumab (PROLIA) 60 MG/ML SOSY injection        KRILL OIL PO Take by mouth daily       magnesium oxide 200 MG TABS Take 100 mg by mouth once as needed       Multiple Vitamins-Minerals (MULTIVITAMIN PO)        RANITIDINE HCL PO Take by mouth as needed        EXAM:  GENERAL: healthy, alert and no distress   BREAST:  The breasts appear symmetric with no overlying skin changes.  The nipples are normal bilaterally.  There is no dimpling or thickening of the skin. There is a well healed lumpectomy scar on the upper inner breast with some increased density deep to the scar. The axillary incision is well healed. She has no lymphedema on exam today. She has some tightness in the axilla with full ROM.   No mass is appreciated in either breast.  The breast tissue is generally soft. She has tenderness to palpation bilaterally.  There is no axillary or supraclavicular lymphadenopathy.  CARDIOVASCULAR:  RRR  RESPIRATORY: nonlabored breathing  NECK: Neck supple. No adenopathy. Thyroid symmetric, normal size,, Carotids without bruits.  SKIN: No suspicious lesions or rashes  LYMPH: Normal cervical lymph nodes      ASSESSMENT/PLAN:   Marixa Ann is a 68 year old female who is seen in follow up for right breast partial mastectomy with SLNB on 1/10/2019 for a grade 1 invasive ductal carcinoma, 2.4cm with DCIS  present. Margins are negative. ER/TN positive, Her 2 negative with three benign sentinel nodes. She had adjuvant radiation and completed this on 3/11/2019. She follows with Dr. Dan with oncology and is on prolia and aromatase inhibitor.     She has no evidence of lymphedema on exam today. I encouraged her to continue seeing PT to work on ROM with axillary tightness. She is also concerned she may have a rotator cuff injury - if PT not improving symptoms, consider seeing orthopedics for further evaluation. She had a diagnostic right breast mammogram and ultrasound today for 6 month follow up which was benign. She will see me back in 6 months for one year follow up. I recommend she have Dr. Kovacs evaluate the rash on her posterior neck or dermatology. It is not infected and there is no abscess. She is happy with our plan.       Gema Diamond MD  [facility]  [phone]        Please route or send letter to:  Primary Care Provider (PCP) and Referring Provider        Again, thank you for allowing me to participate in the care of your patient.        Sincerely,        Gema Diamond MD

## 2019-07-25 NOTE — NURSING NOTE
Marixa Ann's goals for this visit include:   Chief Complaint   Patient presents with     RECHECK     recheck 6 months, shooting pains right armpit into nipple, dx lympadema       She requests these members of her care team be copied on today's visit information: no      PCP: Jackie Kovacs    Referring Provider:  No referring provider defined for this encounter.    LMP 05/31/2003 (Approximate)     Do you need any medication refills at today's visit? No    Joy Westbrook LPN

## 2019-07-25 NOTE — PATIENT INSTRUCTIONS
Preventive Care:    Colorectal Cancer Screening: During our visit today, we discussed that it is recommended you receive colorectal cancer screening. Please call or make an appointment with your primary care provider to discuss this. You may also call the [facility] scheduling line ([phone]) to set up a colonoscopy appointment.

## 2019-07-26 ENCOUNTER — ONCOLOGY VISIT (OUTPATIENT)
Dept: ONCOLOGY | Facility: CLINIC | Age: 69
End: 2019-07-26
Attending: NURSE PRACTITIONER
Payer: COMMERCIAL

## 2019-07-26 VITALS
RESPIRATION RATE: 20 BRPM | TEMPERATURE: 98.3 F | WEIGHT: 169.8 LBS | BODY MASS INDEX: 33.34 KG/M2 | OXYGEN SATURATION: 98 % | HEART RATE: 97 BPM | DIASTOLIC BLOOD PRESSURE: 61 MMHG | HEIGHT: 60 IN | SYSTOLIC BLOOD PRESSURE: 98 MMHG

## 2019-07-26 DIAGNOSIS — Z17.0 MALIGNANT NEOPLASM OF UPPER-INNER QUADRANT OF RIGHT BREAST IN FEMALE, ESTROGEN RECEPTOR POSITIVE (H): ICD-10-CM

## 2019-07-26 DIAGNOSIS — C50.211 MALIGNANT NEOPLASM OF UPPER-INNER QUADRANT OF RIGHT BREAST IN FEMALE, ESTROGEN RECEPTOR POSITIVE (H): ICD-10-CM

## 2019-07-26 PROCEDURE — 99215 OFFICE O/P EST HI 40 MIN: CPT | Performed by: INTERNAL MEDICINE

## 2019-07-26 RX ORDER — ANASTROZOLE 1 MG/1
1 TABLET ORAL DAILY
Qty: 90 TABLET | Refills: 1 | Status: SHIPPED | OUTPATIENT
Start: 2019-07-26 | End: 2019-07-26 | Stop reason: SINTOL

## 2019-07-26 RX ORDER — EXEMESTANE 25 MG/1
25 TABLET ORAL DAILY
Qty: 90 TABLET | Refills: 0 | Status: SHIPPED | OUTPATIENT
Start: 2019-07-26 | End: 2019-07-31

## 2019-07-26 ASSESSMENT — MIFFLIN-ST. JEOR: SCORE: 1235.46

## 2019-07-26 ASSESSMENT — PAIN SCALES - GENERAL: PAINLEVEL: EXTREME PAIN (8)

## 2019-07-26 NOTE — LETTER
7/26/2019         RE: Marixa Ann  [address] [facility] [id number]        Dear Colleague,    Thank you for referring your patient, Marixa Ann, to the [facility]. Please see a copy of my visit note below.    Please see note from 7/26/2019 visit    Oncology Follow-up visit:  Date on this visit: Jul 26, 2019      Surgeon: Dr.Sara Diamond  Primary Care Physician: Jackie Watts   Radiation Oncologist: Dr. Copeland    Diagnosis:  prognostic stage IB (pT2N0) Caliente grade 1 ER+ RI+ Her2 negative invasive ductal right sided breast cancer    Oncologic History:  1. Breast Cancer - She was diagnosed with breast cancer in 12/2018 when she was found to have an abnormality on a screening mammogram (12/3/2019): a possible developing asymmetry right breast around 4:00 position posterior depth in the right breast. A diagnostic mammogram confirmed a 17 mm mass in the right medial breast, posterior depth. R breast US showed In the right breast at 2:00, 9 cm from nipple, there was an irregular hypoechoic mass, 12 x 9 x 14 mm. Right axilla US scan revealed normal appearing lymph nodes. Contrast mammogram on 12/26/2018 showed a 1.9 cm enhancing mass in the right medial breast. She proceeded to undergo a core needle biopsy of the suspicious mass on 12/26/2019. this showed a Carol grade 1 invasive ductal carcinoma, ER positive in 98%, RI positive in 94% of cells, and HER-2/yesica negative with HER2 to JCARLOS 17 ratio of 1.2.  She is s/p right lumpectomy on January 10, 2019 by Dr. Diamond.  Pathology from the surgery demonstrated invasive ductal carcinoma, Caliente grade 1, 2.4 cm, with associated intermediate grade DCIS.  No LVI was identified.  Margins were clear of invasive carcinoma and DCIS.  Three sentinel lymph nodes were negative for malignancy.  Oncotype Dx score returned low risk at 4, correlating with 3% risk of disease recurrence at 9 years with aromatase  inhibitor or tamoxifen alone.  There was no benefit from chemotherapy. She met with Dr. Copeland from radiation oncology, and has been undergoing radiation to the right chest wall, completed on March 11, 2019.      2. Bone Health -  She  had a DEXA scan in April 2018 which showed findings consistent with osteoporosis, with the most negative T score of -2.9 in the left femoral neck and a T score of -2.5 in the lumbar spine.      3. LAVONNE daughter  4. Thyroid nodule- incidentally noted on CTPA in 01/2019 which showed asymmetric prominent right thyroid lobe with a dominant 4.1 cm thyroid nodule on the right side. She then underwent a thyroid US (ordered by Dr. Kovacs) on 1/18/2019 which showed bilateral thyroid nodules with the largest nodule is on the right measuring up to 4.5 cm. Fine needle aspiration of the right thyroid nodule on 1/31/2019 showed findings c/w benign thyroid nodule.       History Of Present Illness:  Ms. Ann (prefers to be called Cookie) is a 68 year old female with Hx of fibromyalgia and ADHD and chronic fatigue, who presents for follow-up of prognostic stage IB ER+NV+ HER2 Darlyn negative Carol grade 1 IDC of  right breast.  She has completed adjuvant radiation to the right chest wall in March 2019. She has a history of osteoporosis but after careful consideration, in view of patient's persistently elevated inflammatory markers and D-dimer, and per patient preference to be aggressive about her breast cancer treatment, and preference in favor of systemic hormonal therapy, she was started on on Anastrazole in March 2019. Her serum vitamin D level was normal and in addition to calcium and vitamin D supplementation, she was started on Prolia subq q 6 months in March 2019, for prevention of fractures and further bone mineral density loss.    She has had persistent mild elevation of inflammatory markers- ESR, CRP and d-dimer.   She had a negative for PE CTPA in March 2019. She was evaluated by Dr. Winter  Nilesh, a rheumatologist at [place], in March 2018, for an elevated ESR of 58 and CRP of 53. At that time she was recommended to proceed with abdomen/pelvis CT for evaluation of a possible occult malignancy and she proceeded with it on 4/4/2018 and there were no findings suspicious for malignancy.    She has never had a screening colonoscopy, and was recommended to proceed with colonoscopy.  Since our last visit she was seen at an outside hospital ED for evaluation of acute chest pain and acute right upper quadrant pain. I have reviewed outside medical records.  CT PA and CT abdomen and pelvis with contrast was performed on May 23, 2019 and has demonstrated no pulmonary embolism.  There was colonic diverticulosis.  There was a gallstone noted and hiatal hernia.  She underwent laparoscopic cholecystectomy by Dr. Lila Rehman on May 24, 2019.  She also underwent laparoscopic aspiration of abdominal wall fluid and lysis of adhesions at that time.  Pathology from cholecystectomy demonstrated acute on chronic cholecystitis with cholelithiasis.  Cytology from peritoneal fluid was negative for malignancy.  Today she has multiple complaints including blurry vision b/l for the past few months. She did have b/l cataract surgery in the past. She also has developed worsening joint pains in her hands and elbows which she attributes to anastrozole. She has been exercising and gardening, but joint pains have persisted and are bothersome to her. She continues to have chronic lower back pain, right shoulder pain, and b/l knee pain and plans to f/up with Dr. Atkins. She has had right sided lateral lower back pain since November 2018 and it was felt to be likely musculoskeletal since no corresponding abnormality was seen on recent CT of the chest, abdomen and pelvis, as above.   Patient was seen by Dr. Fam Atkins on March 25 for b/l knee pain  and had knee x-rays which showed bilateral medial and PF compartment OA.  She received  "bilateral corticosteroid and hyaluronic acid injections, but reports those only helped for two weeks.  She has completed PT. She also felt some \"sagginess\" in her right axilla and had seen lymphedema therapist and has been doing lymphedema exercises. She saw Dr. Diamond in f/up yesterday and was not noted to have lymphedema on exam. She also had a right diagnostic mammogram and US yesterday for evaluation of pain in her right axilla extending into her right breast following her hospitalization for cholecystitis in May 2019 and neither study showed any suspicious findings in the breat or axilla.   She has an eczematous \"spot\" on her posterior neck which has been fading now, per patient.   She has been chronically fatigued for the last 10 years, worse in the last 4 years per patient.     She has a history of ADHD and anxiety and is on Adderall.    In a ddition, a complete 12 point  review of systems is negative.          Past Medical/Surgical History:  Past Medical History:   Diagnosis Date     ADHD      Breast cancer (H) 12/26/2018    Right Breast     Chronic fatigue      Fibromyalgia      Hard of hearing      Osteoporosis      S/P radiation therapy     4,256 cGy to right breast completed on 03/11/2019. [facility] with Dr. Copeland   She has a history of bilateral cataract surgery and reports she has an artificial lens in place in both eyes.     Past Surgical History:   Procedure Laterality Date     APPENDECTOMY  1967     BIOPSY BREAST NEEDLE LOCALIZATION Right 1/10/2019    Procedure: WIRE LOCALIZED RIGHT BREAST LUMPECTOMY WITH RIGHT SLNB;  Surgeon: Gema Diamond MD;  Location: [facility] OR     BREAST BIOPSY, CORE RT/LT Right 12/26/2018     CATARACT IOL, RT/LT Bilateral 2018    with lens implants per patient     THYROID BIOPSY  01/31/2019     FHx and SocHx reviewed.  Allergies:  Allergies as of 07/26/2019 - Reviewed 06/27/2019   Allergen Reaction Noted     Vicodin [hydrocodone-acetaminophen] Anaphylaxis " "01/24/2019     Dust mites  10/19/2017     Milk [lac bovis] Diarrhea 12/31/2018     Current Medications:  Current Outpatient Medications   Medication Sig Dispense Refill     acetaminophen (TYLENOL) 325 MG tablet Take 325-650 mg by mouth every 6 hours as needed for mild pain       amphetamine-dextroamphetamine (ADDERALL) 10 MG tablet Take 1 tablet (10 mg) by mouth 2 times daily 60 tablet 0     anastrozole (ARIMIDEX) 1 MG tablet Take 1 tablet (1 mg) by mouth daily 90 tablet 1     B Complex Vitamins (VITAMIN B-COMPLEX PO) Take by mouth daily       calcium carbonate (TUMS) 500 MG chewable tablet Take 1 chew tab by mouth 2 times daily       Calcium-Magnesium-Zinc 333-133-5 MG TABS per tablet Take 1 tablet by mouth daily       Cholecalciferol (VITAMIN D-3 PO)        cyclobenzaprine (FLEXERIL) 5 MG tablet Take 1 tablet (5 mg) by mouth daily as needed for muscle spasms 20 tablet 1     denosumab (PROLIA) 60 MG/ML SOSY injection        KRILL OIL PO Take by mouth daily       magnesium oxide 200 MG TABS Take 100 mg by mouth once as needed       Multiple Vitamins-Minerals (MULTIVITAMIN PO)        RANITIDINE HCL PO Take by mouth as needed         Physical Exam:  BP 98/61 (BP Location: Left arm)   Pulse 97   Temp 98.3  F (36.8  C) (Oral)   Resp 20   Ht 1.53 m (5' 0.24\")   Wt 77 kg (169 lb 12.8 oz)   LMP 05/31/2003 (Approximate)   SpO2 98%   BMI 32.90 kg/m       LMP 05/31/2003 (Approximate)     GENERAL APPEARANCE: Middle aged woman, NAD  HEENT: no asymmetry or masses. OP: no lesions  Lymphatics: no cervical, supraclavicular or axillary lymphadenopathy b/l  CV: S1S2 reg  Resp: Lungs CTA b/l  GI: + BS, soft, NT, ND, hepatosplenomegaly difficult to evaluate secondary to body habitus       PSYCHIATRIC: mentation appears normal and affect is normal  Neurologic: Alert and oriented x3  Extremeties: No edema b/l LE   Skin: oval scaly patch on posterior neck, faintly erythematous, about one inch in diameter.    Laboratory/Imaging " Studies  Results for orders placed or performed in visit on 05/29/19   UA reflex to Microscopic and Culture   Result Value Ref Range    Color Urine Yellow     Appearance Urine Clear     Glucose Urine Negative NEG^Negative mg/dL    Bilirubin Urine Negative NEG^Negative    Ketones Urine Negative NEG^Negative mg/dL    Specific Gravity Urine 1.015 1.003 - 1.035    Blood Urine Moderate (A) NEG^Negative    pH Urine 5.5 5.0 - 7.0 pH    Protein Albumin Urine Negative NEG^Negative mg/dL    Urobilinogen Urine 0.2 0.2 - 1.0 EU/dL    Nitrite Urine Negative NEG^Negative    Leukocyte Esterase Urine Negative NEG^Negative    Source Midstream Urine    Comprehensive metabolic panel   Result Value Ref Range    Sodium 139 133 - 144 mmol/L    Potassium 3.9 3.4 - 5.3 mmol/L    Chloride 105 94 - 109 mmol/L    Carbon Dioxide 30 20 - 32 mmol/L    Anion Gap 4 3 - 14 mmol/L    Glucose 103 (H) 70 - 99 mg/dL    Urea Nitrogen 15 7 - 30 mg/dL    Creatinine 0.64 0.52 - 1.04 mg/dL    GFR Estimate >90 >60 mL/min/[1.73_m2]    GFR Estimate If Black >90 >60 mL/min/[1.73_m2]    Calcium 9.3 8.5 - 10.1 mg/dL    Bilirubin Total 0.4 0.2 - 1.3 mg/dL    Albumin 3.1 (L) 3.4 - 5.0 g/dL    Protein Total 7.2 6.8 - 8.8 g/dL    Alkaline Phosphatase 86 40 - 150 U/L    ALT 40 0 - 50 U/L    AST 18 0 - 45 U/L   CBC with platelets   Result Value Ref Range    WBC 8.3 4.0 - 11.0 10e9/L    RBC Count 4.32 3.8 - 5.2 10e12/L    Hemoglobin 12.9 11.7 - 15.7 g/dL    Hematocrit 39.7 35.0 - 47.0 %    MCV 92 78 - 100 fl    MCH 29.9 26.5 - 33.0 pg    MCHC 32.5 31.5 - 36.5 g/dL    RDW 14.2 10.0 - 15.0 %    Platelet Count 351 150 - 450 10e9/L       ASSESSMENT/PLAN:  Marixa (prefers to be called NWIX) is a very pleasant 68-year-old postmenopausal woman with recent diagnosis of prognostic stage IB (pT2N0) [place] grade 1 ER+ AZ+ Her2 negative invasive ductal right sided breast cancer, s/p R lumpectomy on 1/10/2019 and scheduled to complete right chest wall radiation on 3/11/2019.  Oncotype Dx score returned low risk at 4, correlating with 3% risk of disease recurrence at 9 years with the use  aromatase inhibitor or tamoxifen alone. There is no benefit to systemic chemotherapy in this setting. She has osteoporosis putting her at high risk of skeletal related events with an aromatase inhibitor  and also persistently elevated d-dimer and other inflammatory markers putting her at a higher risk of venous thromboembolism, but preferred to still be on systemic hormonal therapy, and started on Anastrazole in March 2019 and at the same time started on Prolia for prevention of further bone mineral density loss.    1. Breast cancer - She is having significant joint pains and stiffness in her hands and elbows on Anastrazole despite exercise. Recommend a break from an AI for two weeks- she will discontinue anastrozole and take a break from an AI for two weeks and if her joint pains improve, proceed with a different AI- exemestane. Rationale and side effects reviewed with the patient. Rx given and she is agreeable with the plan. F/up with our NP in 3 months, with labs, for reevaluation    2. Osteoporosis- vitamin D level normal. Continue calcium and vitamin D supplementation and weight bearing exercise as tolerated.  Prolia 60 mg subq q 6 months for treatment of patient's osteoporosis, next due with next visit. We'll monitor DEXA scan closely, next after a year of being on aromatase inhibitor therapy, due in April 2020.    3. Breast cancer screening- Recent R 3D mammogram/breast US for evaluation of right axillary/breast pain negative on 7/25/2019, as above. B/l screening mammogram due 12/2019.     4. LAVONNE daughter-  She was seen by Dr. Zeng on 4/29 and had a normal pelvic exam and a negative pap smear. Plan is to follow her annually by gyn team.    5. B/L knee pain, right shoulder pain, chronic lower back pain- She was reminded to schedule f/up with Dr. Fam Atkins  6. Blurry vision- not a commonly  described side effect associated with an AI. Cataracts described with 6% patients on an AI but in the long term but she has been on AI only for 3 months, and also had cataract surgery b/l. She ha been on ADDERALL since Oct 2018, per patient and it has helped her s/o anxiety and depression but blurry vision has been described with it. Recommend that she follow-up with her ophthalmologist Dr. Echeverria and she plans to do so.    7. Dermatologic- scaly eczematous patch on posterior neck, improving per patient. Apply OTC hydrocortisone cream. Patient to report if no improvement, then we'll refer to dermatology.    Priscilla had multiple questions during today's visit all of which were answered.  At the end of our visit patient verbalized understanding and concurred with the plan.      Again, thank you for allowing me to participate in the care of your patient.        Sincerely,        Sil Dan MD, MD

## 2019-07-26 NOTE — LETTER
7/26/2019        RE: Marixa Ann  [id number] [facility] [address]        Please see note from 7/26/2019 visit    Oncology Follow-up visit:  Date on this visit: Jul 26, 2019      Surgeon: Dr.Sara Diamond  Primary Care Physician: Jackie Watts   Radiation Oncologist: Dr. Copeland    Diagnosis:  prognostic stage IB (pT2N0) [place] grade 1 ER+ [place]+ Her2 negative invasive ductal right sided breast cancer    Oncologic History:  1. Breast Cancer - She was diagnosed with breast cancer in 12/2018 when she was found to have an abnormality on a screening mammogram (12/3/2019): a possible developing asymmetry right breast around 4:00 position posterior depth in the right breast. A diagnostic mammogram confirmed a 17 mm mass in the right medial breast, posterior depth. R breast US showed In the right breast at 2:00, 9 cm from nipple, there was an irregular hypoechoic mass, 12 x 9 x 14 mm. Right axilla US scan revealed normal appearing lymph nodes. Contrast mammogram on 12/26/2018 showed a 1.9 cm enhancing mass in the right medial breast. She proceeded to undergo a core needle biopsy of the suspicious mass on 12/26/2019. this showed a Carol grade 1 invasive ductal carcinoma, ER positive in 98%, WY positive in 94% of cells, and HER-2/yesica negative with HER2 to JCARLOS 17 ratio of 1.2.  She is s/p right lumpectomy on January 10, 2019 by Dr. Diamond.  Pathology from the surgery demonstrated invasive ductal carcinoma, [place] grade 1, 2.4 cm, with associated intermediate grade DCIS.  No LVI was identified.  Margins were clear of invasive carcinoma and DCIS.  Three sentinel lymph nodes were negative for malignancy.  Oncotype Dx score returned low risk at 4, correlating with 3% risk of disease recurrence at 9 years with aromatase inhibitor or tamoxifen alone.  There was no benefit from chemotherapy. She met with Dr. Copeland from radiation oncology, and has been undergoing radiation to the right  chest wall, completed on March 11, 2019.      2. Bone Health -  She  had a DEXA scan in April 2018 which showed findings consistent with osteoporosis, with the most negative T score of -2.9 in the left femoral neck and a T score of -2.5 in the lumbar spine.      3. LAVONNE daughter  4. Thyroid nodule- incidentally noted on CTPA in 01/2019 which showed asymmetric prominent right thyroid lobe with a dominant 4.1 cm thyroid nodule on the right side. She then underwent a thyroid US (ordered by Dr. Kovacs) on 1/18/2019 which showed bilateral thyroid nodules with the largest nodule is on the right measuring up to 4.5 cm. Fine needle aspiration of the right thyroid nodule on 1/31/2019 showed findings c/w benign thyroid nodule.       History Of Present Illness:  Ms. Ann (prefers to be called Cooklouis) is a 68 year old female with Hx of fibromyalgia and ADHD and chronic fatigue, who presents for follow-up of prognostic stage IB ER+SD+ HER2 Darlyn negative [place] grade 1 IDC of  right breast.  She has completed adjuvant radiation to the right chest wall in March 2019. She has a history of osteoporosis but after careful consideration, in view of patient's persistently elevated inflammatory markers and D-dimer, and per patient preference to be aggressive about her breast cancer treatment, and preference in favor of systemic hormonal therapy, she was started on on Anastrazole in March 2019. Her serum vitamin D level was normal and in addition to calcium and vitamin D supplementation, she was started on Prolia subq q 6 months in March 2019, for prevention of fractures and further bone mineral density loss.    She has had persistent mild elevation of inflammatory markers- ESR, CRP and d-dimer.   She had a negative for PE CTPA in March 2019. She was evaluated by Dr. Maggy Galloway, a rheumatologist at [place], in March 2018, for an elevated ESR of 58 and CRP of 53. At that time she was recommended to proceed with abdomen/pelvis CT  "for evaluation of a possible occult malignancy and she proceeded with it on 4/4/2018 and there were no findings suspicious for malignancy.    She has never had a screening colonoscopy, and was recommended to proceed with colonoscopy.  Since our last visit she was seen at an outside hospital ED for evaluation of acute chest pain and acute right upper quadrant pain. I have reviewed outside medical records.  CT PA and CT abdomen and pelvis with contrast was performed on May 23, 2019 and has demonstrated no pulmonary embolism.  There was colonic diverticulosis.  There was a gallstone noted and hiatal hernia.  She underwent laparoscopic cholecystectomy by Dr. Lila Rehman on May 24, 2019.  She also underwent laparoscopic aspiration of abdominal wall fluid and lysis of adhesions at that time.  Pathology from cholecystectomy demonstrated acute on chronic cholecystitis with cholelithiasis.  Cytology from peritoneal fluid was negative for malignancy.  Today she has multiple complaints including blurry vision b/l for the past few months. She did have b/l cataract surgery in the past. She also has developed worsening joint pains in her hands and elbows which she attributes to anastrozole. She has been exercising and gardening, but joint pains have persisted and are bothersome to her. She continues to have chronic lower back pain, right shoulder pain, and b/l knee pain and plans to f/up with Dr. Atkins. She has had right sided lateral lower back pain since November 2018 and it was felt to be likely musculoskeletal since no corresponding abnormality was seen on recent CT of the chest, abdomen and pelvis, as above.   Patient was seen by Dr. Fam Atkins on March 25 for b/l knee pain  and had knee x-rays which showed bilateral medial and PF compartment OA.  She received bilateral corticosteroid and hyaluronic acid injections, but reports those only helped for two weeks.  She has completed PT. She also felt some \"sagginess\" in her " "right axilla and had seen lymphedema therapist and has been doing lymphedema exercises. She saw Dr. Diamond in f/up yesterday and was not noted to have lymphedema on exam. She also had a right diagnostic mammogram and US yesterday for evaluation of pain in her right axilla extending into her right breast following her hospitalization for cholecystitis in May 2019 and neither study showed any suspicious findings in the breat or axilla.   She has an eczematous \"spot\" on her posterior neck which has been fading now, per patient.   She has been chronically fatigued for the last 10 years, worse in the last 4 years per patient.     She has a history of ADHD and anxiety and is on Adderall.    In a ddition, a complete 12 point  review of systems is negative.          Past Medical/Surgical History:  Past Medical History:   Diagnosis Date     ADHD      Breast cancer (H) 12/26/2018    Right Breast     Chronic fatigue      Fibromyalgia      Hard of hearing      Osteoporosis      S/P radiation therapy     4,256 cGy to right breast completed on 03/11/2019. [facility] with Dr. Copeland   She has a history of bilateral cataract surgery and reports she has an artificial lens in place in both eyes.     Past Surgical History:   Procedure Laterality Date     APPENDECTOMY  1967     BIOPSY BREAST NEEDLE LOCALIZATION Right 1/10/2019    Procedure: WIRE LOCALIZED RIGHT BREAST LUMPECTOMY WITH RIGHT SLNB;  Surgeon: Gema Diamond MD;  Location: [facility] OR     BREAST BIOPSY, CORE RT/LT Right 12/26/2018     CATARACT IOL, RT/LT Bilateral 2018    with lens implants per patient     THYROID BIOPSY  01/31/2019     FHx and SocHx reviewed.  Allergies:  Allergies as of 07/26/2019 - Reviewed 06/27/2019   Allergen Reaction Noted     Vicodin [hydrocodone-acetaminophen] Anaphylaxis 01/24/2019     Dust mites  10/19/2017     Milk [lac bovis] Diarrhea 12/31/2018     Current Medications:  Current Outpatient Medications   Medication Sig Dispense Refill " "    acetaminophen (TYLENOL) 325 MG tablet Take 325-650 mg by mouth every 6 hours as needed for mild pain       amphetamine-dextroamphetamine (ADDERALL) 10 MG tablet Take 1 tablet (10 mg) by mouth 2 times daily 60 tablet 0     anastrozole (ARIMIDEX) 1 MG tablet Take 1 tablet (1 mg) by mouth daily 90 tablet 1     B Complex Vitamins (VITAMIN B-COMPLEX PO) Take by mouth daily       calcium carbonate (TUMS) 500 MG chewable tablet Take 1 chew tab by mouth 2 times daily       Calcium-Magnesium-Zinc 333-133-5 MG TABS per tablet Take 1 tablet by mouth daily       Cholecalciferol (VITAMIN D-3 PO)        cyclobenzaprine (FLEXERIL) 5 MG tablet Take 1 tablet (5 mg) by mouth daily as needed for muscle spasms 20 tablet 1     denosumab (PROLIA) 60 MG/ML SOSY injection        KRILL OIL PO Take by mouth daily       magnesium oxide 200 MG TABS Take 100 mg by mouth once as needed       Multiple Vitamins-Minerals (MULTIVITAMIN PO)        RANITIDINE HCL PO Take by mouth as needed         Physical Exam:  BP 98/61 (BP Location: Left arm)   Pulse 97   Temp 98.3  F (36.8  C) (Oral)   Resp 20   Ht 1.53 m (5' 0.24\")   Wt 77 kg (169 lb 12.8 oz)   LMP 05/31/2003 (Approximate)   SpO2 98%   BMI 32.90 kg/m       LMP 05/31/2003 (Approximate)     GENERAL APPEARANCE: Middle aged woman, NAD  HEENT: no asymmetry or masses. OP: no lesions  Lymphatics: no cervical, supraclavicular or axillary lymphadenopathy b/l  CV: S1S2 reg  Resp: Lungs CTA b/l  GI: + BS, soft, NT, ND, hepatosplenomegaly difficult to evaluate secondary to body habitus       PSYCHIATRIC: mentation appears normal and affect is normal  Neurologic: Alert and oriented x3  Extremeties: No edema b/l LE   Skin: oval scaly patch on posterior neck, faintly erythematous, about one inch in diameter.    Laboratory/Imaging Studies  Results for orders placed or performed in visit on 05/29/19   UA reflex to Microscopic and Culture   Result Value Ref Range    Color Urine Yellow     Appearance " Urine Clear     Glucose Urine Negative NEG^Negative mg/dL    Bilirubin Urine Negative NEG^Negative    Ketones Urine Negative NEG^Negative mg/dL    Specific Gravity Urine 1.015 1.003 - 1.035    Blood Urine Moderate (A) NEG^Negative    pH Urine 5.5 5.0 - 7.0 pH    Protein Albumin Urine Negative NEG^Negative mg/dL    Urobilinogen Urine 0.2 0.2 - 1.0 EU/dL    Nitrite Urine Negative NEG^Negative    Leukocyte Esterase Urine Negative NEG^Negative    Source Midstream Urine    Comprehensive metabolic panel   Result Value Ref Range    Sodium 139 133 - 144 mmol/L    Potassium 3.9 3.4 - 5.3 mmol/L    Chloride 105 94 - 109 mmol/L    Carbon Dioxide 30 20 - 32 mmol/L    Anion Gap 4 3 - 14 mmol/L    Glucose 103 (H) 70 - 99 mg/dL    Urea Nitrogen 15 7 - 30 mg/dL    Creatinine 0.64 0.52 - 1.04 mg/dL    GFR Estimate >90 >60 mL/min/[1.73_m2]    GFR Estimate If Black >90 >60 mL/min/[1.73_m2]    Calcium 9.3 8.5 - 10.1 mg/dL    Bilirubin Total 0.4 0.2 - 1.3 mg/dL    Albumin 3.1 (L) 3.4 - 5.0 g/dL    Protein Total 7.2 6.8 - 8.8 g/dL    Alkaline Phosphatase 86 40 - 150 U/L    ALT 40 0 - 50 U/L    AST 18 0 - 45 U/L   CBC with platelets   Result Value Ref Range    WBC 8.3 4.0 - 11.0 10e9/L    RBC Count 4.32 3.8 - 5.2 10e12/L    Hemoglobin 12.9 11.7 - 15.7 g/dL    Hematocrit 39.7 35.0 - 47.0 %    MCV 92 78 - 100 fl    MCH 29.9 26.5 - 33.0 pg    MCHC 32.5 31.5 - 36.5 g/dL    RDW 14.2 10.0 - 15.0 %    Platelet Count 351 150 - 450 10e9/L       ASSESSMENT/PLAN:  Marixa (prefers to be called Cookie) is a very pleasant 68-year-old postmenopausal woman with recent diagnosis of prognostic stage IB (pT2N0) [place] grade 1 ER+ [place]+ Her2 negative invasive ductal right sided breast cancer, s/p R lumpectomy on 1/10/2019 and scheduled to complete right chest wall radiation on 3/11/2019. Oncotype Dx score returned low risk at 4, correlating with 3% risk of disease recurrence at 9 years with the use  aromatase inhibitor or tamoxifen alone. There is no  benefit to systemic chemotherapy in this setting. She has osteoporosis putting her at high risk of skeletal related events with an aromatase inhibitor  and also persistently elevated d-dimer and other inflammatory markers putting her at a higher risk of venous thromboembolism, but preferred to still be on systemic hormonal therapy, and started on Anastrazole in March 2019 and at the same time started on Prolia for prevention of further bone mineral density loss.    1. Breast cancer - She is having significant joint pains and stiffness in her hands and elbows on Anastrazole despite exercise. Recommend a break from an AI for two weeks- she will discontinue anastrozole and take a break from an AI for two weeks and if her joint pains improve, proceed with a different AI- exemestane. Rationale and side effects reviewed with the patient. Rx given and she is agreeable with the plan. F/up with our NP in 3 months, with labs, for reevaluation    2. Osteoporosis- vitamin D level normal. Continue calcium and vitamin D supplementation and weight bearing exercise as tolerated.  Prolia 60 mg subq q 6 months for treatment of patient's osteoporosis, next due with next visit. We'll monitor DEXA scan closely, next after a year of being on aromatase inhibitor therapy, due in April 2020.    3. Breast cancer screening- Recent R 3D mammogram/breast US for evaluation of right axillary/breast pain negative on 7/25/2019, as above. B/l screening mammogram due 12/2019.     4. LAVONNE daughter-  She was seen by Dr. Zeng on 4/29 and had a normal pelvic exam and a negative pap smear. Plan is to follow her annually by gyn team.    5. B/L knee pain, right shoulder pain, chronic lower back pain- She was reminded to schedule f/up with Dr. Fam Atkins  6. Blurry vision- not a commonly described side effect associated with an AI. Cataracts described with 6% patients on an AI but in the long term but she has been on AI only for 3 months, and also had  cataract surgery b/l. She ha been on ADDERALL since Oct 2018, per patient and it has helped her s/o anxiety and depression but blurry vision has been described with it. Recommend that she follow-up with her ophthalmologist Dr. Echeverria and she plans to do so.    7. Dermatologic- scaly eczematous patch on posterior neck, improving per patient. Apply OTC hydrocortisone cream. Patient to report if no improvement, then we'll refer to dermatology.    Priscilla had multiple questions during today's visit all of which were answered.  At the end of our visit patient verbalized understanding and concurred with the plan.        Sincerely,        Sil Dan MD, MD

## 2019-07-27 ENCOUNTER — HOSPITAL ENCOUNTER (OUTPATIENT)
Dept: OCCUPATIONAL THERAPY | Facility: CLINIC | Age: 69
Setting detail: THERAPIES SERIES
End: 2019-07-27
Attending: FAMILY MEDICINE
Payer: COMMERCIAL

## 2019-07-27 PROCEDURE — 97140 MANUAL THERAPY 1/> REGIONS: CPT | Mod: GO | Performed by: OCCUPATIONAL THERAPIST

## 2019-07-27 NOTE — PROGRESS NOTES
Oncology Follow-up visit:  Date on this visit: Jul 26, 2019      Surgeon: Dr.Sara Diamond  Primary Care Physician: Jackie Watts   Radiation Oncologist: Dr. Copeland    Diagnosis:  prognostic stage IB (pT2N0) [place] grade 1 ER+ ME+ Her2 negative invasive ductal right sided breast cancer    Oncologic History:  1. Breast Cancer - She was diagnosed with breast cancer in 12/2018 when she was found to have an abnormality on a screening mammogram (12/3/2019): a possible developing asymmetry right breast around 4:00 position posterior depth in the right breast. A diagnostic mammogram confirmed a 17 mm mass in the right medial breast, posterior depth. R breast US showed In the right breast at 2:00, 9 cm from nipple, there was an irregular hypoechoic mass, 12 x 9 x 14 mm. Right axilla US scan revealed normal appearing lymph nodes. Contrast mammogram on 12/26/2018 showed a 1.9 cm enhancing mass in the right medial breast. She proceeded to undergo a core needle biopsy of the suspicious mass on 12/26/2019. this showed a Carol grade 1 invasive ductal carcinoma, ER positive in 98%, ME positive in 94% of cells, and HER-2/yesica negative with HER2 to JCARLOS 17 ratio of 1.2.  She is s/p right lumpectomy on January 10, 2019 by Dr. Diamond.  Pathology from the surgery demonstrated invasive ductal carcinoma, Carol grade 1, 2.4 cm, with associated intermediate grade DCIS.  No LVI was identified.  Margins were clear of invasive carcinoma and DCIS.  Three sentinel lymph nodes were negative for malignancy.  Oncotype Dx score returned low risk at 4, correlating with 3% risk of disease recurrence at 9 years with aromatase inhibitor or tamoxifen alone.  There was no benefit from chemotherapy. She met with Dr. Copeland from radiation oncology, and has been undergoing radiation to the right chest wall, completed on March 11, 2019.      2. Bone Health -  She  had a DEXA scan in April 2018 which showed findings consistent with  osteoporosis, with the most negative T score of -2.9 in the left femoral neck and a T score of -2.5 in the lumbar spine.      3. LAVONNE daughter  4. Thyroid nodule- incidentally noted on CTPA in 01/2019 which showed asymmetric prominent right thyroid lobe with a dominant 4.1 cm thyroid nodule on the right side. She then underwent a thyroid US (ordered by Dr. Kovacs) on 1/18/2019 which showed bilateral thyroid nodules with the largest nodule is on the right measuring up to 4.5 cm. Fine needle aspiration of the right thyroid nodule on 1/31/2019 showed findings c/w benign thyroid nodule.       History Of Present Illness:  Ms. Ann (prefers to be called Cooklouis) is a 68 year old female with Hx of fibromyalgia and ADHD and chronic fatigue, who presents for follow-up of prognostic stage IB ER+PA+ HER2 Darlyn negative Carol grade 1 IDC of  right breast.  She has completed adjuvant radiation to the right chest wall in March 2019. She has a history of osteoporosis but after careful consideration, in view of patient's persistently elevated inflammatory markers and D-dimer, and per patient preference to be aggressive about her breast cancer treatment, and preference in favor of systemic hormonal therapy, she was started on on Anastrazole in March 2019. Her serum vitamin D level was normal and in addition to calcium and vitamin D supplementation, she was started on Prolia subq q 6 months in March 2019, for prevention of fractures and further bone mineral density loss.    She has had persistent mild elevation of inflammatory markers- ESR, CRP and d-dimer.   She had a negative for PE CTPA in March 2019. She was evaluated by Dr. Maggy Galloway, a rheumatologist at [facility], in March 2018, for an elevated ESR of 58 and CRP of 53. At that time she was recommended to proceed with abdomen/pelvis CT for evaluation of a possible occult malignancy and she proceeded with it on 4/4/2018 and there were no findings suspicious for  "malignancy.    She has never had a screening colonoscopy, and was recommended to proceed with colonoscopy.  Since our last visit she was seen at an outside hospital ED for evaluation of acute chest pain and acute right upper quadrant pain. I have reviewed outside medical records.  CT PA and CT abdomen and pelvis with contrast was performed on May 23, 2019 and has demonstrated no pulmonary embolism.  There was colonic diverticulosis.  There was a gallstone noted and hiatal hernia.  She underwent laparoscopic cholecystectomy by Dr. Lila Rehman on May 24, 2019.  She also underwent laparoscopic aspiration of abdominal wall fluid and lysis of adhesions at that time.  Pathology from cholecystectomy demonstrated acute on chronic cholecystitis with cholelithiasis.  Cytology from peritoneal fluid was negative for malignancy.  Today she has multiple complaints including blurry vision b/l for the past few months. She did have b/l cataract surgery in the past. She also has developed worsening joint pains in her hands and elbows which she attributes to anastrozole. She has been exercising and gardening, but joint pains have persisted and are bothersome to her. She continues to have chronic lower back pain, right shoulder pain, and b/l knee pain and plans to f/up with Dr. Atkins. She has had right sided lateral lower back pain since November 2018 and it was felt to be likely musculoskeletal since no corresponding abnormality was seen on recent CT of the chest, abdomen and pelvis, as above.   Patient was seen by Dr. Fam Atkins on March 25 for b/l knee pain  and had knee x-rays which showed bilateral medial and PF compartment OA.  She received bilateral corticosteroid and hyaluronic acid injections, but reports those only helped for two weeks.  She has completed PT. She also felt some \"sagginess\" in her right axilla and had seen lymphedema therapist and has been doing lymphedema exercises. She saw Dr. Diamond in f/up yesterday " "and was not noted to have lymphedema on exam. She also had a right diagnostic mammogram and US yesterday for evaluation of pain in her right axilla extending into her right breast following her hospitalization for cholecystitis in May 2019 and neither study showed any suspicious findings in the breat or axilla.   She has an eczematous \"spot\" on her posterior neck which has been fading now, per patient.   She has been chronically fatigued for the last 10 years, worse in the last 4 years per patient.     She has a history of ADHD and anxiety and is on Adderall.    In a ddition, a complete 12 point  review of systems is negative.          Past Medical/Surgical History:  Past Medical History:   Diagnosis Date     ADHD      Breast cancer (H) 12/26/2018    Right Breast     Chronic fatigue      Fibromyalgia      Hard of hearing      Osteoporosis      S/P radiation therapy     4,256 cGy to right breast completed on 03/11/2019. [facility] with Dr. Copeland   She has a history of bilateral cataract surgery and reports she has an artificial lens in place in both eyes.     Past Surgical History:   Procedure Laterality Date     APPENDECTOMY  1967     BIOPSY BREAST NEEDLE LOCALIZATION Right 1/10/2019    Procedure: WIRE LOCALIZED RIGHT BREAST LUMPECTOMY WITH RIGHT SLNB;  Surgeon: Gema Diamond MD;  Location: [facility] OR     BREAST BIOPSY, CORE RT/LT Right 12/26/2018     CATARACT IOL, RT/LT Bilateral 2018    with lens implants per patient     THYROID BIOPSY  01/31/2019     FHx and SocHx reviewed.  Allergies:  Allergies as of 07/26/2019 - Reviewed 06/27/2019   Allergen Reaction Noted     Vicodin [hydrocodone-acetaminophen] Anaphylaxis 01/24/2019     Dust mites  10/19/2017     Milk [lac bovis] Diarrhea 12/31/2018     Current Medications:  Current Outpatient Medications   Medication Sig Dispense Refill     acetaminophen (TYLENOL) 325 MG tablet Take 325-650 mg by mouth every 6 hours as needed for mild pain       " "amphetamine-dextroamphetamine (ADDERALL) 10 MG tablet Take 1 tablet (10 mg) by mouth 2 times daily 60 tablet 0     anastrozole (ARIMIDEX) 1 MG tablet Take 1 tablet (1 mg) by mouth daily 90 tablet 1     B Complex Vitamins (VITAMIN B-COMPLEX PO) Take by mouth daily       calcium carbonate (TUMS) 500 MG chewable tablet Take 1 chew tab by mouth 2 times daily       Calcium-Magnesium-Zinc 333-133-5 MG TABS per tablet Take 1 tablet by mouth daily       Cholecalciferol (VITAMIN D-3 PO)        cyclobenzaprine (FLEXERIL) 5 MG tablet Take 1 tablet (5 mg) by mouth daily as needed for muscle spasms 20 tablet 1     denosumab (PROLIA) 60 MG/ML SOSY injection        KRILL OIL PO Take by mouth daily       magnesium oxide 200 MG TABS Take 100 mg by mouth once as needed       Multiple Vitamins-Minerals (MULTIVITAMIN PO)        RANITIDINE HCL PO Take by mouth as needed         Physical Exam:  BP 98/61 (BP Location: Left arm)   Pulse 97   Temp 98.3  F (36.8  C) (Oral)   Resp 20   Ht 1.53 m (5' 0.24\")   Wt 77 kg (169 lb 12.8 oz)   LMP 05/31/2003 (Approximate)   SpO2 98%   BMI 32.90 kg/m      LMP 05/31/2003 (Approximate)     GENERAL APPEARANCE: Middle aged woman, NAD  HEENT: no asymmetry or masses. OP: no lesions  Lymphatics: no cervical, supraclavicular or axillary lymphadenopathy b/l  CV: S1S2 reg  Resp: Lungs CTA b/l  GI: + BS, soft, NT, ND, hepatosplenomegaly difficult to evaluate secondary to body habitus       PSYCHIATRIC: mentation appears normal and affect is normal  Neurologic: Alert and oriented x3  Extremeties: No edema b/l LE   Skin: oval scaly patch on posterior neck, faintly erythematous, about one inch in diameter.    Laboratory/Imaging Studies  Results for orders placed or performed in visit on 05/29/19   UA reflex to Microscopic and Culture   Result Value Ref Range    Color Urine Yellow     Appearance Urine Clear     Glucose Urine Negative NEG^Negative mg/dL    Bilirubin Urine Negative NEG^Negative    Ketones " Urine Negative NEG^Negative mg/dL    Specific Gravity Urine 1.015 1.003 - 1.035    Blood Urine Moderate (A) NEG^Negative    pH Urine 5.5 5.0 - 7.0 pH    Protein Albumin Urine Negative NEG^Negative mg/dL    Urobilinogen Urine 0.2 0.2 - 1.0 EU/dL    Nitrite Urine Negative NEG^Negative    Leukocyte Esterase Urine Negative NEG^Negative    Source Midstream Urine    Comprehensive metabolic panel   Result Value Ref Range    Sodium 139 133 - 144 mmol/L    Potassium 3.9 3.4 - 5.3 mmol/L    Chloride 105 94 - 109 mmol/L    Carbon Dioxide 30 20 - 32 mmol/L    Anion Gap 4 3 - 14 mmol/L    Glucose 103 (H) 70 - 99 mg/dL    Urea Nitrogen 15 7 - 30 mg/dL    Creatinine 0.64 0.52 - 1.04 mg/dL    GFR Estimate >90 >60 mL/min/[1.73_m2]    GFR Estimate If Black >90 >60 mL/min/[1.73_m2]    Calcium 9.3 8.5 - 10.1 mg/dL    Bilirubin Total 0.4 0.2 - 1.3 mg/dL    Albumin 3.1 (L) 3.4 - 5.0 g/dL    Protein Total 7.2 6.8 - 8.8 g/dL    Alkaline Phosphatase 86 40 - 150 U/L    ALT 40 0 - 50 U/L    AST 18 0 - 45 U/L   CBC with platelets   Result Value Ref Range    WBC 8.3 4.0 - 11.0 10e9/L    RBC Count 4.32 3.8 - 5.2 10e12/L    Hemoglobin 12.9 11.7 - 15.7 g/dL    Hematocrit 39.7 35.0 - 47.0 %    MCV 92 78 - 100 fl    MCH 29.9 26.5 - 33.0 pg    MCHC 32.5 31.5 - 36.5 g/dL    RDW 14.2 10.0 - 15.0 %    Platelet Count 351 150 - 450 10e9/L       ASSESSMENT/PLAN:  Marixa (prefers to be called Pcsso) is a very pleasant 68-year-old postmenopausal woman with recent diagnosis of prognostic stage IB (pT2N0) Carol grade 1 ER+ [place]+ Her2 negative invasive ductal right sided breast cancer, s/p R lumpectomy on 1/10/2019 and scheduled to complete right chest wall radiation on 3/11/2019. Oncotype Dx score returned low risk at 4, correlating with 3% risk of disease recurrence at 9 years with the use  aromatase inhibitor or tamoxifen alone. There is no benefit to systemic chemotherapy in this setting. She has osteoporosis putting her at high risk of skeletal  related events with an aromatase inhibitor  and also persistently elevated d-dimer and other inflammatory markers putting her at a higher risk of venous thromboembolism, but preferred to still be on systemic hormonal therapy, and started on Anastrazole in March 2019 and at the same time started on Prolia for prevention of further bone mineral density loss.    1. Breast cancer - She is having significant joint pains and stiffness in her hands and elbows on Anastrazole despite exercise. Recommend a break from an AI for two weeks- she will discontinue anastrozole and take a break from an AI for two weeks and if her joint pains improve, proceed with a different AI- exemestane. Rationale and side effects reviewed with the patient. Rx given and she is agreeable with the plan. F/up with our NP in 3 months, with labs, for reevaluation    2. Osteoporosis- vitamin D level normal. Continue calcium and vitamin D supplementation and weight bearing exercise as tolerated.  Prolia 60 mg subq q 6 months for treatment of patient's osteoporosis, next due with next visit. We'll monitor DEXA scan closely, next after a year of being on aromatase inhibitor therapy, due in April 2020.    3. Breast cancer screening- Recent R 3D mammogram/breast US for evaluation of right axillary/breast pain negative on 7/25/2019, as above. B/l screening mammogram due 12/2019.     4. LAVONNE daughter-  She was seen by Dr. Zeng on 4/29 and had a normal pelvic exam and a negative pap smear. Plan is to follow her annually by gyn team.    5. B/L knee pain, right shoulder pain, chronic lower back pain- She was reminded to schedule f/up with Dr. Fam Atkins  6. Blurry vision- not a commonly described side effect associated with an AI. Cataracts described with 6% patients on an AI but in the long term but she has been on AI only for 3 months, and also had cataract surgery b/l. She ha been on ADDERALL since Oct 2018, per patient and it has helped her s/o anxiety  and depression but blurry vision has been described with it. Recommend that she follow-up with her ophthalmologist Dr. Echeverria and she plans to do so.    7. Dermatologic- scaly eczematous patch on posterior neck, improving per patient. Apply OTC hydrocortisone cream. Patient to report if no improvement, then we'll refer to dermatology.    Priscilla had multiple questions during today's visit all of which were answered.  At the end of our visit patient verbalized understanding and concurred with the plan.    Addendum: Patient preferred to remain on anastrozole secondary to cost issue with exemestane.

## 2019-07-30 ENCOUNTER — MYC MEDICAL ADVICE (OUTPATIENT)
Dept: ONCOLOGY | Facility: CLINIC | Age: 69
End: 2019-07-30

## 2019-07-31 ENCOUNTER — HOSPITAL ENCOUNTER (OUTPATIENT)
Dept: OCCUPATIONAL THERAPY | Facility: CLINIC | Age: 69
Setting detail: THERAPIES SERIES
End: 2019-07-31
Attending: FAMILY MEDICINE
Payer: COMMERCIAL

## 2019-07-31 PROCEDURE — 97140 MANUAL THERAPY 1/> REGIONS: CPT | Mod: GO | Performed by: OCCUPATIONAL THERAPIST

## 2019-07-31 RX ORDER — ANASTROZOLE 1 MG/1
1 TABLET ORAL DAILY
Qty: 90 TABLET | Refills: 3 | Status: SHIPPED | OUTPATIENT
Start: 2019-07-31 | End: 2019-10-24

## 2019-08-01 ENCOUNTER — OFFICE VISIT (OUTPATIENT)
Dept: ORTHOPEDICS | Facility: CLINIC | Age: 69
End: 2019-08-01
Payer: COMMERCIAL

## 2019-08-01 ENCOUNTER — TELEPHONE (OUTPATIENT)
Dept: ORTHOPEDICS | Facility: CLINIC | Age: 69
End: 2019-08-01

## 2019-08-01 VITALS — HEIGHT: 60 IN | WEIGHT: 169 LBS | BODY MASS INDEX: 33.18 KG/M2

## 2019-08-01 DIAGNOSIS — G89.29 CHRONIC RIGHT SHOULDER PAIN: Primary | ICD-10-CM

## 2019-08-01 DIAGNOSIS — M54.6 CHRONIC RIGHT-SIDED THORACIC BACK PAIN: ICD-10-CM

## 2019-08-01 DIAGNOSIS — M25.511 CHRONIC RIGHT SHOULDER PAIN: Primary | ICD-10-CM

## 2019-08-01 DIAGNOSIS — G89.29 CHRONIC RIGHT-SIDED THORACIC BACK PAIN: ICD-10-CM

## 2019-08-01 PROCEDURE — 99214 OFFICE O/P EST MOD 30 MIN: CPT | Performed by: FAMILY MEDICINE

## 2019-08-01 ASSESSMENT — MIFFLIN-ST. JEOR: SCORE: 1232.05

## 2019-08-01 ASSESSMENT — PAIN SCALES - GENERAL: PAINLEVEL: MODERATE PAIN (5)

## 2019-08-01 NOTE — LETTER
"    8/1/2019         RE: Marixa Ann  [address] [address] [facility] [place] [address]        Dear Colleague,    Thank you for referring your patient, Marixa Ann, to the [facility]. Please see a copy of my visit note below.    HISTORY OF PRESENT ILLNESS  Ms. Ann is a pleasant 68 year old year old female following up with knee osteoarthritis.  She also has a new shoulder issue.  She's seen at the request of Dr. Diamond.  Brianlouis last saw me in March of this year, at that visit I injected both of her knees.  These injections helped for some time, she has also been in physical therapy for her knees and other issues.  The injections only helped her knees for a few weeks, she is interested in other treatments.  Unfortunately since last seeing me she had to have an emergency cholecystectomy.  This was successful, her postop course has been relatively uncomplicated.  Unfortunately she started to develop pain in her shoulder region.  She has general shoulder pain and pain in her inferior scapular region.  She points to these general areas.  This is worse with movement, worse with gardening, worse with holding objects.  This is her dominant arm.     PHYSICAL EXAM  Vitals:    08/01/19 1153   Weight: 76.7 kg (169 lb)   Height: 1.53 m (5' 0.25\")     General  - normal appearance, in no obvious distress  CV  - normal radial pulse  Pulm  - normal respiratory pattern, non-labored  Musculoskeletal - right shoulder  - inspection: normal bone and joint alignment, no obvious deformity, no scapular winging, no AC step-off  - palpation: mildly tender RC insertion inferior scapular border  - ROM:  painful and limited flexion and ER at end range, limited IR  - strength: 5/5  strength, 5/5 in all shoulder planes  - special tests:  (-) Speed's  (+) Neer  (+) Hawkin's  (-) Tate's  (-) [place]'s  (-) subscap lift-off  Neuro  - no sensory or motor deficit, grossly normal coordination, normal muscle " tone  Skin  - no ecchymosis, erythema, warmth, or induration, no obvious rash  Psych  - interactive, appropriate, normal mood and affect          ASSESSMENT & PLAN  Ms. Ann is a 68 year old year old female following up with bilateral knee osteoarthritis.  She also has right shoulder pain.    We had a long discussion centering around nonoperative management for both of these issues.  She is very familiar with our last discussion that we had regarding her knee osteoarthritis.  She is interested in intra-articular hyaluronic acid treatment for her knees, we can help ensure coverage and do this at her earliest convenience, if she is interested and if this is covered.    Her right shoulder pain may be secondary to a rotator cuff issue, musculoskeletal periscapular pain, or a different intrinsic issue altogether.  Her pain is not classic for intrinsic shoulder pain, however, this is not unreasonable to think that given her other historical musculoskeletal issues.  I am referring her to physical therapy, she is going to start this at her earliest convenience.  In the future we could consider diagnostic and therapeutic shoulder injections or direct imaging of her shoulder.  It is difficult to rule out alternative causes, such as oncologic or postsurgical diaphragmatic pain.    Irena is likely going to follow-up next week for intra-articular hyaluronic acid injections in her knees.    It was a pleasure seeing Marixa.    40 minutes was spent in the room, 30 of which was spent on counseling and coordination of care.        Fam Atkins DO, KENNYM      This note was constructed using Dragon dictation software, please excuse any minor errors in spelling, grammar, or syntax.      Again, thank you for allowing me to participate in the care of your patient.        Sincerely,        Fam Atkins DO

## 2019-08-01 NOTE — TELEPHONE ENCOUNTER
8/1 called and left voicemail. Instructed patient to call [phone] to schedule Please schedule  for PT with Emigdio Vazquez.    Tova Mclaughlin   Procedure    Ortho/Sports Med/Ent/Eye   [facility]   [phone]

## 2019-08-01 NOTE — PROGRESS NOTES
"HISTORY OF PRESENT ILLNESS  Ms. Ann is a pleasant 68 year old year old female following up with knee osteoarthritis.  She also has a new shoulder issue.  She's seen at the request of Dr. Diamond.  Priscilla last saw me in March of this year, at that visit I injected both of her knees.  These injections helped for some time, she has also been in physical therapy for her knees and other issues.  The injections only helped her knees for a few weeks, she is interested in other treatments.  Unfortunately since last seeing me she had to have an emergency cholecystectomy.  This was successful, her postop course has been relatively uncomplicated.  Unfortunately she started to develop pain in her shoulder region.  She has general shoulder pain and pain in her inferior scapular region.  She points to these general areas.  This is worse with movement, worse with gardening, worse with holding objects.  This is her dominant arm.     PHYSICAL EXAM  Vitals:    08/01/19 1153   Weight: 76.7 kg (169 lb)   Height: 1.53 m (5' 0.25\")     General  - normal appearance, in no obvious distress  CV  - normal radial pulse  Pulm  - normal respiratory pattern, non-labored  Musculoskeletal - right shoulder  - inspection: normal bone and joint alignment, no obvious deformity, no scapular winging, no AC step-off  - palpation: mildly tender RC insertion inferior scapular border  - ROM:  painful and limited flexion and ER at end range, limited IR  - strength: 5/5  strength, 5/5 in all shoulder planes  - special tests:  (-) Speed's  (+) Neer  (+) Hawkin's  (-) Tate's  (-) [place]'s  (-) subscap lift-off  Neuro  - no sensory or motor deficit, grossly normal coordination, normal muscle tone  Skin  - no ecchymosis, erythema, warmth, or induration, no obvious rash  Psych  - interactive, appropriate, normal mood and affect          ASSESSMENT & PLAN  Ms. Ann is a 68 year old year old female following up with bilateral knee osteoarthritis.  She " also has right shoulder pain.    We had a long discussion centering around nonoperative management for both of these issues.  She is very familiar with our last discussion that we had regarding her knee osteoarthritis.  She is interested in intra-articular hyaluronic acid treatment for her knees, we can help ensure coverage and do this at her earliest convenience, if she is interested and if this is covered.    Her right shoulder pain may be secondary to a rotator cuff issue, musculoskeletal periscapular pain, or a different intrinsic issue altogether.  Her pain is not classic for intrinsic shoulder pain, however, this is not unreasonable to think that given her other historical musculoskeletal issues.  I am referring her to physical therapy, she is going to start this at her earliest convenience.  In the future we could consider diagnostic and therapeutic shoulder injections or direct imaging of her shoulder.  It is difficult to rule out alternative causes, such as oncologic or postsurgical diaphragmatic pain.    Irena is likely going to follow-up next week for intra-articular hyaluronic acid injections in her knees.    It was a pleasure seeing Marixa.    40 minutes was spent in the room, 30 of which was spent on counseling and coordination of care.        Fam Atkins DO, CAARLENEM      This note was constructed using Dragon dictation software, please excuse any minor errors in spelling, grammar, or syntax.

## 2019-08-02 ENCOUNTER — HOSPITAL ENCOUNTER (OUTPATIENT)
Dept: OCCUPATIONAL THERAPY | Facility: CLINIC | Age: 69
Setting detail: THERAPIES SERIES
End: 2019-08-02
Attending: FAMILY MEDICINE
Payer: COMMERCIAL

## 2019-08-02 PROCEDURE — 97140 MANUAL THERAPY 1/> REGIONS: CPT | Mod: GO | Performed by: OCCUPATIONAL THERAPIST

## 2019-08-07 NOTE — TELEPHONE ENCOUNTER
Pt called back stating she did get scheduled for PT with Jackie. Please call pt with any questions.

## 2019-08-08 ENCOUNTER — MYC REFILL (OUTPATIENT)
Dept: FAMILY MEDICINE | Facility: CLINIC | Age: 69
End: 2019-08-08

## 2019-08-08 DIAGNOSIS — F90.0 ATTENTION DEFICIT HYPERACTIVITY DISORDER (ADHD), PREDOMINANTLY INATTENTIVE TYPE: ICD-10-CM

## 2019-08-08 RX ORDER — DEXTROAMPHETAMINE SACCHARATE, AMPHETAMINE ASPARTATE, DEXTROAMPHETAMINE SULFATE AND AMPHETAMINE SULFATE 2.5; 2.5; 2.5; 2.5 MG/1; MG/1; MG/1; MG/1
10 TABLET ORAL 2 TIMES DAILY
Qty: 60 TABLET | Refills: 0 | Status: SHIPPED | OUTPATIENT
Start: 2019-08-08 | End: 2019-09-07

## 2019-08-08 NOTE — TELEPHONE ENCOUNTER
Routing refill request to provider for review/approval because:  Drug not on the [facility] refill protocol     Tabitha Davison RN

## 2019-08-08 NOTE — TELEPHONE ENCOUNTER
Requested Prescriptions   Pending Prescriptions Disp Refills     amphetamine-dextroamphetamine (ADDERALL) 10 MG tablet 60 tablet 0     Sig: Take 1 tablet (10 mg) by mouth 2 times daily       There is no refill protocol information for this order        Last Written Prescription Date:  6/27/19  Last Fill Quantity: 60,  # refills: 0   Last Office Visit with G, ANTONIA or [facility] prescribing provider:  6/27/19   Future Office Visit:    Next 5 appointments (look out 90 days)    Aug 14, 2019 10:00 AM CDT  [facility]eland [facility]  [facility] [address] [place] [id number] [phone]   Oct 24, 2019 12:45 PM CDT  Return Visit with MARRY Mathews [facility] ([facility]) [address] [place] [id number] [phone]   Oct 24, 2019  2:00 PM CDT  Return Visit with GARRETT [address] [place] [address] ([facility]) [address] [place] [id number] [phone]               Deni Faarax  [facility]

## 2019-08-12 ENCOUNTER — HOSPITAL ENCOUNTER (OUTPATIENT)
Dept: OCCUPATIONAL THERAPY | Facility: CLINIC | Age: 69
Setting detail: THERAPIES SERIES
End: 2019-08-12
Attending: FAMILY MEDICINE
Payer: COMMERCIAL

## 2019-08-12 PROCEDURE — 97140 MANUAL THERAPY 1/> REGIONS: CPT | Mod: GO | Performed by: OCCUPATIONAL THERAPIST

## 2019-08-13 ENCOUNTER — PATIENT OUTREACH (OUTPATIENT)
Dept: RADIATION ONCOLOGY | Facility: CLINIC | Age: 69
End: 2019-08-13

## 2019-08-13 NOTE — PROGRESS NOTES
Patient contacted for reminder call, informed of appointment with Dr. Copeland on 8/14/2019 at 1000 at [facility].  Patient confirmed appointment date and time and had no questions at this time.        Luna No RN BSN OCN

## 2019-08-14 ENCOUNTER — OFFICE VISIT (OUTPATIENT)
Dept: RADIATION ONCOLOGY | Facility: CLINIC | Age: 69
End: 2019-08-14
Payer: COMMERCIAL

## 2019-08-14 VITALS
SYSTOLIC BLOOD PRESSURE: 110 MMHG | TEMPERATURE: 98.2 F | HEART RATE: 96 BPM | BODY MASS INDEX: 32.77 KG/M2 | DIASTOLIC BLOOD PRESSURE: 64 MMHG | OXYGEN SATURATION: 99 % | WEIGHT: 169.2 LBS | RESPIRATION RATE: 18 BRPM

## 2019-08-14 DIAGNOSIS — Z17.0 MALIGNANT NEOPLASM OF UPPER-INNER QUADRANT OF RIGHT BREAST IN FEMALE, ESTROGEN RECEPTOR POSITIVE (H): Primary | ICD-10-CM

## 2019-08-14 DIAGNOSIS — C50.211 MALIGNANT NEOPLASM OF UPPER-INNER QUADRANT OF RIGHT BREAST IN FEMALE, ESTROGEN RECEPTOR POSITIVE (H): Primary | ICD-10-CM

## 2019-08-14 PROCEDURE — 99214 OFFICE O/P EST MOD 30 MIN: CPT | Performed by: RADIOLOGY

## 2019-08-14 ASSESSMENT — PAIN SCALES - GENERAL: PAINLEVEL: MILD PAIN (2)

## 2019-08-14 NOTE — PROGRESS NOTES
Dear Colleagues,  Today Marixa Ann was seen in follow up      IDENTIFICATION: Ms. Ann is a 68 year old female with R breast cancer, G1, 2.4 cm, ER/RI+, neg margins, s/p lumpectomy. Her pathologic stage is [id number] and she completed adjuvant radiation therapy to the right breast on 3/11/19.   INTERVAL HISTORY:  Marixa Ann was last seen in our clinic 3 months ago. While on treatment she had complaints of fatigue not relieved by rest (grade 2).  Patient has a longstanding history of excessive fatigue and fibromyalgia.  Fatigue went back to her baseline after patient resumed her previously prescribed Aderall.  She also developed diffuse skin erythema (grade 1) that was managed with skin creams.  Post treatment she stated that all of her symptoms resolved. Shortly after being seen she developed right flank pain and is s/p cholecystecomy as of 5/24/19.  In addition, most recently she reported a 2 month history of right axillary pain that radiated to nipple.  Imaging was negative. She remains on Anastrazole and has side effects from it that are being managed by Med Onc. She now returns today in follow up and has no complaints. Specifically denies n/v/ha/sob/cp  REVIEW OF SYSTEMS: As per HPI, a 14-point review of systems is otherwise negative.      PHYSICAL EXAMINATION:  /64 (BP Location: Left arm, Patient Position: Chair, Cuff Size: Adult Regular)   Pulse 96   Temp 98.2  F (36.8  C) (Oral)   Resp 18   Wt 76.7 kg (169 lb 3.2 oz)   LMP 05/31/2003 (Approximate)   SpO2 99%   BMI 32.77 kg/m    GENERAL Well-appearing woman in no acute distress.  HEENT Normocephalic, atraumatic.  Sclerae anicteric.  CVR  Regular rate and rhythm.  No murmurs, rubs, or gallops.  LUNGS Clear to auscultation bilaterally.  BREASTS Breasts are examined in the supine and upright position. There is no suspicious erythema or nodularity noted in either breast.   Dry skin noted in prior treatment field, not suspicious.  No  erythema or desquamation. Scar well healed.  LYMPH No supraclavicular, infraclavicular, or axillary lymphadenopathy appreciated bilaterally  EXT  No clubbing, cyanosis or edema.    NEURO Gait within normal limits.  SKIN  Warm and well perfused.  See breast exam  PSYCH:         Orientation: Alert, attentive, and oriented to time, place, and person                         Affect: Affect was appropriate to the situation and showed normal range and stability. No anxious mood                         Speech: Speech was clear with normal fluency, rate, tone, and volume.                         Memory: Although not formally assessed, immediate, recent, and remote memory appeared to be intact.                          Insight and Judgement:  demonstrates adequate awareness of the issues discussed.                         Thought: Thought patterns were coherent and logical.      IMAGIN19 right diagnostic mmg and US: benign.       IMPRESSION/PLAN:  Marixa Ann is 68 year old female with R breast cancer, G1, 2.4 cm, ER/IN+, neg margins, s/p lumpectomy. Her pathologic stage is [id number] and she completed adjuvant radiation therapy to the right breast on 3/11/19.  She is being seen here in follow up. She has had resolution of her acute radiation induced side effects and only has some residual skin dryness within the treatment field.  This is to be expected. She should continue to use sunblock and moisturizer in the previously treated area generously.   Additional problem list to be addressed in the following manner:  1) Hormonal treatment in postmenopausal woman: Follow up with Med Onc for management regarding hormonal therapy/Anastrazole. Scans per Med Onc.  2) Follow up with Surg Onc as previously directed.    3) Follow up with Rad Onc in prn.  There was ample time for questions and all were answered to the patient's satisfaction. Thank you for allowing me to participate in the care of this pleasant patient. If you  have any questions, please do not hesitate to contact my office.      Sincerely,  Lynette Copeland MD  Adjunct   Dept of Radiation Oncology  [facility]

## 2019-08-14 NOTE — NURSING NOTE
"FOLLOW-UP VISIT    Patient Name: Marixa Ann      : 1950     Age: 68 year old        ______________________________________________________________________________     Chief Complaint   Patient presents with     Cancer     Radiation oncology return visit with Dr. Copeland     /64 (BP Location: Left arm, Patient Position: Chair, Cuff Size: Adult Regular)   Pulse 96   Temp 98.2  F (36.8  C) (Oral)   Resp 18   Wt 76.7 kg (169 lb 3.2 oz)   LMP 2003 (Approximate)   SpO2 99%   BMI 32.77 kg/m       Date Radiation Completed: 4,256 cGy to right breast completed on 3/11/2019    Pain  Currently rates pain \"2/10\", reports she has been experiencing pain from her right axillary region to right nipple with intermittent pain of right arm.  She reports this pain was reviewed with her PCP and Dr. Diamond, patient had mammogram and US completed, has also seen Dr. Dan.  Patient currently following with lymphedema therapy at [facility].  Patient reports taking Tylenol po as needed.    Labs  Other Labs: No    Imaging  Mammogram/US: 2019      Other Appointments:     MD Name: Lidia Parham NP Appointment Date: 10/24/2019   MD Name: Appointment Date:   MD Name: Appointment Date:       Nurse face-to-face time: Level 4:  15 min face to face time        "

## 2019-08-14 NOTE — PATIENT INSTRUCTIONS
Please contact [facility] RN with questions or concerns following today's appointment: [phone].    Thank you!

## 2019-08-15 ENCOUNTER — MYC REFILL (OUTPATIENT)
Dept: FAMILY MEDICINE | Facility: CLINIC | Age: 69
End: 2019-08-15

## 2019-08-15 ENCOUNTER — MYC MEDICAL ADVICE (OUTPATIENT)
Dept: FAMILY MEDICINE | Facility: CLINIC | Age: 69
End: 2019-08-15

## 2019-08-15 ENCOUNTER — THERAPY VISIT (OUTPATIENT)
Dept: PHYSICAL THERAPY | Facility: CLINIC | Age: 69
End: 2019-08-15
Attending: FAMILY MEDICINE
Payer: COMMERCIAL

## 2019-08-15 DIAGNOSIS — M54.6 RIGHT-SIDED THORACIC BACK PAIN: ICD-10-CM

## 2019-08-15 DIAGNOSIS — F90.0 ATTENTION DEFICIT HYPERACTIVITY DISORDER (ADHD), PREDOMINANTLY INATTENTIVE TYPE: ICD-10-CM

## 2019-08-15 DIAGNOSIS — G89.29 CHRONIC RIGHT SHOULDER PAIN: ICD-10-CM

## 2019-08-15 DIAGNOSIS — G89.29 CHRONIC RIGHT-SIDED THORACIC BACK PAIN: ICD-10-CM

## 2019-08-15 DIAGNOSIS — M25.511 CHRONIC RIGHT SHOULDER PAIN: ICD-10-CM

## 2019-08-15 DIAGNOSIS — M54.6 CHRONIC RIGHT-SIDED THORACIC BACK PAIN: ICD-10-CM

## 2019-08-15 PROCEDURE — 97110 THERAPEUTIC EXERCISES: CPT | Mod: GP | Performed by: PHYSICAL THERAPIST

## 2019-08-15 PROCEDURE — 97162 PT EVAL MOD COMPLEX 30 MIN: CPT | Mod: GP | Performed by: PHYSICAL THERAPIST

## 2019-08-15 PROCEDURE — 97530 THERAPEUTIC ACTIVITIES: CPT | Mod: GP | Performed by: PHYSICAL THERAPIST

## 2019-08-15 RX ORDER — DEXTROAMPHETAMINE SACCHARATE, AMPHETAMINE ASPARTATE, DEXTROAMPHETAMINE SULFATE AND AMPHETAMINE SULFATE 2.5; 2.5; 2.5; 2.5 MG/1; MG/1; MG/1; MG/1
10 TABLET ORAL 2 TIMES DAILY
Qty: 60 TABLET | Refills: 0 | Status: CANCELLED | OUTPATIENT
Start: 2019-08-15

## 2019-08-15 NOTE — PROGRESS NOTES
[facility] Initial Evaluation  Subjective:  The history is provided by the patient. No  was used.   Marixa Ann being seen for R shoulder and side pain.   Date of Onset: November 2018. Problem occurred: Possibly watercolor painting, usng ipad, gallbladder infection.  and reported as 3/10 on pain scale. General health as reported by patient is fair. Pertinent medical history includes:  Cancer, depression, fibromyalgia, osteoporosis, osteoarthritis and overweight. Other medical history details: chronic fatigue syndrome, ADD.   Other medical allergies details: Vicodin, Oxycondone, Dilaudid, Milk.  Surgeries include:  Cancer surgery and other. Other surgery history details: gallbladder .  Current medications:  Bone density. Other medications details: aderol, estrogen blocker.    Other job/home tasks details: Housework, cooking, gardening, painting.  Pain is described as burning and is constant. Pain is the same all the time. Since onset symptoms are unchanged.      Patient is Retired.   Barriers include:  None as reported by patient.  Red flags:  None as reported by patient.  Type of problem:  Thoracic spine   Condition occurred with:  Insidious onset.    Problem details: Patient reports onset of R sided symptoms from the bottom of the scapula down into the back in November 2018.  Noticed extreme pain while sitting and watercolor painting.  Also notes pain in the R shoulder and arm and episodes of the hands falling asleep.  Received PT orders 8/1/2019..   Patient reports pain:  Thoracic right side (from inferior scapula into back). Radiates to:  Hand right and lower arm right. Associated symptoms:  Tingling ( B hands). Symptoms are exacerbated by driving, rotating head and other (standing and doing dishes or cooking)                       Objective:  Standing Alignment:    Cervical/Thoracic:  Forward head and thoracic kyphosis increased                          Physical  Exam    [facility] Cervical Evaluation    Posture:  Sitting: poor  Standing: fair  Protruding Head: yes  Wry Neck: nil  Correction of Posture: better  Other Observations: Shoulder AROM: Full and equal B, NE on symptoms.  Movement Loss:  Protrusion (PRO): nil  Flexion (Flex): nil  Retraction (RET): nil  Extension (EXT): min and pain  Lateral Flexion Right (LF R): nil  Lateral Flexion Left (LF L): nil  Rotation Right (ROT R): nil and pain  Rotation Left (ROT L): nil and pain  Test Movements:      RET: During: produces  After: no worse    Repeat RET: During: produces  After: no worse  Mechanical Response: no effect  RET EXT: During: produces  After: no worse    Repeat RET EXT: During: produces  After: no worse  Mechanical Response: no effect                                                 [facility] Thoracic Evalution:    Movement Loss:  Flexion (Flex):  Nil  Extension (EXT): major  Rotation Lt (ROT L): nil  Rotation Rt (ROT R): mod    Test Movements:      Extension:  During:  No effect  After: no effect    Rep Extension:  During:  No effect  After: no effect                                                ROS    Assessment/Plan:    Patient is a 68 year old female with thoracic complaints.    Patient has the following significant findings with corresponding treatment plan.                Diagnosis 1:  Thoracic and shoulder pain  Pain -  manual therapy, directional preference exercise and home program  Decreased ROM/flexibility - manual therapy, therapeutic exercise and home program  Decreased function - therapeutic activities and home program  Impaired posture - neuro re-education, therapeutic activities and home program    Therapy Evaluation Codes:   1) History comprised of:   Personal factors that impact the plan of care:      Past/current experiences.    Comorbidity factors that impact the plan of care are:      Cancer and Fibromyalgia.     Medications impacting care: None.  2) Examination of Body Systems comprised  of:   Body structures and functions that impact the plan of care:      Thoracic Spine.   Activity limitations that impact the plan of care are:      Cooking and Standing.  3) Clinical presentation characteristics are:   Evolving/Changing.  4) Decision-Making    Moderate complexity using standardized patient assessment instrument and/or measureable assessment of functional outcome.  Cumulative Therapy Evaluation is: Moderate complexity.    Previous and current functional limitations:  (See Goal Flow Sheet for this information)    Short term and Long term goals: (See Goal Flow Sheet for this information)     Communication ability:  Patient appears to be able to clearly communicate and understand verbal and written communication and follow directions correctly.  Treatment Explanation - The following has been discussed with the patient:   RX ordered/plan of care  Anticipated outcomes  Possible risks and side effects  This patient would benefit from PT intervention to resume normal activities.   Rehab potential is good.    Frequency:  1 X week, once daily  Duration:  for 12 weeks  Discharge Plan:  Achieve all LTG.  Independent in home treatment program.  Reach maximal therapeutic benefit.    Please refer to the daily flowsheet for treatment today, total treatment time and time spent performing 1:1 timed codes.

## 2019-08-16 ENCOUNTER — MYC MEDICAL ADVICE (OUTPATIENT)
Dept: FAMILY MEDICINE | Facility: CLINIC | Age: 69
End: 2019-08-16

## 2019-08-16 ENCOUNTER — HOSPITAL ENCOUNTER (OUTPATIENT)
Dept: OCCUPATIONAL THERAPY | Facility: CLINIC | Age: 69
Setting detail: THERAPIES SERIES
End: 2019-08-16
Attending: FAMILY MEDICINE
Payer: COMMERCIAL

## 2019-08-16 PROCEDURE — 97140 MANUAL THERAPY 1/> REGIONS: CPT | Mod: GO | Performed by: OCCUPATIONAL THERAPIST

## 2019-08-16 NOTE — TELEPHONE ENCOUNTER
"Spoke with patient on the phone as was unclear what she wanted on her My Chart message. She states that she was not notified when her Adderral Rx was ready. She stated that My Chart is not an effective communication process and she states that it is \"de-humanizing\" to her. This RN told her not to use it then since it is not working for her and instead she should should call her Rx in and request the refill over the phone and then state that she will pick it up as this process may be more beneficial for her since she does not like My Chart. She stated repeatedly that she was angry that no one called her to let her know that it was ready to be picked up.Documentation on August 9th states that patient was notified via My Chart on 8/9/19 that Rx was ready. She asked this writer how in the future this would not happen again. This writer attempted to problem solve with her by giving her solutions and recommendations but she continually focused on stating that she was angry and frustrated at how she was not notified that Rx was ready.writer continued to kindly give problem solving solutions and give recommendations, but her focus was more on what happened versus how to have writer help her move forward.  she then became vocal and argumentative, writer stated that the conversation was not productive any long and that we should end the phone call. She then stated she wanted my name and was going to make a complaint against me. Writer then gave name and stated that I needed to end call as was not going to participate in an argument with her.    Tabitha Davison RN    "

## 2019-08-16 NOTE — TELEPHONE ENCOUNTER
See telephone encounter from 08/16/19. Prescription was brought to the  on 8/8/19 and ready for .      Oriana Ceasr RN, BSN, PHN

## 2019-08-19 ENCOUNTER — THERAPY VISIT (OUTPATIENT)
Dept: PHYSICAL THERAPY | Facility: CLINIC | Age: 69
End: 2019-08-19
Attending: FAMILY MEDICINE
Payer: COMMERCIAL

## 2019-08-19 ENCOUNTER — HOSPITAL ENCOUNTER (OUTPATIENT)
Dept: OCCUPATIONAL THERAPY | Facility: CLINIC | Age: 69
Setting detail: THERAPIES SERIES
End: 2019-08-19
Attending: FAMILY MEDICINE
Payer: COMMERCIAL

## 2019-08-19 DIAGNOSIS — M25.519 PAIN IN JOINT, SHOULDER REGION: ICD-10-CM

## 2019-08-19 DIAGNOSIS — M54.6 RIGHT-SIDED THORACIC BACK PAIN: ICD-10-CM

## 2019-08-19 PROCEDURE — 97140 MANUAL THERAPY 1/> REGIONS: CPT | Mod: GO | Performed by: OCCUPATIONAL THERAPIST

## 2019-08-19 PROCEDURE — 97530 THERAPEUTIC ACTIVITIES: CPT | Mod: GP | Performed by: PHYSICAL THERAPIST

## 2019-08-19 PROCEDURE — 97140 MANUAL THERAPY 1/> REGIONS: CPT | Mod: GP | Performed by: PHYSICAL THERAPIST

## 2019-08-19 PROCEDURE — 97110 THERAPEUTIC EXERCISES: CPT | Mod: GP | Performed by: PHYSICAL THERAPIST

## 2019-08-23 ENCOUNTER — HOSPITAL ENCOUNTER (OUTPATIENT)
Dept: OCCUPATIONAL THERAPY | Facility: CLINIC | Age: 69
Setting detail: THERAPIES SERIES
End: 2019-08-23
Attending: FAMILY MEDICINE
Payer: COMMERCIAL

## 2019-08-23 PROCEDURE — 97140 MANUAL THERAPY 1/> REGIONS: CPT | Mod: GO | Performed by: OCCUPATIONAL THERAPIST

## 2019-08-23 NOTE — PROGRESS NOTES
Outpatient Occupational Therapy Progress Note     Patient: Marixa Ann  : 1950    Beginning/End Dates of Reporting Period:  19 to 2019    Referring Provider: Jackie Kovacs MD    Therapy Diagnosis: lymphedema of RUE, right, lateral trunk    Client Self Report:   0    Objective Measurements:     Objective Measure: skin assessment   Details: 19: soft, mild edema of right, MEDIAL epicondyle of elbow, soft, mild, puffy edema of right axillary fold.    Objective Measure: pain   Details: Pt reproted generalized pain throughout body causing great fatigue.                   Goals:   Goal Identifier volume   Goal Description For decreased risk of infection, skin breakdown/wounds & progressive soft-tissue fibrosis volume RUQ will be reduced to approximately that of LUQ.   Target Date 19   Date Met      Progress:     Goal Identifier home program   Goal Description For long-term home mgmt chronic lymphedema pt/caregiver independent in home program including self-MLD, HEP, scar massage, use of compression as indicated.   Target Date 19   Date Met      Progress:     Goal Identifier precautions   Goal Description Pt will independently verbalize the signs/symptoms of lymphedema, precautions and how to obtain a future lymphedema referral if edema persists to preserve skin integrity, prevent infection and preserve functional mobility.      Target Date 19   Date Met      Progress:           Progress Toward Goals:   Pt making fair progress.  Total body, chronic pain from fibromyalgia makes it difficult to determine if pain in RUE is improving with MLD.  However, pt reports pain relief in RUE after MLD at all tx sessions.        Plan:  Continue therapy per current plan of care.    Discharge:  No

## 2019-08-29 ENCOUNTER — THERAPY VISIT (OUTPATIENT)
Dept: PHYSICAL THERAPY | Facility: CLINIC | Age: 69
End: 2019-08-29
Payer: COMMERCIAL

## 2019-08-29 DIAGNOSIS — M54.6 RIGHT-SIDED THORACIC BACK PAIN: ICD-10-CM

## 2019-08-29 DIAGNOSIS — M25.519 PAIN IN JOINT, SHOULDER REGION: ICD-10-CM

## 2019-08-29 PROCEDURE — 97140 MANUAL THERAPY 1/> REGIONS: CPT | Mod: GP | Performed by: PHYSICAL THERAPIST

## 2019-08-29 PROCEDURE — 97110 THERAPEUTIC EXERCISES: CPT | Mod: GP | Performed by: PHYSICAL THERAPIST

## 2019-08-30 ENCOUNTER — HOSPITAL ENCOUNTER (OUTPATIENT)
Dept: OCCUPATIONAL THERAPY | Facility: CLINIC | Age: 69
Setting detail: THERAPIES SERIES
End: 2019-08-30
Attending: FAMILY MEDICINE
Payer: COMMERCIAL

## 2019-08-30 ENCOUNTER — TELEPHONE (OUTPATIENT)
Dept: ORTHOPEDICS | Facility: CLINIC | Age: 69
End: 2019-08-30

## 2019-08-30 PROCEDURE — 97140 MANUAL THERAPY 1/> REGIONS: CPT | Mod: GO | Performed by: OCCUPATIONAL THERAPIST

## 2019-08-30 PROCEDURE — 97535 SELF CARE MNGMENT TRAINING: CPT | Mod: GO | Performed by: OCCUPATIONAL THERAPIST

## 2019-08-30 NOTE — TELEPHONE ENCOUNTER
8/30 called and left voicemail. Instructed patient to call [phone] to schedule gel knee injection with Dr. Atkins.     Please schedule as 40 minute procedure.    Tova Mclaughlin   Procedure    Ortho/Sports Med/Ent/Eye   [facility]   [phone]      ----- Message from Glenny Huynh ATC sent at 8/29/2019  3:41 PM CDT -----  Regarding: FW: knee injection  Can you call the patient and schedule her for gel injection with Dr. Atkins.

## 2019-08-31 NOTE — PROGRESS NOTES
Outpatient Occupational Therapy Progress Note     Patient: Marixa Ann  : 1950    Beginning/End Dates of Reporting Period:  18 to 2019    Referring Provider: Jackie Kovacs MD    Therapy Diagnosis: Lymphedema of RUE and right, lateral trunk    Client Self Report:   0    Objective Measurements:     Objective Measure: skin assessment   Details: 19: No edema of RUE, mild, puffy edema of right lateral trunk    Objective Measure: pain   Details: 2/10 pain in RUE                        Goals:   Goal Identifier volume   Goal Description For decreased risk of infection, skin breakdown/wounds & progressive soft-tissue fibrosis volume RUQ will be reduced to approximately that of LUQ.   Target Date 19   Date Met  19   Progress:     Goal Identifier home program   Goal Description For long-term home mgmt chronic lymphedema pt/caregiver independent in home program including self-MLD, HEP, scar massage, use of compression as indicated.   Target Date 19   Date Met  19   Progress:     Goal Identifier precautions   Goal Description Pt will independently verbalize the signs/symptoms of lymphedema, precautions and how to obtain a future lymphedema referral if edema persists to preserve skin integrity, prevent infection and preserve functional mobility.      Target Date 19   Date Met  19   Progress:     Goal Identifier pain   Goal Description Pt will report pain reduction of <1/10 in RUE due to compliance with home program.   Target Date 10/29/19   Date Met      Progress:     Goal Identifier     Goal Description     Target Date     Date Met      Progress:     Goal Identifier     Goal Description     Target Date     Date Met      Progress:     Goal Identifier     Goal Description     Target Date     Date Met      Progress:     Goal Identifier     Goal Description     Target Date     Date Met      Progress:     Progress Toward Goals:   Pt met goals 1,2,3.  Pt continues to  have discomfort, pain in RUE that has reduced from 4/10 to 2/10 with MLD and home program exercises, self MLD.  Goal #4 added. 3      Plan:  Changes to therapy plan of care: 1 more tx in 60 days.  Add goal #4    Discharge:  No

## 2019-09-05 ENCOUNTER — THERAPY VISIT (OUTPATIENT)
Dept: PHYSICAL THERAPY | Facility: CLINIC | Age: 69
End: 2019-09-05
Payer: COMMERCIAL

## 2019-09-05 ENCOUNTER — OFFICE VISIT (OUTPATIENT)
Dept: ORTHOPEDICS | Facility: CLINIC | Age: 69
End: 2019-09-05
Payer: COMMERCIAL

## 2019-09-05 DIAGNOSIS — M54.6 RIGHT-SIDED THORACIC BACK PAIN: ICD-10-CM

## 2019-09-05 DIAGNOSIS — M25.519 PAIN IN JOINT, SHOULDER REGION: ICD-10-CM

## 2019-09-05 DIAGNOSIS — M17.0 BILATERAL PRIMARY OSTEOARTHRITIS OF KNEE: Primary | ICD-10-CM

## 2019-09-05 PROCEDURE — 20610 DRAIN/INJ JOINT/BURSA W/O US: CPT | Mod: 50 | Performed by: FAMILY MEDICINE

## 2019-09-05 PROCEDURE — 97110 THERAPEUTIC EXERCISES: CPT | Mod: GP | Performed by: PHYSICAL THERAPIST

## 2019-09-05 PROCEDURE — 97140 MANUAL THERAPY 1/> REGIONS: CPT | Mod: GP | Performed by: PHYSICAL THERAPIST

## 2019-09-05 PROCEDURE — 99207 ZZC DROP WITH A PROCEDURE: CPT | Performed by: FAMILY MEDICINE

## 2019-09-05 NOTE — PROGRESS NOTES
Large Joint Injection/Arthocentesis: bilateral knee  Date/Time: 9/5/2019 3:25 PM  Performed by: Fam Atkins DO  Authorized by: Fam Atkins DO     Indications:  Pain  Needle Size:  25 G  Approach:  Lateral  Location:  Knee  Laterality:  Bilateral      Medications (Right):  30 mg cross-Linked Hyaluronate 30 MG/3ML  Medications (Left):  30 mg cross-Linked Hyaluronate 30 MG/3ML  Outcome:  Tolerated well, no immediate complications  Procedure discussed: discussed risks, benefits, and alternatives    Consent Given by:  Patient  Timeout: timeout called immediately prior to procedure    Prep: patient was prepped and draped in usual sterile fashion        Priscilla is here for bilateral HA injections.  She has bilateral knee osteoarthritis.    PROCEDURE    Knee Injections, HA - Intraarticular  The patient was informed of the risks and the benefits of the procedure and a written consent was signed.  The patient s left knee was prepped with chlorhexidine in sterile fashion.   GelOne syringe removed from packaging and inspected for integrity.  Injection was performed using substerile technique.  A 1.5-inch 22-gauge needle was used to enter the lateral aspect of the left knee.  Injection performed successfully without difficulty.  There were no complications. The patient tolerated the procedure well. There was negligible bleeding.   Attention turned to the right knee.  The patient s right knee was prepped with chlorhexidine in sterile fashion.   GelOne syringe removed from packaging and inspected for integrity.  Injection was performed using substerile technique.  A 1.5-inch 22-gauge needle was used to enter the lateral aspect of the right knee.  Injection performed successfully without difficulty.  There were no complications. The patient tolerated the procedure well. There was negligible bleeding.   The patient was instructed to ice the knee upon leaving clinic and refrain from overuse over the next 3 days.   The  patient was instructed to call or go to the emergency room with any unusual pain, swelling, redness, or if otherwise concerned.      Isaac Atkins DO CAQSM

## 2019-09-05 NOTE — TELEPHONE ENCOUNTER
Patient does not require a PA to have Gel One or euflexxa injection via her [facility] medicare insurance. Patient informed.

## 2019-09-05 NOTE — LETTER
9/5/2019         RE: Marixa Ann  [address] [facility] [id number]        Dear Colleague,    Thank you for referring your patient, Marixa Ann, to the [facility]. Please see a copy of my visit note below.    Large Joint Injection/Arthocentesis: bilateral knee  Date/Time: 9/5/2019 3:25 PM  Performed by: Fam Atkins DO  Authorized by: Fam Atkins DO     Indications:  Pain  Needle Size:  25 G  Approach:  Lateral  Location:  Knee  Laterality:  Bilateral      Medications (Right):  30 mg cross-Linked Hyaluronate 30 MG/3ML  Medications (Left):  30 mg cross-Linked Hyaluronate 30 MG/3ML  Outcome:  Tolerated well, no immediate complications  Procedure discussed: discussed risks, benefits, and alternatives    Consent Given by:  Patient  Timeout: timeout called immediately prior to procedure    Prep: patient was prepped and draped in usual sterile fashion        Priscilla is here for bilateral HA injections.  She has bilateral knee osteoarthritis.    PROCEDURE    Knee Injections, HA - Intraarticular  The patient was informed of the risks and the benefits of the procedure and a written consent was signed.  The patient s left knee was prepped with chlorhexidine in sterile fashion.   GelOne syringe removed from packaging and inspected for integrity.  Injection was performed using substerile technique.  A 1.5-inch 22-gauge needle was used to enter the lateral aspect of the left knee.  Injection performed successfully without difficulty.  There were no complications. The patient tolerated the procedure well. There was negligible bleeding.   Attention turned to the right knee.  The patient s right knee was prepped with chlorhexidine in sterile fashion.   GelOne syringe removed from packaging and inspected for integrity.  Injection was performed using substerile technique.  A 1.5-inch 22-gauge needle was used to enter the lateral aspect of the right knee.  Injection performed  successfully without difficulty.  There were no complications. The patient tolerated the procedure well. There was negligible bleeding.   The patient was instructed to ice the knee upon leaving clinic and refrain from overuse over the next 3 days.   The patient was instructed to call or go to the emergency room with any unusual pain, swelling, redness, or if otherwise concerned.      Isaac Atkins DO CAQSM        Again, thank you for allowing me to participate in the care of your patient.        Sincerely,        Fam Atkins DO

## 2019-09-07 ENCOUNTER — MYC REFILL (OUTPATIENT)
Dept: FAMILY MEDICINE | Facility: CLINIC | Age: 69
End: 2019-09-07

## 2019-09-07 DIAGNOSIS — F90.0 ATTENTION DEFICIT HYPERACTIVITY DISORDER (ADHD), PREDOMINANTLY INATTENTIVE TYPE: ICD-10-CM

## 2019-09-09 ENCOUNTER — THERAPY VISIT (OUTPATIENT)
Dept: PHYSICAL THERAPY | Facility: CLINIC | Age: 69
End: 2019-09-09
Payer: COMMERCIAL

## 2019-09-09 DIAGNOSIS — M54.6 RIGHT-SIDED THORACIC BACK PAIN: ICD-10-CM

## 2019-09-09 DIAGNOSIS — M25.519 PAIN IN JOINT, SHOULDER REGION: ICD-10-CM

## 2019-09-09 PROCEDURE — 97140 MANUAL THERAPY 1/> REGIONS: CPT | Mod: GP | Performed by: PHYSICAL THERAPIST

## 2019-09-09 PROCEDURE — 97110 THERAPEUTIC EXERCISES: CPT | Mod: GP | Performed by: PHYSICAL THERAPIST

## 2019-09-09 RX ORDER — DEXTROAMPHETAMINE SACCHARATE, AMPHETAMINE ASPARTATE, DEXTROAMPHETAMINE SULFATE AND AMPHETAMINE SULFATE 2.5; 2.5; 2.5; 2.5 MG/1; MG/1; MG/1; MG/1
10 TABLET ORAL 2 TIMES DAILY
Qty: 60 TABLET | Refills: 0 | Status: SHIPPED | OUTPATIENT
Start: 2019-09-09 | End: 2019-10-09

## 2019-09-09 NOTE — TELEPHONE ENCOUNTER
Requested Prescriptions   Pending Prescriptions Disp Refills     amphetamine-dextroamphetamine (ADDERALL) 10 MG tablet        Last Written Prescription Date:  08/08/19  Last Fill Quantity: 60,   # refills: 0  Last Office Visit: 06/27/19-[place]  Future Office visit:    Next 5 appointments (look out 90 days)    Oct 24, 2019 12:45 PM CDT  Return Visit with MARRY [facility]) [address] [place] [address] [id number] [phone]   Oct 24, 2019  2:00 PM CDT  Return Visit with [address] ([facility]) [address] [place] [id number] [phone]           Routing refill request to provider for review/approval because:  Drug not on the [facility], P or [facility] refill protocol or controlled substance 60 tablet 0     Sig: Take 1 tablet (10 mg) by mouth 2 times daily       There is no refill protocol information for this order

## 2019-09-09 NOTE — TELEPHONE ENCOUNTER
Please put RX on my desk to be signed. Please call patient tomorrow when it is ready to be picked up. I won't be in to clinic until 11 am.  Jackie Kovacs MD

## 2019-09-09 NOTE — TELEPHONE ENCOUNTER
Routing refill request to provider for review/approval because:  Drug not on the [facility] refill protocol     Oriana Cesar RN, BSN, PHN

## 2019-09-10 NOTE — TELEPHONE ENCOUNTER
Written rx will be deliver to the  this afternoon for pickup after 4p.  Gilberto Bae,  [facility]    Patient has been notified via my chart.  Gilberto Bae,  [facility]    NOTE: If patient and or guardians calls the clinic before the care teams gets a chance to call them, please notify the caller the above information regarding pickup times, remind them to bring a photo ID, document and close the encounter.  Gilberto Bae,  [facility]    FYI:  Anything completed after 2:00p will not be delivered until the next business day after 3p.   Gilberto Bae,  for [facility].

## 2019-09-12 ENCOUNTER — TELEPHONE (OUTPATIENT)
Dept: ONCOLOGY | Facility: CLINIC | Age: 69
End: 2019-09-12

## 2019-09-12 NOTE — TELEPHONE ENCOUNTER
"Priscilla came in today. She thought she had an appointment for Prolia. I let her know she did not have an appointment. It was cancelled in July. A letter/calendar was sent out. She states she did not see it in [facility] but assumed the appointment was there. She told me we don't speak her language. When I asked her what that meant, she stated she has ADHD and doesn't follow things. She stated she only get half of the messages left on her phone and claims she didn't get anything I the mail. I let her know from now-on, we would make she we got a hold of her if an appointment is changed. She then was upset about an appointment that was cancelled in July for labs. She said she needs to know why appointments are not needed and she was never told. She said she called to find out why it was cancelled and was told \"the doctor should have told you.\" When Priscilla left, I told her to have a good day and she said, \"Yeah... That's easy for you to say.\" She was not happy and even after explaining everything, she was not happy with my answers. Bárbara stepped in and told her she was the one who talked to her regarding the labs. She then thanked Bárbara for doing that??     When she left, I did give her an updated calendar with all her future appointment and I also gave her a card with our number.     Dhara Meier   "

## 2019-09-19 ENCOUNTER — THERAPY VISIT (OUTPATIENT)
Dept: PHYSICAL THERAPY | Facility: CLINIC | Age: 69
End: 2019-09-19
Payer: COMMERCIAL

## 2019-09-19 DIAGNOSIS — M25.519 PAIN IN JOINT, SHOULDER REGION: ICD-10-CM

## 2019-09-19 DIAGNOSIS — M54.6 RIGHT-SIDED THORACIC BACK PAIN: ICD-10-CM

## 2019-09-19 PROCEDURE — 97140 MANUAL THERAPY 1/> REGIONS: CPT | Mod: GP | Performed by: PHYSICAL THERAPIST

## 2019-09-19 PROCEDURE — 97110 THERAPEUTIC EXERCISES: CPT | Mod: GP | Performed by: PHYSICAL THERAPIST

## 2019-09-30 ENCOUNTER — HEALTH MAINTENANCE LETTER (OUTPATIENT)
Age: 69
End: 2019-09-30

## 2019-09-30 ENCOUNTER — THERAPY VISIT (OUTPATIENT)
Dept: PHYSICAL THERAPY | Facility: CLINIC | Age: 69
End: 2019-09-30
Payer: COMMERCIAL

## 2019-09-30 DIAGNOSIS — M25.519 PAIN IN JOINT, SHOULDER REGION: ICD-10-CM

## 2019-09-30 DIAGNOSIS — M54.6 RIGHT-SIDED THORACIC BACK PAIN: ICD-10-CM

## 2019-09-30 PROCEDURE — 97140 MANUAL THERAPY 1/> REGIONS: CPT | Mod: GP | Performed by: PHYSICAL THERAPIST

## 2019-09-30 PROCEDURE — 97110 THERAPEUTIC EXERCISES: CPT | Mod: GP | Performed by: PHYSICAL THERAPIST

## 2019-10-09 ENCOUNTER — MYC REFILL (OUTPATIENT)
Dept: FAMILY MEDICINE | Facility: CLINIC | Age: 69
End: 2019-10-09

## 2019-10-09 DIAGNOSIS — F90.0 ATTENTION DEFICIT HYPERACTIVITY DISORDER (ADHD), PREDOMINANTLY INATTENTIVE TYPE: ICD-10-CM

## 2019-10-09 NOTE — TELEPHONE ENCOUNTER
Requested Prescriptions   Pending Prescriptions Disp Refills     amphetamine-dextroamphetamine (ADDERALL) 10 MG tablet 60 tablet 0     Sig: Take 1 tablet (10 mg) by mouth 2 times daily       There is no refill protocol information for this order          amphetamine-dextroamphetamine (ADDERALL) 10 MG tablet      Last Written Prescription Date:  9/9/19  Last Fill Quantity: 60,   # refills: 0  Last Office Visit: 6/27/19  Future Office visit:    Next 5 appointments (look out 90 days)    Oct 24, 2019 12:45 PM CDT  Return Visit with MARRY Mathews [facility] ([facility]) [address] [place] [id number]  [phone]   Oct 24, 2019  2:00 PM CDT  Return Visit with [address] ([facility]) [address] [place] [id number]  [phone]   Nov 01, 2019 10:00 AM CDT  Office Visit with Jackie Kovacs MD  [facility] ([facility]) [address] [place] [id number] [phone]           Routing refill request to provider for review/approval because:  Drug not on the [facility], [facility] or [facility] refill protocol or controlled substance

## 2019-10-10 ENCOUNTER — THERAPY VISIT (OUTPATIENT)
Dept: PHYSICAL THERAPY | Facility: CLINIC | Age: 69
End: 2019-10-10
Payer: COMMERCIAL

## 2019-10-10 DIAGNOSIS — M25.519 PAIN IN JOINT, SHOULDER REGION: ICD-10-CM

## 2019-10-10 DIAGNOSIS — M54.6 RIGHT-SIDED THORACIC BACK PAIN: ICD-10-CM

## 2019-10-10 PROCEDURE — 97110 THERAPEUTIC EXERCISES: CPT | Mod: GP | Performed by: PHYSICAL THERAPIST

## 2019-10-10 PROCEDURE — 97140 MANUAL THERAPY 1/> REGIONS: CPT | Mod: GP | Performed by: PHYSICAL THERAPIST

## 2019-10-11 RX ORDER — DEXTROAMPHETAMINE SACCHARATE, AMPHETAMINE ASPARTATE, DEXTROAMPHETAMINE SULFATE AND AMPHETAMINE SULFATE 2.5; 2.5; 2.5; 2.5 MG/1; MG/1; MG/1; MG/1
10 TABLET ORAL 2 TIMES DAILY
Qty: 60 TABLET | Refills: 0 | Status: SHIPPED | OUTPATIENT
Start: 2019-10-11 | End: 2019-11-06

## 2019-10-15 ENCOUNTER — MYC MEDICAL ADVICE (OUTPATIENT)
Dept: FAMILY MEDICINE | Facility: CLINIC | Age: 69
End: 2019-10-15

## 2019-10-16 ENCOUNTER — MYC MEDICAL ADVICE (OUTPATIENT)
Dept: FAMILY MEDICINE | Facility: CLINIC | Age: 69
End: 2019-10-16

## 2019-10-16 NOTE — TELEPHONE ENCOUNTER
Routing to management to advise. Pharmacy is actually supposed to let patient know once the prescription is ready to . I am unsure if there is anything we can do on our end to fix this.       Oriana Cesar RN, BSN, PHN

## 2019-10-17 ENCOUNTER — THERAPY VISIT (OUTPATIENT)
Dept: PHYSICAL THERAPY | Facility: CLINIC | Age: 69
End: 2019-10-17
Payer: COMMERCIAL

## 2019-10-17 DIAGNOSIS — M25.519 PAIN IN JOINT, SHOULDER REGION: ICD-10-CM

## 2019-10-17 DIAGNOSIS — M54.6 RIGHT-SIDED THORACIC BACK PAIN: ICD-10-CM

## 2019-10-17 PROCEDURE — 97110 THERAPEUTIC EXERCISES: CPT | Mod: GP | Performed by: PHYSICAL THERAPIST

## 2019-10-24 ENCOUNTER — ONCOLOGY VISIT (OUTPATIENT)
Dept: ONCOLOGY | Facility: CLINIC | Age: 69
End: 2019-10-24
Payer: COMMERCIAL

## 2019-10-24 ENCOUNTER — INFUSION THERAPY VISIT (OUTPATIENT)
Dept: INFUSION THERAPY | Facility: CLINIC | Age: 69
End: 2019-10-24
Payer: COMMERCIAL

## 2019-10-24 VITALS
DIASTOLIC BLOOD PRESSURE: 75 MMHG | SYSTOLIC BLOOD PRESSURE: 105 MMHG | BODY MASS INDEX: 33.75 KG/M2 | OXYGEN SATURATION: 98 % | HEART RATE: 85 BPM | TEMPERATURE: 98.2 F | HEIGHT: 60 IN | RESPIRATION RATE: 16 BRPM | WEIGHT: 171.9 LBS

## 2019-10-24 VITALS
TEMPERATURE: 98.2 F | OXYGEN SATURATION: 98 % | SYSTOLIC BLOOD PRESSURE: 105 MMHG | HEIGHT: 60 IN | DIASTOLIC BLOOD PRESSURE: 75 MMHG | WEIGHT: 171.96 LBS | BODY MASS INDEX: 33.76 KG/M2 | HEART RATE: 85 BPM | RESPIRATION RATE: 16 BRPM

## 2019-10-24 DIAGNOSIS — Z79.811 AROMATASE INHIBITOR USE: ICD-10-CM

## 2019-10-24 DIAGNOSIS — F41.9 ANXIETY DISORDER, UNSPECIFIED TYPE: ICD-10-CM

## 2019-10-24 DIAGNOSIS — M81.8 OTHER OSTEOPOROSIS WITHOUT CURRENT PATHOLOGICAL FRACTURE: ICD-10-CM

## 2019-10-24 DIAGNOSIS — C50.211 MALIGNANT NEOPLASM OF UPPER-INNER QUADRANT OF RIGHT BREAST IN FEMALE, ESTROGEN RECEPTOR POSITIVE (H): ICD-10-CM

## 2019-10-24 DIAGNOSIS — Z17.0 MALIGNANT NEOPLASM OF UPPER-INNER QUADRANT OF RIGHT BREAST IN FEMALE, ESTROGEN RECEPTOR POSITIVE (H): ICD-10-CM

## 2019-10-24 DIAGNOSIS — Z12.31 ENCOUNTER FOR SCREENING MAMMOGRAM FOR BREAST CANCER: Primary | ICD-10-CM

## 2019-10-24 DIAGNOSIS — Z79.811 AROMATASE INHIBITOR USE: Primary | ICD-10-CM

## 2019-10-24 LAB
ALBUMIN SERPL-MCNC: 3.5 G/DL (ref 3.4–5)
ALP SERPL-CCNC: 105 U/L (ref 40–150)
ALT SERPL W P-5'-P-CCNC: 21 U/L (ref 0–50)
ANION GAP SERPL CALCULATED.3IONS-SCNC: 2 MMOL/L (ref 3–14)
AST SERPL W P-5'-P-CCNC: 19 U/L (ref 0–45)
BILIRUB SERPL-MCNC: 0.4 MG/DL (ref 0.2–1.3)
BUN SERPL-MCNC: 10 MG/DL (ref 7–30)
CALCIUM SERPL-MCNC: 9.2 MG/DL (ref 8.5–10.1)
CHLORIDE SERPL-SCNC: 109 MMOL/L (ref 94–109)
CO2 SERPL-SCNC: 30 MMOL/L (ref 20–32)
CREAT SERPL-MCNC: 0.62 MG/DL (ref 0.52–1.04)
GFR SERPL CREATININE-BSD FRML MDRD: >90 ML/MIN/{1.73_M2}
GLUCOSE SERPL-MCNC: 96 MG/DL (ref 70–99)
POTASSIUM SERPL-SCNC: 4.1 MMOL/L (ref 3.4–5.3)
PROT SERPL-MCNC: 7.2 G/DL (ref 6.8–8.8)
SODIUM SERPL-SCNC: 141 MMOL/L (ref 133–144)

## 2019-10-24 PROCEDURE — 99207 ZZC NO CHARGE NURSE ONLY: CPT

## 2019-10-24 PROCEDURE — 36415 COLL VENOUS BLD VENIPUNCTURE: CPT | Performed by: INTERNAL MEDICINE

## 2019-10-24 PROCEDURE — 96372 THER/PROPH/DIAG INJ SC/IM: CPT | Performed by: NURSE PRACTITIONER

## 2019-10-24 PROCEDURE — 80053 COMPREHEN METABOLIC PANEL: CPT | Performed by: INTERNAL MEDICINE

## 2019-10-24 PROCEDURE — 99214 OFFICE O/P EST MOD 30 MIN: CPT | Mod: 25 | Performed by: NURSE PRACTITIONER

## 2019-10-24 RX ORDER — ANASTROZOLE 1 MG/1
1 TABLET ORAL DAILY
Qty: 90 TABLET | Refills: 1 | Status: SHIPPED | OUTPATIENT
Start: 2019-10-24 | End: 2020-01-14

## 2019-10-24 ASSESSMENT — PAIN SCALES - GENERAL
PAINLEVEL: MILD PAIN (2)
PAINLEVEL: MILD PAIN (2)

## 2019-10-24 ASSESSMENT — MIFFLIN-ST. JEOR
SCORE: 1240.25
SCORE: 1239.98

## 2019-10-24 NOTE — PATIENT INSTRUCTIONS
We will see you every 3-4 months  In the first 2 years then move on to every 6 months in year 3-5 while on the anastrozole.     We will complete labs yearly if not completed by primary care.    We will redo the dexa scan or bone density test next spring.

## 2019-10-24 NOTE — LETTER
10/24/2019         RE: Marixa Ann  [address] [address] [id number]        Dear Colleague,    Thank you for referring your patient, Marixa Ann, to the [facility]. Please see a copy of my visit note below.    Oncology Follow Up Visit: October 24, 2019     Oncologist: Dr Sil Dan  PCP: Jackie Kovacs    Diagnosis: Stage IB Right Breast Cancer  Marixa Ann is a 68 yo female who had abnormality at 2-4 oclock level of right breaston screening mammogram in 12/2018. Final review proved a 12 x 9 x14 mm invasive ductal carcinoma at 2 oclock to the right breast, [place] grade 1, ER/TN positive, Her2 negative with 0/3 SLN positive and edges free of disease.   Oncotype score =4 or 3% risk of disease at 9 years with hormone therapy.   Treatment:   1/10/2019 Right Lumpectomy with SLN biopsy  3/11/2019 completed right breast radiation post lumpectomy.   - choice made to not use Tamoxifen due to risk for DVT(repeatedly elevated d-dimer as well as elevated ESR and CRP) so started pt on Aromatase inhibitor with close bone evaluation    Interval History: Ms. Ann comes to clinic alone for follow up to her aromatase inhibitor use for her breast cancer. Pt shares she has been feeling anxious as usual but continues using the Arimidex daily with side effects noted to be chills and random joint pains. She is otherwise feeling occasional breast pain at lumpectomy site but no new masses or discharge. She admits she is not active and more sedentary and worries about blood clots. She is taking additional vitamin D she admits as well as her expected calcium supplement with Vit D.   Rest of comprehensive and complete ROS is reviewed and is negative.   Past Medical History:   Diagnosis Date     ADHD      Breast cancer (H) 12/26/2018    Right Breast     Chronic fatigue      Fibromyalgia      Hard of hearing      Osteoporosis      S/P radiation therapy     4,256 cGy to right  "breast completed on 03/11/2019 - [facility] with Dr. Copeland     Current Outpatient Medications   Medication     acetaminophen (TYLENOL) 325 MG tablet     amphetamine-dextroamphetamine (ADDERALL) 10 MG tablet     anastrozole (ARIMIDEX) 1 MG tablet     B Complex Vitamins (VITAMIN B-COMPLEX PO)     calcium carbonate (TUMS) 500 MG chewable tablet     Calcium-Magnesium-Zinc 333-133-5 MG TABS per tablet     Cholecalciferol (VITAMIN D-3 PO)     cyclobenzaprine (FLEXERIL) 5 MG tablet     denosumab (PROLIA) 60 MG/ML SOSY injection     KRILL OIL PO     magnesium oxide 200 MG TABS     Multiple Vitamins-Minerals (MULTIVITAMIN PO)     RANITIDINE HCL PO     vitamin B-12 (CYANOCOBALAMIN) 100 MCG tablet     Current Facility-Administered Medications   Medication     cross-Linked Hyaluronate (GEL-ONE) injection PRSY 30 mg     cross-Linked Hyaluronate (GEL-ONE) injection PRSY 30 mg     triamcinolone (KENALOG-40) injection 80 mg     Allergies   Allergen Reactions     Vicodin [Hydrocodone-Acetaminophen] Anaphylaxis     Dilaudid [Hydromorphone] Nausea and Vomiting     Oxycodone Nausea and Vomiting     Dust Mites      Milk [Lac Bovis] Diarrhea       Physical Exam:/75 (BP Location: Left arm)   Pulse 85   Temp 98.2  F (36.8  C) (Oral)   Resp 16   Ht 1.53 m (5' 0.24\")   Wt 78 kg (171 lb 14.4 oz)   LMP 05/31/2003 (Approximate)   SpO2 98%   BMI 33.31 kg/m      ECOG PS- 0  Constitutional: Alert, obese but moving well- in no distress as seated.   ENT: Eyes bright, No mouth sores  Respiratory- breathing easy, no distress - talking in full sentences  Cardiac: Heart rate and rhythm is regular without murmurs.  Breasts:bilaterally breasts without new masses or discharge but has mild tenderness over lumpectomy site.   MS: Muscle tone normal, extremities normal with no edema.   Psych: Mentation appears normal and affect anxious- asking many questions    Laboratory Results:   Results for orders placed or performed in visit on " 10/24/19   Comprehensive metabolic panel   Result Value Ref Range    Sodium 141 133 - 144 mmol/L    Potassium 4.1 3.4 - 5.3 mmol/L    Chloride 109 94 - 109 mmol/L    Carbon Dioxide 30 20 - 32 mmol/L    Anion Gap 2 (L) 3 - 14 mmol/L    Glucose 96 70 - 99 mg/dL    Urea Nitrogen 10 7 - 30 mg/dL    Creatinine 0.62 0.52 - 1.04 mg/dL    GFR Estimate >90 >60 mL/min/[1.73_m2]    GFR Estimate If Black >90 >60 mL/min/[1.73_m2]    Calcium 9.2 8.5 - 10.1 mg/dL    Bilirubin Total 0.4 0.2 - 1.3 mg/dL    Albumin 3.5 3.4 - 5.0 g/dL    Protein Total 7.2 6.8 - 8.8 g/dL    Alkaline Phosphatase 105 40 - 150 U/L    ALT 21 0 - 50 U/L    AST 19 0 - 45 U/L     Assessment and Plan:   Stage 1B Right Breast Cancer- Pt completed right lumpectomy and SLN biopsy, radiation therapy. She began endocrine therapy with   Tamoxifen since has high risk for DVTs and therefore moved to Arimidex and pt has had no new symptoms of blood clot.she is otherwise tolerating treatment well.   She will continue the anastrozole.   She will return in 3-4 months for review with no labs necessary.  Last Mammogram was completed in2/2019 and was normal- will repeat in February and try to combine mammogram with next visit for convenience.   Has cholesterol checked yearly by PCP with know hyperlipidemia.   Osteoporosis- 4/2018 DEXA result showing T score of -2.9 equalling osteoporosis. Pt started Prolia every 6 months and is due and will continue with administration today-she verifies she is on adequate calcium and vitamin D daily.    Encouraged weight bearing exercises to strengthen bones.   Anxiety with ADHD- .Admits she does ask many questions but is needing much reassurance. Likes things in writing. Pt on Adderall  This was a 30 min visit with > 50% in counseling and coordinating care including education and management of concerns.    Lidia Parham,CNP    Again, thank you for allowing me to participate in the care of your patient.        Sincerely,        Lidia OROURKE  OCHOA Parham, APRN CNP

## 2019-10-24 NOTE — NURSING NOTE
"Oncology Rooming Note    October 24, 2019 12:48 PM   Marixa Ann is a 69 year old female who presents for:    Chief Complaint   Patient presents with     Oncology Clinic Visit     Follow up     Initial Vitals: /75 (BP Location: Left arm)   Pulse 85   Temp 98.2  F (36.8  C) (Oral)   Resp 16   Ht 1.53 m (5' 0.24\")   Wt 78 kg (171 lb 14.4 oz)   LMP 05/31/2003 (Approximate)   SpO2 98%   BMI 33.31 kg/m   Estimated body mass index is 33.31 kg/m  as calculated from the following:    Height as of this encounter: 1.53 m (5' 0.24\").    Weight as of this encounter: 78 kg (171 lb 14.4 oz). Body surface area is 1.82 meters squared.  Mild Pain (2) Comment: Data Unavailable   Patient's last menstrual period was 05/31/2003 (approximate).  Allergies reviewed: Yes  Medications reviewed: Yes    Medications: MEDICATION REFILLS NEEDED TODAY. Provider was notified.  Pharmacy name entered into [facility]:    [id number] - [place], [place] - [address]  WRITTEN PRESCRIPTION REQUESTED    Gema Escalona LPN              "

## 2019-10-24 NOTE — PROGRESS NOTES
Infusion Nursing Note:  Marixa Ann presents today for Prolia.    Patient seen by provider today: Yes: Lidia Parham CNP   present during visit today: Not Applicable.    Note: Patient assessed by JOSEPH Murillo, prior to injection. Patient is taking calcium and vitamin C. Denies upcoming invasive dental procedures.     Intravenous Access:  No Intravenous access/labs at this visit.  Labs drawn without difficulty.    Treatment Conditions:  Lab Results   Component Value Date     10/24/2019                   Lab Results   Component Value Date    POTASSIUM 4.1 10/24/2019           Lab Results   Component Value Date    MAG 2.0 01/04/2019            Lab Results   Component Value Date    CR 0.62 10/24/2019                   Lab Results   Component Value Date    JONATHON 9.2 10/24/2019                Lab Results   Component Value Date    BILITOTAL 0.4 10/24/2019           Lab Results   Component Value Date    ALBUMIN 3.5 10/24/2019                    Lab Results   Component Value Date    ALT 21 10/24/2019           Lab Results   Component Value Date    AST 19 10/24/2019       Results reviewed, labs MET treatment parameters, ok to proceed with treatment.      Post Infusion Assessment:  Patient tolerated injection without incident.  Site patent and intact, free from redness, edema or discomfort.       Discharge Plan:   Patient and/or family verbalized understanding of discharge instructions and all questions answered.  Copy of AVS reviewed with patient and/or family. Patient prefers to call for scheduling.  Patient discharged in stable condition accompanied by: self.  Departure Mode: Ambulatory.    Marialuisa Reed LPN

## 2019-10-24 NOTE — PROGRESS NOTES
Oncology Follow Up Visit: October 24, 2019     Oncologist: Dr Sil Dan  PCP: Jackie Kovacs    Diagnosis: Stage IB Right Breast Cancer  Marixa Ann is a 68 yo female who had abnormality at 2-4 oclock level of right breaston screening mammogram in 12/2018. Final review proved a 12 x 9 x14 mm invasive ductal carcinoma at 2 oclock to the right breast, [place] grade 1, ER/MD positive, Her2 negative with 0/3 SLN positive and edges free of disease.   Oncotype score =4 or 3% risk of disease at 9 years with hormone therapy.   Treatment:   1/10/2019 Right Lumpectomy with SLN biopsy  3/11/2019 completed right breast radiation post lumpectomy.   - choice made to not use Tamoxifen due to risk for DVT(repeatedly elevated d-dimer as well as elevated ESR and CRP) so started pt on Aromatase inhibitor with close bone evaluation    Interval History: Ms. Ann comes to clinic alone for follow up to her aromatase inhibitor use for her breast cancer. Pt shares she has been feeling anxious as usual but continues using the Arimidex daily with side effects noted to be chills and random joint pains. She is otherwise feeling occasional breast pain at lumpectomy site but no new masses or discharge. She admits she is not active and more sedentary and worries about blood clots. She is taking additional vitamin D she admits as well as her expected calcium supplement with Vit D.   Rest of comprehensive and complete ROS is reviewed and is negative.   Past Medical History:   Diagnosis Date     ADHD      Breast cancer (H) 12/26/2018    Right Breast     Chronic fatigue      Fibromyalgia      Hard of hearing      Osteoporosis      S/P radiation therapy     4,256 cGy to right breast completed on 03/11/2019 - [facility] with Dr. Copeland     Current Outpatient Medications   Medication     acetaminophen (TYLENOL) 325 MG tablet     amphetamine-dextroamphetamine (ADDERALL) 10 MG tablet     anastrozole (ARIMIDEX) 1 MG tablet      "B Complex Vitamins (VITAMIN B-COMPLEX PO)     calcium carbonate (TUMS) 500 MG chewable tablet     Calcium-Magnesium-Zinc 333-133-5 MG TABS per tablet     Cholecalciferol (VITAMIN D-3 PO)     cyclobenzaprine (FLEXERIL) 5 MG tablet     denosumab (PROLIA) 60 MG/ML SOSY injection     KRILL OIL PO     magnesium oxide 200 MG TABS     Multiple Vitamins-Minerals (MULTIVITAMIN PO)     RANITIDINE HCL PO     vitamin B-12 (CYANOCOBALAMIN) 100 MCG tablet     Current Facility-Administered Medications   Medication     cross-Linked Hyaluronate (GEL-ONE) injection PRSY 30 mg     cross-Linked Hyaluronate (GEL-ONE) injection PRSY 30 mg     triamcinolone (KENALOG-40) injection 80 mg     Allergies   Allergen Reactions     Vicodin [Hydrocodone-Acetaminophen] Anaphylaxis     Dilaudid [Hydromorphone] Nausea and Vomiting     Oxycodone Nausea and Vomiting     Dust Mites      Milk [Lac Bovis] Diarrhea       Physical Exam:/75 (BP Location: Left arm)   Pulse 85   Temp 98.2  F (36.8  C) (Oral)   Resp 16   Ht 1.53 m (5' 0.24\")   Wt 78 kg (171 lb 14.4 oz)   LMP 05/31/2003 (Approximate)   SpO2 98%   BMI 33.31 kg/m     ECOG PS- 0  Constitutional: Alert, obese but moving well- in no distress as seated.   ENT: Eyes bright, No mouth sores  Respiratory- breathing easy, no distress - talking in full sentences  Cardiac: Heart rate and rhythm is regular without murmurs.  Breasts:bilaterally breasts without new masses or discharge but has mild tenderness over lumpectomy site.   MS: Muscle tone normal, extremities normal with no edema.   Psych: Mentation appears normal and affect anxious- asking many questions    Laboratory Results:   Results for orders placed or performed in visit on 10/24/19   Comprehensive metabolic panel   Result Value Ref Range    Sodium 141 133 - 144 mmol/L    Potassium 4.1 3.4 - 5.3 mmol/L    Chloride 109 94 - 109 mmol/L    Carbon Dioxide 30 20 - 32 mmol/L    Anion Gap 2 (L) 3 - 14 mmol/L    Glucose 96 70 - 99 mg/dL    " Urea Nitrogen 10 7 - 30 mg/dL    Creatinine 0.62 0.52 - 1.04 mg/dL    GFR Estimate >90 >60 mL/min/[1.73_m2]    GFR Estimate If Black >90 >60 mL/min/[1.73_m2]    Calcium 9.2 8.5 - 10.1 mg/dL    Bilirubin Total 0.4 0.2 - 1.3 mg/dL    Albumin 3.5 3.4 - 5.0 g/dL    Protein Total 7.2 6.8 - 8.8 g/dL    Alkaline Phosphatase 105 40 - 150 U/L    ALT 21 0 - 50 U/L    AST 19 0 - 45 U/L     Assessment and Plan:   Stage 1B Right Breast Cancer- Pt completed right lumpectomy and SLN biopsy, radiation therapy. She began endocrine therapy with   Tamoxifen since has high risk for DVTs and therefore moved to Arimidex and pt has had no new symptoms of blood clot.she is otherwise tolerating treatment well.   She will continue the anastrozole.   She will return in 3-4 months for review with no labs necessary.  Last Mammogram was completed in2/2019 and was normal- will repeat in February and try to combine mammogram with next visit for convenience.   Has cholesterol checked yearly by PCP with know hyperlipidemia.   Osteoporosis- 4/2018 DEXA result showing T score of -2.9 equalling osteoporosis. Pt started Prolia every 6 months and is due and will continue with administration today-she verifies she is on adequate calcium and vitamin D daily.    Encouraged weight bearing exercises to strengthen bones.   Anxiety with ADHD- .Admits she does ask many questions but is needing much reassurance. Likes things in writing. Pt on Adderall  This was a 30 min visit with > 50% in counseling and coordinating care including education and management of concerns.    Lidia Parham,CNP

## 2019-11-01 ENCOUNTER — OFFICE VISIT (OUTPATIENT)
Dept: FAMILY MEDICINE | Facility: CLINIC | Age: 69
End: 2019-11-01
Payer: COMMERCIAL

## 2019-11-01 VITALS
HEIGHT: 62 IN | OXYGEN SATURATION: 100 % | HEART RATE: 104 BPM | BODY MASS INDEX: 31.65 KG/M2 | TEMPERATURE: 98.6 F | WEIGHT: 172 LBS | DIASTOLIC BLOOD PRESSURE: 51 MMHG | SYSTOLIC BLOOD PRESSURE: 95 MMHG | RESPIRATION RATE: 14 BRPM

## 2019-11-01 DIAGNOSIS — Z17.0 MALIGNANT NEOPLASM OF UPPER-INNER QUADRANT OF RIGHT BREAST IN FEMALE, ESTROGEN RECEPTOR POSITIVE (H): ICD-10-CM

## 2019-11-01 DIAGNOSIS — F90.0 ATTENTION DEFICIT HYPERACTIVITY DISORDER (ADHD), PREDOMINANTLY INATTENTIVE TYPE: Primary | ICD-10-CM

## 2019-11-01 DIAGNOSIS — C50.211 MALIGNANT NEOPLASM OF UPPER-INNER QUADRANT OF RIGHT BREAST IN FEMALE, ESTROGEN RECEPTOR POSITIVE (H): ICD-10-CM

## 2019-11-01 DIAGNOSIS — F33.1 MODERATE RECURRENT MAJOR DEPRESSION (H): ICD-10-CM

## 2019-11-01 DIAGNOSIS — E78.5 HYPERLIPIDEMIA LDL GOAL <160: ICD-10-CM

## 2019-11-01 PROCEDURE — 99214 OFFICE O/P EST MOD 30 MIN: CPT | Performed by: FAMILY MEDICINE

## 2019-11-01 RX ORDER — DEXTROAMPHETAMINE SACCHARATE, AMPHETAMINE ASPARTATE MONOHYDRATE, DEXTROAMPHETAMINE SULFATE AND AMPHETAMINE SULFATE 5; 5; 5; 5 MG/1; MG/1; MG/1; MG/1
20 CAPSULE, EXTENDED RELEASE ORAL DAILY
Qty: 30 CAPSULE | Refills: 0 | Status: SHIPPED | OUTPATIENT
Start: 2019-11-01 | End: 2019-12-09 | Stop reason: SINTOL

## 2019-11-01 ASSESSMENT — ANXIETY QUESTIONNAIRES
GAD7 TOTAL SCORE: 13
2. NOT BEING ABLE TO STOP OR CONTROL WORRYING: NEARLY EVERY DAY
3. WORRYING TOO MUCH ABOUT DIFFERENT THINGS: NEARLY EVERY DAY
1. FEELING NERVOUS, ANXIOUS, OR ON EDGE: MORE THAN HALF THE DAYS
6. BECOMING EASILY ANNOYED OR IRRITABLE: SEVERAL DAYS
7. FEELING AFRAID AS IF SOMETHING AWFUL MIGHT HAPPEN: NEARLY EVERY DAY
5. BEING SO RESTLESS THAT IT IS HARD TO SIT STILL: SEVERAL DAYS

## 2019-11-01 ASSESSMENT — PATIENT HEALTH QUESTIONNAIRE - PHQ9
SUM OF ALL RESPONSES TO PHQ QUESTIONS 1-9: 18
5. POOR APPETITE OR OVEREATING: NOT AT ALL

## 2019-11-01 ASSESSMENT — MIFFLIN-ST. JEOR: SCORE: 1268.44

## 2019-11-01 ASSESSMENT — PAIN SCALES - GENERAL: PAINLEVEL: NO PAIN (0)

## 2019-11-01 NOTE — PROGRESS NOTES
Subjective     Marixa Ann is a 69 year old female who presents to clinic today for the following health issues:    HPI     Depression and Anxiety Follow-Up    How are you doing with your depression since your last visit? Worsened ,Vision has decreased    How are you doing with your anxiety since your last visit?  Worsened     Are you having other symptoms that might be associated with depression or anxiety? No    Have you had a significant life event? Health Concerns     Do you have any concerns with your use of alcohol or other drugs? No     Feels like the gray rainy weather has been making this worse. Hard of hearing and sight is bad due to macular degeneration.    History of adverse reactions to Wellbutrin and Prozac.     Chemotherapy for cancer and stopped Adderall but restarted during radiation therapy.     Adderall dose questions.    Social History     Tobacco Use     Smoking status: Never Smoker     Smokeless tobacco: Never Used   Substance Use Topics     Alcohol use: No     Frequency: Never     Drug use: No     PHQ 9/13/2018 4/25/2019 5/29/2019   PHQ-9 Total Score 23 22 7   Q9: Thoughts of better off dead/self-harm past 2 weeks More than half the days Nearly every day Not at all     DONOVAN-7 SCORE 9/13/2018 4/25/2019 5/29/2019   Total Score 10 8 5     Last PHQ-9 11/1/2019   1.  Little interest or pleasure in doing things 2   2.  Feeling down, depressed, or hopeless 3   3.  Trouble falling or staying asleep, or sleeping too much 2   4.  Feeling tired or having little energy 2   5.  Poor appetite or overeating 1   6.  Feeling bad about yourself 3   7.  Trouble concentrating 2   8.  Moving slowly or restless 1   Q9: Thoughts of better off dead/self-harm past 2 weeks 2   PHQ-9 Total Score 18   Difficulty at work, home, or with people Very difficult     DONOVAN-7  11/1/2019   1. Feeling nervous, anxious, or on edge 2   2. Not being able to stop or control worrying 3   3. Worrying too much about different things 3    4. Trouble relaxing 0   5. Being so restless that it is hard to sit still 1   6. Becoming easily annoyed or irritable 1   7. Feeling afraid, as if something awful might happen 3   DONOVAN-7 Total Score 13   If you checked any problems, how difficult have they made it for you to do your work, take care of things at home, or get along with other people? -         Suicide Assessment Five-step Evaluation and Treatment (SAFE-T)      How many servings of fruits and vegetables do you eat daily?  2-3    On average, how many sweetened beverages do you drink each day (soda, juice, sweet tea, etc)?   2-3 per week    How many days per week do you miss taking your medication? 0    Medication Followup of  Adderall     Taking Medication as prescribed: yes    Side Effects:  None    Medication Helping Symptoms:  Yes , Would like to increase the dose . Would like another brand , Insurance doesn't cover the current brand .       Patient Active Problem List   Diagnosis     Pain in joint, shoulder region     Moderate recurrent major depression (H)     Hyperlipidemia LDL goal <160     Chronic fatigue disorder     Liver lesion     Cystic disease of liver     Gastroesophageal reflux disease with esophagitis     Hiatal hernia     Age-related cataract of both eyes, unspecified age-related cataract type     Primary osteoarthritis of both knees     Attention deficit hyperactivity disorder (ADHD), predominantly inattentive type     Malignant neoplasm of upper-inner quadrant of right breast in female, estrogen receptor positive (H)     Thyroid nodule     Anxiety disorder, unspecified type     Osteoporosis     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     Aromatase inhibitor use     Cervical cancer screening     History of cholecystectomy     Right-sided thoracic back pain     Past Surgical History:   Procedure Laterality Date     APPENDECTOMY  1967     BIOPSY BREAST NEEDLE LOCALIZATION Right 1/10/2019    Procedure: WIRE LOCALIZED RIGHT BREAST LUMPECTOMY WITH RIGHT  SLNB;  Surgeon: Gema Diamond MD;  Location: [facility] OR     BREAST BIOPSY, CORE RT/LT Right 12/26/2018     CATARACT IOL, RT/LT Bilateral 2018    with lens implants per patient     CHOLECYSTECTOMY  05/24/2019     THYROID BIOPSY  01/31/2019       Social History     Tobacco Use     Smoking status: Never Smoker     Smokeless tobacco: Never Used   Substance Use Topics     Alcohol use: No     Frequency: Never     Family History   Problem Relation Age of Onset     Leukemia Father      Breast Cancer Cousin 50        Maternal Female 1st Cousin         Current Outpatient Medications   Medication Sig Dispense Refill     acetaminophen (TYLENOL) 325 MG tablet Take 325-650 mg by mouth every 6 hours as needed for mild pain       amphetamine-dextroamphetamine (ADDERALL) 10 MG tablet Take 1 tablet (10 mg) by mouth 2 times daily 60 tablet 0     anastrozole (ARIMIDEX) 1 MG tablet Take 1 tablet (1 mg) by mouth daily 90 tablet 1     B Complex Vitamins (VITAMIN B-COMPLEX PO) Take by mouth daily       calcium carbonate (TUMS) 500 MG chewable tablet Take 1 chew tab by mouth 2 times daily       Calcium-Magnesium-Zinc 333-133-5 MG TABS per tablet Take 1 tablet by mouth daily       Cholecalciferol (VITAMIN D-3 PO) Vitamin D is included with multiple vitamin, patient does not take additional. Marjorie Crooks LPN on 7/26/2019 at 3:02 PM       cyclobenzaprine (FLEXERIL) 5 MG tablet Take 1 tablet (5 mg) by mouth daily as needed for muscle spasms 20 tablet 1     denosumab (PROLIA) 60 MG/ML SOSY injection        KRILL OIL PO Take by mouth daily       magnesium oxide 200 MG TABS Take 100 mg by mouth once as needed       Multiple Vitamins-Minerals (MULTIVITAMIN PO)        RANITIDINE HCL PO Take by mouth as needed        vitamin B-12 (CYANOCOBALAMIN) 100 MCG tablet Take 1,000 mcg by mouth daily       Allergies   Allergen Reactions     Vicodin [Hydrocodone-Acetaminophen] Anaphylaxis     Dilaudid [Hydromorphone] Nausea and Vomiting     Oxycodone Nausea  "and Vomiting     Dust Mites      Milk [Lac Bovis] Diarrhea     Recent Labs   Lab Test 10/24/19  1155 05/29/19  1112  03/05/19  1405  10/19/17  1412   LDL  --   --   --   --   --  98   HDL  --   --   --   --   --  40*   TRIG  --   --   --   --   --  82   ALT 21 40  --  21  --  26   CR 0.62 0.64   < >  --    < > 0.72   GFRESTIMATED >90 >90   < >  --    < > 81   GFRESTBLACK >90 >90   < >  --    < > >90   POTASSIUM 4.1 3.9  --   --    < > 3.9   TSH  --   --   --   --   --  1.35    < > = values in this interval not displayed.      BP Readings from Last 3 Encounters:   11/01/19 95/51   10/24/19 105/75   10/24/19 105/75    Wt Readings from Last 3 Encounters:   11/01/19 78 kg (172 lb)   10/24/19 78 kg (171 lb 15.3 oz)   10/24/19 78 kg (171 lb 14.4 oz)                    Reviewed and updated as needed this visit by Provider         Review of Systems   ROS COMP: Constitutional, HEENT, cardiovascular, pulmonary, gi and gu systems are negative, except as otherwise noted.      Objective    BP 95/51 (BP Location: Left arm, Patient Position: Sitting, Cuff Size: Adult Large)   Pulse 104   Temp 98.6  F (37  C) (Oral)   Resp 14   Ht 1.575 m (5' 2\")   Wt 78 kg (172 lb)   LMP 05/31/2003 (Approximate)   SpO2 100%   BMI 31.46 kg/m    Body mass index is 31.46 kg/m .  Physical Exam   GENERAL APPEARANCE: healthy, alert and no distress  EYES: Eyes grossly normal to inspection, PERRL and conjunctivae and sclerae normal  HENT: ear canals and TM's normal, nose and mouth without ulcers or lesions, oropharynx clear and oral mucous membranes moist  NECK: no adenopathy, no asymmetry, masses, or scars and thyroid normal to palpation  RESP: lungs clear to auscultation - no rales, rhonchi or wheezes  CV: regular rates and rhythm, normal S1 S2, no S3 or S4, no murmur, click or rub, no peripheral edema and peripheral pulses strong  ABDOMEN: soft, nontender, no hepatosplenomegaly, no masses and bowel sounds normal  MS: no musculoskeletal defects " "are noted and gait is age appropriate without ataxia  SKIN: no suspicious lesions or rashes  NEURO: Normal strength and tone, sensory exam grossly normal, mentation intact and speech normal  PSYCH: mentation appears normal and affect normal/bright     Diagnostic Test Results:  Labs reviewed in Epic  No results found for any visits on 11/01/19.        Assessment & Plan       ICD-10-CM    1. Attention deficit hyperactivity disorder (ADHD), predominantly inattentive type F90.0 amphetamine-dextroamphetamine (ADDERALL XR) 20 MG 24 hr capsule- will switch to higher long acting dose in am to see if this helps symptoms. Continue 10 mg booster dose in afternoon as needed. Has enough 10 mg dose for now.    2. Hyperlipidemia LDL goal <160 E78.5 Stable    3. Malignant neoplasm of upper-inner quadrant of right breast in female, estrogen receptor positive (H) C50.211 Finished chemotherapy, radiation, and now on Arimidex therapy. Some lymphedema.     Z17.0    Major depression related to loss of function- macular degeneration, loss of hearing. Has a therapist twice a week. History of reactions to antidepressants. 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      BMI:   Estimated body mass index is 31.46 kg/m  as calculated from the following:    Height as of this encounter: 1.575 m (5' 2\").    Weight as of this encounter: 78 kg (172 lb).   Weight management plan: Discussed healthy diet and exercise guidelines        CONSULTATION/REFERRAL to mental health  FUTURE LABS:       - Schedule fasting labs in 3 months  FUTURE APPOINTMENTS:       - Follow-up visit in 1 month to review medications.   Work on weight loss  Regular exercise  See Patient Instructions    No follow-ups on file.    Jackie Kovacs MD  [facility]    "

## 2019-11-01 NOTE — PATIENT INSTRUCTIONS
At [facility], we strive to deliver an exceptional experience to you, every time we see you. If you receive a survey, please complete it as we do value your feedback.  If you have MyChart, you can expect to receive results automatically within 24 hours of their completion.  Your provider will send a note interpreting your results as well.   If you do not have MyChart, you should receive your results in about a week by mail.    Your care team:                            Family Medicine Internal Medicine   MD Kyle Mancera MD Shantel Branch-Fleming, MD Katya Georgiev PA-C Megan Hill, APRN JOSEPH Kauffman, MD Pediatrics   Emigdio Robert, PAKAYDEN Ralph, MD Kayley Millan APRN CNP   MD Alisa Encinas MD Deborah Mielke, MD Kim Thein, APRN CNP      Clinic hours: Monday - Thursday 7 am-7 pm; Fridays 7 am-5 pm.   Urgent care: Monday - Friday 11 am-9 pm; Saturday and Sunday 9 am-5 pm.  Pharmacy : Monday -Thursday 8 am-8 pm; Friday 8 am-6 pm; Saturday and Sunday 9 am-5 pm.     Clinic: [phone]   Pharmacy: [phone]        Patient Education     Attention-Deficit/Hyperactivity Disorder (ADHD) in Adults  You ve always had trouble concentrating. Your mind wanders, and it s hard to finish tasks. As a result, you didn t do well in school. And now, you often struggle with your job. Sometimes this makes you rodriguez or depressed. These may be symptoms of attention-deficit/hyperactivity disorder (ADHD). To find out more, talk to your healthcare provider. He or she can offer guidance and support.  Symptoms  of ADHD in adults  For an adult to be diagnosed with ADHD, the symptoms must have been present since childhood. The symptoms may include:    Trouble thinking things through    Low self-esteem    Depression    Trouble holding a job    Memory problems    Problems with a marriage or relationship    Lack of discipline   What is  ADHD?  Attention-deficit/hyperactivity disorder makes it hard to focus your mind. You may daydream a lot. And you may be restless much of the time. As a result, you may have trouble with detailed or boring work. And it may be hard to stick with one project for very long. You also may forget things. Or, you may miss key points during a lecture or meeting. You may even have trouble sitting through a movie or concert. At times, you may feel frustrated or angry. This can affect your relationships with others.  Who does it affect?  ADHD starts in childhood. Sometimes, your symptoms may improve as you get older. But they also may persist into your adult years. ADHD is often thought of as a  kid s problem.  That s why it s often missed in adults. In fact, many parents learn they have ADHD when their children are diagnosed.  What causes it?  The exact cause of ADHD isn t known. The disorder does run in families. Having one parent with ADHD makes it more likely you ll have it too. And the part of your brain that controls attention may be involved. Certain brain chemicals that are out of balance may also play a role.  What can be done?  The first step is finding out if you really have ADHD. Your doctor will use special guidelines to diagnose the disorder. Most adults with ADHD are greatly helped by therapy and coaching. In some cases, your doctor may also prescribe medicine to ease your symptoms.  Resources    National Resource Center on AD/HDwww.lint8gxyf.org    Attention Deficit Disorder Associationwww.add.org   Date Last Reviewed: 1/1/2017 2000-2018 [employer]. [address] [place] [address], [place] [address]. All rights reserved. This information is not intended as a substitute for professional medical care. Always follow your healthcare professional's instructions.

## 2019-11-02 ASSESSMENT — ANXIETY QUESTIONNAIRES: GAD7 TOTAL SCORE: 13

## 2019-11-04 ENCOUNTER — OFFICE VISIT (OUTPATIENT)
Dept: OPHTHALMOLOGY | Facility: CLINIC | Age: 69
End: 2019-11-04
Payer: COMMERCIAL

## 2019-11-04 DIAGNOSIS — Z96.1 PSEUDOPHAKIA OF BOTH EYES: Primary | ICD-10-CM

## 2019-11-04 PROCEDURE — 92014 COMPRE OPH EXAM EST PT 1/>: CPT | Performed by: OPHTHALMOLOGY

## 2019-11-04 ASSESSMENT — TONOMETRY
IOP_METHOD: TONOPEN
OS_IOP_MMHG: 19
OD_IOP_MMHG: 18

## 2019-11-04 ASSESSMENT — REFRACTION_MANIFEST
OS_SPHERE: +0.50
OD_SPHERE: +0.25
OD_CYLINDER: +0.75
OD_AXIS: 020
OD_SPHERE: PLANO
OS_SPHERE: +0.75
OS_CYLINDER: +1.50
OS_CYLINDER: SPHERE
OD_AXIS: 028
OD_CYLINDER: +0.75

## 2019-11-04 ASSESSMENT — REFRACTION_WEARINGRX
OS_ADD: +2.50
OD_ADD: +2.50
SPECS_TYPE: PAL
OS_AXIS: 087
OD_CYLINDER: +0.50
OD_SPHERE: -0.50
OS_CYLINDER: +0.75
OS_SPHERE: -0.25
OD_AXIS: 086

## 2019-11-04 ASSESSMENT — VISUAL ACUITY
CORRECTION_TYPE: GLASSES
OS_CC: 20/20
OD_CC: 20/20
METHOD: SNELLEN - LINEAR

## 2019-11-04 ASSESSMENT — CONF VISUAL FIELD
OS_NORMAL: 1
OD_NORMAL: 1

## 2019-11-04 NOTE — NURSING NOTE
Chief Complaints and History of Present Illnesses   Patient presents with     Annual Eye Exam      Chief Complaint(s) and History of Present Illness(es)     Annual Eye Exam     Laterality: both eyes    Quality: blurred vision    Associated symptoms: eye pain, photophobia and tearing    Treatments tried: eye drops    Pain scale: 2/10              Comments     New patient annual eye exam both eyes.  Patient has had cataract surgery each eye performed by Dr. Daley at [facility] in 2018.  Complains she has not been able to see clearly since IOL surgery.  Sensitive to light.  Pain near tear duct on right eye.  Possible laser done to left eye after IOL surgery.  History of : IOL each eye 2018  Eye meds: Theradrops each eye TID  LORENA Velazco 11/4/2019 3:08 PM

## 2019-11-07 ENCOUNTER — TELEPHONE (OUTPATIENT)
Dept: OPHTHALMOLOGY | Facility: CLINIC | Age: 69
End: 2019-11-07

## 2019-11-07 NOTE — TELEPHONE ENCOUNTER
[facility] Call Center    Phone Message    May a detailed message be left on voicemail: yes    Reason for Call: Other: Patient called and states that when she went to  her glasses with the new eye prescription, they were not bi-focals. Patient would like call back to discuss why. Patient is hard of hearing, and thinks maybe she misheard why Juwan did not prescribe the bi-focals. Patient also said she has pain in her eye and is not sure if they went over what to do about that. Patient said best time to call back on Friday is before 11:30 and after 2. Please call to discuss.      Action Taken: Message routed to:  Adult Clinics: Eye p [phone]

## 2019-11-12 ASSESSMENT — EXTERNAL EXAM - LEFT EYE: OS_EXAM: NORMAL

## 2019-11-12 ASSESSMENT — REFRACTION_MANIFEST
METHOD_AUTOREFRACTION: 1
OS_AXIS: 109

## 2019-11-12 ASSESSMENT — EXTERNAL EXAM - RIGHT EYE: OD_EXAM: NORMAL

## 2019-11-12 ASSESSMENT — SLIT LAMP EXAM - LIDS
COMMENTS: NORMAL
COMMENTS: NORMAL

## 2019-11-20 ENCOUNTER — MYC MEDICAL ADVICE (OUTPATIENT)
Dept: FAMILY MEDICINE | Facility: CLINIC | Age: 69
End: 2019-11-20

## 2019-11-21 ENCOUNTER — THERAPY VISIT (OUTPATIENT)
Dept: PHYSICAL THERAPY | Facility: CLINIC | Age: 69
End: 2019-11-21
Payer: COMMERCIAL

## 2019-11-21 DIAGNOSIS — M54.6 RIGHT-SIDED THORACIC BACK PAIN: ICD-10-CM

## 2019-11-21 DIAGNOSIS — M25.519 PAIN IN JOINT, SHOULDER REGION: ICD-10-CM

## 2019-11-21 PROCEDURE — 97140 MANUAL THERAPY 1/> REGIONS: CPT | Mod: GP | Performed by: PHYSICAL THERAPIST

## 2019-11-21 PROCEDURE — 97110 THERAPEUTIC EXERCISES: CPT | Mod: GP | Performed by: PHYSICAL THERAPIST

## 2019-11-22 NOTE — PROGRESS NOTES
Subjective:  HPI                    Objective:  System    Physical Exam    General     ROS    Assessment/Plan:    PROGRESS  REPORT    Progress reporting period is from 8/15/2019 to 11/21/2019.  Patient has been seen for 10 visits.       SUBJECTIVE  Subjective: Patient reports an interruption in treatment d/t illness and car issues.  Has been working on exercises at home.  Patient reports continued R shoulder and scapular pain.  Pain increases with use of arms and periods of standing to cook or wash dishes.  Also notes poor sleep related to pain.  Pain currently 4/10 in the R shoulder and scapula.    Current Pain level: 4/10.      Initial Pain level: 3/10.   Changes in function:  Yes (See Goal flowsheet attached for changes in current functional level)  Adverse reaction to treatment or activity: None    OBJECTIVE  Changes noted in objective findings:    Objective: CROM: Patient demonstrates min loss flexion with increased pain into R scapula, mod loss ext with R neck pain, min loss B rotation with end range stiffness.  Shoulder ROM: Equal B in all directions.     ASSESSMENT/PLAN  Updated problem list and treatment plan: Diagnosis 1:  R shoulder/thoracic pain  Pain -  manual therapy, directional preference exercise and home program  Decreased ROM/flexibility - manual therapy, therapeutic exercise and home program  Decreased function - therapeutic activities and home program  STG/LTGs have been met or progress has been made towards goals:  Yes (See Goal flow sheet completed today.)  Assessment of Progress: The patient's condition is improving.  Self Management Plans:  Patient has been instructed in a home treatment program.  I have re-evaluated this patient and find that the nature, scope, duration and intensity of the therapy is appropriate for the medical condition of the patient.  Marixa continues to require the following intervention to meet STG and LTG's:  PT    Recommendations:  This patient would benefit from  continued therapy.     Frequency:  2 X a month, once daily  Duration:  for 1 months.  Patient will all for follow appointment.        Please refer to the daily flowsheet for treatment today, total treatment time and time spent performing 1:1 timed codes.

## 2019-12-09 ENCOUNTER — OFFICE VISIT (OUTPATIENT)
Dept: FAMILY MEDICINE | Facility: CLINIC | Age: 69
End: 2019-12-09
Payer: COMMERCIAL

## 2019-12-09 VITALS
HEART RATE: 105 BPM | SYSTOLIC BLOOD PRESSURE: 123 MMHG | HEIGHT: 62 IN | OXYGEN SATURATION: 98 % | DIASTOLIC BLOOD PRESSURE: 78 MMHG | BODY MASS INDEX: 32.57 KG/M2 | TEMPERATURE: 98.5 F | WEIGHT: 177 LBS

## 2019-12-09 DIAGNOSIS — G93.32 CHRONIC FATIGUE DISORDER: ICD-10-CM

## 2019-12-09 DIAGNOSIS — J32.0 RIGHT MAXILLARY SINUSITIS: ICD-10-CM

## 2019-12-09 DIAGNOSIS — Z17.0 MALIGNANT NEOPLASM OF UPPER-INNER QUADRANT OF RIGHT BREAST IN FEMALE, ESTROGEN RECEPTOR POSITIVE (H): ICD-10-CM

## 2019-12-09 DIAGNOSIS — F33.1 MODERATE RECURRENT MAJOR DEPRESSION (H): ICD-10-CM

## 2019-12-09 DIAGNOSIS — C50.211 MALIGNANT NEOPLASM OF UPPER-INNER QUADRANT OF RIGHT BREAST IN FEMALE, ESTROGEN RECEPTOR POSITIVE (H): ICD-10-CM

## 2019-12-09 DIAGNOSIS — F90.0 ATTENTION DEFICIT HYPERACTIVITY DISORDER (ADHD), PREDOMINANTLY INATTENTIVE TYPE: Primary | ICD-10-CM

## 2019-12-09 DIAGNOSIS — K21.00 GASTROESOPHAGEAL REFLUX DISEASE WITH ESOPHAGITIS: ICD-10-CM

## 2019-12-09 DIAGNOSIS — F41.9 ANXIETY DISORDER, UNSPECIFIED TYPE: ICD-10-CM

## 2019-12-09 PROCEDURE — 99214 OFFICE O/P EST MOD 30 MIN: CPT | Performed by: FAMILY MEDICINE

## 2019-12-09 RX ORDER — DEXTROAMPHETAMINE SACCHARATE, AMPHETAMINE ASPARTATE, DEXTROAMPHETAMINE SULFATE AND AMPHETAMINE SULFATE 3.75; 3.75; 3.75; 3.75 MG/1; MG/1; MG/1; MG/1
15 TABLET ORAL 2 TIMES DAILY
Qty: 60 TABLET | Refills: 0 | Status: SHIPPED | OUTPATIENT
Start: 2019-12-09 | End: 2020-01-05

## 2019-12-09 RX ORDER — FAMOTIDINE 20 MG/1
20 TABLET, FILM COATED ORAL 2 TIMES DAILY
Qty: 90 TABLET | Refills: 3 | Status: SHIPPED | OUTPATIENT
Start: 2019-12-09 | End: 2020-05-02

## 2019-12-09 ASSESSMENT — MIFFLIN-ST. JEOR: SCORE: 1291.12

## 2019-12-09 ASSESSMENT — PAIN SCALES - GENERAL: PAINLEVEL: MILD PAIN (2)

## 2019-12-09 NOTE — PROGRESS NOTES
Subjective     Marixa Ann is a 69 year old female who presents to clinic today for the following health issues:    HPI   Medication Followup of Adderall    Taking Medication as prescribed: No feels dose is too low    Side Effects:  Fatigue    Medication Helping Symptoms:  No      ENT Symptoms- 3 weeks ago started with Adderall dose change but not with upper respiratory infection symptoms. Intermittent symptoms long term.              Symptoms: cc Present Absent Comment   Fever/Chills  x     Fatigue  x     Muscle Aches  x     Eye Irritation  x     Sneezing  x  Some   Nasal Octaviano/Drg  x  Seems more in right nostril   Sinus Pressure/Pain  x  Right maxillary area   Loss of smell   x    Dental pain   x    Sore Throat   x    Swollen Glands   x    Ear Pain/Fullness  x     Cough  x  Thick mucus, yellow green   Wheeze   x    Chest Pain  x     Shortness of breath  x     Rash   x    Other         Symptom duration:  about 3 weeks   Symptom severity:  moderate   Treatments tried:  salt water nasal spray & Claritin   Contacts:  none     Concerned about 1 year anniversary of breast cancer diagnosis. Chest symptoms and nail changes. Increased fatigue. Follow up with oncology and mammogram current.     Depression and Anxiety Follow-Up    How are you doing with your depression since your last visit? No change    How are you doing with your anxiety since your last visit?  No change    Are you having other symptoms that might be associated with depression or anxiety? No    Have you had a significant life event? Health Concerns     Do you have any concerns with your use of alcohol or other drugs? No    Social History     Tobacco Use     Smoking status: Never Smoker     Smokeless tobacco: Never Used   Substance Use Topics     Alcohol use: No     Frequency: Never     Drug use: No     PHQ 4/25/2019 5/29/2019 11/1/2019   PHQ-9 Total Score 22 7 18   Q9: Thoughts of better off dead/self-harm past 2 weeks Nearly every day Not at all More  than half the days     DONOVAN-7 SCORE 4/25/2019 5/29/2019 11/1/2019   Total Score 8 5 13           Suicide Assessment Five-step Evaluation and Treatment (SAFE-T)      Patient Active Problem List   Diagnosis     Pain in joint, shoulder region     Moderate recurrent major depression (H)     Hyperlipidemia LDL goal <160     Chronic fatigue disorder     Liver lesion     Cystic disease of liver     Gastroesophageal reflux disease with esophagitis     Hiatal hernia     Age-related cataract of both eyes, unspecified age-related cataract type     Primary osteoarthritis of both knees     Attention deficit hyperactivity disorder (ADHD), predominantly inattentive type     Malignant neoplasm of upper-inner quadrant of right breast in female, estrogen receptor positive (H)     Thyroid nodule     Anxiety disorder, unspecified type     Osteoporosis     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     Aromatase inhibitor use     Cervical cancer screening     History of cholecystectomy     Right-sided thoracic back pain     Past Surgical History:   Procedure Laterality Date     APPENDECTOMY  1967     BIOPSY BREAST NEEDLE LOCALIZATION Right 1/10/2019    Procedure: WIRE LOCALIZED RIGHT BREAST LUMPECTOMY WITH RIGHT SLNB;  Surgeon: Gema Diamond MD;  Location: [facility] OR     BREAST BIOPSY, CORE RT/LT Right 12/26/2018     CATARACT IOL, RT/LT Bilateral 2018    with lens implants per patient     CHOLECYSTECTOMY  05/24/2019     THYROID BIOPSY  01/31/2019       Social History     Tobacco Use     Smoking status: Never Smoker     Smokeless tobacco: Never Used   Substance Use Topics     Alcohol use: No     Frequency: Never     Family History   Problem Relation Age of Onset     Leukemia Father      Breast Cancer Cousin 50        Maternal Female 1st Cousin     Macular Degeneration Mother          Current Outpatient Medications   Medication Sig Dispense Refill     acetaminophen (TYLENOL) 325 MG tablet Take 325-650 mg by mouth every 6 hours as needed for mild pain        amoxicillin-clavulanate (AUGMENTIN) 875-125 MG tablet Take 1 tablet by mouth 2 times daily 20 tablet 0     amphetamine-dextroamphetamine (ADDERALL) 15 MG tablet Take 1 tablet (15 mg) by mouth 2 times daily 60 tablet 0     anastrozole (ARIMIDEX) 1 MG tablet Take 1 tablet (1 mg) by mouth daily 90 tablet 1     B Complex Vitamins (VITAMIN B-COMPLEX PO) Take by mouth daily       calcium carbonate (TUMS) 500 MG chewable tablet Take 1 chew tab by mouth 2 times daily       Calcium-Magnesium-Zinc 333-133-5 MG TABS per tablet Take 1 tablet by mouth daily       Cholecalciferol (VITAMIN D-3 PO) Vitamin D is included with multiple vitamin, patient does not take additional. Marjorie Crooks LPN on 7/26/2019 at 3:02 PM       cyclobenzaprine (FLEXERIL) 5 MG tablet Take 1 tablet (5 mg) by mouth daily as needed for muscle spasms 20 tablet 1     denosumab (PROLIA) 60 MG/ML SOSY injection        famotidine (PEPCID) 20 MG tablet Take 1 tablet (20 mg) by mouth 2 times daily 90 tablet 3     KRILL OIL PO Take by mouth daily       magnesium oxide 200 MG TABS Take 100 mg by mouth once as needed       Multiple Vitamins-Minerals (MULTIVITAMIN PO)        vitamin B-12 (CYANOCOBALAMIN) 100 MCG tablet Take 1,000 mcg by mouth daily       Allergies   Allergen Reactions     Vicodin [Hydrocodone-Acetaminophen] Anaphylaxis     Dilaudid [Hydromorphone] Nausea and Vomiting     Oxycodone Nausea and Vomiting     Dust Mites      Milk [Lac Bovis] Diarrhea     Recent Labs   Lab Test 10/24/19  1155 05/29/19  1112  03/05/19  1405  10/19/17  1412   LDL  --   --   --   --   --  98   HDL  --   --   --   --   --  40*   TRIG  --   --   --   --   --  82   ALT 21 40  --  21  --  26   CR 0.62 0.64   < >  --    < > 0.72   GFRESTIMATED >90 >90   < >  --    < > 81   GFRESTBLACK >90 >90   < >  --    < > >90   POTASSIUM 4.1 3.9  --   --    < > 3.9   TSH  --   --   --   --   --  1.35    < > = values in this interval not displayed.      BP Readings from Last 3 Encounters:  "  12/09/19 123/78   11/01/19 95/51   10/24/19 105/75    Wt Readings from Last 3 Encounters:   12/09/19 80.3 kg (177 lb)   11/01/19 78 kg (172 lb)   10/24/19 78 kg (171 lb 15.3 oz)                      Reviewed and updated as needed this visit by Provider         Review of Systems   ROS COMP: Constitutional, HEENT, cardiovascular, pulmonary, gi and gu systems are negative, except as otherwise noted.      Objective    /78 (BP Location: Right arm, Patient Position: Chair, Cuff Size: Adult Large)   Pulse 105   Temp 98.5  F (36.9  C) (Oral)   Ht 1.575 m (5' 2\")   Wt 80.3 kg (177 lb)   LMP 05/31/2003 (Approximate)   SpO2 98%   BMI 32.37 kg/m    Body mass index is 32.37 kg/m .  Physical Exam   GENERAL APPEARANCE: healthy, alert and no distress  EYES: Eyes grossly normal to inspection, PERRL and conjunctivae and sclerae normal  HENT: ear canals and TM's normal, nose and mouth without ulcers or lesions, oropharynx clear and oral mucous membranes moist  NECK: no adenopathy, no asymmetry, masses, or scars and thyroid normal to palpation  RESP: lungs clear to auscultation - no rales, rhonchi or wheezes  CV: regular rates and rhythm, normal S1 S2, no S3 or S4, no murmur, click or rub, no peripheral edema and peripheral pulses strong  ABDOMEN: soft, nontender, no hepatosplenomegaly, no masses and bowel sounds normal  MS: no musculoskeletal defects are noted and gait is age appropriate without ataxia  SKIN: no suspicious lesions or rashes  NEURO: Normal strength and tone, sensory exam grossly normal, mentation intact and speech normal  PSYCH: mentation appears normal and affect normal/bright and less anxious than last visit.     Diagnostic Test Results:  Labs reviewed in Epic  No results found for this or any previous visit (from the past 24 hour(s)).        Assessment & Plan       ICD-10-CM    1. Attention deficit hyperactivity disorder (ADHD), predominantly inattentive type F90.0 amphetamine-dextroamphetamine " (ADDERALL) 15 MG tablet increase from 10 mg twice a day, did not tolerate extended release   2. Gastroesophageal reflux disease with esophagitis K21.0 famotidine (PEPCID) 20 MG tablet- stopped ranitidine due to recalls.    3. Right maxillary sinusitis J32.0 amoxicillin-clavulanate (AUGMENTIN) 875-125 MG tablet twice a day for 10 days. Call if not better and will gest a CT of sinuses before referral to ENT.    4. Malignant neoplasm of upper-inner quadrant of right breast in female, estrogen receptor positive (H) C50.211 Some anxiety with 1 year follow up and not knowing if symptoms may be related to metastasis. Has chronic fatigue disorder.    Z17.0    5. Moderate recurrent major depression (H) F33.1 Stable    6. Anxiety disorder, unspecified type F41.9 Increased anxiety around illness    7. Chronic fatigue disorder R53.82 Worse in winter and may be worse with breast cancer treatment.           FURTHER TESTING:       - CT sinuses if symptoms not improving  CONSULTATION/REFERRAL to oncology as scheduled.   FUTURE LABS:       - Schedule fasting labs in 3 months  FUTURE APPOINTMENTS:       - Follow-up visit in 3 months or sooner if any questions or concerns.   See Patient Instructions    Return in about 3 months (around 3/9/2020).    Jackie Kovacs MD  [facility]

## 2019-12-09 NOTE — PATIENT INSTRUCTIONS
At [facility], we strive to deliver an exceptional experience to you, every time we see you. If you receive a survey, please complete it as we do value your feedback.  If you have MyChart, you can expect to receive results automatically within 24 hours of their completion.  Your provider will send a note interpreting your results as well.   If you do not have MyChart, you should receive your results in about a week by mail.    Your care team:                            Family Medicine Internal Medicine   MD Kyle Mancera MD Shantel Branch-Fleming, MD Katya Georgiev PA-C Megan Hill, APRN CNP    Chai Kauffman, MD Pediatrics   Emigdio Robert, PALeiaC  Dagmar Ralph, MD Kayley Millan APRN CNP   MD Alisa Encinas MD Deborah Mielke, MD Kim Thein, APRN [facility]      Clinic hours: Monday - Thursday 7 am-7 pm; Fridays 7 am-5 pm.   Urgent care: Monday - Friday 11 am-9 pm; Saturday and Sunday 9 am-5 pm.  Pharmacy : Monday -Thursday 8 am-8 pm; Friday 8 am-6 pm; Saturday and Sunday 9 am-5 pm.     Clinic: [phone]   Pharmacy: [phone]        Patient Education     Sinusitis (Antibiotic Treatment)    The sinuses are air-filled spaces within the bones of the face. They connect to the inside of the nose. Sinusitis is an inflammation of the tissue that lines the sinuses. Sinusitis can occur during a cold. It can also happen due to allergies to pollens and other particles in the air. Sinusitis can cause symptoms of sinus congestion and a feeling of fullness. A sinus infection causes fever, headache, and facial pain. There is often green or yellow fluid draining from the nose or into the back of the throat (post-nasal drip). You have been given antibiotics to treat this condition.  Home care    Take the full course of antibiotics as instructed. Do not stop taking them, even when you feel better.    Drink plenty of water, hot tea, and other  liquids. This may help thin nasal mucus. It also may help your sinuses drain fluids.    Heat may help soothe painful areas of your face. Use a towel soaked in hot water. Or,  the shower and direct the warm spray onto your face. Using a vaporizer along with a menthol rub at night may also help soothe symptoms.     An expectorant with guaifenesin may help thin nasal mucus and help your sinuses drain fluids.    You can use an over-the-counter decongestant, unless a similar medicine was prescribed to you. Nasal sprays work the fastest. Use one that contains phenylephrine or oxymetazoline. First blow your nose gently. Then use the spray. Do not use these medicines more often than directed on the label. If you do, your symptoms may get worse. You may also take pills that contain pseudoephedrine. Don t use products that combine multiple medicines. This is because side effects may be increased. Read labels. You can also ask the pharmacist for help. (People with high blood pressure should not use decongestants. They can raise blood pressure.)    Over-the-counter antihistamines may help if allergies contributed to your sinusitis.      Do not use nasal rinses or irrigation during an acute sinus infection, unless your healthcare provider tells you to. Rinsing may spread the infection to other areas in your sinuses.    Use acetaminophen or ibuprofen to control pain, unless another pain medicine was prescribed to you. If you have chronic liver or kidney disease or ever had a stomach ulcer, talk with your healthcare provider before using these medicines. (Aspirin should never be taken by anyone under age 18 who is ill with a fever. It may cause severe liver damage.)    Don't smoke. This can make symptoms worse.  Follow-up care  Follow up with your healthcare provider or our staff if you are not better in 1 week.  When to seek medical advice  Call your healthcare provider if any of these occur:    Facial pain or headache  that gets worse    Stiff neck    Unusual drowsiness or confusion    Swelling of your forehead or eyelids    Vision problems, such as blurred or double vision    Fever of 100.4 F (38 C) or higher, or as directed by your healthcare provider    Seizure    Breathing problems    Symptoms don't go away in 10 days  Prevention  Here are steps you can take to help prevent an infection:    Keep good hand washing habits.    Don t have close contact with people who have sore throats, colds, or other upper respiratory infections.    Don t smoke, and stay away from secondhand smoke.    Stay up to date with of your vaccines.  Date Last Reviewed: 11/1/2017 2000-2018 [employer]. [address] [place] [address]. All rights reserved. This information is not intended as a substitute for professional medical care. Always follow your healthcare professional's instructions.           Patient Education     GERD (Adult)    The esophagus is a tube that carries food from the mouth to the stomach. A valve (the LES, lower esophageal sphincter) at the lower end of the esophagus prevents stomach acid from flowing upward. When this valve doesn't work properly, stomach contents may repeatedly flow back up (reflux) into the esophagus. This is called gastroesophageal reflux disease (GERD). GERD can irritate the esophagus. It can cause problems with pain, swallowing or breathing. In severe cases, GERD can cause recurrent pneumonia (from aspiration or breathing in particles) or other serious problems.  Symptoms of reflux include burning, pressure or sharp pain in the upper abdomen or mid to lower chest. The pain can spread to the neck, back, or shoulder. There may be belching, an acid taste in the back of the throat, chronic cough, or sore throat, or hoarseness. GERD symptoms often occur during the day after a big meal. They can also occur at night when lying down.   Home care  Lifestyle changes can help reduce symptoms. If  "needed, your healthcare provider may prescribe medicines. Symptoms often improve with treatment, but if treatment is stopped, the symptoms often return after a few months. So most persons with GERD will need to continue treatment or get treatment on and off.  Lifestyle changes    Limit or avoid fatty, fried, and spicy foods, as well as coffee, chocolate, mint, and foods with high acid content such as tomatoes and citrus fruit and juices (orange, grapefruit, lemon).    Don t eat large meals, especially at night. Frequent, smaller meals are best. Don't lie down right after eating. And don t eat anything 3 hours before going to bed.    Don't drink alcohol or smoke. As much as possible, stay away from second hand smoke.    If you are overweight, losing weight will reduce symptoms.     Don't wear tight clothing around your stomach area.    If your symptoms occur during sleep, use a foam wedge to elevate your upper body (not just your head.) Or, place 4\" blocks under the head of your bed. Or use 2 bed risers under your bedframe.  Medicines  If needed, medicines can help relieve the symptoms of GERD and prevent damage to the esophagus. Discuss a medicine plan with your healthcare provider. This may include one or more of the following medicines:    Antacids to help neutralize the normal acids in your stomach.    Acid blockers (Histamine or H2 blockers) to decrease acid production.    Acid inhibitors (proton pump inhibitors PPIs) to decrease acid production in a different way than the blockers. They may work better, but can take a little longer to take effect.  Take an antacid 30 to 60 minutes after eating and at bedtime, but not at the same time as an acid blocker.Try not to take medicines such as ibuprofen and aspirin. If you are taking aspirin for your heart or other medical reasons, talk to your healthcare provider about stopping it.  Follow-up care  Follow up with your healthcare provider or as advised by our " staff.  When to seek medical advice  Call your healthcare provider if any of the following occur:    Stomach pain gets worse or moves to the lower right abdomen (appendix area)    Chest pain appears or gets worse, or spreads to the back, neck, shoulder, or arm    An over-the-counter trial of medicine doesn't relieve your symptoms    Weight loss that can't be explained    Trouble or pain swallowing    Frequent vomiting (can t keep down liquids)    Blood in the stool or vomit (red or black in color)    Feeling weak or dizzy    Fever of 100.4 F (38 C) or higher, or as directed by your healthcare provider  Date Last Reviewed: 3/1/2018    5104-3590 [employer]. [address] [place] [address], [place] [address]. All rights reserved. This information is not intended as a substitute for professional medical care. Always follow your healthcare professional's instructions.

## 2020-01-05 ENCOUNTER — MYC REFILL (OUTPATIENT)
Dept: FAMILY MEDICINE | Facility: CLINIC | Age: 70
End: 2020-01-05

## 2020-01-05 DIAGNOSIS — F90.0 ATTENTION DEFICIT HYPERACTIVITY DISORDER (ADHD), PREDOMINANTLY INATTENTIVE TYPE: ICD-10-CM

## 2020-01-06 RX ORDER — DEXTROAMPHETAMINE SACCHARATE, AMPHETAMINE ASPARTATE, DEXTROAMPHETAMINE SULFATE AND AMPHETAMINE SULFATE 3.75; 3.75; 3.75; 3.75 MG/1; MG/1; MG/1; MG/1
15 TABLET ORAL 2 TIMES DAILY
Qty: 60 TABLET | Refills: 0 | Status: SHIPPED | OUTPATIENT
Start: 2020-01-06 | End: 2020-02-03

## 2020-01-06 NOTE — TELEPHONE ENCOUNTER
Requested Prescriptions   Pending Prescriptions Disp Refills     amphetamine-dextroamphetamine (ADDERALL) 15 MG tablet 60 tablet 0     Sig: Take 1 tablet (15 mg) by mouth 2 times daily       There is no refill protocol information for this order          amphetamine-dextroamphetamine (ADDERALL) 15 MG tablet      Last Written Prescription Date:  12/9/19  Last Fill Quantity: 60,   # refills: 0  Last Office Visit: 12/9/19  Future Office visit:    Next 5 appointments (look out 90 days)    Jan 14, 2020  2:00 PM CST  Return Visit with Sil Dan MD  [facility] ([facility]) [address] [place] [id number] [phone]   Feb 20, 2020 10:45 AM CST  Return Visit with Gema Diamond MD  [facility] ([facility]) [address] [place] [id number]  [phone]           Routing refill request to provider for review/approval because:  Drug not on the [facility], [facility] or [facility] refill protocol or controlled substance

## 2020-01-09 PROBLEM — M54.6 RIGHT-SIDED THORACIC BACK PAIN: Status: RESOLVED | Noted: 2019-08-15 | Resolved: 2020-01-09

## 2020-01-09 NOTE — PROGRESS NOTES
Patient did not return for further treatment and no additional progress was noted.  Please refer to the progress note and goal flowsheet completed on 11/21/19 for discharge information.

## 2020-01-12 NOTE — PROGRESS NOTES
Oncology Follow-up visit:  Date on this visit: Jan 14, 2020      Surgeon: Dr.Sara Diamond  Primary Care Physician: Jackie Watts   Radiation Oncologist: Dr. Copeland    Diagnosis:  prognostic stage IB (pT2N0) [place] grade 1 ER+ ND+ Her2 negative invasive ductal right sided breast cancer    Oncologic History:  1. Breast Cancer - She was diagnosed with breast cancer in 12/2018 when she was found to have an abnormality on a screening mammogram (12/3/2019): a possible developing asymmetry right breast around 4:00 position posterior depth in the right breast. A diagnostic mammogram confirmed a 17 mm mass in the right medial breast, posterior depth. R breast US showed In the right breast at 2:00, 9 cm from nipple, there was an irregular hypoechoic mass, 12 x 9 x 14 mm. Right axilla US scan revealed normal appearing lymph nodes. Contrast mammogram on 12/26/2018 showed a 1.9 cm enhancing mass in the right medial breast. She proceeded to undergo a core needle biopsy of the suspicious mass on 12/26/2019. this showed a Carol grade 1 invasive ductal carcinoma, ER positive in 98%, ND positive in 94% of cells, and HER-2/yesica negative with HER2 to JCARLOS 17 ratio of 1.2.  She is s/p right lumpectomy on January 10, 2019 by Dr. Diamond.  Pathology from the surgery demonstrated invasive ductal carcinoma, Carol grade 1, 2.4 cm, with associated intermediate grade DCIS.  No LVI was identified.  Margins were clear of invasive carcinoma and DCIS.  Three sentinel lymph nodes were negative for malignancy.  Oncotype Dx score returned low risk at 4, correlating with 3% risk of disease recurrence at 9 years with aromatase inhibitor or tamoxifen alone.  There was no benefit from chemotherapy. She met with Dr. Copeland from radiation oncology, and has been undergoing radiation to the right chest wall, completed on March 11, 2019.      2. Bone Health -  She  had a DEXA scan in April 2018 which showed findings consistent with  osteoporosis, with the most negative T score of -2.9 in the left femoral neck and a T score of -2.5 in the lumbar spine.      3. LAVONNE daughter  4. Thyroid nodule- incidentally noted on CTPA in 01/2019 which showed asymmetric prominent right thyroid lobe with a dominant 4.1 cm thyroid nodule on the right side. She then underwent a thyroid US (ordered by Dr. Kovacs) on 1/18/2019 which showed bilateral thyroid nodules with the largest nodule is on the right measuring up to 4.5 cm. Fine needle aspiration of the right thyroid nodule on 1/31/2019 showed findings c/w benign thyroid nodule.     5. She has had persistent mild elevation of inflammatory markers- ESR, CRP and d-dimer.   She had a negative for PE CTPA in March 2019. She was evaluated by Dr. Maggy Galloway, a rheumatologist at [place], in March 2018, for an elevated ESR of 58 and CRP of 53. At that time she was recommended to proceed with abdomen/pelvis CT for evaluation of a possible occult malignancy and she proceeded with it on 4/4/2018 and there were no findings suspicious for malignancy.  CT PA and CT abdomen and pelvis with contrast was performed on May 23, 2019 and has demonstrated no pulmonary embolism.        History Of Present Illness:  Ms. Ann (prefers to be called Cookie) is a 69 year old female with Hx of fibromyalgia and ADHD and chronic fatigue, who presents for follow-up of prognostic stage IB ER+FL+ HER2 Darlyn negative [place] grade 1 IDC of  right breast.  She has completed adjuvant radiation to the right chest wall in March 2019. She has a history of osteoporosis but after careful consideration, in view of patient's persistently elevated inflammatory markers and D-dimer, and per patient preference to be aggressive about her breast cancer treatment, and preference in favor of systemic hormonal therapy, she was started on on Anastrazole in March 2019. She remains on Anastrozole uneventfully. Her serum vitamin D level was normal and in  addition to calcium and vitamin D supplementation, she was started on Prolia subq q 6 months in March 2019, for prevention of fractures and further bone mineral density loss.    She had a bilateral screening mammogram with tomosynthesis earlier today which showed no suspicious findings.  She has never had a screening colonoscopy, and was previously recommended to proceed with colonoscopy.    She has been chronically fatigued for the last 10 years, worse in the last 4 years per patient.  She has also had diffuse body and muscle aches which she rates at 8/10, chronic, stable.    She has a history of ADHD and anxiety and is on Adderall.    In a ddition, a complete 12 point  review of systems is negative.          Past Medical/Surgical History:  Past Medical History:   Diagnosis Date     ADHD      Breast cancer (H) 12/26/2018    Right Breast     Chronic fatigue      Fibromyalgia      Hard of hearing      Osteoporosis      S/P radiation therapy     4,256 cGy to right breast completed on 03/11/2019 - [facility] with Dr. Copeland   She has a history of bilateral cataract surgery and reports she has an artificial lens in place in both eyes.     Past Surgical History:   Procedure Laterality Date     APPENDECTOMY  1967     BIOPSY BREAST NEEDLE LOCALIZATION Right 1/10/2019    Procedure: WIRE LOCALIZED RIGHT BREAST LUMPECTOMY WITH RIGHT SLNB;  Surgeon: Gema Diamond MD;  Location: [facility] OR     BREAST BIOPSY, CORE RT/LT Right 12/26/2018     CATARACT IOL, RT/LT Bilateral 2018    with lens implants per patient     CHOLECYSTECTOMY  05/24/2019     THYROID BIOPSY  01/31/2019     FHx and SocHx reviewed.  Allergies:  Allergies as of 01/14/2020 - Reviewed 12/09/2019   Allergen Reaction Noted     Vicodin [hydrocodone-acetaminophen] Anaphylaxis 01/24/2019     Dilaudid [hydromorphone] Nausea and Vomiting 07/26/2019     Oxycodone Nausea and Vomiting 07/26/2019     Dust mites  10/19/2017     Milk [lac bovis] Diarrhea 12/31/2018  "    Current Medications:  Current Outpatient Medications   Medication Sig Dispense Refill     acetaminophen (TYLENOL) 325 MG tablet Take 325-650 mg by mouth every 6 hours as needed for mild pain       amoxicillin-clavulanate (AUGMENTIN) 875-125 MG tablet Take 1 tablet by mouth 2 times daily 20 tablet 0     amphetamine-dextroamphetamine (ADDERALL) 15 MG tablet Take 1 tablet (15 mg) by mouth 2 times daily 60 tablet 0     anastrozole (ARIMIDEX) 1 MG tablet Take 1 tablet (1 mg) by mouth daily 90 tablet 1     B Complex Vitamins (VITAMIN B-COMPLEX PO) Take by mouth daily       calcium carbonate (TUMS) 500 MG chewable tablet Take 1 chew tab by mouth 2 times daily       Calcium-Magnesium-Zinc 333-133-5 MG TABS per tablet Take 1 tablet by mouth daily       Cholecalciferol (VITAMIN D-3 PO) Vitamin D is included with multiple vitamin, patient does not take additional. Marjorie Crooks LPN on 7/26/2019 at 3:02 PM       cyclobenzaprine (FLEXERIL) 5 MG tablet Take 1 tablet (5 mg) by mouth daily as needed for muscle spasms 20 tablet 1     denosumab (PROLIA) 60 MG/ML SOSY injection        famotidine (PEPCID) 20 MG tablet Take 1 tablet (20 mg) by mouth 2 times daily 90 tablet 3     KRILL OIL PO Take by mouth daily       magnesium oxide 200 MG TABS Take 100 mg by mouth once as needed       Multiple Vitamins-Minerals (MULTIVITAMIN PO)        vitamin B-12 (CYANOCOBALAMIN) 100 MCG tablet Take 1,000 mcg by mouth daily        Physical Exam:  /79 (BP Location: Left arm)   Pulse 102   Temp 98.1  F (36.7  C) (Oral)   Resp 18   Ht 1.575 m (5' 2.01\")   Wt 81.1 kg (178 lb 12.8 oz)   LMP 05/31/2003 (Approximate)   SpO2 95%   BMI 32.69 kg/m      LMP 05/31/2003 (Approximate)     LMP 05/31/2003 (Approximate)     GENERAL APPEARANCE: Middle aged woman, NAD  HEENT: no asymmetry or masses. OP: no lesions  Lymphatics: no cervical, supraclavicular or axillary lymphadenopathy b/l  CV: S1S2 reg  Resp: Lungs CTA b/l  GI: + BS, soft, NT, ND, " hepatosplenomegaly difficult to evaluate secondary to body habitus       PSYCHIATRIC: mentation appears normal and affect is normal  Neurologic: Alert and oriented x3  Extremeties: No edema b/l LE   Skin: oval scaly patch on posterior neck, faintly erythematous, about one inch in diameter.    Laboratory/Imaging Studies  Component      Latest Ref Rng & Units 10/24/2019   Sodium      133 - 144 mmol/L 141   Potassium      3.4 - 5.3 mmol/L 4.1   Chloride      94 - 109 mmol/L 109   Carbon Dioxide      20 - 32 mmol/L 30   Anion Gap      3 - 14 mmol/L 2 (L)   Glucose      70 - 99 mg/dL 96   Urea Nitrogen      7 - 30 mg/dL 10   Creatinine      0.52 - 1.04 mg/dL 0.62   GFR Estimate      >60 mL/min/1.73:m2 >90   GFR Estimate If Black      >60 mL/min/1.73:m2 >90   Calcium      8.5 - 10.1 mg/dL 9.2   Bilirubin Total      0.2 - 1.3 mg/dL 0.4   Albumin      3.4 - 5.0 g/dL 3.5   Protein Total      6.8 - 8.8 g/dL 7.2   Alkaline Phosphatase      40 - 150 U/L 105   ALT      0 - 50 U/L 21   AST      0 - 45 U/L 19     ASSESSMENT/PLAN:  Marixa (prefers to be called Priscilla) is a very pleasant 69-year-old postmenopausal woman with recent diagnosis of prognostic stage IB (pT2N0) [place] grade 1 ER+ UT+ Her2 negative invasive ductal right sided breast cancer, s/p R lumpectomy on 1/10/2019 and scheduled to complete right chest wall radiation on 3/11/2019. Oncotype Dx score returned low risk at 4, correlating with 3% risk of disease recurrence at 9 years with the use  aromatase inhibitor or tamoxifen alone. There is no benefit to systemic chemotherapy in this setting. She has osteoporosis putting her at high risk of skeletal related events with an aromatase inhibitor  and also persistently elevated d-dimer and other inflammatory markers putting her at a higher risk of venous thromboembolism, but preferred to still be on systemic hormonal therapy, and started on Anastrazole in March 2019 and at the same time started on Prolia for  prevention of further bone mineral density loss.    1. Breast cancer -she has been on anastrozole and will continue. F/up in 3 months with our NP, with CMP.  2. Osteoporosis- vitamin D level normal. Continue calcium and vitamin D supplementation and weight bearing exercise as tolerated.  Prolia 60 mg subq q 6 months for treatment of patient's osteoporosis, next due with next visit. We'll monitor DEXA scan closely, next after a year of being on aromatase inhibitor therapy, due in April 2020, with out next visit.    3. Breast cancer screening-  B/l 3d screening mammogram negative 01/2020. Next annual mammogram due 01/2021.  4. LAVONNE daughter-  She was seen by Dr. Zeng on 4/29/19 and had a normal pelvic exam and a negative pap smear. Plan is to follow her annually by gyn team.      Priscilla had multiple questions during today's visit all of which were answered.  At the end of our visit patient verbalized understanding and concurred with the plan.

## 2020-01-14 ENCOUNTER — ONCOLOGY VISIT (OUTPATIENT)
Dept: ONCOLOGY | Facility: CLINIC | Age: 70
End: 2020-01-14
Payer: COMMERCIAL

## 2020-01-14 ENCOUNTER — ANCILLARY PROCEDURE (OUTPATIENT)
Dept: MAMMOGRAPHY | Facility: CLINIC | Age: 70
End: 2020-01-14
Attending: NURSE PRACTITIONER
Payer: COMMERCIAL

## 2020-01-14 VITALS
HEIGHT: 62 IN | SYSTOLIC BLOOD PRESSURE: 122 MMHG | WEIGHT: 178.8 LBS | DIASTOLIC BLOOD PRESSURE: 79 MMHG | OXYGEN SATURATION: 95 % | RESPIRATION RATE: 18 BRPM | HEART RATE: 102 BPM | BODY MASS INDEX: 32.9 KG/M2 | TEMPERATURE: 98.1 F

## 2020-01-14 DIAGNOSIS — Z79.811 AROMATASE INHIBITOR USE: ICD-10-CM

## 2020-01-14 DIAGNOSIS — Z12.31 ENCOUNTER FOR SCREENING MAMMOGRAM FOR BREAST CANCER: ICD-10-CM

## 2020-01-14 DIAGNOSIS — F41.9 ANXIETY DISORDER, UNSPECIFIED TYPE: ICD-10-CM

## 2020-01-14 DIAGNOSIS — M81.8 OTHER OSTEOPOROSIS WITHOUT CURRENT PATHOLOGICAL FRACTURE: ICD-10-CM

## 2020-01-14 DIAGNOSIS — Z17.0 MALIGNANT NEOPLASM OF UPPER-INNER QUADRANT OF RIGHT BREAST IN FEMALE, ESTROGEN RECEPTOR POSITIVE (H): ICD-10-CM

## 2020-01-14 DIAGNOSIS — C50.211 MALIGNANT NEOPLASM OF UPPER-INNER QUADRANT OF RIGHT BREAST IN FEMALE, ESTROGEN RECEPTOR POSITIVE (H): ICD-10-CM

## 2020-01-14 DIAGNOSIS — E28.39 ESTROGEN DEFICIENCY: Primary | ICD-10-CM

## 2020-01-14 PROCEDURE — 77067 SCR MAMMO BI INCL CAD: CPT | Performed by: RADIOLOGY

## 2020-01-14 PROCEDURE — 77063 BREAST TOMOSYNTHESIS BI: CPT | Performed by: RADIOLOGY

## 2020-01-14 PROCEDURE — 99214 OFFICE O/P EST MOD 30 MIN: CPT | Performed by: INTERNAL MEDICINE

## 2020-01-14 RX ORDER — ANASTROZOLE 1 MG/1
1 TABLET ORAL DAILY
Qty: 90 TABLET | Refills: 1 | Status: SHIPPED | OUTPATIENT
Start: 2020-01-14 | End: 2020-03-14

## 2020-01-14 ASSESSMENT — MIFFLIN-ST. JEOR: SCORE: 1299.41

## 2020-01-14 ASSESSMENT — PAIN SCALES - GENERAL: PAINLEVEL: EXTREME PAIN (8)

## 2020-01-14 NOTE — LETTER
1/14/2020         RE: Marixa Ann  [address] [address] [facility] [facility] [id number]        Dear Colleague,    Thank you for referring your patient, Marixa Ann, to the [facility]. Please see a copy of my visit note below.    Oncology Follow-up visit:  Date on this visit: Jan 14, 2020      Surgeon: Dr.Sara Diamond  Primary Care Physician: Jackie Watts   Radiation Oncologist: Dr. Copeland    Diagnosis:  prognostic stage IB (pT2N0) [place] grade 1 ER+ [place]+ Her2 negative invasive ductal right sided breast cancer    Oncologic History:  1. Breast Cancer - She was diagnosed with breast cancer in 12/2018 when she was found to have an abnormality on a screening mammogram (12/3/2019): a possible developing asymmetry right breast around 4:00 position posterior depth in the right breast. A diagnostic mammogram confirmed a 17 mm mass in the right medial breast, posterior depth. R breast US showed In the right breast at 2:00, 9 cm from nipple, there was an irregular hypoechoic mass, 12 x 9 x 14 mm. Right axilla US scan revealed normal appearing lymph nodes. Contrast mammogram on 12/26/2018 showed a 1.9 cm enhancing mass in the right medial breast. She proceeded to undergo a core needle biopsy of the suspicious mass on 12/26/2019. this showed a [place] grade 1 invasive ductal carcinoma, ER positive in 98%, [place] positive in 94% of cells, and HER-2/yesica negative with HER2 to JCARLOS 17 ratio of 1.2.  She is s/p right lumpectomy on January 10, 2019 by Dr. Diamond.  Pathology from the surgery demonstrated invasive ductal carcinoma, Carol grade 1, 2.4 cm, with associated intermediate grade DCIS.  No LVI was identified.  Margins were clear of invasive carcinoma and DCIS.  Three sentinel lymph nodes were negative for malignancy.  Oncotype Dx score returned low risk at 4, correlating with 3% risk of disease recurrence at 9 years with aromatase inhibitor or tamoxifen alone.  There was no  benefit from chemotherapy. She met with Dr. Copeland from radiation oncology, and has been undergoing radiation to the right chest wall, completed on March 11, 2019.      2. Bone Health -  She  had a DEXA scan in April 2018 which showed findings consistent with osteoporosis, with the most negative T score of -2.9 in the left femoral neck and a T score of -2.5 in the lumbar spine.      3. LAVONNE daughter  4. Thyroid nodule- incidentally noted on CTPA in 01/2019 which showed asymmetric prominent right thyroid lobe with a dominant 4.1 cm thyroid nodule on the right side. She then underwent a thyroid US (ordered by Dr. Kovacs) on 1/18/2019 which showed bilateral thyroid nodules with the largest nodule is on the right measuring up to 4.5 cm. Fine needle aspiration of the right thyroid nodule on 1/31/2019 showed findings c/w benign thyroid nodule.     5. She has had persistent mild elevation of inflammatory markers- ESR, CRP and d-dimer.   She had a negative for PE CTPA in March 2019. She was evaluated by Dr. Maggy Galloway, a rheumatologist at [place], in March 2018, for an elevated ESR of 58 and CRP of 53. At that time she was recommended to proceed with abdomen/pelvis CT for evaluation of a possible occult malignancy and she proceeded with it on 4/4/2018 and there were no findings suspicious for malignancy.  CT PA and CT abdomen and pelvis with contrast was performed on May 23, 2019 and has demonstrated no pulmonary embolism.        History Of Present Illness:  Ms. Ann (prefers to be called Cookie) is a 69 year old female with Hx of fibromyalgia and ADHD and chronic fatigue, who presents for follow-up of prognostic stage IB ER+MT+ HER2 Darlyn negative [place] grade 1 IDC of  right breast.  She has completed adjuvant radiation to the right chest wall in March 2019. She has a history of osteoporosis but after careful consideration, in view of patient's persistently elevated inflammatory markers and D-dimer, and per  patient preference to be aggressive about her breast cancer treatment, and preference in favor of systemic hormonal therapy, she was started on on Anastrazole in March 2019. She remains on Anastrozole uneventfully. Her serum vitamin D level was normal and in addition to calcium and vitamin D supplementation, she was started on Prolia subq q 6 months in March 2019, for prevention of fractures and further bone mineral density loss.    She had a bilateral screening mammogram with tomosynthesis earlier today which showed no suspicious findings.  She has never had a screening colonoscopy, and was previously recommended to proceed with colonoscopy.    She has been chronically fatigued for the last 10 years, worse in the last 4 years per patient.  She has also had diffuse body and muscle aches which she rates at 8/10, chronic, stable.    She has a history of ADHD and anxiety and is on Adderall.    In a ddition, a complete 12 point  review of systems is negative.          Past Medical/Surgical History:  Past Medical History:   Diagnosis Date     ADHD      Breast cancer (H) 12/26/2018    Right Breast     Chronic fatigue      Fibromyalgia      Hard of hearing      Osteoporosis      S/P radiation therapy     4,256 cGy to right breast completed on 03/11/2019 - [facility] with Dr. Copeland   She has a history of bilateral cataract surgery and reports she has an artificial lens in place in both eyes.     Past Surgical History:   Procedure Laterality Date     APPENDECTOMY  1967     BIOPSY BREAST NEEDLE LOCALIZATION Right 1/10/2019    Procedure: WIRE LOCALIZED RIGHT BREAST LUMPECTOMY WITH RIGHT SLNB;  Surgeon: Gema Diamond MD;  Location: [facility] OR     BREAST BIOPSY, CORE RT/LT Right 12/26/2018     CATARACT IOL, RT/LT Bilateral 2018    with lens implants per patient     CHOLECYSTECTOMY  05/24/2019     THYROID BIOPSY  01/31/2019     FHx and SocHx reviewed.  Allergies:  Allergies as of 01/14/2020 - Reviewed 12/09/2019  "  Allergen Reaction Noted     Vicodin [hydrocodone-acetaminophen] Anaphylaxis 01/24/2019     Dilaudid [hydromorphone] Nausea and Vomiting 07/26/2019     Oxycodone Nausea and Vomiting 07/26/2019     Dust mites  10/19/2017     Milk [lac bovis] Diarrhea 12/31/2018     Current Medications:  Current Outpatient Medications   Medication Sig Dispense Refill     acetaminophen (TYLENOL) 325 MG tablet Take 325-650 mg by mouth every 6 hours as needed for mild pain       amoxicillin-clavulanate (AUGMENTIN) 875-125 MG tablet Take 1 tablet by mouth 2 times daily 20 tablet 0     amphetamine-dextroamphetamine (ADDERALL) 15 MG tablet Take 1 tablet (15 mg) by mouth 2 times daily 60 tablet 0     anastrozole (ARIMIDEX) 1 MG tablet Take 1 tablet (1 mg) by mouth daily 90 tablet 1     B Complex Vitamins (VITAMIN B-COMPLEX PO) Take by mouth daily       calcium carbonate (TUMS) 500 MG chewable tablet Take 1 chew tab by mouth 2 times daily       Calcium-Magnesium-Zinc 333-133-5 MG TABS per tablet Take 1 tablet by mouth daily       Cholecalciferol (VITAMIN D-3 PO) Vitamin D is included with multiple vitamin, patient does not take additional. Marjorie Crooks LPN on 7/26/2019 at 3:02 PM       cyclobenzaprine (FLEXERIL) 5 MG tablet Take 1 tablet (5 mg) by mouth daily as needed for muscle spasms 20 tablet 1     denosumab (PROLIA) 60 MG/ML SOSY injection        famotidine (PEPCID) 20 MG tablet Take 1 tablet (20 mg) by mouth 2 times daily 90 tablet 3     KRILL OIL PO Take by mouth daily       magnesium oxide 200 MG TABS Take 100 mg by mouth once as needed       Multiple Vitamins-Minerals (MULTIVITAMIN PO)        vitamin B-12 (CYANOCOBALAMIN) 100 MCG tablet Take 1,000 mcg by mouth daily        Physical Exam:  /79 (BP Location: Left arm)   Pulse 102   Temp 98.1  F (36.7  C) (Oral)   Resp 18   Ht 1.575 m (5' 2.01\")   Wt 81.1 kg (178 lb 12.8 oz)   LMP 05/31/2003 (Approximate)   SpO2 95%   BMI 32.69 kg/m       LMP 05/31/2003 (Approximate) "     LMP 05/31/2003 (Approximate)     GENERAL APPEARANCE: Middle aged woman, NAD  HEENT: no asymmetry or masses. OP: no lesions  Lymphatics: no cervical, supraclavicular or axillary lymphadenopathy b/l  CV: S1S2 reg  Resp: Lungs CTA b/l  GI: + BS, soft, NT, ND, hepatosplenomegaly difficult to evaluate secondary to body habitus       PSYCHIATRIC: mentation appears normal and affect is normal  Neurologic: Alert and oriented x3  Extremeties: No edema b/l LE   Skin: oval scaly patch on posterior neck, faintly erythematous, about one inch in diameter.    Laboratory/Imaging Studies  Component      Latest Ref Rng & Units 10/24/2019   Sodium      133 - 144 mmol/L 141   Potassium      3.4 - 5.3 mmol/L 4.1   Chloride      94 - 109 mmol/L 109   Carbon Dioxide      20 - 32 mmol/L 30   Anion Gap      3 - 14 mmol/L 2 (L)   Glucose      70 - 99 mg/dL 96   Urea Nitrogen      7 - 30 mg/dL 10   Creatinine      0.52 - 1.04 mg/dL 0.62   GFR Estimate      >60 mL/min/1.73:m2 >90   GFR Estimate If Black      >60 mL/min/1.73:m2 >90   Calcium      8.5 - 10.1 mg/dL 9.2   Bilirubin Total      0.2 - 1.3 mg/dL 0.4   Albumin      3.4 - 5.0 g/dL 3.5   Protein Total      6.8 - 8.8 g/dL 7.2   Alkaline Phosphatase      40 - 150 U/L 105   ALT      0 - 50 U/L 21   AST      0 - 45 U/L 19     ASSESSMENT/PLAN:  Marixa (prefers to be called [email or web address]) is a very pleasant 69-year-old postmenopausal woman with recent diagnosis of prognostic stage IB (pT2N0) [place] grade 1 ER+ [place]+ Her2 negative invasive ductal right sided breast cancer, s/p R lumpectomy on 1/10/2019 and scheduled to complete right chest wall radiation on 3/11/2019. Oncotype Dx score returned low risk at 4, correlating with 3% risk of disease recurrence at 9 years with the use  aromatase inhibitor or tamoxifen alone. There is no benefit to systemic chemotherapy in this setting. She has osteoporosis putting her at high risk of skeletal related events with an aromatase inhibitor  and also  persistently elevated d-dimer and other inflammatory markers putting her at a higher risk of venous thromboembolism, but preferred to still be on systemic hormonal therapy, and started on Anastrazole in March 2019 and at the same time started on Prolia for prevention of further bone mineral density loss.    1. Breast cancer -she has been on anastrozole and will continue. F/up in 3 months with our NP, with CMP.  2. Osteoporosis- vitamin D level normal. Continue calcium and vitamin D supplementation and weight bearing exercise as tolerated.  Prolia 60 mg subq q 6 months for treatment of patient's osteoporosis, next due with next visit. We'll monitor DEXA scan closely, next after a year of being on aromatase inhibitor therapy, due in April 2020, with out next visit.    3. Breast cancer screening-  B/l 3d screening mammogram negative 01/2020. Next annual mammogram due 01/2021.  4. LAVONNE daughter-  She was seen by Dr. Zeng on 4/29/19 and had a normal pelvic exam and a negative pap smear. Plan is to follow her annually by gyn team.      Priscilla had multiple questions during today's visit all of which were answered.  At the end of our visit patient verbalized understanding and concurred with the plan.        Again, thank you for allowing me to participate in the care of your patient.        Sincerely,        Sil Dan MD, MD

## 2020-01-14 NOTE — NURSING NOTE
"Oncology Rooming Note    January 14, 2020 2:00 PM   Marixa Ann is a 69 year old female who presents for:    Chief Complaint   Patient presents with     Oncology Clinic Visit     Follow Up     Initial Vitals: /79 (BP Location: Left arm)   Pulse 102   Temp 98.1  F (36.7  C) (Oral)   Resp 18   Ht 1.575 m (5' 2.01\")   Wt 81.1 kg (178 lb 12.8 oz)   LMP 05/31/2003 (Approximate)   SpO2 95%   BMI 32.69 kg/m   Estimated body mass index is 32.69 kg/m  as calculated from the following:    Height as of this encounter: 1.575 m (5' 2.01\").    Weight as of this encounter: 81.1 kg (178 lb 12.8 oz). Body surface area is 1.88 meters squared.  Extreme Pain (8) Comment: Data Unavailable   Patient's last menstrual period was 05/31/2003 (approximate).  Allergies reviewed: Yes  Medications reviewed: Yes    Medications: Medication refills not needed today.  Pharmacy name entered into EPIC:    WRITTEN PRESCRIPTION REQUESTED  [id number] - [place], [place] - [address] LINA Reed, LPN              "

## 2020-01-15 ENCOUNTER — PATIENT OUTREACH (OUTPATIENT)
Dept: CARE COORDINATION | Facility: CLINIC | Age: 70
End: 2020-01-15

## 2020-01-15 NOTE — PROGRESS NOTES
"Oncology Distress Screening Follow-up  Clinical Social Work  [facility]    Identified Concern and Score From Distress Screenin. How concerned are you about your ability to eat?   0    2. How concerned are you about unintended weight loss or your current weight?   0    3. How concerned are you about feeling depressed or very sad?   8Abnormal     4. How concerned are you about feeling anxious or very scared?   8Abnormal     5. Do you struggle with the loss of meaning and stefany in your life?       Not at all    6. How concerned are you about work and home life issues that may be affected by your cancer?   8Abnormal   lack of energy    7. How concerned are you about knowing what resources are available to help you?   0  Does not want any resources from us    8. Do you currently have what you would describe as Alevism or spiritual struggles?                      Date of Distress Screenin20      Intervention/Education Provided: DESIREE called and spoke to Priscilla this afternoon regarding her elevated distress score. DESIREE introduced self and reason for call. Initially, Priscilla was a bit resistant to the call, asking several times if I was going to be charging her for the discussion and reiterating that she has her own therapist. DESIREE made it clear that she was not going to be billing Priscilla for this conversation, but simply wanted to offer support and check in about any needs following her appointment yesterday. With that settled, Priscilla become much more open and spoke for quite some time about her frustrations and feelings about [facility] and her experience within it. Overall, she made clear her feeling that the system treats people like just things to deal with and not like people. She reports her frustration that \"you have a policy that you can only communicate with people through My Chart.\" DESIREE worked to empathize and validate Priscilla's feelings, while also checking in with her about whether someone had told her that was " "a policy, she did not report she was actually told it was policy. Priscilla spoke about a number of interactions she has had with staff throughout the system, within oncology and other departments, that left her feeling negatively about coming in. DESIREE apologized that Priscilla was left feeling this way and discussed utilizing the Patient Relations Department as a means to hopefully get some of those concerns addressed. She reports she has contacted Patient Relations in the past and isn't sure she wants to do it again. She feels that they too, just give her a canned response and she worries, \"I'll get a reputation as a trouble maker and the providers won't want to deal with me.\" DESIREE assured Priscilla that Patient Relations wants those phone calls in order to work on making change at the systemic level, as Priscilla was discussing, and assured her that they don't share the details and source of those calls with the care teams if that is her concern. Prsicilla did say she appreciated knowing that. DESIREE offered to intervene as well and said she could work with Dr. Dan's care coordinator to encourage phone calls instead of mychart messages. Priscilla stated then that she actually likes being able to go back and reference those messages, so she doesn't want to actually stop getting the mychart messages. In the end, DESIREE acknowledged that she was sorry Priscilla was left feeling so unheard and unsupported, but also emphasized that the oncology team really does care about our patients and wants her to have a positive and supportive experience here. DESIREE reiterated Cookies option to share the experience with patient relations, but she wasn't interested at this time. DESIREE offered to give Priscilla her contact information and advised she was welcome to contact  at anytime. Priscilla declined. DESIREE advised that Priscilla could call clinic to get connected to  at anytime.      Follow-up Required:    No follow up planned at this time. DESIREE will remain available " as needed and appropriate.       MONIQUE Bettencourt, [facility]  Clinical , Adult Oncology  Pager: [phone]  Phone: [phone]

## 2020-01-20 ENCOUNTER — TELEPHONE (OUTPATIENT)
Dept: FAMILY MEDICINE | Facility: CLINIC | Age: 70
End: 2020-01-20

## 2020-01-20 NOTE — TELEPHONE ENCOUNTER
Panel Management Review   One phone call and send letter if unable to reach them or Gada Grouphart message and send letter if not read after 2 weeks (You will get a message to your inbasket)          Health Maintenance Due   Topic Date Due     ADVANCE CARE PLANNING  1950     COLONOSCOPY  10/20/1962     ZOSTER IMMUNIZATION (1 of 2) 10/20/2002     MEDICARE ANNUAL WELLNESS VISIT  04/20/2019     FALL RISK ASSESSMENT  02/08/2020        Fail List measure:         Patient is due/failing the following:   COLONOSCOPY    Action needed:   none    Type of outreach:    Sent letter.    Questions for provider review:    None                                                                                                                              Marry Ragland MA

## 2020-01-20 NOTE — LETTER
January 20, 2020    Marixa Ann  [id number] [address] [place] [address]    Dear Marixa Ann,     At [facility] we care about your health and are committed to providing quality patient care.    Which includes staying current on preventive cancer screenings.  You can increase your chances of finding and treating cancers through regular screenings.      Our records indicate you may be due for the following preventive screening(s):    Colonoscopy    Colonoscopy is recommended every ten years for everyone age 50 and older. We strongly urge our patient's to consider having a colonoscopy done, which is the best screening test available and only needs to be done every 10 years if normal. If you are unwilling or unable to have a colonoscopy then we recommend the annual stool testing for blood. This test is called a FIT test and it looks for blood in the stool.     To schedule an appointment for your colonoscopy, please see the attached referral.     To schedule an appointment or discuss this screening further, you may contact us by phone at the [facility] at [phone] or online through the patient [email or web address]    If you have had any of the screenings listed above at another facility, please call us so that we may update your chart.      Your partners in health,      Quality Committee at [facility]

## 2020-02-03 ENCOUNTER — MYC REFILL (OUTPATIENT)
Dept: FAMILY MEDICINE | Facility: CLINIC | Age: 70
End: 2020-02-03

## 2020-02-03 DIAGNOSIS — F90.0 ATTENTION DEFICIT HYPERACTIVITY DISORDER (ADHD), PREDOMINANTLY INATTENTIVE TYPE: ICD-10-CM

## 2020-02-03 RX ORDER — DEXTROAMPHETAMINE SACCHARATE, AMPHETAMINE ASPARTATE, DEXTROAMPHETAMINE SULFATE AND AMPHETAMINE SULFATE 3.75; 3.75; 3.75; 3.75 MG/1; MG/1; MG/1; MG/1
15 TABLET ORAL 2 TIMES DAILY
Qty: 60 TABLET | Refills: 0 | Status: SHIPPED | OUTPATIENT
Start: 2020-02-03 | End: 2020-02-25

## 2020-02-03 NOTE — TELEPHONE ENCOUNTER
Requested Prescriptions   Pending Prescriptions Disp Refills     amphetamine-dextroamphetamine (ADDERALL) 15 MG tablet        Last Written Prescription Date:  01/06/2020  Last Fill Quantity: 60,   # refills: 0  Last Office Visit: 12/09/19-[place]  Future Office visit:    Next 5 appointments (look out 90 days)    Feb 20, 2020 10:45 AM CST  Return Visit with Gema Diamond MD  [facility]) [address] [place] [id number]  [phone]   Apr 16, 2020 12:45 PM CDT  Return Visit with MARRY Mathews CNP  [facility]) [address] [place] [id number]  [phone]   Apr 16, 2020  2:00 PM CDT  Return Visit with GARRETT [address] [place] [address] ([facility]) [address] [place] [id number]  [phone]           Routing refill request to provider for review/approval because:  Drug not on the [facility], [facility] or [facility] refill protocol or controlled substance 60 tablet 0     Sig: Take 1 tablet (15 mg) by mouth 2 times daily       There is no refill protocol information for this order

## 2020-02-25 ENCOUNTER — MYC REFILL (OUTPATIENT)
Dept: FAMILY MEDICINE | Facility: CLINIC | Age: 70
End: 2020-02-25

## 2020-02-25 DIAGNOSIS — F90.0 ATTENTION DEFICIT HYPERACTIVITY DISORDER (ADHD), PREDOMINANTLY INATTENTIVE TYPE: ICD-10-CM

## 2020-02-26 RX ORDER — DEXTROAMPHETAMINE SACCHARATE, AMPHETAMINE ASPARTATE, DEXTROAMPHETAMINE SULFATE AND AMPHETAMINE SULFATE 3.75; 3.75; 3.75; 3.75 MG/1; MG/1; MG/1; MG/1
15 TABLET ORAL 2 TIMES DAILY
Qty: 60 TABLET | Refills: 0 | Status: SHIPPED | OUTPATIENT
Start: 2020-03-01 | End: 2020-05-02

## 2020-02-26 NOTE — TELEPHONE ENCOUNTER
Requested Prescriptions   Pending Prescriptions Disp Refills     amphetamine-dextroamphetamine (ADDERALL) 15 MG tablet        Last Written Prescription Date:  02/03/2020  Last Fill Quantity: 60,   # refills: 0  Last Office Visit: 12/09/19-[place]  Future Office visit:    Next 5 appointments (look out 90 days)    Apr 23, 2020  1:45 PM CDT  Return Visit with MARRY Mathews CNP  [facility] ([facility]) [address] [place] [id number] [phone]           Routing refill request to provider for review/approval because:  Drug not on the [facility], [facility] or [facility] refill protocol or controlled substance 60 tablet 0     Sig: Take 1 tablet (15 mg) by mouth 2 times daily       There is no refill protocol information for this order

## 2020-03-14 ENCOUNTER — MYC REFILL (OUTPATIENT)
Dept: ONCOLOGY | Facility: CLINIC | Age: 70
End: 2020-03-14

## 2020-03-14 DIAGNOSIS — C50.211 MALIGNANT NEOPLASM OF UPPER-INNER QUADRANT OF RIGHT BREAST IN FEMALE, ESTROGEN RECEPTOR POSITIVE (H): ICD-10-CM

## 2020-03-14 DIAGNOSIS — Z17.0 MALIGNANT NEOPLASM OF UPPER-INNER QUADRANT OF RIGHT BREAST IN FEMALE, ESTROGEN RECEPTOR POSITIVE (H): ICD-10-CM

## 2020-03-15 ENCOUNTER — HEALTH MAINTENANCE LETTER (OUTPATIENT)
Age: 70
End: 2020-03-15

## 2020-03-16 RX ORDER — ANASTROZOLE 1 MG/1
1 TABLET ORAL DAILY
Qty: 90 TABLET | Refills: 1 | Status: SHIPPED | OUTPATIENT
Start: 2020-03-16 | End: 2020-08-06

## 2020-05-02 ENCOUNTER — MYC REFILL (OUTPATIENT)
Dept: FAMILY MEDICINE | Facility: CLINIC | Age: 70
End: 2020-05-02

## 2020-05-02 DIAGNOSIS — F90.0 ATTENTION DEFICIT HYPERACTIVITY DISORDER (ADHD), PREDOMINANTLY INATTENTIVE TYPE: ICD-10-CM

## 2020-05-04 RX ORDER — DEXTROAMPHETAMINE SACCHARATE, AMPHETAMINE ASPARTATE, DEXTROAMPHETAMINE SULFATE AND AMPHETAMINE SULFATE 3.75; 3.75; 3.75; 3.75 MG/1; MG/1; MG/1; MG/1
15 TABLET ORAL 2 TIMES DAILY
Qty: 60 TABLET | Refills: 0 | Status: SHIPPED | OUTPATIENT
Start: 2020-05-04 | End: 2020-06-02

## 2020-05-04 NOTE — TELEPHONE ENCOUNTER
Requested Prescriptions   Pending Prescriptions Disp Refills     amphetamine-dextroamphetamine (ADDERALL) 15 MG tablet 60 tablet 0     Sig: Take 1 tablet (15 mg) by mouth 2 times daily       There is no refill protocol information for this order        Routing refill request to provider for review/approval because:  Drug not on the [facility] refill protocol     Oriana Cesar RN, BSN, PHN

## 2020-05-15 ENCOUNTER — VIRTUAL VISIT (OUTPATIENT)
Dept: FAMILY MEDICINE | Facility: CLINIC | Age: 70
End: 2020-05-15
Payer: COMMERCIAL

## 2020-05-15 ENCOUNTER — OFFICE VISIT (OUTPATIENT)
Dept: URGENT CARE | Facility: URGENT CARE | Age: 70
End: 2020-05-15
Payer: COMMERCIAL

## 2020-05-15 VITALS
HEART RATE: 88 BPM | DIASTOLIC BLOOD PRESSURE: 70 MMHG | TEMPERATURE: 98.6 F | OXYGEN SATURATION: 97 % | SYSTOLIC BLOOD PRESSURE: 115 MMHG

## 2020-05-15 DIAGNOSIS — R23.8 VESICULAR RASH: ICD-10-CM

## 2020-05-15 DIAGNOSIS — L03.113 CELLULITIS OF RIGHT UPPER EXTREMITY: ICD-10-CM

## 2020-05-15 DIAGNOSIS — Z85.3 PERSONAL HISTORY OF MALIGNANT NEOPLASM OF BREAST: ICD-10-CM

## 2020-05-15 DIAGNOSIS — M79.89 SWELLING OF RIGHT HAND: Primary | ICD-10-CM

## 2020-05-15 DIAGNOSIS — L03.113 CELLULITIS OF RIGHT HAND: Primary | ICD-10-CM

## 2020-05-15 DIAGNOSIS — F33.1 MODERATE RECURRENT MAJOR DEPRESSION (H): ICD-10-CM

## 2020-05-15 PROCEDURE — 99214 OFFICE O/P EST MOD 30 MIN: CPT | Mod: 25 | Performed by: PHYSICIAN ASSISTANT

## 2020-05-15 PROCEDURE — 96372 THER/PROPH/DIAG INJ SC/IM: CPT | Performed by: PHYSICIAN ASSISTANT

## 2020-05-15 PROCEDURE — 99207 ZZC NO BILLABLE SERVICE THIS VISIT: CPT | Performed by: FAMILY MEDICINE

## 2020-05-15 RX ORDER — CEFTRIAXONE SODIUM 1 G
1 VIAL (EA) INJECTION ONCE
Status: COMPLETED | OUTPATIENT
Start: 2020-05-15 | End: 2020-05-15

## 2020-05-15 RX ORDER — CEPHALEXIN 500 MG/1
500 CAPSULE ORAL 3 TIMES DAILY
Qty: 21 CAPSULE | Refills: 0 | Status: SHIPPED | OUTPATIENT
Start: 2020-05-15 | End: 2020-05-22

## 2020-05-15 RX ORDER — MUPIROCIN 20 MG/G
OINTMENT TOPICAL 2 TIMES DAILY
Qty: 15 G | Refills: 0 | Status: SHIPPED | OUTPATIENT
Start: 2020-05-15 | End: 2020-05-22

## 2020-05-15 RX ADMIN — Medication 1 G: at 18:12

## 2020-05-15 ASSESSMENT — PAIN SCALES - GENERAL: PAINLEVEL: SEVERE PAIN (7)

## 2020-05-15 NOTE — PATIENT INSTRUCTIONS
Patient Education     Chemical Burn of the Skin  Your skin has been burned by a chemical. Chemicals on the skin may cause only mild irritation and redness. Or they may cause deep tissue injury. How serious the burn is depends on:    What kind of chemical it was    How diluted it was    How long it was on your skin  Home care  The following guidelines will help you care for your burn at home:    You may put a towel soaked in ice water on the affected area. Do this 3 to 4 times a day to ease pain or swelling.    If a bandage was put on, change it every day. Watch for the warning signs of infection listed below. If the wound is open, put an antibiotic ointment on it each day to prevent infection.    You may use over-the-counter medicine to control pain, unless another medicine was prescribed. If you have chronic liver or kidney disease, talk with your healthcare provider before using acetaminophen or ibuprofen. Also talk with your provider if you've had a stomach ulcer or GI bleeding.    You may use over-the-counter medicine for itching. Try not to scratch or pick at the wound.    Wear loose-fitting clothing.    Protect your wound from the sun.  Follow-up care  Follow up with your healthcare provider, or as advised.  When to seek medical advice  Call your healthcare provider right away if any of these occur:    Swelling or pain gets worse    Redness gets worse    Fluid or pus drains from the burn area    Fever of 100.4 F (38 C) or higher, or as directed by your healthcare provider    Wound doesn't heal    Nausea or vomiting   Date Last Reviewed: 12/1/2016 2000-2019 [employer]. [address] [place] [address], [place] [address]. All rights reserved. This information is not intended as a substitute for professional medical care. Always follow your healthcare professional's instructions.           Patient Education     Chemical Exposure to the Skin  Your skin has been exposed to a chemical. The effects of  chemicals on the skin range from mild irritation and redness to a severe burn. The seriousness of the injury depends on the type of chemical, how concentrated it was, and how long it was on your skin.  It is common to have some irritation for 24 hours after the exposure. This is the case even if the exposure was mild. If the exposure was more serious, be sure to follow up with your healthcare provider as directed.  Home care    You can put a cool compress on the affected area 3 to 4 times a day. You can make a cool compress by using a towel soaked in ice water. This will help reduce pain and swelling.    If a dressing was applied, change it every 24 hours and watch for the warning signs listed below.    You may use acetaminophen or ibuprofen to control pain unless another pain medicine was prescribed. If you have liver disease, or if you have ever had a stomach ulcer or gastrointestinal bleeding, or if you are taking blood-thinning medicines, talk with your healthcare provider before using these pain medicines.    Try not to pick, scratch, or rub at your injury. This may make the injury worse or cause an infection.  Follow-up care  Follow up with your healthcare provider, or as advised.  When to seek medical advice  Call your healthcare provider right away if any of the following occur:    Increased swelling or pain    Severe blistering    Increasing redness    Fluid drainage from the skin  Call 911  Call 911 if any of these occur:    Trouble breathing or swallowing    Extreme drowsiness or trouble awakening    Severe confusion    Seizures    Fainting  Date Last Reviewed: 2/1/2017 2000-2019 [employer]. [address] [place] [address]. All rights reserved. This information is not intended as a substitute for professional medical care. Always follow your healthcare professional's instructions.

## 2020-05-15 NOTE — PROGRESS NOTES
S: 68 yo female is here for right greater than left hand swelling and redness.  She states 3 weeks ago she used some Borax  to get rid of some mold.  The Borax actually fell inside the gloves.  In addition to that right around the same time she cut herself on some el[place] bush branches and thorns.  She denies any fever.  She is status post right mastectomy and right lymph node dissection.  She states she has not had any issues with lymphedema for over 1 year.  She states feels this is a little different.  Last tetanus shot may/2019.  She is been in isolation due to COVID pandemic in her house since January.  She is very nervous about being here today.  She is worried she will become septic.  She denies any cough, sore throat, fever, nasal congestion.    Allergies   Allergen Reactions     Vicodin [Hydrocodone-Acetaminophen] Anaphylaxis     Dilaudid [Hydromorphone] Nausea and Vomiting     Oxycodone Nausea and Vomiting     Dust Mites      Milk [Lac Bovis] Diarrhea       Past Medical History:   Diagnosis Date     ADHD      Breast cancer (H) 12/26/2018    Right Breast     Chronic fatigue      Fibromyalgia      Hard of hearing      Osteoporosis      S/P radiation therapy     4,256 cGy to right breast completed on 03/11/2019 - [facility] with Dr. Copeland       acetaminophen (TYLENOL) 325 MG tablet, Take 325-650 mg by mouth every 6 hours as needed for mild pain  amphetamine-dextroamphetamine (ADDERALL) 15 MG tablet, Take 1 tablet (15 mg) by mouth 2 times daily  anastrozole (ARIMIDEX) 1 MG tablet, Take 1 tablet (1 mg) by mouth daily  B Complex Vitamins (VITAMIN B-COMPLEX PO), Take by mouth daily  calcium carbonate (TUMS) 500 MG chewable tablet, Take 1 chew tab by mouth 2 times daily  Calcium-Magnesium-Zinc 333-133-5 MG TABS per tablet, Take 1 tablet by mouth daily  Cholecalciferol (VITAMIN D-3 PO), Vitamin D is included with multiple vitamin, patient does not take additional. Marjorie Crooks LPN on 7/26/2019 at 3:02  PM  cyclobenzaprine (FLEXERIL) 5 MG tablet, Take 1 tablet (5 mg) by mouth daily as needed for muscle spasms  denosumab (PROLIA) 60 MG/ML SOSY injection,   famotidine (PEPCID) 20 MG tablet, Take 1 tablet (20 mg) by mouth 2 times daily  KRILL OIL PO, Take by mouth daily  magnesium oxide 200 MG TABS, Take 100 mg by mouth once as needed  Multiple Vitamins-Minerals (MULTIVITAMIN PO),   vitamin B-12 (CYANOCOBALAMIN) 100 MCG tablet, Take 1,000 mcg by mouth daily    cross-Linked Hyaluronate (GEL-ONE) injection PRSY 30 mg  cross-Linked Hyaluronate (GEL-ONE) injection PRSY 30 mg  triamcinolone (KENALOG-40) injection 80 mg        Social History     Tobacco Use     Smoking status: Never Smoker     Smokeless tobacco: Never Used   Substance Use Topics     Alcohol use: No     Frequency: Never       ROS:  CONSTITUTIONAL: Negative for fatigue or fever.  EYES: Negative for eye problems.  ENT: neg for ST.  RESP: neg for SOB  CV: Negative for chest pains.  GI: Negative for vomiting.  MUSCULOSKELETAL:  As above.  NEUROLOGIC: Negative for headaches.  SKIN: as above.  PSYCH: Normal mentation for age.    OBJECTIVE:  /70   Pulse 88   Temp 98.6  F (37  C)   LMP 05/31/2003 (Approximate)   SpO2 97%     LMP 05/31/2003 (Approximate)   GENERAL APPEARANCE: Healthy, alert and no distress.  EYES:Conjunctiva/sclera clear.  NECK: Supple, nontender, no lymphadenopathy.  RESP: Lungs clear to auscultation - no rales, rhonchi or wheezes  CV: Regular rate and rhythm, normal S1 S2, no murmur noted.  NEURO: Awake, alert    SKIN: Right hand and distal forearm are with mild to moderate swelling.  She does have some tenderness and redness across the MCP joints of her hand.  I also noticed a little redness over the volar forearm.  She has tiny excoriations and scratch marks over the knuckles and also at the inferior radial dorsal hand.  Good palpable radial pulse.  Sensation to soft touch intact.  Right shoulder range of motion is intact without  pain.        ASSESSMENT:     ICD-10-CM    1. Cellulitis of right hand  L03.113 cefTRIAXone (ROCEPHIN) injection 1 g     cephALEXin (KEFLEX) 500 MG capsule     mupirocin (BACTROBAN) 2 % external ointment   2. Personal history of malignant neoplasm of breast  Z85.3            PLAN: Likely cellulitis rather than chemical burn. Many tiny cracks in the skin. No reaction to IM rocephin after 20 minutes. Warm soaks in Dreft or Epson salts daily for 5- 10 minutes.  Advised she not use any Borax until this has completely resolved.  Recheck 5 days if no improvement.  Sooner as needed.  Start Keflex tomorrow. Topical Bactroban.  I have discussed clinical findings with patient.  Side effects of medications discussed.  Symptomatic care is discussed.  I have discussed the possibility of  worsening symptoms and indication to RTC or go to the ER if they occur.  All questions are answered, patient indicates understanding of these issues and is in agreement with plan.   Patient care instructions are discussed/given at the end of visit.   Lots of rest and fluids.    Sole Ng PA-C

## 2020-05-15 NOTE — NURSING NOTE
Clinic Administered Medication Documentation      Injectable Medication Documentation    Patient was given Ceftriaxone Sodium (Rocephin). Prior to medication administration, verified patients identity using patient s name and date of birth. Please see MAR and medication order for additional information. Patient instructed to remain in clinic for 15 minutes and report any adverse reaction to staff immediately .      Was entire vial of medication used? Yes  Vial/Syringe: Single dose vial  Expiration Date:  04/21  Was this medication supplied by the patient? No     Jesica Hills CMA

## 2020-06-02 ENCOUNTER — MYC REFILL (OUTPATIENT)
Dept: FAMILY MEDICINE | Facility: CLINIC | Age: 70
End: 2020-06-02

## 2020-06-02 ENCOUNTER — MYC REFILL (OUTPATIENT)
Dept: OBGYN | Facility: CLINIC | Age: 70
End: 2020-06-02

## 2020-06-02 DIAGNOSIS — F90.0 ATTENTION DEFICIT HYPERACTIVITY DISORDER (ADHD), PREDOMINANTLY INATTENTIVE TYPE: ICD-10-CM

## 2020-06-02 DIAGNOSIS — M62.838 SPASM OF MUSCLE: ICD-10-CM

## 2020-06-03 RX ORDER — CYCLOBENZAPRINE HCL 5 MG
5 TABLET ORAL DAILY PRN
Qty: 20 TABLET | OUTPATIENT
Start: 2020-06-03

## 2020-06-03 NOTE — TELEPHONE ENCOUNTER
Requested Prescriptions   Pending Prescriptions Disp Refills     amphetamine-dextroamphetamine (ADDERALL) 15 MG tablet 60 tablet 0     Sig: Take 1 tablet (15 mg) by mouth 2 times daily       There is no refill protocol information for this order        Routing refill request to provider for review/approval because:  Drug not on the [facility] [place]     Oriana Cesar RN, BSN, PHN

## 2020-06-03 NOTE — TELEPHONE ENCOUNTER
cyclobenzaprine (FLEXERIL) 5 MG tablet      Last Written Prescription Date:  4/29/19  Last Fill Quantity: 20,   # refills: 1  Last Office Visit: 4/29/2019  Future Office visit:       Routing refill request to provider for review/approval because:  Drug not on the [facility], [facility] or [facility] refill protocol or controlled substance  Pt hasn't been seen in over a year, 4/29/19.    Rx was last prescribed over a year ago, 4/29/19, for a 2 month supply.    Philly Johnston RN

## 2020-06-03 NOTE — TELEPHONE ENCOUNTER
Rx request for Flexeril was sent to Dr. Zeng.  Dr. Zeng denied the request.    Philyl Johnston RN

## 2020-06-04 RX ORDER — DEXTROAMPHETAMINE SACCHARATE, AMPHETAMINE ASPARTATE, DEXTROAMPHETAMINE SULFATE AND AMPHETAMINE SULFATE 3.75; 3.75; 3.75; 3.75 MG/1; MG/1; MG/1; MG/1
15 TABLET ORAL 2 TIMES DAILY
Qty: 60 TABLET | Refills: 0 | Status: SHIPPED | OUTPATIENT
Start: 2020-06-04 | End: 2020-07-09

## 2020-07-09 ENCOUNTER — MYC REFILL (OUTPATIENT)
Dept: FAMILY MEDICINE | Facility: CLINIC | Age: 70
End: 2020-07-09

## 2020-07-09 ENCOUNTER — MYC REFILL (OUTPATIENT)
Dept: OBGYN | Facility: CLINIC | Age: 70
End: 2020-07-09

## 2020-07-09 DIAGNOSIS — M62.838 SPASM OF MUSCLE: ICD-10-CM

## 2020-07-09 DIAGNOSIS — F90.0 ATTENTION DEFICIT HYPERACTIVITY DISORDER (ADHD), PREDOMINANTLY INATTENTIVE TYPE: ICD-10-CM

## 2020-07-10 RX ORDER — CYCLOBENZAPRINE HCL 5 MG
5 TABLET ORAL DAILY PRN
Qty: 20 TABLET | Refills: 1 | Status: SHIPPED | OUTPATIENT
Start: 2020-07-10 | End: 2021-09-17

## 2020-07-10 RX ORDER — DEXTROAMPHETAMINE SACCHARATE, AMPHETAMINE ASPARTATE, DEXTROAMPHETAMINE SULFATE AND AMPHETAMINE SULFATE 3.75; 3.75; 3.75; 3.75 MG/1; MG/1; MG/1; MG/1
15 TABLET ORAL 2 TIMES DAILY
Qty: 60 TABLET | Refills: 0 | Status: SHIPPED | OUTPATIENT
Start: 2020-07-10 | End: 2020-08-09

## 2020-07-10 NOTE — TELEPHONE ENCOUNTER
Requested Prescriptions   Pending Prescriptions Disp Refills     amphetamine-dextroamphetamine (ADDERALL) 15 MG tablet 60 tablet 0     Sig: Take 1 tablet (15 mg) by mouth 2 times daily       There is no refill protocol information for this order        Routing refill request to provider for review/approval because:  Drug not on the [facility] [place]ill protocol     Oriana Cesar RN, BSN, PHN

## 2020-07-10 NOTE — TELEPHONE ENCOUNTER
cyclobenzaprine (FLEXERIL) 5 MG tablet      Last Written Prescription Date:  4/29/19  Last Fill Quantity: 20,   # refills: 1  Last Office Visit: 4/29/19  Future Office visit:       Routing refill request to provider for review/approval because:  Drug not on the [facility], [facility] or [facility] refill protocol or controlled substance    Philly Johnston RN

## 2020-07-22 ENCOUNTER — MYC REFILL (OUTPATIENT)
Dept: ONCOLOGY | Facility: CLINIC | Age: 70
End: 2020-07-22

## 2020-07-22 DIAGNOSIS — C50.211 MALIGNANT NEOPLASM OF UPPER-INNER QUADRANT OF RIGHT BREAST IN FEMALE, ESTROGEN RECEPTOR POSITIVE (H): ICD-10-CM

## 2020-07-22 DIAGNOSIS — Z17.0 MALIGNANT NEOPLASM OF UPPER-INNER QUADRANT OF RIGHT BREAST IN FEMALE, ESTROGEN RECEPTOR POSITIVE (H): ICD-10-CM

## 2020-07-22 RX ORDER — ANASTROZOLE 1 MG/1
1 TABLET ORAL DAILY
Qty: 90 TABLET | Refills: 1 | Status: CANCELLED | OUTPATIENT
Start: 2020-07-22

## 2020-08-06 ENCOUNTER — ANCILLARY PROCEDURE (OUTPATIENT)
Dept: BONE DENSITY | Facility: CLINIC | Age: 70
End: 2020-08-06
Attending: INTERNAL MEDICINE
Payer: COMMERCIAL

## 2020-08-06 ENCOUNTER — INFUSION THERAPY VISIT (OUTPATIENT)
Dept: INFUSION THERAPY | Facility: CLINIC | Age: 70
End: 2020-08-06
Payer: COMMERCIAL

## 2020-08-06 ENCOUNTER — ONCOLOGY VISIT (OUTPATIENT)
Dept: ONCOLOGY | Facility: CLINIC | Age: 70
End: 2020-08-06
Payer: COMMERCIAL

## 2020-08-06 VITALS
DIASTOLIC BLOOD PRESSURE: 65 MMHG | OXYGEN SATURATION: 96 % | HEART RATE: 89 BPM | SYSTOLIC BLOOD PRESSURE: 114 MMHG | TEMPERATURE: 97.9 F | BODY MASS INDEX: 33.38 KG/M2 | WEIGHT: 182.54 LBS | RESPIRATION RATE: 16 BRPM

## 2020-08-06 VITALS
SYSTOLIC BLOOD PRESSURE: 114 MMHG | WEIGHT: 182.5 LBS | DIASTOLIC BLOOD PRESSURE: 65 MMHG | TEMPERATURE: 97.9 F | BODY MASS INDEX: 33.37 KG/M2 | RESPIRATION RATE: 16 BRPM | HEART RATE: 89 BPM | OXYGEN SATURATION: 96 %

## 2020-08-06 DIAGNOSIS — R53.83 FATIGUE, UNSPECIFIED TYPE: ICD-10-CM

## 2020-08-06 DIAGNOSIS — N95.9 UNSPECIFIED MENOPAUSAL AND PERIMENOPAUSAL DISORDER: ICD-10-CM

## 2020-08-06 DIAGNOSIS — T45.1X5A HOT FLASHES RELATED TO AROMATASE INHIBITOR THERAPY: ICD-10-CM

## 2020-08-06 DIAGNOSIS — Z17.0 MALIGNANT NEOPLASM OF UPPER-INNER QUADRANT OF RIGHT BREAST IN FEMALE, ESTROGEN RECEPTOR POSITIVE (H): ICD-10-CM

## 2020-08-06 DIAGNOSIS — Z12.31 ENCOUNTER FOR SCREENING MAMMOGRAM FOR BREAST CANCER: ICD-10-CM

## 2020-08-06 DIAGNOSIS — M89.9 DISORDER OF BONE: ICD-10-CM

## 2020-08-06 DIAGNOSIS — R53.83 FATIGUE, UNSPECIFIED TYPE: Primary | ICD-10-CM

## 2020-08-06 DIAGNOSIS — Z79.811 AROMATASE INHIBITOR USE: Primary | ICD-10-CM

## 2020-08-06 DIAGNOSIS — M81.8 OTHER OSTEOPOROSIS WITHOUT CURRENT PATHOLOGICAL FRACTURE: ICD-10-CM

## 2020-08-06 DIAGNOSIS — Z79.811 AROMATASE INHIBITOR USE: ICD-10-CM

## 2020-08-06 DIAGNOSIS — Z79.811 USE OF AROMATASE INHIBITORS: ICD-10-CM

## 2020-08-06 DIAGNOSIS — C50.211 MALIGNANT NEOPLASM OF UPPER-INNER QUADRANT OF RIGHT BREAST IN FEMALE, ESTROGEN RECEPTOR POSITIVE (H): ICD-10-CM

## 2020-08-06 DIAGNOSIS — R23.2 HOT FLASHES RELATED TO AROMATASE INHIBITOR THERAPY: ICD-10-CM

## 2020-08-06 DIAGNOSIS — F19.982 DRUG-INDUCED INSOMNIA (H): ICD-10-CM

## 2020-08-06 DIAGNOSIS — E28.39 ESTROGEN DEFICIENCY: ICD-10-CM

## 2020-08-06 DIAGNOSIS — F41.9 ANXIETY: ICD-10-CM

## 2020-08-06 LAB
ALBUMIN SERPL-MCNC: 3.2 G/DL (ref 3.4–5)
ALP SERPL-CCNC: 107 U/L (ref 40–150)
ALT SERPL W P-5'-P-CCNC: 26 U/L (ref 0–50)
ANION GAP SERPL CALCULATED.3IONS-SCNC: 3 MMOL/L (ref 3–14)
AST SERPL W P-5'-P-CCNC: 17 U/L (ref 0–45)
BILIRUB DIRECT SERPL-MCNC: <0.1 MG/DL (ref 0–0.2)
BILIRUB SERPL-MCNC: 0.2 MG/DL (ref 0.2–1.3)
BUN SERPL-MCNC: 20 MG/DL (ref 7–30)
CALCIUM SERPL-MCNC: 9 MG/DL (ref 8.5–10.1)
CHLORIDE SERPL-SCNC: 109 MMOL/L (ref 94–109)
CO2 SERPL-SCNC: 30 MMOL/L (ref 20–32)
CREAT SERPL-MCNC: 0.76 MG/DL (ref 0.52–1.04)
GFR SERPL CREATININE-BSD FRML MDRD: 80 ML/MIN/{1.73_M2}
GLUCOSE SERPL-MCNC: 89 MG/DL (ref 70–99)
POTASSIUM SERPL-SCNC: 4 MMOL/L (ref 3.4–5.3)
PROT SERPL-MCNC: 7 G/DL (ref 6.8–8.8)
SODIUM SERPL-SCNC: 142 MMOL/L (ref 133–144)
TSH SERPL DL<=0.005 MIU/L-ACNC: 3.28 MU/L (ref 0.4–4)

## 2020-08-06 PROCEDURE — 82248 BILIRUBIN DIRECT: CPT | Performed by: INTERNAL MEDICINE

## 2020-08-06 PROCEDURE — 77080 DXA BONE DENSITY AXIAL: CPT | Performed by: RADIOLOGY

## 2020-08-06 PROCEDURE — 80053 COMPREHEN METABOLIC PANEL: CPT | Performed by: INTERNAL MEDICINE

## 2020-08-06 PROCEDURE — 77081 DXA BONE DENSITY APPENDICULR: CPT | Mod: 59 | Performed by: RADIOLOGY

## 2020-08-06 PROCEDURE — 36415 COLL VENOUS BLD VENIPUNCTURE: CPT | Performed by: INTERNAL MEDICINE

## 2020-08-06 PROCEDURE — 96372 THER/PROPH/DIAG INJ SC/IM: CPT | Performed by: NURSE PRACTITIONER

## 2020-08-06 PROCEDURE — 99214 OFFICE O/P EST MOD 30 MIN: CPT | Mod: 25 | Performed by: NURSE PRACTITIONER

## 2020-08-06 PROCEDURE — 99207 ZZC NO CHARGE NURSE ONLY: CPT

## 2020-08-06 PROCEDURE — 84443 ASSAY THYROID STIM HORMONE: CPT | Performed by: NURSE PRACTITIONER

## 2020-08-06 RX ORDER — ANASTROZOLE 1 MG/1
1 TABLET ORAL DAILY
Qty: 90 TABLET | Refills: 1 | Status: SHIPPED | OUTPATIENT
Start: 2020-08-06 | End: 2020-10-22

## 2020-08-06 RX ORDER — GABAPENTIN 300 MG/1
300 CAPSULE ORAL AT BEDTIME
Qty: 30 CAPSULE | Refills: 2 | Status: SHIPPED | OUTPATIENT
Start: 2020-08-06 | End: 2021-02-08

## 2020-08-06 ASSESSMENT — PAIN SCALES - GENERAL: PAINLEVEL: NO PAIN (0)

## 2020-08-06 NOTE — PROGRESS NOTES
Oncology Follow Up Visit: August 6, 2020     Oncologist: Dr Sil Dan  PCP: Jackie Kovacs    Diagnosis: Stage IB Right Breast Cancer  Marixa Ann is a 70 yo female who had abnormality at 2-4 oclock level of right breaston screening mammogram in 12/2018. Final review proved a 12 x 9 x14 mm invasive ductal carcinoma at 2 oclock to the right breast, Limon grade 1, ER/MD positive, Her2 negative with 0/3 SLN positive and edges free of disease.   Oncotype score =4 or 3% risk of disease at 9 years with hormone therapy.   Treatment:   1/10/2019 Right Lumpectomy with SLN biopsy  3/11/2019 completed right breast radiation post lumpectomy.   - choice made to not use Tamoxifen due to risk for DVT(repeatedly elevated d-dimer as well as elevated ESR and CRP) so started pt on Aromatase inhibitor with close bone evaluation    Interval History: Ms. Ann comes to clinic alone for follow up to her aromatase inhibitor use for her breast cancer- requesting face to face visit due to anxiety. Pt shares she has been taking her anastrozole daily without fail and has noted some hot flashes/chills on a regular basis worse at night after she takes her anastrozole in the evening.  She is also seeing a an increase in the arthritis especially her hands and not sure if it is related to the AI but has been using acetaminophen 2-3 times daily to help with this.  She does complain about some weakness but says she has been outside doing a lot of gardening as well with no falls.  Her biggest complaint is that of extreme fatigue and she is using Adderall due to chronic fatigue syndrome which she says does get her through the day.  Patient is quite anxious and reports having nightmares of the cancer returning and this being her last summer-she has seen a counselor in her past.  She also has recurrent heartburn and is using famotidine(read omeprazole causes dementia so we will not use this) but is stating this is not always  covering her heartburn.  States bowel and bladder is her normal and she has not had any new fevers nausea shortness of breath vaginal dryness or other new concerns.   Rest of comprehensive and complete ROS is reviewed and is negative.   Past Medical History:   Diagnosis Date     ADHD      Breast cancer (H) 12/26/2018    Right Breast     Chronic fatigue      Fibromyalgia      Hard of hearing      Osteoporosis      S/P radiation therapy     4,256 cGy to right breast completed on 03/11/2019 - [facility] with Dr. Copeland     Current Outpatient Medications   Medication     acetaminophen (TYLENOL) 325 MG tablet     amphetamine-dextroamphetamine (ADDERALL) 15 MG tablet     anastrozole (ARIMIDEX) 1 MG tablet     B Complex Vitamins (VITAMIN B-COMPLEX PO)     calcium carbonate (TUMS) 500 MG chewable tablet     Calcium-Magnesium-Zinc 333-133-5 MG TABS per tablet     Cholecalciferol (VITAMIN D-3 PO)     cyclobenzaprine (FLEXERIL) 5 MG tablet     denosumab (PROLIA) 60 MG/ML SOSY injection     famotidine (PEPCID) 20 MG tablet     KRILL OIL PO     magnesium oxide 200 MG TABS     Multiple Vitamins-Minerals (MULTIVITAMIN PO)     vitamin B-12 (CYANOCOBALAMIN) 100 MCG tablet     Current Facility-Administered Medications   Medication     cross-Linked Hyaluronate (GEL-ONE) injection PRSY 30 mg     cross-Linked Hyaluronate (GEL-ONE) injection PRSY 30 mg     triamcinolone (KENALOG-40) injection 80 mg     Allergies   Allergen Reactions     Vicodin [Hydrocodone-Acetaminophen] Anaphylaxis     Dilaudid [Hydromorphone] Nausea and Vomiting     Oxycodone Nausea and Vomiting     Dust Mites      Milk [Lac Bovis] Diarrhea       Physical Exam:/65 (BP Location: Left arm)   Pulse 89   Temp 97.9  F (36.6  C) (Oral)   Resp 16   Wt 82.8 kg (182 lb 8 oz)   LMP 05/31/2003 (Approximate)   SpO2 96%   BMI 33.37 kg/m    Constitutional: Alert, overweight, and appears anxious  ENT: Eyes bright, No mouth sores  Neck: Supple, No  adenopathy.Thyroid symmetric  Cardiac: Heart rate and rhythm is regular and strong without murmur  Respiratory: Breathing easy. Lung sounds clear to auscultation  Breasts:tender under right breast and axilla scars but no new masses noted to either breast or axilla. No discharge or abnormal appearance.   Abdomen: Soft, non-tender, BS normal. No masses or organomegaly  MS: Muscle tone normal, extremities normal with no edema.   Skin: No suspicious lesions or rashes  Neuro: Sensory grossly WNL, gait normal.   Lymph: Normal ant/post cervical, axillary, supraclavicular nodes  Psych: Mentation appears normal and affect anxious but states she appreciates the touch of the exam today.     Laboratory Results:   Results for orders placed or performed in visit on 08/06/20   Comprehensive metabolic panel     Status: Abnormal   Result Value Ref Range    Sodium 142 133 - 144 mmol/L    Potassium 4.0 3.4 - 5.3 mmol/L    Chloride 109 94 - 109 mmol/L    Carbon Dioxide 30 20 - 32 mmol/L    Anion Gap 3 3 - 14 mmol/L    Glucose 89 70 - 99 mg/dL    Urea Nitrogen 20 7 - 30 mg/dL    Creatinine 0.76 0.52 - 1.04 mg/dL    GFR Estimate 80 >60 mL/min/[1.73_m2]    GFR Estimate If Black >90 >60 mL/min/[1.73_m2]    Calcium 9.0 8.5 - 10.1 mg/dL    Bilirubin Total 0.2 0.2 - 1.3 mg/dL    Albumin 3.2 (L) 3.4 - 5.0 g/dL    Protein Total 7.0 6.8 - 8.8 g/dL    Alkaline Phosphatase 107 40 - 150 U/L    ALT 26 0 - 50 U/L    AST 17 0 - 45 U/L   Bilirubin direct     Status: None   Result Value Ref Range    Bilirubin Direct <0.1 0.0 - 0.2 mg/dL   TSH with free T4 reflex     Status: None   Result Value Ref Range    TSH 3.28 0.40 - 4.00 mU/L     Assessment and Plan:   Stage 1B Right Breast Cancer- Pt completed right lumpectomy and SLN biopsy, radiation therapy. She began endocrine therapy with   Tamoxifen since has high risk for DVTs and therefore moved to [facility] and pt has had no new symptoms of blood clot.she is otherwise tolerating treatment well.   She will  continue the anastrozole- renewed today.   She will return in 4 months for review with CMP.  Last Mammogram was completed in 2/2020 and was normal- will repeat in February   Use of AI- Has cholesterol checked yearly by PCP with know hyperlipidemia. Hot flashes have continued to interrupt sleep and are worse at night. Suggested she may trial gabapentin 300 mg nightly to help with hot flashes and may help with sleep- no interaction with her Adderall.   Osteoporosis- 4/2018 DEXA result showing T score of -2.9 equalling osteoporosis- repeat DEXA scan is going to be competed today- we will follow up with results. Pt started Prolia every 6 months and is due in September. Pt confirms she is on adequate calcium and vitamin D daily.    Encouraged weight bearing exercises to strengthen bones- given praise for her gardening activity this summer.   Fatigue with known ADD- fatigue is worse she feels- checked TSH and was normal today. Pt continues on Adderall for support and energy. May be related to the AI or exacerbated by pandemic and unrest concerns. Pt is appearing functional today.   Pt was seen on a

## 2020-08-06 NOTE — NURSING NOTE
"Oncology Rooming Note    August 6, 2020 1:02 PM   Marixa Ann is a 69 year old female who presents for:    Chief Complaint   Patient presents with     Oncology Clinic Visit     Follow up     Initial Vitals: /65 (BP Location: Left arm)   Pulse 89   Temp 97.9  F (36.6  C) (Oral)   Resp 16   Wt 82.8 kg (182 lb 8 oz)   LMP 05/31/2003 (Approximate)   SpO2 96%   BMI 33.37 kg/m   Estimated body mass index is 33.37 kg/m  as calculated from the following:    Height as of 1/14/20: 1.575 m (5' 2.01\").    Weight as of this encounter: 82.8 kg (182 lb 8 oz). Body surface area is 1.9 meters squared.  No Pain (0) Comment: Data Unavailable   Patient's last menstrual period was 05/31/2003 (approximate).  Allergies reviewed: Yes  Medications reviewed: Yes    Medications: MEDICATION REFILLS NEEDED TODAY. Provider was notified.  Pharmacy name entered into EPIC:    WRITTEN PRESCRIPTION REQUESTED  [id number] - [place], [place] - [address]    Clinical concerns: Extreme fatigue and heartburn       Marjorie Crooks LPN              "

## 2020-08-06 NOTE — PROGRESS NOTES
Infusion Nursing Note:  Marixa CARLYLE Ann presents today for Prolia injection.  Patient seen by provider today: Yes: Lidia Parham CNP   present during visit today: Not Applicable.    Note: N/A.    Intravenous Access:  No Intravenous access/labs at this visit.    Treatment Conditions:  Lab Results   Component Value Date     08/06/2020                   Lab Results   Component Value Date    POTASSIUM 4.0 08/06/2020           Lab Results   Component Value Date    MAG 2.0 01/04/2019            Lab Results   Component Value Date    CR 0.76 08/06/2020                   Lab Results   Component Value Date    JONATHON 9.0 08/06/2020                Lab Results   Component Value Date    BILITOTAL 0.2 08/06/2020           Lab Results   Component Value Date    ALBUMIN 3.2 08/06/2020                    Lab Results   Component Value Date    ALT 26 08/06/2020           Lab Results   Component Value Date    AST 17 08/06/2020       Results reviewed, labs MET treatment parameters, ok to proceed with treatment.      Post Infusion Assessment:  Patient tolerated injection without incident.  Site patent and intact, free from redness, edema or discomfort.       Discharge Plan:   Patient discharged in stable condition accompanied by: self.  Departure Mode: Ambulatory.    Marjorie Crooks LPN

## 2020-08-06 NOTE — LETTER
8/6/2020         RE: Marixa Ann  [id number] [address] [facility] [address]        Dear Colleague,    Thank you for referring your patient, Marixa Ann, to the [facility]. Please see a copy of my visit note below.    Oncology Follow Up Visit: August 6, 2020     Oncologist: Dr Sil Dan  PCP: Jackie Kovacs    Diagnosis: Stage IB Right Breast Cancer  Marixa Ann is a 68 yo female who had abnormality at 2-4 oclock level of right breaston screening mammogram in 12/2018. Final review proved a 12 x 9 x14 mm invasive ductal carcinoma at 2 oclock to the right breast, Carol grade 1, ER/NC positive, Her2 negative with 0/3 SLN positive and edges free of disease.   Oncotype score =4 or 3% risk of disease at 9 years with hormone therapy.   Treatment:   1/10/2019 Right Lumpectomy with SLN biopsy  3/11/2019 completed right breast radiation post lumpectomy.   - choice made to not use Tamoxifen due to risk for DVT(repeatedly elevated d-dimer as well as elevated ESR and CRP) so started pt on Aromatase inhibitor with close bone evaluation    Interval History: Ms. Ann comes to clinic alone for follow up to her aromatase inhibitor use for her breast cancer- requesting face to face visit due to anxiety. Pt shares she has been taking her anastrozole daily without fail and has noted some hot flashes/chills on a regular basis worse at night after she takes her anastrozole in the evening.  She is also seeing a an increase in the arthritis especially her hands and not sure if it is related to the AI but has been using acetaminophen 2-3 times daily to help with this.  She does complain about some weakness but says she has been outside doing a lot of gardening as well with no falls.  Her biggest complaint is that of extreme fatigue and she is using Adderall due to chronic fatigue syndrome which she says does get her through the day.  Patient is quite anxious and reports having  nightmares of the cancer returning and this being her last summer-she has seen a counselor in her past.  She also has recurrent heartburn and is using famotidine(read omeprazole causes dementia so we will not use this) but is stating this is not always covering her heartburn.  States bowel and bladder is her normal and she has not had any new fevers nausea shortness of breath vaginal dryness or other new concerns.   Rest of comprehensive and complete ROS is reviewed and is negative.   Past Medical History:   Diagnosis Date     ADHD      Breast cancer (H) 12/26/2018    Right Breast     Chronic fatigue      Fibromyalgia      Hard of hearing      Osteoporosis      S/P radiation therapy     4,256 cGy to right breast completed on 03/11/2019 - [facility] with Dr. Copeland     Current Outpatient Medications   Medication     acetaminophen (TYLENOL) 325 MG tablet     amphetamine-dextroamphetamine (ADDERALL) 15 MG tablet     anastrozole (ARIMIDEX) 1 MG tablet     B Complex Vitamins (VITAMIN B-COMPLEX PO)     calcium carbonate (TUMS) 500 MG chewable tablet     Calcium-Magnesium-Zinc 333-133-5 MG TABS per tablet     Cholecalciferol (VITAMIN D-3 PO)     cyclobenzaprine (FLEXERIL) 5 MG tablet     denosumab (PROLIA) 60 MG/ML SOSY injection     famotidine (PEPCID) 20 MG tablet     KRILL OIL PO     magnesium oxide 200 MG TABS     Multiple Vitamins-Minerals (MULTIVITAMIN PO)     vitamin B-12 (CYANOCOBALAMIN) 100 MCG tablet     Current Facility-Administered Medications   Medication     cross-Linked Hyaluronate (GEL-ONE) injection PRSY 30 mg     cross-Linked Hyaluronate (GEL-ONE) injection PRSY 30 mg     triamcinolone (KENALOG-40) injection 80 mg     Allergies   Allergen Reactions     Vicodin [Hydrocodone-Acetaminophen] Anaphylaxis     Dilaudid [Hydromorphone] Nausea and Vomiting     Oxycodone Nausea and Vomiting     Dust Mites      Milk [Lac Bovis] Diarrhea       Physical Exam:/65 (BP Location: Left arm)   Pulse 89    Temp 97.9  F (36.6  C) (Oral)   Resp 16   Wt 82.8 kg (182 lb 8 oz)   LMP 05/31/2003 (Approximate)   SpO2 96%   BMI 33.37 kg/m    Constitutional: Alert, overweight, and appears anxious  ENT: Eyes bright, No mouth sores  Neck: Supple, No adenopathy.Thyroid symmetric  Cardiac: Heart rate and rhythm is regular and strong without murmur  Respiratory: Breathing easy. Lung sounds clear to auscultation  Breasts:tender under right breast and axilla scars but no new masses noted to either breast or axilla. No discharge or abnormal appearance.   Abdomen: Soft, non-tender, BS normal. No masses or organomegaly  MS: Muscle tone normal, extremities normal with no edema.   Skin: No suspicious lesions or rashes  Neuro: Sensory grossly WNL, gait normal.   Lymph: Normal ant/post cervical, axillary, supraclavicular nodes  Psych: Mentation appears normal and affect anxious but states she appreciates the touch of the exam today.     Laboratory Results:   Results for orders placed or performed in visit on 08/06/20   Comprehensive metabolic panel     Status: Abnormal   Result Value Ref Range    Sodium 142 133 - 144 mmol/L    Potassium 4.0 3.4 - 5.3 mmol/L    Chloride 109 94 - 109 mmol/L    Carbon Dioxide 30 20 - 32 mmol/L    Anion Gap 3 3 - 14 mmol/L    Glucose 89 70 - 99 mg/dL    Urea Nitrogen 20 7 - 30 mg/dL    Creatinine 0.76 0.52 - 1.04 mg/dL    GFR Estimate 80 >60 mL/min/[1.73_m2]    GFR Estimate If Black >90 >60 mL/min/[1.73_m2]    Calcium 9.0 8.5 - 10.1 mg/dL    Bilirubin Total 0.2 0.2 - 1.3 mg/dL    Albumin 3.2 (L) 3.4 - 5.0 g/dL    Protein Total 7.0 6.8 - 8.8 g/dL    Alkaline Phosphatase 107 40 - 150 U/L    ALT 26 0 - 50 U/L    AST 17 0 - 45 U/L   Bilirubin direct     Status: None   Result Value Ref Range    Bilirubin Direct <0.1 0.0 - 0.2 mg/dL   TSH with free T4 reflex     Status: None   Result Value Ref Range    TSH 3.28 0.40 - 4.00 mU/L     Assessment and Plan:   Stage 1B Right Breast Cancer- Pt completed right lumpectomy  and SLN biopsy, radiation therapy. She began endocrine therapy with   Tamoxifen since has high risk for DVTs and therefore moved to [place] and pt has had no new symptoms of blood clot.she is otherwise tolerating treatment well.   She will continue the anastrozole- renewed today.   She will return in 4 months for review with CMP.  Last Mammogram was completed in 2/2020 and was normal- will repeat in February   Use of AI- Has cholesterol checked yearly by PCP with know hyperlipidemia. Hot flashes have continued to interrupt sleep and are worse at night. Suggested she may trial gabapentin 300 mg nightly to help with hot flashes and may help with sleep- no interaction with her Adderall.   Osteoporosis- 4/2018 DEXA result showing T score of -2.9 equalling osteoporosis- repeat DEXA scan is going to be competed today- we will follow up with results. Pt started Prolia every 6 months and is due in September. Pt confirms she is on adequate calcium and vitamin D daily.    Encouraged weight bearing exercises to strengthen bones- given praise for her gardening activity this summer.   Fatigue with known ADD- fatigue is worse she feels- checked TSH and was normal today. Pt continues on Adderall for support and energy. May be related to the AI or exacerbated by pandemic and unrest concerns. Pt is appearing functional today.   Pt was seen on a       Again, thank you for allowing me to participate in the care of your patient.        Sincerely,        Lidia Parham, NP, APRN CNP

## 2020-08-09 ENCOUNTER — MYC REFILL (OUTPATIENT)
Dept: FAMILY MEDICINE | Facility: CLINIC | Age: 70
End: 2020-08-09

## 2020-08-09 DIAGNOSIS — F90.0 ATTENTION DEFICIT HYPERACTIVITY DISORDER (ADHD), PREDOMINANTLY INATTENTIVE TYPE: ICD-10-CM

## 2020-08-10 RX ORDER — DEXTROAMPHETAMINE SACCHARATE, AMPHETAMINE ASPARTATE, DEXTROAMPHETAMINE SULFATE AND AMPHETAMINE SULFATE 3.75; 3.75; 3.75; 3.75 MG/1; MG/1; MG/1; MG/1
15 TABLET ORAL 2 TIMES DAILY
Qty: 60 TABLET | Refills: 0 | Status: SHIPPED | OUTPATIENT
Start: 2020-08-10 | End: 2020-09-07

## 2020-09-07 ENCOUNTER — MYC REFILL (OUTPATIENT)
Dept: FAMILY MEDICINE | Facility: CLINIC | Age: 70
End: 2020-09-07

## 2020-09-07 DIAGNOSIS — F90.0 ATTENTION DEFICIT HYPERACTIVITY DISORDER (ADHD), PREDOMINANTLY INATTENTIVE TYPE: ICD-10-CM

## 2020-09-08 RX ORDER — DEXTROAMPHETAMINE SACCHARATE, AMPHETAMINE ASPARTATE, DEXTROAMPHETAMINE SULFATE AND AMPHETAMINE SULFATE 3.75; 3.75; 3.75; 3.75 MG/1; MG/1; MG/1; MG/1
15 TABLET ORAL 2 TIMES DAILY
Qty: 60 TABLET | Refills: 0 | Status: SHIPPED | OUTPATIENT
Start: 2020-09-08 | End: 2020-10-04

## 2020-09-28 ENCOUNTER — OFFICE VISIT (OUTPATIENT)
Dept: ORTHOPEDICS | Facility: CLINIC | Age: 70
End: 2020-09-28
Payer: COMMERCIAL

## 2020-09-28 ENCOUNTER — ALLIED HEALTH/NURSE VISIT (OUTPATIENT)
Dept: PEDIATRICS | Facility: CLINIC | Age: 70
End: 2020-09-28
Payer: COMMERCIAL

## 2020-09-28 DIAGNOSIS — M17.0 OSTEOARTHRITIS OF BOTH KNEES, UNSPECIFIED OSTEOARTHRITIS TYPE: ICD-10-CM

## 2020-09-28 DIAGNOSIS — M54.12 CERVICAL RADICULOPATHY: Primary | ICD-10-CM

## 2020-09-28 DIAGNOSIS — Z23 NEED FOR PROPHYLACTIC VACCINATION AND INOCULATION AGAINST INFLUENZA: ICD-10-CM

## 2020-09-28 DIAGNOSIS — Z23 NEED FOR VACCINATION: ICD-10-CM

## 2020-09-28 PROCEDURE — G0008 ADMIN INFLUENZA VIRUS VAC: HCPCS

## 2020-09-28 PROCEDURE — 20610 DRAIN/INJ JOINT/BURSA W/O US: CPT | Mod: 50 | Performed by: FAMILY MEDICINE

## 2020-09-28 PROCEDURE — 90662 IIV NO PRSV INCREASED AG IM: CPT

## 2020-09-28 PROCEDURE — 99213 OFFICE O/P EST LOW 20 MIN: CPT | Mod: 25 | Performed by: FAMILY MEDICINE

## 2020-09-28 NOTE — PROGRESS NOTES
CHIEF COMPLAINT:  RECHECK (bilateral knee gel injections, left shoulder pain, )       HISTORY OF PRESENT ILLNESS  Ms. Ann is a pleasant 69 year old female who presents to clinic today with left shoulder and arm pain.  Priscilla has had left arm pain for the past few months, possibly more.  No clear inciting event that she can recall.  She feels pain that travels down from her neck past her elbow, down to her hand at times.  This is a numb and tingly sensation, also shooting and burning pain.  Sometimes the pain will settle into her shoulder.  She does not feel the pain whenever she is engaging in range of motion activities, sometimes she feels it when sleeping, although sometimes this is random.  She has tried exercise, analgesics, topical treatments.    She also is here for bilateral knee injections.  She does have a history of bilateral knee osteoarthritis, she has had injections in her knees in the past.  These were hyaluronic acid injections that she has done well with.      Additional history: as documented    MEDICAL HISTORY  Patient Active Problem List   Diagnosis     Moderate recurrent major depression (H)     Hyperlipidemia LDL goal <160     Chronic fatigue disorder     Liver lesion     Cystic disease of liver     Gastroesophageal reflux disease with esophagitis     Hiatal hernia     Age-related cataract of both eyes, unspecified age-related cataract type     Primary osteoarthritis of both knees     Attention deficit hyperactivity disorder (ADHD), predominantly inattentive type     Malignant neoplasm of upper-inner quadrant of right breast in female, estrogen receptor positive (H)     Thyroid nodule     Anxiety disorder, unspecified type     Osteoporosis     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     Aromatase inhibitor use     Cervical cancer screening     History of cholecystectomy       Current Outpatient Medications   Medication Sig Dispense Refill     acetaminophen (TYLENOL) 325 MG tablet Take 325-650 mg by mouth every  6 hours as needed for mild pain       amphetamine-dextroamphetamine (ADDERALL) 15 MG tablet Take 1 tablet (15 mg) by mouth 2 times daily 60 tablet 0     anastrozole (ARIMIDEX) 1 MG tablet Take 1 tablet (1 mg) by mouth daily 90 tablet 1     B Complex Vitamins (VITAMIN B-COMPLEX PO) Take by mouth daily       calcium carbonate (TUMS) 500 MG chewable tablet Take 1 chew tab by mouth 2 times daily       Calcium-Magnesium-Zinc 333-133-5 MG TABS per tablet Take 1 tablet by mouth daily       Cholecalciferol (VITAMIN D-3 PO) Vitamin D is included with multiple vitamin, patient does not take additional. Marjorie Crooks LPN on 7/26/2019 at 3:02 PM       cyclobenzaprine (FLEXERIL) 5 MG tablet Take 1 tablet (5 mg) by mouth daily as needed for muscle spasms 20 tablet 1     denosumab (PROLIA) 60 MG/ML SOSY injection        famotidine (PEPCID) 20 MG tablet Take 1 tablet (20 mg) by mouth 2 times daily 180 tablet 0     gabapentin (NEURONTIN) 300 MG capsule Take 1 capsule (300 mg) by mouth At Bedtime 30 capsule 2     KRILL OIL PO Take by mouth daily       magnesium oxide 200 MG TABS Take 100 mg by mouth once as needed       Multiple Vitamins-Minerals (MULTIVITAMIN PO)        vitamin B-12 (CYANOCOBALAMIN) 100 MCG tablet Take 1,000 mcg by mouth daily         Allergies   Allergen Reactions     Vicodin [Hydrocodone-Acetaminophen] Anaphylaxis     Dilaudid [Hydromorphone] Nausea and Vomiting     Oxycodone Nausea and Vomiting     Dust Mites      Milk [Lac Bovis] Diarrhea       Family History   Problem Relation Age of Onset     Leukemia Father      Breast Cancer Cousin 50        Maternal Female 1st Cousin     Macular Degeneration Mother        Additional medical/Social/Surgical histories reviewed in [facility] and updated as appropriate.        PHYSICAL EXAM           ASSESSMENT & PLAN  Ms. Ann is a 69 year old female who presents to clinic today with left arm pain, she is also following up with bilateral knee osteoarthritis.    Her symptoms in  her arm do seem relatable to a nerve impingement, likely a cervical radiculopathy.  Given her failure of conservative activities as far I am ordering MR imaging of her cervical spine.  She can have this done at her earliest convenience.  I will get in touch with her with the results.    With regards to her knees we did decide to repeat her hyaluronic acid injections today (see procedure note).    It was a pleasure seeing Priscilla today.    Fam Atkins DO, [facility]  Primary Care Sports Medicine      This note was constructed using Dragon dictation software, please excuse any minor errors in spelling, grammar, or syntax.  Scribed by Glenny Huynh ATC for Dr. Atkins on 9/28/20 at 2:30 PM, based on the providers statements to me.           [facility] FOLLOW-UP VISIT 9/28/2020    Marixa Ann's chief complaint for this visit includes:  Chief Complaint   Patient presents with     RECHECK     bilateral knee gel injections, left shoulder pain,      PCP: Jackie Kovacs    Interval History:     Follow up reason: bilateral knee gel one injection     Following Therapeutic Plan: Yes     Pain: Worsening    Function: Worsening    Medical History:    Any recent changes to your medical history? No    Any new medication prescribed since last visit? No    Review of Systems:    Do you have fever, chills, weight loss? No    Do you have any vision problems? No    Do you have any chest pain or edema? No    Do you have any shortness of breath or wheezing?  No    Do you have stomach problems? No    Do you have any urinary track issues? No    Do you have any numbness or focal weakness? No    Do you have diabetes? No    Do you have problems with bleeding or clotting? No    Do you have an rashes or other skin lesions? No    Large Joint Injection/Arthocentesis: bilateral knee    Date/Time: 9/28/2020 2:25 PM  Performed by: Fam Atkins DO  Authorized by: Fam Atkins DO     Indications:  Pain  Needle Size:  22  G  Guidance: landmark guided    Approach:  Lateral  Location:  Knee  Laterality:  Bilateral      Medications (Right):  30 mg cross-Linked Hyaluronate 30 MG/3ML  Medications (Left):  30 mg cross-Linked Hyaluronate 30 MG/3ML  Outcome:  Tolerated well, no immediate complications  Procedure discussed: discussed risks, benefits, and alternatives    Consent Given by:  Patient  Timeout: timeout called immediately prior to procedure    Prep: patient was prepped and draped in usual sterile fashion       Knee Injections with Hyaluronic Acid    The patient was informed of the risks and the benefits of the procedure and a written consent was signed.    The patient's righ knee was prepped with chlorhexidine in sterile fashion.   Gel One solution removed from packaging and inspected for consistency with regards to volume and packaging standard.  Injection was performed using sterile technique.  A 1.5-inch 22-gauge needle was used to enter the lateral aspect of the right knee.  Injection performed without difficulty.  There were no complications. The patient tolerated the procedure well. There was negligible bleeding.     The patient s left knee was prepped with chlorhexidine in sterile fashion.   Gel One solution removed from packaging and inspected for consistency with regards to volume and packaging standard.  Injection was performed using sterile technique.  A 1.5-inch 22-gauge needle was used to enter the lateral aspect of the left knee.  Injection performed without difficulty.  There were no complications. The patient tolerated the procedure well. There was negligible bleeding.     The patient was instructed to ice the knee upon leaving clinic and refrain from overuse over the next 3 days.   The patient was instructed to call or go to the emergency room with any unusual pain, swelling, redness, or if otherwise concerned.  A follow up appointment will be scheduled to evaluate response to the injection, and to assess range of  motion and pain.

## 2020-09-28 NOTE — PROGRESS NOTES
Prior to immunization administration, verified patients identity using patient s name and date of birth. Please see Immunization Activity for additional information.     Screening Questionnaire for Adult Immunization    Are you sick today?   No   Do you have allergies to medications, food, a vaccine component or latex?   No   Have you ever had a serious reaction after receiving a vaccination?   No   Do you have a long-term health problem with heart, lung, kidney, or metabolic disease (e.g., diabetes), asthma, a blood disorder, no spleen, complement component deficiency, a cochlear implant, or a spinal fluid leak?  Are you on long-term aspirin therapy?   No   Do you have cancer, leukemia, HIV/AIDS, or any other immune system problem?   No   Do you have a parent, brother, or sister with an immune system problem?   No   In the past 3 months, have you taken medications that affect  your immune system, such as prednisone, other steroids, or anticancer drugs; drugs for the treatment of rheumatoid arthritis, Crohn s disease, or psoriasis; or have you had radiation treatments?   No   Have you had a seizure, or a brain or other nervous system problem?   No   During the past year, have you received a transfusion of blood or blood    products, or been given immune (gamma) globulin or antiviral drug?   No   For women: Are you pregnant or is there a chance you could become       pregnant during the next month?   No   Have you received any vaccinations in the past 4 weeks?   No     Immunization questionnaire answers were all negative.        Per orders of  , injection of Shingrix given by Diane Richard CMA. Patient instructed to remain in clinic for 15 minutes afterwards, and to report any adverse reaction to me immediately.       Screening performed by Diane Richard CMA on 9/28/2020 at 1:54 PM.

## 2020-09-28 NOTE — LETTER
9/28/2020         RE: Marixa Ann  [id number] [address] [facility] [address]        Dear Colleague,    Thank you for referring your patient, Marixa Ann, to the [facility]. Please see a copy of my visit note below.    CHIEF COMPLAINT:  RECHECK (bilateral knee gel injections, left shoulder pain, )       HISTORY OF PRESENT ILLNESS  Ms. Ann is a pleasant 69 year old female who presents to clinic today with left shoulder and arm pain.  Priscilla has had left arm pain for the past few months, possibly more.  No clear inciting event that she can recall.  She feels pain that travels down from her neck past her elbow, down to her hand at times.  This is a numb and tingly sensation, also shooting and burning pain.  Sometimes the pain will settle into her shoulder.  She does not feel the pain whenever she is engaging in range of motion activities, sometimes she feels it when sleeping, although sometimes this is random.  She has tried exercise, analgesics, topical treatments.    She also is here for bilateral knee injections.  She does have a history of bilateral knee osteoarthritis, she has had injections in her knees in the past.  These were hyaluronic acid injections that she has done well with.      Additional history: as documented    MEDICAL HISTORY  Patient Active Problem List   Diagnosis     Moderate recurrent major depression (H)     Hyperlipidemia LDL goal <160     Chronic fatigue disorder     Liver lesion     Cystic disease of liver     Gastroesophageal reflux disease with esophagitis     Hiatal hernia     Age-related cataract of both eyes, unspecified age-related cataract type     Primary osteoarthritis of both knees     Attention deficit hyperactivity disorder (ADHD), predominantly inattentive type     Malignant neoplasm of upper-inner quadrant of right breast in female, estrogen receptor positive (H)     Thyroid nodule     Anxiety disorder, unspecified type      Osteoporosis     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     Aromatase inhibitor use     Cervical cancer screening     History of cholecystectomy       Current Outpatient Medications   Medication Sig Dispense Refill     acetaminophen (TYLENOL) 325 MG tablet Take 325-650 mg by mouth every 6 hours as needed for mild pain       amphetamine-dextroamphetamine (ADDERALL) 15 MG tablet Take 1 tablet (15 mg) by mouth 2 times daily 60 tablet 0     anastrozole (ARIMIDEX) 1 MG tablet Take 1 tablet (1 mg) by mouth daily 90 tablet 1     B Complex Vitamins (VITAMIN B-COMPLEX PO) Take by mouth daily       calcium carbonate (TUMS) 500 MG chewable tablet Take 1 chew tab by mouth 2 times daily       Calcium-Magnesium-Zinc 333-133-5 MG TABS per tablet Take 1 tablet by mouth daily       Cholecalciferol (VITAMIN D-3 PO) Vitamin D is included with multiple vitamin, patient does not take additional. Marjorie Crooks LPN on 7/26/2019 at 3:02 PM       cyclobenzaprine (FLEXERIL) 5 MG tablet Take 1 tablet (5 mg) by mouth daily as needed for muscle spasms 20 tablet 1     denosumab (PROLIA) 60 MG/ML SOSY injection        famotidine (PEPCID) 20 MG tablet Take 1 tablet (20 mg) by mouth 2 times daily 180 tablet 0     gabapentin (NEURONTIN) 300 MG capsule Take 1 capsule (300 mg) by mouth At Bedtime 30 capsule 2     KRILL OIL PO Take by mouth daily       magnesium oxide 200 MG TABS Take 100 mg by mouth once as needed       Multiple Vitamins-Minerals (MULTIVITAMIN PO)        vitamin B-12 (CYANOCOBALAMIN) 100 MCG tablet Take 1,000 mcg by mouth daily         Allergies   Allergen Reactions     Vicodin [Hydrocodone-Acetaminophen] Anaphylaxis     Dilaudid [Hydromorphone] Nausea and Vomiting     Oxycodone Nausea and Vomiting     Dust Mites      Milk [Lac Bovis] Diarrhea       Family History   Problem Relation Age of Onset     Leukemia Father      Breast Cancer Cousin 50        Maternal Female 1st Cousin     Macular Degeneration Mother        Additional medical/Social/Surgical  histories reviewed in [facility] and updated as appropriate.        PHYSICAL EXAM           ASSESSMENT & PLAN  Ms. Ann is a 69 year old female who presents to clinic today with left arm pain, she is also following up with bilateral knee osteoarthritis.    Her symptoms in her arm do seem relatable to a nerve impingement, likely a cervical radiculopathy.  Given her failure of conservative activities as far I am ordering MR imaging of her cervical spine.  She can have this done at her earliest convenience.  I will get in touch with her with the results.    With regards to her knees we did decide to repeat her hyaluronic acid injections today (see procedure note).    It was a pleasure seeing Cookie today.    Fam Atkins DO, CAQSM  Primary Care Sports Medicine      This note was constructed using Dragon dictation software, please excuse any minor errors in spelling, grammar, or syntax.  Scribed by Glenny Huynh ATC for Dr. Atkins on 9/28/20 at 2:30 PM, based on the providers statements to me.           [facility] FOLLOW-UP VISIT 9/28/2020    Marixa Ann's chief complaint for this visit includes:  Chief Complaint   Patient presents with     RECHECK     bilateral knee gel injections, left shoulder pain,      PCP: Jackie Kovacs    Interval History:     Follow up reason: bilateral knee gel one injection     Following Therapeutic Plan: Yes     Pain: Worsening    Function: Worsening    Medical History:    Any recent changes to your medical history? No    Any new medication prescribed since last visit? No    Review of Systems:    Do you have fever, chills, weight loss? No    Do you have any vision problems? No    Do you have any chest pain or edema? No    Do you have any shortness of breath or wheezing?  No    Do you have stomach problems? No    Do you have any urinary track issues? No    Do you have any numbness or focal weakness? No    Do you have diabetes? No    Do you have problems with bleeding or  clotting? No    Do you have an rashes or other skin lesions? No    Large Joint Injection/Arthocentesis: bilateral knee    Date/Time: 9/28/2020 2:25 PM  Performed by: Fam Atkins DO  Authorized by: Fam Atkins DO     Indications:  Pain  Needle Size:  22 G  Guidance: landmark guided    Approach:  Lateral  Location:  Knee  Laterality:  Bilateral      Medications (Right):  30 mg cross-Linked Hyaluronate 30 MG/3ML  Medications (Left):  30 mg cross-Linked Hyaluronate 30 MG/3ML  Outcome:  Tolerated well, no immediate complications  Procedure discussed: discussed risks, benefits, and alternatives    Consent Given by:  Patient  Timeout: timeout called immediately prior to procedure    Prep: patient was prepped and draped in usual sterile fashion       Knee Injections with Hyaluronic Acid    The patient was informed of the risks and the benefits of the procedure and a written consent was signed.    The patient's righ knee was prepped with chlorhexidine in sterile fashion.   Gel One solution removed from packaging and inspected for consistency with regards to volume and packaging standard.  Injection was performed using sterile technique.  A 1.5-inch 22-gauge needle was used to enter the lateral aspect of the right knee.  Injection performed without difficulty.  There were no complications. The patient tolerated the procedure well. There was negligible bleeding.     The patient s left knee was prepped with chlorhexidine in sterile fashion.   Gel One solution removed from packaging and inspected for consistency with regards to volume and packaging standard.  Injection was performed using sterile technique.  A 1.5-inch 22-gauge needle was used to enter the lateral aspect of the left knee.  Injection performed without difficulty.  There were no complications. The patient tolerated the procedure well. There was negligible bleeding.     The patient was instructed to ice the knee upon leaving clinic and refrain from  overuse over the next 3 days.   The patient was instructed to call or go to the emergency room with any unusual pain, swelling, redness, or if otherwise concerned.  A follow up appointment will be scheduled to evaluate response to the injection, and to assess range of motion and pain.              Again, thank you for allowing me to participate in the care of your patient.        Sincerely,        Fam Atkins, DO

## 2020-10-04 ENCOUNTER — MYC REFILL (OUTPATIENT)
Dept: FAMILY MEDICINE | Facility: CLINIC | Age: 70
End: 2020-10-04

## 2020-10-04 DIAGNOSIS — F90.0 ATTENTION DEFICIT HYPERACTIVITY DISORDER (ADHD), PREDOMINANTLY INATTENTIVE TYPE: ICD-10-CM

## 2020-10-06 NOTE — TELEPHONE ENCOUNTER
Routing refill request to provider for review/approval because:  Drug not on the [facility] refill protocol   Thao Lam RN  [facility]

## 2020-10-07 RX ORDER — DEXTROAMPHETAMINE SACCHARATE, AMPHETAMINE ASPARTATE, DEXTROAMPHETAMINE SULFATE AND AMPHETAMINE SULFATE 3.75; 3.75; 3.75; 3.75 MG/1; MG/1; MG/1; MG/1
15 TABLET ORAL 2 TIMES DAILY
Qty: 60 TABLET | Refills: 0 | Status: SHIPPED | OUTPATIENT
Start: 2020-10-07 | End: 2020-11-09

## 2020-10-22 ENCOUNTER — MYC REFILL (OUTPATIENT)
Dept: FAMILY MEDICINE | Facility: CLINIC | Age: 70
End: 2020-10-22

## 2020-10-22 ENCOUNTER — MYC REFILL (OUTPATIENT)
Dept: ONCOLOGY | Facility: CLINIC | Age: 70
End: 2020-10-22

## 2020-10-22 DIAGNOSIS — Z17.0 MALIGNANT NEOPLASM OF UPPER-INNER QUADRANT OF RIGHT BREAST IN FEMALE, ESTROGEN RECEPTOR POSITIVE (H): ICD-10-CM

## 2020-10-22 DIAGNOSIS — K21.00 GASTROESOPHAGEAL REFLUX DISEASE WITH ESOPHAGITIS: ICD-10-CM

## 2020-10-22 DIAGNOSIS — C50.211 MALIGNANT NEOPLASM OF UPPER-INNER QUADRANT OF RIGHT BREAST IN FEMALE, ESTROGEN RECEPTOR POSITIVE (H): ICD-10-CM

## 2020-10-23 RX ORDER — FAMOTIDINE 20 MG/1
20 TABLET, FILM COATED ORAL 2 TIMES DAILY
Qty: 180 TABLET | Refills: 1 | Status: SHIPPED | OUTPATIENT
Start: 2020-10-23 | End: 2021-04-01

## 2020-10-26 RX ORDER — ANASTROZOLE 1 MG/1
1 TABLET ORAL DAILY
Qty: 90 TABLET | Refills: 1 | Status: SHIPPED | OUTPATIENT
Start: 2020-10-26 | End: 2021-02-08

## 2020-11-09 ENCOUNTER — MYC REFILL (OUTPATIENT)
Dept: FAMILY MEDICINE | Facility: CLINIC | Age: 70
End: 2020-11-09

## 2020-11-09 ENCOUNTER — MYC MEDICAL ADVICE (OUTPATIENT)
Dept: FAMILY MEDICINE | Facility: CLINIC | Age: 70
End: 2020-11-09

## 2020-11-09 DIAGNOSIS — F90.0 ATTENTION DEFICIT HYPERACTIVITY DISORDER (ADHD), PREDOMINANTLY INATTENTIVE TYPE: ICD-10-CM

## 2020-11-10 RX ORDER — DEXTROAMPHETAMINE SACCHARATE, AMPHETAMINE ASPARTATE, DEXTROAMPHETAMINE SULFATE AND AMPHETAMINE SULFATE 3.75; 3.75; 3.75; 3.75 MG/1; MG/1; MG/1; MG/1
15 TABLET ORAL 2 TIMES DAILY
Qty: 60 TABLET | Refills: 0 | Status: SHIPPED | OUTPATIENT
Start: 2020-11-10 | End: 2020-12-29

## 2020-11-13 ENCOUNTER — VIRTUAL VISIT (OUTPATIENT)
Dept: FAMILY MEDICINE | Facility: CLINIC | Age: 70
End: 2020-11-13
Payer: COMMERCIAL

## 2020-11-13 DIAGNOSIS — D23.30 DERMAL NEVUS OF FACE: Primary | ICD-10-CM

## 2020-11-13 PROCEDURE — 99213 OFFICE O/P EST LOW 20 MIN: CPT | Mod: 95 | Performed by: FAMILY MEDICINE

## 2020-11-13 NOTE — PROGRESS NOTES
"Marixa Ann is a 70 year old female who is being evaluated via a billable video visit.      The patient has been notified of following:     \"This video visit will be conducted via a call between you and your physician/provider. We have found that certain health care needs can be provided without the need for an in-person physical exam.  This service lets us provide the care you need with a video conversation.  If a prescription is necessary we can send it directly to your pharmacy.  If lab work is needed we can place an order for that and you can then stop by our lab to have the test done at a later time.    Video visits are billed at different rates depending on your insurance coverage.  Please reach out to your insurance provider with any questions.    If during the course of the call the physician/provider feels a video visit is not appropriate, you will not be charged for this service.\"    Patient has given verbal consent for Video visit? Yes  How would you like to obtain your AVS? MyChart  If you are dropped from the video visit, the video invite should be resent to: Text to cell phone: [phone]  Will anyone else be joining your video visit? No    Subjective     Marixa Ann is a 70 year old female who presents today via video visit for the following health issues:    HPI     Skin Lesion  Onset/Duration: 3-4 months  Description  Location: left side of face  Color: brown and skin colored  Border description: evenly pigmented, raised  Character: round  Itching: no  Bleeding:  no  Intensity:  mild  Progression of Symptoms:  constant  Accompanying signs and symptoms:   Bleeding: no  Scaling: no  Excessive sun exposure/tanning: YES  Sunscreen used: no  History:           Any previous history of skin cancer: no  Any family history of melanoma: no  Previous episodes of similar lesion: no  Precipitating or alleviating factors: none  Therapies tried and outcome: none         Video Start Time: 11:34    ADHD " "follow up. Symptom improved on medication and new branded generic is working very well. Will send [employer] message with brand name.     Review of Systems   Constitutional, HEENT, cardiovascular, pulmonary, gi and gu systems are negative, except as otherwise noted.      Objective           Vitals:  No vitals were obtained today due to virtual visit.    Physical Exam     GENERAL: Healthy, alert and no distress  EYES: Eyes grossly normal to inspection.  No discharge or erythema, or obvious scleral/conjunctival abnormalities.  RESP: No audible wheeze, cough, or visible cyanosis.  No visible retractions or increased work of breathing.    SKIN: Visible skin clear. No significant rash, abnormal pigmentation or lesions.  NEURO: Cranial nerves grossly intact.  Mentation and speech appropriate for age.  PSYCH: Mentation appears normal, affect normal/bright, judgement and insight intact, normal speech and appearance well-groomed.              Assessment & Plan     Dermal nevus of face  Discussed benign appearance of the lesion. History of breast cancer and is very worried about skin cancer. History of sun exposure in past while living in [place].   - DERMATOLOGY ADULT REFERRAL       BMI:   Estimated body mass index is 33.38 kg/m  as calculated from the following:    Height as of 1/14/20: 1.575 m (5' 2.01\").    Weight as of 8/6/20: 82.8 kg (182 lb 8.7 oz).   Weight management plan: Patient was referred to their PCP to discuss a diet and exercise plan.         FUTURE APPOINTMENTS:       - Follow-up for annual visit or as needed  See Patient Instructions    No follow-ups on file.    Jackie Kovacs MD  [facility]      Video-Visit Details    Type of service:  Video Visit    Video End Time:11:51 Some of this time was spent just discussing adjustments to COVID and catching up.     Originating Location (pt. Location): Home    Distant Location (provider location):  [facility]" MARIANELA     Platform used for Video Visit: Isaiah

## 2020-12-29 ENCOUNTER — MYC REFILL (OUTPATIENT)
Dept: FAMILY MEDICINE | Facility: CLINIC | Age: 70
End: 2020-12-29

## 2020-12-29 DIAGNOSIS — F90.0 ATTENTION DEFICIT HYPERACTIVITY DISORDER (ADHD), PREDOMINANTLY INATTENTIVE TYPE: ICD-10-CM

## 2020-12-29 NOTE — TELEPHONE ENCOUNTER
Routing refill request to provider for review/approval because:  Drug not on the [facility] refill protocol     Padma Ragsdale RN  [facility]

## 2020-12-30 RX ORDER — DEXTROAMPHETAMINE SACCHARATE, AMPHETAMINE ASPARTATE, DEXTROAMPHETAMINE SULFATE AND AMPHETAMINE SULFATE 3.75; 3.75; 3.75; 3.75 MG/1; MG/1; MG/1; MG/1
15 TABLET ORAL 2 TIMES DAILY
Qty: 60 TABLET | Refills: 0 | Status: SHIPPED | OUTPATIENT
Start: 2020-12-30 | End: 2021-01-21

## 2021-01-21 ENCOUNTER — MYC REFILL (OUTPATIENT)
Dept: FAMILY MEDICINE | Facility: CLINIC | Age: 71
End: 2021-01-21

## 2021-01-21 DIAGNOSIS — F90.0 ATTENTION DEFICIT HYPERACTIVITY DISORDER (ADHD), PREDOMINANTLY INATTENTIVE TYPE: ICD-10-CM

## 2021-01-21 RX ORDER — DEXTROAMPHETAMINE SACCHARATE, AMPHETAMINE ASPARTATE, DEXTROAMPHETAMINE SULFATE AND AMPHETAMINE SULFATE 3.75; 3.75; 3.75; 3.75 MG/1; MG/1; MG/1; MG/1
15 TABLET ORAL 2 TIMES DAILY
Qty: 60 TABLET | Refills: 0 | Status: SHIPPED | OUTPATIENT
Start: 2021-01-21 | End: 2021-03-31

## 2021-01-21 NOTE — TELEPHONE ENCOUNTER
Routing refill request to provider for review/approval because:  Drug not on the [facility] refill protocol     Anuradha POSADAN, RN

## 2021-01-24 RX ORDER — ANASTROZOLE 1 MG/1
1 TABLET ORAL DAILY
Qty: 90 TABLET | Refills: 0 | Status: SHIPPED | OUTPATIENT
Start: 2021-01-24 | End: 2021-04-27

## 2021-01-26 ENCOUNTER — MYC REFILL (OUTPATIENT)
Dept: ONCOLOGY | Facility: CLINIC | Age: 71
End: 2021-01-26

## 2021-01-26 DIAGNOSIS — Z17.0 MALIGNANT NEOPLASM OF UPPER-INNER QUADRANT OF RIGHT BREAST IN FEMALE, ESTROGEN RECEPTOR POSITIVE (H): ICD-10-CM

## 2021-01-26 DIAGNOSIS — C50.211 MALIGNANT NEOPLASM OF UPPER-INNER QUADRANT OF RIGHT BREAST IN FEMALE, ESTROGEN RECEPTOR POSITIVE (H): ICD-10-CM

## 2021-01-26 RX ORDER — ANASTROZOLE 1 MG/1
1 TABLET ORAL DAILY
Qty: 90 TABLET | Refills: 0 | Status: CANCELLED | OUTPATIENT
Start: 2021-01-26

## 2021-02-05 ENCOUNTER — DOCUMENTATION ONLY (OUTPATIENT)
Dept: LAB | Facility: CLINIC | Age: 71
End: 2021-02-05

## 2021-02-05 DIAGNOSIS — K76.9 LIVER LESION: ICD-10-CM

## 2021-02-05 DIAGNOSIS — M81.8 OTHER OSTEOPOROSIS WITHOUT CURRENT PATHOLOGICAL FRACTURE: Primary | ICD-10-CM

## 2021-02-05 DIAGNOSIS — C50.211 MALIGNANT NEOPLASM OF UPPER-INNER QUADRANT OF RIGHT BREAST IN FEMALE, ESTROGEN RECEPTOR POSITIVE (H): ICD-10-CM

## 2021-02-05 DIAGNOSIS — Z17.0 MALIGNANT NEOPLASM OF UPPER-INNER QUADRANT OF RIGHT BREAST IN FEMALE, ESTROGEN RECEPTOR POSITIVE (H): ICD-10-CM

## 2021-02-08 ENCOUNTER — ANCILLARY PROCEDURE (OUTPATIENT)
Dept: MAMMOGRAPHY | Facility: CLINIC | Age: 71
End: 2021-02-08
Attending: NURSE PRACTITIONER
Payer: COMMERCIAL

## 2021-02-08 ENCOUNTER — INFUSION THERAPY VISIT (OUTPATIENT)
Dept: INFUSION THERAPY | Facility: CLINIC | Age: 71
End: 2021-02-08
Payer: COMMERCIAL

## 2021-02-08 ENCOUNTER — VIRTUAL VISIT (OUTPATIENT)
Dept: ONCOLOGY | Facility: CLINIC | Age: 71
End: 2021-02-08
Attending: NURSE PRACTITIONER
Payer: COMMERCIAL

## 2021-02-08 VITALS
RESPIRATION RATE: 18 BRPM | HEART RATE: 117 BPM | BODY MASS INDEX: 34.28 KG/M2 | WEIGHT: 186.3 LBS | HEIGHT: 62 IN | DIASTOLIC BLOOD PRESSURE: 58 MMHG | TEMPERATURE: 98.2 F | SYSTOLIC BLOOD PRESSURE: 132 MMHG | OXYGEN SATURATION: 95 %

## 2021-02-08 VITALS
DIASTOLIC BLOOD PRESSURE: 58 MMHG | TEMPERATURE: 98.2 F | OXYGEN SATURATION: 95 % | RESPIRATION RATE: 18 BRPM | HEART RATE: 117 BPM | SYSTOLIC BLOOD PRESSURE: 132 MMHG | HEIGHT: 61 IN | BODY MASS INDEX: 35.17 KG/M2 | WEIGHT: 186.29 LBS

## 2021-02-08 DIAGNOSIS — F19.982 DRUG-INDUCED INSOMNIA (H): ICD-10-CM

## 2021-02-08 DIAGNOSIS — Z17.0 MALIGNANT NEOPLASM OF UPPER-INNER QUADRANT OF RIGHT BREAST IN FEMALE, ESTROGEN RECEPTOR POSITIVE (H): ICD-10-CM

## 2021-02-08 DIAGNOSIS — M81.8 OTHER OSTEOPOROSIS WITHOUT CURRENT PATHOLOGICAL FRACTURE: ICD-10-CM

## 2021-02-08 DIAGNOSIS — T45.1X5A HOT FLASHES RELATED TO AROMATASE INHIBITOR THERAPY: ICD-10-CM

## 2021-02-08 DIAGNOSIS — K76.9 LIVER LESION: ICD-10-CM

## 2021-02-08 DIAGNOSIS — C50.211 MALIGNANT NEOPLASM OF UPPER-INNER QUADRANT OF RIGHT BREAST IN FEMALE, ESTROGEN RECEPTOR POSITIVE (H): Primary | ICD-10-CM

## 2021-02-08 DIAGNOSIS — C50.211 MALIGNANT NEOPLASM OF UPPER-INNER QUADRANT OF RIGHT BREAST IN FEMALE, ESTROGEN RECEPTOR POSITIVE (H): ICD-10-CM

## 2021-02-08 DIAGNOSIS — Z17.0 MALIGNANT NEOPLASM OF UPPER-INNER QUADRANT OF RIGHT BREAST IN FEMALE, ESTROGEN RECEPTOR POSITIVE (H): Primary | ICD-10-CM

## 2021-02-08 DIAGNOSIS — Z12.31 ENCOUNTER FOR SCREENING MAMMOGRAM FOR BREAST CANCER: ICD-10-CM

## 2021-02-08 DIAGNOSIS — Z79.811 AROMATASE INHIBITOR USE: Primary | ICD-10-CM

## 2021-02-08 DIAGNOSIS — K21.00 GASTROESOPHAGEAL REFLUX DISEASE WITH ESOPHAGITIS WITHOUT HEMORRHAGE: ICD-10-CM

## 2021-02-08 DIAGNOSIS — R23.2 HOT FLASHES RELATED TO AROMATASE INHIBITOR THERAPY: ICD-10-CM

## 2021-02-08 DIAGNOSIS — F41.9 ANXIETY: ICD-10-CM

## 2021-02-08 DIAGNOSIS — Z79.811 AROMATASE INHIBITOR USE: ICD-10-CM

## 2021-02-08 DIAGNOSIS — R53.83 FATIGUE, UNSPECIFIED TYPE: ICD-10-CM

## 2021-02-08 LAB
ALBUMIN SERPL-MCNC: 3.5 G/DL (ref 3.4–5)
ALP SERPL-CCNC: 105 U/L (ref 40–150)
ALT SERPL W P-5'-P-CCNC: 31 U/L (ref 0–50)
ANION GAP SERPL CALCULATED.3IONS-SCNC: 3 MMOL/L (ref 3–14)
AST SERPL W P-5'-P-CCNC: 22 U/L (ref 0–45)
BILIRUB SERPL-MCNC: 0.4 MG/DL (ref 0.2–1.3)
BUN SERPL-MCNC: 18 MG/DL (ref 7–30)
CALCIUM SERPL-MCNC: 9.2 MG/DL (ref 8.5–10.1)
CHLORIDE SERPL-SCNC: 108 MMOL/L (ref 94–109)
CO2 SERPL-SCNC: 30 MMOL/L (ref 20–32)
CREAT SERPL-MCNC: 0.62 MG/DL (ref 0.52–1.04)
GFR SERPL CREATININE-BSD FRML MDRD: >90 ML/MIN/{1.73_M2}
GLUCOSE SERPL-MCNC: 104 MG/DL (ref 70–99)
POTASSIUM SERPL-SCNC: 3.8 MMOL/L (ref 3.4–5.3)
PROT SERPL-MCNC: 7.2 G/DL (ref 6.8–8.8)
SODIUM SERPL-SCNC: 141 MMOL/L (ref 133–144)

## 2021-02-08 PROCEDURE — 99214 OFFICE O/P EST MOD 30 MIN: CPT | Mod: 95 | Performed by: NURSE PRACTITIONER

## 2021-02-08 PROCEDURE — 77067 SCR MAMMO BI INCL CAD: CPT | Performed by: RADIOLOGY

## 2021-02-08 PROCEDURE — 96372 THER/PROPH/DIAG INJ SC/IM: CPT | Performed by: NURSE PRACTITIONER

## 2021-02-08 PROCEDURE — 80053 COMPREHEN METABOLIC PANEL: CPT | Performed by: NURSE PRACTITIONER

## 2021-02-08 PROCEDURE — 36415 COLL VENOUS BLD VENIPUNCTURE: CPT | Performed by: NURSE PRACTITIONER

## 2021-02-08 PROCEDURE — 99207 PR NO CHARGE NURSE ONLY: CPT

## 2021-02-08 PROCEDURE — 77063 BREAST TOMOSYNTHESIS BI: CPT | Performed by: RADIOLOGY

## 2021-02-08 RX ORDER — PANTOPRAZOLE SODIUM 40 MG/1
40 TABLET, DELAYED RELEASE ORAL DAILY
Qty: 30 TABLET | Refills: 1 | Status: SHIPPED | OUTPATIENT
Start: 2021-02-08 | End: 2022-09-14

## 2021-02-08 ASSESSMENT — PAIN SCALES - GENERAL
PAINLEVEL: NO PAIN (0)
PAINLEVEL: NO PAIN (0)

## 2021-02-08 ASSESSMENT — MIFFLIN-ST. JEOR
SCORE: 1320.25
SCORE: 1320.36

## 2021-02-08 NOTE — PROGRESS NOTES
Infusion Nursing Note:  Marixa CARLYLE Ann presents today for Prolia.    Patient seen by provider today: Yes: Lidia Parham CNP   present during visit today: Not Applicable.    Note: N/A.    Intravenous Access:  No Intravenous access/labs at this visit.    Treatment Conditions:  Lab Results   Component Value Date     02/08/2021                   Lab Results   Component Value Date    POTASSIUM 3.8 02/08/2021           Lab Results   Component Value Date    MAG 2.0 01/04/2019            Lab Results   Component Value Date    CR 0.62 02/08/2021                   Lab Results   Component Value Date    JONATHON 9.2 02/08/2021                Lab Results   Component Value Date    BILITOTAL 0.4 02/08/2021           Lab Results   Component Value Date    ALBUMIN 3.5 02/08/2021                    Lab Results   Component Value Date    ALT 31 02/08/2021           Lab Results   Component Value Date    AST 22 02/08/2021       Results reviewed, labs MET treatment parameters, ok to proceed with treatment.      Post Infusion Assessment:  Patient tolerated injection without incident.  Site patent and intact, free from redness, edema or discomfort.       Discharge Plan:   Patient discharged in stable condition accompanied by: self.  Departure Mode: Ambulatory.    Marjorie Crooks LPN

## 2021-02-08 NOTE — NURSING NOTE
"Oncology Rooming Note    February 8, 2021 2:54 PM   Marixa Ann is a 70 year old female who presents for:    Chief Complaint   Patient presents with     Oncology Clinic Visit     4 month follow up     Initial Vitals: /58 (BP Location: Left arm)   Pulse 117   Temp 98.2  F (36.8  C) (Oral)   Resp 18   Ht 1.562 m (5' 1.5\")   Wt 84.5 kg (186 lb 4.8 oz)   LMP 05/31/2003 (Approximate)   SpO2 95%   BMI 34.63 kg/m   Estimated body mass index is 34.63 kg/m  as calculated from the following:    Height as of this encounter: 1.562 m (5' 1.5\").    Weight as of this encounter: 84.5 kg (186 lb 4.8 oz). Body surface area is 1.91 meters squared.  No Pain (0) Comment: Data Unavailable   Patient's last menstrual period was 05/31/2003 (approximate).  Allergies reviewed: Yes  Medications reviewed: Yes    Medications: Medication refills not needed today.  Pharmacy name entered into EPIC:    WRITTEN PRESCRIPTION REQUESTED  [id number] - [place], [place] - [address] [address], [place]              "

## 2021-02-08 NOTE — PROGRESS NOTES
Oncology Follow Up Visit:February 8, 2021     Oncologist: Dr Sil Dan  PCP: Jackie Kovacs    Diagnosis: Stage IB Right Breast Cancer  Marixa Ann is a 71 yo female who had abnormality at 2-4 oclock level of right breaston screening mammogram in 12/2018. Final review proved a 12 x 9 x14 mm invasive ductal carcinoma at 2 oclock to the right breast, [place] grade 1, ER/UT positive, Her2 negative with 0/3 SLN positive and edges free of disease.   Oncotype score =4 or 3% risk of disease at 9 years with hormone therapy.   Treatment:   1/10/2019 Right Lumpectomy with SLN biopsy  3/11/2019 completed right breast radiation post lumpectomy.   - choice made to not use Tamoxifen due to risk for DVT(repeatedly elevated d-dimer as well as elevated ESR and CRP) so started pt on Aromatase inhibitor with close bone evaluation    Interval History: Ms. Ann comes to clinic alone for review of use of Arimidex for her breast cancer.  Patient is very anxious as usual today and states she has been almost a hermit since the pandemic began but has not gotten ill yet at this time.  She does report she has more fatigue recently but no she has chronic fatigue syndrome and uses Adderall to help with symptoms.  She also has a lesion on her left cheek x4 months and she is seeing dermatology tomorrow for review.  She does report reflux noted which may be related to the anastrozole and feels that her Pepcid she is using is not helping her and is asking for a new product-sleeps in a 45 degree angle and limits her coffee daily to help with the situation.  Patient also has known osteoporosis and she does admit to taking calcium Gummies with vitamin D but admits she is not on a regular exercise program.. Denies any new breast or chest issues of concern.  No new fevers headaches bowel or bladder changes.  Rest of comprehensive and complete ROS is reviewed and is negative.   Past Medical History:   Diagnosis Date     ADHD       "Breast cancer (H) 12/26/2018    Right Breast     Chronic fatigue      Fibromyalgia      Hard of hearing      Osteoporosis      S/P radiation therapy     4,256 cGy to right breast completed on 03/11/2019 - [facility] with Dr. Copeland     Current Outpatient Medications   Medication     acetaminophen (TYLENOL) 325 MG tablet     amphetamine-dextroamphetamine (ADDERALL) 15 MG tablet     anastrozole (ARIMIDEX) 1 MG tablet     anastrozole (ARIMIDEX) 1 MG tablet     B Complex Vitamins (VITAMIN B-COMPLEX PO)     calcium carbonate (TUMS) 500 MG chewable tablet     Calcium-Magnesium-Zinc 333-133-5 MG TABS per tablet     Cholecalciferol (VITAMIN D-3 PO)     cyclobenzaprine (FLEXERIL) 5 MG tablet     denosumab (PROLIA) 60 MG/ML SOSY injection     famotidine (PEPCID) 20 MG tablet     KRILL OIL PO     magnesium oxide 200 MG TABS     Multiple Vitamins-Minerals (MULTIVITAMIN PO)     pantoprazole (PROTONIX) 40 MG EC tablet     vitamin B-12 (CYANOCOBALAMIN) 100 MCG tablet     gabapentin (NEURONTIN) 300 MG capsule     Current Facility-Administered Medications   Medication     cross-Linked Hyaluronate (GEL-ONE) injection PRSY 30 mg     cross-Linked Hyaluronate (GEL-ONE) injection PRSY 30 mg     cross-Linked Hyaluronate (GEL-ONE) injection PRSY 30 mg     cross-Linked Hyaluronate (GEL-ONE) injection PRSY 30 mg     triamcinolone (KENALOG-40) injection 80 mg     Allergies   Allergen Reactions     Vicodin [Hydrocodone-Acetaminophen] Anaphylaxis     Dilaudid [Hydromorphone] Nausea and Vomiting     Oxycodone Nausea and Vomiting     Dust Mites      Milk [Lac Bovis] Diarrhea       Physical Exam:/58 (BP Location: Left arm)   Pulse 117   Temp 98.2  F (36.8  C) (Oral)   Resp 18   Ht 1.562 m (5' 1.5\")   Wt 84.5 kg (186 lb 4.8 oz)   LMP 05/31/2003 (Approximate)   SpO2 95%   BMI 34.63 kg/m    Constitutional: Alert, overweight, and appears anxious  ENT: Eyes bright, No mouth sores  Neck: Supple, No adenopathy.  Cardiac: Heart rate " and rhythm is regular and strong without murmur  Respiratory: Breathing easy. Lung sounds clear to auscultation  Breasts:tender under right breast noted again- also with axilla scar but no new masses noted to either breast or axilla. No discharge or abnormal appearance.   Abdomen: Soft, rounded, non-tender, BS normal. No masses or organomegaly  MS: Muscle tone normal, extremities normal with no edema.   Skin: No suspicious lesions or rashes  Neuro: Sensory grossly WNL, gait normal.   Lymph: Normal ant/post cervical, axillary, supraclavicular nodes  Psych: Mentation appears normal and affect anxious but states she enjoys seeing providers in person    Laboratory Results:   Results for orders placed or performed in visit on 02/08/21   Comprehensive metabolic panel     Status: Abnormal   Result Value Ref Range    Sodium 141 133 - 144 mmol/L    Potassium 3.8 3.4 - 5.3 mmol/L    Chloride 108 94 - 109 mmol/L    Carbon Dioxide 30 20 - 32 mmol/L    Anion Gap 3 3 - 14 mmol/L    Glucose 104 (H) 70 - 99 mg/dL    Urea Nitrogen 18 7 - 30 mg/dL    Creatinine 0.62 0.52 - 1.04 mg/dL    GFR Estimate >90 >60 mL/min/[1.73_m2]    GFR Estimate If Black >90 >60 mL/min/[1.73_m2]    Calcium 9.2 8.5 - 10.1 mg/dL    Bilirubin Total 0.4 0.2 - 1.3 mg/dL    Albumin 3.5 3.4 - 5.0 g/dL    Protein Total 7.2 6.8 - 8.8 g/dL    Alkaline Phosphatase 105 40 - 150 U/L    ALT 31 0 - 50 U/L    AST 22 0 - 45 U/L   Results for orders placed or performed in visit on 02/08/21   MA Screen Bilateral w/All     Status: None    Narrative    Examination: Bilateral digital screening mammography and bilateral  digital tomosynthesis with computer aided detection.    Comparison: 1/14/2020, 7/25/2019, 1/10/2019, 12/26/2018    History/family history: No symptoms, routine screening. History of  right breast cancer, post breast conservation therapy. Family history  of breast cancer in 1 second degree relative diagnosed at age 40.    TECHNIQUE:  Tomosynthesis    BREAST  DENSITY: Heterogeneously dense.    COMMENTS: No suspicious finding.  Postsurgical changes right breast.      Impression    IMPRESSION: BI-RADS CATEGORY: 2 - Benign.    RECOMMENDED FOLLOW-UP: Annual Mammography.        Results to be sent to the patient.    ISAAK KAMINSKI MD     Assessment and Plan:   Stage 1B Right Breast Cancer- Pt completed right lumpectomy and SLN biopsy, radiation therapy. She is currently on anastrozole-has not used the aromatase noted for 2 years with goal of 5 years of use.  She had a mammogram today that was negative and patient was very relieved-we will repeat annually.  Review of labs and exam show no signs of recurrence of disease.  Patient will return again in 6 months without labs or imaging necessary.  Use of AI-patient's hot flashes have improved significantly and is not used to the gabapentin. She has cholesterol checked yearly by PCP with known hyperlipidemia.   Osteoporosis- 8/6/2020 DEXA result showing T score of -2.9 equalling osteoporosis but is stable with use of PRolia every 6 months and is overdue for infusion due to pandemic-we will schedule repeat with next visit in 6 months.  Pt confirms she using calcium and vitamin D daily.    Encouraged weight bearing exercises to strengthen bones.  Fatigue with known ADD- fatigue is worse she feels-Pt continues on Adderall for support and energy.  GERD-patient initially tried Pepcid but it was not helpful and would like a new medication so we will try pantoprazole 40 mg daily x1 month with 1 refill and suggest she follow-up with primary care especially if this is not improved over 1month time period with the pantoprozole. She is also limiting caffeine daily and sleeps in a 45 degree angle to help with reflux issues.  COVID-19 precautions-Reviewed precautions for prevention of transmission and encouraged vaccination as eligible.  The total time of this encounter amounted to 30 minutes. This time included face-to-face time spent with the  patient, prep work, ordering tests, and performing post visit documentation.  Lidia Parham,Cnp

## 2021-02-09 ENCOUNTER — OFFICE VISIT (OUTPATIENT)
Dept: DERMATOLOGY | Facility: CLINIC | Age: 71
End: 2021-02-09
Attending: FAMILY MEDICINE
Payer: COMMERCIAL

## 2021-02-09 DIAGNOSIS — D48.5 NEOPLASM OF UNCERTAIN BEHAVIOR OF SKIN: ICD-10-CM

## 2021-02-09 DIAGNOSIS — L82.1 SEBORRHEIC KERATOSIS: Primary | ICD-10-CM

## 2021-02-09 PROCEDURE — 11103 TANGNTL BX SKIN EA SEP/ADDL: CPT | Performed by: DERMATOLOGY

## 2021-02-09 PROCEDURE — 88305 TISSUE EXAM BY PATHOLOGIST: CPT | Performed by: DERMATOLOGY

## 2021-02-09 PROCEDURE — 99202 OFFICE O/P NEW SF 15 MIN: CPT | Mod: 25 | Performed by: DERMATOLOGY

## 2021-02-09 PROCEDURE — 11104 PUNCH BX SKIN SINGLE LESION: CPT | Performed by: DERMATOLOGY

## 2021-02-09 NOTE — PROGRESS NOTES
[facility] Dermatology Note  Encounter Date: Feb 9, 2021  Office Visit     Dermatology Problem List:  1. NUB, right nasal sidewall and and right temple    ____________________________________________    Assessment & Plan:     1. NUB - 3mm pearly papule right nasal side wall, BCC or other.   Shave biopsy: Location(s) right nasal side wall .  After discussion of benefits and risks including but not limited to bleeding/bruising, pain/swelling, infection, scar, incomplete removal, nerve damage/numbness, recurrence, and non-diagnostic biopsy, written consent, verbal consent and photographs were obtained. Time-out was performed. The area was cleaned with isopropyl alcohol. 0.5mL of 1% lidocaine with epinephrine was injected to obtain adequate anesthesia of each lesion. Shave biopsy was performed. Hemostasis was achieved with aluminium chloride. Vaseline and a sterile dressing were applied. The patient tolerated the procedure and no complications were noted. The patient was provided with verbal and written post care instructions.     2. NUB - L temple subcue nodule rule out malignancy, most likely cyst    Punch biopsy:  After discussion of benefits and risks including but not limited to bleeding/bruising, pain/swelling, infection, scar, incomplete removal, nerve damage/numbness, recurrence, and non-diagnostic biopsy, written consent, verbal consent and photographs were obtained. Time-out was performed. The area was cleaned with isopropyl alcohol. 0.5mL of 1% lidocaine with epinephrine was injected to obtain adequate anesthesia of the lesion. A 2 mm punch biopsy was performed.  4-0 prolene sutures were utilized to approximate the epidermal edges.  White petroleum jelly/VaselineTM and a bandage was applied to the wound.  Explicit verbal and written wound care instructions were provided.  The patient left the Dermatology Clinic in good condition. The patient was counseled to follow up for suture removal in  approximately 7 days.    3. SK, left temple inferior     Procedures Performed:   As above       Follow-up: 6 months, earlier pending biopsy results    Staff:      Scribe Disclosure:   I, García, am serving as a scribe to document services personally performed by Dr. Sherita Uribe, based on data collection and the provider's statements to me.   [id number], MEDICAL ASSISTANT       Provider Disclosure:   The documentation recorded by the scribe accurately reflects the services I personally performed and the decisions made by me.    Sherita Uribe MD    Department of Dermatology  [facility]: Phone: [phone], Fax:[phone]  [facility]: Phone: [phone], Fax: [phone]        ____________________________________________    CC: Derm Problem (spot on face, [facility])    HPI:  Ms. Marixa Ann is a(n) 70 year old female who presents today as a new patient for spot on the face. She reports it has been on the left side of the face since at least September. It is brown and skin colored. PCP felt this was benign.      Patient is otherwise feeling well, without additional concerns.   No fam hx of melanoma or  Personal hx of skin cancer.     Labs:  NA    Physical Exam:  Vitals: LMP 05/31/2003 (Approximate)   SKIN: Total skin excluding the undergarment areas was performed. The exam included the head/face, neck, both arms, chest, back, abdomen, both legs, digits and/or nails.   - 3mm pearly papule right nasal side wall   - L temple subcutaneous nodule pea sized and mobile  - Stuck on papule left temple inferior  - No other lesions of concern on areas examined.     Medications:  Current Outpatient Medications   Medication     acetaminophen (TYLENOL) 325 MG tablet     amphetamine-dextroamphetamine (ADDERALL) 15 MG tablet     anastrozole (ARIMIDEX) 1 MG tablet     B Complex Vitamins (VITAMIN B-COMPLEX PO)      calcium carbonate (TUMS) 500 MG chewable tablet     Calcium-Magnesium-Zinc 333-133-5 MG TABS per tablet     Cholecalciferol (VITAMIN D-3 PO)     cyclobenzaprine (FLEXERIL) 5 MG tablet     denosumab (PROLIA) 60 MG/ML SOSY injection     famotidine (PEPCID) 20 MG tablet     KRILL OIL PO     magnesium oxide 200 MG TABS     Multiple Vitamins-Minerals (MULTIVITAMIN PO)     pantoprazole (PROTONIX) 40 MG EC tablet     vitamin B-12 (CYANOCOBALAMIN) 100 MCG tablet     Current Facility-Administered Medications   Medication     cross-Linked Hyaluronate (GEL-ONE) injection PRSY 30 mg     cross-Linked Hyaluronate (GEL-ONE) injection PRSY 30 mg     cross-Linked Hyaluronate (GEL-ONE) injection PRSY 30 mg     cross-Linked Hyaluronate (GEL-ONE) injection PRSY 30 mg     triamcinolone (KENALOG-40) injection 80 mg      Past Medical History:   Patient Active Problem List   Diagnosis     Moderate recurrent major depression (H)     Hyperlipidemia LDL goal <160     Chronic fatigue disorder     Liver lesion     Cystic disease of liver     Gastroesophageal reflux disease with esophagitis     Hiatal hernia     Age-related cataract of both eyes, unspecified age-related cataract type     Primary osteoarthritis of both knees     Attention deficit hyperactivity disorder (ADHD), predominantly inattentive type     Malignant neoplasm of upper-inner quadrant of right breast in female, estrogen receptor positive (H)     Thyroid nodule     Anxiety disorder, unspecified type     Osteoporosis     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     Aromatase inhibitor use     Cervical cancer screening     History of cholecystectomy     Past Medical History:   Diagnosis Date     ADHD      Breast cancer (H) 12/26/2018    Right Breast     Chronic fatigue      Fibromyalgia      Hard of hearing      Osteoporosis      S/P radiation therapy     4,256 cGy to right breast completed on 03/11/2019 - [facility] with Dr. Min Kovacs MD  [address]  [place] [address] on close of this encounter.

## 2021-02-09 NOTE — NURSING NOTE
Drug Administration Record    Drug Name: Lidocaine with Epi  Dose: see MD note   Route administered: SQ  NDC #: 8060-9460-26      LOT #: -EV  SITE: see MD note   : [employer]  EXPIRATION DATE: 6/1/2021    GERARDO, MEDICAL ASSISTANT

## 2021-02-09 NOTE — LETTER
2/9/2021         RE: Marixa Ann  [address] [facility] [address]        Dear Colleague,    Thank you for referring your patient, Marixa Ann, to the [facility]. Please see a copy of my visit note below.    [facility] Dermatology Note  Encounter Date: Feb 9, 2021  Office Visit     Dermatology Problem List:  1. NUB, right nasal sidewall and and right temple    ____________________________________________    Assessment & Plan:     1. NUB - 3mm pearly papule right nasal side wall, BCC or other.   Shave biopsy: Location(s) right nasal side wall .  After discussion of benefits and risks including but not limited to bleeding/bruising, pain/swelling, infection, scar, incomplete removal, nerve damage/numbness, recurrence, and non-diagnostic biopsy, written consent, verbal consent and photographs were obtained. Time-out was performed. The area was cleaned with isopropyl alcohol. 0.5mL of 1% lidocaine with epinephrine was injected to obtain adequate anesthesia of each lesion. Shave biopsy was performed. Hemostasis was achieved with aluminium chloride. Vaseline and a sterile dressing were applied. The patient tolerated the procedure and no complications were noted. The patient was provided with verbal and written post care instructions.     2. NUB - L temple subcue nodule rule out malignancy, most likely cyst    Punch biopsy:  After discussion of benefits and risks including but not limited to bleeding/bruising, pain/swelling, infection, scar, incomplete removal, nerve damage/numbness, recurrence, and non-diagnostic biopsy, written consent, verbal consent and photographs were obtained. Time-out was performed. The area was cleaned with isopropyl alcohol. 0.5mL of 1% lidocaine with epinephrine was injected to obtain adequate anesthesia of the lesion. A 2 mm punch biopsy was performed.  4-0 prolene sutures were utilized to approximate the epidermal  edges.  White petroleum jelly/VaselineTM and a bandage was applied to the wound.  Explicit verbal and written wound care instructions were provided.  The patient left the Dermatology Clinic in good condition. The patient was counseled to follow up for suture removal in approximately 7 days.    3. SK, left temple inferior     Procedures Performed:   As above       Follow-up: 6 months, earlier pending biopsy results    Staff:      Scribe Disclosure:   I, García, am serving as a scribe to document services personally performed by Dr. Sherita Uribe, based on data collection and the provider's statements to me.   [id number], MEDICAL ASSISTANT       Provider Disclosure:   The documentation recorded by the scribe accurately reflects the services I personally performed and the decisions made by me.    Sherita Uribe MD    Department of Dermatology  [facility]: Phone: [phone], Fax:[phone]  [facility]: Phone: [phone], Fax: [phone]        ____________________________________________    CC: Derm Problem (spot on face, [facility])    HPI:  Ms. Marxia Ann is a(n) 70 year old female who presents today as a new patient for spot on the face. She reports it has been on the left side of the face since at least September. It is brown and skin colored. PCP felt this was benign.      Patient is otherwise feeling well, without additional concerns.   No fam hx of melanoma or  Personal hx of skin cancer.     Labs:  NA    Physical Exam:  Vitals: LMP 05/31/2003 (Approximate)   SKIN: Total skin excluding the undergarment areas was performed. The exam included the head/face, neck, both arms, chest, back, abdomen, both legs, digits and/or nails.   - 3mm pearly papule right nasal side wall   - L temple subcutaneous nodule pea sized and mobile  - Stuck on papule left temple inferior  - No other lesions of concern  on areas examined.     Medications:  Current Outpatient Medications   Medication     acetaminophen (TYLENOL) 325 MG tablet     amphetamine-dextroamphetamine (ADDERALL) 15 MG tablet     anastrozole (ARIMIDEX) 1 MG tablet     B Complex Vitamins (VITAMIN B-COMPLEX PO)     calcium carbonate (TUMS) 500 MG chewable tablet     Calcium-Magnesium-Zinc 333-133-5 MG TABS per tablet     Cholecalciferol (VITAMIN D-3 PO)     cyclobenzaprine (FLEXERIL) 5 MG tablet     denosumab (PROLIA) 60 MG/ML SOSY injection     famotidine (PEPCID) 20 MG tablet     KRILL OIL PO     magnesium oxide 200 MG TABS     Multiple Vitamins-Minerals (MULTIVITAMIN PO)     pantoprazole (PROTONIX) 40 MG EC tablet     vitamin B-12 (CYANOCOBALAMIN) 100 MCG tablet     Current Facility-Administered Medications   Medication     cross-Linked Hyaluronate (GEL-ONE) injection PRSY 30 mg     cross-Linked Hyaluronate (GEL-ONE) injection PRSY 30 mg     cross-Linked Hyaluronate (GEL-ONE) injection PRSY 30 mg     cross-Linked Hyaluronate (GEL-ONE) injection PRSY 30 mg     triamcinolone (KENALOG-40) injection 80 mg      Past Medical History:   Patient Active Problem List   Diagnosis     Moderate recurrent major depression (H)     Hyperlipidemia LDL goal <160     Chronic fatigue disorder     Liver lesion     Cystic disease of liver     Gastroesophageal reflux disease with esophagitis     Hiatal hernia     Age-related cataract of both eyes, unspecified age-related cataract type     Primary osteoarthritis of both knees     Attention deficit hyperactivity disorder (ADHD), predominantly inattentive type     Malignant neoplasm of upper-inner quadrant of right breast in female, estrogen receptor positive (H)     Thyroid nodule     Anxiety disorder, unspecified type     Osteoporosis     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     Aromatase inhibitor use     Cervical cancer screening     History of cholecystectomy     Past Medical History:   Diagnosis Date     ADHD      Breast cancer (H) 12/26/2018     Right Breast     Chronic fatigue      Fibromyalgia      Hard of hearing      Osteoporosis      S/P radiation therapy     4,256 cGy to right breast completed on 03/11/2019 - [facility] with Dr. Min Kovacs MD  [address] [place] [address] on close of this encounter.        Again, thank you for allowing me to participate in the care of your patient.        Sincerely,        Sherita Uribe MD

## 2021-02-09 NOTE — PATIENT INSTRUCTIONS

## 2021-02-09 NOTE — NURSING NOTE
Marixa Ann's goals for this visit include:   Chief Complaint   Patient presents with     Derm Problem     spot on face, [facility]       She requests these members of her care team be copied on today's visit information: no    PCP: Jackie Kovacs    Referring Provider:  Jackie Kovacs MD  [address] [place] [address]    [facility] 05/31/2003 (Approximate)     Do you need any medication refills at today's visit? No    Joy Westbrook LPN

## 2021-02-12 LAB — COPATH REPORT: NORMAL

## 2021-02-16 ENCOUNTER — ALLIED HEALTH/NURSE VISIT (OUTPATIENT)
Dept: NURSING | Facility: CLINIC | Age: 71
End: 2021-02-16
Payer: COMMERCIAL

## 2021-02-16 DIAGNOSIS — Z48.02 VISIT FOR SUTURE REMOVAL: Primary | ICD-10-CM

## 2021-02-16 PROCEDURE — 99207 PR NO CHARGE NURSE ONLY: CPT | Performed by: DERMATOLOGY

## 2021-02-16 NOTE — NURSING NOTE
"Results reviewed with patient and .  Patient expressed anxiety over the diagnosis as she has undergone treatment for breast cancer.  I reviewed the diagnosis and Mohs procedure in detail, reviewed what to expect following the surgery and scar appearance.  Patient expressed relief after conversation and stated \"I feel so much better.\"  Due to anxiety she is asking for something to calm her prior to the procedure.  I did discuss the possibility of Dr. Saini prescribed Xanax or Valium and that this will be further discussed at her consult appt on the 22nd.  I notified her that a  will be needed.  Mohs consult scheduled for 2/22 and procedure scheduled for 2/24.  SALENA Cisneros, Dale GAMBOA MD sent to [facility] [place]             I would prefer a virtual consult for this case. Thank you.    Associated Results    Dermatological path order and indications  Order: [id number]  Status:  Final result   Visible to patient:  Yes (MyChart) Dx:  Neoplasm of uncertain behavior of skin  Component 7d ago   Copath Report Patient Name: JAILYN GUAN   MR#: [id number]   Specimen #: [id number]   Collected: 2/9/2021   Received: 2/10/2021   Reported: 2/12/2021 15:29   Ordering Phy(s): DADA STRICKLAND     For improved result formatting, select 'View Enhanced Report Format' under    Linked Documents section.     SPECIMEN(S):   A: Skin, right nasal side wall, shave   B: Skinl, left temple, punch     FINAL DIAGNOSIS:   A. Skin, right nasal side wall, shave:   - Basal cell carcinoma, nodular type, extending to the deep margin - (see   description)     B. Skin, left temple, punch:   - Osteoma cutis              "

## 2021-02-16 NOTE — NURSING NOTE
Marixa Ann comes into clinic today at the request of Dr. Uribe Ordering Provider for suture removal.      Dermatology Suture Removal Record  S: Marixa presents to the clinic today for  removal of suture.  The patient has had the suture in place for 7 days.    There has been no history of infection or drainage.    O: 1 suture is seen located on the left temple.  The wound is healing well with no signs of infection.    A: Suture removal.    P:  The suture was easily removed today.  Routine wound care discussed.  The patient will follow up as scheduled.      This service provided today was under the supervising provider of the day Dr. Uribe, who was available if needed.    Celina Ramirez RN

## 2021-02-22 ENCOUNTER — OFFICE VISIT (OUTPATIENT)
Dept: DERMATOLOGY | Facility: CLINIC | Age: 71
End: 2021-02-22
Payer: COMMERCIAL

## 2021-02-22 DIAGNOSIS — F41.9 ANXIETY DUE TO INVASIVE PROCEDURE: Primary | ICD-10-CM

## 2021-02-22 DIAGNOSIS — C44.311 BASAL CELL CARCINOMA (BCC) OF RIGHT NASAL SIDEWALL: ICD-10-CM

## 2021-02-22 PROCEDURE — 99212 OFFICE O/P EST SF 10 MIN: CPT | Performed by: DERMATOLOGY

## 2021-02-22 RX ORDER — ALPRAZOLAM 0.5 MG
TABLET ORAL
Qty: 5 TABLET | Refills: 0 | Status: SHIPPED | OUTPATIENT
Start: 2021-02-22 | End: 2021-09-17

## 2021-02-22 ASSESSMENT — PAIN SCALES - GENERAL: PAINLEVEL: NO PAIN (0)

## 2021-02-22 NOTE — LETTER
2/22/2021         RE: Marixa Ann  [address] [address] [facility] [address]        Dear Colleague,    Thank you for referring your patient, Marixa Ann, to the [facility]. Please see a copy of my visit note below.    [facility] Dermatology Note    Dermatology Problem List:  1.BCC, right nasal sidewall. Schedule Mohs.     Encounter Date: Feb 22, 2021    CC:  Chief Complaint   Patient presents with     Derm Problem     Cookie is here today for a consult for right nasal sidewall BCC       History of Present Illness:  Ms. Marixa Ann is a 70 year old female who presents today for a Mohs consultation regarding a BCC on the right nasal sidewall.     She is very nervous about the procedure. She is worried about the appearance of the resulting scar and her long term health risk.   She has been doing wound care as instructed after the biopsy.     The patient is otherwise feeling well. There are no other skin concerns at this time.      Past Medical History:   Patient Active Problem List   Diagnosis     Moderate recurrent major depression (H)     Hyperlipidemia LDL goal <160     Chronic fatigue disorder     Liver lesion     Cystic disease of liver     Gastroesophageal reflux disease with esophagitis     Hiatal hernia     Age-related cataract of both eyes, unspecified age-related cataract type     Primary osteoarthritis of both knees     Attention deficit hyperactivity disorder (ADHD), predominantly inattentive type     Malignant neoplasm of upper-inner quadrant of right breast in female, estrogen receptor positive (H)     Thyroid nodule     Anxiety disorder, unspecified type     Osteoporosis     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     Aromatase inhibitor use     Cervical cancer screening     History of cholecystectomy     Past Medical History:   Diagnosis Date     ADHD      Breast cancer (H) 12/26/2018    Right Breast     Chronic fatigue      Fibromyalgia      Hard  of hearing      Osteoporosis      S/P radiation therapy     4,256 cGy to right breast completed on 03/11/2019 - [facility] with Dr. Copeland     Past Surgical History:   Procedure Laterality Date     APPENDECTOMY  1967     BIOPSY BREAST NEEDLE LOCALIZATION Right 1/10/2019    Procedure: WIRE LOCALIZED RIGHT BREAST LUMPECTOMY WITH RIGHT SLNB;  Surgeon: Gema Diamond MD;  Location: [facility] OR     BREAST BIOPSY, CORE RT/LT Right 12/26/2018     CATARACT IOL, RT/LT Bilateral 2018    with lens implants per patient     CHOLECYSTECTOMY  05/24/2019     THYROID BIOPSY  01/31/2019       Social History:  Social History     Socioeconomic History     Marital status:      Spouse name: None     Number of children: None     Years of education: None     Highest education level: None   Occupational History     None   Social Needs     Financial resource strain: None     Food insecurity     Worry: None     Inability: None     Transportation needs     Medical: None     Non-medical: None   Tobacco Use     Smoking status: Never Smoker     Smokeless tobacco: Never Used   Substance and Sexual Activity     Alcohol use: No     Frequency: Never     Drug use: No     Sexual activity: Not Currently     Partners: Male     Birth control/protection: None   Lifestyle     Physical activity     Days per week: None     Minutes per session: None     Stress: None   Relationships     Social connections     Talks on phone: None     Gets together: None     Attends Restorationist service: None     Active member of club or organization: None     Attends meetings of clubs or organizations: None     Relationship status: None     Intimate partner violence     Fear of current or ex partner: None     Emotionally abused: None     Physically abused: None     Forced sexual activity: None   Other Topics Concern     Parent/sibling w/ CABG, MI or angioplasty before 65F 55M? No   Social History Narrative     None       Family History:  Family History   Problem  Relation Age of Onset     Leukemia Father      Breast Cancer Cousin 50        Maternal Female 1st Cousin     Macular Degeneration Mother         Medications:  Current Outpatient Medications   Medication Sig Dispense Refill     acetaminophen (TYLENOL) 325 MG tablet Take 325-650 mg by mouth every 6 hours as needed for mild pain       ALPRAZolam (XANAX) 0.5 MG tablet Take one tablet by mouth the night before surgery for sleep, you may take one tablet by mouth about 30 minutes before your surgery, bring the remaining tablets to clinic for dispensing according to clinic protocol. 5 tablet 0     amphetamine-dextroamphetamine (ADDERALL) 15 MG tablet Take 1 tablet (15 mg) by mouth 2 times daily 60 tablet 0     anastrozole (ARIMIDEX) 1 MG tablet Take 1 tablet (1 mg) by mouth daily 90 tablet 0     B Complex Vitamins (VITAMIN B-COMPLEX PO) Take by mouth daily       calcium carbonate (TUMS) 500 MG chewable tablet Take 1 chew tab by mouth 2 times daily       Calcium-Magnesium-Zinc 333-133-5 MG TABS per tablet Take 1 tablet by mouth daily       Cholecalciferol (VITAMIN D-3 PO) Vitamin D is included with multiple vitamin, patient does not take additional. Marjorie Crooks LPN on 7/26/2019 at 3:02 PM       cyclobenzaprine (FLEXERIL) 5 MG tablet Take 1 tablet (5 mg) by mouth daily as needed for muscle spasms 20 tablet 1     denosumab (PROLIA) 60 MG/ML SOSY injection        KRILL OIL PO Take by mouth daily       magnesium oxide 200 MG TABS Take 100 mg by mouth once as needed       Multiple Vitamins-Minerals (MULTIVITAMIN PO)        pantoprazole (PROTONIX) 40 MG EC tablet Take 1 tablet (40 mg) by mouth daily 30 tablet 1     vitamin B-12 (CYANOCOBALAMIN) 100 MCG tablet Take 1,000 mcg by mouth daily       famotidine (PEPCID) 20 MG tablet Take 1 tablet (20 mg) by mouth 2 times daily 180 tablet 1     mupirocin (BACTROBAN) 2 % external ointment Use 2 times a day to affected area. 22 g 0       Allergies   Allergen Reactions     Vicodin  [Hydrocodone-Acetaminophen] Anaphylaxis     Dilaudid [Hydromorphone] Nausea and Vomiting     Oxycodone Nausea and Vomiting     Dust Mites      Milk [Lac Bovis] Diarrhea       Review of Systems:  -Skin Establ Pt: The patient denies any new rash, pruritus, or lesions that are symptomatic, changing or bleeding, except as per HPI.  -Constitutional: The patient is feeling generally well.    Physical exam:  Vitals: LMP 05/31/2003 (Approximate)   GEN: This is a well developed, well-nourished female in no acute distress, in a pleasant mood.    SKIN: Focused examination of the nose was performed.  - right nasal sidewall, ~6mm erosion with hemorrhagic crust  - No other lesions of concern on areas examined.       Impression/Plan:  1. BCC, right nasal sidewall    Reviewed pathology report and nature of diagnosis.     Risks, benefits, and process of Mohs micrographic surgery were discussed including possibility of damage to surrounding anatomical structures and infection. Patient is comfortable proceeding with the surgery; consent form was obtained.     No indication for pre-op antibiotics.    Pre-op written instructions provided.     Xanax prescription provided for anxiety.     Consent form signed today.     Follow-up as scheduled for [facility].     Staff Involved:  Dale Saini DO    Department of Dermatology  [facility]: Phone: [phone], Fax:[phone]  [facility]: Phone: [phone], Fax: [phone]          Again, thank you for allowing me to participate in the care of your patient.        Sincerely,        Dale Saini MD

## 2021-02-22 NOTE — NURSING NOTE
Marixa Ann's goals for this visit include:   Chief Complaint   Patient presents with     Derm Problem     Priscilla is here today for a consult for right nasal sidewall BCC       She requests these members of her care team be copied on today's visit information:     PCP: Jackie Kovacs    Referring Provider:  No referring provider defined for this encounter.    LMP 05/31/2003 (Approximate)     Do you need any medication refills at today's visit? No    Lara Riley LPN

## 2021-02-22 NOTE — PATIENT INSTRUCTIONS
Mohs Cutaneous Micrographic Surgery Unit  Dale Saini DO      Pre-Surgical Instruction Sheet    1. Expect to be here majority of the morning.  The Mohs surgical procedure can be an extensive surgery requiring multiple stages. Each stage may take 1-2 hours.    ? You may eat breakfast  ? You may bring snacks or beverages with you  ? Take all normal medications morning of surgery -- unless otherwise indicated by your physician  ? If you have an artificial heart valve, or joint replacement within two years, we will prescribe an antibiotic. Please take one hour prior to surgery.    2. You will need a  to be with you if your surgical site is close to your eyes. You can get swelling/bruising immediately after surgery and will go home with a big bulky bandage that could obstruct your view of driving safely.    3. Wear comfortable clothing -- preferably a button down shirt or a loose fitted shirt. (to avoid pulling over pressure bandage off when you get home) If your surgery site is on your leg, try wearing loose fitted pants. If your surgery site is on your arm, try wearing a short-sleeve shirt.    4. Bring reading material or other items to help pass time. We do have internet access available if you own a laptop, iPad, etc.)    5. Women - do NOT wear make-up. We will be washing it off anyone needing surgery on the face    6. Do NOT stop blood thinning medication unless instructed to do so by your surgeon. This will include: Warfarin, Coumadin, Jantoven, Aspirin, Plavix and Pradaxa. If you are taking Warfarin or Coumadin, please have your INR blood test results sent to our office no more than 7 days prior to your surgery.     7. Tylenol is a good alternative to take for headaches and pain, and can be taken throughout your surgery and postoperative recovery.    8. If your surgery is on your trunk, arms, hands, legs, feet, fingernail or a toenail, please wash the area with Hibiclens, an over-the-counter antiseptic  soap, the night before and morning of your surgery.     If you have any questions, please feel free to contact us.    Phone numbers:  During business hours (M-F 8:00-4:30 p.m.)  [facility]:  [phone] - select option 1 for appt. desk  [phone] - select option 3 for nurse triage line  [facility]:   [phone] - goes straight to call center   ---------------------------------------------------------  Evenings/Weekends/Holidays  Hospital - [phone]   TTY for hearing bsjloldx-[phone]  *Ask  to page dermatologist on-call  Emergency Dwbu-[phone]  TTY for hearing impaired- [phone]

## 2021-02-24 ENCOUNTER — OFFICE VISIT (OUTPATIENT)
Dept: DERMATOLOGY | Facility: CLINIC | Age: 71
End: 2021-02-24
Payer: COMMERCIAL

## 2021-02-24 VITALS — HEART RATE: 84 BPM | SYSTOLIC BLOOD PRESSURE: 117 MMHG | DIASTOLIC BLOOD PRESSURE: 78 MMHG

## 2021-02-24 DIAGNOSIS — C44.311 BASAL CELL CARCINOMA (BCC) OF RIGHT NASAL SIDEWALL: Primary | ICD-10-CM

## 2021-02-24 PROCEDURE — 17311 MOHS 1 STAGE H/N/HF/G: CPT | Performed by: DERMATOLOGY

## 2021-02-24 PROCEDURE — 12051 INTMD RPR FACE/MM 2.5 CM/<: CPT | Performed by: DERMATOLOGY

## 2021-02-24 ASSESSMENT — PAIN SCALES - GENERAL: PAINLEVEL: NO PAIN (0)

## 2021-02-24 NOTE — PROGRESS NOTES
[facility] Dermatologic Surgery [facility] [place] Procedure Note      Date of Service:  Feb 24, 2021  Surgery: Mohs micrographic surgery    Case 1  Repair Type: intermediate  Repair Size: 2.2 cm  Suture Material: Fast Absorbing Gut 5-0  Tumor Type: BCC - Basal cell carcinoma  Location: right nasal sidewall  Derm-Path Accession #: [id number]  PreOp Size: 0.5X0.5 cm  PostOp Size: 0.8X0.6 cm  Mohs Accession #: [id number]  Level of Defect: Fascia       Procedure:  We discussed the principles of treatment and most likely complications including scarring, bleeding, infection, swelling, pain, crusting, nerve damage, large wound,  incomplete excision, wound dehiscence,  nerve damage, recurrence, and a second procedure may be recommended to obtain the best cosmetic or functional result.    Informed consent was obtained and the patient underwent the procedure as follows:  The patient was placed supine on the operating table.  The cancer was identified, outlined with a marker, and verified by the patient.  The entire surgical field was prepped with Hibiclens.  The surgical site was anesthetized using Lidocaine 1% with epi 1:100,000.      The area of clinically apparent tumor was debulked. The layer of tissue was then surgically excised using a #15 blade and was then transferred onto a specimen sheet maintaining the orientation of the specimen. Hemostasis was obtained using bipolar electrocoagulation. The wound site was then covered with a dressing while the tissue samples were processed for examination.    The excised tissue was transported to the Mohs histology laboratory maintaining the tissue orientation.  The tissue specimen was relaxed so that the entire surgical margin was in a a single horizontal plane for sectioning and inked for precise mapping.  A precise reference map was drawn to reflect the sectioning of the specimen, colored inking of the margins, and orientation on the patient. The tissue was processed  using horizontal sectioning of the base and continuous peripheral margins.  The histopathologic sections were reviewed in conjunction with the reference map.    Total blocks: 1    Total slides:  1    There were no cancer cells visualized on examination, therefore Mohs surgery was complete.      REPAIR: An intermediate layered linear closure was selected as the procedure which would maximally preserve both function and cosmesis.    After the excision of the tumor, the area was undermined. Hemostasis was obtained with bipolar electrocoagulation.  Closure was oriented so that the wound was in the patient's natural skin tension lines. The subcutaneous and dermal layers were then closed with 5-0 monocryl buried vertical mattress sutures. The epidermis was then carefully approximated along the length of the wound using 5-0 Fast Absorbing Gut simple running sutures.     Estimated blood loss was less than 10 ml for all surgical sites. A sterile pressure dressing was applied and wound care instructions, with a written handout, were given. The patient was discharged from the Dermatologic Surgery Center alert and ambulatory.    Follow-up in virtual visit 2 weeks.     I personally performed the procedures today.    Dale Saini DO    Department of Dermatology  [facility]: Phone: [phone], Fax:[phone]  [facility]: Phone: [phone], Fax: [phone]    Care and Laboratory Testing Performed at:  [facility]   Dermatology Clinic  [address] [place] [address]

## 2021-02-24 NOTE — NURSING NOTE
The following medication was given:     MEDICATION:  Lidocaine with epinephrine 1% 1:716021  ROUTE: SQ  SITE: see procedure note  DOSE: 4cc  LOT #: -[id number]  : Hospira  EXPIRATION DATE: 6/2021  NDC#: 5151-1257-61   Was there drug waste? 2cc  Multi-dose vial: Yes    Mirna Higgins LPN  February 24, 2021    Vaseline and pressure dressing applied to Mohs site on right nasal sidewall.  Wound care instructions reviewed with patient and AVS provided.  Patient verbalized understanding. No further questions or concerns at this time.      Mirna Higgins LPN

## 2021-02-24 NOTE — NURSING NOTE
Marixa Ann's goals for this visit include:   Chief Complaint   Patient presents with     Derm Problem     Priscilla is here today for MOHS on her right nasal sidewall due to BCC       She requests these members of her care team be copied on today's visit information:     PCP: Jackie Kovacs    Referring Provider:  No referring provider defined for this encounter.    /78 (BP Location: Left arm, Patient Position: Sitting, Cuff Size: Adult Regular)   Pulse 84   LMP 05/31/2003 (Approximate)     Do you need any medication refills at today's visit? No    Lara Riley LPN

## 2021-02-24 NOTE — PATIENT INSTRUCTIONS
MOHS Wound Care Instructions  I will experience scar, altered skin color, bleeding, swelling, pain, crusting and redness. I may experience altered sensation. Risks are excessive bleeding, infection, muscle weakness, thick (hypertrophic or keloidal) scar, and recurrence,. A second procedure may be recommended to obtain the best cosmetic or functional result.  Possible complications of any surgical procedure are bleeding, infection, scarring, alteration in skin color and sensation, muscle weakness in the area, wound dehiscence or seperation, or recurrence of the lesion or disease. On occasion, after healing, a secondary procedure or revision may be recommended in order to obtain the best cosmetic or functional result.   After your surgery, a pressure bandage will be placed over the area that has sutures. This will help prevent bleeding. Please follow these instructions as they will help you to prevent complications as your wound heals.  For the First 48 hours After Surgery:  1. Leave the pressure bandage on and keep it dry. If it should come loose, you may retape it, but do not take it off.  2. Relax and take it easy. Do not do any vigorous exercise, heavy lifting, or bending forward. This could cause the wound to bleed.  3. Post-operative pain is usually mild. You may take plain or extra strength Tylenol every 4 hours as needed (do not take more than 4,000mg in one day). Do not take any medicine that contains aspirin, ibuprofen or motrin unless you have been recommended these by a doctor.  Avoid alcohol and vitamin E as these may increase your tendency to bleed.  4. You may put an ice pack around the bandaged area for 20 minutes every 2-3 hours. This may help reduce swelling, bruising, and pain. Make sure the ice pack is waterproof so that the pressure bandage does not get wet.   5. You may see a small amount of drainage or blood on your pressure bandage. This is normal. However, if drainage or bleeding continues or  saturates the bandage, you will need to apply firm pressure over the bandage with a washcloth for 15 minutes. If bleeding continues after applying pressure for 15 minutes then go to the nearest emergency room.  48 Hours After Surgery  Carefully remove the bandage and start daily wound care and dressing changes. You may also now shower and get the wound wet. Wash wound with a mild soap and water.  Use caution when washing the wound. Be gentle and do not let the forceful shower stream hit the wound directly.  PAT dry.  Daily Wound Care:  1. Wash wound with a mild soap and water.  Use caution when washing the wound, be gentle and do not let the forceful shower stream hit the wound directly.  2. PAT DRY.  3. Apply Vaseline (from a new container or tube) over the suture line with a Q-tip. It is very important to keep the wound continuously moist, as wounds heal best in a moist environment.  4.  Keep the site covered until sutures are dissolved, you can cover it with a Telfa (non-stick) dressing and tape or a band-aid.    5. If you are unable to keep wound covered, you must apply Vaseline every 2 - 3 hours (while awake) to ensure it is being kept moist for optimal healing. A dressing overnight is recommended to keep the area moist.   Call Us If:  1. You have pain that is not controlled with Tylenol.  2. You have signs or symptoms of an infection, such as: fever over 100 degrees F, redness, warmth, or foul-smelling or yellow/creamy drainage from the wound.  Who should I call with questions?    [facility]: [phone]     [facility]: [phone]    For urgent needs outside of business hours call the [facility] at [phone] and ask for the dermatology resident on call

## 2021-02-24 NOTE — LETTER
2/24/2021         RE: Marixa Ann  [address] [facility] [address]        Dear Colleague,    Thank you for referring your patient, Marixa Ann, to the [facility]. Please see a copy of my visit note below.    [facility] Dermatologic Surgery Clinic [place] Procedure Note      Date of Service:  Feb 24, 2021  Surgery: Mohs micrographic surgery    Case 1  Repair Type: intermediate  Repair Size: 2.2 cm  Suture Material: Fast Absorbing Gut 5-0  Tumor Type: BCC - Basal cell carcinoma  Location: right nasal sidewall  Derm-Path Accession #: [id number]  PreOp Size: 0.5X0.5 cm  PostOp Size: 0.8X0.6 cm  Mohs Accession #: [id number]  Level of Defect: Fascia       Procedure:  We discussed the principles of treatment and most likely complications including scarring, bleeding, infection, swelling, pain, crusting, nerve damage, large wound,  incomplete excision, wound dehiscence,  nerve damage, recurrence, and a second procedure may be recommended to obtain the best cosmetic or functional result.    Informed consent was obtained and the patient underwent the procedure as follows:  The patient was placed supine on the operating table.  The cancer was identified, outlined with a marker, and verified by the patient.  The entire surgical field was prepped with Hibiclens.  The surgical site was anesthetized using Lidocaine 1% with epi 1:100,000.      The area of clinically apparent tumor was debulked. The layer of tissue was then surgically excised using a #15 blade and was then transferred onto a specimen sheet maintaining the orientation of the specimen. Hemostasis was obtained using bipolar electrocoagulation. The wound site was then covered with a dressing while the tissue samples were processed for examination.    The excised tissue was transported to the Mohs histology laboratory maintaining the tissue orientation.  The tissue specimen was relaxed so that the entire  surgical margin was in a a single horizontal plane for sectioning and inked for precise mapping.  A precise reference map was drawn to reflect the sectioning of the specimen, colored inking of the margins, and orientation on the patient. The tissue was processed using horizontal sectioning of the base and continuous peripheral margins.  The histopathologic sections were reviewed in conjunction with the reference map.    Total blocks: 1    Total slides:  1    There were no cancer cells visualized on examination, therefore Mohs surgery was complete.      REPAIR: An intermediate layered linear closure was selected as the procedure which would maximally preserve both function and cosmesis.    After the excision of the tumor, the area was undermined. Hemostasis was obtained with bipolar electrocoagulation.  Closure was oriented so that the wound was in the patient's natural skin tension lines. The subcutaneous and dermal layers were then closed with 5-0 monocryl buried vertical mattress sutures. The epidermis was then carefully approximated along the length of the wound using 5-0 Fast Absorbing Gut simple running sutures.     Estimated blood loss was less than 10 ml for all surgical sites. A sterile pressure dressing was applied and wound care instructions, with a written handout, were given. The patient was discharged from the Dermatologic Surgery Center alert and ambulatory.    Follow-up in virtual visit 2 weeks.     I personally performed the procedures today.    Dale Saini DO    Department of Dermatology  [facility]: Phone: [phone], Fax:[phone]  [facility]: Phone: [phone], Fax: [phone]    Care and Laboratory Testing Performed at:  [facility]   Dermatology Clinic  [address] [place] [address]            Again, thank you for allowing me to  participate in the care of your patient.        Sincerely,        Dale Saini MD

## 2021-03-04 ENCOUNTER — MYC MEDICAL ADVICE (OUTPATIENT)
Dept: DERMATOLOGY | Facility: CLINIC | Age: 71
End: 2021-03-04

## 2021-03-04 ENCOUNTER — VIRTUAL VISIT (OUTPATIENT)
Dept: DERMATOLOGY | Facility: CLINIC | Age: 71
End: 2021-03-04
Payer: COMMERCIAL

## 2021-03-04 DIAGNOSIS — Z85.828 HISTORY OF BASAL CELL CARCINOMA OF SKIN: Primary | ICD-10-CM

## 2021-03-04 PROCEDURE — 99024 POSTOP FOLLOW-UP VISIT: CPT | Mod: 95 | Performed by: DERMATOLOGY

## 2021-03-04 NOTE — PROGRESS NOTES
[facility] Dermatology Note  Encounter Date: Mar 4, 2021  Store-and-Forward and Telephone ([phone]). Location of teledermatologist: [facility].  Start time: 1423. End time: 1435.    Dermatology Problem List:  1. BCC, right nasal sidewall, s/p Mohs and linear repair; 2/24/21    ____________________________________________    Assessment & Plan:     # BCC, right nasal sidewall  S/p Mohs surgery and linear repair.   Wound healing appropriately.   Start scar massage after 1 month.   Ok to treat as normal skin after incision is healed over with new pink skin.     Procedures Performed:    None    Staff:     Dale Saini DO    Department of Dermatology  [facility]: Phone: [phone], Fax:[phone]  [facility]: Phone: [phone], Fax: [phone]    ____________________________________________    CC: RECHECK (1 week s/p MOHS)    HPI:  Ms. Marixa Ann is a(n) 70 year old female who presents today for follow-up  for Mohs surgery and linear repair right nasal sidewall.     Patient reports the wound is looking better daily.  There is minimal pain and discomfort.  There is less weeping appearing on the bandages.     Patient is otherwise feeling well, without additional skin concerns.    Labs Reviewed:  N/A    Physical Exam:  Vitals: LMP 05/31/2003 (Approximate)   SKIN: Teledermatology photos were reviewed; image quality and interpretability: acceptable. Image date: 3/4/21.  -Pink poorly defined patch in the area of surgical incision  - No other lesions of concern on areas examined.     Medications:  Current Outpatient Medications   Medication     acetaminophen (TYLENOL) 325 MG tablet     amphetamine-dextroamphetamine (ADDERALL) 15 MG tablet     anastrozole (ARIMIDEX) 1 MG tablet     B Complex Vitamins (VITAMIN B-COMPLEX PO)     calcium  carbonate (TUMS) 500 MG chewable tablet     Calcium-Magnesium-Zinc 333-133-5 MG TABS per tablet     Cholecalciferol (VITAMIN D-3 PO)     cyclobenzaprine (FLEXERIL) 5 MG tablet     denosumab (PROLIA) 60 MG/ML SOSY injection     KRILL OIL PO     magnesium oxide 200 MG TABS     Multiple Vitamins-Minerals (MULTIVITAMIN PO)     pantoprazole (PROTONIX) 40 MG EC tablet     vitamin B-12 (CYANOCOBALAMIN) 100 MCG tablet     ALPRAZolam (XANAX) 0.5 MG tablet     famotidine (PEPCID) 20 MG tablet     mupirocin (BACTROBAN) 2 % external ointment     Current Facility-Administered Medications   Medication     cross-Linked Hyaluronate (GEL-ONE) injection PRSY 30 mg     cross-Linked Hyaluronate (GEL-ONE) injection PRSY 30 mg     cross-Linked Hyaluronate (GEL-ONE) injection PRSY 30 mg     cross-Linked Hyaluronate (GEL-ONE) injection PRSY 30 mg     triamcinolone (KENALOG-40) injection 80 mg      Past Medical/Surgical History:   Patient Active Problem List   Diagnosis     Moderate recurrent major depression (H)     Hyperlipidemia LDL goal <160     Chronic fatigue disorder     Liver lesion     Cystic disease of liver     Gastroesophageal reflux disease with esophagitis     Hiatal hernia     Age-related cataract of both eyes, unspecified age-related cataract type     Primary osteoarthritis of both knees     Attention deficit hyperactivity disorder (ADHD), predominantly inattentive type     Malignant neoplasm of upper-inner quadrant of right breast in female, estrogen receptor positive (H)     Thyroid nodule     Anxiety disorder, unspecified type     Osteoporosis     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     Aromatase inhibitor use     Cervical cancer screening     History of cholecystectomy     Past Medical History:   Diagnosis Date     ADHD      Breast cancer (H) 12/26/2018    Right Breast     Chronic fatigue      Fibromyalgia      Hard of hearing      Osteoporosis      S/P radiation therapy     4,256 cGy to right breast completed on 03/11/2019 [facility]  [facility] with Dr. Copeland       Teledermatology Nurse Call Patients:     Are you  in the [facility] at the time of the encounter? Yes    Today's visit will be billed to you and your insurance.    A teledermatology visit is not as thorough as an in-person visit and the quality of the photograph sent may not be of the same quality as that taken by the dermatology clinic.    Crystal Doe LPN

## 2021-03-04 NOTE — LETTER
3/4/2021         RE: Marixa Ann  [address] [address] [facility] [address]        Dear Colleague,    Thank you for referring your patient, Marixa Ann, to the [facility]. Please see a copy of my visit note below.    [facility] Dermatology Note  Encounter Date: Mar 4, 2021  Store-and-Forward and Telephone ([phone]). Location of teledermatologist: [facility].  Start time: 1423. End time: 1435.    Dermatology Problem List:  1. BCC, right nasal sidewall, s/p Mohs and linear repair; 2/24/21    ____________________________________________    Assessment & Plan:     # BCC, right nasal sidewall  S/p Mohs surgery and linear repair.   Wound healing appropriately.   Start scar massage after 1 month.   Ok to treat as normal skin after incision is healed over with new pink skin.     Procedures Performed:    None    Staff:     Dale Saini DO    Department of Dermatology  [facility]: Phone: [phone], Fax:[phone]  [facility]: Phone: [phone], Fax: [phone]    ____________________________________________    CC: RECHECK (1 week s/p MOHS)    HPI:  Ms. Marixa Ann is a(n) 70 year old female who presents today for follow-up  for Mohs surgery and linear repair right nasal sidewall.     Patient reports the wound is looking better daily.  There is minimal pain and discomfort.  There is less weeping appearing on the bandages.     Patient is otherwise feeling well, without additional skin concerns.    Labs Reviewed:  N/A    Physical Exam:  Vitals: LMP 05/31/2003 (Approximate)   SKIN: Teledermatology photos were reviewed; image quality and interpretability: acceptable. Image date: 3/4/21.  -Pink poorly defined patch in the area of surgical incision  - No other lesions of concern on areas examined.      Medications:  Current Outpatient Medications   Medication     acetaminophen (TYLENOL) 325 MG tablet     amphetamine-dextroamphetamine (ADDERALL) 15 MG tablet     anastrozole (ARIMIDEX) 1 MG tablet     B Complex Vitamins (VITAMIN B-COMPLEX PO)     calcium carbonate (TUMS) 500 MG chewable tablet     Calcium-Magnesium-Zinc 333-133-5 MG TABS per tablet     Cholecalciferol (VITAMIN D-3 PO)     cyclobenzaprine (FLEXERIL) 5 MG tablet     denosumab (PROLIA) 60 MG/ML SOSY injection     KRILL OIL PO     magnesium oxide 200 MG TABS     Multiple Vitamins-Minerals (MULTIVITAMIN PO)     pantoprazole (PROTONIX) 40 MG EC tablet     vitamin B-12 (CYANOCOBALAMIN) 100 MCG tablet     ALPRAZolam (XANAX) 0.5 MG tablet     famotidine (PEPCID) 20 MG tablet     mupirocin (BACTROBAN) 2 % external ointment     Current Facility-Administered Medications   Medication     cross-Linked Hyaluronate (GEL-ONE) injection PRSY 30 mg     cross-Linked Hyaluronate (GEL-ONE) injection PRSY 30 mg     cross-Linked Hyaluronate (GEL-ONE) injection PRSY 30 mg     cross-Linked Hyaluronate (GEL-ONE) injection PRSY 30 mg     triamcinolone (KENALOG-40) injection 80 mg      Past Medical/Surgical History:   Patient Active Problem List   Diagnosis     Moderate recurrent major depression (H)     Hyperlipidemia LDL goal <160     Chronic fatigue disorder     Liver lesion     Cystic disease of liver     Gastroesophageal reflux disease with esophagitis     Hiatal hernia     Age-related cataract of both eyes, unspecified age-related cataract type     Primary osteoarthritis of both knees     Attention deficit hyperactivity disorder (ADHD), predominantly inattentive type     Malignant neoplasm of upper-inner quadrant of right breast in female, estrogen receptor positive (H)     Thyroid nodule     Anxiety disorder, unspecified type     Osteoporosis     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     Aromatase inhibitor use     Cervical cancer screening     History of cholecystectomy     Past  Medical History:   Diagnosis Date     ADHD      Breast cancer (H) 12/26/2018    Right Breast     Chronic fatigue      Fibromyalgia      Hard of hearing      Osteoporosis      S/P radiation therapy     4,256 cGy to right breast completed on 03/11/2019 - [facility] with Dr. Copeland       Teledermatology Nurse Call Patients:     Are you  in the [facility] at the time of the encounter? Yes    Today's visit will be billed to you and your insurance.    A teledermatology visit is not as thorough as an in-person visit and the quality of the photograph sent may not be of the same quality as that taken by the dermatology clinic.    Crystal Doe LPN                                                                                                                                                                                                                                        Again, thank you for allowing me to participate in the care of your patient.        Sincerely,        Dale Saini MD

## 2021-03-10 ENCOUNTER — VIRTUAL VISIT (OUTPATIENT)
Dept: DERMATOLOGY | Facility: CLINIC | Age: 71
End: 2021-03-10
Payer: COMMERCIAL

## 2021-03-10 DIAGNOSIS — Z85.828 HISTORY OF BASAL CELL CARCINOMA OF SKIN: Primary | ICD-10-CM

## 2021-03-10 PROCEDURE — 99024 POSTOP FOLLOW-UP VISIT: CPT | Mod: 95 | Performed by: DERMATOLOGY

## 2021-03-10 NOTE — LETTER
3/10/2021         RE: Marixa Ann  [address] [facility] [address]        Dear Colleague,    Thank you for referring your patient, Marixa Ann, to the [facility]. Please see a copy of my visit note below.    [facility] Dermatology Note  Encounter Date: Mar 10, 2021  Store-and-Forward and Telephone ([phone]). Location of teledermatologist: [facility].  Start time: 1346. End time: 1357.    Dermatology Problem List:  1.BCC, right nasal sidewall. Sp Mohs and linear repair Schedule Mohs 2/24/21.    ____________________________________________    Assessment & Plan:     # BCC, right nasal sidewall. S/p Mohs and linear repair.   Healing appropriately.   Ok to discontinue dressing and petroleum jelly.   Continue sun avoidance and sun protection.   Offered scar eval face to face in 3 months, but ultimately decided to return in 6 months for skin cancer screening.     Procedures Performed:    None      Staff:     Dale Saini DO    Department of Dermatology  [facility]: Phone: [phone], Fax:[phone]  [facility]: Phone: [phone], Fax: [phone]    ____________________________________________    CC: Wound Check    HPI:  Ms. Marixa Ann is a(n) 70 year old female who presents today for follow-up  for wound check following Mohs and linear repair for BCC on R nasal sidewall.     Small dark spot on nose above scar. She isn't sure if it is a Black head vs suture material.  She denies pain and weeping.   Stopped using band aid due to irritation.   Eye itch and burning seemed to resolve after that.       Patient is otherwise feeling well, without additional skin concerns.    Labs Reviewed:  N/A    Physical Exam:  Vitals: LMP 05/31/2003 (Approximate)   SKIN: Teledermatology photos were  reviewed; image quality and interpretability: acceptable. Image date: 3/9/21.  -Right nasal sidewall linear surgical scar, less red than prior photos.  Skin edges intact no hemorrhagic crust or ulceration  - No other lesions of concern on areas examined.     Medications:  Current Outpatient Medications   Medication     acetaminophen (TYLENOL) 325 MG tablet     ALPRAZolam (XANAX) 0.5 MG tablet     amphetamine-dextroamphetamine (ADDERALL) 15 MG tablet     anastrozole (ARIMIDEX) 1 MG tablet     B Complex Vitamins (VITAMIN B-COMPLEX PO)     calcium carbonate (TUMS) 500 MG chewable tablet     Calcium-Magnesium-Zinc 333-133-5 MG TABS per tablet     Cholecalciferol (VITAMIN D-3 PO)     cyclobenzaprine (FLEXERIL) 5 MG tablet     denosumab (PROLIA) 60 MG/ML SOSY injection     KRILL OIL PO     magnesium oxide 200 MG TABS     Multiple Vitamins-Minerals (MULTIVITAMIN PO)     mupirocin (BACTROBAN) 2 % external ointment     pantoprazole (PROTONIX) 40 MG EC tablet     vitamin B-12 (CYANOCOBALAMIN) 100 MCG tablet     famotidine (PEPCID) 20 MG tablet     Current Facility-Administered Medications   Medication     cross-Linked Hyaluronate (GEL-ONE) injection PRSY 30 mg     cross-Linked Hyaluronate (GEL-ONE) injection PRSY 30 mg     cross-Linked Hyaluronate (GEL-ONE) injection PRSY 30 mg     cross-Linked Hyaluronate (GEL-ONE) injection PRSY 30 mg     triamcinolone (KENALOG-40) injection 80 mg      Past Medical/Surgical History:   Patient Active Problem List   Diagnosis     Moderate recurrent major depression (H)     Hyperlipidemia LDL goal <160     Chronic fatigue disorder     Liver lesion     Cystic disease of liver     Gastroesophageal reflux disease with esophagitis     Hiatal hernia     Age-related cataract of both eyes, unspecified age-related cataract type     Primary osteoarthritis of both knees     Attention deficit hyperactivity disorder (ADHD), predominantly inattentive type     Malignant neoplasm of upper-inner quadrant of  right breast in female, estrogen receptor positive (H)     Thyroid nodule     Anxiety disorder, unspecified type     Osteoporosis     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     Aromatase inhibitor use     Cervical cancer screening     History of cholecystectomy     Past Medical History:   Diagnosis Date     ADHD      Breast cancer (H) 12/26/2018    Right Breast     Chronic fatigue      Fibromyalgia      Hard of hearing      Osteoporosis      S/P radiation therapy     4,256 cGy to right breast completed on 03/11/2019 - [facility] with Dr. Copeland           Again, thank you for allowing me to participate in the care of your patient.        Sincerely,        Dale Saini MD

## 2021-03-10 NOTE — NURSING NOTE
Teledermatology Nurse Call Patients:     Are you  in the [facility] at the time of the encounter? yes    Today's visit will be billed to you and your insurance.    A teledermatology visit is not as thorough as an in-person visit and the quality of the photograph sent may not be of the same quality as that taken by the dermatology clinic.           Mirna Higgins LPN

## 2021-03-10 NOTE — PROGRESS NOTES
[facility] Dermatology Note  Encounter Date: Mar 10, 2021  Store-and-Forward and Telephone ([phone]). Location of teledermatologist: [facility].  Start time: 1346. End time: 1357.    Dermatology Problem List:  1.BCC, right nasal sidewall. Sp Mohs and linear repair Schedule Mohs 2/24/21.    ____________________________________________    Assessment & Plan:     # BCC, right nasal sidewall. S/p Mohs and linear repair.   Healing appropriately.   Ok to discontinue dressing and petroleum jelly.   Continue sun avoidance and sun protection.   Offered scar eval face to face in 3 months, but ultimately decided to return in 6 months for skin cancer screening.     Procedures Performed:    None      Staff:     Dale Saini DO    Department of Dermatology  [facility]: Phone: [phone], Fax:[phone]  [facility]: Phone: [phone], Fax: [phone]    ____________________________________________    CC: Wound Check    HPI:  Ms. Marixa Ann is a(n) 70 year old female who presents today for follow-up  for wound check following Mohs and linear repair for BCC on R nasal sidewall.     Small dark spot on nose above scar. She isn't sure if it is a Black head vs suture material.  She denies pain and weeping.   Stopped using band aid due to irritation.   Eye itch and burning seemed to resolve after that.       Patient is otherwise feeling well, without additional skin concerns.    Labs Reviewed:  N/A    Physical Exam:  Vitals: LMP 05/31/2003 (Approximate)   SKIN: Teledermatology photos were reviewed; image quality and interpretability: acceptable. Image date: 3/9/21.  -Right nasal sidewall linear surgical scar, less red than prior photos.  Skin edges intact no hemorrhagic crust or ulceration  - No other lesions of concern on areas examined.      Medications:  Current Outpatient Medications   Medication     acetaminophen (TYLENOL) 325 MG tablet     ALPRAZolam (XANAX) 0.5 MG tablet     amphetamine-dextroamphetamine (ADDERALL) 15 MG tablet     anastrozole (ARIMIDEX) 1 MG tablet     B Complex Vitamins (VITAMIN B-COMPLEX PO)     calcium carbonate (TUMS) 500 MG chewable tablet     Calcium-Magnesium-Zinc 333-133-5 MG TABS per tablet     Cholecalciferol (VITAMIN D-3 PO)     cyclobenzaprine (FLEXERIL) 5 MG tablet     denosumab (PROLIA) 60 MG/ML SOSY injection     KRILL OIL PO     magnesium oxide 200 MG TABS     Multiple Vitamins-Minerals (MULTIVITAMIN PO)     mupirocin (BACTROBAN) 2 % external ointment     pantoprazole (PROTONIX) 40 MG EC tablet     vitamin B-12 (CYANOCOBALAMIN) 100 MCG tablet     famotidine (PEPCID) 20 MG tablet     Current Facility-Administered Medications   Medication     cross-Linked Hyaluronate (GEL-ONE) injection PRSY 30 mg     cross-Linked Hyaluronate (GEL-ONE) injection PRSY 30 mg     cross-Linked Hyaluronate (GEL-ONE) injection PRSY 30 mg     cross-Linked Hyaluronate (GEL-ONE) injection PRSY 30 mg     triamcinolone (KENALOG-40) injection 80 mg      Past Medical/Surgical History:   Patient Active Problem List   Diagnosis     Moderate recurrent major depression (H)     Hyperlipidemia LDL goal <160     Chronic fatigue disorder     Liver lesion     Cystic disease of liver     Gastroesophageal reflux disease with esophagitis     Hiatal hernia     Age-related cataract of both eyes, unspecified age-related cataract type     Primary osteoarthritis of both knees     Attention deficit hyperactivity disorder (ADHD), predominantly inattentive type     Malignant neoplasm of upper-inner quadrant of right breast in female, estrogen receptor positive (H)     Thyroid nodule     Anxiety disorder, unspecified type     Osteoporosis     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     Aromatase inhibitor use     Cervical cancer screening     History of cholecystectomy     Past  Medical History:   Diagnosis Date     ADHD      Breast cancer (H) 12/26/2018    Right Breast     Chronic fatigue      Fibromyalgia      Hard of hearing      Osteoporosis      S/P radiation therapy     4,256 cGy to right breast completed on 03/11/2019 - [facility] with Dr. Copeland

## 2021-03-10 NOTE — PATIENT INSTRUCTIONS
[facility] Dermatology Visit    Thank you for allowing us to participate in your care. Your findings, instructions and follow-up plan are as follows:         When should I call my doctor?    If you are worsening or not improving, please, contact us or seek urgent care as noted below.     Who should I call with questions (adults)?    [facility] (adult and pediatric): [phone]     [facility] (adult): [phone]    For urgent needs outside of business hours call the [facility] at [phone] and ask for the dermatology resident on call    If this is a medical emergency and you are unable to reach an ER, Call 911      Who should I call with questions (pediatric)?  [facility]- Pediatric Dermatology  Dr. Joan Frias, Dr. Martin Gilbert, Dr. Krupa Lobo, Julia Yoon, PA  Dr. Amber Sharma, Dr. Yun Jc & Dr. Fam Engel  Non Urgent  Nurse Triage Line; [phone]- Eryn and Blanca RN Care Coordinators   Kim (/Complex ) [phone]    If you need a prescription refill, please contact your pharmacy. Refills are approved or denied by our Physicians during normal business hours, Monday through Fridays  Per office policy, refills will not be granted if you have not been seen within the past year (or sooner depending on your child's condition)    Scheduling Information:  Pediatric Appointment Scheduling and Call Center [phone]  Radiology Scheduling- [phone]  Sedation Unit Scheduling- [phone]  [place] Scheduling- General [phone]; Pediatric Dermatology [phone]  Main  Services: [phone]  Icelandic: [phone]  Cymro: [phone]  Hmong/Arabic/Persian: [phone]  Preadmission Nursing Department Fax Number: [phone] (Fax all pre-operative paperwork to this number)    For urgent matters arising during evenings,  weekends, or holidays that cannot wait for normal business hours please call [phone] and ask for the Dermatology Resident On-Call to be paged.

## 2021-03-11 ENCOUNTER — IMMUNIZATION (OUTPATIENT)
Dept: PEDIATRICS | Facility: CLINIC | Age: 71
End: 2021-03-11
Payer: COMMERCIAL

## 2021-03-11 PROCEDURE — 0001A PR COVID VAC PFIZER DIL RECON 30 MCG/0.3 ML IM: CPT

## 2021-03-11 PROCEDURE — 91300 PR COVID VAC PFIZER DIL RECON 30 MCG/0.3 ML IM: CPT

## 2021-03-22 NOTE — PROGRESS NOTES
[facility] Dermatology Note    Dermatology Problem List:  1.BCC, right nasal sidewall. Schedule Mohs.     Encounter Date: Feb 22, 2021    CC:  Chief Complaint   Patient presents with     Derm Problem     Priscilla is here today for a consult for right nasal sidewall BCC       History of Present Illness:  Ms. Marixa Ann is a 70 year old female who presents today for a Mohs consultation regarding a BCC on the right nasal sidewall.     She is very nervous about the procedure. She is worried about the appearance of the resulting scar and her long term health risk.   She has been doing wound care as instructed after the biopsy.     The patient is otherwise feeling well. There are no other skin concerns at this time.      Past Medical History:   Patient Active Problem List   Diagnosis     Moderate recurrent major depression (H)     Hyperlipidemia LDL goal <160     Chronic fatigue disorder     Liver lesion     Cystic disease of liver     Gastroesophageal reflux disease with esophagitis     Hiatal hernia     Age-related cataract of both eyes, unspecified age-related cataract type     Primary osteoarthritis of both knees     Attention deficit hyperactivity disorder (ADHD), predominantly inattentive type     Malignant neoplasm of upper-inner quadrant of right breast in female, estrogen receptor positive (H)     Thyroid nodule     Anxiety disorder, unspecified type     Osteoporosis     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     Aromatase inhibitor use     Cervical cancer screening     History of cholecystectomy     Past Medical History:   Diagnosis Date     ADHD      Breast cancer (H) 12/26/2018    Right Breast     Chronic fatigue      Fibromyalgia      Hard of hearing      Osteoporosis      S/P radiation therapy     4,256 cGy to right breast completed on 03/11/2019 - [facility] with Dr. Copeland     Past Surgical History:   Procedure Laterality Date     APPENDECTOMY  1967     BIOPSY BREAST NEEDLE LOCALIZATION  Right 1/10/2019    Procedure: WIRE LOCALIZED RIGHT BREAST LUMPECTOMY WITH RIGHT SLNB;  Surgeon: Gema Diamond MD;  Location: [facility] OR     BREAST BIOPSY, CORE RT/LT Right 12/26/2018     CATARACT IOL, RT/LT Bilateral 2018    with lens implants per patient     CHOLECYSTECTOMY  05/24/2019     THYROID BIOPSY  01/31/2019       Social History:  Social History     Socioeconomic History     Marital status:      Spouse name: None     Number of children: None     Years of education: None     Highest education level: None   Occupational History     None   Social Needs     Financial resource strain: None     Food insecurity     Worry: None     Inability: None     Transportation needs     Medical: None     Non-medical: None   Tobacco Use     Smoking status: Never Smoker     Smokeless tobacco: Never Used   Substance and Sexual Activity     Alcohol use: No     Frequency: Never     Drug use: No     Sexual activity: Not Currently     Partners: Male     Birth control/protection: None   Lifestyle     Physical activity     Days per week: None     Minutes per session: None     Stress: None   Relationships     Social connections     Talks on phone: None     Gets together: None     Attends Jain service: None     Active member of club or organization: None     Attends meetings of clubs or organizations: None     Relationship status: None     Intimate partner violence     Fear of current or ex partner: None     Emotionally abused: None     Physically abused: None     Forced sexual activity: None   Other Topics Concern     Parent/sibling w/ CABG, MI or angioplasty before 65F 55M? No   Social History Narrative     None       Family History:  Family History   Problem Relation Age of Onset     Leukemia Father      Breast Cancer Cousin 50        Maternal Female 1st Cousin     Macular Degeneration Mother         Medications:  Current Outpatient Medications   Medication Sig Dispense Refill     acetaminophen (TYLENOL) 325 MG tablet Take  325-650 mg by mouth every 6 hours as needed for mild pain       ALPRAZolam (XANAX) 0.5 MG tablet Take one tablet by mouth the night before surgery for sleep, you may take one tablet by mouth about 30 minutes before your surgery, bring the remaining tablets to clinic for dispensing according to clinic protocol. 5 tablet 0     amphetamine-dextroamphetamine (ADDERALL) 15 MG tablet Take 1 tablet (15 mg) by mouth 2 times daily 60 tablet 0     anastrozole (ARIMIDEX) 1 MG tablet Take 1 tablet (1 mg) by mouth daily 90 tablet 0     B Complex Vitamins (VITAMIN B-COMPLEX PO) Take by mouth daily       calcium carbonate (TUMS) 500 MG chewable tablet Take 1 chew tab by mouth 2 times daily       Calcium-Magnesium-Zinc 333-133-5 MG TABS per tablet Take 1 tablet by mouth daily       Cholecalciferol (VITAMIN D-3 PO) Vitamin D is included with multiple vitamin, patient does not take additional. Marjorie Crooks LPN on 7/26/2019 at 3:02 PM       cyclobenzaprine (FLEXERIL) 5 MG tablet Take 1 tablet (5 mg) by mouth daily as needed for muscle spasms 20 tablet 1     denosumab (PROLIA) 60 MG/ML SOSY injection        KRILL OIL PO Take by mouth daily       magnesium oxide 200 MG TABS Take 100 mg by mouth once as needed       Multiple Vitamins-Minerals (MULTIVITAMIN PO)        pantoprazole (PROTONIX) 40 MG EC tablet Take 1 tablet (40 mg) by mouth daily 30 tablet 1     vitamin B-12 (CYANOCOBALAMIN) 100 MCG tablet Take 1,000 mcg by mouth daily       famotidine (PEPCID) 20 MG tablet Take 1 tablet (20 mg) by mouth 2 times daily 180 tablet 1     mupirocin (BACTROBAN) 2 % external ointment Use 2 times a day to affected area. 22 g 0       Allergies   Allergen Reactions     Vicodin [Hydrocodone-Acetaminophen] Anaphylaxis     Dilaudid [Hydromorphone] Nausea and Vomiting     Oxycodone Nausea and Vomiting     Dust Mites      Milk [Lac Bovis] Diarrhea       Review of Systems:  -Skin Establ Pt: The patient denies any new rash, pruritus, or lesions that are  symptomatic, changing or bleeding, except as per HPI.  -Constitutional: The patient is feeling generally well.    Physical exam:  Vitals: LMP 05/31/2003 (Approximate)   GEN: This is a well developed, well-nourished female in no acute distress, in a pleasant mood.    SKIN: Focused examination of the nose was performed.  - right nasal sidewall, ~6mm erosion with hemorrhagic crust  - No other lesions of concern on areas examined.       Impression/Plan:  1. BCC, right nasal sidewall    Reviewed pathology report and nature of diagnosis.     Risks, benefits, and process of Mohs micrographic surgery were discussed including possibility of damage to surrounding anatomical structures and infection. Patient is comfortable proceeding with the surgery; consent form was obtained.     No indication for pre-op antibiotics.    Pre-op written instructions provided.     Xanax prescription provided for anxiety.     Consent form signed today.     Follow-up as scheduled for MMS.     Staff Involved:  Dale Saini DO    Department of Dermatology  [facility]: Phone: [phone], Fax:[phone]  [facility]: Phone: [phone], Fax: [phone]

## 2021-03-31 ENCOUNTER — MYC REFILL (OUTPATIENT)
Dept: FAMILY MEDICINE | Facility: CLINIC | Age: 71
End: 2021-03-31

## 2021-03-31 DIAGNOSIS — F90.0 ATTENTION DEFICIT HYPERACTIVITY DISORDER (ADHD), PREDOMINANTLY INATTENTIVE TYPE: ICD-10-CM

## 2021-04-01 ENCOUNTER — IMMUNIZATION (OUTPATIENT)
Dept: PEDIATRICS | Facility: CLINIC | Age: 71
End: 2021-04-01
Attending: INTERNAL MEDICINE
Payer: COMMERCIAL

## 2021-04-01 PROCEDURE — 0002A PR COVID VAC PFIZER DIL RECON 30 MCG/0.3 ML IM: CPT

## 2021-04-01 PROCEDURE — 91300 PR COVID VAC PFIZER DIL RECON 30 MCG/0.3 ML IM: CPT

## 2021-04-01 RX ORDER — DEXTROAMPHETAMINE SACCHARATE, AMPHETAMINE ASPARTATE, DEXTROAMPHETAMINE SULFATE AND AMPHETAMINE SULFATE 3.75; 3.75; 3.75; 3.75 MG/1; MG/1; MG/1; MG/1
15 TABLET ORAL 2 TIMES DAILY
Qty: 60 TABLET | Refills: 0 | Status: SHIPPED | OUTPATIENT
Start: 2021-04-01 | End: 2021-04-27

## 2021-04-27 ENCOUNTER — MYC REFILL (OUTPATIENT)
Dept: FAMILY MEDICINE | Facility: CLINIC | Age: 71
End: 2021-04-27

## 2021-04-27 ENCOUNTER — MYC REFILL (OUTPATIENT)
Dept: ONCOLOGY | Facility: CLINIC | Age: 71
End: 2021-04-27

## 2021-04-27 DIAGNOSIS — F90.0 ATTENTION DEFICIT HYPERACTIVITY DISORDER (ADHD), PREDOMINANTLY INATTENTIVE TYPE: ICD-10-CM

## 2021-04-27 DIAGNOSIS — Z17.0 MALIGNANT NEOPLASM OF UPPER-INNER QUADRANT OF RIGHT BREAST IN FEMALE, ESTROGEN RECEPTOR POSITIVE (H): ICD-10-CM

## 2021-04-27 DIAGNOSIS — C50.211 MALIGNANT NEOPLASM OF UPPER-INNER QUADRANT OF RIGHT BREAST IN FEMALE, ESTROGEN RECEPTOR POSITIVE (H): ICD-10-CM

## 2021-04-27 RX ORDER — ANASTROZOLE 1 MG/1
1 TABLET ORAL DAILY
Qty: 90 TABLET | Refills: 0 | Status: SHIPPED | OUTPATIENT
Start: 2021-04-27 | End: 2021-08-03

## 2021-04-28 RX ORDER — DEXTROAMPHETAMINE SACCHARATE, AMPHETAMINE ASPARTATE, DEXTROAMPHETAMINE SULFATE AND AMPHETAMINE SULFATE 3.75; 3.75; 3.75; 3.75 MG/1; MG/1; MG/1; MG/1
15 TABLET ORAL 2 TIMES DAILY
Qty: 60 TABLET | Refills: 0 | Status: SHIPPED | OUTPATIENT
Start: 2021-04-28 | End: 2021-06-04

## 2021-04-28 RX ORDER — DEXTROAMPHETAMINE SACCHARATE, AMPHETAMINE ASPARTATE, DEXTROAMPHETAMINE SULFATE AND AMPHETAMINE SULFATE 3.75; 3.75; 3.75; 3.75 MG/1; MG/1; MG/1; MG/1
15 TABLET ORAL 2 TIMES DAILY
Qty: 60 TABLET | Refills: 0 | Status: SHIPPED | OUTPATIENT
Start: 2021-04-28 | End: 2021-06-03

## 2021-05-09 ENCOUNTER — HEALTH MAINTENANCE LETTER (OUTPATIENT)
Age: 71
End: 2021-05-09

## 2021-06-03 ENCOUNTER — MYC REFILL (OUTPATIENT)
Dept: FAMILY MEDICINE | Facility: CLINIC | Age: 71
End: 2021-06-03

## 2021-06-03 DIAGNOSIS — F90.0 ATTENTION DEFICIT HYPERACTIVITY DISORDER (ADHD), PREDOMINANTLY INATTENTIVE TYPE: ICD-10-CM

## 2021-06-04 RX ORDER — DEXTROAMPHETAMINE SACCHARATE, AMPHETAMINE ASPARTATE, DEXTROAMPHETAMINE SULFATE AND AMPHETAMINE SULFATE 3.75; 3.75; 3.75; 3.75 MG/1; MG/1; MG/1; MG/1
15 TABLET ORAL 2 TIMES DAILY
Qty: 60 TABLET | Refills: 0 | Status: SHIPPED | OUTPATIENT
Start: 2021-06-04 | End: 2021-08-03

## 2021-06-04 NOTE — TELEPHONE ENCOUNTER
Routing refill request to provider for review/approval because:  Drug not on the [facility] refill protocol   Althea Garcia BSN, RN

## 2021-06-11 NOTE — PATIENT INSTRUCTIONS
Letter signed and ready for . Patient aware and verbalized understanding. Letter at check in desk for . Please contact [facility] RN with questions or concerns following today's appointment: [phone].    Thank you!

## 2021-08-03 ENCOUNTER — MYC REFILL (OUTPATIENT)
Dept: FAMILY MEDICINE | Facility: CLINIC | Age: 71
End: 2021-08-03

## 2021-08-03 DIAGNOSIS — Z17.0 MALIGNANT NEOPLASM OF UPPER-INNER QUADRANT OF RIGHT BREAST IN FEMALE, ESTROGEN RECEPTOR POSITIVE (H): ICD-10-CM

## 2021-08-03 DIAGNOSIS — F90.0 ATTENTION DEFICIT HYPERACTIVITY DISORDER (ADHD), PREDOMINANTLY INATTENTIVE TYPE: ICD-10-CM

## 2021-08-03 DIAGNOSIS — C50.211 MALIGNANT NEOPLASM OF UPPER-INNER QUADRANT OF RIGHT BREAST IN FEMALE, ESTROGEN RECEPTOR POSITIVE (H): ICD-10-CM

## 2021-08-03 RX ORDER — DEXTROAMPHETAMINE SACCHARATE, AMPHETAMINE ASPARTATE, DEXTROAMPHETAMINE SULFATE AND AMPHETAMINE SULFATE 3.75; 3.75; 3.75; 3.75 MG/1; MG/1; MG/1; MG/1
15 TABLET ORAL 2 TIMES DAILY
Qty: 60 TABLET | Refills: 0 | Status: SHIPPED | OUTPATIENT
Start: 2021-08-03 | End: 2021-09-03

## 2021-08-03 RX ORDER — ANASTROZOLE 1 MG/1
1 TABLET ORAL DAILY
Qty: 90 TABLET | Refills: 1 | Status: SHIPPED | OUTPATIENT
Start: 2021-08-03 | End: 2022-01-29

## 2021-08-03 NOTE — TELEPHONE ENCOUNTER
Prescription refilled once, schedule follow up office visit. Due for annual physical and follow up. Needs to sign a Controlled Substance Agreement this next visit. Jackie Kovacs MD

## 2021-08-03 NOTE — TELEPHONE ENCOUNTER
Routing refill request to provider for review/approval because:  Drug not on the [facility] refill protocol.       Toma Guzmán RN  [facility]

## 2021-08-04 DIAGNOSIS — C50.211 MALIGNANT NEOPLASM OF UPPER-INNER QUADRANT OF RIGHT BREAST IN FEMALE, ESTROGEN RECEPTOR POSITIVE (H): Primary | ICD-10-CM

## 2021-08-04 DIAGNOSIS — Z17.0 MALIGNANT NEOPLASM OF UPPER-INNER QUADRANT OF RIGHT BREAST IN FEMALE, ESTROGEN RECEPTOR POSITIVE (H): Primary | ICD-10-CM

## 2021-08-09 ENCOUNTER — INFUSION THERAPY VISIT (OUTPATIENT)
Dept: INFUSION THERAPY | Facility: CLINIC | Age: 71
End: 2021-08-09
Payer: COMMERCIAL

## 2021-08-09 ENCOUNTER — LAB (OUTPATIENT)
Dept: LAB | Facility: CLINIC | Age: 71
End: 2021-08-09
Payer: COMMERCIAL

## 2021-08-09 ENCOUNTER — ONCOLOGY VISIT (OUTPATIENT)
Dept: ONCOLOGY | Facility: CLINIC | Age: 71
End: 2021-08-09
Attending: NURSE PRACTITIONER
Payer: COMMERCIAL

## 2021-08-09 VITALS — OXYGEN SATURATION: 98 % | SYSTOLIC BLOOD PRESSURE: 118 MMHG | DIASTOLIC BLOOD PRESSURE: 64 MMHG | HEART RATE: 98 BPM

## 2021-08-09 VITALS — SYSTOLIC BLOOD PRESSURE: 118 MMHG | OXYGEN SATURATION: 98 % | DIASTOLIC BLOOD PRESSURE: 64 MMHG | HEART RATE: 98 BPM

## 2021-08-09 DIAGNOSIS — C50.211 MALIGNANT NEOPLASM OF UPPER-INNER QUADRANT OF RIGHT BREAST IN FEMALE, ESTROGEN RECEPTOR POSITIVE (H): Primary | ICD-10-CM

## 2021-08-09 DIAGNOSIS — Z12.31 ENCOUNTER FOR SCREENING MAMMOGRAM FOR BREAST CANCER: ICD-10-CM

## 2021-08-09 DIAGNOSIS — M81.8 OTHER OSTEOPOROSIS WITHOUT CURRENT PATHOLOGICAL FRACTURE: ICD-10-CM

## 2021-08-09 DIAGNOSIS — Z79.811 AROMATASE INHIBITOR USE: ICD-10-CM

## 2021-08-09 DIAGNOSIS — C50.211 MALIGNANT NEOPLASM OF UPPER-INNER QUADRANT OF RIGHT BREAST IN FEMALE, ESTROGEN RECEPTOR POSITIVE (H): ICD-10-CM

## 2021-08-09 DIAGNOSIS — Z17.0 MALIGNANT NEOPLASM OF UPPER-INNER QUADRANT OF RIGHT BREAST IN FEMALE, ESTROGEN RECEPTOR POSITIVE (H): Primary | ICD-10-CM

## 2021-08-09 DIAGNOSIS — T45.1X5A HOT FLASHES RELATED TO AROMATASE INHIBITOR THERAPY: ICD-10-CM

## 2021-08-09 DIAGNOSIS — K21.00 GASTROESOPHAGEAL REFLUX DISEASE WITH ESOPHAGITIS WITHOUT HEMORRHAGE: ICD-10-CM

## 2021-08-09 DIAGNOSIS — R23.2 HOT FLASHES RELATED TO AROMATASE INHIBITOR THERAPY: ICD-10-CM

## 2021-08-09 DIAGNOSIS — F41.9 ANXIETY: ICD-10-CM

## 2021-08-09 DIAGNOSIS — Z17.0 MALIGNANT NEOPLASM OF UPPER-INNER QUADRANT OF RIGHT BREAST IN FEMALE, ESTROGEN RECEPTOR POSITIVE (H): ICD-10-CM

## 2021-08-09 DIAGNOSIS — Z79.811 AROMATASE INHIBITOR USE: Primary | ICD-10-CM

## 2021-08-09 DIAGNOSIS — R53.83 FATIGUE, UNSPECIFIED TYPE: ICD-10-CM

## 2021-08-09 LAB
ALBUMIN SERPL-MCNC: 3.3 G/DL (ref 3.4–5)
ALP SERPL-CCNC: 98 U/L (ref 40–150)
ALT SERPL W P-5'-P-CCNC: 22 U/L (ref 0–50)
ANION GAP SERPL CALCULATED.3IONS-SCNC: 3 MMOL/L (ref 3–14)
AST SERPL W P-5'-P-CCNC: 17 U/L (ref 0–45)
BILIRUB SERPL-MCNC: 0.3 MG/DL (ref 0.2–1.3)
BUN SERPL-MCNC: 14 MG/DL (ref 7–30)
CALCIUM SERPL-MCNC: 8.9 MG/DL (ref 8.5–10.1)
CHLORIDE BLD-SCNC: 107 MMOL/L (ref 94–109)
CO2 SERPL-SCNC: 30 MMOL/L (ref 20–32)
CREAT SERPL-MCNC: 0.79 MG/DL (ref 0.52–1.04)
GFR SERPL CREATININE-BSD FRML MDRD: 77 ML/MIN/1.73M2
GLUCOSE BLD-MCNC: 107 MG/DL (ref 70–99)
HOLD SPECIMEN: NORMAL
HOLD SPECIMEN: NORMAL
POTASSIUM BLD-SCNC: 3.9 MMOL/L (ref 3.4–5.3)
PROT SERPL-MCNC: 7.1 G/DL (ref 6.8–8.8)
SODIUM SERPL-SCNC: 140 MMOL/L (ref 133–144)

## 2021-08-09 PROCEDURE — 36415 COLL VENOUS BLD VENIPUNCTURE: CPT

## 2021-08-09 PROCEDURE — 99207 PR NO CHARGE LOS: CPT

## 2021-08-09 PROCEDURE — 80053 COMPREHEN METABOLIC PANEL: CPT

## 2021-08-09 PROCEDURE — 99214 OFFICE O/P EST MOD 30 MIN: CPT | Mod: 25 | Performed by: NURSE PRACTITIONER

## 2021-08-09 PROCEDURE — 96372 THER/PROPH/DIAG INJ SC/IM: CPT | Performed by: NURSE PRACTITIONER

## 2021-08-09 ASSESSMENT — PAIN SCALES - GENERAL: PAINLEVEL: NO PAIN (0)

## 2021-08-09 NOTE — PROGRESS NOTES
Oncology Follow Up Visit: August 9, 2021     Oncologist: Dr Sil Dan  PCP: Jackie Kovacs    Diagnosis: Stage IB Right Breast Cancer  Marixa Ann is a 71 yo female who had abnormality at 2-4 oclock level of right breaston screening mammogram in 12/2018. Final review proved a 12 x 9 x14 mm invasive ductal carcinoma at 2 oclock to the right breast, Haskins grade 1, ER/ND positive, Her2 negative with 0/3 SLN positive and edges free of disease.   Oncotype score =4 or 3% risk of disease at 9 years with hormone therapy.   Treatment:   1/10/2019 Right Lumpectomy with SLN biopsy  3/11/2019 completed right breast radiation post lumpectomy.   - choice made to not use Tamoxifen due to risk for DVT(repeatedly elevated d-dimer as well as elevated ESR and CRP) so started pt on Aromatase inhibitor with close bone evaluation    Interval History: Ms. Ann comes to clinic alone for follow up to her aromatase inhibitor use for her breast cancer.  Pt shares she has been quite anxious and now depressed - concerned about new skin cancer and now worried about marriage issues. She also notes some pain to the right lateral breast x 2 weeks without known trauma, redness warmth or new discharge or masses. SHe has not been getting exercise in garden due to skin cancer concern and refuses to go out in public in fear of pandemic issues. Has gained some weight over pandemic but admits she is not eating as healthy. Bowel and bladder are normal.   Rest of comprehensive and complete ROS is reviewed and is negative.   Past Medical History:   Diagnosis Date     ADHD      Breast cancer (H) 12/26/2018    Right Breast     Chronic fatigue      Fibromyalgia      Hard of hearing      Osteoporosis      S/P radiation therapy     4,256 cGy to right breast completed on 03/11/2019 - [facility] with Dr. Copeland     Current Outpatient Medications   Medication     acetaminophen (TYLENOL) 325 MG tablet     ALPRAZolam (XANAX) 0.5 MG  tablet     amphetamine-dextroamphetamine (ADDERALL) 15 MG tablet     anastrozole (ARIMIDEX) 1 MG tablet     B Complex Vitamins (VITAMIN B-COMPLEX PO)     calcium carbonate (TUMS) 500 MG chewable tablet     Calcium-Magnesium-Zinc 333-133-5 MG TABS per tablet     Cholecalciferol (VITAMIN D-3 PO)     cyclobenzaprine (FLEXERIL) 5 MG tablet     denosumab (PROLIA) 60 MG/ML SOSY injection     KRILL OIL PO     magnesium oxide 200 MG TABS     Multiple Vitamins-Minerals (MULTIVITAMIN PO)     mupirocin (BACTROBAN) 2 % external ointment     pantoprazole (PROTONIX) 40 MG EC tablet     vitamin B-12 (CYANOCOBALAMIN) 100 MCG tablet     Current Facility-Administered Medications   Medication     cross-Linked Hyaluronate (GEL-ONE) injection PRSY 30 mg     cross-Linked Hyaluronate (GEL-ONE) injection PRSY 30 mg     cross-Linked Hyaluronate (GEL-ONE) injection PRSY 30 mg     cross-Linked Hyaluronate (GEL-ONE) injection PRSY 30 mg     triamcinolone (KENALOG-40) injection 80 mg     Allergies   Allergen Reactions     Vicodin [Hydrocodone-Acetaminophen] Anaphylaxis     Dilaudid [Hydromorphone] Nausea and Vomiting     Oxycodone Nausea and Vomiting     Dust Mites      Milk [Lac Bovis] Diarrhea       Physical Exam:/64 (BP Location: Left arm, Patient Position: Chair, Cuff Size: Adult Regular)   Pulse 98   LMP 05/31/2003 (Approximate)   SpO2 98%   Constitutional: Alert, overweight, and appears anxious though talking openly about concerns.   ENT: Eyes bright, No mouth sores- noted to be increasingly [place] made more difficult with the masks.  Neck: Supple, No adenopathy.Thyroid symmetric  Cardiac: Heart rate and rhythm is regular and strong without murmur  Respiratory: Breathing easy. Lung sounds clear to auscultation  Breasts:tender to lateral right breast and lower band of right breast with palpation - no swelling redness or warmth to area. Left breast without abnormalities.   Abdomen: Soft, non-tender, BS normal. No masses or  organomegaly  MS: Muscle tone normal, extremities normal with no edema.   Skin: No suspicious lesions or rashes  Neuro: Sensory grossly WNL, gait normal.   Lymph: Normal ant/post cervical, axillary, supraclavicular nodes  Psych: Mentation appears normal and affect anxious and now admitting to depression though no thoughts of self harm.     Laboratory Results:   Results for orders placed or performed in visit on 08/09/21   Comprehensive metabolic panel     Status: Abnormal   Result Value Ref Range    Sodium 140 133 - 144 mmol/L    Potassium 3.9 3.4 - 5.3 mmol/L    Chloride 107 94 - 109 mmol/L    Carbon Dioxide (CO2) 30 20 - 32 mmol/L    Anion Gap 3 3 - 14 mmol/L    Urea Nitrogen 14 7 - 30 mg/dL    Creatinine 0.79 0.52 - 1.04 mg/dL    Calcium 8.9 8.5 - 10.1 mg/dL    Glucose 107 (H) 70 - 99 mg/dL    Alkaline Phosphatase 98 40 - 150 U/L    AST 17 0 - 45 U/L    ALT 22 0 - 50 U/L    Protein Total 7.1 6.8 - 8.8 g/dL    Albumin 3.3 (L) 3.4 - 5.0 g/dL    Bilirubin Total 0.3 0.2 - 1.3 mg/dL    GFR Estimate 77 >60 mL/min/1.73m2   Extra Serum Separator Tube (SST)     Status: None   Result Value Ref Range    Hold Specimen JIC    Extra Purple Top Tube     Status: None   Result Value Ref Range    Hold Specimen JIC    Extra Tube     Status: None    Narrative    The following orders were created for panel order Extra Tube.  Procedure                               Abnormality         Status                     ---------                               -----------         ------                     Extra Serum Separator Tu...[[id number]]                      Final result               Extra Purple Top Tube[[id number]]                            Final result                 Please view results for these tests on the individual orders.     Assessment and Plan:   Stage 1B Right Breast Cancer- Pt completed right lumpectomy and SLN biopsy, radiation therapy. She is currently on Anastrozole daily   She will continue the anastrozole- renewed  recently  She will return in 6 months for review with Dr Butler - no labs or imaging necessary.   Last Mammogram was completed in 2/2021 and was normal- will repeat in February 2022-- order placed.  Use of AI- Has cholesterol checked yearly by PCP with know hyperlipidemia though no known treatment at this time.   Osteoporosis- 8/6/2020 DEXA result showing T score of -2.9 equalling osteoporosis. Pt started Prolia every 6 months and is due in September. Pt confirms she is on adequate calcium and vitamin D daily.  Will repeat DEXA in 8/2022.  Encouraged weight bearing exercises to strengthen bones- walking or back to gardening.   Fatigue/ anxiety and probable depression with known ADD- encouraged pt to seek treatment for her anxiety and depression. This may be reason for her fatigue though pt reports chronic fatigue syndrome. Antidepressant may help with her symptoms that the xanax may not be as helpful.   The total time of this encounter amounted to 30 minutes. This time included face to face time spent with the patient, prep work, ordering tests, and performing post visit documentation.  Lidia Parham,Cnp

## 2021-08-09 NOTE — NURSING NOTE
"Oncology Rooming Note    August 9, 2021 12:32 PM   Marixa Ann is a 70 year old female who presents for:    Chief Complaint   Patient presents with     Oncology Clinic Visit     follow up     Initial Vitals: /64 (BP Location: Left arm, Patient Position: Chair, Cuff Size: Adult Regular)   Pulse 98   LMP 05/31/2003 (Approximate)   SpO2 98%  Estimated body mass index is 34.63 kg/m  as calculated from the following:    Height as of 2/8/21: 1.562 m (5' 1.5\").    Weight as of 2/8/21: 84.5 kg (186 lb 4.6 oz). There is no height or weight on file to calculate BSA.  No Pain (0) Comment: Data Unavailable   Patient's last menstrual period was 05/31/2003 (approximate).  Allergies reviewed: Yes  Medications reviewed: Yes    Medications: Medication refills not needed today.  Pharmacy name entered into EPIC:    WRITTEN PRESCRIPTION REQUESTED  [id number] [place], [place] - [address]    Clinical concerns: NO Lidia was notified.      Kat Mcghee CMA              "

## 2021-08-09 NOTE — PROGRESS NOTES
Infusion Nursing Note:  Marixa Ann presents today for   Chief Complaint   Patient presents with     Allied Health Visit     Prolia     Patient seen by provider today: Yes: Lidia Parham CNP   present during visit today: Not Applicable.    Note: Prolia injection today.      Intravenous Access:  No Intravenous access/labs at this visit.    Treatment Conditions:  Lab Results   Component Value Date     08/09/2021     02/08/2021                   Lab Results   Component Value Date    POTASSIUM 3.9 08/09/2021    POTASSIUM 3.8 02/08/2021           Lab Results   Component Value Date    MAG 2.0 01/04/2019            Lab Results   Component Value Date    CR 0.79 08/09/2021    CR 0.62 02/08/2021                   Lab Results   Component Value Date    JONATHON 8.9 08/09/2021    JONATHON 9.2 02/08/2021                Lab Results   Component Value Date    BILITOTAL 0.3 08/09/2021    BILITOTAL 0.4 02/08/2021           Lab Results   Component Value Date    ALBUMIN 3.3 08/09/2021    ALBUMIN 3.5 02/08/2021                    Lab Results   Component Value Date    ALT 22 08/09/2021    ALT 31 02/08/2021           Lab Results   Component Value Date    AST 17 08/09/2021    AST 22 02/08/2021       Results reviewed, labs MET treatment parameters, ok to proceed with treatment.      Post Infusion Assessment:  Patient tolerated injection without incident.  Site patent and intact, free from redness, edema or discomfort.       Discharge Plan:   Patient discharged in stable condition accompanied by: self.  Departure Mode: Ambulatory.      Kat Mcghee CMA

## 2021-08-09 NOTE — LETTER
8/9/2021         RE: Marixa Ann  [address] [facility] [address]        Dear Colleague,    Thank you for referring your patient, Marixa Ann, to the [facility]. Please see a copy of my visit note below.    Oncology Follow Up Visit: August 9, 2021     Oncologist: Dr Sli Dan  PCP: Jackie Kovacs    Diagnosis: Stage IB Right Breast Cancer  Marixa Ann is a 71 yo female who had abnormality at 2-4 oclock level of right breaston screening mammogram in 12/2018. Final review proved a 12 x 9 x14 mm invasive ductal carcinoma at 2 oclock to the right breast, Carol grade 1, ER/NE positive, Her2 negative with 0/3 SLN positive and edges free of disease.   Oncotype score =4 or 3% risk of disease at 9 years with hormone therapy.   Treatment:   1/10/2019 Right Lumpectomy with SLN biopsy  3/11/2019 completed right breast radiation post lumpectomy.   - choice made to not use Tamoxifen due to risk for DVT(repeatedly elevated d-dimer as well as elevated ESR and CRP) so started pt on Aromatase inhibitor with close bone evaluation    Interval History: Ms. Ann comes to clinic alone for follow up to her aromatase inhibitor use for her breast cancer.  Pt shares she has been quite anxious and now depressed - concerned about new skin cancer and now worried about marriage issues. She also notes some pain to the right lateral breast x 2 weeks without known trauma, redness warmth or new discharge or masses. SHe has not been getting exercise in garden due to skin cancer concern and refuses to go out in public in fear of pandemic issues. Has gained some weight over pandemic but admits she is not eating as healthy. Bowel and bladder are normal.   Rest of comprehensive and complete ROS is reviewed and is negative.   Past Medical History:   Diagnosis Date     ADHD      Breast cancer (H) 12/26/2018    Right Breast     Chronic fatigue      Fibromyalgia       Hard of hearing      Osteoporosis      S/P radiation therapy     4,256 cGy to right breast completed on 03/11/2019 - [facility] with Dr. Copeland     Current Outpatient Medications   Medication     acetaminophen (TYLENOL) 325 MG tablet     ALPRAZolam (XANAX) 0.5 MG tablet     amphetamine-dextroamphetamine (ADDERALL) 15 MG tablet     anastrozole (ARIMIDEX) 1 MG tablet     B Complex Vitamins (VITAMIN B-COMPLEX PO)     calcium carbonate (TUMS) 500 MG chewable tablet     Calcium-Magnesium-Zinc 333-133-5 MG TABS per tablet     Cholecalciferol (VITAMIN D-3 PO)     cyclobenzaprine (FLEXERIL) 5 MG tablet     denosumab (PROLIA) 60 MG/ML SOSY injection     KRILL OIL PO     magnesium oxide 200 MG TABS     Multiple Vitamins-Minerals (MULTIVITAMIN PO)     mupirocin (BACTROBAN) 2 % external ointment     pantoprazole (PROTONIX) 40 MG EC tablet     vitamin B-12 (CYANOCOBALAMIN) 100 MCG tablet     Current Facility-Administered Medications   Medication     cross-Linked Hyaluronate (GEL-ONE) injection PRSY 30 mg     cross-Linked Hyaluronate (GEL-ONE) injection PRSY 30 mg     cross-Linked Hyaluronate (GEL-ONE) injection PRSY 30 mg     cross-Linked Hyaluronate (GEL-ONE) injection PRSY 30 mg     triamcinolone (KENALOG-40) injection 80 mg     Allergies   Allergen Reactions     Vicodin [Hydrocodone-Acetaminophen] Anaphylaxis     Dilaudid [Hydromorphone] Nausea and Vomiting     Oxycodone Nausea and Vomiting     Dust Mites      Milk [Lac Bovis] Diarrhea       Physical Exam:/64 (BP Location: Left arm, Patient Position: Chair, Cuff Size: Adult Regular)   Pulse 98   LMP 05/31/2003 (Approximate)   SpO2 98%   Constitutional: Alert, overweight, and appears anxious though talking openly about concerns.   ENT: Eyes bright, No mouth sores- noted to be increasingly [place] made more difficult with the masks.  Neck: Supple, No adenopathy.Thyroid symmetric  Cardiac: Heart rate and rhythm is regular and strong without murmur  Respiratory:  Breathing easy. Lung sounds clear to auscultation  Breasts:tender to lateral right breast and lower band of right breast with palpation - no swelling redness or warmth to area. Left breast without abnormalities.   Abdomen: Soft, non-tender, BS normal. No masses or organomegaly  MS: Muscle tone normal, extremities normal with no edema.   Skin: No suspicious lesions or rashes  Neuro: Sensory grossly WNL, gait normal.   Lymph: Normal ant/post cervical, axillary, supraclavicular nodes  Psych: Mentation appears normal and affect anxious and now admitting to depression though no thoughts of self harm.     Laboratory Results:   Results for orders placed or performed in visit on 08/09/21   Comprehensive metabolic panel     Status: Abnormal   Result Value Ref Range    Sodium 140 133 - 144 mmol/L    Potassium 3.9 3.4 - 5.3 mmol/L    Chloride 107 94 - 109 mmol/L    Carbon Dioxide (CO2) 30 20 - 32 mmol/L    Anion Gap 3 3 - 14 mmol/L    Urea Nitrogen 14 7 - 30 mg/dL    Creatinine 0.79 0.52 - 1.04 mg/dL    Calcium 8.9 8.5 - 10.1 mg/dL    Glucose 107 (H) 70 - 99 mg/dL    Alkaline Phosphatase 98 40 - 150 U/L    AST 17 0 - 45 U/L    ALT 22 0 - 50 U/L    Protein Total 7.1 6.8 - 8.8 g/dL    Albumin 3.3 (L) 3.4 - 5.0 g/dL    Bilirubin Total 0.3 0.2 - 1.3 mg/dL    GFR Estimate 77 >60 mL/min/1.73m2   Extra Serum Separator Tube (SST)     Status: None   Result Value Ref Range    Hold Specimen JIC    Extra Purple Top Tube     Status: None   Result Value Ref Range    Hold Specimen JIC    Extra Tube     Status: None    Narrative    The following orders were created for panel order Extra Tube.  Procedure                               Abnormality         Status                     ---------                               -----------         ------                     Extra Serum Separator Tu...[[id number]]                      Final result               Extra Purple Top Tube[[id number]]                            Final result                  Please view results for these tests on the individual orders.     Assessment and Plan:   Stage 1B Right Breast Cancer- Pt completed right lumpectomy and SLN biopsy, radiation therapy. She is currently on Anastrozole daily   She will continue the anastrozole- renewed recently  She will return in 6 months for review with Dr Butler - no labs or imaging necessary.   Last Mammogram was completed in 2/2021 and was normal- will repeat in February 2022-- order placed.  Use of AI- Has cholesterol checked yearly by PCP with know hyperlipidemia though no known treatment at this time.   Osteoporosis- 8/6/2020 DEXA result showing T score of -2.9 equalling osteoporosis. Pt started Prolia every 6 months and is due in September. Pt confirms she is on adequate calcium and vitamin D daily.  Will repeat DEXA in 8/2022.  Encouraged weight bearing exercises to strengthen bones- walking or back to gardening.   Fatigue/ anxiety and probable depression with known ADD- encouraged pt to seek treatment for her anxiety and depression. This may be reason for her fatigue though pt reports chronic fatigue syndrome. Antidepressant may help with her symptoms that the xanax may not be as helpful.   The total time of this encounter amounted to 30 minutes. This time included face to face time spent with the patient, prep work, ordering tests, and performing post visit documentation.  Lidia Parham Cnp          Again, thank you for allowing me to participate in the care of your patient.        Sincerely,        Lidia Parham NP, APRN CNP

## 2021-08-25 ENCOUNTER — IMMUNIZATION (OUTPATIENT)
Dept: NURSING | Facility: CLINIC | Age: 71
End: 2021-08-25
Payer: COMMERCIAL

## 2021-08-25 PROCEDURE — 0002A PR COVID VAC PFIZER DIL RECON 30 MCG/0.3 ML IM: CPT

## 2021-08-25 PROCEDURE — 0003A PR COVID VAC PFIZER DIL RECON 30 MCG/0.3 ML IM: CPT

## 2021-08-25 PROCEDURE — 91300 PR COVID VAC PFIZER DIL RECON 30 MCG/0.3 ML IM: CPT

## 2021-09-03 ENCOUNTER — MYC REFILL (OUTPATIENT)
Dept: FAMILY MEDICINE | Facility: CLINIC | Age: 71
End: 2021-09-03

## 2021-09-03 DIAGNOSIS — F90.0 ATTENTION DEFICIT HYPERACTIVITY DISORDER (ADHD), PREDOMINANTLY INATTENTIVE TYPE: ICD-10-CM

## 2021-09-03 RX ORDER — DEXTROAMPHETAMINE SACCHARATE, AMPHETAMINE ASPARTATE, DEXTROAMPHETAMINE SULFATE AND AMPHETAMINE SULFATE 3.75; 3.75; 3.75; 3.75 MG/1; MG/1; MG/1; MG/1
15 TABLET ORAL 2 TIMES DAILY
Qty: 60 TABLET | Refills: 0 | Status: SHIPPED | OUTPATIENT
Start: 2021-09-03 | End: 2021-10-03

## 2021-09-13 ENCOUNTER — OFFICE VISIT (OUTPATIENT)
Dept: DERMATOLOGY | Facility: CLINIC | Age: 71
End: 2021-09-13
Payer: COMMERCIAL

## 2021-09-13 DIAGNOSIS — Z85.828 HISTORY OF BASAL CELL CARCINOMA OF SKIN: Primary | ICD-10-CM

## 2021-09-13 PROCEDURE — 99024 POSTOP FOLLOW-UP VISIT: CPT | Performed by: DERMATOLOGY

## 2021-09-13 ASSESSMENT — PAIN SCALES - GENERAL: PAINLEVEL: NO PAIN (0)

## 2021-09-13 NOTE — NURSING NOTE
Marixa Ann's goals for this visit include:   Chief Complaint   Patient presents with     Derm Problem     Priscilla is here today for skin exam, has concern on left cheek       She requests these members of her care team be copied on today's visit information:     PCP: Jackie Kovacs    Referring Provider:  No referring provider defined for this encounter.    LMP 05/31/2003 (Approximate)     Do you need any medication refills at today's visit? No    Lara Riley LPN

## 2021-09-13 NOTE — PATIENT INSTRUCTIONS
Recommend sunscreens SPF #30 or greater, protective clothing and avoidance of tanning   Brands that are appropriate include CeraVe, Elta MD, Adin Chao.      beds.

## 2021-09-13 NOTE — LETTER
9/13/2021         RE: Marixa Ann  [address] [facility] [address]        Dear Colleague,    Thank you for referring your patient, Marixa Ann, to the [facility]. Please see a copy of my visit note below.    [facility] Dermatology Note  Encounter Date: Sep 13, 2021  Office Visit     Dermatology Problem List:  *Patient requests no males in the room for skin checks  Last skin check 9/14/21, q6 months until 2/23  1. History of NMSC   - BCC, right nasal sidewall, s/p MMS 2/24/21  2. History of benign biopsy  - Osteoma cutis, left temple, s/p punch bx 2/9/21     Family History: Not assessed.  Social History: Likes to garden. Uses combination of sunscreen and sun protective clothing diligently.  ____________________________________________    Assessment & Plan:    # History of BCC, R nasal sidewall. S/p Mohs and linear repair.   Wound has healed appropriately.   Continue sun avoidance and sun protection.   Could consider scar massage.     Follow-up: She will follow up with Dr. Snyder for skin cancer screening.     Scribe Disclosure:   I, Yamil Herrera, am serving as a scribe to document services personally performed by this physician, Dr. Dale Saini, based on data collection and the provider's statements to me.     Provider Disclosure:   The documentation recorded by the scribe accurately reflects the services I personally performed and the decisions made by me.    Dale Saini DO    Department of Dermatology  [facility]: Phone: [phone], Fax:[phone]  [facility]: Phone: [phone], Fax: [phone]    ____________________________________________    CC: Derm Problem (Priscilla is here today for spot check, has concern on left cheek)    HPI:  Ms. Marixa Ann is a(n) 70 year old female who presents  today as a return patient for a spot check.    Last seen in a virtual visit on 3/10/21. At that time, a surgical site on her right nasal sidewall was healing well. Her last skin check was on 2/9/21 with Dr. Uribe.    Today, she has a spot of concern on her left cheek. It has been pink at times and more dome shaped. She has also noticed tank pink spot on her left forearm. It may be getting bigger, but no other symptoms.     Patient is otherwise feeling well, without additional skin concerns.    Labs Reviewed:  N/A    Physical Exam:  Vitals: LMP 05/31/2003 (Approximate)   SKIN: Focused examination of face and forearms was performed.  - left cheek, pink minimally elevated papule.    Vascular qualities on dermoscopy  - left forearm, tan macule without significant pigment network on dermoscopy.   - R nasal bridge with linear surgical scar, minimal hypertrophy.     - No other lesions of concern on areas examined.     Medications:  Current Outpatient Medications   Medication     acetaminophen (TYLENOL) 325 MG tablet     ALPRAZolam (XANAX) 0.5 MG tablet     amphetamine-dextroamphetamine (ADDERALL) 15 MG tablet     anastrozole (ARIMIDEX) 1 MG tablet     B Complex Vitamins (VITAMIN B-COMPLEX PO)     calcium carbonate (TUMS) 500 MG chewable tablet     Calcium-Magnesium-Zinc 333-133-5 MG TABS per tablet     Cholecalciferol (VITAMIN D-3 PO)     cyclobenzaprine (FLEXERIL) 5 MG tablet     denosumab (PROLIA) 60 MG/ML SOSY injection     KRILL OIL PO     magnesium oxide 200 MG TABS     Multiple Vitamins-Minerals (MULTIVITAMIN PO)     mupirocin (BACTROBAN) 2 % external ointment     pantoprazole (PROTONIX) 40 MG EC tablet     vitamin B-12 (CYANOCOBALAMIN) 100 MCG tablet     Current Facility-Administered Medications   Medication     cross-Linked Hyaluronate (GEL-ONE) injection PRSY 30 mg     cross-Linked Hyaluronate (GEL-ONE) injection PRSY 30 mg     cross-Linked Hyaluronate (GEL-ONE) injection PRSY 30 mg     cross-Linked Hyaluronate  (GEL-ONE) injection PRSY 30 mg     triamcinolone (KENALOG-40) injection 80 mg      Past Medical History:   Patient Active Problem List   Diagnosis     Moderate recurrent major depression (H)     Hyperlipidemia LDL goal <160     Chronic fatigue disorder     Liver lesion     Cystic disease of liver     Gastroesophageal reflux disease with esophagitis     Hiatal hernia     Age-related cataract of both eyes, unspecified age-related cataract type     Primary osteoarthritis of both knees     Attention deficit hyperactivity disorder (ADHD), predominantly inattentive type     Malignant neoplasm of upper-inner quadrant of right breast in female, estrogen receptor positive (H)     Thyroid nodule     Anxiety disorder, unspecified type     Osteoporosis     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     Aromatase inhibitor use     Cervical cancer screening     History of cholecystectomy     Past Medical History:   Diagnosis Date     ADHD      Breast cancer (H) 12/26/2018    Right Breast     Chronic fatigue      Fibromyalgia      Hard of hearing      Osteoporosis      S/P radiation therapy     4,256 cGy to right breast completed on 03/11/2019 - [facility] with Dr. Copeland              Again, thank you for allowing me to participate in the care of your patient.        Sincerely,        Dale Saini MD

## 2021-09-13 NOTE — PROGRESS NOTES
[facility] Dermatology Note  Encounter Date: Sep 14, 2021  Office Visit     Dermatology Problem List:  *Patient requests no males in the room for skin checks  Last skin check 9/14/21, q6 months until 2/23  1. History of NMSC   - BCC, right nasal sidewall, s/p MMS 2/24/21  2. 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  Social History: Likes to garden. Uses combination of sunscreen and sun protective clothing diligently.  ____________________________________________    ASSESSMENT/PLAN:    # History of NMSC. No evidence of recurrence.   - Recommend sunscreens SPF #30 or greater, protective clothing and avoidance of tanning beds.   - Recommended next skin check in 6 months.    # Sun damaged skin with solar lentigines. Chronic. Stable.   - Recommended sunscreens SPF #30 or greater, protective clothing and avoidance of tanning beds.     # Benign findings include: seborrheic keratoses, cherry angioma, dermatofibroma. Self limited, minor.   - No further intervention required. Patient to report changes.  - Patient reassured of the benign nature of these lesions.    # Multiple clinically benign nevi. Chronic. Stable.   - No further intervention required. Patient to report changes.  - Patient reassured of the benign nature of these lesions.    # Ganglion cyst.  - Left hand, between thumb and first finger  - Patient will contact clinic if she would like a referral to hand surgeon.      Procedures Performed:   None    Follow-up: 1 year (s) in-person, or earlier for new or changing lesions    Staff and Scribe:     Scribe Disclosure:   I, Yamil Herrera, am serving as a scribe to document services personally performed by this physician, Dr. Sandrita Snyder, based on data collection and the provider's statements to me.     Provider Disclosure:   The documentation recorded by the scribe accurately reflects the services I personally performed and  "the decisions made by me.    Sandrita Snyder MD    Department of Dermatology  [facility]: Phone: [phone], Fax:[phone]  [facility]: Phone: [phone], Fax: [phone]    ____________________________________________    CC: Derm Problem (Priscilla is here today for full body skin exam, pt states \"no concerns\")    HPI:  Ms. Marixa Ann is a(n) 70 year old female who presents today as a return patient for skin check.    Last skin check was 2/9/21 with Dr. Uribe. At that time, biopsies determined a BCC on the right nasal sidewall and osteoma cutis on the left temple.The BCC has since undergone MMS with Dr. Saini on 2/24/21. Seen yesterday by Dr. Saini for a spot check.    Today patient notes no areas of concern.    Patient is otherwise feeling well, without additional concerns.    Labs:  Derm path from 9/13/21 reviewed.    SPECIMEN(S):   A: Skin, right nasal side wall, shave   B: Skin, left temple, punch     FINAL DIAGNOSIS:   A. Skin, right nasal side wall, shave:   - Basal cell carcinoma, nodular type, extending to the deep margin -     B. Skin, left temple, punch:   - Osteoma cutis -    Physical exam:  Vitals: LMP 05/31/2003 (Approximate)   SKIN: Total skin excluding the undergarment areas was performed. The exam included the head/face, neck, both arms, chest, back, abdomen, both legs, digits and/or nails. Buttocks also examined. Patient declined genital exam.  - Benitez Type 2  - Scattered brown macules on sun exposed areas.  - There are dome shaped bright red papules on the trunk.   - Multiple regular brown pigmented macules and papules are identified on the body. About 40 nevi in all. None are atypical.   - There are waxy stuck on tan to brown papules on the trunk.  - There is a well healed scar at the right nasal sidewall. No pigment recurrence or nodularity.  - " Ganglion cyst - left hand, between thumb and first finger  - There is a firm tan/flesh colored papule that dimples with lateral pressure on the right shin.  - No other lesions of concern on areas examined.     Medications:  Current Outpatient Medications   Medication     acetaminophen (TYLENOL) 325 MG tablet     ALPRAZolam (XANAX) 0.5 MG tablet     amphetamine-dextroamphetamine (ADDERALL) 15 MG tablet     anastrozole (ARIMIDEX) 1 MG tablet     B Complex Vitamins (VITAMIN B-COMPLEX PO)     calcium carbonate (TUMS) 500 MG chewable tablet     Calcium-Magnesium-Zinc 333-133-5 MG TABS per tablet     Cholecalciferol (VITAMIN D-3 PO)     cyclobenzaprine (FLEXERIL) 5 MG tablet     denosumab (PROLIA) 60 MG/ML SOSY injection     KRILL OIL PO     magnesium oxide 200 MG TABS     Multiple Vitamins-Minerals (MULTIVITAMIN PO)     mupirocin (BACTROBAN) 2 % external ointment     pantoprazole (PROTONIX) 40 MG EC tablet     vitamin B-12 (CYANOCOBALAMIN) 100 MCG tablet     Current Facility-Administered Medications   Medication     cross-Linked Hyaluronate (GEL-ONE) injection PRSY 30 mg     cross-Linked Hyaluronate (GEL-ONE) injection PRSY 30 mg     cross-Linked Hyaluronate (GEL-ONE) injection PRSY 30 mg     cross-Linked Hyaluronate (GEL-ONE) injection PRSY 30 mg     triamcinolone (KENALOG-40) injection 80 mg      Past Medical History:   Patient Active Problem List   Diagnosis     Moderate recurrent major depression (H)     Hyperlipidemia LDL goal <160     Chronic fatigue disorder     Liver lesion     Cystic disease of liver     Gastroesophageal reflux disease with esophagitis     Hiatal hernia     Age-related cataract of both eyes, unspecified age-related cataract type     Primary osteoarthritis of both knees     Attention deficit hyperactivity disorder (ADHD), predominantly inattentive type     Malignant neoplasm of upper-inner quadrant of right breast in female, estrogen receptor positive (H)     Thyroid nodule     Anxiety disorder,  unspecified type     Osteoporosis     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     Aromatase inhibitor use     Cervical cancer screening     History of cholecystectomy     Past Medical History:   Diagnosis Date     ADHD      Breast cancer (H) 12/26/2018    Right Breast     Chronic fatigue      Fibromyalgia      Hard of hearing      Osteoporosis      S/P radiation therapy     4,256 cGy to right breast completed on 03/11/2019 - [facility] with Dr. Min DRAPER No referring provider defined for this encounter. on close of this encounter.

## 2021-09-14 ENCOUNTER — OFFICE VISIT (OUTPATIENT)
Dept: DERMATOLOGY | Facility: CLINIC | Age: 71
End: 2021-09-14
Payer: COMMERCIAL

## 2021-09-14 DIAGNOSIS — M67.40 GANGLION CYST: ICD-10-CM

## 2021-09-14 DIAGNOSIS — D18.01 CHERRY ANGIOMA: ICD-10-CM

## 2021-09-14 DIAGNOSIS — L82.1 SEBORRHEIC KERATOSES: ICD-10-CM

## 2021-09-14 DIAGNOSIS — L81.4 SOLAR LENTIGO: ICD-10-CM

## 2021-09-14 DIAGNOSIS — L57.8 SUN-DAMAGED SKIN: ICD-10-CM

## 2021-09-14 DIAGNOSIS — D23.9 DERMATOFIBROMA: ICD-10-CM

## 2021-09-14 DIAGNOSIS — Z85.828 HISTORY OF NONMELANOMA SKIN CANCER: Primary | ICD-10-CM

## 2021-09-14 PROCEDURE — 99213 OFFICE O/P EST LOW 20 MIN: CPT | Performed by: DERMATOLOGY

## 2021-09-14 ASSESSMENT — PAIN SCALES - GENERAL: PAINLEVEL: NO PAIN (0)

## 2021-09-14 NOTE — NURSING NOTE
"Marixa Ann's goals for this visit include:   Chief Complaint   Patient presents with     Derm Problem     Priscilla is here today for full body skin exam, pt states \"no concerns\"       She requests these members of her care team be copied on today's visit information:     PCP: Jackie Kovacs    Referring Provider:  No referring provider defined for this encounter.    LMP 05/31/2003 (Approximate)     Do you need any medication refills at today's visit? No    Lara Riley LPN      "

## 2021-09-14 NOTE — LETTER
9/14/2021         RE: Marixa Ann  [address] [facility] [address]        Dear Colleague,    Thank you for referring your patient, Marixa Ann, to the [facility]. Please see a copy of my visit note below.    [facility]ology Note  Encounter Date: Sep 14, 2021  Office Visit     Dermatology Problem List:  *Patient requests no males in the room for skin checks  Last skin check 9/14/21, q6 months until 2/23  1. History of NMSC   - BCC, right nasal sidewall, s/p MMS 2/24/21  2. 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  Social History: Likes to garden. Uses combination of sunscreen and sun protective clothing diligently.  ____________________________________________    ASSESSMENT/PLAN:    # History of NMSC. No evidence of recurrence.   - Recommend sunscreens SPF #30 or greater, protective clothing and avoidance of tanning beds.   - Recommended next skin check in 6 months.    # Sun damaged skin with solar lentigines. Chronic. Stable.   - Recommended sunscreens SPF #30 or greater, protective clothing and avoidance of tanning beds.     # Benign findings include: seborrheic keratoses, cherry angioma, dermatofibroma. Self limited, minor.   - No further intervention required. Patient to report changes.  - Patient reassured of the benign nature of these lesions.    # Multiple clinically benign nevi. Chronic. Stable.   - No further intervention required. Patient to report changes.  - Patient reassured of the benign nature of these lesions.    # Ganglion cyst.  - Left hand, between thumb and first finger  - Patient will contact clinic if she would like a referral to hand surgeon.      Procedures Performed:   None    Follow-up: 1 year (s) in-person, or earlier for new or changing lesions    Staff and Scribe:     Scribe Disclosure:   Yamil OH, am serving as a scribe to  "document services personally performed by this physician, Dr. Sandrita Snyder, based on data collection and the provider's statements to me.     Provider Disclosure:   The documentation recorded by the scribe accurately reflects the services I personally performed and the decisions made by me.    Sandrita Snyder MD    Department of Dermatology  [facility]: Phone: [phone], Fax:[phone]  [facility]: Phone: [phone], Fax: [phone]    ____________________________________________    CC: Derm Problem (Pirscilla is here today for full body skin exam, pt states \"no concerns\")    HPI:  Ms. Marixa Ann is a(n) 70 year old female who presents today as a return patient for skin check.    Last skin check was 2/9/21 with Dr. Uribe. At that time, biopsies determined a BCC on the right nasal sidewall and osteoma cutis on the left temple.The BCC has since undergone MMS with Dr. Saini on 2/24/21. Seen yesterday by Dr. Saini for a spot check.    Today patient notes no areas of concern.    Patient is otherwise feeling well, without additional concerns.    Labs:  Derm path from 9/13/21 reviewed.    SPECIMEN(S):   A: Skin, right nasal side wall, shave   B: Skin, left temple, punch     FINAL DIAGNOSIS:   A. Skin, right nasal side wall, shave:   - Basal cell carcinoma, nodular type, extending to the deep margin -     B. Skin, left temple, punch:   - Osteoma cutis -    Physical exam:  Vitals: LMP 05/31/2003 (Approximate)   SKIN: Total skin excluding the undergarment areas was performed. The exam included the head/face, neck, both arms, chest, back, abdomen, both legs, digits and/or nails. Buttocks also examined. Patient declined genital exam.  - Benitez Type 2  - Scattered brown macules on sun exposed areas.  - There are dome shaped bright red papules on the trunk.   - Multiple regular brown " pigmented macules and papules are identified on the body. About 40 nevi in all. None are atypical.   - There are waxy stuck on tan to brown papules on the trunk.  - There is a well healed scar at the right nasal sidewall. No pigment recurrence or nodularity.  - Ganglion cyst - left hand, between thumb and first finger  - There is a firm tan/flesh colored papule that dimples with lateral pressure on the right shin.  - No other lesions of concern on areas examined.     Medications:  Current Outpatient Medications   Medication     acetaminophen (TYLENOL) 325 MG tablet     ALPRAZolam (XANAX) 0.5 MG tablet     amphetamine-dextroamphetamine (ADDERALL) 15 MG tablet     anastrozole (ARIMIDEX) 1 MG tablet     B Complex Vitamins (VITAMIN B-COMPLEX PO)     calcium carbonate (TUMS) 500 MG chewable tablet     Calcium-Magnesium-Zinc 333-133-5 MG TABS per tablet     Cholecalciferol (VITAMIN D-3 PO)     cyclobenzaprine (FLEXERIL) 5 MG tablet     denosumab (PROLIA) 60 MG/ML SOSY injection     KRILL OIL PO     magnesium oxide 200 MG TABS     Multiple Vitamins-Minerals (MULTIVITAMIN PO)     mupirocin (BACTROBAN) 2 % external ointment     pantoprazole (PROTONIX) 40 MG EC tablet     vitamin B-12 (CYANOCOBALAMIN) 100 MCG tablet     Current Facility-Administered Medications   Medication     cross-Linked Hyaluronate (GEL-ONE) injection PRSY 30 mg     cross-Linked Hyaluronate (GEL-ONE) injection PRSY 30 mg     cross-Linked Hyaluronate (GEL-ONE) injection PRSY 30 mg     cross-Linked Hyaluronate (GEL-ONE) injection PRSY 30 mg     triamcinolone (KENALOG-40) injection 80 mg      Past Medical History:   Patient Active Problem List   Diagnosis     Moderate recurrent major depression (H)     Hyperlipidemia LDL goal <160     Chronic fatigue disorder     Liver lesion     Cystic disease of liver     Gastroesophageal reflux disease with esophagitis     Hiatal hernia     Age-related cataract of both eyes, unspecified age-related cataract type      Primary osteoarthritis of both knees     Attention deficit hyperactivity disorder (ADHD), predominantly inattentive type     Malignant neoplasm of upper-inner quadrant of right breast in female, estrogen receptor positive (H)     Thyroid nodule     Anxiety disorder, unspecified type     Osteoporosis     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     Aromatase inhibitor use     Cervical cancer screening     History of cholecystectomy     Past Medical History:   Diagnosis Date     ADHD      Breast cancer (H) 12/26/2018    Right Breast     Chronic fatigue      Fibromyalgia      Hard of hearing      Osteoporosis      S/P radiation therapy     4,256 cGy to right breast completed on 03/11/2019 - [facility] with Dr. Min DRAPER No referring provider defined for this encounter. on close of this encounter.      Again, thank you for allowing me to participate in the care of your patient.        Sincerely,        Sandrita Snyder MD

## 2021-09-16 NOTE — PATIENT INSTRUCTIONS
At [facility], we strive to deliver an exceptional experience to you, every time we see you. If you receive a survey, please complete it as we do value your feedback.  If you have MyChart, you can expect to receive results automatically within 24 hours of their completion.  Your provider will send a note interpreting your results as well.   If you do not have MyChart, you should receive your results in about a week by mail.    Your care team:                            Family Medicine Internal Medicine   MD Kyle Mancera MD Shantel Branch-Fleming, MD Srinivasa Vaka, MD Katya Belousova, PALeiaC  Rhiannon Higuera, APRN CNP    Chai Kauffman, MD Pediatrics   Emigdio Robert, PAKAYDEN Ralph, CNP MD Kayley Medina APRN CNP   MD Alisa Encinas MD Deborah Mielke, MD Alyssa Chowdhury, APRN [facility]      Clinic hours: Monday - Thursday 7 am-6 pm; Fridays 7 am-5 pm.   Urgent care: Monday - Friday 10 am- 8 pm; Saturday and Sunday 9 am-5 pm.    Clinic: [phone]       [facility]: Monday - Thursday 8 am - 7 pm; Friday 8 am - 6 pm  [facility]: [phone]     Use [email or web address] for 24/7 diagnosis and treatment of dozens of conditions.    Patient Education     Depression  Depression is one of the most common mental health problems today. It is not just a state of unhappiness or sadness. It is a true disease. The cause seems to be linked to a drop in chemicals that transmit signals in the brain. Having a family history of depression, alcoholism, or suicide increases the risk. Chronic illness, chronic pain, migraine headaches, and high emotional stress also increase the risk.   Depression is something we tend to recognize in others. But we may have a hard time seeing it in ourselves. It can show in many physical and emotional ways:     Loss of appetite    Overeating    Not being able to  sleep    Sleeping too much    Excessive tiredness not linked to physical exertion    Restlessness or irritability    Slowness of movement or speech    Feeling depressed or withdrawn    Loss of interest in things you once enjoyed    Trouble concentrating, remembering, or making decisions    Thoughts of harming or killing oneself, or thoughts that life is not worth living    Low self-esteem  The treatment for depression may include both medicine and psychotherapy. Antidepressants can reduce suffering. They can also improve the ability to function during the depressed period. Therapy can offer emotional support. It can also help you understand emotional factors that may be causing the depression.   Home care    Ongoing care and support help people manage this disease. Find a healthcare provider and therapist who meet your needs. Seek help when you feel like you may be getting ill.    Be kind to yourself. Make it a point to do things that you enjoy (gardening, walking in nature, going to a movie). Reward yourself for small successes.    Once you start your medicine, expect a slow decrease in your symptoms. Depression will lift gradually, not right away. Ask your healthcare provider how long it will take for the medicine to start working.    Take care of your physical body. Eat a balanced diet (low in saturated fat and high in fruits and vegetables). Exercise at least 3 times a week for 30 minutes. Even mild to moderate exercise (like brisk walking) can make you feel better.    Don't make major decisions, such as a job change, a divorce, or a marriage until you feel better.    Don't drink alcohol. It can make depression worse.    Take medicine as prescribed. Don't stop your medicine or adjust the dose unless you talk with your healthcare provider.    Don't share your medicine or use someone else's medicine.    Tell all your healthcare providers about all the prescription and over-the-counter medicines, vitamins, and  supplements you take. Certain supplements interact with medicines. They can cause dangerous side effects. Ask your pharmacist about medicine interactions when you have questions.    Talk with your family and trusted friends about your feelings and thoughts. Ask them to help you notice behavior changes early. You can then get help and, if needed, your medicine can be adjusted.    Talk with your healthcare provider if you are not getting better. He or she may change your medicine or have you try another treatment.    Follow-up care  Follow up with your healthcare provider as advised.   Call 911  Call 911 if you:     Have suicidal thoughts, a suicide plan, and the means to carry out the plan    Have serious thoughts of hurting someone else    Have trouble breathing    Are very confused    Feel very drowsy or have trouble awakening    Faint or lose consciousness    Have new chest pain that becomes more severe, lasts longer, or spreads into your shoulder, arm, neck, jaw, or back  When to seek medical advice  Call your healthcare provider right away if any of these happen:    Worsening of your symptoms    Feeling extreme depression, fear, anxiety, or anger toward yourself or others    Feeling out of control    Feeling that you may try to harm yourself or another    Hearing voices that others do not hear    Seeing things that others do not see    Not sleeping or eating for 3 days in a row    Having friends or family express concern over your behavior and ask you to seek help  [employer] last reviewed this educational content on 7/1/2020 2000-2021 [employer]. All rights reserved. This information is not intended as a substitute for professional medical care. Always follow your healthcare professional's instructions.           Patient Education     Fibromyalgia  Fibromyalgia is a chronic condition. It causes pain and tenderness in connective tissues and muscles. Often, there are also many tender areas  throughout the body. Symptoms may also include stiffness and feelings of numbness and tingling. Symptoms may be worse upon waking up. They may increase with poor sleep, heavy activity, cold or damp weather, anxiety, or stress.  People with fibromyalgia often feel tired. They may have trouble sleeping. Other symptoms include morning stiffness, headaches, and painful menstrual periods. Some people have problems with thinking clearly and changes in memory.  The cause of fibromyalgia is not known. Symptoms are similar to that of other diseases. These include rheumatoid arthritis, low thyroid, chronic fatigue syndrome, and Lyme disease. In some cases, these diseases may occur together.  Fibromyalgia is often treated with medicines. You and your healthcare provider can discuss the medicine that may work best for you. You may have to try more than one medicine or combination of medicines before you find what works for you.  Home care    If your healthcare provider has prescribed or recommended medicines, take them as directed.    Rest as needed. Try to get enough sleep. If you have trouble sleeping, discuss this with your healthcare provider.     Be active. Regular exercise can help manage symptoms. Some options include walking, swimming, and biking. Strengthening and aerobic exercises may also be helpful. Talk to your healthcare provider about the best ways to be active.    Follow a healthy diet. Limit caffeine and alcohol. If you smoke, ask your healthcare provider for help to stop.    Notice how your body reacts to stress. Learn to listen to your body signals. This will help you take action before the stress becomes severe.    Learn relaxation techniques. Also consider joining a stress reduction program or class.    Talk to your healthcare provider about trying complementary treatments. These include acupuncture, hypnosis, and biofeedback. Yoga and antione chi may be helpful.    Ask your healthcare provider about cognitive  behavioral therapy (CBT). This type of counseling can help people with fibromyalgia cope better with their illness.    Follow-up care  Follow up with your healthcare provider, or as advised. In many cases, fibromyalgia is best treated with a team approach.  This may involve your primary care provider, a rheumatologist, a physical therapist, and a mental health professional.   For more information, visit the [employer] [place] [employer]th[employer] and Musculoskeletal and Skin Diseases (NIAMS) website at www.niams.nih.gov or call 844-149-6909.  When to seek medical advice  Contact your healthcare provider right away if any of these occur:    Symptoms get worse or new symptoms develop    You feel hopeless, helpless, or lose interest in day-to-day life  [employer] last reviewed this educational content on 8/1/2019 2000-2021 [employer]. All rights reserved. This information is not intended as a substitute for professional medical care. Always follow your healthcare professional's instructions.
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
## 2021-09-17 ENCOUNTER — OFFICE VISIT (OUTPATIENT)
Dept: FAMILY MEDICINE | Facility: CLINIC | Age: 71
End: 2021-09-17
Payer: COMMERCIAL

## 2021-09-17 VITALS
SYSTOLIC BLOOD PRESSURE: 106 MMHG | OXYGEN SATURATION: 95 % | BODY MASS INDEX: 35.1 KG/M2 | WEIGHT: 188.8 LBS | HEART RATE: 91 BPM | DIASTOLIC BLOOD PRESSURE: 64 MMHG

## 2021-09-17 DIAGNOSIS — E78.5 HYPERLIPIDEMIA LDL GOAL <160: ICD-10-CM

## 2021-09-17 DIAGNOSIS — Z17.0 MALIGNANT NEOPLASM OF UPPER-INNER QUADRANT OF RIGHT BREAST IN FEMALE, ESTROGEN RECEPTOR POSITIVE (H): ICD-10-CM

## 2021-09-17 DIAGNOSIS — F90.0 ATTENTION DEFICIT HYPERACTIVITY DISORDER (ADHD), PREDOMINANTLY INATTENTIVE TYPE: ICD-10-CM

## 2021-09-17 DIAGNOSIS — C50.211 MALIGNANT NEOPLASM OF UPPER-INNER QUADRANT OF RIGHT BREAST IN FEMALE, ESTROGEN RECEPTOR POSITIVE (H): ICD-10-CM

## 2021-09-17 DIAGNOSIS — E66.01 MORBID OBESITY (H): ICD-10-CM

## 2021-09-17 DIAGNOSIS — G93.32 CHRONIC FATIGUE DISORDER: Primary | ICD-10-CM

## 2021-09-17 DIAGNOSIS — G47.00 PERSISTENT INSOMNIA: ICD-10-CM

## 2021-09-17 DIAGNOSIS — M79.7 FIBROMYALGIA: ICD-10-CM

## 2021-09-17 DIAGNOSIS — M62.838 SPASM OF MUSCLE: ICD-10-CM

## 2021-09-17 DIAGNOSIS — F33.1 MODERATE RECURRENT MAJOR DEPRESSION (H): ICD-10-CM

## 2021-09-17 PROCEDURE — 99214 OFFICE O/P EST MOD 30 MIN: CPT | Performed by: FAMILY MEDICINE

## 2021-09-17 RX ORDER — CYCLOBENZAPRINE HCL 10 MG
10 TABLET ORAL 2 TIMES DAILY PRN
Qty: 30 TABLET | Refills: 3 | Status: SHIPPED | OUTPATIENT
Start: 2021-09-17 | End: 2022-09-14

## 2021-10-03 ENCOUNTER — MYC REFILL (OUTPATIENT)
Dept: FAMILY MEDICINE | Facility: CLINIC | Age: 71
End: 2021-10-03

## 2021-10-03 DIAGNOSIS — F90.0 ATTENTION DEFICIT HYPERACTIVITY DISORDER (ADHD), PREDOMINANTLY INATTENTIVE TYPE: ICD-10-CM

## 2021-10-04 RX ORDER — DEXTROAMPHETAMINE SACCHARATE, AMPHETAMINE ASPARTATE, DEXTROAMPHETAMINE SULFATE AND AMPHETAMINE SULFATE 3.75; 3.75; 3.75; 3.75 MG/1; MG/1; MG/1; MG/1
15 TABLET ORAL 2 TIMES DAILY
Qty: 60 TABLET | Refills: 0 | Status: SHIPPED | OUTPATIENT
Start: 2021-10-04 | End: 2021-11-04

## 2021-10-04 NOTE — TELEPHONE ENCOUNTER
Routing refill request to provider for review/approval because:  Drug not on the [facility] refill protocol Alyssa Mandel RN

## 2021-10-24 ENCOUNTER — HEALTH MAINTENANCE LETTER (OUTPATIENT)
Age: 71
End: 2021-10-24

## 2021-11-04 ENCOUNTER — MYC REFILL (OUTPATIENT)
Dept: FAMILY MEDICINE | Facility: CLINIC | Age: 71
End: 2021-11-04

## 2021-11-04 DIAGNOSIS — F90.0 ATTENTION DEFICIT HYPERACTIVITY DISORDER (ADHD), PREDOMINANTLY INATTENTIVE TYPE: ICD-10-CM

## 2021-11-05 RX ORDER — DEXTROAMPHETAMINE SACCHARATE, AMPHETAMINE ASPARTATE, DEXTROAMPHETAMINE SULFATE AND AMPHETAMINE SULFATE 3.75; 3.75; 3.75; 3.75 MG/1; MG/1; MG/1; MG/1
15 TABLET ORAL 2 TIMES DAILY
Qty: 60 TABLET | Refills: 0 | Status: SHIPPED | OUTPATIENT
Start: 2021-11-05 | End: 2021-11-29

## 2021-11-05 NOTE — TELEPHONE ENCOUNTER
Routing refill request to provider for review/approval because:  Drug not on the [facility] refill protocol     Socorro Rios RN  [facility]

## 2021-11-08 ENCOUNTER — MYC MEDICAL ADVICE (OUTPATIENT)
Dept: FAMILY MEDICINE | Facility: CLINIC | Age: 71
End: 2021-11-08
Payer: COMMERCIAL

## 2021-11-29 ENCOUNTER — MYC REFILL (OUTPATIENT)
Dept: FAMILY MEDICINE | Facility: CLINIC | Age: 71
End: 2021-11-29
Payer: COMMERCIAL

## 2021-11-29 DIAGNOSIS — F90.0 ATTENTION DEFICIT HYPERACTIVITY DISORDER (ADHD), PREDOMINANTLY INATTENTIVE TYPE: ICD-10-CM

## 2021-12-01 RX ORDER — DEXTROAMPHETAMINE SACCHARATE, AMPHETAMINE ASPARTATE, DEXTROAMPHETAMINE SULFATE AND AMPHETAMINE SULFATE 3.75; 3.75; 3.75; 3.75 MG/1; MG/1; MG/1; MG/1
15 TABLET ORAL 2 TIMES DAILY
Qty: 60 TABLET | Refills: 0 | Status: SHIPPED | OUTPATIENT
Start: 2021-12-01 | End: 2022-01-28

## 2021-12-09 ENCOUNTER — VIRTUAL VISIT (OUTPATIENT)
Dept: FAMILY MEDICINE | Facility: CLINIC | Age: 71
End: 2021-12-09
Payer: COMMERCIAL

## 2021-12-09 DIAGNOSIS — Z76.89 ENCOUNTER TO ESTABLISH CARE: Primary | ICD-10-CM

## 2021-12-09 DIAGNOSIS — G93.32 CHRONIC FATIGUE DISORDER: ICD-10-CM

## 2021-12-09 DIAGNOSIS — H53.8 BLURRY VISION, RIGHT EYE: ICD-10-CM

## 2021-12-09 DIAGNOSIS — J01.90 ACUTE NON-RECURRENT SINUSITIS, UNSPECIFIED LOCATION: ICD-10-CM

## 2021-12-09 DIAGNOSIS — F90.0 ATTENTION DEFICIT HYPERACTIVITY DISORDER (ADHD), PREDOMINANTLY INATTENTIVE TYPE: ICD-10-CM

## 2021-12-09 PROCEDURE — 99214 OFFICE O/P EST MOD 30 MIN: CPT | Mod: 95 | Performed by: NURSE PRACTITIONER

## 2021-12-09 RX ORDER — DOXYCYCLINE 100 MG/1
100 CAPSULE ORAL 2 TIMES DAILY
Qty: 14 CAPSULE | Refills: 0 | Status: SHIPPED | OUTPATIENT
Start: 2021-12-09 | End: 2021-12-16

## 2021-12-09 NOTE — PROGRESS NOTES
Priscilla is a 71 year old who is being evaluated via a billable video visit.      How would you like to obtain your AVS? MyChart  If the video visit is dropped, the invitation should be resent by: patient already online  Will anyone else be joining your video visit? No    Video Start Time: 2:08 pm    Assessment & Plan     Encounter to establish care    Acute non-recurrent sinusitis, unspecified location  Ongoing x2-3 weeks. She has been doing supportive cares. Will add the below. Recommended stopping fluticasone (Flonase) d/t dry, bloody nose and hx eye issues. Follow up if not improvig.   - doxycycline hyclate (VIBRAMYCIN) 100 MG capsule; Take 1 capsule (100 mg) by mouth 2 times daily for 7 days    Attention deficit hyperactivity disorder (ADHD), predominantly inattentive type  Takes Adderall IR BID. She does not need refill quite yet.    Blurry vision, right eye  x1 month. Has seen optometrist and got new glasses. Has follow up next Thursday. Will refer to ophthalmologist as well. Last labs were unremarkable and not likely contributing. UC/ED precautions reviewed.    Chronic fatigue disorder           See Patient Instructions    Return in about 1 week (around 12/16/2021), or if symptoms worsen or fail to improve.     The benefits, risks and potential side effects were discussed in detail. Black box warnings discussed as relevant. All patient questions were answered. The patient was instructed to follow up immediately if any adverse reactions develop.    Return precautions discussed, including when to seek urgent/emergent care.    Patient verbalizes understanding and agrees with plan of care. Patient stable for discharge.      MARRY Pedraza [facility]    Inocencio Wells is a 71 year old who presents for the following health issues     History of Present Illness   She consumes 1 sweetened beverage(s) daily. She exercises with enough effort to increase her heart rate 3 or less  days per week.   She is taking medications regularly.         Pleasant 71 year old female initiated a virtual visit to establish care. Her primary care provider is retiring.    -chronic fatigue syndrome:   x10 years    -ADD-inattentive. Takes Adderall. Didn't take toay. Usually takes BID but sometimes just takes daily if really tired and feels like sleep would be better. Feels like stress and lack of estrogen make the chronic fatigue and ADD worse. Previously took Adderall XR but felt out of control of her body. Her body was tired but her mind kept going. Sees therapist and discusses ADD as well.     Right eye double vision for the last month or so. Has seen optometrist and dx with astigmatism so got new glasses. Has follow up appointment next Thursday. Hx cataract surgery.     Hx breast cancer. On estrogen inhibitor.     Hx skin cancer on nose - follows with dermatology.    Recently has concerns for sinusitis x2-3 weeks. Headache, eyes burning, sticky boogers, right nostril is blocked, ongoing postnasal drainage, increased fatigue and chills. Right ear with occasional decreased hearing/fullness. Hx chronic sinusitis. Was taking fluticasone (Flonase), allegra, tumeric tea. Sometimes has bloody nose. Fully vaccinated for covid. Feeling a bit better today. No dizziness.      Review of Systems   Constitutional, HEENT, cardiovascular, pulmonary, GI, , musculoskeletal, neuro, skin, endocrine and psych systems are negative, except as otherwise noted.      Objective           Vitals:  No vitals were obtained today due to virtual visit.    Physical Exam   GENERAL: Healthy, alert and no distress  EYES: Eyes grossly normal to inspection.  No discharge or erythema, or obvious scleral/conjunctival abnormalities.  RESP: No audible wheeze, cough, or visible cyanosis.  No visible retractions or increased work of breathing.    SKIN: Visible skin clear. No significant rash, abnormal pigmentation or lesions.  NEURO: Cranial nerves  grossly intact.  Mentation and speech appropriate for age.  PSYCH: Mentation appears normal, affect normal/bright, judgement and insight intact, normal speech and appearance well-groomed.                Video-Visit Details    Type of service:  Video Visit    Video End Time:2:32 PM    Originating Location (pt. Location): Home    Distant Location (provider location):  [facility]     Platform used for Video Visit: [employer]

## 2021-12-13 NOTE — PATIENT INSTRUCTIONS
Stop fluticasone (Flonase) - it can contribute to bloody noses and make certain eye diseases worse

## 2022-01-28 ENCOUNTER — MYC MEDICAL ADVICE (OUTPATIENT)
Dept: FAMILY MEDICINE | Facility: CLINIC | Age: 72
End: 2022-01-28
Payer: COMMERCIAL

## 2022-01-28 DIAGNOSIS — C50.211 MALIGNANT NEOPLASM OF UPPER-INNER QUADRANT OF RIGHT BREAST IN FEMALE, ESTROGEN RECEPTOR POSITIVE (H): ICD-10-CM

## 2022-01-28 DIAGNOSIS — F90.0 ATTENTION DEFICIT HYPERACTIVITY DISORDER (ADHD), PREDOMINANTLY INATTENTIVE TYPE: ICD-10-CM

## 2022-01-28 DIAGNOSIS — Z17.0 MALIGNANT NEOPLASM OF UPPER-INNER QUADRANT OF RIGHT BREAST IN FEMALE, ESTROGEN RECEPTOR POSITIVE (H): ICD-10-CM

## 2022-01-28 RX ORDER — DEXTROAMPHETAMINE SACCHARATE, AMPHETAMINE ASPARTATE, DEXTROAMPHETAMINE SULFATE AND AMPHETAMINE SULFATE 3.75; 3.75; 3.75; 3.75 MG/1; MG/1; MG/1; MG/1
15 TABLET ORAL 2 TIMES DAILY
Qty: 60 TABLET | Refills: 0 | Status: SHIPPED | OUTPATIENT
Start: 2022-01-28 | End: 2022-03-10

## 2022-01-28 NOTE — TELEPHONE ENCOUNTER
See information in this message from pt.    Routing refill request to provider for review/approval because:  Drug not on the [facility] refill protocol   Althea POSADAN, RN

## 2022-01-28 NOTE — TELEPHONE ENCOUNTER
See MyC messages below.  Asking for clarification as to which medication is asking for renewal for February. Also asking for clarification on which pharmacy prescription is to be sent to.  Waiting for return message, then will also update provider on initial message below.      Padma Ragsdale RN  [facility]

## 2022-01-29 RX ORDER — ANASTROZOLE 1 MG/1
TABLET ORAL
Qty: 90 TABLET | Refills: 0 | Status: SHIPPED | OUTPATIENT
Start: 2022-01-29 | End: 2022-04-30

## 2022-02-11 ENCOUNTER — MYC MEDICAL ADVICE (OUTPATIENT)
Dept: ONCOLOGY | Facility: CLINIC | Age: 72
End: 2022-02-11

## 2022-02-11 ENCOUNTER — ONCOLOGY VISIT (OUTPATIENT)
Dept: ONCOLOGY | Facility: CLINIC | Age: 72
End: 2022-02-11
Payer: COMMERCIAL

## 2022-02-11 ENCOUNTER — LAB (OUTPATIENT)
Dept: LAB | Facility: CLINIC | Age: 72
End: 2022-02-11

## 2022-02-11 ENCOUNTER — INFUSION THERAPY VISIT (OUTPATIENT)
Dept: INFUSION THERAPY | Facility: CLINIC | Age: 72
End: 2022-02-11
Payer: COMMERCIAL

## 2022-02-11 ENCOUNTER — ANCILLARY PROCEDURE (OUTPATIENT)
Dept: MAMMOGRAPHY | Facility: CLINIC | Age: 72
End: 2022-02-11
Attending: NURSE PRACTITIONER
Payer: COMMERCIAL

## 2022-02-11 VITALS
HEART RATE: 99 BPM | OXYGEN SATURATION: 96 % | DIASTOLIC BLOOD PRESSURE: 73 MMHG | SYSTOLIC BLOOD PRESSURE: 117 MMHG | BODY MASS INDEX: 35.12 KG/M2 | TEMPERATURE: 97.7 F | WEIGHT: 188.9 LBS

## 2022-02-11 DIAGNOSIS — Z17.0 MALIGNANT NEOPLASM OF UPPER-INNER QUADRANT OF RIGHT BREAST IN FEMALE, ESTROGEN RECEPTOR POSITIVE (H): Primary | ICD-10-CM

## 2022-02-11 DIAGNOSIS — R23.2 HOT FLASHES RELATED TO AROMATASE INHIBITOR THERAPY: ICD-10-CM

## 2022-02-11 DIAGNOSIS — R53.83 FATIGUE, UNSPECIFIED TYPE: ICD-10-CM

## 2022-02-11 DIAGNOSIS — K21.00 GASTROESOPHAGEAL REFLUX DISEASE WITH ESOPHAGITIS WITHOUT HEMORRHAGE: ICD-10-CM

## 2022-02-11 DIAGNOSIS — Z17.0 MALIGNANT NEOPLASM OF UPPER-INNER QUADRANT OF RIGHT BREAST IN FEMALE, ESTROGEN RECEPTOR POSITIVE (H): ICD-10-CM

## 2022-02-11 DIAGNOSIS — T45.1X5A HOT FLASHES RELATED TO AROMATASE INHIBITOR THERAPY: ICD-10-CM

## 2022-02-11 DIAGNOSIS — Z79.811 AROMATASE INHIBITOR USE: ICD-10-CM

## 2022-02-11 DIAGNOSIS — M81.8 OTHER OSTEOPOROSIS WITHOUT CURRENT PATHOLOGICAL FRACTURE: ICD-10-CM

## 2022-02-11 DIAGNOSIS — C50.211 MALIGNANT NEOPLASM OF UPPER-INNER QUADRANT OF RIGHT BREAST IN FEMALE, ESTROGEN RECEPTOR POSITIVE (H): Primary | ICD-10-CM

## 2022-02-11 DIAGNOSIS — F41.9 ANXIETY: ICD-10-CM

## 2022-02-11 DIAGNOSIS — R61 PERSPIRATION EXCESSIVE: ICD-10-CM

## 2022-02-11 DIAGNOSIS — Z12.31 ENCOUNTER FOR SCREENING MAMMOGRAM FOR BREAST CANCER: ICD-10-CM

## 2022-02-11 DIAGNOSIS — C50.211 MALIGNANT NEOPLASM OF UPPER-INNER QUADRANT OF RIGHT BREAST IN FEMALE, ESTROGEN RECEPTOR POSITIVE (H): ICD-10-CM

## 2022-02-11 DIAGNOSIS — Z12.31 VISIT FOR SCREENING MAMMOGRAM: ICD-10-CM

## 2022-02-11 DIAGNOSIS — Z79.811 AROMATASE INHIBITOR USE: Primary | ICD-10-CM

## 2022-02-11 LAB
ALBUMIN SERPL-MCNC: 3.4 G/DL (ref 3.4–5)
ALP SERPL-CCNC: 92 U/L (ref 40–150)
ALT SERPL W P-5'-P-CCNC: 26 U/L (ref 0–50)
ANION GAP SERPL CALCULATED.3IONS-SCNC: 4 MMOL/L (ref 3–14)
AST SERPL W P-5'-P-CCNC: 18 U/L (ref 0–45)
BILIRUB SERPL-MCNC: 0.4 MG/DL (ref 0.2–1.3)
BUN SERPL-MCNC: 17 MG/DL (ref 7–30)
CALCIUM SERPL-MCNC: 9.6 MG/DL (ref 8.5–10.1)
CHLORIDE BLD-SCNC: 108 MMOL/L (ref 94–109)
CO2 SERPL-SCNC: 29 MMOL/L (ref 20–32)
CREAT SERPL-MCNC: 0.68 MG/DL (ref 0.52–1.04)
ERYTHROCYTE [DISTWIDTH] IN BLOOD BY AUTOMATED COUNT: 13.7 % (ref 10–15)
GFR SERPL CREATININE-BSD FRML MDRD: >90 ML/MIN/1.73M2
GLUCOSE BLD-MCNC: 105 MG/DL (ref 70–99)
HCT VFR BLD AUTO: 40.1 % (ref 35–47)
HGB BLD-MCNC: 12.7 G/DL (ref 11.7–15.7)
HOLD SPECIMEN: NORMAL
MCH RBC QN AUTO: 28.9 PG (ref 26.5–33)
MCHC RBC AUTO-ENTMCNC: 31.7 G/DL (ref 31.5–36.5)
MCV RBC AUTO: 91 FL (ref 78–100)
PLATELET # BLD AUTO: 287 10E3/UL (ref 150–450)
POTASSIUM BLD-SCNC: 4 MMOL/L (ref 3.4–5.3)
PROT SERPL-MCNC: 7.4 G/DL (ref 6.8–8.8)
RBC # BLD AUTO: 4.39 10E6/UL (ref 3.8–5.2)
SODIUM SERPL-SCNC: 141 MMOL/L (ref 133–144)
TSH SERPL DL<=0.005 MIU/L-ACNC: 3.13 MU/L (ref 0.4–4)
WBC # BLD AUTO: 7.6 10E3/UL (ref 4–11)

## 2022-02-11 PROCEDURE — 96372 THER/PROPH/DIAG INJ SC/IM: CPT | Performed by: INTERNAL MEDICINE

## 2022-02-11 PROCEDURE — 84443 ASSAY THYROID STIM HORMONE: CPT

## 2022-02-11 PROCEDURE — 36415 COLL VENOUS BLD VENIPUNCTURE: CPT

## 2022-02-11 PROCEDURE — 99214 OFFICE O/P EST MOD 30 MIN: CPT | Mod: 25 | Performed by: INTERNAL MEDICINE

## 2022-02-11 PROCEDURE — 80053 COMPREHEN METABOLIC PANEL: CPT

## 2022-02-11 PROCEDURE — 77063 BREAST TOMOSYNTHESIS BI: CPT | Performed by: RADIOLOGY

## 2022-02-11 PROCEDURE — 77067 SCR MAMMO BI INCL CAD: CPT | Performed by: RADIOLOGY

## 2022-02-11 PROCEDURE — 99207 PR NO CHARGE LOS: CPT

## 2022-02-11 PROCEDURE — 85027 COMPLETE CBC AUTOMATED: CPT

## 2022-02-11 ASSESSMENT — PAIN SCALES - GENERAL: PAINLEVEL: MILD PAIN (3)

## 2022-02-11 NOTE — LETTER
2/11/2022         RE: Marixa Ann  [id number] [address] [facility] [id number]        Dear Colleague,    Thank you for referring your patient, Marixa Ann, to the [facility]. Please see a copy of my visit note below.    Oncology Follow-up visit:  Date on this visit: Feb 11, 2022      Surgeon: Dr.Sara Diamond  Primary Care Physician: Jackie Watts   Radiation Oncologist: Dr. Copeland    Diagnosis:  prognostic stage IB (pT2N0) [place] grade 1 ER+ IA+ Her2 negative invasive ductal right sided breast cancer    Oncologic History:  1. Breast Cancer - She was diagnosed with breast cancer in 12/2018 when she was found to have an abnormality on a screening mammogram (12/3/2019): a possible developing asymmetry right breast around 4:00 position posterior depth in the right breast. A diagnostic mammogram confirmed a 17 mm mass in the right medial breast, posterior depth. R breast US showed In the right breast at 2:00, 9 cm from nipple, there was an irregular hypoechoic mass, 12 x 9 x 14 mm. Right axilla US scan revealed normal appearing lymph nodes. Contrast mammogram on 12/26/2018 showed a 1.9 cm enhancing mass in the right medial breast. She proceeded to undergo a core needle biopsy of the suspicious mass on 12/26/2019. this showed a [place] grade 1 invasive ductal carcinoma, ER positive in 98%, IA positive in 94% of cells, and HER-2/yesica negative with HER2 to JCARLOS 17 ratio of 1.2.  She is s/p right lumpectomy on January 10, 2019 by Dr. Diamond.  Pathology from the surgery demonstrated invasive ductal carcinoma, [place] grade 1, 2.4 cm, with associated intermediate grade DCIS.  No LVI was identified.  Margins were clear of invasive carcinoma and DCIS.  Three sentinel lymph nodes were negative for malignancy.  Oncotype Dx score returned low risk at 4, correlating with 3% risk of disease recurrence at 9 years with aromatase inhibitor or tamoxifen alone.   There was no benefit from chemotherapy. She met with Dr. Copeland from radiation oncology, and has been undergoing radiation to the right chest wall, completed on March 11, 2019.      2. Bone Health -  She  had a DEXA scan in April 2018 which showed findings consistent with osteoporosis, with the most negative T score of -2.9 in the left femoral neck and a T score of -2.5 in the lumbar spine.    DEXA scan in August 2020 showed most negative T score of -2.9 in the femoral neck.    3. LAVONNE daughter  4. Thyroid nodule- incidentally noted on CTPA in 01/2019 which showed asymmetric prominent right thyroid lobe with a dominant 4.1 cm thyroid nodule on the right side. She then underwent a thyroid US (ordered by Dr. Kovacs) on 1/18/2019 which showed bilateral thyroid nodules with the largest nodule is on the right measuring up to 4.5 cm. Fine needle aspiration of the right thyroid nodule on 1/31/2019 showed findings c/w benign thyroid nodule.     5. She has had persistent mild elevation of inflammatory markers- ESR, CRP and d-dimer.   She had a negative for PE CTPA in March 2019. She was evaluated by Dr. Maggy Galloway, a rheumatologist at [place], in March 2018, for an elevated ESR of 58 and CRP of 53. At that time she was recommended to proceed with abdomen/pelvis CT for evaluation of a possible occult malignancy and she proceeded with it on 4/4/2018 and there were no findings suspicious for malignancy.  CT PA and CT abdomen and pelvis with contrast was performed on May 23, 2019 and has demonstrated no pulmonary embolism.        History Of Present Illness:  Ms. Ann (prefers to be called FUELUP) is a 71 year old female with Hx of fibromyalgia and ADHD and chronic fatigue, who presents for follow-up of prognostic stage IB ER+NE+ HER2 Darlyn negative Hutch[place] grade 1 IDC of  right breast.  She has completed adjuvant radiation to the right chest wall in March 2019. She has a history of osteoporosis but  in view of patient's  persistently elevated inflammatory markers and D-dimer,  she was started on on Anastrazole (still preferred over Tamoxifen in her situation)  in March 2019. She remains on Anastrozole uneventfully. She was also started on Prolia subq q 6 months in March 2019, for prevention of fractures and further bone mineral density loss.    She had a bilateral screening mammogram with tomosynthesis earlier today which showed no suspicious findings.  She has been feeling chronically fatigued. In the last several months she has been perspiring excessively.  She has a history of ADHD and anxiety and is on Adderall.        Past Medical/Surgical History:  Past Medical History:   Diagnosis Date     ADHD      Breast cancer (H) 12/26/2018    Right Breast     Chronic fatigue      Fibromyalgia      Hard of hearing      Osteoporosis      S/P radiation therapy     4,256 cGy to right breast completed on 03/11/2019 - [facility] with Dr. Copeland   She has a history of bilateral cataract surgery and reports she has an artificial lens in place in both eyes.     Past Surgical History:   Procedure Laterality Date     APPENDECTOMY  1967     BIOPSY BREAST NEEDLE LOCALIZATION Right 1/10/2019    Procedure: WIRE LOCALIZED RIGHT BREAST LUMPECTOMY WITH RIGHT SLNB;  Surgeon: Gema Diamond MD;  Location: [facility] OR     BREAST BIOPSY, CORE RT/LT Right 12/26/2018     CATARACT IOL, RT/LT Bilateral 2018    with lens implants per patient     CHOLECYSTECTOMY  05/24/2019     THYROID BIOPSY  01/31/2019     FHx and SocHx reviewed.  Allergies:  Allergies as of 02/11/2022 - Reviewed 09/14/2021   Allergen Reaction Noted     Vicodin [hydrocodone-acetaminophen] Anaphylaxis 01/24/2019     Dilaudid [hydromorphone] Nausea and Vomiting 07/26/2019     Oxycodone Nausea and Vomiting 07/26/2019     Dust mites  10/19/2017     Milk [lac bovis] Diarrhea 12/31/2018     Current Medications:  Current Outpatient Medications   Medication Sig Dispense Refill     acetaminophen  (TYLENOL) 325 MG tablet Take 325-650 mg by mouth every 6 hours as needed for mild pain       amphetamine-dextroamphetamine (ADDERALL) 15 MG tablet Take 1 tablet (15 mg) by mouth 2 times daily 60 tablet 0     anastrozole (ARIMIDEX) 1 MG tablet TAKE 1 TABLET BY MOUTH ONE TIME DAILY 90 tablet 0     B Complex Vitamins (VITAMIN B-COMPLEX PO) Take by mouth daily       calcium carbonate (TUMS) 500 MG chewable tablet Take 1 chew tab by mouth 2 times daily       Calcium-Magnesium-Zinc 333-133-5 MG TABS per tablet Take 1 tablet by mouth daily       Cholecalciferol (VITAMIN D-3 PO) Vitamin D is included with multiple vitamin, patient does not take additional. Marjorie Crooks LPN on 7/26/2019 at 3:02 PM       cyclobenzaprine (FLEXERIL) 10 MG tablet Take 1 tablet (10 mg) by mouth 2 times daily as needed for muscle spasms 30 tablet 3     denosumab (PROLIA) 60 MG/ML SOSY injection        KRILL OIL PO Take by mouth daily       magnesium oxide 200 MG TABS Take 100 mg by mouth once as needed       Multiple Vitamins-Minerals (MULTIVITAMIN PO)        mupirocin (BACTROBAN) 2 % external ointment Use 2 times a day to affected area. 22 g 0     pantoprazole (PROTONIX) 40 MG EC tablet Take 1 tablet (40 mg) by mouth daily 30 tablet 1     vitamin B-12 (CYANOCOBALAMIN) 100 MCG tablet Take 1,000 mcg by mouth daily        Physical Exam:  /73   Pulse 99   Temp 97.7  F (36.5  C) (Oral)   Wt 85.7 kg (188 lb 14.4 oz)   LMP 05/31/2003 (Approximate)   SpO2 96%   BMI 35.12 kg/m          GENERAL APPEARANCE: Middle aged woman, NAD  HEENT: no asymmetry or masses. OP: no lesions  Lymphatics: no cervical, supraclavicular or axillary lymphadenopathy b/l  CV: S1S2 reg  Resp: Lungs CTA b/l  GI: + BS, soft, NT, ND, hepatosplenomegaly difficult to evaluate secondary to body habitus       PSYCHIATRIC: mentation appears normal and affect is normal  Neurologic: Alert and oriented x3  Extremeties: No edema b/l LE   Breast: No breast masses b/l.      Laboratory/Imaging Studies  CBC reviewed and within normal limits today.'TSH within normal limits  CMP reviewed and unremarkable  ASSESSMENT/PLAN:  Marixa (prefers to be called Priscilla) is a very pleasant 69-year-old postmenopausal woman with recent diagnosis of prognostic stage IB (pT2N0) [place] grade 1 ER+ SC+ Her2 negative invasive ductal right sided breast cancer, s/p R lumpectomy on 1/10/2019 and scheduled to complete right chest wall radiation on 3/11/2019. Oncotype Dx score returned low risk at 4, correlating with 3% risk of disease recurrence at 9 years with the use  aromatase inhibitor or tamoxifen alone. There is no benefit to systemic chemotherapy in this setting. She has osteoporosis putting her at high risk of skeletal related events with an aromatase inhibitor  and also persistently elevated d-dimer and other inflammatory markers putting her at a higher risk of venous thromboembolism, but preferred to still be on systemic hormonal therapy, and started on Anastrazole in March 2019 and at the same time started on Prolia for prevention of further bone mineral density loss.    1. Breast cancer -she has been on anastrozole and will continue, with plan to continue for 5 years adjuvantly. F/up in 6 months with our NP, with CMP.  2. Osteoporosis- vitamin D level normal. Continue calcium and vitamin D supplementation and weight bearing exercise as tolerated.  Prolia 60 mg subq q 6 months for treatment of patient's osteoporosis, next due with next visit.   3. Breast cancer screening-  B/l 3d screening mammogram negative 02/11/2022. Next annual mammogram due 02/2023.  4. Excessive perspiration- TSH checked and within normal limits. F/up with PCP for further evaluation if symptoms persist.    At the end of our visit patient verbalized understanding and concurred with the plan.     >30 minutes spent on the date of the encounter doing chart review, review of test results, interpretation of tests, patient  visit and documentation.        Again, thank you for allowing me to participate in the care of your patient.        Sincerely,        Sil Dan MD, MD

## 2022-02-11 NOTE — PROGRESS NOTES
Oncology Follow-up visit:  Date on this visit: Feb 11, 2022      Surgeon: Dr.Sara Diamond  Primary Care Physician: Jackie Watts   Radiation Oncologist: Dr. Copeland    Diagnosis:  prognostic stage IB (pT2N0) [place] grade 1 ER+ AL+ Her2 negative invasive ductal right sided breast cancer    Oncologic History:  1. Breast Cancer - She was diagnosed with breast cancer in 12/2018 when she was found to have an abnormality on a screening mammogram (12/3/2019): a possible developing asymmetry right breast around 4:00 position posterior depth in the right breast. A diagnostic mammogram confirmed a 17 mm mass in the right medial breast, posterior depth. R breast US showed In the right breast at 2:00, 9 cm from nipple, there was an irregular hypoechoic mass, 12 x 9 x 14 mm. Right axilla US scan revealed normal appearing lymph nodes. Contrast mammogram on 12/26/2018 showed a 1.9 cm enhancing mass in the right medial breast. She proceeded to undergo a core needle biopsy of the suspicious mass on 12/26/2019. this showed a Carol grade 1 invasive ductal carcinoma, ER positive in 98%, AL positive in 94% of cells, and HER-2/yesica negative with HER2 to JCARLOS 17 ratio of 1.2.  She is s/p right lumpectomy on January 10, 2019 by Dr. Diamond.  Pathology from the surgery demonstrated invasive ductal carcinoma, Carol grade 1, 2.4 cm, with associated intermediate grade DCIS.  No LVI was identified.  Margins were clear of invasive carcinoma and DCIS.  Three sentinel lymph nodes were negative for malignancy.  Oncotype Dx score returned low risk at 4, correlating with 3% risk of disease recurrence at 9 years with aromatase inhibitor or tamoxifen alone.  There was no benefit from chemotherapy. She met with Dr. Copeland from radiation oncology, and has been undergoing radiation to the right chest wall, completed on March 11, 2019.      2. Bone Health -  She  had a DEXA scan in April 2018 which showed findings consistent with  osteoporosis, with the most negative T score of -2.9 in the left femoral neck and a T score of -2.5 in the lumbar spine.    DEXA scan in August 2020 showed most negative T score of -2.9 in the femoral neck.    3. LAVONNE daughter  4. Thyroid nodule- incidentally noted on CTPA in 01/2019 which showed asymmetric prominent right thyroid lobe with a dominant 4.1 cm thyroid nodule on the right side. She then underwent a thyroid US (ordered by Dr. Kovacs) on 1/18/2019 which showed bilateral thyroid nodules with the largest nodule is on the right measuring up to 4.5 cm. Fine needle aspiration of the right thyroid nodule on 1/31/2019 showed findings c/w benign thyroid nodule.     5. She has had persistent mild elevation of inflammatory markers- ESR, CRP and d-dimer.   She had a negative for PE CTPA in March 2019. She was evaluated by Dr. Maggy Galloway, a rheumatologist at [place], in March 2018, for an elevated ESR of 58 and CRP of 53. At that time she was recommended to proceed with abdomen/pelvis CT for evaluation of a possible occult malignancy and she proceeded with it on 4/4/2018 and there were no findings suspicious for malignancy.  CT PA and CT abdomen and pelvis with contrast was performed on May 23, 2019 and has demonstrated no pulmonary embolism.        History Of Present Illness:  Ms. Ann (prefers to be called Priscilla) is a 71 year old female with Hx of fibromyalgia and ADHD and chronic fatigue, who presents for follow-up of prognostic stage IB ER+WI+ HER2 Darlyn negative [place] grade 1 IDC of  right breast.  She has completed adjuvant radiation to the right chest wall in March 2019. She has a history of osteoporosis but  in view of patient's persistently elevated inflammatory markers and D-dimer,  she was started on on Anastrazole (still preferred over Tamoxifen in her situation)  in March 2019. She remains on Anastrozole uneventfully. She was also started on Prolia subq q 6 months in March 2019, for  prevention of fractures and further bone mineral density loss.    She had a bilateral screening mammogram with tomosynthesis earlier today which showed no suspicious findings.  She has been feeling chronically fatigued. In the last several months she has been perspiring excessively.  She has a history of ADHD and anxiety and is on Adderall.        Past Medical/Surgical History:  Past Medical History:   Diagnosis Date     ADHD      Breast cancer (H) 12/26/2018    Right Breast     Chronic fatigue      Fibromyalgia      Hard of hearing      Osteoporosis      S/P radiation therapy     4,256 cGy to right breast completed on 03/11/2019 - [facility] with Dr. Copeland   She has a history of bilateral cataract surgery and reports she has an artificial lens in place in both eyes.     Past Surgical History:   Procedure Laterality Date     APPENDECTOMY  1967     BIOPSY BREAST NEEDLE LOCALIZATION Right 1/10/2019    Procedure: WIRE LOCALIZED RIGHT BREAST LUMPECTOMY WITH RIGHT SLNB;  Surgeon: Gema Diamond MD;  Location: [facility] OR     BREAST BIOPSY, CORE RT/LT Right 12/26/2018     CATARACT IOL, RT/LT Bilateral 2018    with lens implants per patient     CHOLECYSTECTOMY  05/24/2019     THYROID BIOPSY  01/31/2019     FHx and SocHx reviewed.  Allergies:  Allergies as of 02/11/2022 - Reviewed 09/14/2021   Allergen Reaction Noted     Vicodin [hydrocodone-acetaminophen] Anaphylaxis 01/24/2019     Dilaudid [hydromorphone] Nausea and Vomiting 07/26/2019     Oxycodone Nausea and Vomiting 07/26/2019     Dust mites  10/19/2017     Milk [lac bovis] Diarrhea 12/31/2018     Current Medications:  Current Outpatient Medications   Medication Sig Dispense Refill     acetaminophen (TYLENOL) 325 MG tablet Take 325-650 mg by mouth every 6 hours as needed for mild pain       amphetamine-dextroamphetamine (ADDERALL) 15 MG tablet Take 1 tablet (15 mg) by mouth 2 times daily 60 tablet 0     anastrozole (ARIMIDEX) 1 MG tablet TAKE 1 TABLET BY MOUTH  ONE TIME DAILY 90 tablet 0     B Complex Vitamins (VITAMIN B-COMPLEX PO) Take by mouth daily       calcium carbonate (TUMS) 500 MG chewable tablet Take 1 chew tab by mouth 2 times daily       Calcium-Magnesium-Zinc 333-133-5 MG TABS per tablet Take 1 tablet by mouth daily       Cholecalciferol (VITAMIN D-3 PO) Vitamin D is included with multiple vitamin, patient does not take additional. Marjorie Crooks LPN on 7/26/2019 at 3:02 PM       cyclobenzaprine (FLEXERIL) 10 MG tablet Take 1 tablet (10 mg) by mouth 2 times daily as needed for muscle spasms 30 tablet 3     denosumab (PROLIA) 60 MG/ML SOSY injection        KRILL OIL PO Take by mouth daily       magnesium oxide 200 MG TABS Take 100 mg by mouth once as needed       Multiple Vitamins-Minerals (MULTIVITAMIN PO)        mupirocin (BACTROBAN) 2 % external ointment Use 2 times a day to affected area. 22 g 0     pantoprazole (PROTONIX) 40 MG EC tablet Take 1 tablet (40 mg) by mouth daily 30 tablet 1     vitamin B-12 (CYANOCOBALAMIN) 100 MCG tablet Take 1,000 mcg by mouth daily        Physical Exam:  /73   Pulse 99   Temp 97.7  F (36.5  C) (Oral)   Wt 85.7 kg (188 lb 14.4 oz)   LMP 05/31/2003 (Approximate)   SpO2 96%   BMI 35.12 kg/m          GENERAL APPEARANCE: Middle aged woman, NAD  HEENT: no asymmetry or masses.   Lymphatics: no cervical, supraclavicular or axillary lymphadenopathy b/l  CV: S1S2 reg  Resp: Lungs CTA b/l       PSYCHIATRIC: mentation appears normal and affect is normal  Neurologic: Alert and oriented x3  Extremeties: No edema b/l LE       Laboratory/Imaging Studies  CBC reviewed and within normal limits today.'TSH within normal limits  CMP reviewed and unremarkable  ASSESSMENT/PLAN:  Marixa (prefers to be called [email or web address]) is a very pleasant 69-year-old postmenopausal woman with recent diagnosis of prognostic stage IB (pT2N0) [place] grade 1 ER+ FL+ Her2 negative invasive ductal right sided breast cancer, s/p R lumpectomy on 1/10/2019 and  scheduled to complete right chest wall radiation on 3/11/2019. Oncotype Dx score returned low risk at 4, correlating with 3% risk of disease recurrence at 9 years with the use  aromatase inhibitor or tamoxifen alone. There is no benefit to systemic chemotherapy in this setting. She has osteoporosis putting her at high risk of skeletal related events with an aromatase inhibitor  and also persistently elevated d-dimer and other inflammatory markers putting her at a higher risk of venous thromboembolism, but preferred to still be on systemic hormonal therapy, and started on Anastrazole in March 2019 and at the same time started on Prolia for prevention of further bone mineral density loss.    1. Breast cancer -she has been on anastrozole and will continue, with plan to continue for 5 years adjuvantly. F/up in 6 months with our NP, with CMP.  2. Osteoporosis-  Continue calcium and vitamin D supplementation and weight bearing exercise as tolerated.  Prolia 60 mg subq q 6 months for treatment of patient's osteoporosis, next due today and then with next visit.   3. Breast cancer screening-  B/l 3d screening mammogram negative 02/11/2022. Next annual mammogram due 02/2023.  4. Excessive perspiration- TSH checked and within normal limits. F/up with PCP for further evaluation if symptoms persist.    At the end of our visit patient verbalized understanding and concurred with the plan.     >30 minutes spent on the date of the encounter doing chart review, review of test results, interpretation of tests, patient visit and documentation.

## 2022-02-11 NOTE — NURSING NOTE
"Oncology Rooming Note    February 11, 2022 12:25 PM   Marixa Ann is a 71 year old female who presents for:    Chief Complaint   Patient presents with     Oncology Clinic Visit     6 month f/u     Initial Vitals: /73   Pulse 99   Temp 97.7  F (36.5  C) (Oral)   Wt 85.7 kg (188 lb 14.4 oz)   LMP 05/31/2003 (Approximate)   SpO2 96%   BMI 35.12 kg/m   Estimated body mass index is 35.12 kg/m  as calculated from the following:    Height as of 2/8/21: 1.562 m (5' 1.5\").    Weight as of this encounter: 85.7 kg (188 lb 14.4 oz). Body surface area is 1.93 meters squared.  Mild Pain (3) Comment: Data Unavailable   Patient's last menstrual period was 05/31/2003 (approximate).  Allergies reviewed: Yes  Medications reviewed: Yes    Medications: Medication refills not needed today.  Pharmacy name entered into EPIC:    WRITTEN PRESCRIPTION REQUESTED  [id number] [address] [address]    Clinical concerns: pain in the right underarm.       Darcie Payton LPN              "

## 2022-02-11 NOTE — PROGRESS NOTES
Infusion Nursing Note:  Marixa Ann presents today for Prolia.    Patient seen by provider today: Yes: Dr. DUNCAN   present during visit today: Not Applicable.    Note: Seen in clinic today.      Intravenous Access:  No Intravenous access/labs at this visit.    Treatment Conditions:  Not Applicable.      Post Infusion Assessment:  Patient tolerated injection without incident.       Discharge Plan:   Departure Mode: Ambulatory.      Shruthi Andrea RN

## 2022-02-14 NOTE — TELEPHONE ENCOUNTER
Called patient r/t her [employer] message, discussed she may have been looking at an old note. New note is available for her to review. Notified Thyroid results were normal. No further questions or concerns at this time.     Shruthi Andrea RN, BSN, PHN  [facility]

## 2022-03-10 ENCOUNTER — MYC REFILL (OUTPATIENT)
Dept: FAMILY MEDICINE | Facility: CLINIC | Age: 72
End: 2022-03-10
Payer: COMMERCIAL

## 2022-03-10 DIAGNOSIS — F90.0 ATTENTION DEFICIT HYPERACTIVITY DISORDER (ADHD), PREDOMINANTLY INATTENTIVE TYPE: ICD-10-CM

## 2022-03-11 RX ORDER — DEXTROAMPHETAMINE SACCHARATE, AMPHETAMINE ASPARTATE, DEXTROAMPHETAMINE SULFATE AND AMPHETAMINE SULFATE 3.75; 3.75; 3.75; 3.75 MG/1; MG/1; MG/1; MG/1
15 TABLET ORAL 2 TIMES DAILY
Qty: 60 TABLET | Refills: 0 | Status: SHIPPED | OUTPATIENT
Start: 2022-03-11 | End: 2022-04-30

## 2022-03-11 NOTE — TELEPHONE ENCOUNTER
Routing refill request to provider for review/approval because:  Drug not on the [facility] refill protocol       Camille Polanco RN  [facility]

## 2022-04-30 ENCOUNTER — MYC REFILL (OUTPATIENT)
Dept: FAMILY MEDICINE | Facility: CLINIC | Age: 72
End: 2022-04-30
Payer: COMMERCIAL

## 2022-04-30 DIAGNOSIS — F90.0 ATTENTION DEFICIT HYPERACTIVITY DISORDER (ADHD), PREDOMINANTLY INATTENTIVE TYPE: ICD-10-CM

## 2022-05-02 RX ORDER — DEXTROAMPHETAMINE SACCHARATE, AMPHETAMINE ASPARTATE, DEXTROAMPHETAMINE SULFATE AND AMPHETAMINE SULFATE 3.75; 3.75; 3.75; 3.75 MG/1; MG/1; MG/1; MG/1
15 TABLET ORAL 2 TIMES DAILY
Qty: 60 TABLET | Refills: 0 | Status: SHIPPED | OUTPATIENT
Start: 2022-05-02 | End: 2022-06-29

## 2022-05-02 NOTE — TELEPHONE ENCOUNTER
Routing refill request to provider for review/approval because:  Not on refill protocol.        Camille Polanco RN  [facility]

## 2022-06-05 ENCOUNTER — HEALTH MAINTENANCE LETTER (OUTPATIENT)
Age: 72
End: 2022-06-05

## 2022-07-21 ENCOUNTER — MYC MEDICAL ADVICE (OUTPATIENT)
Dept: FAMILY MEDICINE | Facility: CLINIC | Age: 72
End: 2022-07-21

## 2022-07-21 ENCOUNTER — TELEPHONE (OUTPATIENT)
Dept: FAMILY MEDICINE | Facility: CLINIC | Age: 72
End: 2022-07-21

## 2022-07-21 NOTE — LETTER
Dear Marixa Ann      Our records indicate you are due for your recommended annual wellness exam. The annual wellness exam can be completed in person or as a virtual visit.     You may contact the [facility] at [phone] to schedule at your earliest convenience.    Sincerely,     [facility] Team

## 2022-07-21 NOTE — TELEPHONE ENCOUNTER
Patient Quality Outreach    Patient is due for the following:   Physical  - AWV    NEXT STEPS:   Schedule a yearly physical    Type of outreach:    Sent [employer] message.    Next Steps:  Reach out within 90 days via Letter.    Max number of attempts reached: Yes. Will try again in 90 days if patient still on fail list.    Questions for provider review:    None     Norma Mai RN

## 2022-08-09 DIAGNOSIS — Z79.811 AROMATASE INHIBITOR USE: Primary | ICD-10-CM

## 2022-08-09 DIAGNOSIS — Z17.0 MALIGNANT NEOPLASM OF UPPER-INNER QUADRANT OF RIGHT BREAST IN FEMALE, ESTROGEN RECEPTOR POSITIVE (H): ICD-10-CM

## 2022-08-09 DIAGNOSIS — C50.211 MALIGNANT NEOPLASM OF UPPER-INNER QUADRANT OF RIGHT BREAST IN FEMALE, ESTROGEN RECEPTOR POSITIVE (H): ICD-10-CM

## 2022-08-09 DIAGNOSIS — M81.8 OTHER OSTEOPOROSIS WITHOUT CURRENT PATHOLOGICAL FRACTURE: ICD-10-CM

## 2022-08-11 ENCOUNTER — ONCOLOGY VISIT (OUTPATIENT)
Dept: ONCOLOGY | Facility: CLINIC | Age: 72
End: 2022-08-11

## 2022-08-11 ENCOUNTER — LAB (OUTPATIENT)
Dept: LAB | Facility: CLINIC | Age: 72
End: 2022-08-11
Payer: COMMERCIAL

## 2022-08-11 ENCOUNTER — INFUSION THERAPY VISIT (OUTPATIENT)
Dept: INFUSION THERAPY | Facility: CLINIC | Age: 72
End: 2022-08-11

## 2022-08-11 VITALS
RESPIRATION RATE: 18 BRPM | BODY MASS INDEX: 34.77 KG/M2 | DIASTOLIC BLOOD PRESSURE: 77 MMHG | HEART RATE: 93 BPM | TEMPERATURE: 98.3 F | WEIGHT: 187 LBS | OXYGEN SATURATION: 99 % | SYSTOLIC BLOOD PRESSURE: 120 MMHG

## 2022-08-11 VITALS
TEMPERATURE: 98.3 F | WEIGHT: 187 LBS | BODY MASS INDEX: 34.77 KG/M2 | HEART RATE: 93 BPM | OXYGEN SATURATION: 99 % | RESPIRATION RATE: 18 BRPM | DIASTOLIC BLOOD PRESSURE: 77 MMHG | SYSTOLIC BLOOD PRESSURE: 120 MMHG

## 2022-08-11 DIAGNOSIS — F41.9 ANXIETY: ICD-10-CM

## 2022-08-11 DIAGNOSIS — C50.211 MALIGNANT NEOPLASM OF UPPER-INNER QUADRANT OF RIGHT BREAST IN FEMALE, ESTROGEN RECEPTOR POSITIVE (H): ICD-10-CM

## 2022-08-11 DIAGNOSIS — Z79.811 AROMATASE INHIBITOR USE: ICD-10-CM

## 2022-08-11 DIAGNOSIS — M81.8 OTHER OSTEOPOROSIS WITHOUT CURRENT PATHOLOGICAL FRACTURE: ICD-10-CM

## 2022-08-11 DIAGNOSIS — Z79.811 AROMATASE INHIBITOR USE: Primary | ICD-10-CM

## 2022-08-11 DIAGNOSIS — Z17.0 MALIGNANT NEOPLASM OF UPPER-INNER QUADRANT OF RIGHT BREAST IN FEMALE, ESTROGEN RECEPTOR POSITIVE (H): ICD-10-CM

## 2022-08-11 DIAGNOSIS — M81.8 OTHER OSTEOPOROSIS WITHOUT CURRENT PATHOLOGICAL FRACTURE: Primary | ICD-10-CM

## 2022-08-11 DIAGNOSIS — F90.0 ATTENTION DEFICIT HYPERACTIVITY DISORDER (ADHD), PREDOMINANTLY INATTENTIVE TYPE: ICD-10-CM

## 2022-08-11 DIAGNOSIS — R53.83 FATIGUE, UNSPECIFIED TYPE: ICD-10-CM

## 2022-08-11 LAB
ALBUMIN SERPL-MCNC: 3.2 G/DL (ref 3.4–5)
ALP SERPL-CCNC: 97 U/L (ref 40–150)
ALT SERPL W P-5'-P-CCNC: 19 U/L (ref 0–50)
ANION GAP SERPL CALCULATED.3IONS-SCNC: 5 MMOL/L (ref 3–14)
AST SERPL W P-5'-P-CCNC: 18 U/L (ref 0–45)
BILIRUB SERPL-MCNC: 0.3 MG/DL (ref 0.2–1.3)
BUN SERPL-MCNC: 15 MG/DL (ref 7–30)
CALCIUM SERPL-MCNC: 9.3 MG/DL (ref 8.5–10.1)
CHLORIDE BLD-SCNC: 110 MMOL/L (ref 94–109)
CO2 SERPL-SCNC: 30 MMOL/L (ref 20–32)
CREAT SERPL-MCNC: 0.9 MG/DL (ref 0.52–1.04)
GFR SERPL CREATININE-BSD FRML MDRD: 69 ML/MIN/1.73M2
GLUCOSE BLD-MCNC: 101 MG/DL (ref 70–99)
HOLD SPECIMEN: NORMAL
POTASSIUM BLD-SCNC: 4.3 MMOL/L (ref 3.4–5.3)
PROT SERPL-MCNC: 6.5 G/DL (ref 6.8–8.8)
SODIUM SERPL-SCNC: 145 MMOL/L (ref 133–144)

## 2022-08-11 PROCEDURE — 36415 COLL VENOUS BLD VENIPUNCTURE: CPT

## 2022-08-11 PROCEDURE — 99215 OFFICE O/P EST HI 40 MIN: CPT | Mod: 25 | Performed by: NURSE PRACTITIONER

## 2022-08-11 PROCEDURE — 99207 PR NO CHARGE LOS: CPT

## 2022-08-11 PROCEDURE — 96372 THER/PROPH/DIAG INJ SC/IM: CPT | Performed by: NURSE PRACTITIONER

## 2022-08-11 PROCEDURE — 80053 COMPREHEN METABOLIC PANEL: CPT

## 2022-08-11 ASSESSMENT — PAIN SCALES - GENERAL: PAINLEVEL: MILD PAIN (3)

## 2022-08-11 NOTE — NURSING NOTE
"Oncology Rooming Note    August 11, 2022 12:46 PM   Marixa Ann is a 71 year old female who presents for:    Chief Complaint   Patient presents with     Oncology Clinic Visit     6 month follow up     Initial Vitals: /77 (BP Location: Left arm)   Pulse 93   Temp 98.3  F (36.8  C) (Oral)   Resp 18   Wt 84.8 kg (187 lb)   LMP 05/31/2003 (Approximate)   SpO2 99%   BMI 34.77 kg/m   Estimated body mass index is 34.77 kg/m  as calculated from the following:    Height as of 2/8/21: 1.562 m (5' 1.5\").    Weight as of this encounter: 84.8 kg (187 lb). Body surface area is 1.92 meters squared.  Mild Pain (3) Comment: Data Unavailable   Patient's last menstrual period was 05/31/2003 (approximate).  Allergies reviewed: Yes  Medications reviewed: Yes    Medications: Medication refills not needed today.  Pharmacy name entered into EPIC:    WRITTEN PRESCRIPTION REQUESTED  [id number] - [place], [place] - [address] [address]    Clinical concerns: Fatigue- worsening       Marjorie Crooks LPN              "

## 2022-08-11 NOTE — PROGRESS NOTES
Oncology Follow Up Visit: August 11, 2022     Oncologist: Dr Sil Dan to Dr Laguerre in 2/2023  PCP: Rhiannon Weir    Diagnosis: Stage IB Right Breast Cancer  Marixa Ann is a 70 yo female who had abnormality at 2-4 oclock level of right breaston screening mammogram in 12/2018. Final review proved a 12 x 9 x14 mm invasive ductal carcinoma at 2 oclock to the right breast, Carol grade 1, ER/FL positive, Her2 negative with 0/3 SLN positive and edges free of disease.   Oncotype score =4 or 3% risk of disease at 9 years with hormone therapy.   Treatment:   1/10/2019 Right Lumpectomy with SLN biopsy  3/11/2019 completed right breast radiation post lumpectomy.   - choice made to not use Tamoxifen due to risk for DVT(repeatedly elevated d-dimer as well as elevated ESR and CRP) so started pt on Aromatase inhibitor with close bone evaluation.    Interval History: Ms. Ann comes to clinic alone for follow up to her Arimidex use for her breast cancer.  Pt states she has been more stressed since finding out  has fast growing prostate cancer and has life expectancy of 1 year. Known to have ADHD and some anxiety. She had lost some weight but now is back up. She does not exercise due to chronic fatigue. No new breast or chest issues. Feel no new new pains or illness. Reports she did have a skin cancer remove in last months.    Rest of comprehensive and complete ROS is reviewed and is negative.   Past Medical History:   Diagnosis Date     ADHD      Breast cancer (H) 12/26/2018    Right Breast     Chronic fatigue      Fibromyalgia      Hard of hearing      Osteoporosis      S/P radiation therapy     4,256 cGy to right breast completed on 03/11/2019 - [facility] with Dr. Copeland     Current Outpatient Medications   Medication     acetaminophen (TYLENOL) 325 MG tablet     amphetamine-dextroamphetamine (ADDERALL) 15 MG tablet     anastrozole (ARIMIDEX) 1 MG tablet     B Complex Vitamins (VITAMIN  B-COMPLEX PO)     calcium carbonate (TUMS) 500 MG chewable tablet     Calcium-Magnesium-Zinc 333-133-5 MG TABS per tablet     Cholecalciferol (VITAMIN D-3 PO)     cyclobenzaprine (FLEXERIL) 10 MG tablet     denosumab (PROLIA) 60 MG/ML SOSY injection     KRILL OIL PO     magnesium oxide 200 MG TABS     Multiple Vitamins-Minerals (MULTIVITAMIN PO)     mupirocin (BACTROBAN) 2 % external ointment     pantoprazole (PROTONIX) 40 MG EC tablet     vitamin B-12 (CYANOCOBALAMIN) 100 MCG tablet     Current Facility-Administered Medications   Medication     cross-Linked Hyaluronate (GEL-ONE) injection PRSY 30 mg     cross-Linked Hyaluronate (GEL-ONE) injection PRSY 30 mg     cross-Linked Hyaluronate (GEL-ONE) injection PRSY 30 mg     cross-Linked Hyaluronate (GEL-ONE) injection PRSY 30 mg     triamcinolone (KENALOG-40) injection 80 mg     Allergies   Allergen Reactions     Vicodin [Hydrocodone-Acetaminophen] Anaphylaxis     Dilaudid [Hydromorphone] Nausea and Vomiting     Oxycodone Nausea and Vomiting     Dust Mites      Milk [Lac Bovis] Diarrhea       Physical Exam:/77 (BP Location: Left arm)   Pulse 93   Temp 98.3  F (36.8  C) (Oral)   Resp 18   Wt 84.8 kg (187 lb)   LMP 05/31/2003 (Approximate)   SpO2 99%   BMI 34.77 kg/m    Constitutional: Alert, overweight.Appears anxious though talking openly about concerns.   ENT: Eyes bright, No mouth sores- noted to be increasingly [place] made more difficult with the masks.  Neck: Supple, No adenopathy.Thyroid symmetric  Cardiac: Heart rate and rhythm is regular and strong without murmur  Respiratory: Breathing easy. Lung sounds clear to auscultation  Breasts:tender to lateral right breast and lower band of right breast with palpation - no swelling redness or warmth to area. Left breast without abnormalities.   Abdomen: Soft, non-tender, BS normal. No masses or organomegaly  MS: Muscle tone normal, extremities normal with no edema.   Skin: No suspicious lesions or  rashes  Neuro: Sensory grossly WNL, gait normal.   Lymph: Normal ant/post cervical, axillary, supraclavicular nodes  Psych: Mentation appears normal and affect somewhat anxious but very functional and easily focuses.     Laboratory Results:   Results for orders placed or performed in visit on 08/11/22   Comprehensive metabolic panel     Status: Abnormal   Result Value Ref Range    Sodium 145 (H) 133 - 144 mmol/L    Potassium 4.3 3.4 - 5.3 mmol/L    Chloride 110 (H) 94 - 109 mmol/L    Carbon Dioxide (CO2) 30 20 - 32 mmol/L    Anion Gap 5 3 - 14 mmol/L    Urea Nitrogen 15 7 - 30 mg/dL    Creatinine 0.90 0.52 - 1.04 mg/dL    Calcium 9.3 8.5 - 10.1 mg/dL    Glucose 101 (H) 70 - 99 mg/dL    Alkaline Phosphatase 97 40 - 150 U/L    AST 18 0 - 45 U/L    ALT 19 0 - 50 U/L    Protein Total 6.5 (L) 6.8 - 8.8 g/dL    Albumin 3.2 (L) 3.4 - 5.0 g/dL    Bilirubin Total 0.3 0.2 - 1.3 mg/dL    GFR Estimate 69 >60 mL/min/1.73m2   Extra Tube     Status: None    Narrative    The following orders were created for panel order Extra Tube.  Procedure                               Abnormality         Status                     ---------                               -----------         ------                     Extra Serum Separator Tu...[[id number]]                      Final result               Extra Purple Top Tube[[id number]]                            Final result                 Please view results for these tests on the individual orders.   Extra Serum Separator Tube (SST)     Status: None   Result Value Ref Range    Hold Specimen JIC    Extra Purple Top Tube     Status: None   Result Value Ref Range    Hold Specimen JIC    Extra Tube     Status: None    Narrative    The following orders were created for panel order Extra Tube.  Procedure                               Abnormality         Status                     ---------                               -----------         ------                     Extra Green Top  (Lithium...[[id number]]                      Final result                 Please view results for these tests on the individual orders.   Extra Green Top (Lithium Heparin) Tube     Status: None   Result Value Ref Range    Hold Specimen [facility]      Assessment and Plan:   Stage 1B Right Breast Cancer- Pt completed right lumpectomy and SLN biopsy, radiation therapy. She is currently on Anastrozole daily   She will continue the anastrozole- renewed recently  She will return in 6 months for review with Dr Butler - no labs or imaging necessary.   Last Mammogram was completed in 2/2021 and was normal- will repeat in February 2022-- order placed.  Use of AI- Has cholesterol checked yearly by PCP with know hyperlipidemia though no known treatment at this time.   Osteoporosis- 8/6/2020 DEXA result showing T score of -2.9 equalling osteoporosis. Pt using Prolia every 6 months - will be given today.  - new order for DEXA scan give for near future.   Had discussion about if results of DEXA showing worsening osteoporosis, we will need to weigh use of AI or importance of bone health.   Continue with Calcium +viatmin D supplement.   Fatigue/ anxiety and probable depression with known ADD- pt reports chronic fatigue syndrome and ADHD. She is on medication for the hyperactivity. Mentions need for depression medication but will bring this up to new PCP. Encouraged her gardening to help with stress.   The total time of this encounter amounted to 40 minutes. This time included face to face time spent with the patient, prep work, ordering tests, and performing post visit documentation.  Lidia Parham,Cnp

## 2022-08-11 NOTE — PATIENT INSTRUCTIONS
Please set up Dexa scan in very near future for pt. - this is a test to check for bone density and how the anastrozole affects your bones  Has next appt with labs and Dr Laguerre set for February 2023 and repeat of Prolia.

## 2022-08-11 NOTE — LETTER
8/11/2022         RE: Marixa Ann  [address] [address] [facility] [id number]        Dear Colleague,    Thank you for referring your patient, Marixa Ann, to the [facility]. Please see a copy of my visit note below.    Oncology Follow Up Visit: August 11, 2022     Oncologist: Dr Sil Dan to Dr Laguerre in 2/2023  PCP: Rhiannon Weir    Diagnosis: Stage IB Right Breast Cancer  Marixa Ann is a 70 yo female who had abnormality at 2-4 oclock level of right breaston screening mammogram in 12/2018. Final review proved a 12 x 9 x14 mm invasive ductal carcinoma at 2 oclock to the right breast, [place] grade 1, ER/AZ positive, Her2 negative with 0/3 SLN positive and edges free of disease.   Oncotype score =4 or 3% risk of disease at 9 years with hormone therapy.   Treatment:   1/10/2019 Right Lumpectomy with SLN biopsy  3/11/2019 completed right breast radiation post lumpectomy.   - choice made to not use Tamoxifen due to risk for DVT(repeatedly elevated d-dimer as well as elevated ESR and CRP) so started pt on Aromatase inhibitor with close bone evaluation.    Interval History: Ms. Ann comes to clinic alone for follow up to her Arimidex use for her breast cancer.  Pt states she has been more stressed since finding out  has fast growing prostate cancer and has life expectancy of 1 year. Known to have ADHD and some anxiety. She had lost some weight but now is back up. She does not exercise due to chronic fatigue. No new breast or chest issues. Feel no new new pains or illness. Reports she did have a skin cancer remove in last months.    Rest of comprehensive and complete ROS is reviewed and is negative.   Past Medical History:   Diagnosis Date     ADHD      Breast cancer (H) 12/26/2018    Right Breast     Chronic fatigue      Fibromyalgia      Hard of hearing      Osteoporosis      S/P radiation therapy     4,256 cGy to right breast  completed on 03/11/2019 - [facility] with Dr. Copeland     Current Outpatient Medications   Medication     acetaminophen (TYLENOL) 325 MG tablet     amphetamine-dextroamphetamine (ADDERALL) 15 MG tablet     anastrozole (ARIMIDEX) 1 MG tablet     B Complex Vitamins (VITAMIN B-COMPLEX PO)     calcium carbonate (TUMS) 500 MG chewable tablet     Calcium-Magnesium-Zinc 333-133-5 MG TABS per tablet     Cholecalciferol (VITAMIN D-3 PO)     cyclobenzaprine (FLEXERIL) 10 MG tablet     denosumab (PROLIA) 60 MG/ML SOSY injection     KRILL OIL PO     magnesium oxide 200 MG TABS     Multiple Vitamins-Minerals (MULTIVITAMIN PO)     mupirocin (BACTROBAN) 2 % external ointment     pantoprazole (PROTONIX) 40 MG EC tablet     vitamin B-12 (CYANOCOBALAMIN) 100 MCG tablet     Current Facility-Administered Medications   Medication     cross-Linked Hyaluronate (GEL-ONE) injection PRSY 30 mg     cross-Linked Hyaluronate (GEL-ONE) injection PRSY 30 mg     cross-Linked Hyaluronate (GEL-ONE) injection PRSY 30 mg     cross-Linked Hyaluronate (GEL-ONE) injection PRSY 30 mg     triamcinolone (KENALOG-40) injection 80 mg     Allergies   Allergen Reactions     Vicodin [Hydrocodone-Acetaminophen] Anaphylaxis     Dilaudid [Hydromorphone] Nausea and Vomiting     Oxycodone Nausea and Vomiting     Dust Mites      Milk [Lac Bovis] Diarrhea       Physical Exam:/77 (BP Location: Left arm)   Pulse 93   Temp 98.3  F (36.8  C) (Oral)   Resp 18   Wt 84.8 kg (187 lb)   LMP 05/31/2003 (Approximate)   SpO2 99%   BMI 34.77 kg/m    Constitutional: Alert, overweight.Appears anxious though talking openly about concerns.   ENT: Eyes bright, No mouth sores- noted to be increasingly [place] made more difficult with the masks.  Neck: Supple, No adenopathy.Thyroid symmetric  Cardiac: Heart rate and rhythm is regular and strong without murmur  Respiratory: Breathing easy. Lung sounds clear to auscultation  Breasts:tender to lateral right breast and lower  band of right breast with palpation - no swelling redness or warmth to area. Left breast without abnormalities.   Abdomen: Soft, non-tender, BS normal. No masses or organomegaly  MS: Muscle tone normal, extremities normal with no edema.   Skin: No suspicious lesions or rashes  Neuro: Sensory grossly WNL, gait normal.   Lymph: Normal ant/post cervical, axillary, supraclavicular nodes  Psych: Mentation appears normal and affect somewhat anxious but very functional and easily focuses.     Laboratory Results:   Results for orders placed or performed in visit on 08/11/22   Comprehensive metabolic panel     Status: Abnormal   Result Value Ref Range    Sodium 145 (H) 133 - 144 mmol/L    Potassium 4.3 3.4 - 5.3 mmol/L    Chloride 110 (H) 94 - 109 mmol/L    Carbon Dioxide (CO2) 30 20 - 32 mmol/L    Anion Gap 5 3 - 14 mmol/L    Urea Nitrogen 15 7 - 30 mg/dL    Creatinine 0.90 0.52 - 1.04 mg/dL    Calcium 9.3 8.5 - 10.1 mg/dL    Glucose 101 (H) 70 - 99 mg/dL    Alkaline Phosphatase 97 40 - 150 U/L    AST 18 0 - 45 U/L    ALT 19 0 - 50 U/L    Protein Total 6.5 (L) 6.8 - 8.8 g/dL    Albumin 3.2 (L) 3.4 - 5.0 g/dL    Bilirubin Total 0.3 0.2 - 1.3 mg/dL    GFR Estimate 69 >60 mL/min/1.73m2   Extra Tube     Status: None    Narrative    The following orders were created for panel order Extra Tube.  Procedure                               Abnormality         Status                     ---------                               -----------         ------                     Extra Serum Separator Tu...[[id number]]                      Final result               Extra Purple Top Tube[[id number]]                            Final result                 Please view results for these tests on the individual orders.   Extra Serum Separator Tube (SST)     Status: None   Result Value Ref Range    Hold Specimen JIC    Extra Purple Top Tube     Status: None   Result Value Ref Range    Hold Specimen JIC    Extra Tube     Status: None    Narrative     The following orders were created for panel order Extra Tube.  Procedure                               Abnormality         Status                     ---------                               -----------         ------                     Extra Green Top (Lithium...[[id number]]                      Final result                 Please view results for these tests on the individual orders.   Extra Green Top (Lithium Heparin) Tube     Status: None   Result Value Ref Range    Hold Specimen JI      Assessment and Plan:   Stage 1B Right Breast Cancer- Pt completed right lumpectomy and SLN biopsy, radiation therapy. She is currently on Anastrozole daily   She will continue the anastrozole- renewed recently  She will return in 6 months for review with Dr Butler - no labs or imaging necessary.   Last Mammogram was completed in 2/2021 and was normal- will repeat in February 2022-- order placed.  Use of AI- Has cholesterol checked yearly by PCP with know hyperlipidemia though no known treatment at this time.   Osteoporosis- 8/6/2020 DEXA result showing T score of -2.9 equalling osteoporosis. Pt using Prolia every 6 months - will be given today.  - new order for DEXA scan give for near future.   Had discussion about if results of DEXA showing worsening osteoporosis, we will need to weigh use of AI or importance of bone health.   Continue with Calcium +viatmin D supplement.   Fatigue/ anxiety and probable depression with known ADD- pt reports chronic fatigue syndrome and ADHD. She is on medication for the hyperactivity. Mentions need for depression medication but will bring this up to new PCP. Encouraged her gardening to help with stress.   The total time of this encounter amounted to 40 minutes. This time included face to face time spent with the patient, prep work, ordering tests, and performing post visit documentation.  Lidia Parham,Cnp          Again, thank you for allowing me to participate in the care of your  patient.        Sincerely,        Lidia Parham, NP, APRN CNP

## 2022-08-15 ENCOUNTER — TELEPHONE (OUTPATIENT)
Dept: ONCOLOGY | Facility: CLINIC | Age: 72
End: 2022-08-15

## 2022-08-15 NOTE — TELEPHONE ENCOUNTER
Left voicemail for patient to call us back to schedule DEXA scan     Sherice Cruz  Procedure   [phone]

## 2022-08-24 ENCOUNTER — ANCILLARY PROCEDURE (OUTPATIENT)
Dept: BONE DENSITY | Facility: CLINIC | Age: 72
End: 2022-08-24
Attending: NURSE PRACTITIONER
Payer: COMMERCIAL

## 2022-08-24 DIAGNOSIS — Z79.811 AROMATASE INHIBITOR USE: ICD-10-CM

## 2022-08-24 DIAGNOSIS — M81.8 OTHER OSTEOPOROSIS WITHOUT CURRENT PATHOLOGICAL FRACTURE: ICD-10-CM

## 2022-08-24 DIAGNOSIS — Z17.0 MALIGNANT NEOPLASM OF UPPER-INNER QUADRANT OF RIGHT BREAST IN FEMALE, ESTROGEN RECEPTOR POSITIVE (H): ICD-10-CM

## 2022-08-24 DIAGNOSIS — C50.211 MALIGNANT NEOPLASM OF UPPER-INNER QUADRANT OF RIGHT BREAST IN FEMALE, ESTROGEN RECEPTOR POSITIVE (H): ICD-10-CM

## 2022-08-24 PROCEDURE — 77081 DXA BONE DENSITY APPENDICULR: CPT | Mod: XU | Performed by: RADIOLOGY

## 2022-09-14 ENCOUNTER — OFFICE VISIT (OUTPATIENT)
Dept: FAMILY MEDICINE | Facility: CLINIC | Age: 72
End: 2022-09-14
Payer: COMMERCIAL

## 2022-09-14 VITALS
TEMPERATURE: 97.5 F | OXYGEN SATURATION: 96 % | HEART RATE: 83 BPM | HEIGHT: 60 IN | BODY MASS INDEX: 35.26 KG/M2 | SYSTOLIC BLOOD PRESSURE: 115 MMHG | WEIGHT: 179.6 LBS | DIASTOLIC BLOOD PRESSURE: 75 MMHG | RESPIRATION RATE: 17 BRPM

## 2022-09-14 DIAGNOSIS — Z00.00 ENCOUNTER FOR MEDICARE ANNUAL WELLNESS EXAM: Primary | ICD-10-CM

## 2022-09-14 DIAGNOSIS — Z12.11 SCREEN FOR COLON CANCER: ICD-10-CM

## 2022-09-14 DIAGNOSIS — R73.01 ELEVATED FASTING GLUCOSE: ICD-10-CM

## 2022-09-14 DIAGNOSIS — F19.982 DRUG-INDUCED INSOMNIA (H): ICD-10-CM

## 2022-09-14 DIAGNOSIS — F33.1 MODERATE RECURRENT MAJOR DEPRESSION (H): ICD-10-CM

## 2022-09-14 DIAGNOSIS — E66.01 MORBID OBESITY (H): ICD-10-CM

## 2022-09-14 DIAGNOSIS — M62.838 SPASM OF MUSCLE: ICD-10-CM

## 2022-09-14 DIAGNOSIS — E78.5 HYPERLIPIDEMIA LDL GOAL <160: ICD-10-CM

## 2022-09-14 DIAGNOSIS — Z23 NEED FOR PROPHYLACTIC VACCINATION AND INOCULATION AGAINST INFLUENZA: ICD-10-CM

## 2022-09-14 PROCEDURE — G0438 PPPS, INITIAL VISIT: HCPCS | Performed by: NURSE PRACTITIONER

## 2022-09-14 PROCEDURE — G0008 ADMIN INFLUENZA VIRUS VAC: HCPCS | Performed by: NURSE PRACTITIONER

## 2022-09-14 PROCEDURE — 90662 IIV NO PRSV INCREASED AG IM: CPT | Performed by: NURSE PRACTITIONER

## 2022-09-14 RX ORDER — CYCLOBENZAPRINE HCL 10 MG
10 TABLET ORAL 2 TIMES DAILY PRN
Qty: 30 TABLET | Refills: 3 | Status: SHIPPED | OUTPATIENT
Start: 2022-09-14 | End: 2023-03-24

## 2022-09-14 ASSESSMENT — ENCOUNTER SYMPTOMS
BREAST MASS: 0
SHORTNESS OF BREATH: 0
DIZZINESS: 1
PARESTHESIAS: 0
FREQUENCY: 1
EYE PAIN: 1
NAUSEA: 0
HEARTBURN: 0
DYSURIA: 0
DIARRHEA: 0
WEAKNESS: 1
HEMATOCHEZIA: 0
CONSTIPATION: 0
CHILLS: 1
MYALGIAS: 1
PALPITATIONS: 0
HEMATURIA: 0
FEVER: 0
NERVOUS/ANXIOUS: 1
COUGH: 1
JOINT SWELLING: 1
ARTHRALGIAS: 1
HEADACHES: 1

## 2022-09-14 ASSESSMENT — ACTIVITIES OF DAILY LIVING (ADL)
CURRENT_FUNCTION: PREPARING MEALS REQUIRES ASSISTANCE
CURRENT_FUNCTION: HOUSEWORK REQUIRES ASSISTANCE

## 2022-09-14 ASSESSMENT — PATIENT HEALTH QUESTIONNAIRE - PHQ9
SUM OF ALL RESPONSES TO PHQ QUESTIONS 1-9: 22
10. IF YOU CHECKED OFF ANY PROBLEMS, HOW DIFFICULT HAVE THESE PROBLEMS MADE IT FOR YOU TO DO YOUR WORK, TAKE CARE OF THINGS AT HOME, OR GET ALONG WITH OTHER PEOPLE: EXTREMELY DIFFICULT
SUM OF ALL RESPONSES TO PHQ QUESTIONS 1-9: 22

## 2022-09-14 ASSESSMENT — PAIN SCALES - GENERAL: PAINLEVEL: MILD PAIN (3)

## 2022-09-14 NOTE — PATIENT INSTRUCTIONS
Patient Education   Personalized Prevention Plan  You are due for the preventive services outlined below.  Your care team is available to assist you in scheduling these services.  If you have already completed any of these items, please share that information with your care team to update in your medical record.  Health Maintenance Due   Topic Date Due     Discuss Advance Care Planning  Never done     Colorectal Cancer Screening  Never done     Flu Vaccine (1) 09/01/2022     Zoster (Shingles) Vaccine (2 of 2) 12/07/2021     ANNUAL REVIEW OF HM ORDERS  09/17/2022     Your Health Risk Assessment indicates you feel you are not in good health    A healthy lifestyle helps keep the body fit and the mind alert. It helps protect you from disease, helps you fight disease, and helps prevent chronic disease (disease that doesn't go away) from getting worse. This is important as you get older and begin to notice twinges in muscles and joints and a decline in the strength and stamina you once took for granted. A healthy lifestyle includes good healthcare, good nutrition, weight control, recreation, and regular exercise. Avoid harmful substances and do what you can to keep safe. Another part of a healthy lifestyle is stay mentally active and socially involved.    Good healthcare     Have a wellness visit every year.     If you have new symptoms, let us know right away. Don't wait until the next checkup.     Take medicines exactly as prescribed and keep your medicines in a safe place. Tell us if your medicine causes problems.   Healthy diet and weight control     Eat 3 or 4 small, nutritious, low-fat, high-fiber meals a day. Include a variety of fruits, vegetables, and whole-grain foods.     Make sure you get enough calcium in your diet. Calcium, vitamin D, and exercise help prevent osteoporosis (bone thinning).     If you live alone, try eating with others when you can. That way you get a good meal and have company while you  eat it.     Try to keep a healthy weight. If you eat more calories than your body uses for energy, it will be stored as fat and you will gain weight.     Recreation   Recreation is not limited to sports and team events. It includes any activity that provides relaxation, interest, enjoyment, and exercise. Recreation provides an outlet for physical, mental, and social energy. It can give a sense of worth and achievement. It can help you stay healthy.    Mental Exercise and Social Involvement  Mental and emotional health is as important as physical health. Keep in touch with friends and family. Stay as active as possible. Continue to learn and challenge yourself.   Things you can do to stay mentally active are:    Learn something new, like a foreign language or musical instrument.     Play SCRABBLE or do crossword puzzles. If you cannot find people to play these games with you at home, you can play them with others on your computer through the Internet.     Join a games club--anything from card games to chess or checkers or lawn bowling.     Start a new hobby.     Go back to school.     Volunteer.     Read.   Keep up with world events.    Understanding USDA MyPlate  The USDA has guidelines to help you make healthy food choices. These are called MyPlate. MyPlate shows the food groups that make up healthy meals using the image of a place setting. Before you eat, think about the healthiest choices for what to put on your plate or in your cup or bowl. To learn more about building a healthy plate, visit www.choosemyplate.gov.    The food groups    Fruits. Any fruit or 100% fruit juice counts as part of the Fruit Group. Fruits may be fresh, canned, frozen, or dried, and may be whole, cut-up, or pureed. Make 1/2 of your plate fruits and vegetables.    Vegetables. Any vegetable or 100% vegetable juice counts as a member of the Vegetable Group. Vegetables may be fresh, frozen, canned, or dried. They can be served raw or cooked  and may be whole, cut-up, or mashed. Make 1/2 of your plate fruits and vegetables.    Grains. All foods made from grains are part of the Grains Group. These include wheat, rice, oats, cornmeal, and barley. Grains are often used to make foods such as bread, pasta, oatmeal, cereal, tortillas, and grits. Grains should be no more than 1/4 of your plate. At least half of your grains should be whole grains.    Protein. This group includes meat, poultry, seafood, beans and peas, eggs, processed soy products (such as tofu), nuts (including nut butters), and seeds. Make protein choices no more than 1/4 of your plate. Meat and poultry choices should be lean or low fat.    Dairy. The Dairy Group includes all fluid milk products and foods made from milk that contain calcium, such as yogurt and cheese. (Foods that have little calcium, such as cream, butter, and cream cheese, are not part of this group.) Most dairy choices should be low-fat or fat-free.    Oils. Oils aren't a food group, but they do contain essential nutrients. However it's important to watch your intake of oils. These are fats that are liquid at room temperature. They include canola, corn, olive, soybean, vegetable, and sunflower oil. Foods that are mainly oil include mayonnaise, certain salad dressings, and soft margarines. You likely already get your daily oil allowance from the foods you eat.  Things to limit  Eating healthy also means limiting these things in your diet:       Salt (sodium). Many processed foods have a lot of sodium. To keep sodium intake down, eat fresh vegetables, meats, poultry, and seafood when possible. Purchase low-sodium, reduced-sodium, or no-salt-added food products at the store. And don't add salt to your meals at home. Instead, season them with herbs and spices such as dill, oregano, cumin, and paprika. Or try adding flavor with lemon or lime zest and juice.    Saturated fat. Saturated fats are most often found in animal products  such as beef, pork, and chicken. They are often solid at room temperature, such as butter. To reduce your saturated fat intake, choose leaner cuts of meat and poultry. And try healthier cooking methods such as grilling, broiling, roasting, or baking. For a simple lower-fat swap, use plain nonfat yogurt instead of mayonnaise when making potato salad or macaroni salad.    Added sugars. These are sugars added to foods. They are in foods such as ice cream, candy, soda, fruit drinks, sports drinks, energy drinks, cookies, pastries, jams, and syrups. Cut down on added sugars by sharing sweet treats with a family member or friend. You can also choose fruit for dessert, and drink water or other unsweetened beverages.     [employer] last reviewed this educational content on 6/1/2020 2000-2021 [employer]. All rights reserved. This information is not intended as a substitute for professional medical care. Always follow your healthcare professional's instructions.        Activities of Daily Living    Your Health Risk Assessment indicates you have difficulties with activities of daily living such as housework, bathing, preparing meals, taking medication, etc. Please make a follow up appointment for us to address this issue in more detail.    Signs of Hearing Loss      Hearing much better with one ear can be a sign of hearing loss.   Hearing loss is a problem shared by many people. In fact, it is one of the most common health problems, particularly as people age. Most people age 65 and older have some hearing loss. By age 80, almost everyone does. Hearing loss often occurs slowly over the years. So you may not realize your hearing has gotten worse.  Have your hearing checked  Call your healthcare provider if you:    Have to strain to hear normal conversation    Have to watch other people s faces very carefully to follow what they re saying    Need to ask people to repeat what they ve said    Often misunderstand  what people are saying    Turn the volume of the television or radio up so high that others complain    Feel that people are mumbling when they re talking to you    Find that the effort to hear leaves you feeling tired and irritated    Notice, when using the phone, that you hear better with one ear than the other  Radha last reviewed this educational content on 1/1/2020 2000-2021 [employer]. All rights reserved. This information is not intended as a substitute for professional medical care. Always follow your healthcare professional's instructions.          Urinary Incontinence, Female (Adult)   Urinary incontinence means loss of bladder control. This problem affects many women, especially as they get older. If you have incontinence, you may be embarrassed to ask for help. But know that this problem can be treated.   Types of Incontinence  There are different types of incontinence. Two of the main types are described here. You can have more than one type.     Stress incontinence. With this type, urine leaks when pressure (stress) is put on the bladder. This may happen when you cough, sneeze, or laugh. Stress incontinence most often occurs because the pelvic floor muscles that support the bladder and urethra are weak. This can happen after pregnancy and vaginal childbirth or a hysterectomy. It can also be due to excess body weight or hormone changes.    Urge incontinence (also called overactive bladder). With this type, a sudden urge to urinate is felt often. This may happen even though there may not be much urine in the bladder. The need to urinate often during the night is common. Urge incontinence most often occurs because of bladder spasms. This may be due to bladder irritation or infection. Damage to bladder nerves or pelvic muscles, constipation, and certain medicines can also lead to urge incontinence.  Treatment depends on the cause. Further evaluation is needed to find the type you have.  This will likely include an exam and certain tests. Based on the results, you and your healthcare provider can then plan treatment. Until a diagnosis is made, the home care tips below can help ease symptoms.   Home care    Do pelvic floor muscle exercises, if they are prescribed. The pelvic floor muscles help support the bladder and urethra. Many women find that their symptoms improve when doing special exercises that strengthen these muscles. To do the exercises, contract the muscles you would use to stop your stream of urine. But do this when you re not urinating. Hold for 10 seconds, then relax. Repeat 10 to 20 times in a row, at least 3 times a day. Your healthcare provider may give you other instructions for how to do the exercises and how often.    Keep a bladder diary. This helps track how often and how much you urinate over a set period of time. Bring this diary with you to your next visit with the provider. The information can help your provider learn more about your bladder problem.    Lose weight, if advised to by your provider. Extra weight puts pressure on the bladder. Your provider can help you create a weight-loss plan that s right for you. This may include exercising more and making certain diet changes.    Don't have foods and drinks that may irritate the bladder. These can include alcohol and caffeinated drinks.    Quit smoking. Smoking and other tobacco use can lead to a long-term (chronic) cough that strains the pelvic floor muscles. Smoking may also damage the bladder and urethra. Talk with your provider about treatments or methods you can use to quit smoking.    If drinking large amounts of fluid makes you have symptoms, you may be advised to limit your fluid intake. You may also be advised to drink most of your fluids during the day and to limit fluids at night.    If you re worried about urine leakage or accidents, you may wear absorbent pads to catch urine. Change the pads often. This helps  reduce discomfort. It may also reduce the risk of skin or bladder infections.    Follow-up care  Follow up with your healthcare provider, or as directed. It may take some to find the right treatment for your problem. But healthy lifestyle changes can be made right away. These include such things as exercising on a regular basis, eating a healthy diet, losing weight (if needed), and quitting smoking. Your treatment plan may include special therapies or medicines. Certain procedures or surgery may also be options. Talk about any questions you have with your provider.   When to seek medical advice  Call the healthcare provider right away if any of these occur:    Fever of 100.4 F (38 C) or higher, or as directed by your provider    Bladder pain or fullness    Belly swelling    Nausea or vomiting    Back pain    Weakness, dizziness, or fainting  [employer]incan last reviewed this educational content on 1/1/2020 2000-2021 [employer]. All rights reserved. This information is not intended as a substitute for professional medical care. Always follow your healthcare professional's instructions.        Your Health Risk Assessment indicates you feel you are not in good emotional health.    Recreation   Recreation is not limited to sports and team events. It includes any activity that provides relaxation, interest, enjoyment, and exercise. Recreation provides an outlet for physical, mental, and social energy. It can give a sense of worth and achievement. It can help you stay healthy.    Mental Exercise and Social Involvement  Mental and emotional health is as important as physical health. Keep in touch with friends and family. Stay as active as possible. Continue to learn and challenge yourself.   Things you can do to stay mentally active are:    Learn something new, like a foreign language or musical instrument.     Play SCRABBLE or do crossword puzzles. If you cannot find people to play these games with you at  "home, you can play them with others on your computer through the Internet.     Join a games club--anything from card games to chess or checkers or lawn bowling.     Start a new hobby.     Go back to school.     Volunteer.     Read.   Keep up with world events.    Depression and Suicide in Older Adults    Nearly 2 million older Americans have some type of depression. Some of them even take their own lives. Yet depression among older adults is often ignored. Learn the warning signs. You may help spare a loved one needless pain. You may also save a life.   What is depression?  Depression is a common and serious illness that affects the way you think and feel. It is not a normal part of aging, nor is it a sign of weakness, a character flaw, or something you can snap out of. Most people with depression need treatment to get better. The most common symptom is a feeling of deep sadness. People who are depressed also may seem tired and listless. And nothing seems to give them pleasure. It s normal to grieve or be sad sometimes. But sadness lessens or passes with time. Depression rarely goes away or improves on its own. A person with clinical depression can't \"snap out of it.\" Other symptoms of depression are:     Sleeping more or less than normal    Eating more or less than normal    Having headaches, stomachaches, or other pains that don t go away    Feeling nervous,  empty,  or worthless    Crying a great deal    Thinking or talking about suicide or death    Loss of interest in activities previously enjoyed    Social isolation    Feeling confused or forgetful  What causes it?  The causes of depression aren t fully known. But it is thought to result from a complex blend of these factors:     Biochemistry. Certain chemicals in the brain play a role.    Genes. Depression does run in families.    Life stress. Life stresses can also trigger depression in some people. Older adults often face many stressors, such as death of " friends or a spouse, health problems, and financial concerns.    Chronic conditions. This includes conditions such as diabetes, heart disease, or cancer. These can cause symptoms of depression. Medicine side effects can cause changes in thoughts and behaviors.  How you can help  Often, depressed people may not want to ask for help. When they do, they may be ignored. Or, they may receive the wrong treatment. You can help by showing parents and older friends love and support. If they seem depressed, don t lecture the person, ignore the symptoms, or discount the symptoms as a  normal  part of aging -which they are not. Get involved, listen, and show interest and support.   Help them understand that depression is a treatable illness. Tell them you can help them find the right treatment. Offer to go to their healthcare provider's appointment with them for support when the symptoms are discussed. With their approval, contact a local mental health center, social service agency, or hospital about services.   You can be an advocate for him or her at healthcare appointments. Many older adults have chronic illnesses that can cause symptoms of depression. Medicine side effects can change thoughts and behaviors. You can help make sure that the healthcare provider looks at all of these factors. He or she should refer your family member or friend to a mental healthcare provider when needed. in some cases, untreated depression can lead to a misdiagnosis. A person may be diagnosed with a brain disorder such as dementia. If the healthcare provider does not take the issue of depression seriously, help your family member or friend to find another provider.   Don't be afraid to ask  If you think an older person you care about could be suicidal, ask,  Have you thought about suicide?  Most people will tell you the truth. If they say  yes,  they may already have a plan for how and when they will attempt it. Find out as much as you can. The  more detailed the plan, and the easier it is to carry out, the more danger the person is in right now. Tell the person you are there for them and do not want them to harm him or herself. Don't wait to get help for the person. Call the person's healthcare provider, local hospital, or emergency services.   To learn more    National Suicide Prevention Lifeline (crisis hotline) 794-250-VYMO (838-227-8468)    National [place] of Mental Ganzys720-098-4256jxw.[facility].nih.gov    National [place] on Mental Pdhknqr502-213-4375dgc.jules.org    Mental Health Lyjbnzi770-202-3994pog.[facility] [[facility]].org    National Suicide Btfbfmi444-BGJLKFV (886-738-9135)    Call 911  Never leave the person alone. A person who is actively suicidal needs psychiatric care right away. They will need constant supervision. Never leave the person out of sight. Call 911 or the national 24-hour suicide crisis hotline at 848-781-HSYT (565-253-3578). You can also take the person to the closest emergency room.   Radha last reviewed this educational content on 5/1/2020 2000-2021 [employer]. All rights reserved. This information is not intended as a substitute for professional medical care. Always follow your healthcare professional's instructions.

## 2022-09-14 NOTE — PROGRESS NOTES
"SUBJECTIVE:   Priscilla is a 71 year old who presents for Preventive Visit.      Patient has been advised of split billing requirements and indicates understanding: Yes  Are you in the first 12 months of your Medicare coverage?  No    Healthy Habits:     In general, how would you rate your overall health?  Fair    Frequency of exercise:  2-3 days/week    Duration of exercise:  N/A    Do you usually eat at least 4 servings of fruit and vegetables a day, include whole grains    & fiber and avoid regularly eating high fat or \"junk\" foods?  No    Taking medications regularly:  Yes    Medication side effects:  Muscle aches    Ability to successfully perform activities of daily living:  Preparing meals requires assistance and housework requires assistance    Home Safety:  Lack of grab bars in the bathroom and lighting is poor    Hearing Impairment:  Difficulty following a conversation in a noisy restaurant or crowded room, feel that people are mumbling or not speaking clearly, difficulty following dialogue in the theater, need to ask people to speak up or repeat themselves, find that men's voices are easier to understand than woman's, difficulty understanding soft or whispered speech and difficulty understanding speech on the telephone    In the past 6 months, have you been bothered by leaking of urine? Yes    In general, how would you rate your overall mental or emotional health?  Poor      PHQ-2 Total Score: 6    Additional concerns today:  No    Do you feel safe in your environment? Yes    Have you ever done Advance Care Planning? (For example, a Health Directive, POLST, or a discussion with a medical provider or your loved ones about your wishes): No, advance care planning information given to patient to review.  Patient declined advance care planning discussion at this time.       Fall risk  Fallen 2 or more times in the past year?: No  Any fall with injury in the past year?: No    Cognitive Screening   1) Repeat 3 " items (Leader, Season, Table)    2) Clock draw: NORMAL  3) 3 item recall: Recalls 1 object   Results: NORMAL clock, 1-2 items recalled: COGNITIVE IMPAIRMENT LESS LIKELY    Mini-CogTM Copyright S Vishal. Licensed by the author for use in [facility]; reprinted with permission ([email or web address]). All rights reserved.          Reviewed and updated as needed this visit by clinical staff   Tobacco  Allergies  Meds  Problems  Med Hx  Surg Hx  Fam Hx  Soc   Hx          Reviewed and updated as needed this visit by Provider   Tobacco  Allergies  Meds  Problems  Med Hx  Surg Hx  Fam Hx           Social History     Tobacco Use     Smoking status: Never Smoker     Smokeless tobacco: Never Used   Substance Use Topics     Alcohol use: No     If you drink alcohol do you typically have >3 drinks per day or >7 drinks per week? Not applicable    Alcohol Use 9/14/2022   Prescreen: >3 drinks/day or >7 drinks/week? No   Prescreen: >3 drinks/day or >7 drinks/week? -           Depression has been worse recently   Up until 5am last night. Can't sleep. Sometimes goes to the bathroom to cry  2 months ago  diagnosed with prostate cancer and fast growing rectal cancer and was given 1 year to live  Often seasonal depression starts for her in Nov  In therapy. Has had bad reaction to antidepressants in the past. Hallucinated with wellbutrin, shut down with prozac  Takes adderall for ADHD. Also helps with emotions too. Allows her to focus to work in her garden which brings her much stefany  She also has 2 dogs which bring her stefany - both are older age. She's worried rescues won't adopt to older applicants in the future  Chronic fatigue  Because of depression and decreased interest/motivation, sometimes hard to take 2nd adderall dose in the day    Current providers sharing in care for this patient include:   Patient Care Team:  Rhiannon Weir APRN CNP as PCP - General (Family Medicine)  Alexandra Copeland MD as MD  (Radiation Oncology)  Dale Saini MD as Assigned Surgical Provider  Rhiannon Weir APRN CNP as Assigned PCP  Lidia Parham APRN CNP as Assigned Cancer Care Provider    The following health maintenance items are reviewed in Epic and correct as of today:  Health Maintenance   Topic Date Due     COLORECTAL CANCER SCREENING  Never done     INFLUENZA VACCINE (1) 09/01/2022     ZOSTER IMMUNIZATION (2 of 2) 12/07/2021     ANNUAL REVIEW OF HM ORDERS  09/17/2022     LIPID  10/19/2022     PHQ-9  03/14/2023     MEDICARE ANNUAL WELLNESS VISIT  09/14/2023     FALL RISK ASSESSMENT  09/14/2023     MAMMO SCREENING  02/11/2024     ADVANCE CARE PLANNING  09/14/2027     DTAP/TDAP/TD IMMUNIZATION (2 - Td or Tdap) 05/02/2029     DEXA  08/24/2037     HEPATITIS C SCREENING  Completed     DEPRESSION ACTION PLAN  Completed     Pneumococcal Vaccine: 65+ Years  Completed     COVID-19 Vaccine  Completed     IPV IMMUNIZATION  Aged Out     MENINGITIS IMMUNIZATION  Aged Out     HEPATITIS B IMMUNIZATION  Aged Out     Lab work is in process  Labs reviewed in EPIC  BP Readings from Last 3 Encounters:   09/14/22 115/75   08/11/22 120/77   08/11/22 120/77    Wt Readings from Last 3 Encounters:   09/14/22 81.5 kg (179 lb 9.6 oz)   08/11/22 84.8 kg (187 lb)   08/11/22 84.8 kg (187 lb)                  Patient Active Problem List   Diagnosis     Moderate recurrent major depression (H)     Hyperlipidemia LDL goal <160     Chronic fatigue disorder     Liver lesion     Cystic disease of liver     Gastroesophageal reflux disease with esophagitis     Hiatal hernia     Age-related cataract of both eyes, unspecified age-related cataract type     Primary osteoarthritis of both knees     Attention deficit hyperactivity disorder (ADHD), predominantly inattentive type     Malignant neoplasm of upper-inner quadrant of right breast in female, estrogen receptor positive (H)     Thyroid nodule     Anxiety disorder, unspecified type     Osteoporosis      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     Aromatase inhibitor use     Cervical cancer screening     History of cholecystectomy     Morbid obesity (H)     Fibromyalgia     Drug-induced insomnia (H)     Past Surgical History:   Procedure Laterality Date     APPENDECTOMY  1967     BIOPSY BREAST NEEDLE LOCALIZATION Right 1/10/2019    Procedure: WIRE LOCALIZED RIGHT BREAST LUMPECTOMY WITH RIGHT SLNB;  Surgeon: Gema Diamond MD;  Location: [facility] OR     BREAST BIOPSY, CORE RT/LT Right 12/26/2018     CATARACT IOL, RT/LT Bilateral 2018    with lens implants per patient     CHOLECYSTECTOMY  05/24/2019     THYROID BIOPSY  01/31/2019       Social History     Tobacco Use     Smoking status: Never Smoker     Smokeless tobacco: Never Used   Substance Use Topics     Alcohol use: No     Family History   Problem Relation Age of Onset     Leukemia Father      Breast Cancer Cousin 50        Maternal Female 1st Cousin     Macular Degeneration Mother          Current Outpatient Medications   Medication Sig Dispense Refill     acetaminophen (TYLENOL) 325 MG tablet Take 325-650 mg by mouth every 6 hours as needed for mild pain       amphetamine-dextroamphetamine (ADDERALL) 15 MG tablet Take 1 tablet (15 mg) by mouth 2 times daily Teva Brand 60 tablet 0     anastrozole (ARIMIDEX) 1 MG tablet Take 1 tablet (1 mg) by mouth daily 90 tablet 1     B Complex Vitamins (VITAMIN B-COMPLEX PO) Take by mouth daily       calcium carbonate (TUMS) 500 MG chewable tablet Take 1 chew tab by mouth 2 times daily       Calcium-Magnesium-Zinc 333-133-5 MG TABS per tablet Take 1 tablet by mouth daily       Cholecalciferol (VITAMIN D-3 PO) Vitamin D is included with multiple vitamin, patient does not take additional. Marjorie Crooks LPN on 7/26/2019 at 3:02 PM       cyclobenzaprine (FLEXERIL) 10 MG tablet Take 1 tablet (10 mg) by mouth 2 times daily as needed for muscle spasms 30 tablet 3     denosumab (PROLIA) 60 MG/ML SOSY injection        magnesium oxide 200 MG TABS Take  100 mg by mouth once as needed       Multiple Vitamins-Minerals (MULTIVITAMIN PO)        vitamin B-12 (CYANOCOBALAMIN) 100 MCG tablet Take 1,000 mcg by mouth daily       KRILL OIL PO Take by mouth daily (Patient not taking: Reported on 9/14/2022)       mupirocin (BACTROBAN) 2 % external ointment Use 2 times a day to affected area. (Patient not taking: Reported on 9/14/2022) 22 g 0     Allergies   Allergen Reactions     Vicodin [Hydrocodone-Acetaminophen] Anaphylaxis     Dilaudid [Hydromorphone] Nausea and Vomiting     Oxycodone Nausea and Vomiting     Dust Mites      Milk [Lac Bovis] Diarrhea     Recent Labs   Lab Test 08/11/22  1212 02/11/22  1210 08/09/21  1211 08/09/21  1211 02/08/21  1426 08/06/20  1206 01/04/19  1122 10/19/17  1412   LDL  --   --   --   --   --   --   --  98   HDL  --   --   --   --   --   --   --  40*   TRIG  --   --   --   --   --   --   --  82   ALT 19 26  --  22 31 26   < > 26   CR 0.90 0.68   < > 0.79 0.62 0.76   < > 0.72   GFRESTIMATED 69 >90   < > 77 >90 80   < > 81   GFRESTBLACK  --   --   --   --  >90 >90   < > >90   POTASSIUM 4.3 4.0   < > 3.9 3.8 4.0   < > 3.9   TSH  --  3.13  --   --   --  3.28  --  1.35    < > = values in this interval not displayed.          Breast CA Risk Assessment (FHS-7) 9/14/2022   Do you have a family history of breast, colon, or ovarian cancer? No / Unknown         Mammogram Screening: Recommended mammography every 1-2 years with patient discussion and risk factor consideration  Pertinent mammograms are reviewed under the imaging tab.    Review of Systems   Constitutional: Positive for chills. Negative for fever.   HENT: Positive for congestion, ear pain and hearing loss.    Eyes: Positive for pain. Negative for visual disturbance.   Respiratory: Positive for cough. Negative for shortness of breath.    Cardiovascular: Positive for peripheral edema. Negative for chest pain and palpitations.   Gastrointestinal: Negative for constipation, diarrhea, heartburn,  "hematochezia and nausea.   Breasts:  Negative for tenderness, breast mass and discharge.   Genitourinary: Positive for frequency. Negative for dysuria, genital sores, hematuria, pelvic pain, urgency, vaginal bleeding and vaginal discharge.   Musculoskeletal: Positive for arthralgias, joint swelling and myalgias.   Skin: Negative for rash.   Neurological: Positive for dizziness, weakness and headaches. Negative for paresthesias.   Psychiatric/Behavioral: Positive for mood changes. The patient is nervous/anxious.          OBJECTIVE:   /75 (BP Location: Left arm, Patient Position: Sitting, Cuff Size: Adult Large)   Pulse 83   Temp 97.5  F (36.4  C) (Oral)   Resp 17   Ht 1.517 m (4' 11.72\")   Wt 81.5 kg (179 lb 9.6 oz)   LMP 05/31/2003 (Approximate)   SpO2 96%   BMI 35.40 kg/m   Estimated body mass index is 35.4 kg/m  as calculated from the following:    Height as of this encounter: 1.517 m (4' 11.72\").    Weight as of this encounter: 81.5 kg (179 lb 9.6 oz).  Physical Exam  GENERAL: healthy, alert and no distress  EYES: Eyes grossly normal to inspection, PERRL and conjunctivae and sclerae normal  HENT: ear canals and TM's normal, nose and mouth without ulcers or lesions  NECK: no adenopathy, no asymmetry, masses, or scars and thyroid normal to palpation  RESP: lungs clear to auscultation - no rales, rhonchi or wheezes  CV: regular rate and rhythm, normal S1 S2, no S3 or S4, no murmur, click or rub, no peripheral edema and peripheral pulses strong  ABDOMEN: soft, nontender, no hepatosplenomegaly, no masses and bowel sounds normal  MS: no gross musculoskeletal defects noted, no edema  SKIN: no suspicious lesions or rashes  NEURO: Normal strength and tone, mentation intact and speech normal  PSYCH: mentation appears normal, affect normal/bright    Diagnostic Test Results:  Labs reviewed in Epic  No results found for any visits on 09/14/22.    ASSESSMENT / PLAN:   (Z00.00) Encounter for Medicare annual " "wellness exam  (primary encounter diagnosis)    (M62.838) Spasm of muscle  Comment: Refilled  Plan: cyclobenzaprine (FLEXERIL) 10 MG tablet            (Z12.11) Screen for colon cancer  Plan: COLOGUARD([employer])            (Z23) Need for prophylactic vaccination and inoculation against influenza  Plan: INFLUENZA, QUAD, HIGH DOSE, PF, 65YR + (FLUZONE        HD)            (R73.01) Elevated fasting glucose  Comment: she will do with her next lab draw  Plan: Hemoglobin A1c            (E78.5) Hyperlipidemia LDL goal <160  Comment: she will do fasting labs with next lab draw  Plan: Lipid panel reflex to direct LDL Fasting            (F19.982) Drug-induced insomnia (H)  Comment: supportive cares discussed  Plan: monitor    (F33.1) Moderate recurrent major depression (H)  Comment: worsened since 's new diagnoses. No thoughts of harm. Continues to do things that bring her stefany but difficult to find motivation to do those things.  Plan: continue therapy. Declines medication d/t adverse reactions in the past.    (E66.01) Morbid obesity (H)  Comment: likely contributing to hyperlipidemia, elevated fasting glucose  Plan: diet/exercise        COUNSELING:  Reviewed preventive health counseling, as reflected in patient instructions       Regular exercise       Healthy diet/nutrition    Estimated body mass index is 35.4 kg/m  as calculated from the following:    Height as of this encounter: 1.517 m (4' 11.72\").    Weight as of this encounter: 81.5 kg (179 lb 9.6 oz).        She reports that she has never smoked. She has never used smokeless tobacco.      Appropriate preventive services were discussed with this patient, including applicable screening as appropriate for cardiovascular disease, diabetes, osteopenia/osteoporosis, and glaucoma.  As appropriate for age/gender, discussed screening for colorectal cancer, prostate cancer, breast cancer, and cervical cancer. Checklist reviewing preventive services available has " been given to the patient.    Reviewed patients plan of care and provided an AVS. The Intermediate Care Plan ( asthma action plan, low back pain action plan, and migraine action plan) for Marixa meets the Care Plan requirement. This Care Plan has been established and reviewed with the Patient.    Counseling Resources:  ATP IV Guidelines  Pooled Cohorts Equation Calculator  Breast Cancer Risk Calculator  Breast Cancer: Medication to Reduce Risk  FRAX Risk Assessment  ICSI Preventive Guidelines  Dietary Guidelines for Americans, 2010  [employer]'s MyPlate  ASA Prophylaxis  Lung CA Screening    MARRY RAHMAN CNP  [facility]    Identified Health Risks:  Answers for HPI/ROS submitted by the patient on 9/14/2022  If you checked off any problems, how difficult have these problems made it for you to do your work, take care of things at home, or get along with other people?: Extremely difficult  PHQ9 TOTAL SCORE: 22        The patient was provided with suggestions to help her develop a healthy physical lifestyle.  The patient was counseled and encouraged to consider modifying their diet and eating habits. She was provided with information on recommended healthy diet options.  The patient reports that she has difficulty with activities of daily living. I have asked that the patient make a follow up appointment in 6 weeks where this issue will be further evaluated and addressed.  The patient was provided with written information regarding signs of hearing loss.  Information on urinary incontinence and treatment options given to patient.  The patient was provided with suggestions to help her develop a healthy emotional lifestyle.  The patient's PHQ-9 score is consistent with severe depression. She was provided with information regarding depression and suicide prevention and was advised to schedule a follow up appointment in 6 weeks to further address this issue.

## 2022-10-04 NOTE — PATIENT INSTRUCTIONS
Please contact [facility] RN with questions or concerns following today's appointment: [phone].    Thank you!     Recommended observation.

## 2022-11-04 ENCOUNTER — MYC REFILL (OUTPATIENT)
Dept: FAMILY MEDICINE | Facility: CLINIC | Age: 72
End: 2022-11-04

## 2022-11-04 DIAGNOSIS — F90.0 ATTENTION DEFICIT HYPERACTIVITY DISORDER (ADHD), PREDOMINANTLY INATTENTIVE TYPE: ICD-10-CM

## 2022-11-07 RX ORDER — DEXTROAMPHETAMINE SACCHARATE, AMPHETAMINE ASPARTATE, DEXTROAMPHETAMINE SULFATE AND AMPHETAMINE SULFATE 3.75; 3.75; 3.75; 3.75 MG/1; MG/1; MG/1; MG/1
15 TABLET ORAL 2 TIMES DAILY
Qty: 60 TABLET | Refills: 0 | Status: SHIPPED | OUTPATIENT
Start: 2022-11-07 | End: 2023-01-30

## 2022-11-10 DIAGNOSIS — Z17.0 MALIGNANT NEOPLASM OF UPPER-INNER QUADRANT OF RIGHT BREAST IN FEMALE, ESTROGEN RECEPTOR POSITIVE (H): ICD-10-CM

## 2022-11-10 DIAGNOSIS — C50.211 MALIGNANT NEOPLASM OF UPPER-INNER QUADRANT OF RIGHT BREAST IN FEMALE, ESTROGEN RECEPTOR POSITIVE (H): ICD-10-CM

## 2022-11-10 RX ORDER — ANASTROZOLE 1 MG/1
TABLET ORAL
Qty: 90 TABLET | Refills: 1 | Status: SHIPPED | OUTPATIENT
Start: 2022-11-10 | End: 2023-02-14

## 2023-01-30 ENCOUNTER — MYC REFILL (OUTPATIENT)
Dept: FAMILY MEDICINE | Facility: CLINIC | Age: 73
End: 2023-01-30
Payer: COMMERCIAL

## 2023-01-30 DIAGNOSIS — F90.0 ATTENTION DEFICIT HYPERACTIVITY DISORDER (ADHD), PREDOMINANTLY INATTENTIVE TYPE: ICD-10-CM

## 2023-01-30 RX ORDER — DEXTROAMPHETAMINE SACCHARATE, AMPHETAMINE ASPARTATE, DEXTROAMPHETAMINE SULFATE AND AMPHETAMINE SULFATE 3.75; 3.75; 3.75; 3.75 MG/1; MG/1; MG/1; MG/1
15 TABLET ORAL 2 TIMES DAILY
Qty: 60 TABLET | Refills: 0 | Status: SHIPPED | OUTPATIENT
Start: 2023-01-30 | End: 2023-08-03

## 2023-02-10 ENCOUNTER — DOCUMENTATION ONLY (OUTPATIENT)
Dept: LAB | Facility: CLINIC | Age: 73
End: 2023-02-10

## 2023-02-10 NOTE — PROGRESS NOTES
Marixa Ann has an upcoming lab appointment:    Future Appointments   Date Time Provider Department Center   [facility] 11[facility] [place]   2/14/2023 11:45 AM LAB ONC [facility] [place]   2/14/2023 12:30 PM Crystal Laguerre MD [facility]   2/14/2023  1:00 PM [address] [facility] [place]   3/2/2023 10:45 AM Sherita Uribe MD [facility]     Patient is scheduled for the following lab(s): Lidia HUMPHREYS/[facility] labs per appt notes    There is no order available. Please review and place either future orders or HMPO (Review of Health Maintenance Protocol Orders), as appropriate.    Health Maintenance Due   Topic     LIPID      Lorie Gates

## 2023-02-12 DIAGNOSIS — Z79.811 AROMATASE INHIBITOR USE: Primary | ICD-10-CM

## 2023-02-12 DIAGNOSIS — C50.211 MALIGNANT NEOPLASM OF UPPER-INNER QUADRANT OF RIGHT BREAST IN FEMALE, ESTROGEN RECEPTOR POSITIVE (H): ICD-10-CM

## 2023-02-12 DIAGNOSIS — Z17.0 MALIGNANT NEOPLASM OF UPPER-INNER QUADRANT OF RIGHT BREAST IN FEMALE, ESTROGEN RECEPTOR POSITIVE (H): ICD-10-CM

## 2023-02-12 DIAGNOSIS — M81.8 OTHER OSTEOPOROSIS WITHOUT CURRENT PATHOLOGICAL FRACTURE: ICD-10-CM

## 2023-02-14 ENCOUNTER — INFUSION THERAPY VISIT (OUTPATIENT)
Dept: INFUSION THERAPY | Facility: CLINIC | Age: 73
End: 2023-02-14
Payer: COMMERCIAL

## 2023-02-14 ENCOUNTER — LAB (OUTPATIENT)
Dept: LAB | Facility: CLINIC | Age: 73
End: 2023-02-14
Payer: COMMERCIAL

## 2023-02-14 ENCOUNTER — ONCOLOGY VISIT (OUTPATIENT)
Dept: ONCOLOGY | Facility: CLINIC | Age: 73
End: 2023-02-14
Payer: COMMERCIAL

## 2023-02-14 ENCOUNTER — ANCILLARY PROCEDURE (OUTPATIENT)
Dept: MAMMOGRAPHY | Facility: CLINIC | Age: 73
End: 2023-02-14
Attending: INTERNAL MEDICINE
Payer: COMMERCIAL

## 2023-02-14 VITALS
WEIGHT: 179 LBS | SYSTOLIC BLOOD PRESSURE: 114 MMHG | OXYGEN SATURATION: 96 % | DIASTOLIC BLOOD PRESSURE: 76 MMHG | BODY MASS INDEX: 35.14 KG/M2 | HEIGHT: 60 IN | HEART RATE: 96 BPM

## 2023-02-14 DIAGNOSIS — Z79.811 AROMATASE INHIBITOR USE: ICD-10-CM

## 2023-02-14 DIAGNOSIS — C50.211 MALIGNANT NEOPLASM OF UPPER-INNER QUADRANT OF RIGHT BREAST IN FEMALE, ESTROGEN RECEPTOR POSITIVE (H): ICD-10-CM

## 2023-02-14 DIAGNOSIS — M81.8 OTHER OSTEOPOROSIS WITHOUT CURRENT PATHOLOGICAL FRACTURE: ICD-10-CM

## 2023-02-14 DIAGNOSIS — E78.5 HYPERLIPIDEMIA LDL GOAL <160: ICD-10-CM

## 2023-02-14 DIAGNOSIS — Z17.0 MALIGNANT NEOPLASM OF UPPER-INNER QUADRANT OF RIGHT BREAST IN FEMALE, ESTROGEN RECEPTOR POSITIVE (H): ICD-10-CM

## 2023-02-14 DIAGNOSIS — Z12.31 VISIT FOR SCREENING MAMMOGRAM: ICD-10-CM

## 2023-02-14 DIAGNOSIS — R73.01 ELEVATED FASTING GLUCOSE: ICD-10-CM

## 2023-02-14 DIAGNOSIS — Z79.811 AROMATASE INHIBITOR USE: Primary | ICD-10-CM

## 2023-02-14 DIAGNOSIS — F90.0 ATTENTION DEFICIT HYPERACTIVITY DISORDER (ADHD), PREDOMINANTLY INATTENTIVE TYPE: ICD-10-CM

## 2023-02-14 DIAGNOSIS — F41.9 ANXIETY: ICD-10-CM

## 2023-02-14 DIAGNOSIS — R53.83 FATIGUE, UNSPECIFIED TYPE: ICD-10-CM

## 2023-02-14 LAB
ALBUMIN SERPL-MCNC: 3.4 G/DL (ref 3.4–5)
ALP SERPL-CCNC: 95 U/L (ref 40–150)
ALT SERPL W P-5'-P-CCNC: 20 U/L (ref 0–50)
ANION GAP SERPL CALCULATED.3IONS-SCNC: 5 MMOL/L (ref 3–14)
AST SERPL W P-5'-P-CCNC: 16 U/L (ref 0–45)
BILIRUB SERPL-MCNC: 0.4 MG/DL (ref 0.2–1.3)
BUN SERPL-MCNC: 18 MG/DL (ref 7–30)
CALCIUM SERPL-MCNC: 9.8 MG/DL (ref 8.5–10.1)
CHLORIDE BLD-SCNC: 106 MMOL/L (ref 94–109)
CHOLEST SERPL-MCNC: 154 MG/DL
CO2 SERPL-SCNC: 29 MMOL/L (ref 20–32)
CREAT SERPL-MCNC: 0.66 MG/DL (ref 0.52–1.04)
FASTING STATUS PATIENT QL REPORTED: NO
GFR SERPL CREATININE-BSD FRML MDRD: >90 ML/MIN/1.73M2
GLUCOSE BLD-MCNC: 105 MG/DL (ref 70–99)
HBA1C MFR BLD: 6.1 % (ref 0–5.6)
HDLC SERPL-MCNC: 50 MG/DL
LDLC SERPL CALC-MCNC: 86 MG/DL
NONHDLC SERPL-MCNC: 104 MG/DL
POTASSIUM BLD-SCNC: 3.7 MMOL/L (ref 3.4–5.3)
PROT SERPL-MCNC: 7.3 G/DL (ref 6.8–8.8)
SODIUM SERPL-SCNC: 140 MMOL/L (ref 133–144)
TRIGL SERPL-MCNC: 91 MG/DL

## 2023-02-14 PROCEDURE — 77063 BREAST TOMOSYNTHESIS BI: CPT | Performed by: RADIOLOGY

## 2023-02-14 PROCEDURE — 99214 OFFICE O/P EST MOD 30 MIN: CPT | Mod: 25 | Performed by: INTERNAL MEDICINE

## 2023-02-14 PROCEDURE — 83036 HEMOGLOBIN GLYCOSYLATED A1C: CPT

## 2023-02-14 PROCEDURE — 80061 LIPID PANEL: CPT

## 2023-02-14 PROCEDURE — 36415 COLL VENOUS BLD VENIPUNCTURE: CPT

## 2023-02-14 PROCEDURE — 77067 SCR MAMMO BI INCL CAD: CPT | Performed by: RADIOLOGY

## 2023-02-14 PROCEDURE — 96372 THER/PROPH/DIAG INJ SC/IM: CPT | Performed by: NURSE PRACTITIONER

## 2023-02-14 PROCEDURE — 99207 PR NO CHARGE LOS: CPT

## 2023-02-14 PROCEDURE — 80053 COMPREHEN METABOLIC PANEL: CPT

## 2023-02-14 RX ORDER — ANASTROZOLE 1 MG/1
1 TABLET ORAL DAILY
Qty: 90 TABLET | Refills: 3 | Status: SHIPPED | OUTPATIENT
Start: 2023-02-14 | End: 2024-05-17

## 2023-02-14 ASSESSMENT — PAIN SCALES - GENERAL: PAINLEVEL: NO PAIN (0)

## 2023-02-14 NOTE — LETTER
2/14/2023         RE: Marixa Ann  [id number] [facility] [address]        Dear Colleague,    Thank you for referring your patient, Marixa Ann, to the [facility]. Please see a copy of my visit note below.    Oncology Follow Up Visit: 02/14/23    PCP: Rhiannon Weir    Diagnosis: Stage IB Right Breast Cancer  Marixa Ann is a 72 yo female who had abnormality at 2-4 oclock level of right breaston screening mammogram in 12/2018. Final review proved a 12 x 9 x14 mm invasive ductal carcinoma at 2 oclock to the right breast, Carol grade 1, ER/CA positive, Her2 negative with 0/3 SLN positive and edges free of disease. [id number]  Oncotype score =4 or 3% risk of disease at 9 years with hormone therapy.     Treatment:   1/10/2019 Right Lumpectomy with SLN biopsy  3/11/2019 completed right breast radiation post lumpectomy.   - choice made to not use Tamoxifen due to risk for DVT(repeatedly elevated d-dimer as well as elevated ESR and CRP) so started pt on arimidex with close bone evaluation.    Interval History:    Ms Ann is here for routine followup. She had a mammogram today. She tells me I am her 3rd medical oncologist she has seen and is frustrated with this. She has been having problems getting her adderall, I do see a Rx was sent a couple weeks ago from her PCP. She has fatigue and depression but no other new aches or pains. She has tried antidepressants before and had bad reactions to them and is not interested in talking to anyone about them. She has some hot flashes from the arimidex but no other side effects.     Rest of comprehensive and complete ROS is reviewed and is negative.   Past Medical History:   Diagnosis Date     ADHD      Breast cancer (H) 12/26/2018    Right Breast     Chronic fatigue      Fibromyalgia      Hard of hearing      Osteoporosis      S/P radiation therapy     4,256 cGy to right breast completed on 03/11/2019 -  [facility] with Dr. Copeland     Current Outpatient Medications   Medication     acetaminophen (TYLENOL) 325 MG tablet     amphetamine-dextroamphetamine (ADDERALL) 15 MG tablet     anastrozole (ARIMIDEX) 1 MG tablet     B Complex Vitamins (VITAMIN B-COMPLEX PO)     calcium carbonate (TUMS) 500 MG chewable tablet     Calcium-Magnesium-Zinc 333-133-5 MG TABS per tablet     Cholecalciferol (VITAMIN D-3 PO)     cyclobenzaprine (FLEXERIL) 10 MG tablet     denosumab (PROLIA) 60 MG/ML SOSY injection     KRILL OIL PO     magnesium oxide 200 MG TABS     Multiple Vitamins-Minerals (MULTIVITAMIN PO)     mupirocin (BACTROBAN) 2 % external ointment     vitamin B-12 (CYANOCOBALAMIN) 100 MCG tablet     Current Facility-Administered Medications   Medication     cross-Linked Hyaluronate (GEL-ONE) injection PRSY 30 mg     cross-Linked Hyaluronate (GEL-ONE) injection PRSY 30 mg     cross-Linked Hyaluronate (GEL-ONE) injection PRSY 30 mg     cross-Linked Hyaluronate (GEL-ONE) injection PRSY 30 mg     triamcinolone (KENALOG-40) injection 80 mg     Allergies   Allergen Reactions     Vicodin [Hydrocodone-Acetaminophen] Anaphylaxis     Dilaudid [Hydromorphone] Nausea and Vomiting     Oxycodone Nausea and Vomiting     Dust Mites      Milk [Lac Bovis] Diarrhea       Physical Exam:  Physical Exam  Vitals reviewed.   Constitutional:       Appearance: Normal appearance.   HENT:      Head: Normocephalic and atraumatic.   Eyes:      Extraocular Movements: Extraocular movements intact.      Conjunctiva/sclera: Conjunctivae normal.      Pupils: Pupils are equal, round, and reactive to light.   Cardiovascular:      Rate and Rhythm: Normal rate and regular rhythm.      Heart sounds: Normal heart sounds.   Pulmonary:      Effort: Pulmonary effort is normal.      Breath sounds: Normal breath sounds.   Abdominal:      General: Abdomen is flat.      Palpations: Abdomen is soft.   Musculoskeletal:      Cervical back: Normal range of motion and neck  supple.   Skin:     General: Skin is warm.   Neurological:      General: No focal deficit present.      Mental Status: She is alert and oriented to person, place, and time. Mental status is at baseline.   Psychiatric:         Mood and Affect: Mood normal.         Behavior: Behavior normal.         Thought Content: Thought content normal.         Judgment: Judgment normal.     breast exam: right breast post lumpectomy and radiation, no masses no nipple discharge and axilla benign.Left breast no masses no nipple discharge and axilla benign.  Laboratory Results:   Recent Labs   Lab Test 02/14/23  1137 08/11/22  1212 02/11/22  1210 08/09/21  1211 02/08/21  1426    145* 141 140 141   POTASSIUM 3.7 4.3 4.0 3.9 3.8   CHLORIDE 106 110* 108 107 108   CO2 29 30 29 30 30   ANIONGAP 5 5 4 3 3   BUN 18 15 17 14 18   CR 0.66 0.90 0.68 0.79 0.62   * 101* 105* 107* 104*   OJNATHON 9.8 9.3 9.6 8.9 9.2     Recent Labs   Lab Test 01/04/19  1122 10/19/17  1412   MAG 2.0 1.9     Recent Labs   Lab Test 02/11/22  1210 05/29/19  1112 01/04/19  1122 09/13/18  1141 10/19/17  1412   WBC 7.6 8.3 8.1 8.8 7.3   HGB 12.7 12.9 12.8 12.8 12.8    351 338 322 356   MCV 91 92 92 92 95   NEUTROPHIL  --   --   --   --  64.9     Recent Labs   Lab Test 02/14/23  1137 08/11/22  1212 02/11/22  1210   BILITOTAL 0.4 0.3 0.4   ALKPHOS 95 97 92   ALT 20 19 26   AST 16 18 18   ALBUMIN 3.4 3.2* 3.4     TSH   Date Value Ref Range Status   02/11/2022 3.13 0.40 - 4.00 mU/L Final   08/06/2020 3.28 0.40 - 4.00 mU/L Final   10/19/2017 1.35 0.40 - 4.00 mU/L Final     No results for input(s): CEA in the last 71276 hours.  Results for orders placed or performed in visit on 08/24/22   DX Hip/Pelvis/Spine    Narrative    HISTORY: Known osteopenia with use of AI. Other osteoporosis without  current pathological fracture; Aromatase inhibitor use.    COMPARISON: 8/6/2020    [age]  Height: 62.0 inches  Weight: 187.0 pounds  Sex: Female  Ethnicity:  White    Image quality: Adequate    Lumbar spine T-score in region of L4 = -1.8   L4 percent change: 16.8%     HIPS:  Mean total hip T-score: -1.6  Mean total hip percent change: 7.6%     Left femoral neck T-score = -2.5  Right femoral neck T-score= -2.5     Radius 33% T-score = -1.9  Radius 33% percent change: -3.8%   Wrist DEXA performed because of discrepancy between spine and hips.    COMPARISON TO PRIOR:  Percent change in the lumbar spine and hips is significant accounting  to the precision errors for this facility. Percent change in the  radius is NOT significant (or considered invalid) accounting for the  precision errors for this facility.     FRAX:  10 year probability of major osteoporotic fracture: 13.8%  10 year probability of hip fracture: 3.4%  The 10 year probability of fracture may be lower than reported if the  patient has received treatment. FRAX data should be disregarded in  patient's taking bisphosphonates.    World Health Organization definition of osteoporosis and osteopenia  for  women:   Normal: T-score at or above -1.0  Low Bone Mass (Osteopenia): T-score between -1.0 and -2.5.   Osteoporosis: T-score at or below -2.5   T-scores are reported for postmenopausal women and men over 50 years  of age.      Impression    IMPRESSION:    Osteoporosis.      PRISCILLA REAGAN MD         SYSTEM ID:  [id number]         Assessment and Plan:   1. tage 1B Right Breast Cancer [id number] s/p lumpectomy, radiation  2. Osteoporosis  3. known ADD- pt reports chronic fatigue syndrome and ADHD. She is on medication and follows with her PCP.    Plan    1. She will follow with her PCP about her depression and ADD. She is not interested in seeing a therapy or any medication for depression.  2. Prolia today and q 6 months  3. Next dexa due 8/2024  4. Mammogram today pending, next likely in one year  5. See us in 6 months for follow-up    Crystal Laguerre MD on 2/14/2023 at 12:27 PM        Again,  thank you for allowing me to participate in the care of your patient.        Sincerely,        Crystal Laguerre MD

## 2023-02-14 NOTE — PROGRESS NOTES
Oncology Follow Up Visit: 02/14/23    PCP: Rhiannon Weir    Diagnosis: Stage IB Right Breast Cancer  Marixa Ann is a 72 yo female who had abnormality at 2-4 oclock level of right breaston screening mammogram in 12/2018. Final review proved a 12 x 9 x14 mm invasive ductal carcinoma at 2 oclock to the right breast, Carol grade 1, ER/KS positive, Her2 negative with 0/3 SLN positive and edges free of disease. [id number]  Oncotype score =4 or 3% risk of disease at 9 years with hormone therapy.     Treatment:   1/10/2019 Right Lumpectomy with SLN biopsy  3/11/2019 completed right breast radiation post lumpectomy.   - choice made to not use Tamoxifen due to risk for DVT(repeatedly elevated d-dimer as well as elevated ESR and CRP) so started pt on arimidex with close bone evaluation.    Interval History:    Ms Ann is here for routine followup. She had a mammogram today. She tells me I am her 3rd medical oncologist she has seen and is frustrated with this. She has been having problems getting her adderall, I do see a Rx was sent a couple weeks ago from her PCP. She has fatigue and depression but no other new aches or pains. She has tried antidepressants before and had bad reactions to them and is not interested in talking to anyone about them. She has some hot flashes from the arimidex but no other side effects.     Rest of comprehensive and complete ROS is reviewed and is negative.   Past Medical History:   Diagnosis Date     ADHD      Breast cancer (H) 12/26/2018    Right Breast     Chronic fatigue      Fibromyalgia      Hard of hearing      Osteoporosis      S/P radiation therapy     4,256 cGy to right breast completed on 03/11/2019 - [facility] with Dr. Copeland     Current Outpatient Medications   Medication     acetaminophen (TYLENOL) 325 MG tablet     amphetamine-dextroamphetamine (ADDERALL) 15 MG tablet     anastrozole (ARIMIDEX) 1 MG tablet     B Complex Vitamins (VITAMIN B-COMPLEX PO)      calcium carbonate (TUMS) 500 MG chewable tablet     Calcium-Magnesium-Zinc 333-133-5 MG TABS per tablet     Cholecalciferol (VITAMIN D-3 PO)     cyclobenzaprine (FLEXERIL) 10 MG tablet     denosumab (PROLIA) 60 MG/ML SOSY injection     KRILL OIL PO     magnesium oxide 200 MG TABS     Multiple Vitamins-Minerals (MULTIVITAMIN PO)     mupirocin (BACTROBAN) 2 % external ointment     vitamin B-12 (CYANOCOBALAMIN) 100 MCG tablet     Current Facility-Administered Medications   Medication     cross-Linked Hyaluronate (GEL-ONE) injection PRSY 30 mg     cross-Linked Hyaluronate (GEL-ONE) injection PRSY 30 mg     cross-Linked Hyaluronate (GEL-ONE) injection PRSY 30 mg     cross-Linked Hyaluronate (GEL-ONE) injection PRSY 30 mg     triamcinolone (KENALOG-40) injection 80 mg     Allergies   Allergen Reactions     Vicodin [Hydrocodone-Acetaminophen] Anaphylaxis     Dilaudid [Hydromorphone] Nausea and Vomiting     Oxycodone Nausea and Vomiting     Dust Mites      Milk [Lac Bovis] Diarrhea       Physical Exam:  Physical Exam  Vitals reviewed.   Constitutional:       Appearance: Normal appearance.   HENT:      Head: Normocephalic and atraumatic.   Eyes:      Extraocular Movements: Extraocular movements intact.      Conjunctiva/sclera: Conjunctivae normal.      Pupils: Pupils are equal, round, and reactive to light.   Cardiovascular:      Rate and Rhythm: Normal rate and regular rhythm.      Heart sounds: Normal heart sounds.   Pulmonary:      Effort: Pulmonary effort is normal.      Breath sounds: Normal breath sounds.   Abdominal:      General: Abdomen is flat.      Palpations: Abdomen is soft.   Musculoskeletal:      Cervical back: Normal range of motion and neck supple.   Skin:     General: Skin is warm.   Neurological:      General: No focal deficit present.      Mental Status: She is alert and oriented to person, place, and time. Mental status is at baseline.   Psychiatric:         Mood and Affect: Mood normal.          Behavior: Behavior normal.         Thought Content: Thought content normal.         Judgment: Judgment normal.     breast exam: right breast post lumpectomy and radiation, no masses no nipple discharge and axilla benign.Left breast no masses no nipple discharge and axilla benign.  Laboratory Results:   Recent Labs   Lab Test 02/14/23  1137 08/11/22  1212 02/11/22  1210 08/09/21  1211 02/08/21  1426    145* 141 140 141   POTASSIUM 3.7 4.3 4.0 3.9 3.8   CHLORIDE 106 110* 108 107 108   CO2 29 30 29 30 30   ANIONGAP 5 5 4 3 3   BUN 18 15 17 14 18   CR 0.66 0.90 0.68 0.79 0.62   * 101* 105* 107* 104*   JONATHON 9.8 9.3 9.6 8.9 9.2     Recent Labs   Lab Test 01/04/19  1122 10/19/17  1412   MAG 2.0 1.9     Recent Labs   Lab Test 02/11/22  1210 05/29/19  1112 01/04/19  1122 09/13/18  1141 10/19/17  1412   WBC 7.6 8.3 8.1 8.8 7.3   HGB 12.7 12.9 12.8 12.8 12.8    351 338 322 356   MCV 91 92 92 92 95   NEUTROPHIL  --   --   --   --  64.9     Recent Labs   Lab Test 02/14/23  1137 08/11/22  1212 02/11/22  1210   BILITOTAL 0.4 0.3 0.4   ALKPHOS 95 97 92   ALT 20 19 26   AST 16 18 18   ALBUMIN 3.4 3.2* 3.4     TSH   Date Value Ref Range Status   02/11/2022 3.13 0.40 - 4.00 mU/L Final   08/06/2020 3.28 0.40 - 4.00 mU/L Final   10/19/2017 1.35 0.40 - 4.00 mU/L Final     No results for input(s): CEA in the last 04167 hours.  Results for orders placed or performed in visit on 08/24/22   DX Hip/Pelvis/Spine    Narrative    HISTORY: Known osteopenia with use of AI. Other osteoporosis without  current pathological fracture; Aromatase inhibitor use.    COMPARISON: 8/6/2020    [age] years[age]  Height: 62.0 inches  Weight: 187.0 pounds  Sex: Female  Ethnicity: White    Image quality: Adequate    Lumbar spine T-score in region of L4 = -1.8   L4 percent change: 16.8%     HIPS:  Mean total hip T-score: -1.6  Mean total hip percent change: 7.6%     Left femoral neck T-score = -2.5  Right femoral neck T-score= -2.5     Radius  33% T-score = -1.9  Radius 33% percent change: -3.8%   Wrist DEXA performed because of discrepancy between spine and hips.    COMPARISON TO PRIOR:  Percent change in the lumbar spine and hips is significant accounting  to the precision errors for this facility. Percent change in the  radius is NOT significant (or considered invalid) accounting for the  precision errors for this facility.     FRAX:  10 year probability of major osteoporotic fracture: 13.8%  10 year probability of hip fracture: 3.4%  The 10 year probability of fracture may be lower than reported if the  patient has received treatment. FRAX data should be disregarded in  patient's taking bisphosphonates.    World Health Organization definition of osteoporosis and osteopenia  for  women:   Normal: T-score at or above -1.0  Low Bone Mass (Osteopenia): T-score between -1.0 and -2.5.   Osteoporosis: T-score at or below -2.5   T-scores are reported for postmenopausal women and men over 50 years  of age.      Impression    IMPRESSION:    Osteoporosis.      PRISCILLA REAGAN MD         SYSTEM ID:  [id number]         Assessment and Plan:   1. tage 1B Right Breast Cancer [id number] s/p lumpectomy, radiation  2. Osteoporosis  3. known ADD- pt reports chronic fatigue syndrome and ADHD. She is on medication and follows with her PCP.    Plan    1. She will follow with her PCP about her depression and ADD. She is not interested in seeing a therapy or any medication for depression.  2. Prolia today and q 6 months  3. Next dexa due 8/2024  4. Mammogram today pending, next likely in one year  5. See us in 6 months for follow-up    Crystal Laguerre MD on 2/14/2023 at 12:27 PM

## 2023-02-14 NOTE — NURSING NOTE
"Oncology Rooming Note    February 14, 2023 11:54 AM   Marixa Ann is a 72 year old female who presents for:    Chief Complaint   Patient presents with     Oncology Clinic Visit     Follow up     Initial Vitals: /76 (BP Location: Right arm, Patient Position: Chair, Cuff Size: Adult Regular)   Pulse 96   Ht 1.517 m (4' 11.72\")   Wt 81.2 kg (179 lb)   LMP 05/31/2003 (Approximate)   SpO2 96%   BMI 35.29 kg/m   Estimated body mass index is 35.29 kg/m  as calculated from the following:    Height as of this encounter: 1.517 m (4' 11.72\").    Weight as of this encounter: 81.2 kg (179 lb). Body surface area is 1.85 meters squared.  No Pain (0) Comment: Data Unavailable   Patient's last menstrual period was 05/31/2003 (approximate).  Allergies reviewed: Yes  Medications reviewed: Yes    Medications: Medication refills not needed today.  Pharmacy name entered into EPIC:    WRITTEN PRESCRIPTION REQUESTED  [id number] - [facility] [id number] [address]    Clinical concerns: weakness in right upper arm - hard to lift items - getting worse     Dr. Laguerre was notified.      Kat Mcghee CMA              "

## 2023-02-14 NOTE — PROGRESS NOTES
Infusion Nursing Note:  Marixa Ann presents today for   Chief Complaint   Patient presents with     Allied Health Visit     Prolia     Patient seen by provider today: Yes: Dr. Laguerre   present during visit today: Not Applicable.    Note: N/A.      Treatment Conditions:  Lab Results   Component Value Date     02/14/2023    POTASSIUM 3.7 02/14/2023    MAG 2.0 01/04/2019    CR 0.66 02/14/2023    JONATHON 9.8 02/14/2023    BILITOTAL 0.4 02/14/2023    ALBUMIN 3.4 02/14/2023    ALT 20 02/14/2023    AST 16 02/14/2023     Results reviewed, labs MET treatment parameters, ok to proceed with treatment.    Post Infusion Assessment:  Patient tolerated injection without incident.  Site patent and intact, free from redness, edema or discomfort.     Discharge Plan:   Patient discharged in stable condition accompanied by: self.  Departure Mode: Ambulatory.      Kat Mcghee CMA

## 2023-03-02 ENCOUNTER — OFFICE VISIT (OUTPATIENT)
Dept: DERMATOLOGY | Facility: CLINIC | Age: 73
End: 2023-03-02
Payer: COMMERCIAL

## 2023-03-02 DIAGNOSIS — Z85.828 HISTORY OF NONMELANOMA SKIN CANCER: Primary | ICD-10-CM

## 2023-03-02 PROCEDURE — 99213 OFFICE O/P EST LOW 20 MIN: CPT | Performed by: DERMATOLOGY

## 2023-03-02 NOTE — NURSING NOTE
Marixa Ann's goals for this visit include:   Chief Complaint   Patient presents with     Derm Problem     FBSC, hx of NMSC, rough patch on forehead        She requests these members of her care team be copied on today's visit information: no    PCP: Rhiannon Weir    Referring Provider:  Referred Self, MD  No address on file    LMP 05/31/2003 (Approximate)     Do you need any medication refills at today's visit? No    Joy Westbrook LPN

## 2023-03-02 NOTE — PATIENT INSTRUCTIONS
Sun protective clothing and Resources     L[place] (www.TopVisible.com)  Athleta ([email or web address] [email or web address])  [employer] Life ([email or web address] [email or web address])  Carve Designs ([employer]) - affordable  Skinz (uvskinz.com)  Coolibar.com  Long sleeve - Mickey Cool DRI UPF 50 or [place] PFG UPF 50  Hoodie - [place] PFG UPF 50  Swimshirt/Rash Guard - Yarelis UPF 50 (on Amazon)  Neck - Outdoor Research Ubertubes ([email or web address] Taxi)   Patient Education     Checking for Skin Cancer  You can find cancer early by checking your skin each month. There are 3 kinds of skin cancer. They are melanoma, basal cell carcinoma, and squamous cell carcinoma. Doing monthly skin checks is the best way to find new marks or skin changes. Follow the instructions below for checking your skin.   The ABCDEs of checking moles for melanoma   Check your moles or growths for signs of melanoma using ABCDE:   Asymmetry: the sides of the mole or growth don t match  Border: the edges are ragged, notched, or blurred  Color: the color within the mole or growth varies  Diameter: the mole or growth is larger than 6 mm (size of a pencil eraser)  Evolving: the size, shape, or color of the mole or growth is changing (evolving is not shown in the images below)    Checking for other types of skin cancer  Basal cell carcinoma or squamous cell carcinoma have symptoms such as:     A spot or mole that looks different from all other marks on your skin  Changes in how an area feels, such as itching, tenderness, or pain  Changes in the skin's surface, such as oozing, bleeding, or scaliness  A sore that does not heal  New swelling or redness beyond the border of a mole    Who s at risk?  Anyone can get skin cancer. But you are at greater risk if you have:   Fair skin, light-colored hair, or light-colored eyes  Many moles or abnormal moles on your skin  A history of sunburns from sunlight or tanning beds  A family history of skin cancer  A history of exposure to radiation or  chemicals  A weakened immune system  If you have had skin cancer in the past, you are at risk for recurring skin cancer.   How to check your skin  Do your monthly skin checkups in front of a full-length mirror. Check all parts of your body, including your:   Head (ears, face, neck, and scalp)  Torso (front, back, and sides)  Arms (tops, undersides, upper, and lower armpits)  Hands (palms, backs, and fingers, including under the nails)  Buttocks and genitals  Legs (front, back, and sides)  Feet (tops, soles, toes, including under the nails, and between toes)  If you have a lot of moles, take digital photos of them each month. Make sure to take photos both up close and from a distance. These can help you see if any moles change over time.   Most skin changes are not cancer. But if you see any changes in your skin, call your doctor right away. Only he or she can diagnose a problem. If you have skin cancer, seeing your doctor can be the first step toward getting the treatment that could save your life.   [employer] last reviewed this educational content on 4/1/2019 2000-2020 [employer]. [address] [place] [address]. All rights reserved. This information is not intended as a substitute for professional medical care. Always follow your healthcare professional's instructions.       When should I call my doctor?  If you are worsening or not improving, please, contact us or seek urgent care as noted below.     Who should I call with questions (adults)?  [facility] (adult and pediatric): [phone]  [facility] (adult): [phone]  For urgent needs outside of business hours call the [facility] at [phone] and ask for the dermatology resident on call to be paged  If this is a medical emergency and you are unable to reach an ER, Call [phone]    Who should I call with questions (pediatric)?  [facility]  Pediatric Dermatology  Dr. Joan Frias, Dr. Martin Gilbert, Dr. Krupa Lboo, BRAYDEN Muhammad, Dr. Amber Sharma, Dr. Yun Jc & Dr. Fam Engel  Non-urgent nurse triage line; [phone]- Eryn and Blanca RN Care Coordinators   Kim (/Complex ) [phone]    If you need a prescription refill, please contact your pharmacy. Refills are approved or denied by our Physicians during normal business hours, Monday through Fridays  Per office policy, refills will not be granted if you have not been seen within the past year (or sooner depending on your child's condition)    Scheduling Information:  Pediatric Appointment Scheduling and Call Center [phone]  Radiology Scheduling- [phone]  Sedation Unit Scheduling- [phone]  [place] Scheduling- [facility] [phone]; Pediatric Dermatology [phone]  Main  Services: [phone]  Faroese: [phone]  Macanese: [phone]  Hmong/Gabonese/Mauricio: [phone]  Preadmission Nursing Department Fax Number: [phone] (Fax all pre-operative paperwork to this number)    For urgent matters arising during evenings, weekends, or holidays that cannot wait for normal business hours please call [phone] and ask for the dermatology resident on call to be paged.

## 2023-03-02 NOTE — LETTER
3/2/2023         RE: Marixa Ann  [id number]  [facility] [address]        Dear Colleague,    Thank you for referring your patient, Marixa Ann, to the [facility]. Please see a copy of my visit note below.    [facility] Dermatology Note  Encounter Date: Mar 2, 2023  Office Visit     Dermatology Problem List:  *Patient requests no males in the room for skin checks  Last skin check 3/2/23, recommended yearly  1. History of NMSC   - BCC - right nasal sidewall, s/p MMS 2/24/21  2. History of benign biopsy  - Osteoma cutis, left temple, s/p punch bx 2/9/21     Family History: Not assessed.  Social History: Likes to garden. Uses combination of sunscreen and sun protective clothing diligently.  ____________________________________________     ASSESSMENT/PLAN:    # History of nonmelanoma skin cancer, no clinical evidence of recurrence.  - ABCDEs: Counseled ABCDEs of melanoma: Asymmetry, Border (irregularity), Color (not uniform, changes in color), Diameter (greater than 6 mm which is about the size of a pencil eraser), and Evolving (any changes in preexisting moles).  - Sun protection: Counseled SPF30+ sunscreen, UPF clothing, sun avoidance, tanning bed avoidance.   - Recommended regular skin checks.      #rosacea  -reviewed sunscreen  -eye exam      Procedures Performed:   None    Follow-up: 9 months or earlier for new or changing lesions    Staff and Scribe:     Scribe Disclosure:   I, Caleb Champion, am serving as a scribe to document services personally performed by this physician, Dr. Sherita Uribe, based on data collection and the provider's statements to me.       Provider Disclosure:   The documentation recorded by the scribe accurately reflects the services I personally performed and the decisions made by me.    Sherita Uribe MD    Department of Dermatology  [facility]  Clinics: Phone: [phone], Fax:[phone]  [facility]: Phone: [phone], Fax: [phone]    ____________________________________________    CC: Derm Problem (FBSC, hx of NMSC, rough patch on forehead )    HPI:  Ms. Marixa Ann is a(n) 72 year old female who presents today as a return patient for a skin check.    Last seen 9/14/21 by Dr. Snyder for a skin check. At that time, a ganglion cyst on the left hand was noted, and patient will contact clinic if desiring removal.     Today, she reports a rough patch on the forehead that is red  Patient is otherwise feeling well, without additional skin concerns.    Labs Reviewed:  N/A    Physical Exam:  Vitals: LMP 05/31/2003 (Approximate)   SKIN: Total skin excluding the undergarment areas was performed. The exam included the head/face, neck, both arms, chest, back, abdomen, both legs, digits and/or nails. Rhiannon V. present  - Well healed scar at site of previous NMSC, no repigmentation or nodularity noted.   -There are tan to brown waxy, stuck on papules located on the extremities  -telangiectasias on forehead    - No other lesions of concern on areas examined.     Medications:  Current Outpatient Medications   Medication     acetaminophen (TYLENOL) 325 MG tablet     amphetamine-dextroamphetamine (ADDERALL) 15 MG tablet     anastrozole (ARIMIDEX) 1 MG tablet     B Complex Vitamins (VITAMIN B-COMPLEX PO)     calcium carbonate (TUMS) 500 MG chewable tablet     Calcium-Magnesium-Zinc 333-133-5 MG TABS per tablet     Cholecalciferol (VITAMIN D-3 PO)     cyclobenzaprine (FLEXERIL) 10 MG tablet     denosumab (PROLIA) 60 MG/ML SOSY injection     KRILL OIL PO     magnesium oxide 200 MG TABS     Multiple Vitamins-Minerals (MULTIVITAMIN PO)     mupirocin (BACTROBAN) 2 % external ointment     vitamin B-12 (CYANOCOBALAMIN) 100 MCG tablet     Current Facility-Administered Medications   Medication     cross-Linked  Hyaluronate (GEL-ONE) injection PRSY 30 mg     cross-Linked Hyaluronate (GEL-ONE) injection PRSY 30 mg     cross-Linked Hyaluronate (GEL-ONE) injection PRSY 30 mg     cross-Linked Hyaluronate (GEL-ONE) injection PRSY 30 mg     triamcinolone (KENALOG-40) injection 80 mg      Past Medical History:   Patient Active Problem List   Diagnosis     Moderate recurrent major depression (H)     Hyperlipidemia LDL goal <160     Chronic fatigue disorder     Liver lesion     Cystic disease of liver     Gastroesophageal reflux disease with esophagitis     Hiatal hernia     Age-related cataract of both eyes, unspecified age-related cataract type     Primary osteoarthritis of both knees     Attention deficit hyperactivity disorder (ADHD), predominantly inattentive type     Malignant neoplasm of upper-inner quadrant of right breast in female, estrogen receptor positive (H)     Thyroid nodule     Anxiety disorder, unspecified type     Osteoporosis     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     Aromatase inhibitor use     Cervical cancer screening     History of cholecystectomy     Morbid obesity (H)     Fibromyalgia     Drug-induced insomnia (H)     Past Medical History:   Diagnosis Date     ADHD      Breast cancer (H) 12/26/2018    Right Breast     Chronic fatigue      Fibromyalgia      Hard of hearing      Osteoporosis      S/P radiation therapy     4,256 cGy to right breast completed on 03/11/2019 - [facility] with Dr. Min De La Rosa MD  No address on file on close of this encounter.       Again, thank you for allowing me to participate in the care of your patient.        Sincerely,        Sherita Uribe MD

## 2023-03-02 NOTE — PROGRESS NOTES
[facility] Dermatology Note  Encounter Date: Mar 2, 2023  Office Visit     Dermatology Problem List:  *Patient requests no males in the room for skin checks  Last skin check 3/2/23, recommended yearly  1. History of NMSC   - BCC - right nasal sidewall, s/p MMS 2/24/21  2. History of benign biopsy  - Osteoma cutis, left temple, s/p punch bx 2/9/21     Family History: Not assessed.  Social History: Likes to garden. Uses combination of sunscreen and sun protective clothing diligently.  ____________________________________________     ASSESSMENT/PLAN:    # History of nonmelanoma skin cancer, no clinical evidence of recurrence.  - ABCDEs: Counseled ABCDEs of melanoma: Asymmetry, Border (irregularity), Color (not uniform, changes in color), Diameter (greater than 6 mm which is about the size of a pencil eraser), and Evolving (any changes in preexisting moles).  - Sun protection: Counseled SPF30+ sunscreen, UPF clothing, sun avoidance, tanning bed avoidance.   - Recommended regular skin checks.      #rosacea  -reviewed sunscreen  -eye exam      Procedures Performed:   None    Follow-up: 9 months or earlier for new or changing lesions    Staff and Scribe:     Scribe Disclosure:   I, Caleb Champion, am serving as a scribe to document services personally performed by this physician, Dr. Sherita Uribe, based on data collection and the provider's statements to me.       Provider Disclosure:   The documentation recorded by the scribe accurately reflects the services I personally performed and the decisions made by me.    Sherita Uribe MD    Department of Dermatology  [facility]: Phone: [phone], Fax:[phone]  [facility]: Phone: [phone], Fax: [phone]    ____________________________________________    CC: Derm Problem (FBSC, hx of NMSC, rough patch on forehead  )    HPI:  Ms. Marixa Ann is a(n) 72 year old female who presents today as a return patient for a skin check.    Last seen 9/14/21 by Dr. Snyder for a skin check. At that time, a ganglion cyst on the left hand was noted, and patient will contact clinic if desiring removal.     Today, she reports a rough patch on the forehead that is red  Patient is otherwise feeling well, without additional skin concerns.    Labs Reviewed:  N/A    Physical Exam:  Vitals: LMP 05/31/2003 (Approximate)   SKIN: Total skin excluding the undergarment areas was performed. The exam included the head/face, neck, both arms, chest, back, abdomen, both legs, digits and/or nails. Rhiannon V. present  - Well healed scar at site of previous NMSC, no repigmentation or nodularity noted.   -There are tan to brown waxy, stuck on papules located on the extremities  -telangiectasias on forehead    - No other lesions of concern on areas examined.     Medications:  Current Outpatient Medications   Medication     acetaminophen (TYLENOL) 325 MG tablet     amphetamine-dextroamphetamine (ADDERALL) 15 MG tablet     anastrozole (ARIMIDEX) 1 MG tablet     B Complex Vitamins (VITAMIN B-COMPLEX PO)     calcium carbonate (TUMS) 500 MG chewable tablet     Calcium-Magnesium-Zinc 333-133-5 MG TABS per tablet     Cholecalciferol (VITAMIN D-3 PO)     cyclobenzaprine (FLEXERIL) 10 MG tablet     denosumab (PROLIA) 60 MG/ML SOSY injection     KRILL OIL PO     magnesium oxide 200 MG TABS     Multiple Vitamins-Minerals (MULTIVITAMIN PO)     mupirocin (BACTROBAN) 2 % external ointment     vitamin B-12 (CYANOCOBALAMIN) 100 MCG tablet     Current Facility-Administered Medications   Medication     cross-Linked Hyaluronate (GEL-ONE) injection PRSY 30 mg     cross-Linked Hyaluronate (GEL-ONE) injection PRSY 30 mg     cross-Linked Hyaluronate (GEL-ONE) injection PRSY 30 mg     cross-Linked Hyaluronate (GEL-ONE) injection PRSY 30 mg     triamcinolone (KENALOG-40) injection 80  mg      Past Medical History:   Patient Active Problem List   Diagnosis     Moderate recurrent major depression (H)     Hyperlipidemia LDL goal <160     Chronic fatigue disorder     Liver lesion     Cystic disease of liver     Gastroesophageal reflux disease with esophagitis     Hiatal hernia     Age-related cataract of both eyes, unspecified age-related cataract type     Primary osteoarthritis of both knees     Attention deficit hyperactivity disorder (ADHD), predominantly inattentive type     Malignant neoplasm of upper-inner quadrant of right breast in female, estrogen receptor positive (H)     Thyroid nodule     Anxiety disorder, unspecified type     Osteoporosis     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     Aromatase inhibitor use     Cervical cancer screening     History of cholecystectomy     Morbid obesity (H)     Fibromyalgia     Drug-induced insomnia (H)     Past Medical History:   Diagnosis Date     ADHD      Breast cancer (H) 12/26/2018    Right Breast     Chronic fatigue      Fibromyalgia      Hard of hearing      Osteoporosis      S/P radiation therapy     4,256 cGy to right breast completed on 03/11/2019 - [facility] with Dr. Min DRAPER [facility] Self, MD  No address on file on close of this encounter.

## 2023-03-24 ENCOUNTER — OFFICE VISIT (OUTPATIENT)
Dept: FAMILY MEDICINE | Facility: CLINIC | Age: 73
End: 2023-03-24
Payer: COMMERCIAL

## 2023-03-24 VITALS
DIASTOLIC BLOOD PRESSURE: 64 MMHG | BODY MASS INDEX: 34.14 KG/M2 | SYSTOLIC BLOOD PRESSURE: 112 MMHG | HEART RATE: 78 BPM | WEIGHT: 180.8 LBS | HEIGHT: 61 IN | RESPIRATION RATE: 16 BRPM | OXYGEN SATURATION: 99 %

## 2023-03-24 DIAGNOSIS — Z17.0 MALIGNANT NEOPLASM OF UPPER-INNER QUADRANT OF RIGHT BREAST IN FEMALE, ESTROGEN RECEPTOR POSITIVE (H): ICD-10-CM

## 2023-03-24 DIAGNOSIS — M62.838 SPASM OF MUSCLE: ICD-10-CM

## 2023-03-24 DIAGNOSIS — K42.9 UMBILICAL HERNIA WITHOUT OBSTRUCTION AND WITHOUT GANGRENE: Primary | ICD-10-CM

## 2023-03-24 DIAGNOSIS — F33.1 MODERATE RECURRENT MAJOR DEPRESSION (H): ICD-10-CM

## 2023-03-24 DIAGNOSIS — F90.0 ATTENTION DEFICIT HYPERACTIVITY DISORDER (ADHD), PREDOMINANTLY INATTENTIVE TYPE: ICD-10-CM

## 2023-03-24 DIAGNOSIS — F19.982 DRUG-INDUCED INSOMNIA (H): ICD-10-CM

## 2023-03-24 DIAGNOSIS — R10.84 ABDOMINAL PAIN, GENERALIZED: ICD-10-CM

## 2023-03-24 DIAGNOSIS — C50.211 MALIGNANT NEOPLASM OF UPPER-INNER QUADRANT OF RIGHT BREAST IN FEMALE, ESTROGEN RECEPTOR POSITIVE (H): ICD-10-CM

## 2023-03-24 PROCEDURE — 96127 BRIEF EMOTIONAL/BEHAV ASSMT: CPT | Performed by: PHYSICIAN ASSISTANT

## 2023-03-24 PROCEDURE — 90471 IMMUNIZATION ADMIN: CPT | Performed by: PHYSICIAN ASSISTANT

## 2023-03-24 PROCEDURE — 90750 HZV VACC RECOMBINANT IM: CPT | Performed by: PHYSICIAN ASSISTANT

## 2023-03-24 PROCEDURE — 99214 OFFICE O/P EST MOD 30 MIN: CPT | Mod: 25 | Performed by: PHYSICIAN ASSISTANT

## 2023-03-24 RX ORDER — CYCLOBENZAPRINE HCL 10 MG
10 TABLET ORAL 2 TIMES DAILY PRN
Qty: 30 TABLET | Refills: 0 | Status: SHIPPED | OUTPATIENT
Start: 2023-03-24 | End: 2023-12-28

## 2023-03-24 RX ORDER — TRAZODONE HYDROCHLORIDE 50 MG/1
50 TABLET, FILM COATED ORAL AT BEDTIME
Qty: 90 TABLET | Refills: 0 | Status: SHIPPED | OUTPATIENT
Start: 2023-03-24 | End: 2023-08-03

## 2023-03-24 ASSESSMENT — PATIENT HEALTH QUESTIONNAIRE - PHQ9
SUM OF ALL RESPONSES TO PHQ QUESTIONS 1-9: 12
SUM OF ALL RESPONSES TO PHQ QUESTIONS 1-9: 12
10. IF YOU CHECKED OFF ANY PROBLEMS, HOW DIFFICULT HAVE THESE PROBLEMS MADE IT FOR YOU TO DO YOUR WORK, TAKE CARE OF THINGS AT HOME, OR GET ALONG WITH OTHER PEOPLE: SOMEWHAT DIFFICULT
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
## 2023-03-24 NOTE — PROGRESS NOTES
"  Assessment & Plan     Umbilical hernia without obstruction and without gangrene  Follow up with surgery- given cancer evaluate with CT  - Adult General Surg Referral  - CT Abdomen Pelvis w Contrast    Abdominal pain, generalized  Evaluate with CT given history of breast cancer and pain- exam tender but benign   - CT Abdomen Pelvis w Contrast    Malignant neoplasm of upper-inner quadrant of right breast in female, estrogen receptor positive (H)  As above   - CT Abdomen Pelvis w Contrast    Drug-induced insomnia (H)  On adderall.  Trial of trazodone and follow up with PCP in approximately 3 weeks   Referred for psychotherapy  - traZODone (DESYREL) 50 MG tablet  Dispense: 90 tablet; Refill: 0  [facility]    Attention deficit hyperactivity disorder (ADHD), predominantly inattentive type  Referred for psychotherapy as well.   - [facility]    Spasm of muscle  Refilled flexeril  - cyclobenzaprine (FLEXERIL) 10 MG tablet  Dispense: 30 tablet; Refill: 0    Moderate recurrent major depression (H)  Declines antidepressants. No SI - follow up with psychologist   - [facility] Referral      Ordering of each unique test  Prescription drug management         BMI:   Estimated body mass index is 34.44 kg/m  as calculated from the following:    Height as of this encounter: 1.543 m (5' 0.75\").    Weight as of this encounter: 82 kg (180 lb 12.8 oz).   Weight management plan: Discussed healthy diet and exercise guidelines    Patient Instructions   Start trazodone 50 mg 1-2 at bedtime as needed for insomnia   Follow up with surgery for hernia  Follow up with Rhiannon in approximately 3 weeks   Follow up with psychologist   Return urgently if any change in symptoms.          Katie Hein PA-C  [facility]    Inocencio Wells is a 72 year old, presenting for the following health issues:  Mass    Additional Questions 3/24/2023 "   Roomed by Pauline GAMBOA   Accompanied by self     Patient Reported Additional Medications 3/24/2023   Patient reports taking the following new medications none     Pt states she would like something small to help her sleep at night.    Mass    History of Present Illness       Reason for visit:  Hernia difficulty sleeping  Symptom onset:  3-4 weeks ago  Symptoms include:  Bulg in stomach  worried at night  Symptom intensity:  Moderate  Symptom progression:  Worsening  Had these symptoms before:  No  What makes it worse:  Computers instead of human contact    She eats 0-1 servings of fruits and vegetables daily.She consumes 1 sweetened beverage(s) daily.She exercises with enough effort to increase her heart rate 9 or less minutes per day.  She is missing 3 dose(s) of medications per week.    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    I am feeling depresssed and hard to go to sleep.  I have ADHD and on adderall..   is a bufffer between me and the world.   This winter has been shoveling snow and couple days later looking at belly button and skin rolled away and belly button disappeared and noted bulge in abdomen  Appendix removed young early 20s and cholecystectomy approximately 4 years ago.  Have had problems with depressiove medications in past  With wellbutrin said odd things and Spent one sheeba giving anyone finger  prozac not helpful either  Has Chronic fatigue  Doesn't want anything that will cause weird behavior  Hesitating to  "do cologauard - sent me the box - doesn't want to find out that she has colon cancer.   Breast cancer diagnosed on christmas - things go bad in body   Requests refill of flexeril Draws a lot and hurts right arm - took out lymh nodes. Any kind of work right arm ends up in a lot of pain   Didn't sleep well last noc  Doesn't leave house much gets too anxious and hard to walk   Putting off knee surgery because things keep coming up.  Has a History of Skin cancer and breast cancer currrently on arimidex and followed by oncology  Worsening depression.     Was working with theraist but quit - wants a therapist that specializes with adhd  Is in a zoom support group             Review of Systems   Constitutional, HEENT, cardiovascular, pulmonary, gi and gu systems are negative, except as otherwise noted.      Objective    /64 (BP Location: Left arm, Patient Position: Sitting, Cuff Size: Adult Large)   Pulse 78   Resp 16   Ht 1.543 m (5' 0.75\")   Wt 82 kg (180 lb 12.8 oz)   LMP 05/31/2003 (Approximate)   SpO2 99%   BMI 34.44 kg/m    Body mass index is 34.44 kg/m .  Physical Exam   GENERAL: alert, no distress and obese  NECK: no adenopathy, no asymmetry, masses, or scars and thyroid normal to palpation  RESP: lungs clear to auscultation - no rales, rhonchi or wheezes  CV: regular rate and rhythm, normal S1 S2, no S3 or S4, no murmur, click or rub, no peripheral edema and peripheral pulses strong  ABDOMEN: tenderness umbilical, bowel sounds normal, no palpable or pulsatile masses and hernia periumbilical   PSYCH: mentation appears normal, tearful, judgement and insight intact and appearance well groomed                    "

## 2023-03-24 NOTE — PATIENT INSTRUCTIONS
Start trazodone 50 mg 1-2 at bedtime as needed for insomnia   Follow up with surgery for hernia  Follow up with Rhiannon in approximately 3 weeks   Follow up with psychologist   Return urgently if any change in symptoms.

## 2023-03-30 ENCOUNTER — ANCILLARY PROCEDURE (OUTPATIENT)
Dept: CT IMAGING | Facility: CLINIC | Age: 73
End: 2023-03-30
Attending: PHYSICIAN ASSISTANT
Payer: COMMERCIAL

## 2023-03-30 DIAGNOSIS — C50.211 MALIGNANT NEOPLASM OF UPPER-INNER QUADRANT OF RIGHT BREAST IN FEMALE, ESTROGEN RECEPTOR POSITIVE (H): ICD-10-CM

## 2023-03-30 DIAGNOSIS — R93.5 ABNORMAL CT OF THE ABDOMEN: Primary | ICD-10-CM

## 2023-03-30 DIAGNOSIS — K42.9 UMBILICAL HERNIA WITHOUT OBSTRUCTION AND WITHOUT GANGRENE: ICD-10-CM

## 2023-03-30 DIAGNOSIS — R10.84 ABDOMINAL PAIN, GENERALIZED: ICD-10-CM

## 2023-03-30 DIAGNOSIS — Z17.0 MALIGNANT NEOPLASM OF UPPER-INNER QUADRANT OF RIGHT BREAST IN FEMALE, ESTROGEN RECEPTOR POSITIVE (H): ICD-10-CM

## 2023-03-30 LAB
CREAT BLD-MCNC: 0.7 MG/DL (ref 0.5–1)
GFR SERPL CREATININE-BSD FRML MDRD: >60 ML/MIN/1.73M2

## 2023-03-30 PROCEDURE — 82565 ASSAY OF CREATININE: CPT

## 2023-03-30 PROCEDURE — 74177 CT ABD & PELVIS W/CONTRAST: CPT | Mod: GC | Performed by: RADIOLOGY

## 2023-03-30 RX ORDER — IOPAMIDOL 755 MG/ML
111 INJECTION, SOLUTION INTRAVASCULAR ONCE
Status: COMPLETED | OUTPATIENT
Start: 2023-03-30 | End: 2023-03-30

## 2023-03-30 RX ADMIN — IOPAMIDOL 111 ML: 755 INJECTION, SOLUTION INTRAVASCULAR at 11:08

## 2023-03-30 NOTE — RESULT ENCOUNTER NOTE
Dear Priscilla  Your CT scan showed the hernia but it also showed something unusual with the pancreas.  Schedule an MRI at [facility] ([phone]) formerly called [facility] to further evaluate this.  It is unlikely to be anything concerning but we should take a look to be completely sure.   Also schedule a lab only appointment to check a lipase or pancreas test as well as a comprehensive metabolic panel.   Please call or MyChart my office with any questions or concerns.   Katie Hein, PAC

## 2023-04-04 ENCOUNTER — LAB (OUTPATIENT)
Dept: LAB | Facility: CLINIC | Age: 73
End: 2023-04-04
Payer: COMMERCIAL

## 2023-04-04 ENCOUNTER — TELEPHONE (OUTPATIENT)
Dept: FAMILY MEDICINE | Facility: CLINIC | Age: 73
End: 2023-04-04

## 2023-04-04 DIAGNOSIS — R93.5 ABNORMAL CT OF THE ABDOMEN: Primary | ICD-10-CM

## 2023-04-04 DIAGNOSIS — R93.5 ABNORMAL CT OF THE ABDOMEN: ICD-10-CM

## 2023-04-04 LAB
ALBUMIN SERPL-MCNC: 3.1 G/DL (ref 3.4–5)
ALP SERPL-CCNC: 98 U/L (ref 40–150)
ALT SERPL W P-5'-P-CCNC: 27 U/L (ref 0–50)
ANION GAP SERPL CALCULATED.3IONS-SCNC: 1 MMOL/L (ref 3–14)
AST SERPL W P-5'-P-CCNC: 19 U/L (ref 0–45)
BASOPHILS # BLD AUTO: 0 10E3/UL (ref 0–0.2)
BASOPHILS NFR BLD AUTO: 1 %
BILIRUB SERPL-MCNC: 0.3 MG/DL (ref 0.2–1.3)
BUN SERPL-MCNC: 10 MG/DL (ref 7–30)
CALCIUM SERPL-MCNC: 8.8 MG/DL (ref 8.5–10.1)
CHLORIDE BLD-SCNC: 110 MMOL/L (ref 94–109)
CO2 SERPL-SCNC: 31 MMOL/L (ref 20–32)
CREAT SERPL-MCNC: 0.64 MG/DL (ref 0.52–1.04)
EOSINOPHIL # BLD AUTO: 0.2 10E3/UL (ref 0–0.7)
EOSINOPHIL NFR BLD AUTO: 2 %
ERYTHROCYTE [DISTWIDTH] IN BLOOD BY AUTOMATED COUNT: 14.1 % (ref 10–15)
GFR SERPL CREATININE-BSD FRML MDRD: >90 ML/MIN/1.73M2
GLUCOSE BLD-MCNC: 99 MG/DL (ref 70–99)
HCT VFR BLD AUTO: 40 % (ref 35–47)
HGB BLD-MCNC: 12.4 G/DL (ref 11.7–15.7)
IMM GRANULOCYTES # BLD: 0 10E3/UL
IMM GRANULOCYTES NFR BLD: 1 %
LIPASE SERPL-CCNC: 119 U/L (ref 73–393)
LYMPHOCYTES # BLD AUTO: 2 10E3/UL (ref 0.8–5.3)
LYMPHOCYTES NFR BLD AUTO: 27 %
MCH RBC QN AUTO: 27.5 PG (ref 26.5–33)
MCHC RBC AUTO-ENTMCNC: 31 G/DL (ref 31.5–36.5)
MCV RBC AUTO: 89 FL (ref 78–100)
MONOCYTES # BLD AUTO: 0.6 10E3/UL (ref 0–1.3)
MONOCYTES NFR BLD AUTO: 8 %
NEUTROPHILS # BLD AUTO: 4.5 10E3/UL (ref 1.6–8.3)
NEUTROPHILS NFR BLD AUTO: 61 %
NRBC # BLD AUTO: 0 10E3/UL
NRBC BLD AUTO-RTO: 0 /100
PLATELET # BLD AUTO: 331 10E3/UL (ref 150–450)
POTASSIUM BLD-SCNC: 3.6 MMOL/L (ref 3.4–5.3)
PROT SERPL-MCNC: 7.1 G/DL (ref 6.8–8.8)
RBC # BLD AUTO: 4.51 10E6/UL (ref 3.8–5.2)
SODIUM SERPL-SCNC: 142 MMOL/L (ref 133–144)
WBC # BLD AUTO: 7.3 10E3/UL (ref 4–11)

## 2023-04-04 PROCEDURE — 85025 COMPLETE CBC W/AUTO DIFF WBC: CPT

## 2023-04-04 PROCEDURE — 36415 COLL VENOUS BLD VENIPUNCTURE: CPT

## 2023-04-04 PROCEDURE — 83690 ASSAY OF LIPASE: CPT

## 2023-04-04 PROCEDURE — 80053 COMPREHEN METABOLIC PANEL: CPT

## 2023-04-04 NOTE — TELEPHONE ENCOUNTER
Phone call to patient to review need for comprehensive metabolic panel and MRI- currently scheduled for MRI in [place].  Please call and assist with scheduling lab only appointment at her preferred clinic- orders for cbc, comprehensive metabolic panel and lipase are in the system  Call [phone] to schedule

## 2023-04-04 NOTE — RESULT ENCOUNTER NOTE
Dear Priscilla  Your albumin or protein level is a bit low?  Are you eating regularly?  Your blood counts and hemoglobin were normal.   You do not have pancreatitis.  I will inform you of your MRI results as soon as they are available.  Please call or MyChart my office with any questions or concerns.   Katie Hein, PAC

## 2023-04-04 NOTE — TELEPHONE ENCOUNTER
Called patient and scheduled for lab appointment   No further needs at this time     Maria Esther MOREIRA - Visit facilitator

## 2023-04-10 ENCOUNTER — ANCILLARY PROCEDURE (OUTPATIENT)
Dept: MRI IMAGING | Facility: CLINIC | Age: 73
End: 2023-04-10
Attending: PHYSICIAN ASSISTANT
Payer: COMMERCIAL

## 2023-04-10 DIAGNOSIS — R93.5 ABNORMAL CT OF THE ABDOMEN: ICD-10-CM

## 2023-04-10 PROCEDURE — A9585 GADOBUTROL INJECTION: HCPCS | Performed by: RADIOLOGY

## 2023-04-10 PROCEDURE — 74183 MRI ABD W/O CNTR FLWD CNTR: CPT | Mod: TC | Performed by: RADIOLOGY

## 2023-04-10 RX ORDER — GADOBUTROL 604.72 MG/ML
8 INJECTION INTRAVENOUS ONCE
Status: COMPLETED | OUTPATIENT
Start: 2023-04-10 | End: 2023-04-10

## 2023-04-10 RX ADMIN — GADOBUTROL 8 ML: 604.72 INJECTION INTRAVENOUS at 13:22

## 2023-04-10 NOTE — RESULT ENCOUNTER NOTE
Keke Wells  Your scan is reassuring. I recommend repeating a CT in 2-3 years to follow this unless your oncologist feels differently.  Please call or MyChart my office with any questions or concerns.   Katie Hein, PAC

## 2023-04-22 NOTE — PROGRESS NOTES
Infusion Nursing Note:  Marixa CARLYLE Ann presents today for Prolia.    Patient seen by provider today: Yes: Lidia Parham CNP   present during visit today: Not Applicable.    Note: N/A.    Intravenous Access:  No Intravenous access/labs at this visit.    Treatment Conditions:  Lab Results   Component Value Date     (H) 08/11/2022    POTASSIUM 4.3 08/11/2022    MAG 2.0 01/04/2019    CR 0.90 08/11/2022    JONATHON 9.3 08/11/2022    BILITOTAL 0.3 08/11/2022    ALBUMIN 3.2 (L) 08/11/2022    ALT 19 08/11/2022    AST 18 08/11/2022     Results reviewed, labs MET treatment parameters, ok to proceed with treatment.    Post Infusion Assessment:  Patient tolerated injection without incident.  Site patent and intact, free from redness, edema or discomfort.     Discharge Plan:   Patient discharged in stable condition accompanied by: self.  Departure Mode: Ambulatory.      Marjorie Crooks LPN                                  used

## 2023-05-11 ENCOUNTER — TELEPHONE (OUTPATIENT)
Dept: FAMILY MEDICINE | Facility: CLINIC | Age: 73
End: 2023-05-11
Payer: COMMERCIAL

## 2023-05-11 NOTE — TELEPHONE ENCOUNTER
She can take one of my same day appointments if she wants but if she wants to see the same person she's been seeing, please ask Emigdio Hein at [place] as she has been seeing her for this issue and is up to date

## 2023-05-11 NOTE — TELEPHONE ENCOUNTER
Reason for Call:  Appointment Request    Patient requesting this type of appt: Pre-op    Requested provider: Rhiannon Weir or Katie OWEN    Reason patient unable to be scheduled: Not within requested timeframe    When does patient want to be seen/preferred time: 5/12/23 -5/19/23    Comments: Patient needs a pre op prior to her surgery on 5/22    Could we send this information to you in [facility] or would you prefer to receive a phone call?:   Patient would prefer a phone call   Okay to leave a detailed message?: Yes at Home number on file [phone] (home)    Call taken on 5/11/2023 at 2:49 PM by Natasha Calero

## 2023-05-15 ENCOUNTER — OFFICE VISIT (OUTPATIENT)
Dept: FAMILY MEDICINE | Facility: CLINIC | Age: 73
End: 2023-05-15
Payer: COMMERCIAL

## 2023-05-15 VITALS
WEIGHT: 180.6 LBS | DIASTOLIC BLOOD PRESSURE: 76 MMHG | TEMPERATURE: 98.1 F | HEIGHT: 60 IN | BODY MASS INDEX: 35.46 KG/M2 | SYSTOLIC BLOOD PRESSURE: 121 MMHG | RESPIRATION RATE: 13 BRPM | HEART RATE: 103 BPM | OXYGEN SATURATION: 99 %

## 2023-05-15 DIAGNOSIS — D64.9 ANEMIA, UNSPECIFIED TYPE: ICD-10-CM

## 2023-05-15 DIAGNOSIS — M25.569 PAIN AND SWELLING OF KNEE, UNSPECIFIED LATERALITY: ICD-10-CM

## 2023-05-15 DIAGNOSIS — E78.5 HYPERLIPIDEMIA LDL GOAL <160: ICD-10-CM

## 2023-05-15 DIAGNOSIS — K43.2 INCISIONAL HERNIA, WITHOUT OBSTRUCTION OR GANGRENE: ICD-10-CM

## 2023-05-15 DIAGNOSIS — Z01.818 PREOP GENERAL PHYSICAL EXAM: Primary | ICD-10-CM

## 2023-05-15 DIAGNOSIS — M25.469 PAIN AND SWELLING OF KNEE, UNSPECIFIED LATERALITY: ICD-10-CM

## 2023-05-15 DIAGNOSIS — R73.03 PREDIABETES: ICD-10-CM

## 2023-05-15 DIAGNOSIS — Z17.0 MALIGNANT NEOPLASM OF UPPER-INNER QUADRANT OF RIGHT BREAST IN FEMALE, ESTROGEN RECEPTOR POSITIVE (H): ICD-10-CM

## 2023-05-15 DIAGNOSIS — E66.01 MORBID OBESITY (H): ICD-10-CM

## 2023-05-15 DIAGNOSIS — Z79.811 AROMATASE INHIBITOR USE: ICD-10-CM

## 2023-05-15 DIAGNOSIS — C50.211 MALIGNANT NEOPLASM OF UPPER-INNER QUADRANT OF RIGHT BREAST IN FEMALE, ESTROGEN RECEPTOR POSITIVE (H): ICD-10-CM

## 2023-05-15 DIAGNOSIS — M79.89 LEG SWELLING: ICD-10-CM

## 2023-05-15 LAB
HBA1C MFR BLD: 6.2 % (ref 0–5.6)
HGB BLD-MCNC: 11.5 G/DL (ref 11.7–15.7)

## 2023-05-15 PROCEDURE — 93000 ELECTROCARDIOGRAM COMPLETE: CPT | Performed by: NURSE PRACTITIONER

## 2023-05-15 PROCEDURE — 36415 COLL VENOUS BLD VENIPUNCTURE: CPT | Performed by: NURSE PRACTITIONER

## 2023-05-15 PROCEDURE — 99214 OFFICE O/P EST MOD 30 MIN: CPT | Performed by: NURSE PRACTITIONER

## 2023-05-15 PROCEDURE — 80048 BASIC METABOLIC PNL TOTAL CA: CPT | Performed by: NURSE PRACTITIONER

## 2023-05-15 PROCEDURE — 83036 HEMOGLOBIN GLYCOSYLATED A1C: CPT | Performed by: NURSE PRACTITIONER

## 2023-05-15 PROCEDURE — 85018 HEMOGLOBIN: CPT | Performed by: NURSE PRACTITIONER

## 2023-05-15 ASSESSMENT — PATIENT HEALTH QUESTIONNAIRE - PHQ9
10. IF YOU CHECKED OFF ANY PROBLEMS, HOW DIFFICULT HAVE THESE PROBLEMS MADE IT FOR YOU TO DO YOUR WORK, TAKE CARE OF THINGS AT HOME, OR GET ALONG WITH OTHER PEOPLE: VERY DIFFICULT
SUM OF ALL RESPONSES TO PHQ QUESTIONS 1-9: 9
SUM OF ALL RESPONSES TO PHQ QUESTIONS 1-9: 9

## 2023-05-15 NOTE — PATIENT INSTRUCTIONS
For informational purposes only. Not to replace the advice of your health care provider. Copyright   2003,  [employer] [facility]. All rights reserved. Clinically reviewed by Martine Colvin MD. [employer] [id number] - REV .  Preparing for Your Surgery  Getting started  A nurse will call you to review your health history and instructions. They will give you an arrival time based on your scheduled surgery time. Please be ready to share:  Your doctor's clinic name and phone number  Your medical, surgical, and anesthesia history  A list of allergies and sensitivities  A list of medicines, including herbal treatments and over-the-counter drugs  Whether the patient has a legal guardian (ask how to send us the papers in advance)  Please tell us if you're pregnant--or if there's any chance you might be pregnant. Some surgeries may injure a fetus (unborn baby), so they require a pregnancy test. Surgeries that are safe for a fetus don't always need a test, and you can choose whether to have one.   If you have a child who's having surgery, please ask for a copy of Preparing for Your Child's Surgery.    Preparing for surgery  Within 10 to 30 days of surgery: Have a pre-op exam (sometimes called an H&P, or History and Physical). This can be done at a clinic or pre-operative center.  If you're having a , you may not need this exam. Talk to your care team.  At your pre-op exam, talk to your care team about all medicines you take. If you need to stop any medicines before surgery, ask when to start taking them again.  We do this for your safety. Many medicines can make you bleed too much during surgery. Some change how well surgery (anesthesia) drugs work.  Call your insurance company to let them know you're having surgery. (If you don't have insurance, call [phone].)  Call your clinic if there's any change in your health. This includes signs of a cold or flu (sore throat, runny nose, cough, rash, fever). It  also includes a scrape or scratch near the surgery site.  If you have questions on the day of surgery, call your hospital or surgery center.  Eating and drinking guidelines  For your safety: Unless your surgeon tells you otherwise, follow the guidelines below.  Eat and drink as usual until 8 hours before you arrive for surgery. After that, no food or milk.  Drink clear liquids until 2 hours before you arrive. These are liquids you can see through, like water, Gatorade, and Propel Water. They also include plain black coffee and tea (no cream or milk), candy, and breath mints. You can spit out gum when you arrive.  If you drink alcohol: Stop drinking it the night before surgery.  If your care team tells you to take medicine on the morning of surgery, it's okay to take it with a sip of water.  Preventing infection  Shower or bathe the night before and morning of your surgery. Follow the instructions your clinic gave you. (If no instructions, use regular soap.)  Don't shave or clip hair near your surgery site. We'll remove the hair if needed.  Don't smoke or vape the morning of surgery. You may chew nicotine gum up to 2 hours before surgery. A nicotine patch is okay.  Note: Some surgeries require you to completely quit smoking and nicotine. Check with your surgeon.  Your care team will make every effort to keep you safe from infection. We will:  Clean our hands often with soap and water (or an alcohol-based hand rub).  Clean the skin at your surgery site with a special soap that kills germs.  Give you a special gown to keep you warm. (Cold raises the risk of infection.)  Wear special hair covers, masks, gowns and gloves during surgery.  Give antibiotic medicine, if prescribed. Not all surgeries need antibiotics.  What to bring on the day of surgery  Photo ID and insurance card  Copy of your health care directive, if you have one  Glasses and hearing aids (bring cases)  You can't wear contacts during surgery  Inhaler and  eye drops, if you use them (tell us about these when you arrive)  CPAP machine or breathing device, if you use them  A few personal items, if spending the night  If you have . . .  A pacemaker, ICD (cardiac defibrillator) or other implant: Bring the ID card.  An implanted stimulator: Bring the remote control.  A legal guardian: Bring a copy of the certified (court-stamped) guardianship papers.  Please remove any jewelry, including body piercings. Leave jewelry and other valuables at home.  If you're going home the day of surgery  You must have a responsible adult drive you home. They should stay with you overnight as well.  If you don't have someone to stay with you, and you aren't safe to go home alone, we may keep you overnight. Insurance often won't pay for this.  After surgery  If it's hard to control your pain or you need more pain medicine, please call your surgeon's office.  Questions?   If you have any questions for your care team, list them here: _________________________________________________________________________________________________________________________________________________________________________ ____________________________________ ____________________________________ ____________________________________    How to Take Your Medication Before Surgery  - HOLD (do not take) krill oil

## 2023-05-15 NOTE — PROGRESS NOTES
[address] [address]  Phone: [phone]  Primary Provider: Rhiannon Breaux  Pre-op Performing Provider: RHIANNON BREAUX      PREOPERATIVE EVALUATION:  Today's date: 5/15/2023    Marixa Ann is a 72 year old female who presents for a preoperative evaluation.      5/15/2023     1:19 PM   Additional Questions   Roomed by Mick WEST     Surgical Information:  Surgery/Procedure: ROBOTIC XI ASSISTED MULTIPORT LAPAROSCOPIC REPAIR INCISIONAL HERNIA WITH MESH POSSIBLE LAPAROSCOPIC POSSIBLE OPEN  Surgery Location:  [facility]  Surgeon: Lila Rehman  Surgery Date: 5/22/2023  Time of Surgery: 9:00 AM  Where patient plans to recover: At home with family  Fax number for surgical facility: [phone]    Assessment & Plan     The proposed surgical procedure is considered INTERMEDIATE risk.    Preop general physical exam  - EKG 12-lead complete w/read - Clinics  - Basic metabolic panel  (Ca, Cl, CO2, Creat, Gluc, K, Na, BUN); Future  - Hemoglobin A1c; Future  - Hemoglobin; Future  - Basic metabolic panel  (Ca, Cl, CO2, Creat, Gluc, K, Na, BUN)  - Hemoglobin A1c  - Hemoglobin    Incisional hernia, without obstruction or gangrene  Reason for procedure.     Leg swelling  Negative for DVT. Suspect dependent edema. Not present today.   - US Lower Extremity Venous Duplex Bilateral; Future    Prediabetes  Rechecking. Patient also requesting referral for diabetic educator.   - [facility] Adult Diabetes Educator Referral; Future  - Hemoglobin A1c; Future  - Hemoglobin A1c    Hyperlipidemia LDL goal <160    Malignant neoplasm of upper-inner quadrant of right breast in female, estrogen receptor positive (H)    Aromatase inhibitor use    Anemia, unspecified type  Will recheck  - CBC with platelets and differential; Future    Morbid obesity (H)    Pain and swelling of knee, unspecified laterality  Chronic issue. US with bilateral knee  "effusions. Will get x-ray and refer to ortho. Knee effusions on x-ray in 2019.  - XR Knee Bilateral 3 Views; Future  - Orthopedic  Referral; Future      Possible Sleep Apnea: daytime drowsiness          Risks and Recommendations:  The patient has the following additional risks and recommendations for perioperative complications:   - No identified additional risk factors other than previously addressed    Antiplatelet or Anticoagulation Medication Instructions:   - Patient is on no antiplatelet or anticoagulation medications.   Stop krill oil (patient had already stopped)    Additional Medication Instructions:  Patient is to take all scheduled medications on the day of surgery EXCEPT for modifications listed below:    RECOMMENDATION:  APPROVAL GIVEN to proceed with proposed procedure, without further diagnostic evaluation.            Subjective     HPI related to upcoming procedure: Incisional hernia causing abdominal discomfort.     Priscilla also mentioned that she is worried about leg pain and ankle swelling. She notes that it has been going on for years. Sometimes has calf pain. She has been told to elevate her legs while resting which she has been doing. Requesting US to eval for DVT.        5/15/2023     1:10 PM   Preop Questions   1. Have you ever had a heart attack or stroke? No   2. Have you ever had surgery on your heart or blood vessels, such as a stent placement, a coronary artery bypass, or surgery on an artery in your head, neck, heart, or legs? No   3. Do you have chest pain with activity? No   4. Do you have a history of  heart failure? No   5. Do you currently have a cold, bronchitis or symptoms of other infection? UNKNOWN - \"not that I know of\"   6. Do you have a cough, shortness of breath, or wheezing? YES - from allergies or GERD.    7. Do you or anyone in your family have previous history of blood clots? UNKNOWN    8. Do you or does anyone in your family have a serious bleeding problem such " "as prolonged bleeding following surgeries or cuts? No   9. Have you ever had problems with anemia or been told to take iron pills? No   10. Have you had any abnormal blood loss such as black, tarry or bloody stools, or abnormal vaginal bleeding? No   11. Have you ever had a blood transfusion? UNKNOWN    12. Are you willing to have a blood transfusion if it is medically needed before, during, or after your surgery? Yes   13. Have you or any of your relatives ever had problems with anesthesia? YES - when had gallbladder removed felt \"thick headed\" coming out of anesthesia   14. Do you have sleep apnea, excessive snoring or daytime drowsiness? UNKNOWN - daytime drowsiness. Takes afternoon naps   15. Do you have any artifical heart valves or other implanted medical devices like a pacemaker, defibrillator, or continuous glucose monitor? No   16. Do you have artificial joints? No   17. Are you allergic to latex? No       Health Care Directive:  Patient does not have a Health Care Directive or Living Will: Discussed advance care planning with patient; however, patient declined at this time.    Preoperative Review of :   reviewed - controlled substances reflected in medication list.      Status of Chronic Conditions:  See problem list for active medical problems.  Problems all longstanding and stable, except as noted/documented.  See ROS for pertinent symptoms related to these conditions.      Review of Systems  Constitutional, neuro, ENT, endocrine, pulmonary, cardiac, gastrointestinal, genitourinary, musculoskeletal, integument and psychiatric systems are negative, except as otherwise noted.    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 **  2002, 2009, 2010, 2015 all NIL paps.   4/29/19 NIL pap, neg HR HPV. @ 69 yo. Plan: pap in 1 yr.        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        Anxiety disorder, unspecified type 01/14/2019     Priority: Medium     Malignant neoplasm of upper-inner quadrant of right breast in female, estrogen receptor positive (H) 01/04/2019     Priority: Medium     Attention deficit hyperactivity disorder (ADHD), predominantly inattentive type 10/25/2018     Priority: Medium     Primary osteoarthritis of both knees 09/13/2018     Priority: Medium     Age-related cataract of both eyes, unspecified age-related cataract type 07/03/2018     Priority: Medium     Gastroesophageal reflux disease with esophagitis 05/31/2018     Priority: Medium     Hiatal hernia 05/31/2018     Priority: Medium     Cystic disease of liver 12/21/2017     Priority: Medium     Liver lesion 12/14/2017     Priority: Medium     Moderate recurrent major depression (H) 10/19/2017     Priority: Medium     Hyperlipidemia LDL goal <160 10/19/2017     Priority: Medium     Chronic fatigue disorder 10/19/2017     Priority: Medium      Past Medical History:   Diagnosis Date     ADHD      Breast cancer (H) 12/26/2018    Right Breast     Chronic fatigue      Fibromyalgia      Hard of hearing      Osteoporosis      S/P radiation therapy     4,256 cGy to right breast completed on 03/11/2019 - [facility] with Dr. Copeland     Past Surgical History:   Procedure Laterality Date     APPENDECTOMY  1967     BIOPSY BREAST NEEDLE LOCALIZATION Right 1/10/2019    Procedure: WIRE LOCALIZED RIGHT BREAST LUMPECTOMY WITH RIGHT SLNB;  Surgeon: Gema Diamond MD;  Location: [facility] OR     BREAST BIOPSY, CORE RT/LT  Right 12/26/2018     CATARACT IOL, RT/LT Bilateral 2018    with lens implants per patient     CHOLECYSTECTOMY  05/24/2019     THYROID BIOPSY  01/31/2019     Current Outpatient Medications   Medication Sig Dispense Refill     acetaminophen (TYLENOL) 325 MG tablet Take 325-650 mg by mouth every 6 hours as needed for mild pain       amphetamine-dextroamphetamine (ADDERALL) 15 MG tablet Take 1 tablet (15 mg) by mouth 2 times daily Teva Brand 60 tablet 0     anastrozole (ARIMIDEX) 1 MG tablet Take 1 tablet (1 mg) by mouth daily 90 tablet 3     B Complex Vitamins (VITAMIN B-COMPLEX PO) Take by mouth daily       calcium carbonate (TUMS) 500 MG chewable tablet Take 1 chew tab by mouth 2 times daily       Calcium-Magnesium-Zinc 333-133-5 MG TABS per tablet Take 1 tablet by mouth daily       Cholecalciferol (VITAMIN D-3 PO) Vitamin D is included with multiple vitamin, patient does not take additional. Marjorie Crooks LPN on 7/26/2019 at 3:02 PM       cyclobenzaprine (FLEXERIL) 10 MG tablet Take 1 tablet (10 mg) by mouth 2 times daily as needed for muscle spasms 30 tablet 0     denosumab (PROLIA) 60 MG/ML SOSY injection        magnesium oxide 200 MG TABS Take 100 mg by mouth once as needed       Multiple Vitamins-Minerals (MULTIVITAMIN PO)        vitamin B-12 (CYANOCOBALAMIN) 100 MCG tablet Take 1,000 mcg by mouth daily       KRILL OIL PO Take by mouth daily       mupirocin (BACTROBAN) 2 % external ointment Use 2 times a day to affected area. (Patient not taking: Reported on 3/24/2023) 22 g 0     traZODone (DESYREL) 50 MG tablet Take 1 tablet (50 mg) by mouth At Bedtime - may take 2 tablets at bedtime if not sleeping with one (Patient not taking: Reported on 5/15/2023) 90 tablet 0       Allergies   Allergen Reactions     Vicodin [Hydrocodone-Acetaminophen] Anaphylaxis     Dilaudid [Hydromorphone] Nausea and Vomiting     Oxycodone Nausea and Vomiting     Dust Mites      Milk [Lac Bovis] Diarrhea        Social History     Tobacco  Use     Smoking status: Never     Smokeless tobacco: Never   Vaping Use     Vaping status: Never Used     Passive vaping exposure: Yes   Substance Use Topics     Alcohol use: No     Family History   Problem Relation Age of Onset     Leukemia Father      Breast Cancer Cousin 50        Maternal Female 1st Cousin     Macular Degeneration Mother      History   Drug Use No         Objective     /76 (BP Location: Left arm, Patient Position: Sitting, Cuff Size: Adult Regular)   Pulse 103   Temp 98.1  F (36.7  C) (Oral)   Resp 13   Ht 1.524 m (5')   Wt 81.9 kg (180 lb 9.6 oz)   LMP 05/31/2003 (Approximate)   SpO2 99%   BMI 35.27 kg/m      Physical Exam    GENERAL APPEARANCE: healthy, alert and no distress     EYES: EOMI, PERRL     HENT: ear canals and TM's normal and nose and mouth without ulcers or lesions     NECK: no adenopathy, no asymmetry, masses, or scars and thyroid normal to palpation     RESP: lungs clear to auscultation - no rales, rhonchi or wheezes     CV: regular rates and rhythm, normal S1 S2, no S3 or S4 and no murmur, click or rub     ABDOMEN:  soft, nontender, and bowel sounds normal     MS: extremities normal- no gross deformities noted, no evidence of inflammation in joints, FROM in all extremities.     SKIN: no suspicious lesions or rashes     NEURO: Normal strength and tone, sensory exam grossly normal, mentation intact and speech normal     PSYCH: mentation appears normal. and affect normal/bright     LYMPHATICS: No cervical adenopathy    Recent Labs   Lab Test 04/04/23  1336 03/30/23  1006 02/14/23  1137 08/11/22  1212 02/11/22  1210   HGB 12.4  --   --   --  12.7     --   --   --  287     --  140   < > 141   POTASSIUM 3.6  --  3.7   < > 4.0   CR 0.64 0.7 0.66   < > 0.68   A1C  --   --  6.1*  --   --     < > = values in this interval not displayed.        Diagnostics:  Recent Results (from the past 168 hour(s))   Basic metabolic panel  (Ca, Cl, CO2, Creat, Gluc, K, Na, BUN)     Collection Time: 05/15/23  2:22 PM   Result Value Ref Range    Sodium 142 133 - 144 mmol/L    Potassium 3.9 3.4 - 5.3 mmol/L    Chloride 108 94 - 109 mmol/L    Carbon Dioxide (CO2) 28 20 - 32 mmol/L    Anion Gap 6 3 - 14 mmol/L    Urea Nitrogen 15 7 - 30 mg/dL    Creatinine 0.65 0.52 - 1.04 mg/dL    Calcium 8.4 (L) 8.5 - 10.1 mg/dL    Glucose 95 70 - 99 mg/dL    GFR Estimate >90 >60 mL/min/1.73m2   Hemoglobin A1c    Collection Time: 05/15/23  2:22 PM   Result Value Ref Range    Hemoglobin A1C 6.2 (H) 0.0 - 5.6 %   Hemoglobin    Collection Time: 05/15/23  2:22 PM   Result Value Ref Range    Hemoglobin 11.5 (L) 11.7 - 15.7 g/dL      EKG: sinus rhythm, normal axis, normal intervals, no acute ST/T changes c/w ischemia, no LVH by voltage criteria, short [place], nonspecific T wave abnormality, unchanged from previous tracings - similar to 1/4/19.    Revised Cardiac Risk Index (RCRI):  The patient has the following serious cardiovascular risks for perioperative complications:   - No serious cardiac risks = 0 points     RCRI Interpretation: 0 points: Class I (very low risk - 0.4% complication rate)           Signed Electronically by: MARRY RAHMAN CNP  Copy of this evaluation report is provided to requesting physician.        Answers for HPI/ROS submitted by the patient on 5/15/2023  If you checked off any problems, how difficult have these problems made it for you to do your work, take care of things at home, or get along with other people?: Very difficult  PHQ9 TOTAL SCORE: 9

## 2023-05-16 ENCOUNTER — TELEPHONE (OUTPATIENT)
Dept: FAMILY MEDICINE | Facility: CLINIC | Age: 73
End: 2023-05-16

## 2023-05-16 LAB
ANION GAP SERPL CALCULATED.3IONS-SCNC: 6 MMOL/L (ref 3–14)
BUN SERPL-MCNC: 15 MG/DL (ref 7–30)
CALCIUM SERPL-MCNC: 8.4 MG/DL (ref 8.5–10.1)
CHLORIDE BLD-SCNC: 108 MMOL/L (ref 94–109)
CO2 SERPL-SCNC: 28 MMOL/L (ref 20–32)
CREAT SERPL-MCNC: 0.65 MG/DL (ref 0.52–1.04)
GFR SERPL CREATININE-BSD FRML MDRD: >90 ML/MIN/1.73M2
GLUCOSE BLD-MCNC: 95 MG/DL (ref 70–99)
POTASSIUM BLD-SCNC: 3.9 MMOL/L (ref 3.4–5.3)
SODIUM SERPL-SCNC: 142 MMOL/L (ref 133–144)

## 2023-05-16 NOTE — TELEPHONE ENCOUNTER
Diabetes Education Scheduling Outreach #1:    Call to patient to schedule. Pt declined to schedule at this time.      Sridevi Perez  [facility]  Diabetes and Nutrition Scheduling

## 2023-05-18 ENCOUNTER — ANCILLARY PROCEDURE (OUTPATIENT)
Dept: ULTRASOUND IMAGING | Facility: CLINIC | Age: 73
End: 2023-05-18
Attending: NURSE PRACTITIONER
Payer: COMMERCIAL

## 2023-05-18 DIAGNOSIS — M79.89 LEG SWELLING: ICD-10-CM

## 2023-05-18 PROCEDURE — 93970 EXTREMITY STUDY: CPT | Performed by: RADIOLOGY

## 2023-06-08 ENCOUNTER — TRANSFERRED RECORDS (OUTPATIENT)
Dept: HEALTH INFORMATION MANAGEMENT | Facility: CLINIC | Age: 73
End: 2023-06-08
Payer: COMMERCIAL

## 2023-07-12 ENCOUNTER — TELEPHONE (OUTPATIENT)
Dept: FAMILY MEDICINE | Facility: CLINIC | Age: 73
End: 2023-07-12
Payer: COMMERCIAL

## 2023-07-12 NOTE — TELEPHONE ENCOUNTER
Patient Quality Outreach    Patient is due for the following:   Colon Cancer Screening      Topic Date Due     COVID-19 Vaccine (6 - Pfizer series) 01/25/2023       Next Steps:   Schedule a nurse only visit for covid vaccine    Type of outreach:    Sent [employer] message.      Questions for provider review:    None           Marita Worthy RN

## 2023-08-03 ENCOUNTER — VIRTUAL VISIT (OUTPATIENT)
Dept: FAMILY MEDICINE | Facility: CLINIC | Age: 73
End: 2023-08-03
Payer: COMMERCIAL

## 2023-08-03 ENCOUNTER — TELEPHONE (OUTPATIENT)
Dept: FAMILY MEDICINE | Facility: CLINIC | Age: 73
End: 2023-08-03

## 2023-08-03 DIAGNOSIS — M79.622 PAIN OF LEFT UPPER ARM: Primary | ICD-10-CM

## 2023-08-03 DIAGNOSIS — G47.00 INSOMNIA, UNSPECIFIED TYPE: ICD-10-CM

## 2023-08-03 DIAGNOSIS — F90.0 ATTENTION DEFICIT HYPERACTIVITY DISORDER (ADHD), PREDOMINANTLY INATTENTIVE TYPE: ICD-10-CM

## 2023-08-03 PROCEDURE — 99214 OFFICE O/P EST MOD 30 MIN: CPT | Mod: VID | Performed by: PHYSICIAN ASSISTANT

## 2023-08-03 RX ORDER — TRAZODONE HYDROCHLORIDE 50 MG/1
50 TABLET, FILM COATED ORAL AT BEDTIME
Qty: 90 TABLET | Refills: 1 | Status: SHIPPED | OUTPATIENT
Start: 2023-08-03 | End: 2024-05-17

## 2023-08-03 RX ORDER — DEXTROAMPHETAMINE SACCHARATE, AMPHETAMINE ASPARTATE, DEXTROAMPHETAMINE SULFATE AND AMPHETAMINE SULFATE 3.75; 3.75; 3.75; 3.75 MG/1; MG/1; MG/1; MG/1
15 TABLET ORAL 2 TIMES DAILY
Qty: 60 TABLET | Refills: 0 | Status: SHIPPED | OUTPATIENT
Start: 2023-08-03 | End: 2024-05-17

## 2023-08-03 NOTE — PROGRESS NOTES
Priscilla is a 72 year old who is being evaluated via a billable video visit.      How would you like to obtain your AVS? MyChart  If the video visit is dropped, the invitation should be resent by: Send to e-mail at: [email or web address]  Will anyone else be joining your video visit? No      Email link for patient to join.   [place]MP reviewed and no fills outside of this office. Last filled adderall 3/10/23      Assessment & Plan     Pain of left upper arm  Concern for cellulitis- I don't believe it is related to covid vaccine since that was a couple months ago.  Recommended emergency department visit for assessment     Attention deficit hyperactivity disorder (ADHD), predominantly inattentive type  [place] and [place] outside of this office.   Last filled adderall 3/10/23  Med refilled.  Follow up with us in clinic   - amphetamine-dextroamphetamine (ADDERALL) 15 MG tablet  Dispense: 60 tablet; Refill: 0    Insomnia, unspecified type  Refilled trazodone   - traZODone (DESYREL) 50 MG tablet  Dispense: 90 tablet; Refill: 1    Needs face to face visit evaluation for Rollator walker   Prescription drug management  37 minutes spent by me on the date of the encounter doing chart review, history and exam, documentation and further activities per the note       Patient Instructions   Take 1/2 tablet of trazodone 50 mg at bedtime as needed   Continue adderall 15 mg twice a day for ADHD  Go to the emergency department for swelling of left arm and pain  I will have someone reach out to you to schedule a face to face visit to discuss the Rollator walker   Mail in the [facility] for screening for colon cancer     Katie Hein PA-C  [facility]   Priscilla is a 72 year old, presenting for the following health issues:  Swelling (Left arm )      8/3/2023     2:10 PM   Additional Questions   Roomed by Liz WADDELL     Pain History:  When did you first notice your pain? 1-2 weeks  "ago    Have you seen anyone else for your pain? No  How has your pain affected your ability to work? Not applicable  Where in your body do you have pain? Musculoskeletal problem/pain  Onset/Duration: 1 week   Description  Location: Arm - left  Joint Swelling: YES  Redness: YES  Pain: YES  Warmth: YES  Intensity:  moderate, severe  Progression of Symptoms:  worsening  Accompanying signs and symptoms:   Fevers: No  Numbness/tingling/weakness: YES- weakness in left arm   History  Trauma to the area: YES- Patient had Covid vaccine early June and recently noticed swelling, itchiness, and warm skin in the last week   Recent illness:  No  Previous similar problem: No  Previous evaluation:  No  Precipitating or alleviating factors:  Aggravating factors include: lifting and sleeping   Therapies tried and outcome: ice and Ibuprofen        Patient known to me with history of depression, hyperlipidemia, ADHD, breast cancer, osteoporosis, osteoarthritis, obesity and insomnia presents via video visit for redness, swelling and pain of left arm. Received Covid vaccine couple months ago.    Week ago started hurting.  Difficulty raising my arms up due to pain.  No fever. Is warm and tender to touch.     Also wishes to talk about adderall prescription for ADHD.  Complains of lethargy when doesn't take it. With adderall can get house clean and work on my drawings.  Attends a weekly ADHD association group.   I hate taking medication but when takes medication, house gets clean.  No energy to do anything.  Tried trazodone for sleep.  Wondering about a small dose of trazodone.  Didn't help her sleep but did shut down some ruminations that have kept her awake in the past. Went back to listening to downloaded books to help me calm down and drift off to sleep.  Wondering about a smaller dose of the trazodone.  Prozac not helpful in the past.   \"I cannot take wellbutrin, makes me say weird things\".    Any kind of stress will shut me " down.  Trazodone didn't help me sleep but did shut down some ruminations but didn't help me sleep- kept me awake   Went back to listening to downloaded books to help me calm down and drift off to sleep.   Also diagnosed with prediabetes. Joined [employer] power project.  Meet over the internet.  Year long free program.  Knees are really shot from arthritis pain.    Seeing DR. Ventura at [facility]  and steroid shot in the knee   Would like a prescription for a Rollator - prescription - walker with wheels and a seat.  Try to walk down the block.  Really scared about diabetes and wants to stay active- reports that she fell down and skinned her knees.  Would like Rollator -so she could  sit for a minute - only place can really go is grocery store because carts available easier to walk .  Doesn't want to  be trapped in my home.  Afraid to walk in backyard due to risk of fall..         Review of Systems   Constitutional, HEENT, cardiovascular, pulmonary, gi and gu systems are negative, except as otherwise noted.      Objective           Vitals:  No vitals were obtained today due to virtual visit.    Physical Exam   GENERAL: Healthy, alert and no distress  EYES: Eyes grossly normal to inspection.  No discharge or erythema, or obvious scleral/conjunctival abnormalities.  RESP: No audible wheeze, cough, or visible cyanosis.  No visible retractions or increased work of breathing.    SKIN: left upper arm with marked erythema and edema   NEURO: Cranial nerves grossly intact.  Mentation and speech appropriate for age.  PSYCH: Mentation appears normal, affect normal/bright, judgement and insight intact, normal speech and appearance well-groomed.                Video-Visit Details    Type of service:  Video Visit   Video Start Time:  345 pm   Video End Time:4:05 PM    Originating Location (pt. Location): Home    Distant Location (provider location):  Off-site  Platform used for Video Visit: [employer]

## 2023-08-03 NOTE — PATIENT INSTRUCTIONS
Take 1/2 tablet of trazodone 50 mg at bedtime as needed   Continue adderall 15 mg twice a day for ADHD  Go to the emergency department for swelling of left arm and pain  I will have someone reach out to you to schedule a face to face visit to discuss the Rollator walker   Mail in the cologuard for screening for colon cancer

## 2023-08-04 NOTE — TELEPHONE ENCOUNTER
Called patient and scheduled appointment   No further needs at this time     Maria Esther MOREIRA - Visit facilitator

## 2023-08-11 ENCOUNTER — LAB (OUTPATIENT)
Dept: FAMILY MEDICINE | Facility: CLINIC | Age: 73
End: 2023-08-11

## 2023-08-11 ENCOUNTER — OFFICE VISIT (OUTPATIENT)
Dept: FAMILY MEDICINE | Facility: CLINIC | Age: 73
End: 2023-08-11
Payer: COMMERCIAL

## 2023-08-11 VITALS
BODY MASS INDEX: 33.29 KG/M2 | OXYGEN SATURATION: 97 % | RESPIRATION RATE: 21 BRPM | HEIGHT: 61 IN | DIASTOLIC BLOOD PRESSURE: 66 MMHG | WEIGHT: 176.3 LBS | SYSTOLIC BLOOD PRESSURE: 104 MMHG | TEMPERATURE: 98.3 F | HEART RATE: 103 BPM
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
## 2023-08-11 DIAGNOSIS — D64.9 ANEMIA, UNSPECIFIED TYPE: ICD-10-CM

## 2023-08-11 DIAGNOSIS — Z12.11 SCREEN FOR COLON CANCER: ICD-10-CM

## 2023-08-11 DIAGNOSIS — M17.0 PRIMARY OSTEOARTHRITIS OF BOTH KNEES: ICD-10-CM

## 2023-08-11 DIAGNOSIS — R73.03 PREDIABETES: ICD-10-CM

## 2023-08-11 DIAGNOSIS — R42 DIZZINESS: ICD-10-CM

## 2023-08-11 LAB
ALBUMIN SERPL BCG-MCNC: 4 G/DL (ref 3.5–5.2)
ALP SERPL-CCNC: 90 U/L (ref 35–104)
ALT SERPL W P-5'-P-CCNC: 12 U/L (ref 0–50)
ANION GAP SERPL CALCULATED.3IONS-SCNC: 12 MMOL/L (ref 7–15)
AST SERPL W P-5'-P-CCNC: 24 U/L (ref 0–45)
BASOPHILS # BLD AUTO: 0 10E3/UL (ref 0–0.2)
BASOPHILS NFR BLD AUTO: 0 %
BILIRUB SERPL-MCNC: 0.2 MG/DL
BUN SERPL-MCNC: 20 MG/DL (ref 8–23)
CALCIUM SERPL-MCNC: 9.6 MG/DL (ref 8.8–10.2)
CHLORIDE SERPL-SCNC: 103 MMOL/L (ref 98–107)
CREAT SERPL-MCNC: 0.72 MG/DL (ref 0.51–0.95)
DEPRECATED HCO3 PLAS-SCNC: 27 MMOL/L (ref 22–29)
EOSINOPHIL # BLD AUTO: 0.3 10E3/UL (ref 0–0.7)
EOSINOPHIL NFR BLD AUTO: 4 %
ERYTHROCYTE [DISTWIDTH] IN BLOOD BY AUTOMATED COUNT: 14.6 % (ref 10–15)
FERRITIN SERPL-MCNC: 54 NG/ML (ref 11–328)
GFR SERPL CREATININE-BSD FRML MDRD: 89 ML/MIN/1.73M2
GLUCOSE BLD-MCNC: 121 MG/DL (ref 60–99)
GLUCOSE SERPL-MCNC: 109 MG/DL (ref 70–99)
HCT VFR BLD AUTO: 38.1 % (ref 35–47)
HGB BLD-MCNC: 11.5 G/DL (ref 11.7–15.7)
IMM GRANULOCYTES # BLD: 0 10E3/UL
IMM GRANULOCYTES NFR BLD: 0 %
IRON BINDING CAPACITY (ROCHE): 247 UG/DL (ref 240–430)
IRON SATN MFR SERPL: 10 % (ref 15–46)
IRON SERPL-MCNC: 24 UG/DL (ref 37–145)
LYMPHOCYTES # BLD AUTO: 1.8 10E3/UL (ref 0.8–5.3)
LYMPHOCYTES NFR BLD AUTO: 25 %
MCH RBC QN AUTO: 26.3 PG (ref 26.5–33)
MCHC RBC AUTO-ENTMCNC: 30.2 G/DL (ref 31.5–36.5)
MCV RBC AUTO: 87 FL (ref 78–100)
MONOCYTES # BLD AUTO: 0.6 10E3/UL (ref 0–1.3)
MONOCYTES NFR BLD AUTO: 9 %
NEUTROPHILS # BLD AUTO: 4.4 10E3/UL (ref 1.6–8.3)
NEUTROPHILS NFR BLD AUTO: 62 %
PLATELET # BLD AUTO: 362 10E3/UL (ref 150–450)
POTASSIUM SERPL-SCNC: 4.4 MMOL/L (ref 3.4–5.3)
PROT SERPL-MCNC: 7.2 G/DL (ref 6.4–8.3)
RBC # BLD AUTO: 4.37 10E6/UL (ref 3.8–5.2)
SODIUM SERPL-SCNC: 142 MMOL/L (ref 136–145)
WBC # BLD AUTO: 7.1 10E3/UL (ref 4–11)

## 2023-08-11 PROCEDURE — 36415 COLL VENOUS BLD VENIPUNCTURE: CPT | Performed by: PHYSICIAN ASSISTANT

## 2023-08-11 PROCEDURE — 82728 ASSAY OF FERRITIN: CPT | Performed by: PHYSICIAN ASSISTANT

## 2023-08-11 PROCEDURE — 85025 COMPLETE CBC W/AUTO DIFF WBC: CPT | Performed by: PHYSICIAN ASSISTANT

## 2023-08-11 PROCEDURE — 83550 IRON BINDING TEST: CPT | Performed by: PHYSICIAN ASSISTANT

## 2023-08-11 PROCEDURE — 83540 ASSAY OF IRON: CPT | Performed by: PHYSICIAN ASSISTANT

## 2023-08-11 PROCEDURE — 99214 OFFICE O/P EST MOD 30 MIN: CPT | Performed by: PHYSICIAN ASSISTANT

## 2023-08-11 PROCEDURE — 80053 COMPREHEN METABOLIC PANEL: CPT | Performed by: PHYSICIAN ASSISTANT

## 2023-08-11 ASSESSMENT — PATIENT HEALTH QUESTIONNAIRE - PHQ9
SUM OF ALL RESPONSES TO PHQ QUESTIONS 1-9: 6
10. IF YOU CHECKED OFF ANY PROBLEMS, HOW DIFFICULT HAVE THESE PROBLEMS MADE IT FOR YOU TO DO YOUR WORK, TAKE CARE OF THINGS AT HOME, OR GET ALONG WITH OTHER PEOPLE: SOMEWHAT DIFFICULT
SUM OF ALL RESPONSES TO PHQ QUESTIONS 1-9: 6

## 2023-08-11 ASSESSMENT — PAIN SCALES - GENERAL: PAINLEVEL: MODERATE PAIN (4)

## 2023-08-11 NOTE — PROGRESS NOTES
Assessment & Plan     Fall, subsequent encounter  Has had a couple of falls even with walking with cane and has severely impaired mobility.  Has to stop frequently to rest to due pain of both knees therefore needs rollator walker - so it has brakes and able to rest as needed.    Primary osteoarthritis of both knees  With a couple severe falls even when using cane.  Needs rollator walker     Dizziness  Slightly anemic but comparable to previous. Normal electrolytes.  Prediabetes has not progressed.   - CBC with platelets and differential  - Comprehensive metabolic panel (BMP + Alb, Alk Phos, ALT, AST, Total. Bili, TP)  - Glucose, whole blood  - CBC with platelets and differential  - Comprehensive metabolic panel (BMP + Alb, Alk Phos, ALT, AST, Total. Bili, TP)  - Glucose, whole blood    Prediabetes  Not progressed to diabetes- she is very afraid of this   - Glucose, whole blood  - Glucose, whole blood    Anemia, unspecified type  Low iron but normal ferritin   - Iron and iron binding capacity  - Ferritin  - Iron and iron binding capacity  - Ferritin    Screen for colon cancer    - [employer])      Review of the result(s) of each unique test - cbc, comprehensive metabolic panel, iron and tibc, ferritin   Ordering of each unique test         Patient Instructions   Call [facility] for Rollerator Walker   Return urgently if any change in symptoms.    I will notify you of lab results via [employer]   Schedule an A1C blood count after 8/20/23   Follow up with us in 3 months for chronic health conditions   Schedule eye exam as soon as possible     Katie Hein PA-C  [facility]    Inocencio Wells is a 72 year old, presenting for the following health issues:  WALKER ASSESMENT       8/11/2023     2:03 PM   Additional Questions   Roomed by Sahara       History of Present Illness       Reason for visit:  Rollator unsteady on feet ,CFSShe consumes 0 sweetened beverage(s)  "daily.She exercises with enough effort to increase her heart rate 9 or less minutes per day.  She exercises with enough effort to increase her heart rate 3 or less days per week.   She is taking medications regularly.     Patient known to me with history of prediabetes, arthritis of both knees and ADHD presents for evaluation for rollator walker.  Reports extra Wobbly today and a little dizzy and took an allergy pill- concerned about her prediabetes  Has had a couple of falls even when walking with cane.   First time walking with cane on the street and unsure what happened - but tripped  and fell and scraped both knees- injury occurred last year -not walked on the street since   Second time recovering from surgery for hiatal hernia May of this year in hospital and ready to be discharged and as walking to chair to get cane -right knee went from under me and fell  History of severe arthritis of both knees- followed by ortho  Afraid to go in yard and walk- not balanced on feet . Never know which foot would go out frpm under her   Can't step and feel secure  No headache.  \"Feeling of disassociation\"  Eyes feel a bit clouded.  Feel a bit weird in body   Doing program on american diabtes association  No sweets for 2 weeks.  Doesn't know what to eat  Snacks 1/4 cup peanuts.  Lunch 1/4 sandwich white bread and good quality ham  2 days did 20 min on exercise bike and 3rd day couldn't walk due to knee pain.  Followed by DR. Proctor [facility] orthopedics and getting steroid injections with plans for bilateral knee replacements in future - left knee is worse than right   Went to [employer] and walked as far as bench and had to stop and sit down because knees hurt too bad  All joints hurt including neck   Mother lived to 92 and still living   Sisters late 80s              Review of Systems   Constitutional, HEENT, cardiovascular, pulmonary, gi and gu systems are negative, except as otherwise noted.      Objective    /66 " "(BP Location: Left arm, Patient Position: Sitting, Cuff Size: Adult Large)   Pulse 103   Temp 98.3  F (36.8  C) (Oral)   Resp 21   Ht 1.549 m (5' 1\")   Wt 80 kg (176 lb 4.8 oz)   LMP 05/31/2003 (Approximate)   HC 96 cm (37.8\")   SpO2 97%   BMI 33.31 kg/m    There is no height or weight on file to calculate BMI.  Physical Exam   GENERAL: alert, no distress, and obese  NECK: no adenopathy, no asymmetry, masses, or scars and thyroid normal to palpation  RESP: lungs clear to auscultation - no rales, rhonchi or wheezes  CV: regular rate and rhythm, normal S1 S2, no S3 or S4, no murmur, click or rub, no peripheral edema and peripheral pulses strong  ABDOMEN: soft, nontender, no hepatosplenomegaly, no masses and bowel sounds normal  MS: unsteady gait even with cane- requires assistance to get up and off the exam table     Results for orders placed or performed in visit on 08/11/23   Comprehensive metabolic panel (BMP + Alb, Alk Phos, ALT, AST, Total. Bili, TP)     Status: Abnormal   Result Value Ref Range    Sodium 142 136 - 145 mmol/L    Potassium 4.4 3.4 - 5.3 mmol/L    Chloride 103 98 - 107 mmol/L    Carbon Dioxide (CO2) 27 22 - 29 mmol/L    Anion Gap 12 7 - 15 mmol/L    Urea Nitrogen 20.0 8.0 - 23.0 mg/dL    Creatinine 0.72 0.51 - 0.95 mg/dL    Calcium 9.6 8.8 - 10.2 mg/dL    Glucose 109 (H) 70 - 99 mg/dL    Alkaline Phosphatase 90 35 - 104 U/L    AST 24 0 - 45 U/L    ALT 12 0 - 50 U/L    Protein Total 7.2 6.4 - 8.3 g/dL    Albumin 4.0 3.5 - 5.2 g/dL    Bilirubin Total 0.2 <=1.2 mg/dL    GFR Estimate 89 >60 mL/min/1.73m2   Glucose, whole blood     Status: Abnormal   Result Value Ref Range    Glucose Whole Blood 121 (H) 60 - 99 mg/dL   CBC with platelets and differential     Status: Abnormal   Result Value Ref Range    WBC Count 7.1 4.0 - 11.0 10e3/uL    RBC Count 4.37 3.80 - 5.20 10e6/uL    Hemoglobin 11.5 (L) 11.7 - 15.7 g/dL    Hematocrit 38.1 35.0 - 47.0 %    MCV 87 78 - 100 fL    MCH 26.3 (L) 26.5 - 33.0 " pg    MCHC 30.2 (L) 31.5 - 36.5 g/dL    RDW 14.6 10.0 - 15.0 %    Platelet Count 362 150 - 450 10e3/uL    % Neutrophils 62 %    % Lymphocytes 25 %    % Monocytes 9 %    % Eosinophils 4 %    % Basophils 0 %    % Immature Granulocytes 0 %    Absolute Neutrophils 4.4 1.6 - 8.3 10e3/uL    Absolute Lymphocytes 1.8 0.8 - 5.3 10e3/uL    Absolute Monocytes 0.6 0.0 - 1.3 10e3/uL    Absolute Eosinophils 0.3 0.0 - 0.7 10e3/uL    Absolute Basophils 0.0 0.0 - 0.2 10e3/uL    Absolute Immature Granulocytes 0.0 <=0.4 10e3/uL   Iron and iron binding capacity     Status: Abnormal   Result Value Ref Range    Iron 24 (L) 37 - 145 ug/dL    Iron Binding Capacity 247 240 - 430 ug/dL    Iron Sat Index 10 (L) 15 - 46 %   Ferritin     Status: Normal   Result Value Ref Range    Ferritin 54 11 - 328 ng/mL   CBC with platelets and differential     Status: Abnormal    Narrative    The following orders were created for panel order CBC with platelets and differential.  Procedure                               Abnormality         Status                     ---------                               -----------         ------                     CBC with platelets and d...[[id number]]  Abnormal            Final result                 Please view results for these tests on the individual orders.

## 2023-08-11 NOTE — PATIENT INSTRUCTIONS
Call [facility] for Omid Daley   Return urgently if any change in symptoms.    I will notify you of lab results via [facility]   Schedule an A1C blood count after 8/20/23   Follow up with us in 3 months for chronic health conditions   Schedule eye exam as soon as possible      Tranexamic Acid Counseling:  Patient advised of the small risk of bleeding problems with tranexamic acid. They were also instructed to call if they developed any nausea, vomiting or diarrhea. All of the patient's questions and concerns were addressed.

## 2023-08-12 NOTE — RESULT ENCOUNTER NOTE
Deakim Wells  You are slightly anemic (low iron stores) and your iron level is a bit low but your long term iron stores are normal.   Your electrolytes, kidney and liver function were normal.  Your protein level was normal.  Your blood sugar was slightly elevated.   Please call or MyChart my office with any questions or concerns.   Katie Hein, PAC

## 2023-08-15 ENCOUNTER — PATIENT OUTREACH (OUTPATIENT)
Dept: CARE COORDINATION | Facility: CLINIC | Age: 73
End: 2023-08-15
Payer: COMMERCIAL

## 2023-08-22 ENCOUNTER — LAB (OUTPATIENT)
Dept: LAB | Facility: CLINIC | Age: 73
End: 2023-08-22
Payer: COMMERCIAL

## 2023-08-22 ENCOUNTER — ONCOLOGY VISIT (OUTPATIENT)
Dept: ONCOLOGY | Facility: CLINIC | Age: 73
End: 2023-08-22
Attending: INTERNAL MEDICINE
Payer: COMMERCIAL

## 2023-08-22 ENCOUNTER — INFUSION THERAPY VISIT (OUTPATIENT)
Dept: INFUSION THERAPY | Facility: CLINIC | Age: 73
End: 2023-08-22
Payer: COMMERCIAL

## 2023-08-22 VITALS
OXYGEN SATURATION: 98 % | DIASTOLIC BLOOD PRESSURE: 64 MMHG | HEIGHT: 61 IN | WEIGHT: 175.71 LBS | BODY MASS INDEX: 33.17 KG/M2 | RESPIRATION RATE: 18 BRPM | HEART RATE: 86 BPM | SYSTOLIC BLOOD PRESSURE: 104 MMHG

## 2023-08-22 VITALS
RESPIRATION RATE: 18 BRPM | WEIGHT: 175.6 LBS | DIASTOLIC BLOOD PRESSURE: 64 MMHG | BODY MASS INDEX: 33.15 KG/M2 | HEART RATE: 86 BPM | OXYGEN SATURATION: 98 % | HEIGHT: 61 IN | SYSTOLIC BLOOD PRESSURE: 104 MMHG

## 2023-08-22 DIAGNOSIS — Z12.31 ENCOUNTER FOR SCREENING MAMMOGRAM FOR BREAST CANCER: ICD-10-CM

## 2023-08-22 DIAGNOSIS — M81.8 OTHER OSTEOPOROSIS WITHOUT CURRENT PATHOLOGICAL FRACTURE: ICD-10-CM

## 2023-08-22 DIAGNOSIS — Z79.811 AROMATASE INHIBITOR USE: ICD-10-CM

## 2023-08-22 DIAGNOSIS — C50.211 MALIGNANT NEOPLASM OF UPPER-INNER QUADRANT OF RIGHT BREAST IN FEMALE, ESTROGEN RECEPTOR POSITIVE (H): Primary | ICD-10-CM

## 2023-08-22 DIAGNOSIS — Z17.0 MALIGNANT NEOPLASM OF UPPER-INNER QUADRANT OF RIGHT BREAST IN FEMALE, ESTROGEN RECEPTOR POSITIVE (H): Primary | ICD-10-CM

## 2023-08-22 LAB
ALBUMIN SERPL BCG-MCNC: 3.8 G/DL (ref 3.5–5.2)
ALP SERPL-CCNC: 89 U/L (ref 35–104)
ALT SERPL W P-5'-P-CCNC: 12 U/L (ref 0–50)
ANION GAP SERPL CALCULATED.3IONS-SCNC: 12 MMOL/L (ref 7–15)
AST SERPL W P-5'-P-CCNC: 23 U/L (ref 0–45)
BILIRUB SERPL-MCNC: 0.2 MG/DL
BUN SERPL-MCNC: 15.9 MG/DL (ref 8–23)
CALCIUM SERPL-MCNC: 9.9 MG/DL (ref 8.8–10.2)
CHLORIDE SERPL-SCNC: 104 MMOL/L (ref 98–107)
CREAT SERPL-MCNC: 0.73 MG/DL (ref 0.51–0.95)
DEPRECATED HCO3 PLAS-SCNC: 27 MMOL/L (ref 22–29)
GFR SERPL CREATININE-BSD FRML MDRD: 88 ML/MIN/1.73M2
GLUCOSE SERPL-MCNC: 113 MG/DL (ref 70–99)
HOLD SPECIMEN: NORMAL
HOLD SPECIMEN: NORMAL
POTASSIUM SERPL-SCNC: 3.6 MMOL/L (ref 3.4–5.3)
PROT SERPL-MCNC: 6.7 G/DL (ref 6.4–8.3)
SODIUM SERPL-SCNC: 143 MMOL/L (ref 136–145)

## 2023-08-22 PROCEDURE — 96372 THER/PROPH/DIAG INJ SC/IM: CPT | Performed by: INTERNAL MEDICINE

## 2023-08-22 PROCEDURE — 99214 OFFICE O/P EST MOD 30 MIN: CPT | Mod: 25 | Performed by: INTERNAL MEDICINE

## 2023-08-22 PROCEDURE — 36415 COLL VENOUS BLD VENIPUNCTURE: CPT

## 2023-08-22 PROCEDURE — 80053 COMPREHEN METABOLIC PANEL: CPT

## 2023-08-22 ASSESSMENT — PAIN SCALES - GENERAL: PAINLEVEL: MODERATE PAIN (5)

## 2023-08-22 NOTE — PROGRESS NOTES
Oncology Follow Up Visit: 08/22/23  PCP: Rhiannon Weir    Diagnosis: Stage IB Right Breast Cancer  Marixa Ann is a 71 yo female who had abnormality at 2-4 oclock level of right breaston screening mammogram in 12/2018. Final review proved a 12 x 9 x14 mm invasive ductal carcinoma at 2 oclock to the right breast, Carol grade 1, ER/NE positive, Her2 negative with 0/3 SLN positive and edges free of disease. [id number]  Oncotype score =4 or 3% risk of disease at 9 years with hormone therapy.     Treatment:   1/10/2019 Right Lumpectomy with SLN biopsy  3/11/2019 completed right breast radiation post lumpectomy.   - choice made to not use Tamoxifen due to risk for DVT(repeatedly elevated d-dimer as well as elevated ESR and CRP) so started pt on arimidex with close bone evaluation.    Interval History:    Ms Ann is here for routine followup. She has been well. She had a hernia repair recently and has noted prolonged fatigue after this. No n/v/d/c. No new pains. She has otherwise been well.    Rest of comprehensive and complete ROS is reviewed and is negative.   Past Medical History:   Diagnosis Date    ADHD     Breast cancer (H) 12/26/2018    Right Breast    Chronic fatigue     Fibromyalgia     Hard of hearing     Osteoporosis     S/P radiation therapy     4,256 cGy to right breast completed on 03/11/2019 - [facility] with Dr. Copeland     Current Outpatient Medications   Medication    acetaminophen (TYLENOL) 325 MG tablet    amphetamine-dextroamphetamine (ADDERALL) 15 MG tablet    anastrozole (ARIMIDEX) 1 MG tablet    B Complex Vitamins (VITAMIN B-COMPLEX PO)    calcium carbonate (TUMS) 500 MG chewable tablet    Calcium-Magnesium-Zinc 333-133-5 MG TABS per tablet    Cholecalciferol (VITAMIN D-3 PO)    cyclobenzaprine (FLEXERIL) 10 MG tablet    denosumab (PROLIA) 60 MG/ML SOSY injection    KRILL OIL PO    magnesium oxide 200 MG TABS    Multiple Vitamins-Minerals (MULTIVITAMIN PO)    mupirocin  (BACTROBAN) 2 % external ointment    traZODone (DESYREL) 50 MG tablet    vitamin B-12 (CYANOCOBALAMIN) 100 MCG tablet     Current Facility-Administered Medications   Medication    cross-Linked Hyaluronate (GEL-ONE) injection PRSY 30 mg    cross-Linked Hyaluronate (GEL-ONE) injection PRSY 30 mg    cross-Linked Hyaluronate (GEL-ONE) injection PRSY 30 mg    cross-Linked Hyaluronate (GEL-ONE) injection PRSY 30 mg    triamcinolone (KENALOG-40) injection 80 mg     Allergies   Allergen Reactions    Vicodin [Hydrocodone-Acetaminophen] Anaphylaxis    Dilaudid [Hydromorphone] Nausea and Vomiting    Oxycodone Nausea and Vomiting    Dust Mites     Milk [Milk (Cow)] Diarrhea       SH:  Lives with her  and 2 dogs.    Physical Exam  Vitals reviewed.   Constitutional:       Appearance: Normal appearance.   HENT:      Head: Normocephalic and atraumatic.   Eyes:      Extraocular Movements: Extraocular movements intact.      Conjunctiva/sclera: Conjunctivae normal.      Pupils: Pupils are equal, round, and reactive to light.   Cardiovascular:      Rate and Rhythm: Normal rate and regular rhythm.      Heart sounds: Normal heart sounds.   Pulmonary:      Effort: Pulmonary effort is normal.      Breath sounds: Normal breath sounds.   Abdominal:      General: Abdomen is flat.      Palpations: Abdomen is soft.   Musculoskeletal:      Cervical back: Normal range of motion and neck supple.   Skin:     General: Skin is warm.   Neurological:      General: No focal deficit present.      Mental Status: She is alert and oriented to person, place, and time. Mental status is at baseline.   Psychiatric:         Mood and Affect: Mood normal.         Behavior: Behavior normal.         Thought Content: Thought content normal.         Judgment: Judgment normal.     breast exam: right breast post lumpectomy and radiation, no masses no nipple discharge and axilla benign.Left breast no masses no nipple discharge and axilla benign.    Laboratory  Results:   Recent Labs   Lab Test 08/22/23  1013 08/11/23  1447 05/15/23  1422 04/04/23  1336 03/30/23  1006 02/14/23  1137    142 142 142  --  140   POTASSIUM 3.6 4.4 3.9 3.6  --  3.7   CHLORIDE 104 103 108 110*  --  106   CO2 27 27 28 31  --  29   ANIONGAP 12 12 6 1*  --  5   BUN 15.9 20.0 15 10  --  18   CR 0.73 0.72 0.65 0.64 0.7 0.66   * 109* 95 99  --  105*   JONATHON 9.9 9.6 8.4* 8.8  --  9.8     Recent Labs   Lab Test 01/04/19  1122 10/19/17  1412   MAG 2.0 1.9     Recent Labs   Lab Test 08/11/23  1447 05/15/23  1422 04/04/23  1336 02/11/22  1210 05/29/19  1112 01/04/19  1122 09/13/18  1141 10/19/17  1412   WBC 7.1  --  7.3 7.6 8.3 8.1   < > 7.3   HGB 11.5* 11.5* 12.4 12.7 12.9 12.8   < > 12.8     --  331 287 351 338   < > 356   MCV 87  --  89 91 92 92   < > 95   NEUTROPHIL 62  --  61  --   --   --   --  64.9    < > = values in this interval not displayed.     Recent Labs   Lab Test 08/22/23  1013 08/11/23  1447 04/04/23  1336   BILITOTAL 0.2 0.2 0.3   ALKPHOS 89 90 98   ALT 12 12 27   AST 23 24 19   ALBUMIN 3.8 4.0 3.1*     TSH   Date Value Ref Range Status   02/11/2022 3.13 0.40 - 4.00 mU/L Final   08/06/2020 3.28 0.40 - 4.00 mU/L Final   10/19/2017 1.35 0.40 - 4.00 mU/L Final     No results for input(s): CEA in the last 88063 hours.  Results for orders placed or performed in visit on 05/18/23   US Lower Extremity Venous Duplex Bilateral    Narrative    EXAMINATION: Bilateral lower extremity venous Doppler ultrasound.    COMPARISON: None    HISTORY: Leg swelling    FINDINGS:   The bilateral common femoral, femoral, and popliteal veins are fully  compressible with patent Doppler wave forms. No thrombus is identified  within them on grayscale imaging.    Moderate sized complex knee joint effusions containing debris and  septations are noted bilaterally.      Impression    IMPRESSION:  No DVT demonstrated in bilateral lower extremities.  Moderate complex knee joint effusions  bilaterally.    ISAAK KAMINSKI MD         SYSTEM ID:  [id number]       Assessment and Plan:   1. Stage 1B Right Breast Cancer ER+[id number] s/p lumpectomy, radiation  2. Osteoporosis  3. ADD- pt reports chronic fatigue syndrome and ADHD. She is on medication and follows with her PCP.    Plan    1. Doing well without evidence of disease  2. Prolia today and q 6 months  3. Next dexa due 8/2024  4. Mammogram 2/2024  5. See us in 6 months for follow-up  6. Continue arimidex until next spring.    Crystal Laguerre MD on 8/22/2023 at 12:05 PM

## 2023-08-22 NOTE — PROGRESS NOTES
Infusion Nursing Note:  Marixa Ann presents today for Prolia.    Patient seen by provider today: Yes: Dr. Laguerre   present during visit today: Not Applicable.    Note: N/A.      Intravenous Access:  No Intravenous access/labs at this visit.    Treatment Conditions:  Lab Results   Component Value Date     08/22/2023    POTASSIUM 3.6 08/22/2023    MAG 2.0 01/04/2019    CR 0.73 08/22/2023    JONATHON 9.9 08/22/2023    BILITOTAL 0.2 08/22/2023    ALBUMIN 3.8 08/22/2023    ALT 12 08/22/2023    AST 23 08/22/2023       Results reviewed, labs MET treatment parameters, ok to proceed with treatment.      Post Infusion Assessment:  Patient tolerated injection without incident.  Site patent and intact, free from redness, edema or discomfort.       Discharge Plan:   Patient discharged in stable condition accompanied by: self.  Departure Mode: Ambulatory.      Marjorie Crooks LPN

## 2023-08-22 NOTE — NURSING NOTE
"Oncology Rooming Note    August 22, 2023 11:01 AM   Marixa Ann is a 72 year old female who presents for:    Chief Complaint   Patient presents with    Oncology Clinic Visit     6 month follow up     Initial Vitals: /64 (BP Location: Left arm)   Pulse 86   Resp 18   Ht 1.549 m (5' 0.98\")   Wt 79.7 kg (175 lb 9.6 oz)   LMP 05/31/2003 (Approximate)   SpO2 98%   BMI 33.20 kg/m   Estimated body mass index is 33.2 kg/m  as calculated from the following:    Height as of this encounter: 1.549 m (5' 0.98\").    Weight as of this encounter: 79.7 kg (175 lb 9.6 oz). Body surface area is 1.85 meters squared.  Moderate Pain (5) Comment: Data Unavailable   Patient's last menstrual period was 05/31/2003 (approximate).  Allergies reviewed: Yes  Medications reviewed: Yes    Medications: Medication refills not needed today.  Pharmacy name entered into EPIC:    WRITTEN PRESCRIPTION REQUESTED  [id number] - [place], [place] - [address]    Clinical concerns: No new concerns         Marjorie Crooks LPN              "

## 2023-08-22 NOTE — LETTER
8/22/2023         RE: Marixa Ann  [address] [address] [address]        Dear Colleague,    Thank you for referring your patient, Marixa Ann, to the [facility]. Please see a copy of my visit note below.    Oncology Follow Up Visit: 08/22/23  PCP: Rhiannon Weir    Diagnosis: Stage IB Right Breast Cancer  Marixa Ann is a 71 yo female who had abnormality at 2-4 oclock level of right breaston screening mammogram in 12/2018. Final review proved a 12 x 9 x14 mm invasive ductal carcinoma at 2 oclock to the right breast, [place] grade 1, ER/OK positive, Her2 negative with 0/3 SLN positive and edges free of disease. [id number]  Oncotype score =4 or 3% risk of disease at 9 years with hormone therapy.     Treatment:   1/10/2019 Right Lumpectomy with SLN biopsy  3/11/2019 completed right breast radiation post lumpectomy.   - choice made to not use Tamoxifen due to risk for DVT(repeatedly elevated d-dimer as well as elevated ESR and CRP) so started pt on arimidex with close bone evaluation.    Interval History:    Ms Ann is here for routine followup. She has been well. She had a hernia repair recently and has noted prolonged fatigue after this. No n/v/d/c. No new pains. She has otherwise been well.    Rest of comprehensive and complete ROS is reviewed and is negative.   Past Medical History:   Diagnosis Date     ADHD      Breast cancer (H) 12/26/2018    Right Breast     Chronic fatigue      Fibromyalgia      Hard of hearing      Osteoporosis      S/P radiation therapy     4,256 cGy to right breast completed on 03/11/2019 - [facility] with Dr. Copeland     Current Outpatient Medications   Medication     acetaminophen (TYLENOL) 325 MG tablet     amphetamine-dextroamphetamine (ADDERALL) 15 MG tablet     anastrozole (ARIMIDEX) 1 MG tablet     B Complex Vitamins (VITAMIN B-COMPLEX PO)     calcium carbonate (TUMS) 500 MG chewable tablet      Calcium-Magnesium-Zinc 333-133-5 MG TABS per tablet     Cholecalciferol (VITAMIN D-3 PO)     cyclobenzaprine (FLEXERIL) 10 MG tablet     denosumab (PROLIA) 60 MG/ML SOSY injection     KRILL OIL PO     magnesium oxide 200 MG TABS     Multiple Vitamins-Minerals (MULTIVITAMIN PO)     mupirocin (BACTROBAN) 2 % external ointment     traZODone (DESYREL) 50 MG tablet     vitamin B-12 (CYANOCOBALAMIN) 100 MCG tablet     Current Facility-Administered Medications   Medication     cross-Linked Hyaluronate (GEL-ONE) injection PRSY 30 mg     cross-Linked Hyaluronate (GEL-ONE) injection PRSY 30 mg     cross-Linked Hyaluronate (GEL-ONE) injection PRSY 30 mg     cross-Linked Hyaluronate (GEL-ONE) injection PRSY 30 mg     triamcinolone (KENALOG-40) injection 80 mg     Allergies   Allergen Reactions     Vicodin [Hydrocodone-Acetaminophen] Anaphylaxis     Dilaudid [Hydromorphone] Nausea and Vomiting     Oxycodone Nausea and Vomiting     Dust Mites      Milk [Milk (Cow)] Diarrhea       SH:  Lives with her  and 2 dogs.    Physical Exam  Vitals reviewed.   Constitutional:       Appearance: Normal appearance.   HENT:      Head: Normocephalic and atraumatic.   Eyes:      Extraocular Movements: Extraocular movements intact.      Conjunctiva/sclera: Conjunctivae normal.      Pupils: Pupils are equal, round, and reactive to light.   Cardiovascular:      Rate and Rhythm: Normal rate and regular rhythm.      Heart sounds: Normal heart sounds.   Pulmonary:      Effort: Pulmonary effort is normal.      Breath sounds: Normal breath sounds.   Abdominal:      General: Abdomen is flat.      Palpations: Abdomen is soft.   Musculoskeletal:      Cervical back: Normal range of motion and neck supple.   Skin:     General: Skin is warm.   Neurological:      General: No focal deficit present.      Mental Status: She is alert and oriented to person, place, and time. Mental status is at baseline.   Psychiatric:         Mood and Affect: Mood normal.          Behavior: Behavior normal.         Thought Content: Thought content normal.         Judgment: Judgment normal.     breast exam: right breast post lumpectomy and radiation, no masses no nipple discharge and axilla benign.Left breast no masses no nipple discharge and axilla benign.    Laboratory Results:   Recent Labs   Lab Test 08/22/23  1013 08/11/23  1447 05/15/23  1422 04/04/23  1336 03/30/23  1006 02/14/23  1137    142 142 142  --  140   POTASSIUM 3.6 4.4 3.9 3.6  --  3.7   CHLORIDE 104 103 108 110*  --  106   CO2 27 27 28 31  --  29   ANIONGAP 12 12 6 1*  --  5   BUN 15.9 20.0 15 10  --  18   CR 0.73 0.72 0.65 0.64 0.7 0.66   * 109* 95 99  --  105*   JONATHON 9.9 9.6 8.4* 8.8  --  9.8     Recent Labs   Lab Test 01/04/19  1122 10/19/17  1412   MAG 2.0 1.9     Recent Labs   Lab Test 08/11/23  1447 05/15/23  1422 04/04/23  1336 02/11/22  1210 05/29/19  1112 01/04/19  1122 09/13/18  1141 10/19/17  1412   WBC 7.1  --  7.3 7.6 8.3 8.1   < > 7.3   HGB 11.5* 11.5* 12.4 12.7 12.9 12.8   < > 12.8     --  331 287 351 338   < > 356   MCV 87  --  89 91 92 92   < > 95   NEUTROPHIL 62  --  61  --   --   --   --  64.9    < > = values in this interval not displayed.     Recent Labs   Lab Test 08/22/23  1013 08/11/23  1447 04/04/23  1336   BILITOTAL 0.2 0.2 0.3   ALKPHOS 89 90 98   ALT 12 12 27   AST 23 24 19   ALBUMIN 3.8 4.0 3.1*     TSH   Date Value Ref Range Status   02/11/2022 3.13 0.40 - 4.00 mU/L Final   08/06/2020 3.28 0.40 - 4.00 mU/L Final   10/19/2017 1.35 0.40 - 4.00 mU/L Final     No results for input(s): CEA in the last 47198 hours.  Results for orders placed or performed in visit on 05/18/23   US Lower Extremity Venous Duplex Bilateral    Narrative    EXAMINATION: Bilateral lower extremity venous Doppler ultrasound.    COMPARISON: None    HISTORY: Leg swelling    FINDINGS:   The bilateral common femoral, femoral, and popliteal veins are fully  compressible with patent Doppler wave forms. No  thrombus is identified  within them on grayscale imaging.    Moderate sized complex knee joint effusions containing debris and  septations are noted bilaterally.      Impression    IMPRESSION:  No DVT demonstrated in bilateral lower extremities.  Moderate complex knee joint effusions bilaterally.    ISAAK KAMINSKI MD         SYSTEM ID:  [id number]       Assessment and Plan:   1. Stage 1B Right Breast Cancer ER+[id number] s/p lumpectomy, radiation  2. Osteoporosis  3. ADD- pt reports chronic fatigue syndrome and ADHD. She is on medication and follows with her PCP.    Plan    1. Doing well without evidence of disease  2. Prolia today and q 6 months  3. Next dexa due 8/2024  4. Mammogram 2/2024  5. See us in 6 months for follow-up  6. Continue arimidex until next spring.    Crystal Laguerre MD on 8/22/2023 at 12:05 PM        Again, thank you for allowing me to participate in the care of your patient.        Sincerely,        Crystal Laguerre MD

## 2023-08-28 ENCOUNTER — MYC MEDICAL ADVICE (OUTPATIENT)
Dept: FAMILY MEDICINE | Facility: CLINIC | Age: 73
End: 2023-08-28
Payer: COMMERCIAL

## 2023-08-28 DIAGNOSIS — R42 DIZZINESS: Primary | ICD-10-CM

## 2023-08-28 DIAGNOSIS — R26.89 IMPAIRED GAIT AND MOBILITY: ICD-10-CM

## 2023-08-28 DIAGNOSIS — R29.6 RECURRENT FALLS: ICD-10-CM

## 2023-08-28 NOTE — TELEPHONE ENCOUNTER
Called pt to discuss [facility] msg. Pt was informed of pt instructions from LOV and I did send them to her via Neul as well     Called [facility] at [phone] and they are needing a DME order for a walker with the specific info below listed in order for her to get the Rollerator Walker   - 4 wheeled walker with seat and basket     Once the DME order is signed it needs to be faxed along with most recent OV notes from 8/11/23   Their fax # is [phone].    I dont believe there was a DME order placed or I was not able to locate it. Sending to provider to place order if appropriate.  Gema Mccarty, CMA

## 2023-08-29 ENCOUNTER — PATIENT OUTREACH (OUTPATIENT)
Dept: CARE COORDINATION | Facility: CLINIC | Age: 73
End: 2023-08-29
Payer: COMMERCIAL

## 2023-09-26 ENCOUNTER — MYC MEDICAL ADVICE (OUTPATIENT)
Dept: FAMILY MEDICINE | Facility: CLINIC | Age: 73
End: 2023-09-26
Payer: COMMERCIAL

## 2023-09-27 ENCOUNTER — MYC MEDICAL ADVICE (OUTPATIENT)
Dept: FAMILY MEDICINE | Facility: CLINIC | Age: 73
End: 2023-09-27

## 2023-09-27 ENCOUNTER — OFFICE VISIT (OUTPATIENT)
Dept: FAMILY MEDICINE | Facility: CLINIC | Age: 73
End: 2023-09-27
Payer: COMMERCIAL

## 2023-09-27 VITALS
HEART RATE: 105 BPM | BODY MASS INDEX: 34.16 KG/M2 | DIASTOLIC BLOOD PRESSURE: 69 MMHG | OXYGEN SATURATION: 97 % | TEMPERATURE: 98 F | SYSTOLIC BLOOD PRESSURE: 107 MMHG | WEIGHT: 174 LBS | RESPIRATION RATE: 16 BRPM | HEIGHT: 60 IN

## 2023-09-27 DIAGNOSIS — R73.03 PREDIABETES: ICD-10-CM

## 2023-09-27 DIAGNOSIS — M81.8 OTHER OSTEOPOROSIS WITHOUT CURRENT PATHOLOGICAL FRACTURE: ICD-10-CM

## 2023-09-27 DIAGNOSIS — Z01.818 PREOP GENERAL PHYSICAL EXAM: Primary | ICD-10-CM

## 2023-09-27 DIAGNOSIS — F90.0 ATTENTION DEFICIT HYPERACTIVITY DISORDER (ADHD), PREDOMINANTLY INATTENTIVE TYPE: ICD-10-CM

## 2023-09-27 DIAGNOSIS — C50.211 MALIGNANT NEOPLASM OF UPPER-INNER QUADRANT OF RIGHT BREAST IN FEMALE, ESTROGEN RECEPTOR POSITIVE (H): ICD-10-CM

## 2023-09-27 DIAGNOSIS — D64.9 ANEMIA, UNSPECIFIED TYPE: ICD-10-CM

## 2023-09-27 DIAGNOSIS — R31.29 MICROSCOPIC HEMATURIA: Primary | ICD-10-CM

## 2023-09-27 DIAGNOSIS — Z79.811 AROMATASE INHIBITOR USE: ICD-10-CM

## 2023-09-27 DIAGNOSIS — Z17.0 MALIGNANT NEOPLASM OF UPPER-INNER QUADRANT OF RIGHT BREAST IN FEMALE, ESTROGEN RECEPTOR POSITIVE (H): ICD-10-CM

## 2023-09-27 DIAGNOSIS — G47.00 INSOMNIA, UNSPECIFIED TYPE: ICD-10-CM

## 2023-09-27 LAB
ALBUMIN SERPL BCG-MCNC: 3.9 G/DL (ref 3.5–5.2)
ALBUMIN UR-MCNC: NEGATIVE MG/DL
ALP SERPL-CCNC: 109 U/L (ref 35–104)
ALT SERPL W P-5'-P-CCNC: 17 U/L (ref 0–50)
ANION GAP SERPL CALCULATED.3IONS-SCNC: 14 MMOL/L (ref 7–15)
APPEARANCE UR: CLEAR
AST SERPL W P-5'-P-CCNC: 26 U/L (ref 0–45)
BACTERIA #/AREA URNS HPF: ABNORMAL /HPF
BASOPHILS # BLD AUTO: 0 10E3/UL (ref 0–0.2)
BASOPHILS NFR BLD AUTO: 0 %
BILIRUB SERPL-MCNC: 0.3 MG/DL
BILIRUB UR QL STRIP: NEGATIVE
BUN SERPL-MCNC: 12.1 MG/DL (ref 8–23)
CALCIUM SERPL-MCNC: 9.3 MG/DL (ref 8.8–10.2)
CHLORIDE SERPL-SCNC: 103 MMOL/L (ref 98–107)
COLOR UR AUTO: YELLOW
CREAT SERPL-MCNC: 0.66 MG/DL (ref 0.51–0.95)
DEPRECATED HCO3 PLAS-SCNC: 23 MMOL/L (ref 22–29)
EGFRCR SERPLBLD CKD-EPI 2021: >90 ML/MIN/1.73M2
EOSINOPHIL # BLD AUTO: 0.1 10E3/UL (ref 0–0.7)
EOSINOPHIL NFR BLD AUTO: 1 %
ERYTHROCYTE [DISTWIDTH] IN BLOOD BY AUTOMATED COUNT: 15.3 % (ref 10–15)
GLUCOSE SERPL-MCNC: 93 MG/DL (ref 70–99)
GLUCOSE UR STRIP-MCNC: NEGATIVE MG/DL
HBA1C MFR BLD: 5.8 % (ref 0–5.6)
HCT VFR BLD AUTO: 36.2 % (ref 35–47)
HGB BLD-MCNC: 11.2 G/DL (ref 11.7–15.7)
HGB UR QL STRIP: ABNORMAL
IMM GRANULOCYTES # BLD: 0 10E3/UL
IMM GRANULOCYTES NFR BLD: 0 %
KETONES UR STRIP-MCNC: ABNORMAL MG/DL
LEUKOCYTE ESTERASE UR QL STRIP: ABNORMAL
LYMPHOCYTES # BLD AUTO: 1.6 10E3/UL (ref 0.8–5.3)
LYMPHOCYTES NFR BLD AUTO: 23 %
MCH RBC QN AUTO: 26.4 PG (ref 26.5–33)
MCHC RBC AUTO-ENTMCNC: 30.9 G/DL (ref 31.5–36.5)
MCV RBC AUTO: 85 FL (ref 78–100)
MONOCYTES # BLD AUTO: 0.5 10E3/UL (ref 0–1.3)
MONOCYTES NFR BLD AUTO: 8 %
NEUTROPHILS # BLD AUTO: 4.8 10E3/UL (ref 1.6–8.3)
NEUTROPHILS NFR BLD AUTO: 68 %
NITRATE UR QL: NEGATIVE
PH UR STRIP: 5.5 [PH] (ref 5–7)
PLATELET # BLD AUTO: 311 10E3/UL (ref 150–450)
POTASSIUM SERPL-SCNC: 4.3 MMOL/L (ref 3.4–5.3)
PROT SERPL-MCNC: 7.1 G/DL (ref 6.4–8.3)
RBC # BLD AUTO: 4.25 10E6/UL (ref 3.8–5.2)
RBC #/AREA URNS AUTO: ABNORMAL /HPF
SODIUM SERPL-SCNC: 140 MMOL/L (ref 135–145)
SP GR UR STRIP: 1.02 (ref 1–1.03)
SQUAMOUS #/AREA URNS AUTO: ABNORMAL /LPF
UROBILINOGEN UR STRIP-ACNC: 0.2 E.U./DL
WBC # BLD AUTO: 7.2 10E3/UL (ref 4–11)
WBC #/AREA URNS AUTO: ABNORMAL /HPF

## 2023-09-27 PROCEDURE — 36415 COLL VENOUS BLD VENIPUNCTURE: CPT | Performed by: FAMILY MEDICINE

## 2023-09-27 PROCEDURE — 83036 HEMOGLOBIN GLYCOSYLATED A1C: CPT | Performed by: FAMILY MEDICINE

## 2023-09-27 PROCEDURE — 81001 URINALYSIS AUTO W/SCOPE: CPT | Performed by: FAMILY MEDICINE

## 2023-09-27 PROCEDURE — 85025 COMPLETE CBC W/AUTO DIFF WBC: CPT | Performed by: FAMILY MEDICINE

## 2023-09-27 PROCEDURE — 80053 COMPREHEN METABOLIC PANEL: CPT | Performed by: FAMILY MEDICINE

## 2023-09-27 PROCEDURE — 99214 OFFICE O/P EST MOD 30 MIN: CPT | Performed by: FAMILY MEDICINE

## 2023-09-27 PROCEDURE — 93000 ELECTROCARDIOGRAM COMPLETE: CPT | Performed by: FAMILY MEDICINE

## 2023-09-27 ASSESSMENT — PAIN SCALES - GENERAL: PAINLEVEL: MODERATE PAIN (4)

## 2023-09-27 ASSESSMENT — PATIENT HEALTH QUESTIONNAIRE - PHQ9
10. IF YOU CHECKED OFF ANY PROBLEMS, HOW DIFFICULT HAVE THESE PROBLEMS MADE IT FOR YOU TO DO YOUR WORK, TAKE CARE OF THINGS AT HOME, OR GET ALONG WITH OTHER PEOPLE: VERY DIFFICULT
SUM OF ALL RESPONSES TO PHQ QUESTIONS 1-9: 8
SUM OF ALL RESPONSES TO PHQ QUESTIONS 1-9: 8

## 2023-09-27 NOTE — PROGRESS NOTES
[address] [address]  Phone: [phone]  Primary Provider: Katie Hein  Pre-op Performing Provider: COLE GUERRA      PREOPERATIVE EVALUATION:  Today's date: 9/27/2023    Priscilla is a 72 year old female who presents for a preoperative evaluation.      9/27/2023    10:49 AM   Additional Questions   Roomed by Deketa   Accompanied by Self       Surgical Information:  Surgery/Procedure: Total Knee Arthroplasty Left  Surgery Location: [facility]  Surgeon: Dr. Cuba Betancur  Surgery Date: 10/12/23  Time of Surgery: 11:45am  Where patient plans to recover: At home with family  Fax number for surgical facility: Note does not need to be faxed, will be available electronically in Epic.    Assessment & Plan     The proposed surgical procedure is considered INTERMEDIATE risk.    Preop general physical exam  Medically cleared for anesthesia for proposed procedure.  - Comprehensive metabolic panel (BMP + Alb, Alk Phos, ALT, AST, Total. Bili, TP); Future  - CBC with platelets and differential; Future  - EKG 12-lead complete w/read - Clinics  - UA with Microscopic reflex to Culture - lab collect; Future    Prediabetes  Stable. Recheck.  - Comprehensive metabolic panel (BMP + Alb, Alk Phos, ALT, AST, Total. Bili, TP); Future  - Hemoglobin A1c; Future    Anemia, unspecified type  Recheck. Has been stable.  - CBC with platelets and differential; Future    Malignant neoplasm of upper-inner quadrant of right breast in female, estrogen receptor positive (H)  Continue with anastrozole.    Attention deficit hyperactivity disorder (ADHD), predominantly inattentive type  Okay to continue with Adderall.    Insomnia, unspecified type  Okay to continue with trazodone.       - No identified additional risk factors other than previously addressed    Antiplatelet or Anticoagulation Medication Instructions:   - Patient is on no antiplatelet or anticoagulation  medications.    Additional Medication Instructions:  Patient is to take all scheduled medications on the day of surgery    RECOMMENDATION:  APPROVAL GIVEN to proceed with proposed procedure, without further diagnostic evaluation.      Subjective       HPI related to upcoming procedure: history of symptomatic knee osteoarthritis          9/27/2023    10:50 AM   Preop Questions   1. Have you ever had a heart attack or stroke? No   2. Have you ever had surgery on your heart or blood vessels, such as a stent placement, a coronary artery bypass, or surgery on an artery in your head, neck, heart, or legs? No   3. Do you have chest pain with activity? No   4. Do you have a history of  heart failure? No   5. Do you currently have a cold, bronchitis or symptoms of other infection? UNKNOWN   6. Do you have a cough, shortness of breath, or wheezing? No   7. Do you or anyone in your family have previous history of blood clots? UNKNOWN   8. Do you or does anyone in your family have a serious bleeding problem such as prolonged bleeding following surgeries or cuts? UNKNOWN   9. Have you ever had problems with anemia or been told to take iron pills? YES   10. Have you had any abnormal blood loss such as black, tarry or bloody stools, or abnormal vaginal bleeding? No   11. Have you ever had a blood transfusion? UNKNOWN   12. Are you willing to have a blood transfusion if it is medically needed before, during, or after your surgery? Yes   13. Have you or any of your relatives ever had problems with anesthesia? UNKNOWN   14. Do you have sleep apnea, excessive snoring or daytime drowsiness? UNKNOWN   15. Do you have any artifical heart valves or other implanted medical devices like a pacemaker, defibrillator, or continuous glucose monitor? No   16. Do you have artificial joints? No   17. Are you allergic to latex? No       Review of Systems  CONSTITUTIONAL: NEGATIVE for fever, chills, change in weight  INTEGUMENTARY/SKIN: NEGATIVE for  worrisome rashes, moles or lesions  EYES: NEGATIVE for vision changes or irritation  ENT/MOUTH: NEGATIVE for ear, mouth and throat problems  RESP: NEGATIVE for significant cough or SOB  CV: NEGATIVE for chest pain, palpitations or peripheral edema  GI: NEGATIVE for nausea, abdominal pain, heartburn, or change in bowel habits  : NEGATIVE for frequency, dysuria, or hematuria  MUSCULOSKELETAL: NEGATIVE for significant arthralgias or myalgia  NEURO: NEGATIVE for weakness, dizziness or paresthesias  ENDOCRINE: NEGATIVE for temperature intolerance, skin/hair changes  HEME: NEGATIVE for bleeding problems  PSYCHIATRIC: NEGATIVE for changes in mood or affect    Patient Active Problem List    Diagnosis Date Noted    Insomnia, unspecified type 09/14/2022     Priority: Medium    Morbid obesity (H) 09/17/2021     Priority: Medium    Fibromyalgia 09/17/2021     Priority: Medium    History of cholecystectomy 06/27/2019     Priority: Medium    Cervical cancer screening      Priority: Medium       ** 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 **  2002, 2009, 2010, 2015 all NIL paps.   4/29/19 NIL pap, neg HR HPV. @ 69 yo. Plan: pap in 1 yr.       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       Anxiety disorder, unspecified type 01/14/2019     Priority: Medium    Malignant neoplasm of upper-inner quadrant of right breast in female, estrogen receptor positive (H) 01/04/2019     Priority: Medium    Attention deficit hyperactivity disorder (ADHD), predominantly inattentive type 10/25/2018     Priority: Medium    Primary osteoarthritis of both knees 09/13/2018     Priority: Medium    Age-related cataract of both eyes, unspecified age-related cataract type 07/03/2018     Priority: Medium    Gastroesophageal reflux disease with esophagitis  05/31/2018     Priority: Medium    Hiatal hernia 05/31/2018     Priority: Medium    Cystic disease of liver 12/21/2017     Priority: Medium    Liver lesion 12/14/2017     Priority: Medium    Moderate recurrent major depression (H) 10/19/2017     Priority: Medium    Hyperlipidemia LDL goal <160 10/19/2017     Priority: Medium    Chronic fatigue disorder 10/19/2017     Priority: Medium      Past Medical History:   Diagnosis Date    ADHD     Breast cancer (H) 12/26/2018    Right Breast    Chronic fatigue     Fibromyalgia     Hard of hearing     Osteoporosis     S/P radiation therapy     4,256 cGy to right breast completed on 03/11/2019 [facility] with Dr. Copeland     Past Surgical History:   Procedure Laterality Date    APPENDECTOMY  1967    BIOPSY BREAST NEEDLE LOCALIZATION Right 01/10/2019    Procedure: WIRE LOCALIZED RIGHT BREAST LUMPECTOMY WITH RIGHT SLNB;  Surgeon: Gema Diamond MD;  Location: [facility] OR    BREAST BIOPSY, CORE RT/LT Right 12/26/2018    CATARACT IOL, RT/LT Bilateral 2018    with lens implants per patient    CHOLECYSTECTOMY  05/24/2019    THYROID BIOPSY  01/31/2019    ZZC LAP RPR INCISIONAL HERNIA  05/2023     Current Outpatient Medications   Medication Sig Dispense Refill    acetaminophen (TYLENOL) 325 MG tablet Take 325-650 mg by mouth every 6 hours as needed for mild pain      amphetamine-dextroamphetamine (ADDERALL) 15 MG tablet Take 1 tablet (15 mg) by mouth 2 times daily Teva Brand 60 tablet 0    anastrozole (ARIMIDEX) 1 MG tablet Take 1 tablet (1 mg) by mouth daily 90 tablet 3    B Complex Vitamins (VITAMIN B-COMPLEX PO) Take by mouth daily      calcium carbonate (TUMS) 500 MG chewable tablet Take 1 chew tab by mouth 2 times daily      Calcium-Magnesium-Zinc 333-133-5 MG TABS per tablet Take 1 tablet by mouth daily      Cholecalciferol (VITAMIN D-3 PO) Vitamin D is included with multiple vitamin, patient does not take additional. Marjorie Crooks LPN on 7/26/2019 at 3:02 PM       cyclobenzaprine (FLEXERIL) 10 MG tablet Take 1 tablet (10 mg) by mouth 2 times daily as needed for muscle spasms 30 tablet 0    denosumab (PROLIA) 60 MG/ML SOSY injection       KRILL OIL PO Take by mouth daily      magnesium oxide 200 MG TABS Take 100 mg by mouth once as needed      Multiple Vitamins-Minerals (MULTIVITAMIN PO)       mupirocin (BACTROBAN) 2 % external ointment Use 2 times a day to affected area. 22 g 0    traZODone (DESYREL) 50 MG tablet Take 1 tablet (50 mg) by mouth At Bedtime - may take 2 tablets at bedtime if not sleeping with one 90 tablet 1    vitamin B-12 (CYANOCOBALAMIN) 100 MCG tablet Take 1,000 mcg by mouth daily         Allergies   Allergen Reactions    Vicodin [Hydrocodone-Acetaminophen] Anaphylaxis    Dilaudid [Hydromorphone] Nausea and Vomiting    Oxycodone Nausea and Vomiting    Dust Mites     Milk [Milk (Cow)] Diarrhea        Social History     Tobacco Use    Smoking status: Never    Smokeless tobacco: Never   Substance Use Topics    Alcohol use: No     Family History   Problem Relation Age of Onset    Leukemia Father     Breast Cancer Cousin 50        Maternal Female 1st Cousin    Macular Degeneration Mother      History   Drug Use No         Objective     /69 (BP Location: Left arm, Patient Position: Chair, Cuff Size: Adult Large)   Pulse 105   Temp 98  F (36.7  C) (Oral)   Resp 16   Ht 1.524 m (5')   Wt 78.9 kg (174 lb)   LMP 05/31/2003 (Approximate)   SpO2 97%   BMI 33.98 kg/m      Physical Exam    GENERAL APPEARANCE: healthy, alert and no distress     EYES: EOMI,  PERRL     HENT: ear canals and TM's normal and nose and mouth without ulcers or lesions     NECK: no adenopathy, no asymmetry, masses, or scars and thyroid normal to palpation     RESP: lungs clear to auscultation - no rales, rhonchi or wheezes     CV: regular rates and rhythm, normal S1 S2, no S3 or S4 and no murmur, click or rub     ABDOMEN:  soft, nontender, no HSM or masses and bowel sounds normal      MS: extremities normal- no gross deformities noted, no evidence of inflammation in joints, FROM in all extremities.     SKIN: no suspicious lesions or rashes     NEURO: Normal strength and tone, sensory exam grossly normal, mentation intact and speech normal     PSYCH: mentation appears normal. and affect normal/bright     LYMPHATICS: No cervical adenopathy    Recent Labs   Lab Test 08/22/23  1013 08/11/23  1447 05/15/23  1422 04/04/23  1336 03/30/23  1006 02/14/23  1137   HGB  --  11.5* 11.5* 12.4  --   --    PLT  --  362  --  331  --   --     142 142 142  --  140   POTASSIUM 3.6 4.4 3.9 3.6  --  3.7   CR 0.73 0.72 0.65 0.64   < > 0.66   A1C  --   --  6.2*  --   --  6.1*    < > = values in this interval not displayed.        Diagnostics:  CBC, UA, and BMP pending.   EKG: appears normal, NSR, normal axis, normal intervals, no acute ST/T changes c/w ischemia, no LVH by voltage criteria, unchanged from previous tracings    Revised Cardiac Risk Index (RCRI):  The patient has the following serious cardiovascular risks for perioperative complications:   - No serious cardiac risks = 0 points     RCRI Interpretation: 0 points: Class I (very low risk - 0.4% complication rate)         Signed Electronically by: Chai Kauffman MD  Copy of this evaluation report is provided to requesting physician.

## 2023-09-27 NOTE — RESULT ENCOUNTER NOTE
"Keke Wells  Your blood sugar is borderline elevated.    This is in the \"prediabetes\" range.  You are not currently diabetic, but at risk for developing this disease in the future.   Exercise, weight loss,  and limiting carbohydrates and sugars in the diet can be helpful to avoid progression to diabetes.  At minimum we need to check your blood sugar annually.    Please be seen urgently if you develop any signs or symptoms of diabetes (increase thirst or urination, fatigue, blurry vision, unexplained weight loss).    Please call or MyChart my office with any questions or concerns.   Katie Hein, PAC        "

## 2023-09-27 NOTE — TELEPHONE ENCOUNTER
Routing message to  to fax over order for rollator walker to fax number listed below.     Maritza Johnson, SWETHAN, RN   [facility]

## 2023-10-16 ENCOUNTER — TELEPHONE (OUTPATIENT)
Dept: FAMILY MEDICINE | Facility: CLINIC | Age: 73
End: 2023-10-16
Payer: COMMERCIAL

## 2023-10-16 NOTE — TELEPHONE ENCOUNTER
Emerson PT from [facility] is calling regarding an established patient with [facility].       Requesting orders from: Katie Hein  Provider is following patient: No       Orders Requested    Needed provider confirmation that she is aware of few medication interactions and if there needs to be any medication changes:    1.) Adderall with calcium/magnesium/zinc supplement  2.) Magnesium glycinate instead of oxide flagging with Adderall  3.) Adderall with Tums  4.) Flexeril with Tramadol     Routing to provider to review and advise.     Maritza Johnson RN

## 2023-10-17 NOTE — TELEPHONE ENCOUNTER
Don't take supplements or any multivitamin containing iron and calcium within 4 hours of adderall and should be fine to take all medications as prescribed

## 2023-10-17 NOTE — TELEPHONE ENCOUNTER
RN called Emerson and left detailed message relaying provider message below verbatim. Left clinic number [phone] for any further questions or concerns.    SALENA Monreal  [facility] Primary [facility] Triage

## 2023-10-18 ENCOUNTER — TELEPHONE (OUTPATIENT)
Dept: FAMILY MEDICINE | Facility: CLINIC | Age: 73
End: 2023-10-18
Payer: COMMERCIAL

## 2023-10-18 NOTE — TELEPHONE ENCOUNTER
Emerson, PT with [facility], calling with patient update.     He had visit with patient this morning around 1030. Initial BP at rest was 92/56, patient asymptomatic. While completing therapy, such as walking 20 ft and standing for a couple of minutes, patient reported that she started to feel light headed and felt like passing out. Patient then sat down and rested for a bit and symptoms resolved. No reports of falls since hospital visit and patient continues to use walker with no concerns.    Emerson inquiring if any further instructions or follow-up needed regarding these symptoms.    SALENA Monreal  [facility] Primary [facility] Triage

## 2023-10-29 ENCOUNTER — HEALTH MAINTENANCE LETTER (OUTPATIENT)
Age: 73
End: 2023-10-29

## 2023-10-31 ENCOUNTER — TELEPHONE (OUTPATIENT)
Dept: FAMILY MEDICINE | Facility: CLINIC | Age: 73
End: 2023-10-31

## 2023-10-31 ENCOUNTER — TELEPHONE (OUTPATIENT)
Dept: FAMILY MEDICINE | Facility: CLINIC | Age: 73
End: 2023-10-31
Payer: COMMERCIAL

## 2023-10-31 NOTE — TELEPHONE ENCOUNTER
Forms/Letter Request    Type of form/letter:  [facility]    Have you been seen for this request: N/A    Do we have the form/letter: Yes: Will place form on providers desk    When is form/letter needed by: asap    How would you like the form/letter returned: Fax : [phone]

## 2023-10-31 NOTE — TELEPHONE ENCOUNTER
Forms/Letter Request    Type of form/letter:  [facility]     Have you been seen for this request: N/A    Do we have the form/letter: Yes: Will place form on providers desk    When is form/letter needed by: asap    How would you like the form/letter returned: Fax : [phone]

## 2023-11-01 ENCOUNTER — MEDICAL CORRESPONDENCE (OUTPATIENT)
Dept: HEALTH INFORMATION MANAGEMENT | Facility: CLINIC | Age: 73
End: 2023-11-01
Payer: COMMERCIAL

## 2023-11-21 ENCOUNTER — TELEPHONE (OUTPATIENT)
Dept: UROLOGY | Facility: CLINIC | Age: 73
End: 2023-11-21
Payer: COMMERCIAL

## 2023-11-21 DIAGNOSIS — R31.29 MICROSCOPIC HEMATURIA: Primary | ICD-10-CM

## 2023-11-21 NOTE — TELEPHONE ENCOUNTER
Please place UA/UC order for upcoming appointment with Maya Pang.    Thank you,    Kristine mott Clinic Assistant- Surgical Specialties

## 2023-11-21 NOTE — TELEPHONE ENCOUNTER
Future UA/UC ordered per protocol for upcoming visit with Maya Pang PA-C.    Juana Granda RN, BSN

## 2023-11-27 ENCOUNTER — LAB (OUTPATIENT)
Dept: LAB | Facility: CLINIC | Age: 73
End: 2023-11-27
Payer: COMMERCIAL

## 2023-11-27 ENCOUNTER — OFFICE VISIT (OUTPATIENT)
Dept: UROLOGY | Facility: CLINIC | Age: 73
End: 2023-11-27
Attending: PHYSICIAN ASSISTANT
Payer: COMMERCIAL

## 2023-11-27 DIAGNOSIS — R31.29 MICROSCOPIC HEMATURIA: Primary | ICD-10-CM

## 2023-11-27 DIAGNOSIS — R31.29 MICROSCOPIC HEMATURIA: ICD-10-CM

## 2023-11-27 LAB
ALBUMIN UR-MCNC: 10 MG/DL
APPEARANCE UR: CLEAR
BILIRUB UR QL STRIP: NEGATIVE
COLOR UR AUTO: YELLOW
GLUCOSE UR STRIP-MCNC: NEGATIVE MG/DL
HGB UR QL STRIP: ABNORMAL
KETONES UR STRIP-MCNC: NEGATIVE MG/DL
LEUKOCYTE ESTERASE UR QL STRIP: NEGATIVE
MUCOUS THREADS #/AREA URNS LPF: PRESENT /LPF
NITRATE UR QL: NEGATIVE
PH UR STRIP: 6 [PH] (ref 5–7)
RBC #/AREA URNS AUTO: ABNORMAL /HPF
SKIP: ABNORMAL
SP GR UR STRIP: 1.02 (ref 1–1.03)
SQUAMOUS #/AREA URNS AUTO: ABNORMAL /LPF
UROBILINOGEN UR STRIP-MCNC: NORMAL MG/DL
WBC #/AREA URNS AUTO: ABNORMAL /HPF

## 2023-11-27 PROCEDURE — 81001 URINALYSIS AUTO W/SCOPE: CPT

## 2023-11-27 PROCEDURE — 99203 OFFICE O/P NEW LOW 30 MIN: CPT | Performed by: PHYSICIAN ASSISTANT

## 2023-11-27 NOTE — PATIENT INSTRUCTIONS
- CT scan: Please call [phone] to schedule this at the [place] location.   ----DO NOT undergo CT scan if you are currently pregnant or think you are pregnant  - Please go to nearest [facility] lab to leave a urine sample for your urine cytology. Make sure to call the [facility] prior to leaving a sample so they are ware you are coming. Urine cytology looks for cancer cells in the urine. If you were able to leave a urine sample today, then will notify you once the results are back.   - Please follow-up for cystoscopy with next available urologist. Please make sure you have your urine cytology and CT scan done prior to you cystoscopy appointment.  _____________________________    CYSTOSCOPY  _____________________________    What is a Cystoscopy?  This is a procedure done to check for problems inside the bladder.  Problems may include polyps (growths), tumors, inflammation (swelling and redness) and other concerns.    The doctor inserts a thin tube (called a cystoscope) into the bladder.  The tube is about the size of a pencil.  We will give you numbing medicine to reduce the pain or discomfort you may feel.    The tube allows the doctor to:  The doctor will be able to see inside the bladder by filling the bladder with water.  The water makes it easier to see any problems that may be present.    If needed, the doctor may use the tube to:  The doctor is able to take tissue samples (biopsies).  Samples are sent to the lab for testing.  The doctor can also burn off any small growths or tumors that are found.  This is call fulguration.    What happens after the exam?  You may go back to your normal diet and activity as you feel ready, unless your doctor tells you not to.    For the next two days, you may notice:  Some blood in your urine.  Some burning when you urinate (use the toilet).  An urge to urinate more often.  Bladder spasms.    These are normal after the procedure. They should go away on their own  after a day or two.      You can help to relieve the above listed symptoms by:  Drinking 6 to 8 large glasses of water each day (includes drinks at meals).  This will help clear the urine.  Take warm baths to relieve pain and bladder spasms.  Do not add anything to the bath water.  Your doctor may prescribe pain medicine.  You may also take Tylenol (acetaminophen) for pain.    When should I call my doctor?  A fever over 100.0 F (38 C) for more than a day.  (Before you call the doctor, check your temperature under your tongue.)  Chills.  Failure to urinate: No urine comes out when you try to use the toilet.  (Try soaking in a bathtub full of warm water.  If still no urine, call your doctor.)  A lot of blood in the urine or blood clots larger than a nickel.  Pain in the back or abdomen (belly / stomach area).  Pain or spasms that are not relieved by warm tub baths and pain medicine.  Severe pain, burning or other problems while passing urine.  Pain that gets worse after two days.

## 2023-11-27 NOTE — NURSING NOTE
Marixa Ann's chief complaint for this visit includes:  Chief Complaint   Patient presents with    New Patient     LAB FIRST PVR Microscopic hematuria, no outside records, referred by jair Joshi per Pt     PCP: Katie Hein    post void residual:  17 mls      Referring Provider:  Chai Kauffman MD  [address] [place] [address]    LMP 05/31/2003 (Approximate)   Data Unavailable        Allergies   Allergen Reactions    Vicodin [Hydrocodone-Acetaminophen] Anaphylaxis    Dilaudid [Hydromorphone] Nausea and Vomiting    Oxycodone Nausea and Vomiting    Dust Mites     Milk [Milk (Cow)] Diarrhea         Do you need any medication refills at today's visit? No    Kristine mott Clinic Assistant- Surgical Specialties

## 2023-11-27 NOTE — PROGRESS NOTES
CC: Hematuria.    HPI: It is a pleasure to see Ms. Marixa Ann, a 73 year old female, asked to be seen in consultation by Dr. Kauffman for evaluation of microscopic hematuria. Per EMR, has had 2 UA/micro over the past 6 months that show 2-10 RBCs.     The patient denies any gross hematuria. CT 3/30/23 showed simple renal cysts. Ms. Ann voids without difficulty.  Denies any voiding issues, pyuria or any recent history of urinary tract infections or stones. Does know that coffee irritates her bladder causing urinary frequency at times. Follows with oncology for hx of Right breast cancer ER/TX positive HER2 negative. Currently on aromatase inhibitor.  PMH includes ADHD, social anxiety,  GERD, liver cysts, OA, MDD, HLD, fibromyalgia. States she was recently dx with prediabetes. PSH includes cholecystectomy.     Hematuria Risk Factors:  Age >40: Yes     Smoking history: none  Occupational exposure to chemicals or dyes (ie, benzenes, aromatic amines): no  History of urologic disorder or disease: no  History of irritative voiding symptoms: no  History of urinary tract infection: no  Analgesic abuse: no  History of pelvic irradiation: no    Past Medical History:   Diagnosis Date    ADHD     Breast cancer (H) 12/26/2018    Right Breast    Chronic fatigue     Fibromyalgia     Hard of hearing     Osteoporosis     S/P radiation therapy     4,256 cGy to right breast completed on 03/11/2019 - [facility] with Dr. Copeland     Past Surgical History:   Procedure Laterality Date    APPENDECTOMY  1967    BIOPSY BREAST NEEDLE LOCALIZATION Right 01/10/2019    Procedure: WIRE LOCALIZED RIGHT BREAST LUMPECTOMY WITH RIGHT SLNB;  Surgeon: Gema Diamond MD;  Location: [facility] OR    BREAST BIOPSY, CORE RT/LT Right 12/26/2018    CATARACT IOL, RT/LT Bilateral 2018    with lens implants per patient    CHOLECYSTECTOMY  05/24/2019    THYROID BIOPSY  01/31/2019    ZZC LAP RPR INCISIONAL HERNIA  05/2023     Current Outpatient Medications    Medication Sig Dispense Refill    acetaminophen (TYLENOL) 325 MG tablet Take 325-650 mg by mouth every 6 hours as needed for mild pain      amphetamine-dextroamphetamine (ADDERALL) 15 MG tablet Take 1 tablet (15 mg) by mouth 2 times daily Teva Brand 60 tablet 0    anastrozole (ARIMIDEX) 1 MG tablet Take 1 tablet (1 mg) by mouth daily 90 tablet 3    B Complex Vitamins (VITAMIN B-COMPLEX PO) Take by mouth daily      calcium carbonate (TUMS) 500 MG chewable tablet Take 1 chew tab by mouth 2 times daily      Calcium-Magnesium-Zinc 333-133-5 MG TABS per tablet Take 1 tablet by mouth daily      Cholecalciferol (VITAMIN D-3 PO) Vitamin D is included with multiple vitamin, patient does not take additional. Marjorie Crooks LPN on 7/26/2019 at 3:02 PM      cyclobenzaprine (FLEXERIL) 10 MG tablet Take 1 tablet (10 mg) by mouth 2 times daily as needed for muscle spasms 30 tablet 0    denosumab (PROLIA) 60 MG/ML SOSY injection       KRILL OIL PO Take by mouth daily      magnesium oxide 200 MG TABS Take 100 mg by mouth once as needed      Multiple Vitamins-Minerals (MULTIVITAMIN PO)       mupirocin (BACTROBAN) 2 % external ointment Use 2 times a day to affected area. 22 g 0    traZODone (DESYREL) 50 MG tablet Take 1 tablet (50 mg) by mouth At Bedtime - may take 2 tablets at bedtime if not sleeping with one 90 tablet 1    vitamin B-12 (CYANOCOBALAMIN) 100 MCG tablet Take 1,000 mcg by mouth daily       Allergies   Allergen Reactions    Vicodin [Hydrocodone-Acetaminophen] Anaphylaxis    Dilaudid [Hydromorphone] Nausea and Vomiting    Oxycodone Nausea and Vomiting    Dust Mites     Milk [Milk (Cow)] Diarrhea     FAMILY HISTORY: There is no reported history of genitourinary carcinoma.  There is no history of urolithiasis.      Social History     Socioeconomic History    Marital status:      Spouse name: Not on file    Number of children: Not on file    Years of education: Not on file    Highest education level: Not on file    Occupational History    Not on file   Tobacco Use    Smoking status: Never    Smokeless tobacco: Never   Vaping Use    Vaping Use: Never used   Substance and Sexual Activity    Alcohol use: No    Drug use: No    Sexual activity: Not Currently     Partners: Male     Birth control/protection: None   Other Topics Concern    Parent/sibling w/ CABG, MI or angioplasty before 65F 55M? No   Social History Narrative    Not on file     Social Determinants of Health     Financial Resource Strain: Low Risk  (9/27/2023)    Financial Resource Strain     Within the past 12 months, have you or your family members you live with been unable to get utilities (heat, electricity) when it was really needed?: No   Food Insecurity: Low Risk  (9/27/2023)    Food Insecurity     Within the past 12 months, did you worry that your food would run out before you got money to buy more?: No     Within the past 12 months, did the food you bought just not last and you didn t have money to get more?: No   Transportation Needs: Low Risk  (9/27/2023)    Transportation Needs     Within the past 12 months, has lack of transportation kept you from medical appointments, getting your medicines, non-medical meetings or appointments, work, or from getting things that you need?: No   Physical Activity: Not on file   Stress: Not on file   Social Connections: Not on file   Interpersonal Safety: Low Risk  (9/27/2023)    Interpersonal Safety     Do you feel physically and emotionally safe where you currently live?: Yes     Within the past 12 months, have you been hit, slapped, kicked or otherwise physically hurt by someone?: No     Within the past 12 months, have you been humiliated or emotionally abused in other ways by your partner or ex-partner?: No   Housing Stability: Low Risk  (9/27/2023)    Housing Stability     Do you have housing? : Yes     Are you worried about losing your housing?: Patient refused       PHYSICAL EXAM:   There were no vitals filed for  this visit.  PSYCH: NAD  EYES: EOMI  MOUTH: MMM  NEURO: AAO x3  : Vulva is normal in appearance. Urethral prolapse noted.. Negative for YOSSI with reduction of speculum when instructed to cough. Vaginal mucosa atrophic. Slight tenderness to palation along rigth levator ani muscles. No prolapse appreciated. Strength 1/5. No obvious masses, lesions, ulcers, bleeding noted on internal or external exam.      UA RESULTS:  Recent Labs   Lab Test 11/27/23  1029 09/27/23  1135   COLOR Yellow Yellow   APPEARANCE Clear Clear   URINEGLC Negative Negative   URINEBILI Negative Negative   URINEKETONE Negative Trace*   SG 1.021 1.020   UBLD Small* Moderate*   URINEPH 6.0 5.5   PROTEIN 10* Negative   UROBILINOGEN  --  0.2   NITRITE Negative Negative   LEUKEST Negative Trace*   RBCU 2-5* 2-5*   WBCU 0-5 0-5       Office Visit on 09/27/2023   Component Date Value Ref Range Status    Hemoglobin A1C 09/27/2023 5.8 (H)  0.0 - 5.6 % Final    Normal <5.7%   Prediabetes 5.7-6.4%    Diabetes 6.5% or higher     Note: Adopted from ADA consensus guidelines.    Sodium 09/27/2023 140  135 - 145 mmol/L Final    Reference intervals for this test were updated on 09/26/2023 to more accurately reflect our healthy population. There may be differences in the flagging of prior results with similar values performed with this method. Interpretation of those prior results can be made in the context of the updated reference intervals.     Potassium 09/27/2023 4.3  3.4 - 5.3 mmol/L Final    Carbon Dioxide (CO2) 09/27/2023 23  22 - 29 mmol/L Final    Anion Gap 09/27/2023 14  7 - 15 mmol/L Final    Urea Nitrogen 09/27/2023 12.1  8.0 - 23.0 mg/dL Final    Creatinine 09/27/2023 0.66  0.51 - 0.95 mg/dL Final    GFR Estimate 09/27/2023 >90  >60 mL/min/1.73m2 Final    Calcium 09/27/2023 9.3  8.8 - 10.2 mg/dL Final    Chloride 09/27/2023 103  98 - 107 mmol/L Final    Glucose 09/27/2023 93  70 - 99 mg/dL Final    Alkaline Phosphatase 09/27/2023 109 (H)  35 - 104 U/L  Final    AST 09/27/2023 26  0 - 45 U/L Final    Reference intervals for this test were updated on 6/12/2023 to more accurately reflect our healthy population. There may be differences in the flagging of prior results with similar values performed with this method. Interpretation of those prior results can be made in the context of the updated reference intervals.    ALT 09/27/2023 17  0 - 50 U/L Final    Reference intervals for this test were updated on 6/12/2023 to more accurately reflect our healthy population. There may be differences in the flagging of prior results with similar values performed with this method. Interpretation of those prior results can be made in the context of the updated reference intervals.      Protein Total 09/27/2023 7.1  6.4 - 8.3 g/dL Final    Albumin 09/27/2023 3.9  3.5 - 5.2 g/dL Final    Bilirubin Total 09/27/2023 0.3  <=1.2 mg/dL Final    Color Urine 09/27/2023 Yellow  Colorless, Straw, Light Yellow, Yellow Final    Appearance Urine 09/27/2023 Clear  Clear Final    Glucose Urine 09/27/2023 Negative  Negative mg/dL Final    Bilirubin Urine 09/27/2023 Negative  Negative Final    Ketones Urine 09/27/2023 Trace (A)  Negative mg/dL Final    Specific Gravity Urine 09/27/2023 1.020  1.003 - 1.035 Final    Blood Urine 09/27/2023 Moderate (A)  Negative Final    pH Urine 09/27/2023 5.5  5.0 - 7.0 Final    Protein Albumin Urine 09/27/2023 Negative  Negative mg/dL Final    Urobilinogen Urine 09/27/2023 0.2  0.2, 1.0 E.U./dL Final    Nitrite Urine 09/27/2023 Negative  Negative Final    Leukocyte Esterase Urine 09/27/2023 Trace (A)  Negative Final    WBC Count 09/27/2023 7.2  4.0 - 11.0 10e3/uL Final    RBC Count 09/27/2023 4.25  3.80 - 5.20 10e6/uL Final    Hemoglobin 09/27/2023 11.2 (L)  11.7 - 15.7 g/dL Final    Hematocrit 09/27/2023 36.2  35.0 - 47.0 % Final    MCV 09/27/2023 85  78 - 100 fL Final    MCH 09/27/2023 26.4 (L)  26.5 - 33.0 pg Final    MCHC 09/27/2023 30.9 (L)  31.5 - 36.5 g/dL  Final    RDW 09/27/2023 15.3 (H)  10.0 - 15.0 % Final    Platelet Count 09/27/2023 311  150 - 450 10e3/uL Final    % Neutrophils 09/27/2023 68  % Final    % Lymphocytes 09/27/2023 23  % Final    % Monocytes 09/27/2023 8  % Final    % Eosinophils 09/27/2023 1  % Final    % Basophils 09/27/2023 0  % Final    % Immature Granulocytes 09/27/2023 0  % Final    Absolute Neutrophils 09/27/2023 4.8  1.6 - 8.3 10e3/uL Final    Absolute Lymphocytes 09/27/2023 1.6  0.8 - 5.3 10e3/uL Final    Absolute Monocytes 09/27/2023 0.5  0.0 - 1.3 10e3/uL Final    Absolute Eosinophils 09/27/2023 0.1  0.0 - 0.7 10e3/uL Final    Absolute Basophils 09/27/2023 0.0  0.0 - 0.2 10e3/uL Final    Absolute Immature Granulocytes 09/27/2023 0.0  <=0.4 10e3/uL Final    Bacteria Urine 09/27/2023 Few (A)  None Seen /HPF Final    RBC Urine 09/27/2023 2-5 (A)  0-2 /HPF /HPF Final    WBC Urine 09/27/2023 0-5  0-5 /HPF /HPF Final    Squamous Epithelials Urine 09/27/2023 Few (A)  None Seen /LPF Final     UA   5/29/19 5-10 RBC  9/27/23 2-5 RBC    IMAGING:     CT a/p 3/30/23  FINDINGS:     Lower thorax: Dependent subsegmental atelectasis of the lung bases,  left greater than right.  5 mm solid nodule along left major fissure  (series 3 image 18).  Moderate hiatal hernia. Elevation of the left  hemidiaphragm.     Liver: Multiple fluid attenuating cysts scattered throughout the liver  and additional too small to characterize hepatic hypodensities. Focal  hepatic steatosis along the falciform ligament. No intrahepatic  biliary ductal dilation.     Biliary System: Status post cholecystectomy with dilated common bile  duct measuring 11 mm in diameter, likely reservoir effect.     Pancreas: 6 mm hypodensity in the pancreatic body (series 3 image 68).  No pancreatic ductal dilation.     Adrenal glands: No mass or nodules     Spleen: Normal.     Kidneys: Simple renal cysts bilaterally and additional too small to  characterize cortical hypodensities. No suspicious mass,  obstructing  calculus or hydronephrosis.     Gastrointestinal tract:  Normal caliber small and large bowel.  Nonspecific fatty infiltration of the colonic submucosa predominantly  involving the ascending colon. Colonic diverticulosis without evidence  of acute diverticulitis.     Mesentery/peritoneum/retroperitoneum: No mass. No free fluid or air.     Lymph nodes: No significant lymphadenopathy.     Vasculature: Patent major abdominal vasculature.     Pelvis: Urinary bladder is normal.  Uterus and adnexa within normal  limits.     Osseous structures: Multilevel degenerative changes of the spine. No  suspicious osseous lesion.      Soft tissues: Fat-containing periumbilical hernia with a fascial  defect measuring 2.0 cm in width (series 3 image 29).  Mild  surrounding fat stranding.                                                                   IMPRESSION:   1. Fat-containing periumbilical hernia with mild surrounding fat  stranding suggestive of active inflammation.  2. Moderate hiatal hernia.   3. Incidental 6 mm pancreatic hypodensity is likely a cyst or an  intraductal papillary mucinous neoplasm. Consider follow up MRI of the  pancreas for further evaluation versus comparison with outside imaging  to assess stability.      ASSESSMENT and PLAN:    73 year old female with microscopic hematuria.  The differential diagnosis at this point includes stone disease, infection, vaginal contaminant, urothelial malignancy, renal disorder versus another yet unknown diagnosis.    At this time, recommend proceeding with comprehensive hematuria evaluation to include:  - Urine cytology to look for cells concerning for malignancy.  - CT urogram for upper tract imaging.   - Cystoscopy with the first available urologist to evaluate the interior of the bladder. Follow up for hematuria as recommended by urologist performing cystoscopic evaluation. Discussed with patient to have CT and urine cytology completed prior to her  cystoscopy.    Thank you for allowing me to participate in Ms. Ann's care. I will keep you updated of her progress, but please do not hesitate to contact me with any questions.    Maya Pang PA-C  Urology    23  minutes spent on the date of the encounter doing chart review, review of outside records, exam, ordering imaging, ordering urine cytology, review of test results, interpretation of tests, patient visit and documentation.

## 2023-12-07 ENCOUNTER — OFFICE VISIT (OUTPATIENT)
Dept: DERMATOLOGY | Facility: CLINIC | Age: 73
End: 2023-12-07
Payer: COMMERCIAL

## 2023-12-07 DIAGNOSIS — Z85.828 HISTORY OF NONMELANOMA SKIN CANCER: Primary | ICD-10-CM

## 2023-12-07 DIAGNOSIS — L82.1 SEBORRHEIC KERATOSIS: ICD-10-CM

## 2023-12-07 DIAGNOSIS — D48.5 NEOPLASM OF UNCERTAIN BEHAVIOR OF SKIN: ICD-10-CM

## 2023-12-07 PROCEDURE — 99213 OFFICE O/P EST LOW 20 MIN: CPT | Mod: 25 | Performed by: DERMATOLOGY

## 2023-12-07 PROCEDURE — 88342 IMHCHEM/IMCYTCHM 1ST ANTB: CPT | Performed by: PATHOLOGY

## 2023-12-07 PROCEDURE — 11102 TANGNTL BX SKIN SINGLE LES: CPT | Performed by: DERMATOLOGY

## 2023-12-07 PROCEDURE — 88305 TISSUE EXAM BY PATHOLOGIST: CPT | Performed by: PATHOLOGY

## 2023-12-07 NOTE — NURSING NOTE
Marixa Ann's chief complaint for this visit includes:  Chief Complaint   Patient presents with    Skin Check     HX NMSC  Areas of concern- past BCC on nose, back of neck where she can't see     PCP: Katie Hein    Referring Provider:  No referring provider defined for this encounter.    LMP 05/31/2003 (Approximate)   Data Unavailable        Allergies   Allergen Reactions    Vicodin [Hydrocodone-Acetaminophen] Anaphylaxis    Dilaudid [Hydromorphone] Nausea and Vomiting    Oxycodone Nausea and Vomiting    Dust Mites     Milk [Milk (Cow)] Diarrhea         Do you need any medication refills at today's visit? No

## 2023-12-07 NOTE — LETTER
12/7/2023         RE: Marixa Ann  [address] [facility] [address]        Dear Colleague,    Thank you for referring your patient, Marixa Ann, to the [facility]. Please see a copy of my visit note below.    [facility] Dermatology Note  Encounter Date: Dec 7, 2023  Office Visit     Dermatology Problem List:  *Patient requests no males in the room for skin checks  Last skin check 12/07/23, recommended yearly  0. NUB, left mid forearm, s/p bx 12/07/23  1. History of NMSC   - BCC - right nasal sidewall, s/p MMS 2/24/21  2. History of benign biopsy  - Osteoma cutis, left temple, s/p punch bx 2/9/21  3. SK    Family History: Not assessed.  Social History: Likes to garden. Uses combination of sunscreen and sun protective clothing diligently.    ____________________________________________    Assessment & Plan:    # Neoplasm of unspecified behavior of the skin (D49.2) on the left mid forearm. The differential diagnosis includes DN, rule out MM .   - Shave biopsy performed today (see procedure note(s) below).    # SK, left thigh  - No further treatment at this time.    # History of nonmelanoma skin cancer, no clinical evidence of recurrence.  - ABCDEs: Counseled ABCDEs of melanoma: Asymmetry, Border (irregularity), Color (not uniform, changes in color), Diameter (greater than 6 mm which is about the size of a pencil eraser), and Evolving (any changes in preexisting moles).  - Sun protection: Counseled SPF30+ sunscreen, UPF clothing, sun avoidance, tanning bed avoidance.   - Recommended regular skin checks.     Procedures Performed:   - Shave biopsy procedure note, location(s): see above. After discussion of benefits and risks including but not limited to bleeding, infection, scar, incomplete removal, recurrence, and non-diagnostic biopsy, written consent and photographs were obtained. The area was cleaned with isopropyl alcohol. 0.5mL of 1%  lidocaine with epinephrine was injected to obtain adequate anesthesia of lesion(s). Shave biopsy at site(s) performed. Hemostasis was achieved with aluminium chloride. Petrolatum ointment and a sterile dressing were applied. The patient tolerated the procedure and no complications were noted. The patient was provided with verbal and written post care instructions.     Follow-up: 1 year(s) in-person, or earlier for new or changing lesions    Staff and Scribe:     Scribe Disclosure:   I, JAYCE HEREDIA, am serving as a scribe; to document services personally performed by Sherita Uribe MD -based on data collection and the provider's statements to me.    Provider Disclosure:   The documentation recorded by the scribe accurately reflects the services I personally performed and the decisions made by me.    Sherita Uribe MD    Department of Dermatology  [facility]: Phone: [phone], Fax:[phone]  [facility]: Phone: [phone], Fax: [phone]   ____________________________________________    CC: Skin Check (HX NMSC/Areas of concern- past BCC on nose, back of neck where she can't see)    HPI:  Ms. Marixa Ann is a(n) 73 year old female who presents today as a return patient for FBSE.    The patient presents with concern for spot on her nose, where she previously had BCC.    She would also like the back of her neck to be checked.    She also mentions of a spot on her leg.    Patient is otherwise feeling well, without additional skin concerns.    Labs Reviewed:  N/A    Physical Exam:  Vitals: LMP 05/31/2003 (Approximate)   SKIN: Total skin including the buttocks was performed. The exam included the head/face, neck, both arms, chest, back, abdomen, both legs, digits and/or nails.   - Left mid forearm, 3 mm pigment macule  - There is a waxy stuck on tan to brown papule on the left  thigh.  - There is no erythema, telangectasias, nodularity, or pigmentation on the site of prior NMSC.  - No other lesions of concern on areas examined.     Medications:  Current Outpatient Medications   Medication     acetaminophen (TYLENOL) 325 MG tablet     amphetamine-dextroamphetamine (ADDERALL) 15 MG tablet     anastrozole (ARIMIDEX) 1 MG tablet     B Complex Vitamins (VITAMIN B-COMPLEX PO)     calcium carbonate (TUMS) 500 MG chewable tablet     Calcium-Magnesium-Zinc 333-133-5 MG TABS per tablet     Cholecalciferol (VITAMIN D-3 PO)     cyclobenzaprine (FLEXERIL) 10 MG tablet     denosumab (PROLIA) 60 MG/ML SOSY injection     KRILL OIL PO     magnesium oxide 200 MG TABS     Multiple Vitamins-Minerals (MULTIVITAMIN PO)     mupirocin (BACTROBAN) 2 % external ointment     traZODone (DESYREL) 50 MG tablet     vitamin B-12 (CYANOCOBALAMIN) 100 MCG tablet     Current Facility-Administered Medications   Medication     cross-Linked Hyaluronate (GEL-ONE) injection PRSY 30 mg     cross-Linked Hyaluronate (GEL-ONE) injection PRSY 30 mg     cross-Linked Hyaluronate (GEL-ONE) injection PRSY 30 mg     cross-Linked Hyaluronate (GEL-ONE) injection PRSY 30 mg     triamcinolone (KENALOG-40) injection 80 mg      Past Medical History:   Patient Active Problem List   Diagnosis     Moderate recurrent major depression (H)     Hyperlipidemia LDL goal <160     Chronic fatigue disorder     Liver lesion     Cystic disease of liver     Gastroesophageal reflux disease with esophagitis     Hiatal hernia     Age-related cataract of both eyes, unspecified age-related cataract type     Primary osteoarthritis of both knees     Attention deficit hyperactivity disorder (ADHD), predominantly inattentive type     Malignant neoplasm of upper-inner quadrant of right breast in female, estrogen receptor positive (H)     Thyroid nodule     Anxiety disorder, unspecified type     Osteoporosis     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     Aromatase inhibitor use      Cervical cancer screening     History of cholecystectomy     Morbid obesity (H)     Fibromyalgia     Insomnia, unspecified type     Past Medical History:   Diagnosis Date     ADHD      Breast cancer (H) 12/26/2018    Right Breast     Chronic fatigue      Fibromyalgia      Hard of hearing      Osteoporosis      S/P radiation therapy     4,256 cGy to right breast completed on 03/11/2019 - [facility] with Dr. Min DRAPER No referring provider defined for this encounter. on close of this encounter.      Again, thank you for allowing me to participate in the care of your patient.        Sincerely,        Sherita Uribe MD

## 2023-12-07 NOTE — PROGRESS NOTES
[facility] Dermatology Note  Encounter Date: Dec 7, 2023  Office Visit     Dermatology Problem List:  *Patient requests no males in the room for skin checks  Last skin check 12/07/23, recommended yearly  0. NUB, left mid forearm, s/p bx 12/07/23  1. History of NMSC   - BCC - right nasal sidewall, s/p MMS 2/24/21  2. History of benign biopsy  - Osteoma cutis, left temple, s/p punch bx 2/9/21  3. SK    Family History: Not assessed.  Social History: Likes to garden. Uses combination of sunscreen and sun protective clothing diligently.    ____________________________________________    Assessment & Plan:    # Neoplasm of unspecified behavior of the skin (D49.2) on the left mid forearm. The differential diagnosis includes DN, rule out MM .   - Shave biopsy performed today (see procedure note(s) below).    # SK, left thigh  - No further treatment at this time.    # History of nonmelanoma skin cancer, no clinical evidence of recurrence.  - ABCDEs: Counseled ABCDEs of melanoma: Asymmetry, Border (irregularity), Color (not uniform, changes in color), Diameter (greater than 6 mm which is about the size of a pencil eraser), and Evolving (any changes in preexisting moles).  - Sun protection: Counseled SPF30+ sunscreen, UPF clothing, sun avoidance, tanning bed avoidance.   - Recommended regular skin checks.     Procedures Performed:   - Shave biopsy procedure note, location(s): see above. After discussion of benefits and risks including but not limited to bleeding, infection, scar, incomplete removal, recurrence, and non-diagnostic biopsy, written consent and photographs were obtained. The area was cleaned with isopropyl alcohol. 0.5mL of 1% lidocaine with epinephrine was injected to obtain adequate anesthesia of lesion(s). Shave biopsy at site(s) performed. Hemostasis was achieved with aluminium chloride. Petrolatum ointment and a sterile dressing were applied. The patient tolerated the procedure and no  complications were noted. The patient was provided with verbal and written post care instructions.     Follow-up: 1 year(s) in-person, or earlier for new or changing lesions    Staff and Scribe:     Scribe Disclosure:   I, JAYCE HEREDIA, am serving as a scribe; to document services personally performed by Sherita Uribe MD -based on data collection and the provider's statements to me.    Provider Disclosure:   The documentation recorded by the scribe accurately reflects the services I personally performed and the decisions made by me.    Sherita Uribe MD    Department of Dermatology  [facility]: Phone: [phone], Fax:[phone]  [facility]: Phone: [phone], Fax: [phone]   ____________________________________________    CC: Skin Check (HX NMSC/Areas of concern- past BCC on nose, back of neck where she can't see)    HPI:  Ms. Marixa Ann is a(n) 73 year old female who presents today as a return patient for FBSE.    The patient presents with concern for spot on her nose, where she previously had BCC.    She would also like the back of her neck to be checked.    She also mentions of a spot on her leg.    Patient is otherwise feeling well, without additional skin concerns.    Labs Reviewed:  N/A    Physical Exam:  Vitals: LMP 05/31/2003 (Approximate)   SKIN: Total skin including the buttocks was performed. The exam included the head/face, neck, both arms, chest, back, abdomen, both legs, digits and/or nails.   - Left mid forearm, 3 mm pigment macule  - There is a waxy stuck on tan to brown papule on the left thigh.  - There is no erythema, telangectasias, nodularity, or pigmentation on the site of prior NMSC.  - No other lesions of concern on areas examined.     Medications:  Current Outpatient Medications   Medication    acetaminophen (TYLENOL) 325 MG tablet     amphetamine-dextroamphetamine (ADDERALL) 15 MG tablet    anastrozole (ARIMIDEX) 1 MG tablet    B Complex Vitamins (VITAMIN B-COMPLEX PO)    calcium carbonate (TUMS) 500 MG chewable tablet    Calcium-Magnesium-Zinc 333-133-5 MG TABS per tablet    Cholecalciferol (VITAMIN D-3 PO)    cyclobenzaprine (FLEXERIL) 10 MG tablet    denosumab (PROLIA) 60 MG/ML SOSY injection    KRILL OIL PO    magnesium oxide 200 MG TABS    Multiple Vitamins-Minerals (MULTIVITAMIN PO)    mupirocin (BACTROBAN) 2 % external ointment    traZODone (DESYREL) 50 MG tablet    vitamin B-12 (CYANOCOBALAMIN) 100 MCG tablet     Current Facility-Administered Medications   Medication    cross-Linked Hyaluronate (GEL-ONE) injection PRSY 30 mg    cross-Linked Hyaluronate (GEL-ONE) injection PRSY 30 mg    cross-Linked Hyaluronate (GEL-ONE) injection PRSY 30 mg    cross-Linked Hyaluronate (GEL-ONE) injection PRSY 30 mg    triamcinolone (KENALOG-40) injection 80 mg      Past Medical History:   Patient Active Problem List   Diagnosis    Moderate recurrent major depression (H)    Hyperlipidemia LDL goal <160    Chronic fatigue disorder    Liver lesion    Cystic disease of liver    Gastroesophageal reflux disease with esophagitis    Hiatal hernia    Age-related cataract of both eyes, unspecified age-related cataract type    Primary osteoarthritis of both knees    Attention deficit hyperactivity disorder (ADHD), predominantly inattentive type    Malignant neoplasm of upper-inner quadrant of right breast in female, estrogen receptor positive (H)    Thyroid nodule    Anxiety disorder, unspecified type    Osteoporosis    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    Aromatase inhibitor use    Cervical cancer screening    History of cholecystectomy    Morbid obesity (H)    Fibromyalgia    Insomnia, unspecified type     Past Medical History:   Diagnosis Date    ADHD     Breast cancer (H) 12/26/2018    Right Breast    Chronic fatigue     Fibromyalgia     Hard of hearing     Osteoporosis      S/P radiation therapy     4,256 cGy to right breast completed on 03/11/2019 - [facility] with Dr. Min DRAPER No referring provider defined for this encounter. on close of this encounter.

## 2023-12-07 NOTE — NURSING NOTE
The following medication was given:     MEDICATION:  Lidocaine with epinephrine 1% 1:[id number]  ROUTE: SQ  SITE: see procedure note  DOSE: 1ml  LOT #: [id number]  : [employer]  EXPIRATION DATE: 02/28/2025  NDC#: 15752-673-99  Was there drug waste? No  Multi-dose vial: Yes    Rachel Ragland  December 7, 2023

## 2023-12-07 NOTE — PATIENT INSTRUCTIONS
Wound Care After a Biopsy    What is a skin biopsy?  A skin biopsy allows the doctor to examine a very small piece of tissue under the microscope to determine the diagnosis and the best treatment for the skin condition. A local anesthetic (numbing medicine) is injected with a very small needle into the skin area to be tested. A small piece of skin is taken from the area. Sometimes a suture (stitch) is used.     What are the risks of a skin biopsy?  I will experience scar, bleeding, swelling, pain, crusting and redness. I may experience incomplete removal or recurrence. Risks of this procedure are excessive bleeding, bruising, infection, nerve damage, numbness, thick (hypertrophic or keloidal) scar and non-diagnostic biopsy.    How should I care for my wound for the first 24 hours?  Keep the wound dry and covered for 24 hours  If it bleeds, hold direct pressure on the area for 15 minutes. If bleeding does not stop, call us or go to the emergency room  Avoid strenuous exercise the first 1-2 days or as your doctor instructs you    How should I care for the wound after 24 hours?  After 24 hours, remove the bandage  You may bathe or shower as normal  If you had a scalp biopsy, you can shampoo as usual and can use shower water to clean the biopsy site daily  Clean the wound once a day with gentle soap and water  Do not scrub, be gentle  Apply white petroleum/Vaseline after cleaning the wound with a cotton swab or a clean finger, and keep the site covered with a Bandaid /bandage. Bandages are not necessary with a scalp biopsy  If you are unable to cover the site with a Bandaid /bandage, re-apply ointment 2-3 times a day to keep the site moist. Moisture will help with healing  Avoid strenuous activity for first 1-2 days  Avoid lakes, rivers, pools, and oceans until the stitches are removed or the site is healed    How do I clean my wound?  Wash hands thoroughly with soap or use hand  before all wound care  Clean  the wound with gentle soap and water  Apply white petroleum/Vaseline  to wound after it is clean  Replace the Bandaid /bandage to keep the wound covered for the first few days or as instructed by your doctor  If you had a scalp biopsy, warm shower water to the area on a daily basis should suffice    What should I use to clean my wound?   Cotton-tipped applicators (Qtips )  White petroleum jelly (Vaseline ). Use a clean new container and use Q-tips to apply.  Bandaids  as needed  Gentle soap     How should I care for my wound long term?  Do not get your wound dirty  Keep up with wound care for one week or until the area is healed.     A small scab will form and fall off by itself when the area is completely healed. The area will be red and will become pink in color as it heals. Sun protection is very important for how your scar will turn out. Sunscreen with an SPF 30 or greater is recommended once the area is healed.  You should have some soreness but it should be mild and slowly go away over several days. Talk to your doctor about using tylenol for pain,    When should I call my doctor?  If you have increased:   Pain or swelling  Pus or drainage (clear or slightly yellow drainage is ok)  Temperature over 100F  Spreading redness or warmth around wound    When will I hear about my results?  The biopsy results can take 2 weeks to come back.  Your results will automatically release to [employer] before your provider has even reviewed them.  The clinic will call you with the results, send you a [employer] message, or have you schedule a follow-up clinic or phone time to discuss the results.  Contact our clinics if you do not hear from us in 2 weeks.    Who should I call with questions?  [facility]: [phone]  [facility]: [phone]  For urgent needs outside of business hours call the [facility] at [phone] and ask for the dermatology resident on  call      Wound Care After a Biopsy    What is a skin biopsy?  A skin biopsy allows the doctor to examine a very small piece of tissue under the microscope to determine the diagnosis and the best treatment for the skin condition. A local anesthetic (numbing medicine) is injected with a very small needle into the skin area to be tested. A small piece of skin is taken from the area. Sometimes a suture (stitch) is used.     What are the risks of a skin biopsy?  I will experience scar, bleeding, swelling, pain, crusting and redness. I may experience incomplete removal or recurrence. Risks of this procedure are excessive bleeding, bruising, infection, nerve damage, numbness, thick (hypertrophic or keloidal) scar and non-diagnostic biopsy.    How should I care for my wound for the first 24 hours?  Keep the wound dry and covered for 24 hours  If it bleeds, hold direct pressure on the area for 15 minutes. If bleeding does not stop, call us or go to the emergency room  Avoid strenuous exercise the first 1-2 days or as your doctor instructs you    How should I care for the wound after 24 hours?  After 24 hours, remove the bandage  You may bathe or shower as normal  If you had a scalp biopsy, you can shampoo as usual and can use shower water to clean the biopsy site daily  Clean the wound once a day with gentle soap and water  Do not scrub, be gentle  Apply white petroleum/Vaseline after cleaning the wound with a cotton swab or a clean finger, and keep the site covered with a Bandaid /bandage. Bandages are not necessary with a scalp biopsy  If you are unable to cover the site with a Bandaid /bandage, re-apply ointment 2-3 times a day to keep the site moist. Moisture will help with healing  Avoid strenuous activity for first 1-2 days  Avoid lakes, rivers, pools, and oceans until the stitches are removed or the site is healed    How do I clean my wound?  Wash hands thoroughly with soap or use hand  before all wound  care  Clean the wound with gentle soap and water  Apply white petroleum/Vaseline  to wound after it is clean  Replace the Bandaid /bandage to keep the wound covered for the first few days or as instructed by your doctor  If you had a scalp biopsy, warm shower water to the area on a daily basis should suffice    What should I use to clean my wound?   Cotton-tipped applicators (Qtips )  White petroleum jelly (Vaseline ). Use a clean new container and use Q-tips to apply.  Bandaids  as needed  Gentle soap     How should I care for my wound long term?  Do not get your wound dirty  Keep up with wound care for one week or until the area is healed.  A small scab will form and fall off by itself when the area is completely healed. The area will be red and will become pink in color as it heals. Sun protection is very important for how your scar will turn out. Sunscreen with an SPF 30 or greater is recommended once the area is healed.  You should have some soreness but it should be mild and slowly go away over several days. Talk to your doctor about using tylenol for pain,    When should I call my doctor?  If you have increased:   Pain or swelling  Pus or drainage (clear or slightly yellow drainage is ok)  Temperature over 100F  Spreading redness or warmth around wound    When will I hear about my results?  The biopsy results can take 2 weeks to come back.  Your results will automatically release to [employer] before your provider has even reviewed them.  The clinic will call you with the results, send you a [employer] message, or have you schedule a follow-up clinic or phone time to discuss the results.  Contact our clinics if you do not hear from us in 2 weeks.    Who should I call with questions?  [facility]: [phone]  [facility]: [phone]  For urgent needs outside of business hours call the [facility] at [phone] and ask for the dermatology  resident on call   Checking for Skin Cancer  You can help find cancer early by checking your skin each month. There are 3 main kinds of skin cancer: melanoma, basal cell carcinoma, and squamous cell carcinoma. Doing monthly skin checks is the best way to find new marks, sores, or skin changes. Follow these instructions for checking your skin.   The ABCDEs of checking moles for melanoma   Check your moles or growths for signs of melanoma using ABCDE:   Asymmetry: The sides of the mole or growth don t match.  Border: The edges are ragged, notched, or blurred.  Color: The color within the mole or growth varies. It could be black, brown, tan, white, or shades of red, gray, or blue.  Diameter: The mole or growth is larger than   inch or 6 mm (size of a pencil eraser).  Evolving: The size, shape, texture, or color of the mole or growth is changing.     ABCDE's of moles on light skin.        ABCDE's of moles on dark skin may be harder to identify.     Checking for other types of skin cancer  Basal cell carcinoma or squamous cell carcinoma cause symptoms like:     A spot or mole that looks different from all other marks on your skin  Changes in how an area feels, such as itching, tenderness, or pain  Changes in the skin's surface, such as oozing, bleeding, or scaliness  A sore that doesn't heal  New swelling, redness, or spread of color beyond the border of a mole    Who s at risk?  Anyone of any skin color can get skin cancer. But you're at greater risk if you have:   Fair skin that freckles easily and burns instead of tanning  Light-colored or red hair  Light-colored eyes  Many moles or abnormal moles on your skin  A long history of unprotected exposure to sunlight or tanning beds  A history of many blistering sunburns as a child or teen  A family history of skin cancer  Been exposed to radiation or chemicals  A weakened immune system  Been exposed to arsenic  If you've had skin cancer in the past, you're at high risk of  having it again.   How to check your skin  Do your monthly skin checkups in front of a full-length mirror. Use a room with good lighting so it's easier to see. Use a hand mirror to look at hard-to-see places like your buttocks and back. You can also have a trusted friend or family member help you with these checks. Check every part of your body, including your:   Head (ears, face, neck, and scalp)  Torso (front, back, sides, and under breasts)  Arms (tops, undersides, and armpits)  Hands (palms, backs, and fingers, including under the nails)  Lower back, buttocks, and genitals  Legs (front, back, and sides)  Feet (tops, soles, toes, including under the nails, and between toes)  Watch for new spots on your skin or a spot that's changing in color, shape, size.   If you have a lot of moles, take digital photos of them each month. Make sure to take photos both up close and from a distance. These can help you see if any moles change over time.   Know your skin  Most skin changes aren't cancer. But if you see any changes in your skin, call your healthcare provider right away. Only they can tell you if a change is a problem. If you have skin cancer, seeing your provider can be the first step to getting the treatment that could save your life.   Radha last reviewed this educational content on 10/1/2021    8513-7791 [employer]. All rights reserved. This information is not intended as a substitute for professional medical care. Always follow your healthcare professional's instructions.

## 2023-12-12 LAB
PATH REPORT.COMMENTS IMP SPEC: NORMAL
PATH REPORT.FINAL DX SPEC: NORMAL
PATH REPORT.GROSS SPEC: NORMAL
PATH REPORT.MICROSCOPIC SPEC OTHER STN: NORMAL
PATH REPORT.RELEVANT HX SPEC: NORMAL

## 2023-12-13 ENCOUNTER — TELEPHONE (OUTPATIENT)
Dept: UROLOGY | Facility: CLINIC | Age: 73
End: 2023-12-13
Payer: COMMERCIAL

## 2023-12-13 DIAGNOSIS — R31.29 MICROSCOPIC HEMATURIA: Primary | ICD-10-CM

## 2023-12-13 NOTE — TELEPHONE ENCOUNTER
Please place UA/UC order for upcoming cystoscopy with Dr. Romero.    Thank you,    Kristine mott Clinic Assistant- Surgical Specialties

## 2023-12-14 ENCOUNTER — ANCILLARY PROCEDURE (OUTPATIENT)
Dept: CT IMAGING | Facility: CLINIC | Age: 73
End: 2023-12-14
Attending: PHYSICIAN ASSISTANT
Payer: COMMERCIAL

## 2023-12-14 ENCOUNTER — LAB (OUTPATIENT)
Dept: LAB | Facility: CLINIC | Age: 73
End: 2023-12-14
Payer: COMMERCIAL

## 2023-12-14 DIAGNOSIS — R31.29 MICROSCOPIC HEMATURIA: ICD-10-CM

## 2023-12-14 LAB
ALBUMIN UR-MCNC: NEGATIVE MG/DL
APPEARANCE UR: CLEAR
BILIRUB UR QL STRIP: NEGATIVE
COLOR UR AUTO: ABNORMAL
CREAT BLD-MCNC: 0.6 MG/DL (ref 0.5–1)
EGFRCR SERPLBLD CKD-EPI 2021: >60 ML/MIN/1.73M2
GLUCOSE UR STRIP-MCNC: NEGATIVE MG/DL
HGB UR QL STRIP: ABNORMAL
KETONES UR STRIP-MCNC: NEGATIVE MG/DL
LEUKOCYTE ESTERASE UR QL STRIP: NEGATIVE
NITRATE UR QL: NEGATIVE
PH UR STRIP: 6.5 [PH] (ref 5–7)
RBC #/AREA URNS AUTO: ABNORMAL /HPF
SKIP: ABNORMAL
SP GR UR STRIP: 1.01 (ref 1–1.03)
UROBILINOGEN UR STRIP-MCNC: NORMAL MG/DL
WBC #/AREA URNS AUTO: ABNORMAL /HPF

## 2023-12-14 PROCEDURE — 88112 CYTOPATH CELL ENHANCE TECH: CPT | Performed by: PATHOLOGY

## 2023-12-14 PROCEDURE — 82565 ASSAY OF CREATININE: CPT

## 2023-12-14 PROCEDURE — 74178 CT ABD&PLV WO CNTR FLWD CNTR: CPT | Performed by: STUDENT IN AN ORGANIZED HEALTH CARE EDUCATION/TRAINING PROGRAM

## 2023-12-14 PROCEDURE — 81001 URINALYSIS AUTO W/SCOPE: CPT

## 2023-12-14 RX ORDER — IOPAMIDOL 755 MG/ML
96 INJECTION, SOLUTION INTRAVASCULAR ONCE
Status: COMPLETED | OUTPATIENT
Start: 2023-12-14 | End: 2023-12-14

## 2023-12-14 RX ADMIN — IOPAMIDOL 96 ML: 755 INJECTION, SOLUTION INTRAVASCULAR at 11:47

## 2023-12-16 ENCOUNTER — MYC MEDICAL ADVICE (OUTPATIENT)
Dept: DERMATOLOGY | Facility: CLINIC | Age: 73
End: 2023-12-16
Payer: COMMERCIAL

## 2023-12-19 NOTE — RESULT ENCOUNTER NOTE
Called patient with results. Discussed with patient urologic findings. Discussed with patient the kidney stone is small and not obstructing. Discussed red flag symptoms of acute kidney stone such as fevers/chills, gross hematuria, flank pain, nausea/vomiting and to go to ER if this occurs. Patient states she has felt this way due to her chronic pain for over 15 years. Maxine any worsening symptoms in remote past; advise to go to ER if symptoms worsen. Advise to keep cysto appointment. Advised patient to notify her oncologist of CT scan and noted lymph notes enlargement that appear similar to previous CT scan. Patient verbalized understanding.

## 2023-12-22 NOTE — TELEPHONE ENCOUNTER
Attempted to reach patient, left message for her to return call to the clinic.      Patient has been tentatively scheduled for excision with Dr. Thompson on 2/27/2024.    Left message for patient to return call to clinic to verify appointment and go over pre-excision checklist.

## 2023-12-22 NOTE — TELEPHONE ENCOUNTER
"Case Report   Surgical Pathology Report                         Case: [id number]                                   Authorizing Provider:  Sherita Uribe MD           Collected:           12/07/2023 01:41 PM           Ordering Location:     [facility]   Received:            12/07/2023 04:52 PM                                  [place]                                                                   Pathologist:           Alexander Givens MD                                                       Specimen:    Skin, left mid forearm                                                                    Final Diagnosis   A(1). Skin, left mid forearm, shave:  -  Junctional dysplastic nevus with moderate to severe atypia - (see comment)       Electronically signed by Alexander Givens MD on 12/12/2023 at  5:50 PM   Comment  [facility]   A narrow re-excision is recommended to ensure complete removal.   Clinical Information  [facility]   The patient is a 73 year old female   Gross Description  [facility]   A(1). Skin, left mid forearm:  The specimen is received in formalin with proper patient identification, labeled \"left mid forearm\".  The specimen consists of a 0.6 x 0.5 cm ovoid tan skin shave.  The skin surface contains a 0.3 x 0.2 cm tan-brown, flat lesion.  The resection margin is inked, the specimen is bisected and submitted in its entirety in cassette A1.   Microscopic Description  [facility]   The specimen exhibits a junctional melanocytic proliferation with an apparent narrow lateral diameter which is both nested and single celled, with moderate to severe cytologic atypia and architectural disorder but cells largely confined to the sides and bases of rete ridges, above papillary dermal fibrosis and a mild superficial perivascular lymphocytic infiltrate with melanophages. Melan-A stained sections highlight melanocytes in a pattern consistent with the above histologic impression. The lesion appears narrowly " excised.     Performing Labs  [facility]   The technical component of this testing was completed at [facility]              Specimen Collected: 12/07/23  1:41 PM Last Resulted: 12/12/23  5:50 PM

## 2023-12-26 NOTE — TELEPHONE ENCOUNTER
Excision/Mohs previsit information                                                    Diagnosis: dysplastic nevus  Site(s): left mid forearm    Over the counter Chlorhexidine surgical soap to wash all skin below the belly button twice before surgery should be recommended for the following:  - Surgical sites below the waist  - Immunosuppressed  - Previous surgical site infection  - Anticipated wound care challenges    Medication & Allergy Information                                                      Review and update allergy and medication list.    Do you take the following medications:  Coumadin, Eliquis, Pradaxa, Xarelto:  NO   -If on Coumadin, INR should be checked within 7 days of surgery.  Range should be 3.5 or less or within therapeutic range.    Past Medical History                                                    Do you currently or have you previously had any of the following conditions:    Hepatitis:  NO  HIV/AIDS:  NO  Prolonged bleeding or bleeding disorder:  NO  Pacemaker or Defibrillator:  NO.     History of artificial or heart valve replacement:  NO  Endocarditis (inflammation of the inner lining of the heart's chambers and valves):  NO  Have you ever had a prosthetic joint infection:  NO  Pregnant or Breastfeeding:  NO  Mobility device (wheelchair, transfer difficulty): YES    Important Reminders:                                                      Ok to take all of their medications as prescribed  Patients can eat, no need to be fasting  Patient will not be able to get the site wet for 48 hrs  No submerging wound in standing water (lake, pool, bathtub, hot tub) for 2 weeks  No physical activity for 48 hrs (further restrictions will be discussed by MD at time of visit)    If any positives, send to RN for further review  Sandrita Hernandez CMA

## 2023-12-28 ENCOUNTER — MYC MEDICAL ADVICE (OUTPATIENT)
Dept: UROLOGY | Facility: CLINIC | Age: 73
End: 2023-12-28
Payer: COMMERCIAL

## 2023-12-28 ENCOUNTER — MYC MEDICAL ADVICE (OUTPATIENT)
Dept: FAMILY MEDICINE | Facility: CLINIC | Age: 73
End: 2023-12-28
Payer: COMMERCIAL

## 2023-12-28 ENCOUNTER — MYC REFILL (OUTPATIENT)
Dept: FAMILY MEDICINE | Facility: CLINIC | Age: 73
End: 2023-12-28
Payer: COMMERCIAL

## 2023-12-28 DIAGNOSIS — M62.838 SPASM OF MUSCLE: ICD-10-CM

## 2023-12-28 DIAGNOSIS — R31.29 MICROSCOPIC HEMATURIA: Primary | ICD-10-CM

## 2023-12-28 RX ORDER — CYCLOBENZAPRINE HCL 10 MG
10 TABLET ORAL 2 TIMES DAILY PRN
Qty: 30 TABLET | Refills: 0 | Status: SHIPPED | OUTPATIENT
Start: 2023-12-28 | End: 2024-05-17

## 2024-01-03 NOTE — TELEPHONE ENCOUNTER
LEIFM and montserrat msg informing pt to call back to sched an appt.  When pt calls back please help schedule an appt per prov req below.  Gema Mccarty CMA

## 2024-01-04 ENCOUNTER — TELEPHONE (OUTPATIENT)
Dept: FAMILY MEDICINE | Facility: CLINIC | Age: 74
End: 2024-01-04
Payer: COMMERCIAL

## 2024-01-04 NOTE — TELEPHONE ENCOUNTER
Central scheduling called Pt needs Apt for hospital follow up. Pt needs to be seen by 1/9 sending to PCP for same day approval. Please advise           Crystal GAMBOA Visit Facilitator

## 2024-01-12 ENCOUNTER — OFFICE VISIT (OUTPATIENT)
Dept: FAMILY MEDICINE | Facility: CLINIC | Age: 74
End: 2024-01-12
Payer: COMMERCIAL

## 2024-01-12 VITALS
SYSTOLIC BLOOD PRESSURE: 119 MMHG | HEIGHT: 61 IN | HEART RATE: 96 BPM | BODY MASS INDEX: 32.85 KG/M2 | WEIGHT: 174 LBS | DIASTOLIC BLOOD PRESSURE: 82 MMHG | OXYGEN SATURATION: 99 % | RESPIRATION RATE: 20 BRPM | TEMPERATURE: 97.4 F

## 2024-01-12 DIAGNOSIS — F33.1 MODERATE RECURRENT MAJOR DEPRESSION (H): ICD-10-CM

## 2024-01-12 DIAGNOSIS — E66.01 MORBID OBESITY (H): ICD-10-CM

## 2024-01-12 DIAGNOSIS — Z17.0 MALIGNANT NEOPLASM OF UPPER-INNER QUADRANT OF RIGHT BREAST IN FEMALE, ESTROGEN RECEPTOR POSITIVE (H): ICD-10-CM

## 2024-01-12 DIAGNOSIS — M17.0 PRIMARY OSTEOARTHRITIS OF BOTH KNEES: ICD-10-CM

## 2024-01-12 DIAGNOSIS — Z29.11 NEED FOR VACCINATION AGAINST RESPIRATORY SYNCYTIAL VIRUS: ICD-10-CM

## 2024-01-12 DIAGNOSIS — K57.32 DIVERTICULITIS OF COLON: Primary | ICD-10-CM

## 2024-01-12 DIAGNOSIS — C50.211 MALIGNANT NEOPLASM OF UPPER-INNER QUADRANT OF RIGHT BREAST IN FEMALE, ESTROGEN RECEPTOR POSITIVE (H): ICD-10-CM

## 2024-01-12 DIAGNOSIS — R73.03 PREDIABETES: ICD-10-CM

## 2024-01-12 PROCEDURE — 99214 OFFICE O/P EST MOD 30 MIN: CPT | Performed by: PHYSICIAN ASSISTANT

## 2024-01-12 RX ORDER — GABAPENTIN 100 MG/1
CAPSULE ORAL
Qty: 90 CAPSULE | Refills: 0 | Status: SHIPPED | OUTPATIENT
Start: 2024-01-12 | End: 2024-05-17

## 2024-01-12 RX ORDER — RESPIRATORY SYNCYTIAL VIRUS VACCINE 120MCG/0.5
0.5 KIT INTRAMUSCULAR ONCE
Qty: 1 EACH | Refills: 0 | Status: CANCELLED | OUTPATIENT
Start: 2024-01-12 | End: 2024-01-12

## 2024-01-12 ASSESSMENT — PATIENT HEALTH QUESTIONNAIRE - PHQ9
SUM OF ALL RESPONSES TO PHQ QUESTIONS 1-9: 13
SUM OF ALL RESPONSES TO PHQ QUESTIONS 1-9: 13
10. IF YOU CHECKED OFF ANY PROBLEMS, HOW DIFFICULT HAVE THESE PROBLEMS MADE IT FOR YOU TO DO YOUR WORK, TAKE CARE OF THINGS AT HOME, OR GET ALONG WITH OTHER PEOPLE: VERY DIFFICULT

## 2024-01-12 NOTE — PROGRESS NOTES
"  Assessment & Plan     Diverticulitis of colon  Improved - completed antibiotic course -on BRAT diet- would like to follow up with dietician but I can't find diagnosis covered under medicare     Need for vaccination against respiratory syncytial virus  Will get from pharmacy    Prediabetes  Managing with diet-has done research     Morbid obesity (H)  Working on diet and exercise     Primary osteoarthritis of both knees  Would like some tramadol - when discussed would require urine screen and controlled substance agreement declines.  Trial of voltaren and gabapentin  - gabapentin (NEURONTIN) 100 MG capsule  Dispense: 90 capsule; Refill: 0  - PRIMARY CARE FOLLOW-UP SCHEDULING    Moderate recurrent major depression (H)  Symptoms not controlled but recent loss of dog    Malignant neoplasm of upper-inner quadrant of right breast in female, estrogen receptor positive (H)  Followed by oncology       Review of external notes as documented elsewhere in note  Prescription drug management       MED REC REQUIRED  Post Medication Reconciliation Status:  Patient was not discharged from an inpatient facility or TCU  BMI:   Estimated body mass index is 32.88 kg/m  as calculated from the following:    Height as of this encounter: 1.549 m (5' 1\").    Weight as of this encounter: 78.9 kg (174 lb).   Weight management plan: Discussed healthy diet and exercise guidelines    Patient Instructions   Try voltaren gel for knee pain at night time   Follow up with me by Jeff if not helpful.  Try gabapentin 100 mg - start with one at bedtime and may titrate up to 9 at bedtime as needed for sleep and nighttime pain        Katie Hein PA-C  [facility]    Inocencio Wells is a 73 year old, presenting for the following health issues:  Hospital F/U (ER Follow up @ 1/2/2024)      1/12/2024     2:43 PM   Additional Questions   Roomed by Maritza DICKERSON   Accompanied by Self       HPI       ED/UC Followup:    Facility:  " [facility]    Date of visit: 1/2/2024  Reason for visit: Diverticulitis of Colon   Current Status: pt reports that vision was blurry with antibiotics, is still having an upset stomach when eating, especially with Sugar and Coffee.  Pt also wants to discuss a possible sleeping medication for knee pain and if it is safe for RSV vaccination.    Patient known to me with history of ADHD (not currently taking adderall), arthritis of knees with history of total left knee replacement, breast cancer and insomnia presents for emergency department followup- had diverticulitis- has completed antibiotic course.  History of 4 CT since June   Much better than before  Eat anything gives me gas.  Stool is not solid yet.    Did buy yogurt.  No melena or hematochezia  Lower abdominal pain resolved   Upper stomach gets queezey eat anything for sugar  On BRAT diet -which is not following a prediabetic diet   Can't sleep at all.  Total knee replacement oct11 and having difficulty finding a comforable way to sleep.  Wonders about tramadol at night   Colder weather also exacerbates pain- would use tramadolol specifically for days tylenol not working  Tried flexeril for fibro pain but works in muscles and not helpful for knee pain.   Depression is worse. Dog iced age 14 year o  History of ADHD and wonders if a bit of autism trouble making and keeping friends -   Not taking trazodone- listening to books on tape and wake up in middle night  Mainly legs keep me up  Side sleeper  Had all covid and flu shots - wonders when can get RSV vaccine  Does have exercise equipment but not good about using.   Has done research on diet through [employer]   Just refilled flexeril  Not ready to go back on adhd medicien   Reports with Fibromyalgia - learned to live with pain  Would like to talk about sleeping pill for knee pain  Considering right knee replacement   Review of Systems   Constitutional, HEENT, cardiovascular,  "pulmonary, gi and gu systems are negative, except as otherwise noted.      Objective    /82 (BP Location: Left arm, Patient Position: Sitting, Cuff Size: Adult Regular)   Pulse 96   Temp 97.4  F (36.3  C) (Oral)   Resp 20   Ht 1.549 m (5' 1\")   Wt 78.9 kg (174 lb)   LMP 05/31/2003 (Approximate)   SpO2 99%   BMI 32.88 kg/m    Body mass index is 32.88 kg/m .  Physical Exam   GENERAL: alert, no distress, and obese  NECK: no adenopathy, no asymmetry, masses, or scars and thyroid normal to palpation  RESP: lungs clear to auscultation - no rales, rhonchi or wheezes  CV: regular rate and rhythm, normal S1 S2, no S3 or S4, no murmur, click or rub, no peripheral edema and peripheral pulses strong  ABDOMEN: soft, nontender, no hepatosplenomegaly, no masses and bowel sounds normal  MS: antalgic gait- using cane- requires assistance to get up and off exam table   PSYCH: mentation appears normal, tearful, judgement and insight intact, and appearance well groomed                      Answers submitted by the patient for this visit:  Patient Health Questionnaire (Submitted on 1/12/2024)  If you checked off any problems, how difficult have these problems made it for you to do your work, take care of things at home, or get along with other people?: Very difficult  PHQ9 TOTAL SCORE: 13    "

## 2024-01-12 NOTE — COMMUNITY RESOURCES LIST (ENGLISH)
01/12/2024   [facility]  N/A  For questions about this resource list or additional care needs, please contact your primary care clinic or care manager.  Phone: [phone]   Email: N/A   Address: [facility] [place] [address]   Hours: N/A        Transportation       Free or low-cost transportation  1  [employer] Distance: 12.89 miles      In-Person   [address] [place] [address]  Language: English  Hours: Mon - Fri 6:00 AM - 6:00 PM  Fees: Insurance, Self Pay   Phone: (444) 439-1554 Email: info@[employer] Website: http://www.[employer]     2  [facility] - Bus [employer] - Free or low-cost transportation Distance: 13.41 miles      In-Person   [address] [place] [address]  Language: English  Hours: Tue 9:30 AM - 11:30 AM , Thu 9:30 AM - 11:30 AM  Fees: Free   Phone: (409) 318-4844 Email: info@[employer] Website: http://www.[employer].org/     Transportation to medical appointments  3  [employer] Distance: 5.95 miles      In-Person   [address] [address]  Language: English  Hours: Mon - Sat 4:00 AM - 6:00 PM  Fees: Insurance, Self Pay   Phone: (415) 573-9606 Email: delighttransportation1@[employer] Website: https://helpmeconnect.web.[facility]iatric hospital.[place].us/HelpMeConnect/Providers/Delight_Transportation/Transportation/2?returnUrl=%2FHelpMeConnect%2FSearch%2FBasicNeeds%2FTransportation%2FTransportationServices%3Fstart%3D40     4  [employer]   [employer] ([employer]) Distance: 7.25 miles      In-Person   [address] [place] [address]  Language: Wolof, Japanese, English, Gujarati, Rod, Setswana, Chinese, Syriac, Romansh, Occitan  Hours: Mon - Wed 9:00 AM - 5:00 PM , Thu 12:00 PM - 6:00 PM , Fri 9:00 AM - 5:00 PM , Sun 10:30 AM - 2:00 PM Appt. Only  Fees: Free   Phone: (143) 635-7050 Email: info@[employer].org Website: https://www.[employer].org/          Important  Numbers & Websites       Emergency Services   911  [facility] Services   311  Poison Control   (416) 347-1399  Suicide Prevention Lifeline   (867) 459-4959 (TALK)  Child Abuse Hotline   (703) 549-1819 (4-A-Child)  Sexual Assault Hotline   (769) 218-5434 (HOPE)  National Runaway Safeline   (267) 574-2416 (RUNAWAY)  All-Options Talkline   (240) 652-1643  Substance Abuse Referral   (282) 267-5943 (HELP)

## 2024-01-12 NOTE — PATIENT INSTRUCTIONS
Try voltaren gel for knee pain at night time   Follow up with me by Jeff if not helpful.  Try gabapentin 100 mg - start with one at bedtime and may titrate up to 9 at bedtime as needed for sleep and nighttime pain

## 2024-01-22 ENCOUNTER — OFFICE VISIT (OUTPATIENT)
Dept: UROLOGY | Facility: CLINIC | Age: 74
End: 2024-01-22
Payer: COMMERCIAL

## 2024-01-22 ENCOUNTER — MYC MEDICAL ADVICE (OUTPATIENT)
Dept: UROLOGY | Facility: CLINIC | Age: 74
End: 2024-01-22

## 2024-01-22 ENCOUNTER — LAB (OUTPATIENT)
Dept: LAB | Facility: CLINIC | Age: 74
End: 2024-01-22
Payer: COMMERCIAL

## 2024-01-22 DIAGNOSIS — R31.29 MICROHEMATURIA: Primary | ICD-10-CM

## 2024-01-22 DIAGNOSIS — R31.29 MICROSCOPIC HEMATURIA: ICD-10-CM

## 2024-01-22 LAB
ALBUMIN UR-MCNC: NEGATIVE MG/DL
APPEARANCE UR: CLEAR
BILIRUB UR QL STRIP: NEGATIVE
COLOR UR AUTO: ABNORMAL
GLUCOSE UR STRIP-MCNC: NEGATIVE MG/DL
HGB UR QL STRIP: ABNORMAL
KETONES UR STRIP-MCNC: NEGATIVE MG/DL
LEUKOCYTE ESTERASE UR QL STRIP: NEGATIVE
NITRATE UR QL: NEGATIVE
PH UR STRIP: 5.5 [PH] (ref 5–7)
RBC #/AREA URNS AUTO: ABNORMAL /HPF
SKIP: ABNORMAL
SP GR UR STRIP: 1.01 (ref 1–1.03)
UROBILINOGEN UR STRIP-MCNC: NORMAL MG/DL
WBC #/AREA URNS AUTO: ABNORMAL /HPF

## 2024-01-22 PROCEDURE — 52000 CYSTOURETHROSCOPY: CPT | Performed by: UROLOGY

## 2024-01-22 PROCEDURE — 81001 URINALYSIS AUTO W/SCOPE: CPT

## 2024-01-22 PROCEDURE — 99207 PR NO CHARGE LOS: CPT | Performed by: UROLOGY

## 2024-01-22 NOTE — NURSING NOTE
Marixa Ann's goals for this visit include:   Chief Complaint   Patient presents with    Cystoscopy     Microscopic Hematuria       She requests these members of her care team be copied on today's visit information:     PCP: Katie Hein    Referring Provider:  No referring provider defined for this encounter.    LMP 05/31/2003 (Approximate)     Do you need any medication refills at today's visit?     Kat Nieves MA on 1/22/2024 at 11:47 AM

## 2024-01-22 NOTE — PATIENT INSTRUCTIONS
After Your Cystoscopy    What happens after the exam?    You may go back to your normal diet and activity as you feel ready, unless your doctor tells you not to.    For the next two days, you may notice:    Some blood in your urine  Some burning when you urinate (use the toilet)  An urge to urinate more often  Bladder spasms    These are normal after the procedure and should go away after a day or two.  To relieve these problems drink 6 to 8 large glasses of water each day (includes drinks at meals) as this will help clear the urine.  Take warm baths to relieve pain and bladder spasms.  Do not add anything to the bath water.  You may also take Tylenol (acetaminophen) for pain if needed.    When should I call my doctor?    A fever over 100F (38C) for more than a day. (Before you call the doctor, check your temperature under your tongue)  Chills  Failure to urinate: No urine comes out when you try to use the toilet. (Try soaking in a bathtub full of warm water. If still no urine, call your doctor)  A lot of blood in the urine, or blood clots larger than a nickel  Pain in the back or belly area (abdomen)  Pain or spasms that are not relieved by warm tub baths and pain medicine  Severe pain, burning or other problems while passing urine  Pain that gets worse after two days

## 2024-01-22 NOTE — TELEPHONE ENCOUNTER
Another MyChart encounter started for patient. Closing this encounter.    Kat Nieves MA on 1/22/2024 at 4:10 PM

## 2024-01-22 NOTE — PROGRESS NOTES
Reason for Visit:  Cystoscopy and review of Ct urogram    Clinical Data: Ms. Marixa Ann is a 73 year old female with a hx of microhematuria.    CT urogram 12/14/23:  IMPRESSION:   1a. 3 mm nonobstructive left renal calculus.   1b. No hydronephrosis, suspicious  urothelial mass in the opacified  bilateral renal pelvicalyceal system, right ureter and proximal left  ureter. Limited evaluation of the urinary bladder due to  underdistention, distal left ureter is not opacified on delayed phase  limiting evaluation.  1c. Similar size of renal cysts and too small to characterize renal  hypodensities compared to CT from 3/30/2023. No new focal renal  lesion.  2. Postsurgical changes of ventral hernia repair. Additional findings  are similar to prior CT 3/30/2023 as described above including  moderate hiatal hernia, subcentimeter lingular pulmonary density,  multifocal hepatic hypodensities and few prominent pelvic lymph nodes    Cystoscopy procedure:  Pt. Was consented and placed in the lithotomy position.  She was cleaned and preparred in the usual fashion.  Lidocain gel was inserted into the urethra and given time to take effect.  A 16 fr flexible cystoscope was then inserted through the urethra and into the bladder.  The urethra was wnl.  The bladder was with 1+ trabeculation.  No tumors, diverticulae, or stones.  Bilateral u/o's were effluxing clear urine.  The cystoscope was then withdrawn.  The pt. Tolerated the procedure well.    A/P:  73 year old female with microhematuria and negative w/u    -f/u with Lisa in 6 months    Thank you for allowing me to participate in the care of  Ms. Marixa Ann and I will keep you updated on her progress.    Sophia Romero MD

## 2024-01-24 NOTE — CONFIDENTIAL NOTE
Dr. Romero discussed the 12/14/23 CT urogram results with the radiologist. The radiologist updated the results and impression. My chart message sent to patient.     Juana Granda RN, BSN

## 2024-02-20 ENCOUNTER — INFUSION THERAPY VISIT (OUTPATIENT)
Dept: INFUSION THERAPY | Facility: CLINIC | Age: 74
End: 2024-02-20
Attending: INTERNAL MEDICINE
Payer: COMMERCIAL

## 2024-02-20 ENCOUNTER — ONCOLOGY VISIT (OUTPATIENT)
Dept: ONCOLOGY | Facility: CLINIC | Age: 74
End: 2024-02-20
Attending: INTERNAL MEDICINE
Payer: COMMERCIAL

## 2024-02-20 ENCOUNTER — ANCILLARY PROCEDURE (OUTPATIENT)
Dept: MAMMOGRAPHY | Facility: CLINIC | Age: 74
End: 2024-02-20
Attending: INTERNAL MEDICINE
Payer: COMMERCIAL

## 2024-02-20 VITALS
HEART RATE: 96 BPM | BODY MASS INDEX: 32.84 KG/M2 | SYSTOLIC BLOOD PRESSURE: 116 MMHG | RESPIRATION RATE: 18 BRPM | OXYGEN SATURATION: 99 % | WEIGHT: 173.94 LBS | HEIGHT: 61 IN | DIASTOLIC BLOOD PRESSURE: 77 MMHG

## 2024-02-20 VITALS
BODY MASS INDEX: 32.83 KG/M2 | HEART RATE: 96 BPM | WEIGHT: 173.9 LBS | OXYGEN SATURATION: 99 % | RESPIRATION RATE: 18 BRPM | DIASTOLIC BLOOD PRESSURE: 77 MMHG | HEIGHT: 61 IN | SYSTOLIC BLOOD PRESSURE: 116 MMHG

## 2024-02-20 DIAGNOSIS — M81.8 OTHER OSTEOPOROSIS WITHOUT CURRENT PATHOLOGICAL FRACTURE: ICD-10-CM

## 2024-02-20 DIAGNOSIS — Z79.811 AROMATASE INHIBITOR USE: ICD-10-CM

## 2024-02-20 DIAGNOSIS — Z17.0 MALIGNANT NEOPLASM OF UPPER-INNER QUADRANT OF RIGHT BREAST IN FEMALE, ESTROGEN RECEPTOR POSITIVE (H): Primary | ICD-10-CM

## 2024-02-20 DIAGNOSIS — M81.8 OTHER OSTEOPOROSIS WITHOUT CURRENT PATHOLOGICAL FRACTURE: Primary | ICD-10-CM

## 2024-02-20 DIAGNOSIS — C50.211 MALIGNANT NEOPLASM OF UPPER-INNER QUADRANT OF RIGHT BREAST IN FEMALE, ESTROGEN RECEPTOR POSITIVE (H): Primary | ICD-10-CM

## 2024-02-20 DIAGNOSIS — Z12.31 ENCOUNTER FOR SCREENING MAMMOGRAM FOR BREAST CANCER: ICD-10-CM

## 2024-02-20 LAB
ALBUMIN SERPL BCG-MCNC: 4 G/DL (ref 3.5–5.2)
ALP SERPL-CCNC: 97 U/L (ref 40–150)
ALT SERPL W P-5'-P-CCNC: 8 U/L (ref 0–50)
ANION GAP SERPL CALCULATED.3IONS-SCNC: 12 MMOL/L (ref 7–15)
AST SERPL W P-5'-P-CCNC: 29 U/L (ref 0–45)
BILIRUB SERPL-MCNC: 0.3 MG/DL
BUN SERPL-MCNC: 13.4 MG/DL (ref 8–23)
CALCIUM SERPL-MCNC: 9.7 MG/DL (ref 8.8–10.2)
CHLORIDE SERPL-SCNC: 105 MMOL/L (ref 98–107)
CREAT SERPL-MCNC: 0.73 MG/DL (ref 0.51–0.95)
DEPRECATED HCO3 PLAS-SCNC: 26 MMOL/L (ref 22–29)
EGFRCR SERPLBLD CKD-EPI 2021: 87 ML/MIN/1.73M2
GLUCOSE SERPL-MCNC: 104 MG/DL (ref 70–99)
HOLD SPECIMEN: NORMAL
HOLD SPECIMEN: NORMAL
POTASSIUM SERPL-SCNC: 3.8 MMOL/L (ref 3.4–5.3)
PROT SERPL-MCNC: 7.1 G/DL (ref 6.4–8.3)
SODIUM SERPL-SCNC: 143 MMOL/L (ref 135–145)

## 2024-02-20 PROCEDURE — 250N000011 HC RX IP 250 OP 636: Mod: JZ | Performed by: INTERNAL MEDICINE

## 2024-02-20 PROCEDURE — 96372 THER/PROPH/DIAG INJ SC/IM: CPT | Performed by: INTERNAL MEDICINE

## 2024-02-20 PROCEDURE — 99207 PR NO CHARGE LOS: CPT

## 2024-02-20 PROCEDURE — 77063 BREAST TOMOSYNTHESIS BI: CPT | Mod: GC | Performed by: RADIOLOGY

## 2024-02-20 PROCEDURE — 84460 ALANINE AMINO (ALT) (SGPT): CPT

## 2024-02-20 PROCEDURE — 77067 SCR MAMMO BI INCL CAD: CPT | Mod: GC | Performed by: RADIOLOGY

## 2024-02-20 PROCEDURE — 99214 OFFICE O/P EST MOD 30 MIN: CPT | Performed by: INTERNAL MEDICINE

## 2024-02-20 PROCEDURE — 36415 COLL VENOUS BLD VENIPUNCTURE: CPT

## 2024-02-20 PROCEDURE — 84155 ASSAY OF PROTEIN SERUM: CPT

## 2024-02-20 PROCEDURE — G0463 HOSPITAL OUTPT CLINIC VISIT: HCPCS | Mod: 25 | Performed by: INTERNAL MEDICINE

## 2024-02-20 RX ADMIN — DENOSUMAB 60 MG: 60 INJECTION SUBCUTANEOUS at 11:41

## 2024-02-20 ASSESSMENT — PAIN SCALES - GENERAL: PAINLEVEL: MILD PAIN (3)

## 2024-02-20 NOTE — LETTER
2/20/2024         RE: Marixa Ann  [address] [address] [facility] [address]        Dear Colleague,    Thank you for referring your patient, Marixa Ann, to the [facility]. Please see a copy of my visit note below.    Oncology Follow Up Visit:   02/20/24  PCP: Rhiannon Weir    Diagnosis: Stage IB Right Breast Cancer  Marixa Ann is a 71 yo female who had abnormality at 2-4 oclock level of right breaston screening mammogram in 12/2018. Final review proved a 12 x 9 x14 mm invasive ductal carcinoma at 2 oclock to the right breast, Carol grade 1, ER/MT positive, Her2 negative with 0/3 SLN positive and edges free of disease. [id number]  Oncotype score =4 or 3% risk of disease at 9 years with hormone therapy.     Treatment:   1/10/2019 Right Lumpectomy with SLN biopsy  3/11/2019 completed right breast radiation post lumpectomy.   - choice made to not use Tamoxifen due to risk for DVT(repeatedly elevated d-dimer as well as elevated ESR and CRP) so started pt on arimidex with close bone evaluation.    Interval History:    Ms Ann is here for routine followup. She had had some hematuria this fall and was evaluated by urology and nothing was found. She had diverticulitis in January and was treated conservatively. She has otherwise been doing well. She is wondering how much longer she needs the arimidex. She has no significant side effects from it. She has otherwise been well and has no new aches or pains. She continues to have issues with chronic fatigue.     Rest of comprehensive and complete ROS is reviewed and is negative.   Past Medical History:   Diagnosis Date     ADHD      Breast cancer (H) 12/26/2018    Right Breast     Chronic fatigue      Fibromyalgia      Hard of hearing      Osteoporosis      S/P radiation therapy     4,256 cGy to right breast completed on 03/11/2019 - [facility] with Dr. Copeland     Current Outpatient Medications    Medication     acetaminophen (TYLENOL) 325 MG tablet     amphetamine-dextroamphetamine (ADDERALL) 15 MG tablet     anastrozole (ARIMIDEX) 1 MG tablet     B Complex Vitamins (VITAMIN B-COMPLEX PO)     calcium carbonate (TUMS) 500 MG chewable tablet     Calcium-Magnesium-Zinc 333-133-5 MG TABS per tablet     Cholecalciferol (VITAMIN D-3 PO)     cyclobenzaprine (FLEXERIL) 10 MG tablet     denosumab (PROLIA) 60 MG/ML SOSY injection     gabapentin (NEURONTIN) 100 MG capsule     KRILL OIL PO     magnesium oxide 200 MG TABS     Multiple Vitamins-Minerals (MULTIVITAMIN PO)     mupirocin (BACTROBAN) 2 % external ointment     traZODone (DESYREL) 50 MG tablet     vitamin B-12 (CYANOCOBALAMIN) 100 MCG tablet     Current Facility-Administered Medications   Medication     cross-Linked Hyaluronate (GEL-ONE) injection PRSY 30 mg     cross-Linked Hyaluronate (GEL-ONE) injection PRSY 30 mg     cross-Linked Hyaluronate (GEL-ONE) injection PRSY 30 mg     cross-Linked Hyaluronate (GEL-ONE) injection PRSY 30 mg     triamcinolone (KENALOG-40) injection 80 mg     Allergies   Allergen Reactions     Vicodin [Hydrocodone-Acetaminophen] Anaphylaxis     Dilaudid [Hydromorphone] Nausea and Vomiting     Oxycodone Nausea and Vomiting     Dust Mites      Milk [Milk (Cow)] Diarrhea       SH:  Lives with her  and 2 dogs.    Physical Exam  Vitals reviewed.   Constitutional:       Appearance: Normal appearance.   HENT:      Head: Normocephalic and atraumatic.   Eyes:      Extraocular Movements: Extraocular movements intact.      Conjunctiva/sclera: Conjunctivae normal.      Pupils: Pupils are equal, round, and reactive to light.   Cardiovascular:      Rate and Rhythm: Normal rate and regular rhythm.      Heart sounds: Normal heart sounds.   Pulmonary:      Effort: Pulmonary effort is normal.      Breath sounds: Normal breath sounds.   Abdominal:      General: Abdomen is flat.      Palpations: Abdomen is soft.   Musculoskeletal:      Cervical  "back: Normal range of motion and neck supple.   Skin:     General: Skin is warm.   Neurological:      General: No focal deficit present.      Mental Status: She is alert and oriented to person, place, and time. Mental status is at baseline.   Psychiatric:         Mood and Affect: Mood normal.         Behavior: Behavior normal.         Thought Content: Thought content normal.         Judgment: Judgment normal.     breast exam: right breast post lumpectomy and radiation, no masses no nipple discharge and axilla benign.Left breast no masses no nipple discharge and axilla benign.    Laboratory Results:   Recent Labs   Lab Test 02/20/24  1025 12/14/23  1143 09/27/23  1135 08/22/23  1013 08/11/23  1447 05/15/23  1422     --  140 143 142 142   POTASSIUM 3.8  --  4.3 3.6 4.4 3.9   CHLORIDE 105  --  103 104 103 108   CO2 26  --  23 27 27 28   ANIONGAP 12  --  14 12 12 6   BUN 13.4  --  12.1 15.9 20.0 15   CR 0.73 0.6 0.66 0.73 0.72 0.65   *  --  93 113* 109* 95   JONATHON 9.7  --  9.3 9.9 9.6 8.4*     Recent Labs   Lab Test 01/04/19  1122 10/19/17  1412   MAG 2.0 1.9     Recent Labs   Lab Test 09/27/23  1135 08/11/23  1447 05/15/23  1422 04/04/23  1336 02/11/22  1210 05/29/19  1112 09/13/18  1141 10/19/17  1412   WBC 7.2 7.1  --  7.3 7.6 8.3   < > 7.3   HGB 11.2* 11.5* 11.5* 12.4 12.7 12.9   < > 12.8    362  --  331 287 351   < > 356   MCV 85 87  --  89 91 92   < > 95   NEUTROPHIL 68 62  --  61  --   --   --  64.9    < > = values in this interval not displayed.     Recent Labs   Lab Test 02/20/24  1025 09/27/23  1135 08/22/23  1013   BILITOTAL 0.3 0.3 0.2   ALKPHOS 97 109* 89   ALT 8 17 12   AST 29 26 23   ALBUMIN 4.0 3.9 3.8     TSH   Date Value Ref Range Status   02/11/2022 3.13 0.40 - 4.00 mU/L Final   08/06/2020 3.28 0.40 - 4.00 mU/L Final   10/19/2017 1.35 0.40 - 4.00 mU/L Final     No results for input(s): \"CEA\" in the last 30423 hours.  Results for orders placed or performed in visit on 12/14/23   CT " Urogram wo & w Contrast    Addendum: 1/24/2024    Impression point #2 may be read as :     1b. No hydronephrosis and no suspicious urothelial mass in the  opacified bilateral renal pelvicalyceal system, right ureter and  proximal left ureter. Limited evaluation of the urinary bladder due to  underdistention, distal left ureter is not opacified on delayed phase  limiting evaluation.    VLAD GALEANA MD         SYSTEM ID:  [id number]      Narrative    EXAMINATION: CT UROGRAM WO & W CONTRAST, 12/14/2023 12:10 PM    TECHNIQUE:  Helical CT images from the lung bases through the  symphysis pubis were obtained  with IV contrast. Contrast dose: 96  isovue 370    COMPARISON: CT abdomen 3/13/2023 and MRI 4/10/2023    HISTORY: microscopic hematuria; Microscopic hematuria    FINDINGS:    Genitourinary: 3 mm non-obstructive calculus in the midpole of left  kidney (series 3, image 144). Simple cyst in the midpole of left  kidney measuring 14 mm (series 6, image 104). Subcentimeter cyst the  inferior right kidney (series 7, image 57). Additional bilateral renal  cortical hypodensities, too small to characterize, similar to prior CT  from 3/30/2023, statistically favored to represent cysts. No abnormal  urothelial enhancement involving the pelvicalyceal system or ureters.  No filling defect seen in bilateral pelvicalyceal system on delayed  phase and in the opacified right and proximal left ureter. Distal left  ureter is not opacified on delayed phase. Limited evaluation of the  urinary bladder due to under distention and partial dependent contrast  filling on delayed phase, no obvious mass identified. No pelvic mass.    Gastrointestinal: Colonic diverticulosis. No definite diverticulitis.  Redundant right colon. No significant small bowel loop dilatation.  Appendix is surgically absent. Moderate hiatal hernia.    Hepatobiliary: Multiple hepatic hypodensities, similar to CT from  3/30/2023,, largest measuring 2 cm in segment 3  (series 7, image 31),  better characterized on MRI l from 4/10/2023. No new focal liver  lesion. Postcholecystectomy. Mild prominence of the common bile duct,  similar to prior, measuring 8 mm favored to represent  postcholecystectomy reservoir effect  Adrenal glands: Within normal limits.  Pancreas: No pancreatic ductal dilatation. Similar 6 mm (series 6,  image 115) hypodensity in the pancreatic body, characterized as IPMN  on recent MRI from 4/10/2023. No new focal pancreatic lesion.  Spleen: No focal splenic lesion.  Peritoneum, retroperitoneum and mesentery: Patent major abdominal  vasculature. No abdominal aortic aneurysm. Similar subcentimeter  para-aortic lymph nodes (series 6, image 159) similar subcentimeter  right common iliac lymph nodes (series 6, image 256); right external  iliac subcentimeter lymph nodes (series 6, image 314). Similar  enlarged right external iliac lymph node measuring 1 cm (series 6,  image 336). Similar left iliac and pelvic subcentimeter sized lymph  nodes.  No pneumoperitoneum or significant free fluid.  Osseous structures and soft tissue: No aggressive osseous lesion.  Small fat-containing right inguinal hernia. Multilevel degenerative  changes of the spine.  Lower chest: Mild groundglass density in the left lower lung, favor  subsegmental atelectasis. Similar subcentimeter nodular density along  the left oblique fissure (series 6, image 29). Mild elevation of the  left hemidiaphragm. Post repair changes in the periumbilical anterior  abdominal wall subcutaneous soft tissue      Impression    IMPRESSION:       1a. 3 mm nonobstructive left renal calculus.   1b. No hydronephrosis, suspicious  urothelial mass in the opacified  bilateral renal pelvicalyceal system, right ureter and proximal left  ureter. Limited evaluation of the urinary bladder due to  underdistention, distal left ureter is not opacified on delayed phase  limiting evaluation.  1c. Similar size of renal cysts and too  small to characterize renal  hypodensities compared to CT from 3/30/2023. No new focal renal  lesion.  2. Postsurgical changes of ventral hernia repair. Additional findings  are similar to prior CT 3/30/2023 as described above including  moderate hiatal hernia, subcentimeter lingular pulmonary density,  multifocal hepatic hypodensities and few prominent pelvic lymph nodes.    VLAD GALEANA MD         SYSTEM ID:  [id number]     Assessment and Plan:   1. Stage 1B Right Breast Cancer ER+[id number] s/p lumpectomy, radiation  2. Osteoporosis  3. ADD- pt reports chronic fatigue syndrome and ADHD. She is on medication and follows with her PCP.    Plan    1. Doing well without evidence of disease  2. Prolia today and q 6 months  3. Next dexa due 8/2024  4. Mammogram from today is pending.  5. See us in 6 months for follow-up  6. She had a T1c node negative breast cancer with an oncotype recurrence score of 4 (very low risk) therefore she can discontinue her arimidex when her current bottle is completed. She will have completed 5 years of therapy and presumed benefit from extended therapy would be < 1%.    Crystal Laguerer MD on 2/20/2024 at 12:06 PM          Again, thank you for allowing me to participate in the care of your patient.        Sincerely,        Crystal Laguerre MD

## 2024-02-20 NOTE — NURSING NOTE
"Oncology Rooming Note    February 20, 2024 11:09 AM   Marixa Ann is a 73 year old female who presents for:    Chief Complaint   Patient presents with    Oncology Clinic Visit     6 month follow up     Initial Vitals: /77 (BP Location: Left arm)   Pulse 96   Resp 18   Ht 1.549 m (5' 0.98\")   Wt 78.9 kg (173 lb 14.4 oz)   LMP 05/31/2003 (Approximate)   SpO2 99%   BMI 32.88 kg/m   Estimated body mass index is 32.88 kg/m  as calculated from the following:    Height as of this encounter: 1.549 m (5' 0.98\").    Weight as of this encounter: 78.9 kg (173 lb 14.4 oz). Body surface area is 1.84 meters squared.  Mild Pain (3) Comment: Data Unavailable   Patient's last menstrual period was 05/31/2003 (approximate).  Allergies reviewed: Yes  Medications reviewed: Yes    Medications: Medication refills not needed today.  Pharmacy name entered into EPIC:    WRITTEN PRESCRIPTION REQUESTED  [id number] - [place], [place] - [address]    Frailty Screening:   Is the patient here for a new oncology consult visit in cancer care? 2. No      Clinical concerns: No new concerns         Marjorie Crooks LPN              "

## 2024-02-20 NOTE — PROGRESS NOTES
Infusion Nursing Note:  Marixa Ann presents today for Prolia.    Patient seen by provider today: Yes: Dr. Laguerre   present during visit today: Not Applicable.    Note: N/A.      Intravenous Access:  No Intravenous access/labs at this visit.    Treatment Conditions:  Lab Results   Component Value Date     02/20/2024    POTASSIUM 3.8 02/20/2024    MAG 2.0 01/04/2019    CR 0.73 02/20/2024    JONATHON 9.7 02/20/2024    BILITOTAL 0.3 02/20/2024    ALBUMIN 4.0 02/20/2024    ALT 17 09/27/2023    AST 29 02/20/2024       Results reviewed, labs MET treatment parameters, ok to proceed with treatment.      Post Infusion Assessment:  Patient tolerated injection without incident.  Site patent and intact, free from redness, edema or discomfort.       Discharge Plan:   Patient discharged in stable condition accompanied by: self.  Departure Mode: Ambulatory.      Marjorie Crooks LPN

## 2024-02-20 NOTE — PROGRESS NOTES
Oncology Follow Up Visit:   02/20/24  PCP: Rhiannon Weir    Diagnosis: Stage IB Right Breast Cancer  Marixa Ann is a 71 yo female who had abnormality at 2-4 oclock level of right breaston screening mammogram in 12/2018. Final review proved a 12 x 9 x14 mm invasive ductal carcinoma at 2 oclock to the right breast, Carol grade 1, ER/KS positive, Her2 negative with 0/3 SLN positive and edges free of disease. [id number]  Oncotype score =4 or 3% risk of disease at 9 years with hormone therapy.     Treatment:   1/10/2019 Right Lumpectomy with SLN biopsy  3/11/2019 completed right breast radiation post lumpectomy.   - choice made to not use Tamoxifen due to risk for DVT(repeatedly elevated d-dimer as well as elevated ESR and CRP) so started pt on arimidex with close bone evaluation.    Interval History:    Ms Ann is here for routine followup. She had had some hematuria this fall and was evaluated by urology and nothing was found. She had diverticulitis in January and was treated conservatively. She has otherwise been doing well. She is wondering how much longer she needs the arimidex. She has no significant side effects from it. She has otherwise been well and has no new aches or pains. She continues to have issues with chronic fatigue.     Rest of comprehensive and complete ROS is reviewed and is negative.   Past Medical History:   Diagnosis Date    ADHD     Breast cancer (H) 12/26/2018    Right Breast    Chronic fatigue     Fibromyalgia     Hard of hearing     Osteoporosis     S/P radiation therapy     4,256 cGy to right breast completed on 03/11/2019 - [facility] with Dr. Copeland     Current Outpatient Medications   Medication    acetaminophen (TYLENOL) 325 MG tablet    amphetamine-dextroamphetamine (ADDERALL) 15 MG tablet    anastrozole (ARIMIDEX) 1 MG tablet    B Complex Vitamins (VITAMIN B-COMPLEX PO)    calcium carbonate (TUMS) 500 MG chewable tablet    Calcium-Magnesium-Zinc 333-133-5 MG  TABS per tablet    Cholecalciferol (VITAMIN D-3 PO)    cyclobenzaprine (FLEXERIL) 10 MG tablet    denosumab (PROLIA) 60 MG/ML SOSY injection    gabapentin (NEURONTIN) 100 MG capsule    KRILL OIL PO    magnesium oxide 200 MG TABS    Multiple Vitamins-Minerals (MULTIVITAMIN PO)    mupirocin (BACTROBAN) 2 % external ointment    traZODone (DESYREL) 50 MG tablet    vitamin B-12 (CYANOCOBALAMIN) 100 MCG tablet     Current Facility-Administered Medications   Medication    cross-Linked Hyaluronate (GEL-ONE) injection PRSY 30 mg    cross-Linked Hyaluronate (GEL-ONE) injection PRSY 30 mg    cross-Linked Hyaluronate (GEL-ONE) injection PRSY 30 mg    cross-Linked Hyaluronate (GEL-ONE) injection PRSY 30 mg    triamcinolone (KENALOG-40) injection 80 mg     Allergies   Allergen Reactions    Vicodin [Hydrocodone-Acetaminophen] Anaphylaxis    Dilaudid [Hydromorphone] Nausea and Vomiting    Oxycodone Nausea and Vomiting    Dust Mites     Milk [Milk (Cow)] Diarrhea       SH:  Lives with her  and 2 dogs.    Physical Exam  Vitals reviewed.   Constitutional:       Appearance: Normal appearance.   HENT:      Head: Normocephalic and atraumatic.   Eyes:      Extraocular Movements: Extraocular movements intact.      Conjunctiva/sclera: Conjunctivae normal.      Pupils: Pupils are equal, round, and reactive to light.   Cardiovascular:      Rate and Rhythm: Normal rate and regular rhythm.      Heart sounds: Normal heart sounds.   Pulmonary:      Effort: Pulmonary effort is normal.      Breath sounds: Normal breath sounds.   Abdominal:      General: Abdomen is flat.      Palpations: Abdomen is soft.   Musculoskeletal:      Cervical back: Normal range of motion and neck supple.   Skin:     General: Skin is warm.   Neurological:      General: No focal deficit present.      Mental Status: She is alert and oriented to person, place, and time. Mental status is at baseline.   Psychiatric:         Mood and Affect: Mood normal.         Behavior:  "Behavior normal.         Thought Content: Thought content normal.         Judgment: Judgment normal.     breast exam: right breast post lumpectomy and radiation, no masses no nipple discharge and axilla benign.Left breast no masses no nipple discharge and axilla benign.    Laboratory Results:   Recent Labs   Lab Test 02/20/24  1025 12/14/23  1143 09/27/23  1135 08/22/23  1013 08/11/23  1447 05/15/23  1422     --  140 143 142 142   POTASSIUM 3.8  --  4.3 3.6 4.4 3.9   CHLORIDE 105  --  103 104 103 108   CO2 26  --  23 27 27 28   ANIONGAP 12  --  14 12 12 6   BUN 13.4  --  12.1 15.9 20.0 15   CR 0.73 0.6 0.66 0.73 0.72 0.65   *  --  93 113* 109* 95   JONATHON 9.7  --  9.3 9.9 9.6 8.4*     Recent Labs   Lab Test 01/04/19  1122 10/19/17  1412   MAG 2.0 1.9     Recent Labs   Lab Test 09/27/23  1135 08/11/23  1447 05/15/23  1422 04/04/23  1336 02/11/22  1210 05/29/19  1112 09/13/18  1141 10/19/17  1412   WBC 7.2 7.1  --  7.3 7.6 8.3   < > 7.3   HGB 11.2* 11.5* 11.5* 12.4 12.7 12.9   < > 12.8    362  --  331 287 351   < > 356   MCV 85 87  --  89 91 92   < > 95   NEUTROPHIL 68 62  --  61  --   --   --  64.9    < > = values in this interval not displayed.     Recent Labs   Lab Test 02/20/24  1025 09/27/23  1135 08/22/23  1013   BILITOTAL 0.3 0.3 0.2   ALKPHOS 97 109* 89   ALT 8 17 12   AST 29 26 23   ALBUMIN 4.0 3.9 3.8     TSH   Date Value Ref Range Status   02/11/2022 3.13 0.40 - 4.00 mU/L Final   08/06/2020 3.28 0.40 - 4.00 mU/L Final   10/19/2017 1.35 0.40 - 4.00 mU/L Final     No results for input(s): \"CEA\" in the last 73872 hours.  Results for orders placed or performed in visit on 12/14/23   CT Urogram wo & w Contrast    Addendum: 1/24/2024    Impression point #2 may be read as :     1b. No hydronephrosis and no suspicious urothelial mass in the  opacified bilateral renal pelvicalyceal system, right ureter and  proximal left ureter. Limited evaluation of the urinary bladder due to  underdistention, " distal left ureter is not opacified on delayed phase  limiting evaluation.    VLAD GALEANA MD         SYSTEM ID:  [id number]      Narrative    EXAMINATION: CT UROGRAM WO & W CONTRAST, 12/14/2023 12:10 PM    TECHNIQUE:  Helical CT images from the lung bases through the  symphysis pubis were obtained  with IV contrast. Contrast dose: 96  isovue 370    COMPARISON: CT abdomen 3/13/2023 and MRI 4/10/2023    HISTORY: microscopic hematuria; Microscopic hematuria    FINDINGS:    Genitourinary: 3 mm non-obstructive calculus in the midpole of left  kidney (series 3, image 144). Simple cyst in the midpole of left  kidney measuring 14 mm (series 6, image 104). Subcentimeter cyst the  inferior right kidney (series 7, image 57). Additional bilateral renal  cortical hypodensities, too small to characterize, similar to prior CT  from 3/30/2023, statistically favored to represent cysts. No abnormal  urothelial enhancement involving the pelvicalyceal system or ureters.  No filling defect seen in bilateral pelvicalyceal system on delayed  phase and in the opacified right and proximal left ureter. Distal left  ureter is not opacified on delayed phase. Limited evaluation of the  urinary bladder due to under distention and partial dependent contrast  filling on delayed phase, no obvious mass identified. No pelvic mass.    Gastrointestinal: Colonic diverticulosis. No definite diverticulitis.  Redundant right colon. No significant small bowel loop dilatation.  Appendix is surgically absent. Moderate hiatal hernia.    Hepatobiliary: Multiple hepatic hypodensities, similar to CT from  3/30/2023,, largest measuring 2 cm in segment 3 (series 7, image 31),  better characterized on MRI l from 4/10/2023. No new focal liver  lesion. Postcholecystectomy. Mild prominence of the common bile duct,  similar to prior, measuring 8 mm favored to represent  postcholecystectomy reservoir effect  Adrenal glands: Within normal limits.  Pancreas: No  pancreatic ductal dilatation. Similar 6 mm (series 6,  image 115) hypodensity in the pancreatic body, characterized as IPMN  on recent MRI from 4/10/2023. No new focal pancreatic lesion.  Spleen: No focal splenic lesion.  Peritoneum, retroperitoneum and mesentery: Patent major abdominal  vasculature. No abdominal aortic aneurysm. Similar subcentimeter  para-aortic lymph nodes (series 6, image 159) similar subcentimeter  right common iliac lymph nodes (series 6, image 256); right external  iliac subcentimeter lymph nodes (series 6, image 314). Similar  enlarged right external iliac lymph node measuring 1 cm (series 6,  image 336). Similar left iliac and pelvic subcentimeter sized lymph  nodes.  No pneumoperitoneum or significant free fluid.  Osseous structures and soft tissue: No aggressive osseous lesion.  Small fat-containing right inguinal hernia. Multilevel degenerative  changes of the spine.  Lower chest: Mild groundglass density in the left lower lung, favor  subsegmental atelectasis. Similar subcentimeter nodular density along  the left oblique fissure (series 6, image 29). Mild elevation of the  left hemidiaphragm. Post repair changes in the periumbilical anterior  abdominal wall subcutaneous soft tissue      Impression    IMPRESSION:       1a. 3 mm nonobstructive left renal calculus.   1b. No hydronephrosis, suspicious  urothelial mass in the opacified  bilateral renal pelvicalyceal system, right ureter and proximal left  ureter. Limited evaluation of the urinary bladder due to  underdistention, distal left ureter is not opacified on delayed phase  limiting evaluation.  1c. Similar size of renal cysts and too small to characterize renal  hypodensities compared to CT from 3/30/2023. No new focal renal  lesion.  2. Postsurgical changes of ventral hernia repair. Additional findings  are similar to prior CT 3/30/2023 as described above including  moderate hiatal hernia, subcentimeter lingular pulmonary  density,  multifocal hepatic hypodensities and few prominent pelvic lymph nodes.    VLAD GALEANA MD         SYSTEM ID:  [id number]     Assessment and Plan:   1. Stage 1B Right Breast Cancer ER+[id number] s/p lumpectomy, radiation  2. Osteoporosis  3. ADD- pt reports chronic fatigue syndrome and ADHD. She is on medication and follows with her PCP.    Plan    1. Doing well without evidence of disease  2. Prolia today and q 6 months  3. Next dexa due 8/2024  4. Mammogram from today is pending.  5. See us in 6 months for follow-up  6. She had a T1c node negative breast cancer with an oncotype recurrence score of 4 (very low risk) therefore she can discontinue her arimidex when her current bottle is completed. She will have completed 5 years of therapy and presumed benefit from extended therapy would be < 1%.    Crystal Laguerre MD on 2/20/2024 at 12:06 PM

## 2024-02-27 ENCOUNTER — OFFICE VISIT (OUTPATIENT)
Dept: DERMATOLOGY | Facility: CLINIC | Age: 74
End: 2024-02-27
Payer: COMMERCIAL

## 2024-02-27 DIAGNOSIS — D23.9 DYSPLASTIC NEVUS: Primary | ICD-10-CM

## 2024-02-27 PROCEDURE — 13121 CMPLX RPR S/A/L 2.6-7.5 CM: CPT | Performed by: DERMATOLOGY

## 2024-02-27 PROCEDURE — 11402 EXC TR-EXT B9+MARG 1.1-2 CM: CPT | Performed by: DERMATOLOGY

## 2024-02-27 PROCEDURE — 88305 TISSUE EXAM BY PATHOLOGIST: CPT | Performed by: DERMATOLOGY

## 2024-02-27 ASSESSMENT — PAIN SCALES - GENERAL: PAINLEVEL: NO PAIN (1)

## 2024-02-27 NOTE — PATIENT INSTRUCTIONS
Excision Wound Care Instructions with Steri-Strips    Possible complications of any surgical procedure are bleeding, infection, scarring, alteration in skin color and sensation, muscle weakness in the area, wound dehiscence or seperation, or recurrence of the lesion or disease. On occasion, after healing, a secondary procedure or revision may be recommended in order to obtain the best cosmetic or functional result.     After your surgery, a pressure bandage will be placed over the area that has sutures. This will help prevent bleeding. Please, follow these instructions as they will help you to prevent complications as your wound heals.    For the First 48 hours After Surgery:    Leave the pressure bandage on and keep it dry. If it should come loose, you may retape it, but do not take it off.    Relax and take it easy. Do not do any vigorous exercise, heavy lifting, or bending forward. This could cause the wound to bleed.    Post-operative pain is usually mild. You may alternate between 1000 mg of Tylenol (acetaminophen) and 400 mg of Ibuprofen every 4 hours.  Do not take more than 4,000 mg of acetaminophen and more than 3200 mg of Ibuprofen in a 24 hr period.  Avoid alcohol and vitamin E as these may increase your tendency to bleed.    You may put an ice pack around the bandaged area for 20 minutes every 2-3 hours. This may help reduce swelling, bruising, and pain. Make sure the ice pack is waterproof so that the pressure bandage does not get wet.     If your bandage is saturated with blood apply firm pressure over the bandage with a washcloth for 15 minutes. If bleeding continues after applying pressure for 15 minutes then go to the nearest emergency room.      48 Hours After Surgery:    Remove outer white bandage down to clear plastic film (Tegaderm).  You may notice dark brown, dried blood under the Tegaderm.  This is normal.    Leave the clear plastic film (Tegaderm) on for up to 2 weeks, as long as it is  intact.  If it falls off prior to 2 weeks follow daily wound care below.  If it stays intact for the full 2 weeks, then remove and treat as normal, healthy skin.      Daily Wound Care (if Tegaderm and Steri-Strips fall off prior to 2 weeks):    Wash wound with a mild soap and water.  Use caution when washing the wound, be gentle and do not let the forceful shower stream hit the wound directly. DO NOT WASH WITH HYDROGEN PEROXIDE AS THIS MIGHT CAUSE THE STITCHES TO DISSOLVE FASTER THAN WHAT WE WANT.    Pat dry.    Apply Vaseline (from a new container or tube) over the suture line with a Q-tip until it is completely healed. It is very important to keep the wound continuously moist, as wounds heal best in a moist environment.    Keep the site covered until it's healed.  You can cover it with a Telfa (non-stick) dressing and tape or a band-aid until healed with normal, healthy skin.      Call Us If:    You have pain that is not controlled with Tylenol/Ibuprofen.    You have signs or symptoms of an infection, such as: fever over 100 degrees F, redness, warmth, or foul-smelling or yellow/creamy drainage from the wound.      Who should I call with questions?  [facility]: [phone]  [facility]: [phone]  For urgent needs outside of business hours call the [facility] at [phone] and ask for the dermatology resident on call

## 2024-02-27 NOTE — NURSING NOTE
The following medication was given:     MEDICATION:  Lidocaine with epinephrine 1% [id number]  ROUTE: SQ  SITE: see procedure note  DOSE: 6 mL  LOT #: [id number]  : [employer]  EXPIRATION DATE: 06/30/2025  NDC#: 90229-179-60  Was there drug waste? no  Multi-dose vial: Yes    Mastisol, Steri-Strips, Tegaderm and pressure dressing applied to excision site on left mid forearm.  Wound care instructions reviewed with patient and AVS provided.  Patient verbalized understanding.  Patient will follow up for suture removal: N/A.  No further questions or concerns at this time.      Sandrita Hernandez CMA  February 27, 2024

## 2024-02-27 NOTE — LETTER
2/27/2024         RE: Marixa Ann  [address] [facility] [address]        Dear Colleague,    Thank you for referring your patient, Marixa Ann, to the [facility]. Please see a copy of my visit note below.    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 History of NMSC   - BCC - right nasal sidewall, s/p MMS 2/24/21  2. History of benign biopsy  - Osteoma cutis, left temple, s/p punch bx 2/9/21  3. SK     Family History: Not assessed.  Social History: Likes to garden. Uses combination of sunscreen and sun protective clothing diligently.    NAME OF PROCEDURE: Excision complex layered linear closure  Staff surgeon: Abdi Thompson MD  Scrub Nurse: Sandrita Hernandez CMA    PRE-OPERATIVE DIAGNOSIS:  Dysplastic nevus  POST-OPERATIVE DIAGNOSIS: Same   LOCATION: L Mid Forearm  FINAL EXCISION SIZE(DEFECT SIZE): 1.2 x 1.2 cm  MARGIN: 0.3 cm  FINAL REPAIR LENGTH: 3.8 cm   ANESTHESIA: 6 mL 1% lidocaine with 1:100,000 epinephrine    INDICATIONS: This patient presented with a 0.6 x 0.6 cm Dysplastic nevus. Excision was indicated. We discussed the principles of treatment and most likely complications including scarring, bleeding, infection, incomplete excision, wound dehiscence, pain, nerve damage, and recurrence. Informed consent was obtained and the patient underwent the procedure as follows:    PROCEDURE: The patient was taken to the operative suite. Time-out was performed.  The treatment area was anesthetized with 1% lidocaine with epinephrine. The area was prepped with Chlorhexidine and rinsed with sterile saline and draped with sterile towels. The lesion was delineated and excised down to subcutaneous fat in a elliptical manner. Hemostasis was  obtained by electrocoagulation.     REPAIR: An complex layered linear closure was selected as the procedure which would maximally preserve both function and cosmesis.    After the excision of the tumor, the area was extensivelyundermined. Hemostasis was obtained with electrocoagulation.  Closure was oriented so that the wound was in the patient's natural skin tension lines. The subcutaneous and dermal layers were then closed with 4-0 monocryl sutures. The epidermis was then carefully approximated along the length of the wound using 4-0 monocryl running subcuticular sutures.     Estimated blood loss was less than 10 ml for all surgical sites. A sterile pressure dressing was applied and wound care instructions, with a written handout, were given. The patient was discharged from the [facility] alert and ambulatory.    The patient elected for pathology results to automatically release and understands that the clinical staff will contact them as soon as possible to notify them of the results.      Dr. Abdi Thompson was immediately available for the entire surgery and was physicially present for the key portions of the procedure.    Anatomic Pathology Results: pending    Clinical Follow-Up: with primary dermatologist    Staff Involved:  Scribe/Staff    Scribe Disclosure:   I, JEFF SHELTON, am serving as a scribe; to document services personally performed by Abdi Thompson MD -based on data collection and the provider's statements to me.     Attending attestation:  I personally performed the entire procedure.  I have reviewed the note and edited it as necessary, and agree with its contents.    Abdi Thompson M.D.  Professor  Director of Dermatologic Surgery  Department of Dermatology  [facility]    Dermatology Surgery Clinic  [facility] [place] [phone]      Again, thank you for allowing me to participate in the care of your  patient.        Sincerely,        Abdi Thompson MD

## 2024-02-27 NOTE — PROGRESS NOTES
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 History of NMSC   - BCC - right nasal sidewall, s/p MMS 2/24/21  2. History of benign biopsy  - Osteoma cutis, left temple, s/p punch bx 2/9/21  3. SK     Family History: Not assessed.  Social History: Likes to garden. Uses combination of sunscreen and sun protective clothing diligently.    NAME OF PROCEDURE: Excision complex layered linear closure  Staff surgeon: Abdi Thompson MD  Scrub Nurse: Sandrita Hernandez CMA    PRE-OPERATIVE DIAGNOSIS:  Dysplastic nevus  POST-OPERATIVE DIAGNOSIS: Same   LOCATION: L Mid Forearm  FINAL EXCISION SIZE(DEFECT SIZE): 1.2 x 1.2 cm  MARGIN: 0.3 cm  FINAL REPAIR LENGTH: 3.8 cm   ANESTHESIA: 6 mL 1% lidocaine with 1:100,000 epinephrine    INDICATIONS: This patient presented with a 0.6 x 0.6 cm Dysplastic nevus. Excision was indicated. We discussed the principles of treatment and most likely complications including scarring, bleeding, infection, incomplete excision, wound dehiscence, pain, nerve damage, and recurrence. Informed consent was obtained and the patient underwent the procedure as follows:    PROCEDURE: The patient was taken to the operative suite. Time-out was performed.  The treatment area was anesthetized with 1% lidocaine with epinephrine. The area was prepped with Chlorhexidine and rinsed with sterile saline and draped with sterile towels. The lesion was delineated and excised down to subcutaneous fat in a elliptical manner. Hemostasis was obtained by electrocoagulation.     REPAIR: An complex layered linear closure was selected as the procedure which would maximally preserve both function and cosmesis.    After the excision of the tumor, the area was extensivelyundermined. Hemostasis was obtained with  electrocoagulation.  Closure was oriented so that the wound was in the patient's natural skin tension lines. The subcutaneous and dermal layers were then closed with 4-0 monocryl sutures. The epidermis was then carefully approximated along the length of the wound using 4-0 monocryl running subcuticular sutures.     Estimated blood loss was less than 10 ml for all surgical sites. A sterile pressure dressing was applied and wound care instructions, with a written handout, were given. The patient was discharged from the [facility] alert and ambulatory.    The patient elected for pathology results to automatically release and understands that the clinical staff will contact them as soon as possible to notify them of the results.      Dr. Abdi Thompson was immediately available for the entire surgery and was physicially present for the key portions of the procedure.    Anatomic Pathology Results: pending    Clinical Follow-Up: with primary dermatologist    Staff Involved:  Scribe/Staff    Scribe Disclosure:   I, JEFF SHELTON, am serving as a scribe; to document services personally performed by Abdi Thompson MD -based on data collection and the provider's statements to me.     Attending attestation:  I personally performed the entire procedure.  I have reviewed the note and edited it as necessary, and agree with its contents.    Abdi Thompson M.D.  Professor  Director of Dermatologic Surgery  Department of Dermatology  [facility]    Dermatology Surgery Clinic  [facility] [address] [address]

## 2024-02-29 LAB
PATH REPORT.COMMENTS IMP SPEC: NORMAL
PATH REPORT.COMMENTS IMP SPEC: NORMAL
PATH REPORT.FINAL DX SPEC: NORMAL
PATH REPORT.GROSS SPEC: NORMAL
PATH REPORT.MICROSCOPIC SPEC OTHER STN: NORMAL
PATH REPORT.RELEVANT HX SPEC: NORMAL

## 2024-03-06 ENCOUNTER — TELEPHONE (OUTPATIENT)
Dept: FAMILY MEDICINE | Facility: CLINIC | Age: 74
End: 2024-03-06
Payer: COMMERCIAL

## 2024-03-06 NOTE — TELEPHONE ENCOUNTER
Patient Quality Outreach    Patient is due for the following:   Colon Cancer Screening  Physical Annual Wellness Visit  There are no preventive care reminders to display for this patient.    Next Steps:   Schedule patient appointment    Type of outreach:    Sent [employer] message.      Questions for provider review:    None           Nati Cerrato MA

## 2024-05-08 ENCOUNTER — NURSE TRIAGE (OUTPATIENT)
Dept: FAMILY MEDICINE | Facility: CLINIC | Age: 74
End: 2024-05-08
Payer: COMMERCIAL

## 2024-05-08 DIAGNOSIS — K04.7 DENTAL INFECTION: Primary | ICD-10-CM

## 2024-05-08 RX ORDER — AMOXICILLIN 875 MG
875 TABLET ORAL 2 TIMES DAILY
Qty: 14 TABLET | Refills: 0 | Status: SHIPPED | OUTPATIENT
Start: 2024-05-08 | End: 2024-05-15

## 2024-05-08 NOTE — TELEPHONE ENCOUNTER
Called patient and read providers note. Patient understands message and verbalizes good understanding of plan of care. Patient instructed to call the dentist to schedule since abx sent.    Socorro Tyler RN

## 2024-05-08 NOTE — TELEPHONE ENCOUNTER
"Okay, I will send in a course of antibiotics to start treating this.  This is slightly different than the \"pre dental\" antibiotics but it will still function just the same  "

## 2024-05-08 NOTE — TELEPHONE ENCOUNTER
"Patient's  called with patient present. There is also consent to communicate on file.     He states patient has a tooth ache and is requesting an antibiotic prior to dental procedure.     She has not been to see a dentist for this problem yet.     She had a knee replacement and was told she would need antibiotics before dental procedures.     Patient states the right side of her jaw is swollen and painful. They could not specify more about where the pain is located.    They deny her running a fever.     They state they are planning to go to the dentist as soon as possible.     RN advised a message will go to her PCP, but she is out. Further advised patient be seen in urgent care. They would like message to go to PCP for antibiotic.     Katie Sheppard, RN, BSN, PHN  [facility]  Nurse Triage, [facility]    Reason for Disposition   Face is swollen   Tooth is painful or swelling around a tooth    Additional Information   Negative: Pale cold skin and very weak (can't stand)   Negative: Similar pain previously and it was from 'heart attack'   Negative: Similar pain previously and it was from 'angina' and not relieved by nitroglycerin   Negative: Sounds like a life-threatening emergency to the triager   Negative: Chest pain   Negative: Toothache followed tooth injury   Negative: Patient sounds very sick or weak to the triager   Negative: SEVERE difficulty breathing (e.g., struggling for each breath, speaks in single words, stridor)   Negative: Sounds like a life-threatening emergency to the triager   Negative: Injury to tooth or teeth   Negative: Injury to mouth   Negative: Cold sore suspected (i.e., fever blister sore) on the outer lip    Answer Assessment - Initial Assessment Questions  1. LOCATION: \"Which tooth is hurting?\"  (e.g., right-side/left-side, upper/lower, front/back)      Right side  2. ONSET: \"When did the toothache start?\"  (e.g., hours, days)       Couple of days " "ago  3. SEVERITY: \"How bad is the toothache?\"  (Scale 1-10; mild, moderate or severe)    - MODERATE (4-7): interferes with chewing, interferes with normal activities, awakens from sleep        Moderate  4. SWELLING: \"Is there any visible swelling of your face?\"      Yes  5. OTHER SYMPTOMS: \"Do you have any other symptoms?\" (e.g., fever)      No  6. PREGNANCY: \"Is there any chance you are pregnant?\" \"When was your last menstrual period?\"      No    Protocols used: Mouth Symptoms-A-OH, Toothache-A-OH    "

## 2024-05-17 ENCOUNTER — OFFICE VISIT (OUTPATIENT)
Dept: FAMILY MEDICINE | Facility: CLINIC | Age: 74
End: 2024-05-17
Payer: COMMERCIAL

## 2024-05-17 VITALS
WEIGHT: 175 LBS | TEMPERATURE: 97.3 F | SYSTOLIC BLOOD PRESSURE: 115 MMHG | BODY MASS INDEX: 34.36 KG/M2 | HEIGHT: 60 IN | DIASTOLIC BLOOD PRESSURE: 67 MMHG

## 2024-05-17 DIAGNOSIS — R73.03 PREDIABETES: ICD-10-CM

## 2024-05-17 DIAGNOSIS — M62.838 SPASM OF MUSCLE: ICD-10-CM

## 2024-05-17 DIAGNOSIS — Z13.0 SCREENING FOR DEFICIENCY ANEMIA: ICD-10-CM

## 2024-05-17 DIAGNOSIS — Z29.11 NEED FOR VACCINATION AGAINST RESPIRATORY SYNCYTIAL VIRUS: ICD-10-CM

## 2024-05-17 DIAGNOSIS — Z00.00 ENCOUNTER FOR MEDICARE ANNUAL WELLNESS EXAM: Primary | ICD-10-CM

## 2024-05-17 DIAGNOSIS — M25.512 CHRONIC LEFT SHOULDER PAIN: ICD-10-CM

## 2024-05-17 DIAGNOSIS — G89.29 CHRONIC LEFT SHOULDER PAIN: ICD-10-CM

## 2024-05-17 DIAGNOSIS — Z13.1 SCREENING FOR DIABETES MELLITUS: ICD-10-CM

## 2024-05-17 DIAGNOSIS — E78.5 HYPERLIPIDEMIA LDL GOAL <160: ICD-10-CM

## 2024-05-17 DIAGNOSIS — F90.0 ATTENTION DEFICIT HYPERACTIVITY DISORDER (ADHD), PREDOMINANTLY INATTENTIVE TYPE: ICD-10-CM

## 2024-05-17 LAB
ALBUMIN SERPL BCG-MCNC: 4 G/DL (ref 3.5–5.2)
ALP SERPL-CCNC: 98 U/L (ref 40–150)
ALT SERPL W P-5'-P-CCNC: 17 U/L (ref 0–50)
ANION GAP SERPL CALCULATED.3IONS-SCNC: 10 MMOL/L (ref 7–15)
AST SERPL W P-5'-P-CCNC: 25 U/L (ref 0–45)
BASOPHILS # BLD AUTO: 0 10E3/UL (ref 0–0.2)
BASOPHILS NFR BLD AUTO: 1 %
BILIRUB SERPL-MCNC: 0.3 MG/DL
BUN SERPL-MCNC: 13.7 MG/DL (ref 8–23)
CALCIUM SERPL-MCNC: 9.8 MG/DL (ref 8.8–10.2)
CHLORIDE SERPL-SCNC: 103 MMOL/L (ref 98–107)
CHOLEST SERPL-MCNC: 166 MG/DL
CREAT SERPL-MCNC: 0.67 MG/DL (ref 0.51–0.95)
DEPRECATED HCO3 PLAS-SCNC: 27 MMOL/L (ref 22–29)
EGFRCR SERPLBLD CKD-EPI 2021: >90 ML/MIN/1.73M2
EOSINOPHIL # BLD AUTO: 0.1 10E3/UL (ref 0–0.7)
EOSINOPHIL NFR BLD AUTO: 2 %
ERYTHROCYTE [DISTWIDTH] IN BLOOD BY AUTOMATED COUNT: 14.6 % (ref 10–15)
FASTING STATUS PATIENT QL REPORTED: NO
FASTING STATUS PATIENT QL REPORTED: NO
GLUCOSE SERPL-MCNC: 82 MG/DL (ref 70–99)
HBA1C MFR BLD: 5.7 % (ref 0–5.6)
HCT VFR BLD AUTO: 36.4 % (ref 35–47)
HDLC SERPL-MCNC: 44 MG/DL
HGB BLD-MCNC: 11.1 G/DL (ref 11.7–15.7)
IMM GRANULOCYTES # BLD: 0 10E3/UL
IMM GRANULOCYTES NFR BLD: 0 %
LDLC SERPL CALC-MCNC: 103 MG/DL
LYMPHOCYTES # BLD AUTO: 2.3 10E3/UL (ref 0.8–5.3)
LYMPHOCYTES NFR BLD AUTO: 32 %
MCH RBC QN AUTO: 26.2 PG (ref 26.5–33)
MCHC RBC AUTO-ENTMCNC: 30.5 G/DL (ref 31.5–36.5)
MCV RBC AUTO: 86 FL (ref 78–100)
MONOCYTES # BLD AUTO: 0.5 10E3/UL (ref 0–1.3)
MONOCYTES NFR BLD AUTO: 7 %
NEUTROPHILS # BLD AUTO: 4.2 10E3/UL (ref 1.6–8.3)
NEUTROPHILS NFR BLD AUTO: 59 %
NONHDLC SERPL-MCNC: 122 MG/DL
PLATELET # BLD AUTO: 319 10E3/UL (ref 150–450)
POTASSIUM SERPL-SCNC: 4.5 MMOL/L (ref 3.4–5.3)
PROT SERPL-MCNC: 7.4 G/DL (ref 6.4–8.3)
RBC # BLD AUTO: 4.24 10E6/UL (ref 3.8–5.2)
SODIUM SERPL-SCNC: 140 MMOL/L (ref 135–145)
TRIGL SERPL-MCNC: 95 MG/DL
WBC # BLD AUTO: 7.1 10E3/UL (ref 4–11)

## 2024-05-17 PROCEDURE — 99214 OFFICE O/P EST MOD 30 MIN: CPT | Mod: 25 | Performed by: PHYSICIAN ASSISTANT

## 2024-05-17 PROCEDURE — G0439 PPPS, SUBSEQ VISIT: HCPCS | Performed by: PHYSICIAN ASSISTANT

## 2024-05-17 PROCEDURE — 85025 COMPLETE CBC W/AUTO DIFF WBC: CPT | Performed by: PHYSICIAN ASSISTANT

## 2024-05-17 PROCEDURE — 80053 COMPREHEN METABOLIC PANEL: CPT | Performed by: PHYSICIAN ASSISTANT

## 2024-05-17 PROCEDURE — 80061 LIPID PANEL: CPT | Performed by: PHYSICIAN ASSISTANT

## 2024-05-17 PROCEDURE — 83036 HEMOGLOBIN GLYCOSYLATED A1C: CPT | Performed by: PHYSICIAN ASSISTANT

## 2024-05-17 PROCEDURE — 36415 COLL VENOUS BLD VENIPUNCTURE: CPT | Performed by: PHYSICIAN ASSISTANT

## 2024-05-17 RX ORDER — CYCLOBENZAPRINE HCL 10 MG
10 TABLET ORAL 2 TIMES DAILY PRN
Qty: 30 TABLET | Refills: 0 | Status: SHIPPED | OUTPATIENT
Start: 2024-05-17

## 2024-05-17 RX ORDER — RESPIRATORY SYNCYTIAL VIRUS VACCINE 120MCG/0.5
0.5 KIT INTRAMUSCULAR ONCE
Qty: 1 EACH | Refills: 0 | Status: CANCELLED | OUTPATIENT
Start: 2024-05-17 | End: 2024-05-17

## 2024-05-17 RX ORDER — DEXTROAMPHETAMINE SACCHARATE, AMPHETAMINE ASPARTATE, DEXTROAMPHETAMINE SULFATE AND AMPHETAMINE SULFATE 3.75; 3.75; 3.75; 3.75 MG/1; MG/1; MG/1; MG/1
15 TABLET ORAL 2 TIMES DAILY
Qty: 60 TABLET | Refills: 0 | Status: SHIPPED | OUTPATIENT
Start: 2024-05-17

## 2024-05-17 SDOH — HEALTH STABILITY: PHYSICAL HEALTH: ON AVERAGE, HOW MANY DAYS PER WEEK DO YOU ENGAGE IN MODERATE TO STRENUOUS EXERCISE (LIKE A BRISK WALK)?: 0 DAYS

## 2024-05-17 ASSESSMENT — PATIENT HEALTH QUESTIONNAIRE - PHQ9
10. IF YOU CHECKED OFF ANY PROBLEMS, HOW DIFFICULT HAVE THESE PROBLEMS MADE IT FOR YOU TO DO YOUR WORK, TAKE CARE OF THINGS AT HOME, OR GET ALONG WITH OTHER PEOPLE: VERY DIFFICULT
SUM OF ALL RESPONSES TO PHQ QUESTIONS 1-9: 17
SUM OF ALL RESPONSES TO PHQ QUESTIONS 1-9: 17

## 2024-05-17 ASSESSMENT — PAIN SCALES - GENERAL: PAINLEVEL: EXTREME PAIN (8)

## 2024-05-17 ASSESSMENT — SOCIAL DETERMINANTS OF HEALTH (SDOH): HOW OFTEN DO YOU GET TOGETHER WITH FRIENDS OR RELATIVES?: NEVER

## 2024-05-17 NOTE — PATIENT INSTRUCTIONS
"Get RSV vaccine from pharmacy  Mail in [facility] for colon cancer screening   Restart adderall after dental prodcure  Follow up with orthopedics at [facility] orthopedics for shoulder pain  Patient Education    Preventive Care Advice   This is general advice we often give to help people stay healthy. Your care team may have specific advice just for you. Please talk to your care team about your own preventive care needs.  Lifestyle  Exercise at least 150 minutes each week (30 minutes a day, 5 days a week).  Do muscle strengthening activities 2 days a week. These help control your weight and prevent disease.  No smoking.  Wear sunscreen to prevent skin cancer.  Have your home tested for radon every 2 to 5 years. Radon is a colorless, odorless gas that can harm your lungs. To learn more, go to www.health.[facility].mn.us and search for \"Radon in Homes.\"  Keep guns unloaded and locked up in a safe place like a safe or gun vault, or, use a gun lock and hide the keys. Always lock away bullets separately. To learn more, visit [employer].[place].gov and search for \"safe gun storage.\"  Nutrition  Eat 5 or more servings of fruits and vegetables each day.  Try wheat bread, brown rice and whole grain pasta (instead of white bread, rice, and pasta).  Get enough calcium and vitamin D. Check the label on foods and aim for 100% of the RDA (recommended daily allowance).  Regular exams  Have a dental exam and cleaning every 6 months.  See your health care team every year to talk about:  Any changes in your health.  Any medicines your care team has prescribed.  Preventive care, family planning, and ways to prevent chronic diseases.  Shots (vaccines)   HPV shots (up to age 26), if you've never had them before.  Hepatitis B shots (up to age 59), if you've never had them before.  COVID-19 shot: Get this shot when it's due.  Flu shot: Get a flu shot every year.  Tetanus shot: Get a tetanus shot every 10 years.  Pneumococcal, hepatitis A, and RSV shots: " Ask your care team if you need these based on your risk.  Shingles shot (for age 50 and up).  General health tests  Diabetes screening:  Starting at age 35, Get screened for diabetes at least every 3 years.  If you are younger than age 35, ask your care team if you should be screened for diabetes.  Cholesterol test: At age 39, start having a cholesterol test every 5 years, or more often if advised.  Bone density scan (DEXA): At age 50, ask your care team if you should have this scan for osteoporosis (brittle bones).  Hepatitis C: Get tested at least once in your life.  Abdominal aortic aneurysm screening: Talk to your doctor about having this screening if you:  Have ever smoked; and  Are biologically male; and  Are between the ages of 65 and 75.  STIs (sexually transmitted infections)  Before age 24: Ask your care team if you should be screened for STIs.  After age 24: Get screened for STIs if you're at risk. You are at risk for STIs (including HIV) if:  You are sexually active with more than one person.  You don't use condoms every time.  You or a partner was diagnosed with a sexually transmitted infection.  If you are at risk for HIV, ask about PrEP medicine to prevent HIV.  Get tested for HIV at least once in your life, whether you are at risk for HIV or not.  Cancer screening tests  Cervical cancer screening: If you have a cervix, begin getting regular cervical cancer screening tests at age 21. Most people who have regular screenings with normal results can stop after age 65. Talk about this with your provider.  Breast cancer scan (mammogram): If you've ever had breasts, begin having regular mammograms starting at age 40. This is a scan to check for breast cancer.  Colon cancer screening: It is important to start screening for colon cancer at age 45.  Have a colonoscopy test every 10 years (or more often if you're at risk) Or, ask your provider about stool tests like a FIT test every year or Cologuard test every  3 years.  To learn more about your testing options, visit: [email or web address]/307230.pdf.  For help making a decision, visit: omar/[email or web address].  Prostate cancer screening test: If you have a prostate and are age 55 to 69, ask your provider if you would benefit from a yearly prostate cancer screening test.  Lung cancer screening: If you are a current or former smoker age 50 to 80, ask your care team if ongoing lung cancer screenings are right for you.  For informational purposes only. Not to replace the advice of your health care provider. Copyright   2023 [employer]. All rights reserved. Clinically reviewed by the [facility] Transitions Program. [employer] 145132 - REV 04/24.    Learning About Activities of Daily Living  What are activities of daily living?     Activities of daily living (ADLs) are the basic self-care tasks you do every day. These include eating, bathing, dressing, and moving around.  As you age, and if you have health problems, you may find that it's harder to do some of these tasks. If so, your doctor can suggest ideas that may help.  To measure what kind of help you may need, your doctor will ask how well you are able to do ADLs. Let your doctor know if there are any tasks that you are having trouble doing. This is an important first step to getting help. And when you have the help you need, you can stay as independent as possible.  How will a doctor assess your ADLs?  Asking about ADLs is part of a routine health checkup your doctor will likely do as you age. Your health check might be done in a doctor's office, in your home, or at a hospital. The goal is to find out if you are having any problems that could make it hard to care for yourself or that make it unsafe for you to be on your own.  To measure your ADLs, your doctor will ask how hard it is for you to do routine tasks. Your doctor may also want to know if you have changed the way you do a task because of a health problem.  Your doctor may watch how you:  Walk back and forth.  Keep your balance while you stand or walk.  Move from sitting to standing or from a bed to a chair.  Button or unbutton a shirt or sweater.  Remove and put on your shoes.  It's common to feel a little worried or anxious if you find you can't do all the things you used to be able to do. Talking with your doctor about ADLs is a way to make sure you're as safe as possible and able to care for yourself as well as you can. You may want to bring a caregiver, friend, or family member to your checkup. They can help you talk to your doctor.  Follow-up care is a key part of your treatment and safety. Be sure to make and go to all appointments, and call your doctor if you are having problems. It's also a good idea to know your test results and keep a list of the medicines you take.  Current as of: October 24, 2023               Content Version: 14.0    7175-9117 [employer].   Care instructions adapted under license by your healthcare professional. If you have questions about a medical condition or this instruction, always ask your healthcare professional. [employer] disclaims any warranty or liability for your use of this information.      Preventing Falls: Care Instructions  Injuries and health problems such as trouble walking or poor eyesight can increase your risk of falling. So can some medicines. But there are things you can do to help prevent falls. You can exercise to get stronger. You can also arrange your home to make it safer.    Talk to your doctor about the medicines you take. Ask if any of them increase the risk of falls and whether they can be changed or stopped.   Try to exercise regularly. It can help improve your strength and balance. This can help lower your risk of falling.     Practice fall safety and prevention.    Wear low-heeled shoes that fit well and give your feet good support. Talk to your doctor if you have foot  "problems that make this hard.  Carry a cellphone or wear a medical alert device that you can use to call for help.  Use stepladders instead of chairs to reach high objects. Don't climb if you're at risk for falls. Ask for help, if needed.  Wear the correct eyeglasses, if you need them.    Make your home safer.    Remove rugs, cords, clutter, and furniture from walkways.  Keep your house well lit. Use night-lights in hallways and bathrooms.  Install and use sturdy handrails on stairways.  Wear nonskid footwear, even inside. Don't walk barefoot or in socks without shoes.    Be safe outside.    Use handrails, curb cuts, and ramps whenever possible.  Keep your hands free by using a shoulder bag or backpack.  Try to walk in well-lit areas. Watch out for uneven ground, changes in pavement, and debris.  Be careful in the winter. Walk on the grass or gravel when sidewalks are slippery. Use de-icer on steps and walkways. Add non-slip devices to shoes.    Put grab bars and nonskid mats in your shower or tub and near the toilet. Try to use a shower chair or bath bench when bathing.   Get into a tub or shower by putting in your weaker leg first. Get out with your strong side first. Have a phone or medical alert device in the bathroom with you.   Where can you learn more?  Go to https://www.[employer]SK[employer].net/patiented  Enter G117 in the search box to learn more about \"Preventing Falls: Care Instructions.\"  Current as of: July 17, 2023               Content Version: 14.0    0407-9844 [employer].   Care instructions adapted under license by your healthcare professional. If you have questions about a medical condition or this instruction, always ask your healthcare professional. [employer] disclaims any warranty or liability for your use of this information.      Hearing Loss: Care Instructions  Overview     Hearing loss is a sudden or slow decrease in how well you hear. It can range from slight to " profound. Permanent hearing loss can occur with aging. It also can happen when you are exposed long-term to loud noise. Examples include listening to loud music, riding motorcycles, or being around other loud machines.  Hearing loss can affect your work and home life. It can make you feel lonely or depressed. You may feel that you have lost your independence. But hearing aids and other devices can help you hear better and feel connected to others.  Follow-up care is a key part of your treatment and safety. Be sure to make and go to all appointments, and call your doctor if you are having problems. It's also a good idea to know your test results and keep a list of the medicines you take.  How can you care for yourself at home?  Avoid loud noises whenever possible. This helps keep your hearing from getting worse.  Always wear hearing protection around loud noises.  Wear a hearing aid as directed.  A professional can help you pick a hearing aid that will work best for you.  You can also get hearing aids over the counter for mild to moderate hearing loss.  Have hearing tests as your doctor suggests. They can show whether your hearing has changed. Your hearing aid may need to be adjusted.  Use other devices as needed. These may include:  Telephone amplifiers and hearing aids that can connect to a television, stereo, radio, or microphone.  Devices that use lights or vibrations. These alert you to the doorbell, a ringing telephone, or a baby monitor.  Television closed-captioning. This shows the words at the bottom of the screen. Most new TVs can do this.  TTY (text telephone). This lets you type messages back and forth on the telephone instead of talking or listening. These devices are also called TDD. When messages are typed on the keyboard, they are sent over the phone line to a receiving TTY. The message is shown on a monitor.  Use text messaging, social media, and email if it is hard for you to communicate by  "telephone.  Try to learn a listening technique called speechreading. It is not lipreading. You pay attention to people's gestures, expressions, posture, and tone of voice. These clues can help you understand what a person is saying. Face the person you are talking to, and have them face you. Make sure the lighting is good. You need to see the other person's face clearly.  Think about counseling if you need help to adjust to your hearing loss.  When should you call for help?  Watch closely for changes in your health, and be sure to contact your doctor if:    You think your hearing is getting worse.     You have new symptoms, such as dizziness or nausea.   Where can you learn more?  Go to https://www.[employer].net/patiented  Enter R798 in the search box to learn more about \"Hearing Loss: Care Instructions.\"  Current as of: September 27, 2023               Content Version: 14.0    [id number] [facility].   Care instructions adapted under license by your healthcare professional. If you have questions about a medical condition or this instruction, always ask your healthcare professional. [facility] disclaims any warranty or liability for your use of this information.      Relationships for Good Health  Relationships are important for our health and happiness. Social isolation, loneliness and lack of support are bad for your health. Studies show that loneliness can harm health and limit your life span as much as high blood pressure and smoking.   Take some time to reflect on your relationships. Then answer these questions:  Are there people in your life that cause you stress or drain your energy? What can you do to set " limits?  ________________________________________________________________________________________________________________________________________________________________________________________________________________________________________________________________________________________________________________________________________________  Who do you enjoy spending time with? Who can you go to for support?  ________________________________________________________________________________________________________________________________________________________________________________________________________________________________________________________________________________________________________________________________________________  What can you do to improve your relationships with others?  __________________________________________________________________________________________________________________________________________________________________________________________________________________  ______________________________________________________________________________________________________________________________  What do you like most about your relationships with others?  ________________________________________________________________________________________________________________________________________________________________________________________________________________________________________________________________________________________________________________________________________________  My goal: ______________________________________________________________________  I will ______________________________________________________________________________________________________________________________________________________________________________________________    For informational purposes only. Not to replace the advice of your health care provider. Copyright   2018 [facility]. All rights reserved. Clinically reviewed by Bariatric Health  Team. [employer] 291930 - Rev 04/21.    Learning About Stress  What is stress?     Stress is your body's response to a hard situation. Your body can have a physical, emotional, or mental response. Stress is a fact of life for most people, and it affects everyone differently. What causes stress for you may not be stressful for someone else.  A lot of things can cause stress. You may feel stress when you go on a job interview, take a test, or run a race. This kind of short-term stress is normal and even useful. It can help you if you need to work hard or react quickly. For example, stress can help you finish an important job on time.  Long-term stress is caused by ongoing stressful situations or events. Examples of long-term stress include long-term health problems, ongoing problems at work, or conflicts in your family. Long-term stress can harm your health.  How does stress affect your health?  When you are stressed, your body responds as though you are in danger. It makes hormones that speed up your heart, make you breathe faster, and give you a burst of energy. This is called the fight-or-flight stress response. If the stress is over quickly, your body goes back to normal and no harm is done.  But if stress happens too often or lasts too long, it can have bad effects. Long-term stress can make you more likely to get sick, and it can make symptoms of some diseases worse. If you tense up when you are stressed, you may develop neck, shoulder, or low back pain. Stress is linked to high blood pressure and heart disease.  Stress also harms your emotional health. It can make you rodriguez, tense, or depressed. Your relationships may suffer, and you may not do well at work or school.  What can you do to manage stress?  You can try these things to help manage stress:   Do something active. Exercise or activity can help reduce stress. Walking is  a great way to get started. Even everyday activities such as housecleaning or yard work can help.  Try yoga or antione chi. These techniques combine exercise and meditation. You may need some training at first to learn them.  Do something you enjoy. For example, listen to music or go to a movie. Practice your hobby or do volunteer work.  Meditate. This can help you relax, because you are not worrying about what happened before or what may happen in the future.  Do guided imagery. Imagine yourself in any setting that helps you feel calm. You can use online videos, books, or a teacher to guide you.  Do breathing exercises. For example:  From a standing position, bend forward from the waist with your knees slightly bent. Let your arms dangle close to the floor.  Breathe in slowly and deeply as you return to a standing position. Roll up slowly and lift your head last.  Hold your breath for just a few seconds in the standing position.  Breathe out slowly and bend forward from the waist.  Let your feelings out. Talk, laugh, cry, and express anger when you need to. Talking with supportive friends or family, a counselor, or a taylor leader about your feelings is a healthy way to relieve stress. Avoid discussing your feelings with people who make you feel worse.  Write. It may help to write about things that are bothering you. This helps you find out how much stress you feel and what is causing it. When you know this, you can find better ways to cope.  What can you do to prevent stress?  You might try some of these things to help prevent stress:  Manage your time. This helps you find time to do the things you want and need to do.  Get enough sleep. Your body recovers from the stresses of the day while you are sleeping.  Get support. Your family, friends, and community can make a difference in how you experience stress.  Limit your news feed. Avoid or limit time on social media or news that may make you feel stressed.  Do  "something active. Exercise or activity can help reduce stress. Walking is a great way to get started.  Where can you learn more?  Go to https://www.[employer].net/patiented  Enter N032 in the search box to learn more about \"Learning About Stress.\"  Current as of: October 24, 2023               Content Version: 14.0    1618-2662 [employer].   Care instructions adapted under license by your healthcare professional. If you have questions about a medical condition or this instruction, always ask your healthcare professional. [employer] disclaims any warranty or liability for your use of this information.      Bladder Training: Care Instructions  Your Care Instructions     Bladder training is used to treat urge incontinence and stress incontinence. Urge incontinence means that the need to urinate comes on so fast that you can't get to a toilet in time. Stress incontinence means that you leak urine because of pressure on your bladder. For example, it may happen when you laugh, cough, or lift something heavy.  Bladder training can increase how long you can wait before you have to urinate. It can also help your bladder hold more urine. And it can give you better control over the urge to urinate.  It is important to remember that bladder training takes a few weeks to a few months to make a difference. You may not see results right away, but don't give up.  Follow-up care is a key part of your treatment and safety. Be sure to make and go to all appointments, and call your doctor if you are having problems. It's also a good idea to know your test results and keep a list of the medicines you take.  How can you care for yourself at home?  Work with your doctor to come up with a bladder training program that is right for you. You may use one or more of the following methods.  Delayed urination  In the beginning, try to keep from urinating for 5 minutes after you first feel the need to go.  While you " "wait, take deep, slow breaths to relax. Kegel exercises can also help you delay the need to go to the bathroom.  After some practice, when you can easily wait 5 minutes to urinate, try to wait 10 minutes before you urinate.  Slowly increase the waiting period until you are able to control when you have to urinate.  Scheduled urination  Empty your bladder when you first wake up in the morning.  Schedule times throughout the day when you will urinate.  Start by going to the bathroom every hour, even if you don't need to go.  Slowly increase the time between trips to the bathroom.  When you have found a schedule that works well for you, keep doing it.  If you wake up during the night and have to urinate, do it. Apply your schedule to waking hours only.  Kegel exercises  These tighten and strengthen pelvic muscles, which can help you control the flow of urine. (If doing these exercises causes pain, stop doing them and talk with your doctor.) To do Kegel exercises:  Squeeze your muscles as if you were trying not to pass gas. Or squeeze your muscles as if you were stopping the flow of urine. Your belly, legs, and buttocks shouldn't move.  Hold the squeeze for 3 seconds, then relax for 5 to 10 seconds.  Start with 3 seconds, then add 1 second each week until you are able to squeeze for 10 seconds.  Repeat the exercise 10 times a session. Do 3 to 8 sessions a day.  When should you call for help?  Watch closely for changes in your health, and be sure to contact your doctor if:    Your incontinence is getting worse.     You do not get better as expected.   Where can you learn more?  Go to https://www.healthAlmondy.net/patiented  Enter V684 in the search box to learn more about \"Bladder Training: Care Instructions.\"  Current as of: November 15, 2023               Content Version: 14.0    9569-2066 [employer].   Care instructions adapted under license by your healthcare professional. If you have questions about a " medical condition or this instruction, always ask your healthcare professional. Healthwise, [facility] disclaims any warranty or liability for your use of this information.      Learning About Depression Screening  What is depression screening?  Depression screening is a way to see if you have depression symptoms. It may be done by a doctor or counselor. It's often part of a routine checkup. That's because your mental health is just as important as your physical health.  Depression is a mental health condition that affects how you feel, think, and act. You may:  Have less energy.  Lose interest in your daily activities.  Feel sad and grouchy for a long time.  Depression is very common. It affects people of all ages.  Many things can lead to depression. Some people become depressed after they have a stroke or find out they have a major illness like cancer or heart disease. The death of a loved one or a breakup may lead to depression. It can run in families. Most experts believe that a combination of inherited genes and stressful life events can cause it.  What happens during screening?  You may be asked to fill out a form about your depression symptoms. You and the doctor will discuss your answers. The doctor may ask you more questions to learn more about how you think, act, and feel.  What happens after screening?  If you have symptoms of depression, your doctor will talk to you about your options.  Doctors usually treat depression with medicines or counseling. Often, combining the two works best. Many people don't get help because they think that they'll get over the depression on their own. But people with depression may not get better unless they get treatment.  The cause of depression is not well understood. There may be many factors involved. But if you have depression, it's not your fault.  A serious symptom of depression is thinking about death or suicide. If you or someone you care about talks about this  "or about feeling hopeless, get help right away.  It's important to know that depression can be treated. Medicine, counseling, and self-care may help.  Where can you learn more?  Go to https://www.[employer].net/patiented  Enter T185 in the search box to learn more about \"Learning About Depression Screening.\"  Current as of: June 24, 2023               Content Version: 14.0    1399-3954 [employer].   Care instructions adapted under license by your healthcare professional. If you have questions about a medical condition or this instruction, always ask your healthcare professional. [employer] disclaims any warranty or liability for your use of this information.      "

## 2024-05-17 NOTE — PROGRESS NOTES
Preventive Care Visit  [facility]  Katie Hein PA-C, Family Medicine  May 17, 2024      Assessment & Plan     Encounter for Medicare annual wellness exam  Benign exam except obesity and left shoulder   Declines colonoscopy- reports has cologuard kit at home     Attention deficit hyperactivity disorder (ADHD), predominantly inattentive type  Refilled adderall.  Follow up with us in 6 months- may call in for refill. - amphetamine-dextroamphetamine (ADDERALL) 15 MG tablet  Dispense: 60 tablet; Refill: 0    Hyperlipidemia LDL goal <160    - Lipid panel reflex to direct LDL Non-fasting  - Lipid panel reflex to direct LDL Non-fasting    Need for vaccination against respiratory syncytial virus  Encouraged to get at pharmacy       Screening for diabetes mellitus  A1C 5.7- glucose 82  - Comprehensive metabolic panel (BMP + Alb, Alk Phos, ALT, AST, Total. Bili, TP)  - Comprehensive metabolic panel (BMP + Alb, Alk Phos, ALT, AST, Total. Bili, TP)    Prediabetes  A1c 5.7- prediabetes   - Hemoglobin A1c  - Hemoglobin A1c    Spasm of muscle  Refilled   - cyclobenzaprine (FLEXERIL) 10 MG tablet  Dispense: 30 tablet; Refill: 0    Chronic left shoulder pain  Follow up with ortho- no xray today- since prefers to go to [facility]   - Orthopedic  Referral    Screening for deficiency anemia    - CBC with platelets and differential  - CBC with platelets and differential              Counseling  Appropriate preventive services were discussed with this patient, including applicable screening as appropriate for fall prevention, nutrition, physical activity, Tobacco-use cessation, weight loss and cognition.  Checklist reviewing preventive services available has been given to the patient.  Reviewed patient's diet, addressing concerns and/or questions.   Patient is at risk for social isolation and has been provided with information about the benefit of social connection.   The patient was instructed to see  the dentist every 6 months.   Updated plan of care.  Patient reported difficulty with activities of daily living were addressed today.The patient was provided with written information regarding signs of hearing loss.   Information on urinary incontinence and treatment options given to patient.   The patient's PHQ-9 score is consistent with moderate depression. She was provided with information regarding depression.       Get RSV vaccine from pharmacy  [facility] in [facility] for colon cancer screening   Restart adderall after dental prodcure  Follow up with [facility]opedics at [facility] for shoulder pain    Inocencio Wells is a 73 year old, presenting for the following:  Medicare Visit        5/17/2024     2:13 PM   Additional Questions   Roomed by thea   Accompanied by self         5/17/2024     2:13 PM   Patient Reported Additional Medications   Patient reports taking the following new medications no         Health Care Directive  Patient does not have a Health Care Directive or Living Will: Discussed advance care planning with patient; however, patient declined at this time.    HPI  Patient known to me with history of ADHD and osteoparthritis presents for annual exam. somehow hurt left arm.  Hard to use.  Pain for 3 months   Unusre if pinched nerve or rotator cuff  Pain in left shoulder   Right hand dominant   No fall or injury  Suspicious mole left arm and didn't use arm for 2 weeks and soemtime afer that started hurting.   Hears bones rubbing.   Rates pain 6 out of 10 and 9 for a while  Slowly improving  Side sleeper and almost impossible to sleep  Complete replacement left knee-couldn't stop bleeding-   Other knee giving her trouble but afraid for replacement due to complications from previous knee replacement   Afraid to take adderall because pushing on teeth and got very depressed.  Concern that adderall is affecting dentition.  With adderall significant improvement in focus and able to do drawing and  house work   Tooth pulled next week- probably related to prolia- unsure how affecting jaw bone  Grew up 1970s - no drinking or drugs-   Concerned how adderall will affect will heal.  Would like to be able to think- chronic fatigue and brain function.    Does have left kidney stone - followed by urology            5/17/2024   General Health   How would you rate your overall physical health? (!) FAIR   Feel stress (tense, anxious, or unable to sleep) To some extent   (!) STRESS CONCERN      5/17/2024   Nutrition   Diet: Diabetic         5/17/2024   Exercise   Days per week of moderate/strenous exercise 0 days   (!) EXERCISE CONCERN      5/17/2024   Social Factors   Frequency of gathering with friends or relatives Never   Worry food won't last until get money to buy more No   Food not last or not have enough money for food? No   Do you have housing?  Yes   Are you worried about losing your housing? No   Lack of transportation? Patient declined   Unable to get utilities (heat,electricity)? No   (!) SOCIAL CONNECTIONS CONCERN      5/17/2024   Fall Risk   Fallen 2 or more times in the past year? Yes   Trouble with walking or balance? Yes           5/17/2024   Activities of Daily Living- Home Safety   Needs help with the following daily activites Preparing meals    Housework    Laundry   Safety concerns in the home None of the above         5/17/2024   Dental   Dentist two times every year? (!) NO         5/17/2024   Hearing Screening   Hearing concerns? (!) I FEEL THAT PEOPLE ARE MUMBLING OR NOT SPEAKING CLEARLY.    (!) I NEED TO ASK PEOPLE TO SPEAK UP OR REPEAT THEMSELVES.    (!) IT'S HARDER TO UNDERSTAND WOMEN'S VOICES THAN MEN'S VOICES.    (!) IT'S HARD TO FOLLOW A CONVERSATION IN A NOISY RESTAURANT OR CROWDED ROOM.    (!) TROUBLE UNDESTANDING A SPEAKER IN A PUBLIC MEETING OR [employer] SERVICE.    (!) TROUBLE FOLLOWING DIALOGUE IN THE THEATHER.    (!) TROUBLE UNDERSTANDING SOFT OR WHISPERED SPEECH.    (!) TROUBLE  UNDERSTANDING SPEECH ON THE TELEPHONE         5/17/2024   Driving Risk Screening   Patient/family members have concerns about driving (!) DECLINE         5/17/2024   General Alertness/Fatigue Screening   Have you been more tired than usual lately? (!) DECLINE         5/17/2024   Urinary Incontinence Screening   Bothered by leaking urine in past 6 months Yes         5/17/2024   TB Screening   Were you born outside of the [place]? No       Today's PHQ-9 Score:       5/17/2024     2:03 PM   PHQ-9 SCORE   PHQ-9 Total Score MyChart 17 (Moderately severe depression)   PHQ-9 Total Score 17         5/17/2024   Substance Use   Alcohol more than 3/day or more than 7/wk Not Applicable   Do you have a current opioid prescription? No   How severe/bad is pain from 1 to 10? 8/10   Do you use any other substances recreationally? No     Social History     Tobacco Use    Smoking status: Never     Passive exposure: Never    Smokeless tobacco: Never   Vaping Use    Vaping status: Never Used   Substance Use Topics    Alcohol use: No    Drug use: No           2/20/2024   LAST FHS-7 RESULTS   1st degree relative breast or ovarian cancer No   Any relative bilateral breast cancer No   Any male have breast cancer No   Any ONE woman have BOTH breast AND ovarian cancer No   Any woman with breast cancer before 50yrs No   2 or more relatives with breast AND/OR ovarian cancer No   2 or more relatives with breast AND/OR bowel cancer No        Mammogram Screening - Annual screen due to history of breast cancer, carcinoma in situ, or hyperplasia    ASCVD Risk   The 10-year ASCVD risk score (Jere RAY, et al., 2019) is: 8.2%    Values used to calculate the score:      Age: 73 years      Sex: Female      Is Non- : No      Diabetic: No      Tobacco smoker: No      Systolic Blood Pressure: 115 mmHg      Is BP treated: No      HDL Cholesterol: 50 mg/dL      Total Cholesterol: 154 mg/dL            Reviewed and updated as needed  this visit by Provider   Tobacco   Meds   Med Hx  Surg Hx  Fam Hx  Soc Hx Sexual Activity          BP Readings from Last 3 Encounters:   24 115/67   24 116/77   24 116/77    Wt Readings from Last 3 Encounters:   24 79.4 kg (175 lb)   24 78.9 kg (173 lb 15.1 oz)   24 78.9 kg (173 lb 14.4 oz)                  Patient Active Problem List   Diagnosis    Moderate recurrent major depression (H)    Hyperlipidemia LDL goal <160    Chronic fatigue disorder    Liver lesion    Cystic disease of liver    Gastroesophageal reflux disease with esophagitis    Hiatal hernia    Age-related cataract of both eyes, unspecified age-related cataract type    Primary osteoarthritis of both knees    Attention deficit hyperactivity disorder (ADHD), predominantly inattentive type    Malignant neoplasm of upper-inner quadrant of right breast in female, estrogen receptor positive (H)    Thyroid nodule    Anxiety disorder, unspecified type    Osteoporosis    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    Aromatase inhibitor use    Cervical cancer screening    History of cholecystectomy    Morbid obesity (H)    Fibromyalgia    Insomnia, unspecified type     Past Surgical History:   Procedure Laterality Date    APPENDECTOMY  1967    BIOPSY BREAST NEEDLE LOCALIZATION Right 01/10/2019    Procedure: WIRE LOCALIZED RIGHT BREAST LUMPECTOMY WITH RIGHT SLNB;  Surgeon: Gema Diamond MD;  Location: [facility] OR    BREAST BIOPSY, CORE RT/LT Right 2018    CATARACT IOL, RT/LT Bilateral 2018    with lens implants per patient    CHOLECYSTECTOMY  2019    THYROID BIOPSY  2019    ZZC LAP RPR INCISIONAL HERNIA  2023       Social History     Tobacco Use    Smoking status: Never     Passive exposure: Never    Smokeless tobacco: Never   Substance Use Topics    Alcohol use: No     Family History   Problem Relation Age of Onset    Macular Degeneration Mother          age 92    Leukemia Father     Unknown/Adopted Sister      Unknown/Adopted Brother     Breast Cancer Cousin 50        Maternal Female 1st Cousin    Diabetes Paternal Aunt     Colon Cancer No family hx of          Current Outpatient Medications   Medication Sig Dispense Refill    acetaminophen (TYLENOL) 325 MG tablet Take 325-650 mg by mouth every 6 hours as needed for mild pain      amphetamine-dextroamphetamine (ADDERALL) 15 MG tablet Take 1 tablet (15 mg) by mouth 2 times daily Teva Brand 60 tablet 0    B Complex Vitamins (VITAMIN B-COMPLEX PO) Take by mouth daily      calcium carbonate (TUMS) 500 MG chewable tablet Take 1 chew tab by mouth 2 times daily      Calcium-Magnesium-Zinc 333-133-5 MG TABS per tablet Take 1 tablet by mouth daily      Cholecalciferol (VITAMIN D-3 PO) Vitamin D is included with multiple vitamin, patient does not take additional. Marjorie Crooks LPN on 7/26/2019 at 3:02 PM      cyclobenzaprine (FLEXERIL) 10 MG tablet Take 1 tablet (10 mg) by mouth 2 times daily as needed for muscle spasms 30 tablet 0    denosumab (PROLIA) 60 MG/ML SOSY injection       magnesium oxide 200 MG TABS Take 100 mg by mouth once as needed      Multiple Vitamins-Minerals (MULTIVITAMIN PO)       vitamin B-12 (CYANOCOBALAMIN) 100 MCG tablet Take 1,000 mcg by mouth daily       Current providers sharing in care for this patient include:  Patient Care Team:  Katie Hein PA-C as PCP - General (Family Medicine)  Alexandra Copeland MD as MD (Radiation Oncology)  Crystal Laguerre MD as Assigned Cancer Care Provider  Sherita Uribe MD as Assigned Surgical Provider  Katie Hein PA-C as Assigned PCP  Chai Kauffman MD as MD (Family Medicine)  Maya Pang PA-C as Physician Assistant    The following health maintenance items are reviewed in Epic and correct as of today:  Health Maintenance   Topic Date Due    COLORECTAL CANCER SCREENING  Never done    RSV VACCINE (Pregnancy & 60+) (1 - 1-dose 60+ series) Never done    MEDICARE ANNUAL WELLNESS VISIT   09/14/2023    LIPID  02/14/2024    DEPRESSION 6 MO INDEX REPEAT PHQ-9  05/31/2024    ANNUAL REVIEW OF HM ORDERS  09/27/2024    PHQ-9  11/17/2024    MAMMO SCREENING  02/20/2025    FALL RISK ASSESSMENT  05/17/2025    GLUCOSE  02/20/2027    ADVANCE CARE PLANNING  05/15/2028    DTAP/TDAP/TD IMMUNIZATION (2 - Td or Tdap) 05/02/2029    DEXA  08/24/2037    HEPATITIS C SCREENING  Completed    DEPRESSION ACTION PLAN  Completed    INFLUENZA VACCINE  Completed    Pneumococcal Vaccine: 65+ Years  Completed    ZOSTER IMMUNIZATION  Completed    COVID-19 Vaccine  Completed    IPV IMMUNIZATION  Aged Out    HPV IMMUNIZATION  Aged Out    MENINGITIS IMMUNIZATION  Aged Out    RSV MONOCLONAL ANTIBODY  Aged Out         Review of Systems  CONSTITUTIONAL: NEGATIVE for fever, chills, change in weight  INTEGUMENTARY/SKIN: NEGATIVE for worrisome rashes, moles or lesions  EYES: NEGATIVE for vision changes or irritation  ENT/MOUTH: poor dentition- dental work planned  RESP: NEGATIVE for significant cough or SOB  BREAST: NEGATIVE for masses, tenderness or discharge  CV: NEGATIVE for chest pain, palpitations or peripheral edema  GI: NEGATIVE for nausea, abdominal pain, heartburn, or change in bowel habits  : NEGATIVE for frequency, dysuria, or hematuria  MUSCULOSKELETAL:see above  NEURO: NEGATIVE for weakness, dizziness or paresthesias  ENDOCRINE: NEGATIVE for temperature intolerance, skin/hair changes  HEME: NEGATIVE for bleeding problems  PSYCHIATRIC: concentration difficulty     Objective    Exam  /67 (BP Location: Right arm, Patient Position: Sitting, Cuff Size: Adult Regular)   Temp 97.3  F (36.3  C) (Temporal)   Ht 1.524 m (5')   Wt 79.4 kg (175 lb)   LMP 05/31/2003 (Approximate)   BMI 34.18 kg/m     Estimated body mass index is 34.18 kg/m  as calculated from the following:    Height as of this encounter: 1.524 m (5').    Weight as of this encounter: 79.4 kg (175 lb).    Physical Exam  GENERAL: alert, no distress, and over  weight  EYES: Eyes grossly normal to inspection, PERRL and conjunctivae and sclerae normal  HENT: ear canals and TM's normal, nose and mouth without ulcers or lesions  NECK: no adenopathy, no asymmetry, masses, or scars  RESP: lungs clear to auscultation - no rales, rhonchi or wheezes  CV: regular rate and rhythm, normal S1 S2, no S3 or S4, no murmur, click or rub, no peripheral edema  ABDOMEN: soft, nontender, no hepatosplenomegaly, no masses and bowel sounds normal  MS: left shoulder with some crepitance- good range of motion - tender ac joint   SKIN: no suspicious lesions or rashes  NEURO: Normal strength and tone, mentation intact and speech normal  PSYCH: mentation appears normal, affect normal/bright, judgement and insight intact, and appearance well groomed        5/17/2024   Mini Cog   Clock Draw Score 2 Normal   3 Item Recall 3 objects recalled   Mini Cog Total Score 5              Signed Electronically by: Katie Hein PA-C

## 2024-05-18 NOTE — RESULT ENCOUNTER NOTE
"Dear Priscilla    Your cholesterol looks ok.    You are slightly anemic but comparable to past.    Your blood sugar is borderline elevated.    This is in the \"prediabetes\" range.  You are not currently diabetic, but at risk for developing this disease in the future.   Exercise, weight loss,  and limiting carbohydrates and sugars in the diet can be helpful to avoid progression to diabetes.  At minimum we need to check your blood sugar annually.    Please be seen urgently if you develop any signs or symptoms of diabetes (increase thirst or urination, fatigue, blurry vision, unexplained weight loss).     Your electrolytes, kidney function and liver function were normal.      Please call or MyChart my office with any questions or concerns.   AVILA Faye      The 10-year ASCVD risk score (Jere DK, et al., 2019) is: 8.5%    Values used to calculate the score:      Age: 73 years      Sex: Female      Is Non- : No      Diabetic: No      Tobacco smoker: No      Systolic Blood Pressure: 115 mmHg      Is BP treated: No      HDL Cholesterol: 44 mg/dL      Total Cholesterol: 166 mg/dL "

## 2024-05-23 ENCOUNTER — TELEPHONE (OUTPATIENT)
Dept: FAMILY MEDICINE | Facility: CLINIC | Age: 74
End: 2024-05-23
Payer: COMMERCIAL

## 2024-05-23 NOTE — TELEPHONE ENCOUNTER
Called patient and patient's , Fam, answered. Reports that patient will call clinic back shortly.     If patient calls back, please relay provider message below.     Maritza Johnson RN

## 2024-05-23 NOTE — TELEPHONE ENCOUNTER
"Dear Priscilla     Your cholesterol looks ok- but overall your risk is high and you should be on cholesterol medication.  We would start crestor and recheck fasting labs in 8 weeks after starting the medication.  We would change medications if you develop muscle aches or pains.  We will also need to check liver tests in 8 weeks- let me know your thoughts.        You are slightly anemic but comparable to past.     Your blood sugar is borderline elevated.    This is in the \"prediabetes\" range.  You are not currently diabetic, but at risk for developing this disease in the future.  Exercise, weight loss,  and limiting carbohydrates and sugars in the diet can be helpful to avoid progression to diabetes.  At minimum we need to check your blood sugar annually.    Please be seen urgently if you develop any signs or symptoms of diabetes (increase thirst or urination, fatigue, blurry vision, unexplained weight loss).     Your electrolytes, kidney function and liver function were normal.       Please call or MyChart my office with any questions or concerns.  AVILA Faye        The 10-year ASCVD risk score (Jere RAY, et al., 2019) is: 8.5%    Values used to calculate the score:      Age: 73 years      Sex: Female      Is Non- : No      Diabetic: No      Tobacco smoker: No      Systolic Blood Pressure: 115 mmHg      Is BP treated: No      HDL Cholesterol: 44 mg/dL      Total Cholesterol: 166 mg/dL  "

## 2024-05-28 NOTE — TELEPHONE ENCOUNTER
This writer attempted to contact patient on 05/28/24.    Reason for call provider's message and left message to call clinic back at [phone]. Writer also sent [employer] message with provider's message below.     If patient calls back:   Please relay provider's message below.     SALENA Beltrán  [facility]

## 2024-05-29 NOTE — TELEPHONE ENCOUNTER
This writer called patient, she had read the [employer] message but did not respond.  Patient states that she does not want to take the Crestor as she is concerned about the side effects of dizziness, brain fog.   She is concerned about taking the statin with her other medical conditions.      's: Patient also wondering about her Rollator that she requested last year.  States [facility] never got the orders that were faxed on 8/28/23 and 9/28/23.  Patient states that if she had this Rollator she wouldn't have any issues with her knees like she is having now.  Patient would like someone to call over the the medical device company to see if another order is needed to get this walker.  Patient denies that she is currently in PT.      Routing to provider to review and advise.  Routing to 's to track down DME items.      Kristina Kjellberg, MSN, RN  [facility] Primary Care Triage.

## 2024-05-29 NOTE — TELEPHONE ENCOUNTER
Order faxed to [phone]. Writer called [facility] and they stated that they have received the order and it was good to go. They will get in touch with pt after they do find out if her insurance can pay.     Writer GRIFFIN informing pt of [facility] response.

## 2024-05-29 NOTE — TELEPHONE ENCOUNTER
See dme order 8/28/23 under other orders and fax to [facility] and follow up with [facility]  May need new order- if so - assist with scheduling video visit with me

## 2024-07-11 ENCOUNTER — TELEPHONE (OUTPATIENT)
Dept: FAMILY MEDICINE | Facility: CLINIC | Age: 74
End: 2024-07-11
Payer: COMMERCIAL

## 2024-07-11 NOTE — TELEPHONE ENCOUNTER
Patient Quality Outreach    Patient is due for the following:   Colon Cancer Screening    Next Steps:   Schedule a office visit for      Type of outreach:    Sent [email or web address] message.      Questions for provider review:    None           Nati Cerrato MA

## 2024-07-22 ENCOUNTER — OFFICE VISIT (OUTPATIENT)
Dept: UROLOGY | Facility: CLINIC | Age: 74
End: 2024-07-22
Payer: COMMERCIAL

## 2024-07-22 DIAGNOSIS — N20.0 NEPHROLITHIASIS: ICD-10-CM

## 2024-07-22 DIAGNOSIS — R31.29 MICROHEMATURIA: Primary | ICD-10-CM

## 2024-07-22 PROCEDURE — 99213 OFFICE O/P EST LOW 20 MIN: CPT | Performed by: PHYSICIAN ASSISTANT

## 2024-07-22 NOTE — NURSING NOTE
Marixa Ann's goals for this visit include:   Chief Complaint   Patient presents with    Hematuria     Follow-up as requested by Dr Romero re:cystoscopy of 1/22/24; microscopic hematuria with negative work-up       She requests these members of her care team be copied on today's visit information:     PCP: Katie Hein    Referring Provider:  Katie Hein PA-C  [address] [place] [address]    [facility] 05/31/2003 (Approximate)     Do you need any medication refills at today's visit?       Rhiannon Dutta EMT

## 2024-07-22 NOTE — PROGRESS NOTES
Urology Clinic      Name: Marixa Ann    MRN: [id number]   YOB: 1950  Accompanied at today's visit by:self                 Chief Complaint:   microhematuria          History of Present Illness:   July 22, 2024    HISTORY:   We have been following 73 year old Marixa Ann for Microhematuria. Hx complicated by breast cancer; follows with oncology. Her urine cytology on 12/14/24 was negative. CT urogram showed nonobstructing stones. Last seen by Dr. Romero on 1/22/24 for cystoscopy which showed 1+ trabeculation but otherwise unremarkable. Here today for follow-up. Denies any kidney stone symptoms but has questions in regards to management of kidney stones. No gross hematuria.  Patient voices no other concerns at this time.            Allergies:     Allergies   Allergen Reactions    Vicodin [Hydrocodone-Acetaminophen] Anaphylaxis    Dilaudid [Hydromorphone] Nausea and Vomiting    Oxycodone Nausea and Vomiting    Dust Mites     Milk [Milk (Cow)] Diarrhea            Medications:     Current Outpatient Medications   Medication Sig Dispense Refill    acetaminophen (TYLENOL) 325 MG tablet Take 325-650 mg by mouth every 6 hours as needed for mild pain      amphetamine-dextroamphetamine (ADDERALL) 15 MG tablet Take 1 tablet (15 mg) by mouth 2 times daily Teva Brand 60 tablet 0    B Complex Vitamins (VITAMIN B-COMPLEX PO) Take by mouth daily      calcium carbonate (TUMS) 500 MG chewable tablet Take 1 chew tab by mouth 2 times daily      Calcium-Magnesium-Zinc 333-133-5 MG TABS per tablet Take 1 tablet by mouth daily      Cholecalciferol (VITAMIN D-3 PO) Vitamin D is included with multiple vitamin, patient does not take additional. Marjorie Crooks LPN on 7/26/2019 at 3:02 PM      cyclobenzaprine (FLEXERIL) 10 MG tablet Take 1 tablet (10 mg) by mouth 2 times daily as needed for muscle spasms 30 tablet 0    denosumab (PROLIA) 60 MG/ML SOSY injection       magnesium oxide 200 MG TABS Take 100 mg by mouth once  as needed      Multiple Vitamins-Minerals (MULTIVITAMIN PO)       vitamin B-12 (CYANOCOBALAMIN) 100 MCG tablet Take 1,000 mcg by mouth daily       Current Facility-Administered Medications   Medication Dose Route Frequency Provider Last Rate Last Admin    cross-Linked Hyaluronate (GEL-ONE) injection PRSY 30 mg  30 mg   Fam Atkins Abdi, DO   30 mg at 09/28/20 1425    cross-Linked Hyaluronate (GEL-ONE) injection PRSY 30 mg  30 mg   Fam Atkins Abdi, DO   30 mg at 09/28/20 1425    cross-Linked Hyaluronate (GEL-ONE) injection PRSY 30 mg  30 mg   Fam Atkins Abdi, DO   30 mg at 09/05/19 1525    cross-Linked Hyaluronate (GEL-ONE) injection PRSY 30 mg  30 mg   Fam Atkins Abdi, DO   30 mg at 09/05/19 1525    triamcinolone (KENALOG-40) injection 80 mg  80 mg INTRA-ARTICULAR Once Fam Atkins Abdi, DO                   Past  Surgical History:     Past Surgical History:   Procedure Laterality Date    APPENDECTOMY  1967    BIOPSY BREAST NEEDLE LOCALIZATION Right 01/10/2019    Procedure: WIRE LOCALIZED RIGHT BREAST LUMPECTOMY WITH RIGHT SLNB;  Surgeon: Gema Diamond MD;  Location: [facility] OR    BREAST BIOPSY, CORE RT/LT Right 12/26/2018    CATARACT IOL, RT/LT Bilateral 2018    with lens implants per patient    CHOLECYSTECTOMY  05/24/2019    THYROID BIOPSY  01/31/2019    ZZC LAP RPR INCISIONAL HERNIA  05/2023             Physical Exam:   There were no vitals filed for this visit.  PSYCH: NAD  EYES: EOMI  NEURO: AAO x3    LABS:   Creatinine   Date Value Ref Range Status   05/17/2024 0.67 0.51 - 0.95 mg/dL Final   02/08/2021 0.62 0.52 - 1.04 mg/dL Final         Component    Final Diagnosis   Specimen A                 Interpretation:                  - Negative for High Grade Urothelial Carcinoma                 Adequacy:                 Satisfactory for evaluation        CT urogram 12/14/23  FINDINGS:     Genitourinary: 3 mm non-obstructive calculus in the midpole of left  kidney (series 3, image 144). Simple cyst in the midpole  of left  kidney measuring 14 mm (series 6, image 104). Subcentimeter cyst the  inferior right kidney (series 7, image 57). Additional bilateral renal  cortical hypodensities, too small to characterize, similar to prior CT  from 3/30/2023, statistically favored to represent cysts. No abnormal  urothelial enhancement involving the pelvicalyceal system or ureters.  No filling defect seen in bilateral pelvicalyceal system on delayed  phase and in the opacified right and proximal left ureter. Distal left  ureter is not opacified on delayed phase. Limited evaluation of the  urinary bladder due to under distention and partial dependent contrast  filling on delayed phase, no obvious mass identified. No pelvic mass.     Gastrointestinal: Colonic diverticulosis. No definite diverticulitis.  Redundant right colon. No significant small bowel loop dilatation.  Appendix is surgically absent. Moderate hiatal hernia.     Hepatobiliary: Multiple hepatic hypodensities, similar to CT from  3/30/2023,, largest measuring 2 cm in segment 3 (series 7, image 31),  better characterized on MRI l from 4/10/2023. No new focal liver  lesion. Postcholecystectomy. Mild prominence of the common bile duct,  similar to prior, measuring 8 mm favored to represent  postcholecystectomy reservoir effect  Adrenal glands: Within normal limits.  Pancreas: No pancreatic ductal dilatation. Similar 6 mm (series 6,  image 115) hypodensity in the pancreatic body, characterized as IPMN  on recent MRI from 4/10/2023. No new focal pancreatic lesion.  Spleen: No focal splenic lesion.  Peritoneum, retroperitoneum and mesentery: Patent major abdominal  vasculature. No abdominal aortic aneurysm. Similar subcentimeter  para-aortic lymph nodes (series 6, image 159) similar subcentimeter  right common iliac lymph nodes (series 6, image 256); right external  iliac subcentimeter lymph nodes (series 6, image 314). Similar  enlarged right external iliac lymph node measuring 1  cm (series 6,  image 336). Similar left iliac and pelvic subcentimeter sized lymph  nodes.  No pneumoperitoneum or significant free fluid.  Osseous structures and soft tissue: No aggressive osseous lesion.  Small fat-containing right inguinal hernia. Multilevel degenerative  changes of the spine.  Lower chest: Mild groundglass density in the left lower lung, favor  subsegmental atelectasis. Similar subcentimeter nodular density along  the left oblique fissure (series 6, image 29). Mild elevation of the  left hemidiaphragm. Post repair changes in the periumbilical anterior  abdominal wall subcutaneous soft tissue                                                                      IMPRESSION:   1a. 3 mm nonobstructive left renal calculus.   1b. No hydronephrosis, suspicious  urothelial mass in the opacified  bilateral renal pelvicalyceal system, right ureter and proximal left  ureter. Limited evaluation of the urinary bladder due to  underdistention, distal left ureter is not opacified on delayed phase  limiting evaluation.  1c. Similar size of renal cysts and too small to characterize renal  hypodensities compared to CT from 3/30/2023. No new focal renal  lesion.  2. Postsurgical changes of ventral hernia repair. Additional findings  are similar to prior CT 3/30/2023 as described above including  moderate hiatal hernia, subcentimeter lingular pulmonary density,  multifocal hepatic hypodensities and few prominent pelvic lymph nodes.    Impression point #2 may be read as :      1b. No hydronephrosis and no suspicious urothelial mass in the  opacified bilateral renal pelvicalyceal system, right ureter and  proximal left ureter. Limited evaluation of the urinary bladder due to  underdistention, distal left ureter is not opacified on delayed phase  limiting evaluation.            Assessment and Plan:   73 year old is a pleasant female who has hx of microhematuria with negative workup, hx of kidney stones and hx  complicated by breast cancer.     Plan:  -  Discussed referral to stone specialist for stone management and stone prevention, patient would like referral. Will schedule with Laure Mcmullen CNP or Venessa Westbrook CNP. Patient prefers female providers. Educated patient about red flag symptoms such as flank pain, gross hematuria, worsening urgency/frequency, abdominal pain to go to ED as may be signs of acute stone.   -  unremarkable hematuria workup. Recommend repeating UA/micro annually which can be done with PCP and if noted increased RBC in future consider repeating workup at that time or if RBC persists over next 2-3 years. If develops gross hematuria in the future recommend repeating workup.  - continue to follow with oncology.   - After discussing the assessment and plan with patient, patient verbalizes understanding and agrees to the above plan. All questions answered.     13 minutes spent on the date of the encounter doing chart review, review of imaging, review of labs, review of Dr. Romero's note/cysto note, review of test results, interpretation of tests, patient visit and documentation.      Maya Pang PA-C  Urology  July 22, 2024      Patient Care Team:  Lizbeth Hein PA-C as PCP - General (Family Medicine)  Alexandra Copeland MD as MD (Radiation Oncology)  Crystal Laguerre MD as Assigned Cancer Care Provider  Sherita Uribe MD as Assigned Surgical Provider  Lizbeth Hein PA-C as Assigned PCP  Chai Kauffman MD as MD (Family Medicine)  Maya Pang PA-C as Physician Assistant  LIZBETH HEIN

## 2024-08-07 ENCOUNTER — TELEPHONE (OUTPATIENT)
Dept: ONCOLOGY | Facility: CLINIC | Age: 74
End: 2024-08-07
Payer: COMMERCIAL

## 2024-08-07 NOTE — TELEPHONE ENCOUNTER
Spoke with her spouse. Asked for Priscilla to call us back so we can place her with a new oncologist.     Dhara

## 2024-08-11 NOTE — PROGRESS NOTES
Urology Video Office Visit    Video-Visit Details    Type of service:  Video Visit    Video Start Time: 1324    Video End Time: 1355    Originating Location (pt. Location): Home    Distant Location (provider location):  Off-site     Platform used for Video Visit: PromoteU           Assessment and Plan:     Assessment: 73 year old female with a left 2-3mm nonobstructing renal stone.     Plan:  -Reviewed CT scan with patient. Noted left nonobstructing renal stone.   -The patient and I discussed the diagnosis and natural history of urolithiasis.   -We discussed some general measures to prevent recurrent kidney stones.  These include fluid intake to achieve 2.5 liters of urine per day, decreased salt intake, normal calcium intake, lowering animal protein intake, avoiding high amounts of oxalate containing foods, and increased citrate intake with orange juice/lemonade.  -RTC in 1 year with CT scan or sooner GUILLAUME Westbrook CNP  Department of Urology  August 11, 2024    I spent a total of 30 minutes spent on the date of the encounter doing chart review, history and exam, documentation, and further activities as noted above.          Chief Complaint:   Nephrolithiasis         History of Present Illness:    Marixa Ann is a pleasant 73 year old female who presents with concerns of a nephrolithiasis.     Ms. Ann was previously seen with Sybil and Dr. Mancini for concerns of microscopic hematuria. Work up was negative.     CT Urogram on 12/14/23 (images personally reviewed) revealed one nonobstructing 2-3mm left renal stones. No noted left hydronephrosis. No noted right hydronephrosis or nephrolithiasis.     She is doing well at this time. Denies any s/s of kidney stones.     This is her first kidney stone. Denies any family history of nephrolithiasis.     Stone Risk Factors: DMII    She will like to drink coffee and Vitamin Water.   Does like to eat saltines. Does not tolerate milk/some dairy products.           Past Medical History:     Past Medical History:   Diagnosis Date    ADHD     Breast cancer (H) 12/26/2018    Right Breast    Chronic fatigue     Fibromyalgia     Hard of hearing     Osteoporosis     S/P radiation therapy     4,256 cGy to right breast completed on 03/11/2019 - [facility] with Dr. Copeland            Past Surgical History:     Past Surgical History:   Procedure Laterality Date    APPENDECTOMY  1967    BIOPSY BREAST NEEDLE LOCALIZATION Right 01/10/2019    Procedure: WIRE LOCALIZED RIGHT BREAST LUMPECTOMY WITH RIGHT SLNB;  Surgeon: Gema Diamond MD;  Location: [facility] OR    BREAST BIOPSY, CORE RT/LT Right 12/26/2018    CATARACT IOL, RT/LT Bilateral 2018    with lens implants per patient    CHOLECYSTECTOMY  05/24/2019    THYROID BIOPSY  01/31/2019    ZZC LAP RPR INCISIONAL HERNIA  05/2023            Medications     Current Outpatient Medications   Medication Sig Dispense Refill    acetaminophen (TYLENOL) 325 MG tablet Take 325-650 mg by mouth every 6 hours as needed for mild pain      amphetamine-dextroamphetamine (ADDERALL) 15 MG tablet Take 1 tablet (15 mg) by mouth 2 times daily Teva Brand 60 tablet 0    B Complex Vitamins (VITAMIN B-COMPLEX PO) Take by mouth daily      calcium carbonate (TUMS) 500 MG chewable tablet Take 1 chew tab by mouth 2 times daily      Calcium-Magnesium-Zinc 333-133-5 MG TABS per tablet Take 1 tablet by mouth daily      Cholecalciferol (VITAMIN D-3 PO) Vitamin D is included with multiple vitamin, patient does not take additional. Marjorie Crooks LPN on 7/26/2019 at 3:02 PM      cyclobenzaprine (FLEXERIL) 10 MG tablet Take 1 tablet (10 mg) by mouth 2 times daily as needed for muscle spasms 30 tablet 0    denosumab (PROLIA) 60 MG/ML SOSY injection       magnesium oxide 200 MG TABS Take 100 mg by mouth once as needed      Multiple Vitamins-Minerals (MULTIVITAMIN PO)       vitamin B-12 (CYANOCOBALAMIN) 100 MCG tablet Take 1,000 mcg by mouth daily       Current  Facility-Administered Medications   Medication Dose Route Frequency Provider Last Rate Last Admin    cross-Linked Hyaluronate (GEL-ONE) injection PRSY 30 mg  30 mg   Fam Atkins Abdi, DO   30 mg at 20 1425    cross-Linked Hyaluronate (GEL-ONE) injection PRSY 30 mg  30 mg   Fam Atkins Abdi, DO   30 mg at 20 1425    cross-Linked Hyaluronate (GEL-ONE) injection PRSY 30 mg  30 mg   Fam Atkins Abdi, DO   30 mg at 19 1525    cross-Linked Hyaluronate (GEL-ONE) injection PRSY 30 mg  30 mg   Fam Atkins Abdi, DO   30 mg at 19 1525    triamcinolone (KENALOG-40) injection 80 mg  80 mg INTRA-ARTICULAR Once Fam Atkins, DO                Family History:     Family History   Problem Relation Age of Onset    Macular Degeneration Mother          age 92    Leukemia Father     Unknown/Adopted Sister     Unknown/Adopted Brother     Breast Cancer Cousin 50        Maternal Female 1st Cousin    Diabetes Paternal Aunt     Colon Cancer No family hx of             Social History:     Social History     Socioeconomic History    Marital status:      Spouse name: Not on file    Number of children: Not on file    Years of education: Not on file    Highest education level: Not on file   Occupational History    Not on file   Tobacco Use    Smoking status: Never     Passive exposure: Never    Smokeless tobacco: Never   Vaping Use    Vaping status: Never Used   Substance and Sexual Activity    Alcohol use: No    Drug use: No    Sexual activity: Not Currently     Partners: Male     Birth control/protection: None   Other Topics Concern    Parent/sibling w/ CABG, MI or angioplasty before 65F 55M? No   Social History Narrative    Not on file     Social Determinants of Health     Financial Resource Strain: Low Risk  (2024)    Financial Resource Strain     Within the past 12 months, have you or your family members you live with been unable to get utilities (heat, electricity) when it was really needed?: No    Food Insecurity: Low Risk  (5/17/2024)    Food Insecurity     Within the past 12 months, did you worry that your food would run out before you got money to buy more?: No     Within the past 12 months, did the food you bought just not last and you didn t have money to get more?: No   Transportation Needs: Unknown (5/17/2024)    Transportation Needs     Within the past 12 months, has lack of transportation kept you from medical appointments, getting your medicines, non-medical meetings or appointments, work, or from getting things that you need?: Patient declined   Physical Activity: Unknown (5/17/2024)    Exercise Vital Sign     Days of Exercise per Week: 0 days     Minutes of Exercise per Session: Not on file   Stress: Stress Concern Present (5/17/2024)    [employer] [place] [employer] - Occupational Stress Questionnaire     Feeling of Stress : To some extent   Social Connections: Unknown (5/17/2024)    Social Connection and Isolation Panel [NHANES]     Frequency of Communication with Friends and Family: Not on file     Frequency of Social Gatherings with Friends and Family: Never     Attends Jain Services: Not on file     Active Member of Clubs or Organizations: Not on file     Attends Club or Organization Meetings: Not on file     Marital Status: Not on file   Interpersonal Safety: Low Risk  (5/17/2024)    Interpersonal Safety     Do you feel physically and emotionally safe where you currently live?: Yes     Within the past 12 months, have you been hit, slapped, kicked or otherwise physically hurt by someone?: No     Within the past 12 months, have you been humiliated or emotionally abused in other ways by your partner or ex-partner?: No   Housing Stability: Low Risk  (5/17/2024)    Housing Stability     Do you have housing? : Yes     Are you worried about losing your housing?: No            Allergies:   Vicodin [hydrocodone-acetaminophen], Dilaudid [hydromorphone], Oxycodone, Dust mites, and  Milk [milk (cow)]         Review of Systems:  From intake questionnaire   Negative 14 system review except as noted on HPI, nurse's note.         Physical Exam:   General Appearance: Well groomed, hygenic  Eyes: No redness, discharge  Respiratory: No cough, no respiratory distress or labored breathing  Musculoskeletal: Grossly normal, full range of motion in upper extremities, no gross deficits  Skin: No discoloration or apparent rashes  Neurologic - No tremors  Psychiatric - Alert and oriented  The rest of a comprehensive physical examination is deferred due to video visit restrictions        Labs:    I personally reviewed all applicable laboratory data and went over findings with patient  Significant for:    CBC RESULTS:  Recent Labs   Lab Test 05/17/24  1457 09/27/23  1135 08/11/23  1447 05/15/23  1422 04/04/23  1336   WBC 7.1 7.2 7.1  --  7.3   HGB 11.1* 11.2* 11.5* 11.5* 12.4    311 362  --  331        BMP RESULTS:  Recent Labs   Lab Test 05/17/24  1457 02/20/24  1025 12/14/23  1143 09/27/23  1135 08/22/23  1013 08/09/21  1211 02/08/21  1426 08/06/20  1206 10/24/19  1155 05/29/19  1112    143  --  140 143   < > 141 142 141 139   POTASSIUM 4.5 3.8  --  4.3 3.6   < > 3.8 4.0 4.1 3.9   CHLORIDE 103 105  --  103 104   < > 108 109 109 105   CO2 27 26  --  23 27   < > 30 30 30 30   ANIONGAP 10 12  --  14 12   < > 3 3 2* 4   GLC 82 104*  --  93 113*   < > 104* 89 96 103*   BUN 13.7 13.4  --  12.1 15.9   < > 18 20 10 15   CR 0.67 0.73 0.6 0.66 0.73   < > 0.62 0.76 0.62 0.64   GFRESTIMATED >90 87 >60 >90 88   < > >90 80 >90 >90   GFRESTBLACK  --   --   --   --   --   --  >90 >90 >90 >90   JONATHON 9.8 9.7  --  9.3 9.9   < > 9.2 9.0 9.2 9.3    < > = values in this interval not displayed.       UA RESULTS:   Recent Labs   Lab Test 01/22/24  1121 12/14/23  1104 11/27/23  1029   SG 1.013 1.015 1.021   URINEPH 5.5 6.5 6.0   NITRITE Negative Negative Negative   RBCU 2-5* 2-5* 2-5*   WBCU None Seen 0-5 0-5        CALCIUM RESULTS  Lab Results   Component Value Date    JONATHON 9.8 05/17/2024    JONATHON 9.7 02/20/2024    JONATHON 9.3 09/27/2023    JONATHON 9.2 02/08/2021    JONATHON 9.0 08/06/2020    JONATHON 9.2 10/24/2019           Imaging:    I personally reviewed all applicable imaging and went over the below findings with patient.    Impression    IMPRESSION: Mild uncomplicated diverticulitis of the distal descending colon within the left lower quadrant. No abscess. No free fluid. The  Hiatal hernia unchanged.  Cholecystectomy.    REPORT SIGNED BY DR. Yana Huddleston  Narrative    EXAM: CT ABDOMEN & PELVIS W/O ORAL W IV CON    DATE: 1/2/2024 10:38 AM    CLINICAL DATA: Left lower quadrant pain    ICD 10:    COMPARISON: 5/23/2019    TECHNIQUE: Helical images were obtained through the abdomen and pelvis after IV contrast administration.  Coronal and sagittal reformatted images were created.      CONTRAST: IOHEXOL 350 MGI/ML    Dose Given: 88 mL    FINDINGS:  The heart size is normal. There is no pericardial effusion. There is a moderate to large hiatal hernia involving the fundus of the stomach.    Postsurgical changes consistent with cholecystectomy. The portal vein is patent. There are simple cysts of the left hepatic lobe.    The adrenal glands, pancreas, spleen and kidneys are normal.    There is short segment inflammation of the distal descending colon within the left lower quadrant associated with diverticula consistent with mild uncomplicated diverticulitis. There is no free fluid. There is no abscess.    No dilated bowel loops to suggest bowel obstruction. The multiple cecum is in the right upper quadrant.    Mild idiopathic scoliosis.  Exam End: 01/02/24 10:45 AM    Specimen Collected: 01/02/24 10:46 AM Last Resulted: 01/02/24 10:57 AM   Received From: [facility]  Result Received: 01/12/24  2:35 PM     Impression point #2 may be read as :      1b. No hydronephrosis and no suspicious urothelial mass in the  opacified bilateral  renal pelvicalyceal system, right ureter and  proximal left ureter. Limited evaluation of the urinary bladder due to  underdistention, distal left ureter is not opacified on delayed phase  limiting evaluation.     MILENA MEHTA MD         SYSTEM ID:  [id number]   Addended by Milena Mehta MD on 1/24/2024  4:17 AM     Study Result    Narrative & Impression   EXAMINATION: CT UROGRAM WO & W CONTRAST, 12/14/2023 12:10 PM     TECHNIQUE:  Helical CT images from the lung bases through the  symphysis pubis were obtained  with IV contrast. Contrast dose: 96  isovue 370     COMPARISON: CT abdomen 3/13/2023 and MRI 4/10/2023     HISTORY: microscopic hematuria; Microscopic hematuria     FINDINGS:     Genitourinary: 3 mm non-obstructive calculus in the midpole of left  kidney (series 3, image 144). Simple cyst in the midpole of left  kidney measuring 14 mm (series 6, image 104). Subcentimeter cyst the  inferior right kidney (series 7, image 57). Additional bilateral renal  cortical hypodensities, too small to characterize, similar to prior CT  from 3/30/2023, statistically favored to represent cysts. No abnormal  urothelial enhancement involving the pelvicalyceal system or ureters.  No filling defect seen in bilateral pelvicalyceal system on delayed  phase and in the opacified right and proximal left ureter. Distal left  ureter is not opacified on delayed phase. Limited evaluation of the  urinary bladder due to under distention and partial dependent contrast  filling on delayed phase, no obvious mass identified. No pelvic mass.     Gastrointestinal: Colonic diverticulosis. No definite diverticulitis.  Redundant right colon. No significant small bowel loop dilatation.  Appendix is surgically absent. Moderate hiatal hernia.     Hepatobiliary: Multiple hepatic hypodensities, similar to CT from  3/30/2023,, largest measuring 2 cm in segment 3 (series 7, image 31),  better characterized on MRI l from 4/10/2023. No new focal  liver  lesion. Postcholecystectomy. Mild prominence of the common bile duct,  similar to prior, measuring 8 mm favored to represent  postcholecystectomy reservoir effect  Adrenal glands: Within normal limits.  Pancreas: No pancreatic ductal dilatation. Similar 6 mm (series 6,  image 115) hypodensity in the pancreatic body, characterized as IPMN  on recent MRI from 4/10/2023. No new focal pancreatic lesion.  Spleen: No focal splenic lesion.  Peritoneum, retroperitoneum and mesentery: Patent major abdominal  vasculature. No abdominal aortic aneurysm. Similar subcentimeter  para-aortic lymph nodes (series 6, image 159) similar subcentimeter  right common iliac lymph nodes (series 6, image 256); right external  iliac subcentimeter lymph nodes (series 6, image 314). Similar  enlarged right external iliac lymph node measuring 1 cm (series 6,  image 336). Similar left iliac and pelvic subcentimeter sized lymph  nodes.  No pneumoperitoneum or significant free fluid.  Osseous structures and soft tissue: No aggressive osseous lesion.  Small fat-containing right inguinal hernia. Multilevel degenerative  changes of the spine.  Lower chest: Mild groundglass density in the left lower lung, favor  subsegmental atelectasis. Similar subcentimeter nodular density along  the left oblique fissure (series 6, image 29). Mild elevation of the  left hemidiaphragm. Post repair changes in the periumbilical anterior  abdominal wall subcutaneous soft tissue                                                                      IMPRESSION:         1a. 3 mm nonobstructive left renal calculus.   1b. No hydronephrosis, suspicious  urothelial mass in the opacified  bilateral renal pelvicalyceal system, right ureter and proximal left  ureter. Limited evaluation of the urinary bladder due to  underdistention, distal left ureter is not opacified on delayed phase  limiting evaluation.  1c. Similar size of renal cysts and too small to characterize  renal  hypodensities compared to CT from 3/30/2023. No new focal renal  lesion.  2. Postsurgical changes of ventral hernia repair. Additional findings  are similar to prior CT 3/30/2023 as described above including  moderate hiatal hernia, subcentimeter lingular pulmonary density,  multifocal hepatic hypodensities and few prominent pelvic lymph nodes.     VLAD GALEANA MD

## 2024-08-12 ENCOUNTER — VIRTUAL VISIT (OUTPATIENT)
Dept: UROLOGY | Facility: CLINIC | Age: 74
End: 2024-08-12
Payer: COMMERCIAL

## 2024-08-12 VITALS — WEIGHT: 180 LBS | BODY MASS INDEX: 33.99 KG/M2 | HEIGHT: 61 IN

## 2024-08-12 DIAGNOSIS — N20.0 NEPHROLITHIASIS: Primary | ICD-10-CM

## 2024-08-12 PROCEDURE — 99203 OFFICE O/P NEW LOW 30 MIN: CPT | Mod: 95 | Performed by: NURSE PRACTITIONER

## 2024-08-12 ASSESSMENT — PAIN SCALES - GENERAL: PAINLEVEL: NO PAIN (0)

## 2024-08-12 NOTE — PATIENT INSTRUCTIONS
UROLOGY CLINIC VISIT PATIENT INSTRUCTIONS    If you have any issues, questions or concerns in the meantime, do not hesitate to contact us at [facility] at [phone] or via HelpMeNow.     Venessa Westbrook CNP  Department of Urology       DIET & LIFESTYLE CHANGES FOR PATIENTS WITH KIDNEY STONES    If you've had a kidney stone, you are likely to form another. Risk of recurrence is 15% at 1 year, 35% to 40% at 2 years, and 50% at 10 years. Therefore, prevention is very important.   These recommendations have shown to be effective.    CALCIUM STONES (Oxalate and Phosphate)    Fluid intake is the most important prevention measure to help prevent stones. Fluid intake should be at least 2.5 liters per day or 90-120oz per day. With goal of urine output of >2.5L per day.   Increasing liquids that have citric acid may help such as low calorie orange juice, lemonade (Crystal Light Lemonade or True Lemon/Lime), or adding a citrus to your water.  You can add lemon juice or fresh leyla to your water daily.  Lemon juice increases the citrate in your urine, and helps decrease the formation of stone and even breakdown certain types of stones. Add a cap full/teaspoon of pure lemon juice to each glass.   Try to limit sugar, especially if you have diabetes.    Helpful Fluid Measurements:  1 liter = 34oz  1 quart = 32 oz  24 pack water: Each bottle 16.9 oz     Low Oxalate Diet: Limit your consumption of OXALATE-rich foods including:  All nuts and nut products including peanuts, almonds,peanut butter, almond milk  Spinach  Rhubarb  Beets  Chocolate  Soybeans and soy products   Wheat Germ  Website:   [email or web address]       Low Sodium Diet: Salt (sodium chloride) is found in abundance in many foods. High sodium levels in the urine can interfere with the kidney's handling of calcium.   Trying a DASH (Dietary Approaches to Stop Hypertension) diet which is eating more fruits and vegetables, limiting salt intake,  moderate in low-fat diary products, and low in animal protein.   Try to decrease salt intake to <2300 mg of sodium daily.     Tips for reducing the salt in your diet:  Don't use salt at the table  Reduce the salt used in food preparation. Try 1/2 teaspoon when recipes call for 1 teaspoon.  Use herbs and spices for flavoring instead of salt.  Avoid salty foods.  Check the label before you buy or use a product. Note sodium and portion size information.  Try to consume less than 2,000 mg/day. (1 teaspoon = 2,000 mg)    Foods with high sodium content include:  Processed meat (including luncheon meats, sausage)   Crackers   Instant cereal   Processed cheese   Canned soups   Chips and snack foods   Soy sauce    Low Animal Protein: Reduce animal protein (meat) intake to no more than 8-10 ounces per day.     Maintain a normal calcium diet: Calcium rich foods are encouraged, but no more than 1000 - 1200 mg per day. Researches have found that people with low calcium intakes tend to have more stones. Foods with high calcium content are acceptable and include:  Calcium rich foods include:   Diary (cheese, milk, and yogurt)  Enriched cereals  Meat and fish  Dark green vegetables  Non-Dairy Calcium Sources  Fortified Coconut, Rice, Flax, Oat milk  (avoid almond milk)  Calcium fortified Orange Juice  - look for a low sugar/light variety  Canned sardines, canned pink salmon with bones  - look for low sodium options  Fortified cereals  Oatmeal  Broccoli, peas, chinese cabbage/bok kolby, Kale, mustard greens, pistachio nuts, mung beans, red kidney beans     Limit Vitamin C intake to < 1000 mg daily.    Increase fruit and vegetables to 5 servings per day.    Consultation with a dietician may be helpful as well.  Please let our staff know if you are interested in this helpful option so a consult may be placed for you.

## 2024-08-12 NOTE — NURSING NOTE
Current patient location: [id number] [facility] [address]    Is the patient currently in the state of [place]? YES    Visit mode:VIDEO    If the visit is dropped, the patient can be reconnected by: VIDEO VISIT: Send to e-mail at: [email or web address]    Will anyone else be joining the visit? NO  (If patient encounters technical issues they should call [phone]    How would you like to obtain your AVS? MyChart    Are changes needed to the allergy or medication list? No    Are refills needed on medications prescribed by this physician? NO    Rooming Documentation:  Patient declined to complete questionnaire(s)      Reason for visit: RECHECK    Marguerite CHARLTON

## 2024-08-26 ENCOUNTER — ANCILLARY PROCEDURE (OUTPATIENT)
Dept: BONE DENSITY | Facility: CLINIC | Age: 74
End: 2024-08-26
Attending: INTERNAL MEDICINE
Payer: COMMERCIAL

## 2024-08-26 DIAGNOSIS — M81.8 OTHER OSTEOPOROSIS WITHOUT CURRENT PATHOLOGICAL FRACTURE: ICD-10-CM

## 2024-08-26 PROCEDURE — 77080 DXA BONE DENSITY AXIAL: CPT | Performed by: RADIOLOGY

## 2024-09-05 NOTE — PROGRESS NOTES
[facility] Dermatology Note  Encounter Date: Sep 11, 2024  Office Visit     Reviewed patients past medical history and pertinent chart review prior to patients visit today.     Dermatology Problem List:  Last skin check: 09/11/2024    1. History of NMSC   - BCC - right nasal sidewall, s/p MMS 2/24/21  2. History of benign biopsy  - Osteoma cutis, left temple, s/p punch bx 2/9/21  3. Seborrheic keratosis   4. Junctional DN, moderate to severe atypia,  left mid forearm, s/p shave bx 12/07/23, s/p excision 2/27/24      Family Hx: No family history of skin cancer.   ____________________________________________    Assessment & Plan:     # Personal history of nonmelanoma skin cancer  - No signs of recurrence. Continued observation recommended.   - Sun protection: Counseled SPF 30+ sunscreen, UPF clothing, sun avoidance, tanning bed avoidance.    # Multiple nevi, trunk and extremities  # Solar lentigines  - Nevi demonstrate no concerning features on dermoscopy. We discussed the importance of self exams at home.   - ABCDEs: Counseled ABCDEs of melanoma: Asymmetry, Border (irregularity), Color (not uniform, changes in color), Diameter (greater than 6 mm which is about the size of a pencil eraser), and Evolving (any changes in preexisting moles).    # Cherry angiomas  # Seborrheic keratoses  - We discussed the benign nature of the skin lesions. No treatment required. Continued observation recommended. Follow up with any concerns.        Follow-up:  Annual for follow up full body skin exam, as needed for new or changing lesions or new concerns    All risks, benefits and alternatives were discussed with patient.  Patient is in agreement and understands the assessment and plan.  All questions were answered.  Barbara Darling PA-C  [facility] Dermatology    ____________________________________________    CC: Skin Check (FBSC- left upper posterior thigh. )    HPI:  MsEdenilson Marixa Ann is a(n) 73 year  old female who presents today as a return patient for a full body skin cancer screening. The patient has a history of nonmelanoma skin cancer. The patient was last seen in dermatology 02/27/2024. Today, the patient reports a concern with a lesion on the left upper posterior thigh. No other cutaneous concerns. Patient reports being diligent with photoprotection.      Physical Exam:  Vitals: LMP 05/31/2003 (Approximate)    SKIN: Total skin excluding the genitalia areas was performed. The exam included the scalp, face, neck, bilateral arms, chest, back, abdomen, bilateral legs, digits, mons pubis, buttocks, and nails.   - Benitez II.  -left posterior upper posterior thigh, waxy, stuck on tan to brown papule   - Multiple tan/brown macules and papules scattered throughout exam, consistent with benign nevi. No concerning features on dermoscopy.   - Scattered tan, homogenous macules scattered on sun exposed skin, consistent with solar lentigines.   - Scattered waxy, stuck on appearing papules and patches, consistent with seborrheic keratoses.  - Several 1-2 mm red dome shaped symmetric papules, consistent with cherry angiomas.   - No other lesions of concern on areas examined.     Medications:  Current Outpatient Medications   Medication Sig Dispense Refill    acetaminophen (TYLENOL) 325 MG tablet Take 325-650 mg by mouth every 6 hours as needed for mild pain      amphetamine-dextroamphetamine (ADDERALL) 15 MG tablet Take 1 tablet (15 mg) by mouth 2 times daily Teva Brand 60 tablet 0    B Complex Vitamins (VITAMIN B-COMPLEX PO) Take by mouth daily      calcium carbonate (TUMS) 500 MG chewable tablet Take 1 chew tab by mouth 2 times daily      Calcium-Magnesium-Zinc 333-133-5 MG TABS per tablet Take 1 tablet by mouth daily      Cholecalciferol (VITAMIN D-3 PO) Vitamin D is included with multiple vitamin, patient does not take additional. Marjorie Crooks LPN on 7/26/2019 at 3:02 PM      cyclobenzaprine (FLEXERIL) 10 MG  tablet Take 1 tablet (10 mg) by mouth 2 times daily as needed for muscle spasms 30 tablet 0    denosumab (PROLIA) 60 MG/ML SOSY injection       magnesium oxide 200 MG TABS Take 100 mg by mouth once as needed      Multiple Vitamins-Minerals (MULTIVITAMIN PO)       vitamin B-12 (CYANOCOBALAMIN) 100 MCG tablet Take 1,000 mcg by mouth daily       Current Facility-Administered Medications   Medication Dose Route Frequency Provider Last Rate Last Admin    cross-Linked Hyaluronate (GEL-ONE) injection PRSY 30 mg  30 mg   Fam Atkins DO   30 mg at 09/28/20 1425    cross-Linked Hyaluronate (GEL-ONE) injection PRSY 30 mg  30 mg   Fam Atkins Abdi, DO   30 mg at 09/28/20 1425    cross-Linked Hyaluronate (GEL-ONE) injection PRSY 30 mg  30 mg   Fam Atkins, DO   30 mg at 09/05/19 1525    cross-Linked Hyaluronate (GEL-ONE) injection PRSY 30 mg  30 mg   Fam Atkins, DO   30 mg at 09/05/19 1525    triamcinolone (KENALOG-40) injection 80 mg  80 mg INTRA-ARTICULAR Once Fam Atkins, DO          Past Medical History:   Patient Active Problem List   Diagnosis    Moderate recurrent major depression (H)    Hyperlipidemia LDL goal <160    Chronic fatigue disorder    Liver lesion    Cystic disease of liver    Gastroesophageal reflux disease with esophagitis    Hiatal hernia    Age-related cataract of both eyes, unspecified age-related cataract type    Primary osteoarthritis of both knees    Attention deficit hyperactivity disorder (ADHD), predominantly inattentive type    Malignant neoplasm of upper-inner quadrant of right breast in female, estrogen receptor positive (H)    Thyroid nodule    Anxiety disorder, unspecified type    Osteoporosis    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    Aromatase inhibitor use    Cervical cancer screening    History of cholecystectomy    Morbid obesity (H)    Fibromyalgia    Insomnia, unspecified type     Past Medical History:   Diagnosis Date    ADHD     Breast cancer (H) 12/26/2018    Right Breast     Chronic fatigue     Fibromyalgia     Hard of hearing     Osteoporosis     S/P radiation therapy     4,256 cGy to right breast completed on 03/11/2019 - [facility] with Dr. Min Uribe MD  [address] [place] [id number] on close of this encounter.

## 2024-09-06 ENCOUNTER — ONCOLOGY VISIT (OUTPATIENT)
Dept: ONCOLOGY | Facility: CLINIC | Age: 74
End: 2024-09-06
Payer: COMMERCIAL

## 2024-09-06 ENCOUNTER — INFUSION THERAPY VISIT (OUTPATIENT)
Dept: INFUSION THERAPY | Facility: CLINIC | Age: 74
End: 2024-09-06
Attending: NURSE PRACTITIONER
Payer: COMMERCIAL

## 2024-09-06 ENCOUNTER — LAB (OUTPATIENT)
Dept: INFUSION THERAPY | Facility: CLINIC | Age: 74
End: 2024-09-06
Payer: COMMERCIAL

## 2024-09-06 VITALS
HEART RATE: 83 BPM | HEIGHT: 60 IN | SYSTOLIC BLOOD PRESSURE: 132 MMHG | DIASTOLIC BLOOD PRESSURE: 90 MMHG | OXYGEN SATURATION: 96 % | BODY MASS INDEX: 35.15 KG/M2

## 2024-09-06 DIAGNOSIS — C50.211 MALIGNANT NEOPLASM OF UPPER-INNER QUADRANT OF RIGHT BREAST IN FEMALE, ESTROGEN RECEPTOR POSITIVE (H): Primary | ICD-10-CM

## 2024-09-06 DIAGNOSIS — Z17.0 MALIGNANT NEOPLASM OF UPPER-INNER QUADRANT OF RIGHT BREAST IN FEMALE, ESTROGEN RECEPTOR POSITIVE (H): ICD-10-CM

## 2024-09-06 DIAGNOSIS — E11.9 TYPE 2 DIABETES MELLITUS WITHOUT COMPLICATION, WITHOUT LONG-TERM CURRENT USE OF INSULIN (H): ICD-10-CM

## 2024-09-06 DIAGNOSIS — M81.8 OTHER OSTEOPOROSIS WITHOUT CURRENT PATHOLOGICAL FRACTURE: ICD-10-CM

## 2024-09-06 DIAGNOSIS — Z17.0 MALIGNANT NEOPLASM OF UPPER-INNER QUADRANT OF RIGHT BREAST IN FEMALE, ESTROGEN RECEPTOR POSITIVE (H): Primary | ICD-10-CM

## 2024-09-06 DIAGNOSIS — C50.211 MALIGNANT NEOPLASM OF UPPER-INNER QUADRANT OF RIGHT BREAST IN FEMALE, ESTROGEN RECEPTOR POSITIVE (H): ICD-10-CM

## 2024-09-06 DIAGNOSIS — Z79.811 AROMATASE INHIBITOR USE: ICD-10-CM

## 2024-09-06 DIAGNOSIS — Z12.31 ENCOUNTER FOR SCREENING MAMMOGRAM FOR BREAST CANCER: ICD-10-CM

## 2024-09-06 LAB
ALBUMIN SERPL BCG-MCNC: 3.9 G/DL (ref 3.5–5.2)
ALP SERPL-CCNC: 110 U/L (ref 40–150)
ALT SERPL W P-5'-P-CCNC: 6 U/L (ref 0–50)
ANION GAP SERPL CALCULATED.3IONS-SCNC: 10 MMOL/L (ref 7–15)
AST SERPL W P-5'-P-CCNC: 19 U/L (ref 0–45)
BILIRUB SERPL-MCNC: 0.3 MG/DL
BUN SERPL-MCNC: 11.3 MG/DL (ref 8–23)
CALCIUM SERPL-MCNC: 9.5 MG/DL (ref 8.8–10.4)
CHLORIDE SERPL-SCNC: 105 MMOL/L (ref 98–107)
CREAT SERPL-MCNC: 0.7 MG/DL (ref 0.51–0.95)
EGFRCR SERPLBLD CKD-EPI 2021: >90 ML/MIN/1.73M2
GLUCOSE SERPL-MCNC: 93 MG/DL (ref 70–99)
HBA1C MFR BLD: 5.9 % (ref 0–5.6)
HCO3 SERPL-SCNC: 28 MMOL/L (ref 22–29)
HOLD SPECIMEN: NORMAL
HOLD SPECIMEN: NORMAL
POTASSIUM SERPL-SCNC: 3.9 MMOL/L (ref 3.4–5.3)
PROT SERPL-MCNC: 6.8 G/DL (ref 6.4–8.3)
SODIUM SERPL-SCNC: 143 MMOL/L (ref 135–145)

## 2024-09-06 PROCEDURE — 99214 OFFICE O/P EST MOD 30 MIN: CPT

## 2024-09-06 PROCEDURE — 80053 COMPREHEN METABOLIC PANEL: CPT

## 2024-09-06 PROCEDURE — 250N000011 HC RX IP 250 OP 636

## 2024-09-06 PROCEDURE — 83036 HEMOGLOBIN GLYCOSYLATED A1C: CPT

## 2024-09-06 PROCEDURE — 99207 PR NO CHARGE LOS: CPT

## 2024-09-06 PROCEDURE — G0463 HOSPITAL OUTPT CLINIC VISIT: HCPCS | Mod: 25

## 2024-09-06 PROCEDURE — 96372 THER/PROPH/DIAG INJ SC/IM: CPT

## 2024-09-06 PROCEDURE — 36415 COLL VENOUS BLD VENIPUNCTURE: CPT

## 2024-09-06 RX ADMIN — DENOSUMAB 60 MG: 60 INJECTION SUBCUTANEOUS at 13:58

## 2024-09-06 ASSESSMENT — PAIN SCALES - GENERAL: PAINLEVEL: MODERATE PAIN (5)

## 2024-09-06 NOTE — PROGRESS NOTES
Infusion Nursing Note:  Marixa Ann presents today for Prolia.    Patient seen by provider today: Yes Toma RAVI   present during visit today: Not Applicable.          Intravenous Access:  No Intravenous access/labs at this visit.    Treatment Conditions:  Lab Results   Component Value Date     09/06/2024    POTASSIUM 3.9 09/06/2024    MAG 2.0 01/04/2019    CR 0.70 09/06/2024    JONATHON 9.5 09/06/2024    BILITOTAL 0.3 09/06/2024    ALBUMIN 3.9 09/06/2024    ALT 6 09/06/2024    AST 19 09/06/2024       Results reviewed, labs MET treatment parameters, ok to proceed with treatment.      Post Infusion Assessment:  Patient tolerated injection without incident.  Site patent and intact, free from redness, edema or discomfort.       Discharge Plan:   Patient discharged in stable condition accompanied by: .  Departure Mode: Ambulatory.      Kristie Westbrook MA

## 2024-09-06 NOTE — NURSING NOTE
Oncology Rooming Note    September 6, 2024 1:07 PM   Marixa Ann is a 73 year old female who presents for:    Chief Complaint   Patient presents with    Oncology Clinic Visit     Initial Vitals: BP (!) 132/90 (BP Location: Left arm)   Pulse 83   Ht 1.524 m (5')   LMP 05/31/2003 (Approximate)   SpO2 96%   BMI 35.15 kg/m   Estimated body mass index is 35.15 kg/m  as calculated from the following:    Height as of this encounter: 1.524 m (5').    Weight as of 8/12/24: 81.6 kg (180 lb). Body surface area is 1.86 meters squared.  Moderate Pain (5) Comment: Data Unavailable   Patient's last menstrual period was 05/31/2003 (approximate).  Allergies reviewed: Yes  Medications reviewed: No    Medications: Medication refills not needed today.  Pharmacy name entered into EPIC:    WRITTEN PRESCRIPTION REQUESTED  [id number] - [place], [place] - [address]    Frailty Screening:   Is the patient here for a new oncology consult visit in cancer care? 2. No      Clinical concerns: No Concerns        Kristie Westbrook MA

## 2024-09-06 NOTE — PROGRESS NOTES
Oncology Follow Up Visit: September 6, 2024    Oncologist: Dr. Laguerre / TBD  PCP: Katie Hein    Reason for Visit: 6-month follow-up    Diagnosis: R) breast cancer  Marixa Ann is a 74 yo female who had abnormality at 2-4 oclock level of right breaston screening mammogram in 12/2018. Final review proved a 12 x 9 x14 mm invasive ductal carcinoma at 2 oclock to the right breast, Carol grade 1, ER/AR positive, Her2 negative with 0/3 SLN positive and edges free of disease. [id number]  Oncotype score = 4 or 3% risk of disease at 9 years with hormone therapy.     Treatment:  1/10/2019 Right Lumpectomy with SLN biopsy  3/11/2019 completed right breast radiation post lumpectomy.   - choice made to not use Tamoxifen due to risk for DVT (repeatedly elevated d-dimer as well as elevated ESR and CRP) so started pt on arimidex with close bone evaluation    Interval History:  Patient is seen in clinic today for 6-month review. Frustrated and anxious with numerous providers, has seen a different MD/ELZA each visit. Reports her chronic fatigue and ADHD have been worse the past few months and she is afraid her cancer has returned. But also admits she is not sleeping or eating well or physically active. Ongoing pain to her L) knee which was replaced in 10/2023. No additional questions or concerns.     Patient denies any of the following except if noted above: fevers, chills, difficulty with energy, vision or hearing changes, chest pain, dyspnea, abdominal pain, nausea, vomiting, diarrhea, constipation, urinary concerns, headaches, numbness, tingling, issues with sleep or mood. She also denies lumps, bumps, rashes or skin lesions, bleeding or bruising issues.    Physical Exam:  BP (!) 132/90 (BP Location: Left arm)   Pulse 83   Ht 1.524 m (5')   LMP 05/31/2003 (Approximate)   SpO2 96%   BMI 35.15 kg/m     BP Readings from Last 6 Encounters:   09/06/24 (!) 132/90   05/17/24 115/67   02/20/24 116/77   02/20/24 116/77    01/12/24 119/82   09/27/23 107/69     Wt Readings from Last 6 Encounters:   08/12/24 81.6 kg (180 lb)   05/17/24 79.4 kg (175 lb)   02/20/24 78.9 kg (173 lb 15.1 oz)   02/20/24 78.9 kg (173 lb 14.4 oz)   01/12/24 78.9 kg (174 lb)   09/27/23 78.9 kg (174 lb)      Constitutional: Anxious and tearful but pleasant and appropriately groomed.   ENT: PERRLA, sclera without erythema. Appropriate dentition.  Neck: Trachea midline, no adenopathy.   Resp: CTA, adequate depth and rate of respirations.   Cardiac: S1/S2, RRR, no murmurs.   Breasts: Bilateral breasts without palpable masses or tenderness, no nipple discharge. R) breast w/post surgical scarring.   Abdomen: BS active, abdomen soft and non-tender. No masses or organomegaly.   MS:  5/5 muscle strength, adequate ROM.   Skin: No rashes, lesions, or wounds on exposed skin.  Neuro: A/O x 4, sensation intact.   Lymph: No palpable anterior/posterior cervical, axillary, or supraclavicular nodes.   Psych: Appropriate mentation and affect.    Laboratory Results:   Results for orders placed or performed in visit on 09/06/24   Extra Tube     Status: None (In process)    Narrative    The following orders were created for panel order Extra Tube.  Procedure                               Abnormality         Status                     ---------                               -----------         ------                     Extra Serum Separator Tu...[[id number]]                      In process                 Extra Purple Top Tube[[id number]]                            In process                   Please view results for these tests on the individual orders.   Comprehensive metabolic panel     Status: Normal   Result Value Ref Range    Sodium 143 135 - 145 mmol/L    Potassium 3.9 3.4 - 5.3 mmol/L    Carbon Dioxide (CO2) 28 22 - 29 mmol/L    Anion Gap 10 7 - 15 mmol/L    Urea Nitrogen 11.3 8.0 - 23.0 mg/dL    Creatinine 0.70 0.51 - 0.95 mg/dL    GFR Estimate >90 >60 mL/min/1.73m2     Calcium 9.5 8.8 - 10.4 mg/dL    Chloride 105 98 - 107 mmol/L    Glucose 93 70 - 99 mg/dL    Alkaline Phosphatase 110 40 - 150 U/L    AST 19 0 - 45 U/L    ALT 6 0 - 50 U/L    Protein Total 6.8 6.4 - 8.3 g/dL    Albumin 3.9 3.5 - 5.2 g/dL    Bilirubin Total 0.3 <=1.2 mg/dL     I reviewed the above labs today.    Imaging:  DX Hip/Pelvis/Spine  Narrative: HISTORY:    Postmenopausal    COMPARISON:  8/24/2022    Age: 73 years.  Height: 61 inches  Weight: 180 pounds  Sex: Female  Ethnicity: White  Referring Provider: JEFF CRAIN    Image quality: Adequate    Lumbar spine T-score in region of L4 = -2.8   L4 percent change: -11.5%, significant     HIPS:  Mean total hip T-score: -1.8  Mean total hip percent change: -3.4%     Left femoral neck T-score = -2.6  Left femoral neck percent changed = -1.9%  Right femoral neck T-score= -2.6   Right femoral neck percent changed = -2.2%    Radius 33% T-score = -1.4  Radius 33% percent change: 6.2%     COMPARISON TO PRIOR:  Percent change at the L4 level is significant based on a 95%  confidence interval.    FRAX:  10 year probability of major osteoporotic fracture: 15.4%  10 year probability of hip fracture: 4.3%  The 10 year probability of fracture may be lower than reported if the  patient has received treatment. FRAX data should be disregarded in  patient's taking bisphosphonates.    World Health Organization definition of osteoporosis and osteopenia  for  women:   Normal: T-score at or above -1.0  Low Bone Mass (Osteopenia): T-score between -1.0 and -2.5.   Osteoporosis: T-score at or below -2.5   T-scores are reported for postmenopausal women and men over 50 years  of age.  Impression: IMPRESSION:  Osteoporosis. Consider follow-up DEXA in approximately 2  years.     EMILY RODAS MD         SYSTEM ID:  [id number]    I reviewed the above imaging report today.    Assessment and Plan:   Stage 1B Right Breast Cancer ER+[id number] s/p lumpectomy, radiation   -  Completed 5-years use of anastrozole in 2024.   - No concern for reoccurrence based on review of labs and clinical exam.   - Mammogram due 2/2025, ordered today.   - RTC in 6 months for provider review / clinical exam, labs + prolia     Bone health  - Hx of osteoporosis  - Encouraged intake of calcium and vitamin D (pt reports she is not taking supplement), weight bearing exercise as tolerated  - Continues w/prolia q6 months, next dose 2/2025  - DEXA 8/2024 with osteoporosis again noted in lumbar spine and bilateral femoral neck     Knee pain  - S/p L) knee replacement in 10/2023  - Recommended PT referral, patient declined     Pre-diabetes  - Hgb A1c added to today's labs per patient request, last 5.7% in 5/2024  - Follow-up with PCP        Toma TUTTLE, CNP

## 2024-09-06 NOTE — LETTER
9/6/2024      Marixa Ann  [address] [facility] [id number]      Dear Colleague,    Thank you for referring your patient, Marixa Ann, to the [facility]. Please see a copy of my visit note below.    Oncology Follow Up Visit: September 6, 2024    Oncologist: Dr. Laguerre / [facility]  PCP: Katie Hein    Reason for Visit: 6-month follow-up    Diagnosis: R) breast cancer  Marixa Ann is a 72 yo female who had abnormality at 2-4 oclock level of right breaston screening mammogram in 12/2018. Final review proved a 12 x 9 x14 mm invasive ductal carcinoma at 2 oclock to the right breast, Edgard grade 1, ER/AL positive, Her2 negative with 0/3 SLN positive and edges free of disease. [id number]  Oncotype score = 4 or 3% risk of disease at 9 years with hormone therapy.     Treatment:  1/10/2019 Right Lumpectomy with SLN biopsy  3/11/2019 completed right breast radiation post lumpectomy.   - choice made to not use Tamoxifen due to risk for DVT (repeatedly elevated d-dimer as well as elevated ESR and CRP) so started pt on arimidex with close bone evaluation    Interval History:  Patient is seen in clinic today for 6-month review. Frustrated and anxious with numerous providers, has seen a different MD/ELZA each visit. Reports her chronic fatigue and ADHD have been worse the past few months and she is afraid her cancer has returned. But also admits she is not sleeping or eating well or physically active. Ongoing pain to her L) knee which was replaced in 10/2023. No additional questions or concerns.     Patient denies any of the following except if noted above: fevers, chills, difficulty with energy, vision or hearing changes, chest pain, dyspnea, abdominal pain, nausea, vomiting, diarrhea, constipation, urinary concerns, headaches, numbness, tingling, issues with sleep or mood. She also denies lumps, bumps, rashes or skin lesions, bleeding or bruising  issues.    Physical Exam:  BP (!) 132/90 (BP Location: Left arm)   Pulse 83   Ht 1.524 m (5')   LMP 05/31/2003 (Approximate)   SpO2 96%   BMI 35.15 kg/m     BP Readings from Last 6 Encounters:   09/06/24 (!) 132/90   05/17/24 115/67   02/20/24 116/77   02/20/24 116/77   01/12/24 119/82   09/27/23 107/69     Wt Readings from Last 6 Encounters:   08/12/24 81.6 kg (180 lb)   05/17/24 79.4 kg (175 lb)   02/20/24 78.9 kg (173 lb 15.1 oz)   02/20/24 78.9 kg (173 lb 14.4 oz)   01/12/24 78.9 kg (174 lb)   09/27/23 78.9 kg (174 lb)      Constitutional: Anxious and tearful but pleasant and appropriately groomed.   ENT: PERRLA, sclera without erythema. Appropriate dentition.  Neck: Trachea midline, no adenopathy.   Resp: CTA, adequate depth and rate of respirations.   Cardiac: S1/S2, RRR, no murmurs.   Breasts: Bilateral breasts without palpable masses or tenderness, no nipple discharge. R) breast w/post surgical scarring.   Abdomen: BS active, abdomen soft and non-tender. No masses or organomegaly.   MS:  5/5 muscle strength, adequate ROM.   Skin: No rashes, lesions, or wounds on exposed skin.  Neuro: A/O x 4, sensation intact.   Lymph: No palpable anterior/posterior cervical, axillary, or supraclavicular nodes.   Psych: Appropriate mentation and affect.    Laboratory Results:   Results for orders placed or performed in visit on 09/06/24   Extra Tube     Status: None (In process)    Narrative    The following orders were created for panel order Extra Tube.  Procedure                               Abnormality         Status                     ---------                               -----------         ------                     Extra Serum Separator Tu...[[id number]]                      In process                 Extra Purple Top Tube[[id number]]                            In process                   Please view results for these tests on the individual orders.   Comprehensive metabolic panel     Status: Normal   Result  Value Ref Range    Sodium 143 135 - 145 mmol/L    Potassium 3.9 3.4 - 5.3 mmol/L    Carbon Dioxide (CO2) 28 22 - 29 mmol/L    Anion Gap 10 7 - 15 mmol/L    Urea Nitrogen 11.3 8.0 - 23.0 mg/dL    Creatinine 0.70 0.51 - 0.95 mg/dL    GFR Estimate >90 >60 mL/min/1.73m2    Calcium 9.5 8.8 - 10.4 mg/dL    Chloride 105 98 - 107 mmol/L    Glucose 93 70 - 99 mg/dL    Alkaline Phosphatase 110 40 - 150 U/L    AST 19 0 - 45 U/L    ALT 6 0 - 50 U/L    Protein Total 6.8 6.4 - 8.3 g/dL    Albumin 3.9 3.5 - 5.2 g/dL    Bilirubin Total 0.3 <=1.2 mg/dL     I reviewed the above labs today.    Imaging:  DX Hip/Pelvis/Spine  Narrative: HISTORY:    Postmenopausal    COMPARISON:  8/24/2022    Age: 73 years.  Height: 61 inches  Weight: 180 pounds  Sex: Female  Ethnicity: White  Referring Provider: JEFF CRAIN    Image quality: Adequate    Lumbar spine T-score in region of L4 = -2.8   L4 percent change: -11.5%, significant     HIPS:  Mean total hip T-score: -1.8  Mean total hip percent change: -3.4%     Left femoral neck T-score = -2.6  Left femoral neck percent changed = -1.9%  Right femoral neck T-score= -2.6   Right femoral neck percent changed = -2.2%    Radius 33% T-score = -1.4  Radius 33% percent change: 6.2%     COMPARISON TO PRIOR:  Percent change at the L4 level is significant based on a 95%  confidence interval.    FRAX:  10 year probability of major osteoporotic fracture: 15.4%  10 year probability of hip fracture: 4.3%  The 10 year probability of fracture may be lower than reported if the  patient has received treatment. FRAX data should be disregarded in  patient's taking bisphosphonates.    World Health Organization definition of osteoporosis and osteopenia  for  women:   Normal: T-score at or above -1.0  Low Bone Mass (Osteopenia): T-score between -1.0 and -2.5.   Osteoporosis: T-score at or below -2.5   T-scores are reported for postmenopausal women and men over 50 years  of age.  Impression:  IMPRESSION:  Osteoporosis. Consider follow-up DEXA in approximately 2  years.     EMILY RODAS MD         SYSTEM ID:  [id number]    I reviewed the above imaging report today.    Assessment and Plan:   Stage 1B Right Breast Cancer ER+[id number] s/p lumpectomy, radiation   - Completed 5-years use of anastrozole in 2024.   - No concern for reoccurrence based on review of labs and clinical exam.   - Mammogram due 2/2025, ordered today.   - RTC in 6 months for provider review / clinical exam, labs + prolia     Bone health  - Hx of osteoporosis  - Encouraged intake of calcium and vitamin D (pt reports she is not taking supplement), weight bearing exercise as tolerated  - Continues w/prolia q6 months, next dose 2/2025  - DEXA 8/2024 with osteoporosis again noted in lumbar spine and bilateral femoral neck     Knee pain  - S/p L) knee replacement in 10/2023  - Recommended PT referral, patient declined     Pre-diabetes  - Hgb A1c added to today's labs per patient request, last 5.7% in 5/2024  - Follow-up with PCP        Toma Mccarty - MARRY, CNP      Again, thank you for allowing me to participate in the care of your patient.        Sincerely,        MARRY Hidalgo CNP

## 2024-09-11 ENCOUNTER — OFFICE VISIT (OUTPATIENT)
Dept: DERMATOLOGY | Facility: CLINIC | Age: 74
End: 2024-09-11
Attending: DERMATOLOGY
Payer: COMMERCIAL

## 2024-09-11 DIAGNOSIS — D18.01 CHERRY ANGIOMA: ICD-10-CM

## 2024-09-11 DIAGNOSIS — Z85.828 HISTORY OF NONMELANOMA SKIN CANCER: ICD-10-CM

## 2024-09-11 DIAGNOSIS — L82.1 SEBORRHEIC KERATOSES: ICD-10-CM

## 2024-09-11 DIAGNOSIS — D22.9 MULTIPLE BENIGN NEVI: ICD-10-CM

## 2024-09-11 DIAGNOSIS — Z12.83 SCREENING EXAM FOR SKIN CANCER: Primary | ICD-10-CM

## 2024-09-11 DIAGNOSIS — L81.4 SOLAR LENTIGINOSIS: ICD-10-CM

## 2024-09-11 PROCEDURE — 99213 OFFICE O/P EST LOW 20 MIN: CPT | Performed by: STUDENT IN AN ORGANIZED HEALTH CARE EDUCATION/TRAINING PROGRAM

## 2024-09-11 ASSESSMENT — PAIN SCALES - GENERAL: PAINLEVEL: MODERATE PAIN (5)

## 2024-09-11 NOTE — NURSING NOTE
Marixa Ann's chief complaint for this visit includes:  Chief Complaint   Patient presents with    Skin Check     FBSC- left upper posterior thigh.      PCP: Katie Hein    Referring Provider:  Sherita Uribe MD  [address] [place] [address] [address]  [place] [address]    [facility] 05/31/2003 (Approximate)   Moderate Pain (5)        Allergies   Allergen Reactions    Vicodin [Hydrocodone-Acetaminophen] Anaphylaxis    Dilaudid [Hydromorphone] Nausea and Vomiting    Oxycodone Nausea and Vomiting    Dust Mites     Milk [Milk (Cow)] Diarrhea         Do you need any medication refills at today's visit?  No.       Dhara Leonardo RN on 9/11/2024 at 3:26 PM

## 2024-09-11 NOTE — LETTER
9/11/2024      Marixa Ann  [address] [facility] [id number]      Dear Colleague,    Thank you for referring your patient, Marixa Ann, to the [facility]. Please see a copy of my visit note below.    [facility] Dermatology Note  Encounter Date: Sep 11, 2024  Office Visit     Reviewed patients past medical history and pertinent chart review prior to patients visit today.     Dermatology Problem List:  Last skin check: 09/11/2024    1. History of NMSC   - BCC - right nasal sidewall, s/p MMS 2/24/21  2. History of benign biopsy  - Osteoma cutis, left temple, s/p punch bx 2/9/21  3. Seborrheic keratosis   4. Junctional DN, moderate to severe atypia,  left mid forearm, s/p shave bx 12/07/23, s/p excision 2/27/24      Family Hx: No family history of skin cancer.   ____________________________________________    Assessment & Plan:     # Personal history of nonmelanoma skin cancer  - No signs of recurrence. Continued observation recommended.   - Sun protection: Counseled SPF 30+ sunscreen, UPF clothing, sun avoidance, tanning bed avoidance.    # Multiple nevi, trunk and extremities  # Solar lentigines  - Nevi demonstrate no concerning features on dermoscopy. We discussed the importance of self exams at home.   - ABCDEs: Counseled ABCDEs of melanoma: Asymmetry, Border (irregularity), Color (not uniform, changes in color), Diameter (greater than 6 mm which is about the size of a pencil eraser), and Evolving (any changes in preexisting moles).    # Cherry angiomas  # Seborrheic keratoses  - We discussed the benign nature of the skin lesions. No treatment required. Continued observation recommended. Follow up with any concerns.        Follow-up:  Annual for follow up full body skin exam, as needed for new or changing lesions or new concerns    All risks, benefits and alternatives were discussed with patient.  Patient is in agreement and understands the  assessment and plan.  All questions were answered.  Barbara Darling PA-C  [facility] Dermatology    ____________________________________________    CC: Skin Check (FBSC- left upper posterior thigh. )    HPI:  Ms. Marixa Ann is a(n) 73 year old female who presents today as a return patient for a full body skin cancer screening. The patient has a history of nonmelanoma skin cancer. The patient was last seen in dermatology 02/27/2024. Today, the patient reports a concern with a lesion on the left upper posterior thigh. No other cutaneous concerns. Patient reports being diligent with photoprotection.      Physical Exam:  Vitals: LMP 05/31/2003 (Approximate)    SKIN: Total skin excluding the genitalia areas was performed. The exam included the scalp, face, neck, bilateral arms, chest, back, abdomen, bilateral legs, digits, mons pubis, buttocks, and nails.   - Benitez II.  -left posterior upper posterior thigh, waxy, stuck on tan to brown papule   - Multiple tan/brown macules and papules scattered throughout exam, consistent with benign nevi. No concerning features on dermoscopy.   - Scattered tan, homogenous macules scattered on sun exposed skin, consistent with solar lentigines.   - Scattered waxy, stuck on appearing papules and patches, consistent with seborrheic keratoses.  - Several 1-2 mm red dome shaped symmetric papules, consistent with cherry angiomas.   - No other lesions of concern on areas examined.     Medications:  Current Outpatient Medications   Medication Sig Dispense Refill     acetaminophen (TYLENOL) 325 MG tablet Take 325-650 mg by mouth every 6 hours as needed for mild pain       amphetamine-dextroamphetamine (ADDERALL) 15 MG tablet Take 1 tablet (15 mg) by mouth 2 times daily Teva Brand 60 tablet 0     B Complex Vitamins (VITAMIN B-COMPLEX PO) Take by mouth daily       calcium carbonate (TUMS) 500 MG chewable tablet Take 1 chew tab by mouth 2 times daily        Calcium-Magnesium-Zinc 333-133-5 MG TABS per tablet Take 1 tablet by mouth daily       Cholecalciferol (VITAMIN D-3 PO) Vitamin D is included with multiple vitamin, patient does not take additional. Marjorie Crooks LPN on 7/26/2019 at 3:02 PM       cyclobenzaprine (FLEXERIL) 10 MG tablet Take 1 tablet (10 mg) by mouth 2 times daily as needed for muscle spasms 30 tablet 0     denosumab (PROLIA) 60 MG/ML SOSY injection        magnesium oxide 200 MG TABS Take 100 mg by mouth once as needed       Multiple Vitamins-Minerals (MULTIVITAMIN PO)        vitamin B-12 (CYANOCOBALAMIN) 100 MCG tablet Take 1,000 mcg by mouth daily       Current Facility-Administered Medications   Medication Dose Route Frequency Provider Last Rate Last Admin     cross-Linked Hyaluronate (GEL-ONE) injection PRSY 30 mg  30 mg   Fam Atkins, DO   30 mg at 09/28/20 1425     cross-Linked Hyaluronate (GEL-ONE) injection PRSY 30 mg  30 mg   Fam Atkins, DO   30 mg at 09/28/20 1425     cross-Linked Hyaluronate (GEL-ONE) injection PRSY 30 mg  30 mg   Fam Atkins, DO   30 mg at 09/05/19 1525     cross-Linked Hyaluronate (GEL-ONE) injection PRSY 30 mg  30 mg   Fam Atkins, DO   30 mg at 09/05/19 1525     triamcinolone (KENALOG-40) injection 80 mg  80 mg INTRA-ARTICULAR Once Fam Atkins, DO          Past Medical History:   Patient Active Problem List   Diagnosis     Moderate recurrent major depression (H)     Hyperlipidemia LDL goal <160     Chronic fatigue disorder     Liver lesion     Cystic disease of liver     Gastroesophageal reflux disease with esophagitis     Hiatal hernia     Age-related cataract of both eyes, unspecified age-related cataract type     Primary osteoarthritis of both knees     Attention deficit hyperactivity disorder (ADHD), predominantly inattentive type     Malignant neoplasm of upper-inner quadrant of right breast in female, estrogen receptor positive (H)     Thyroid nodule     Anxiety disorder, unspecified  type     Osteoporosis     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     Aromatase inhibitor use     Cervical cancer screening     History of cholecystectomy     Morbid obesity (H)     Fibromyalgia     Insomnia, unspecified type     Past Medical History:   Diagnosis Date     ADHD      Breast cancer (H) 12/26/2018    Right Breast     Chronic fatigue      Fibromyalgia      Hard of hearing      Osteoporosis      S/P radiation therapy     4,256 cGy to right breast completed on 03/11/2019 - [facility] with Dr. Min Uribe MD  [address] [place] [address] on close of this encounter.      Again, thank you for allowing me to participate in the care of your patient.        Sincerely,        Barbara Darling PA-C

## 2024-12-21 ENCOUNTER — HEALTH MAINTENANCE LETTER (OUTPATIENT)
Age: 74
End: 2024-12-21

## 2025-01-30 ENCOUNTER — TELEPHONE (OUTPATIENT)
Dept: FAMILY MEDICINE | Facility: CLINIC | Age: 75
End: 2025-01-30
Payer: COMMERCIAL

## 2025-01-30 NOTE — TELEPHONE ENCOUNTER
Called pt to relay provider recommendation.    Pt verbalized understanding and scheduled with PCP for video visit tomorrow at 1130am.    Pt states she has not been getting reminders for her appointments via her email. Verified that the email address we have in her chart is correct. Advised pt that we would add her email as the main source of communication to her communication preferences for MyChart. Pt very thankful, as she is [place] making phone calls sometimes difficult.    Lary Bales, RN, BSN  [facility] Triage

## 2025-01-30 NOTE — TELEPHONE ENCOUNTER
Needs a visit -video or same day ok - I have openings tomorrow   I have not seen patient since 5/17/24

## 2025-01-30 NOTE — TELEPHONE ENCOUNTER
Pt calling she has a dental appt coming up next week and wants to know if her provider can prescription 2 medication for her. Ativan for her dental anxiety and a antibiotic for jaw infection. She would like these medication sent to [facility].      Routing to provider to review and advise.

## 2025-01-31 ENCOUNTER — VIRTUAL VISIT (OUTPATIENT)
Dept: FAMILY MEDICINE | Facility: CLINIC | Age: 75
End: 2025-01-31
Payer: COMMERCIAL

## 2025-01-31 ENCOUNTER — ORDERS ONLY (AUTO-RELEASED) (OUTPATIENT)
Dept: FAMILY MEDICINE | Facility: CLINIC | Age: 75
End: 2025-01-31

## 2025-01-31 DIAGNOSIS — F41.9 ANXIETY DISORDER, UNSPECIFIED TYPE: ICD-10-CM

## 2025-01-31 DIAGNOSIS — F33.1 MODERATE RECURRENT MAJOR DEPRESSION (H): ICD-10-CM

## 2025-01-31 DIAGNOSIS — Z12.11 SCREEN FOR COLON CANCER: ICD-10-CM

## 2025-01-31 DIAGNOSIS — E66.01 MORBID OBESITY (H): ICD-10-CM

## 2025-01-31 DIAGNOSIS — R31.29 MICROSCOPIC HEMATURIA: ICD-10-CM

## 2025-01-31 DIAGNOSIS — C50.211 MALIGNANT NEOPLASM OF UPPER-INNER QUADRANT OF RIGHT BREAST IN FEMALE, ESTROGEN RECEPTOR POSITIVE (H): ICD-10-CM

## 2025-01-31 DIAGNOSIS — Z87.442 HISTORY OF KIDNEY STONES: ICD-10-CM

## 2025-01-31 DIAGNOSIS — R73.03 PREDIABETES: ICD-10-CM

## 2025-01-31 DIAGNOSIS — K04.7 DENTAL INFECTION: Primary | ICD-10-CM

## 2025-01-31 DIAGNOSIS — Z17.0 MALIGNANT NEOPLASM OF UPPER-INNER QUADRANT OF RIGHT BREAST IN FEMALE, ESTROGEN RECEPTOR POSITIVE (H): ICD-10-CM

## 2025-01-31 PROCEDURE — 98014 SYNCH AUDIO-ONLY EST MOD 30: CPT | Performed by: PHYSICIAN ASSISTANT

## 2025-01-31 RX ORDER — LORAZEPAM 2 MG/1
TABLET ORAL
Qty: 2 TABLET | Refills: 0 | Status: SHIPPED | OUTPATIENT
Start: 2025-01-31

## 2025-01-31 RX ORDER — AMOXICILLIN 875 MG/1
875 TABLET, COATED ORAL 2 TIMES DAILY
Qty: 14 TABLET | Refills: 0 | Status: SHIPPED | OUTPATIENT
Start: 2025-01-31 | End: 2025-02-07

## 2025-01-31 NOTE — PROGRESS NOTES
"  If patient has telephone visit, have they been educated on video visit as preferred visit method and offered to change to video visit? N/A        Instructions Relayed to Patient by Virtual Roomer:     Patient is active on [employer]:   Relayed following to patient: \"It looks like you are active on [employer], are you able to join the visit this way? If not, do you need us to send you a link now or would you like your provider to send a link via text or email when they are ready to initiate the visit?\"      Patient Confirmed they will join visit via: Email   Reminded patient to ensure they were logged on to virtual visit by arrival time listed.   Asked if patient has flexibility to initiate visit sooner than arrival time: patient is unable to initiate visit earlier than arrival time     If pediatric virtual visit, ensured pediatric patient along with parent/guardian will be present for video visit.     Patient offered the website [email or web address] and/or phone number to [employer] Help line: [phone]      Priscilla is a 74 year old who is being evaluated via a billable video visit.    How would you like to obtain your AVS? [facility]  If the video visit is dropped, the invitation should be resent by: Send to e-mail at: [email or web address]  Will anyone else be joining your video visit? No      Assessment & Plan     Dental infection  I contacted [facility] dentistry and patient is only having an exam on Monday - they will not be pulling tooth or any kind of treatment for tooth.   Typically they give two tablets of 2 mg ativan for procedure and it basically knocks patients out.  Have talked with her and she is extremely anxious about any form of exam.   Will give 2 mg ativan night before and 2 mg one hour prior to appointment and given previous dental infections and her concern for abscess treat with amoxicillin   - LORazepam (ATIVAN) 2 MG tablet  Dispense: 2 tablet; Refill: 0  - amoxicillin (AMOXIL) 875 " MG tablet  Dispense: 14 tablet; Refill: 0    Screen for colon cancer    - TERESA([employer])    Moderate recurrent major depression (H)  Not currently taking any medications.     Morbid obesity (H)  Will address at physical    Malignant neoplasm of upper-inner quadrant of right breast in female, estrogen receptor positive (H)  Followed by oncology - diagnosed on screening mammogram 2018   On prolia  completed course of arimidex  Has upcoming appointment next month but scheduled with a male provider and patient is quite adamant can't see a male provider  Message sent to oncology     Anxiety disorder, unspecified type  Severe anxiety regarding any medical issue or procedure    Prediabetes  Last A1C 5.7 eight months ago - recheck when returns for physcial    Microscopic hematuria  Has had [facility] evaluation - they recommended monitoring by PCP and follow up with urology 8/2025     History of kidney stones  As above             BMI  Estimated body mass index is 35.15 kg/m  as calculated from the following:    Height as of 9/6/24: 1.524 m (5').    Weight as of 8/12/24: 81.6 kg (180 lb).   Weight management plan: Discussed healthy diet and exercise guidelines      Patient Instructions   Take ativan 2 mg night before visit and 2 mg one hour prior to appointment.  Take amoxicillin 875 mg twice a day for 7 days.  Schedule urinalysis and follow up with urology approximately 8/25/25 for microscopic blood in the urine and history of kidney stones   I have sent a message to the oncology team about rescheduling your oncology appointment with a female provider   Return urgently if any change in symptoms.   Schedule physical 5/18/25    Inocencio Wells is a 74 year old, presenting for the following health issues:  Consult (Pt has a dental appointment on Monday and will need anti-anxiety medication and antibiotics to prevent infection due to prior abscesses )      1/31/2025    10:40 AM   Additional Questions    Roomed by Maritza DICKERSON   Accompanied by Self     Video Start Time:  1139 AM     History of Present Illness       Reason for visit:  Dental appointment - Medications for anxiety and to prevent infection  Symptom onset:  More than a month  Symptoms include:  Dental Appointment on Monday, Pt has panic attacks and would like Ativan and preventative antibiotics due to prior abscess  Symptom intensity:  Severe  Symptom progression:  Staying the same  Had these symptoms before:  No  What makes it worse:  None  What makes it better:  None   She is taking medications regularly.     Patient known to me with history of breast cancer, depression, anxiety, ADHD, hyperlipidemia, GERD, insomnia, microscopic hematuria, history of kidney stones and morbid obesity presents via telephone visit because she couldn't get audio on ipad to work and doesn't own cell phone for tooth pain.   is driving her on Monday (next business day) to [facility] for evaluation of dental pain.  She reports that [facility] uses ativan for sedation.  Previous dentist would give me med night before and the day of visit but he retired and his daughter took over the practice and she doesn't use sedation. I cannot use laughing gas  Reports that she has a tooth that is senior care out of the socket- she is sure she is going to have to have tooth removed- tooth next to it was removed last year- previously saw an  dentist and coulnd't understand her language and she screwed up antibiotic   She reports a tooth is broken off at gum line  Has only been eating Soup for a week.  Prolia has really ruined my teeth- afraid jaw will  disintegrate   Broke it last week -reports that she has severe dental anxiety   Started to shake so bad that she couldn't do the smoothing of the filling  Needs a sedation dentist  Talked with Ama at Dr. Cao's office and needs a prescription for ativan and antibiotic   No fever but Last night was really clammy  Only eating  soup- cannot eat solid food due to pain  Believes she missed an appointment   Reports had Appointments scheduled and IT people instead of sending me notice scheduled for a different appointment and didn't let me know   Due to providers leaving the practice has Had 3 different oncologists   Adamant doesn't want to see a male provider.                 Review of Systems  Constitutional, HEENT, cardiovascular, pulmonary, gi and gu systems are negative, except as otherwise noted.      Objective           Vitals:  No vitals were obtained today due to virtual visit.    Physical Exam     General: Alert and no distress //Respiratory: No audible wheeze, cough, or shortness of breath // Psychiatric: speech is pressured, also sounds anxious         Video-Visit Details  Patient unable to do video visit- doesn't have a cell phone and video  on ipad not working  18 minutes spent on the phone   Type of service:  Video Visit   Video End Time:11:50 AM  Originating Location (pt. Location): Home    Distant Location (provider location):  Off-site  Platform used for Video Visit: Unable to complete video visit  Signed Electronically by: Katie Hein PA-C

## 2025-02-02 PROBLEM — E11.9 TYPE 2 DIABETES MELLITUS WITHOUT COMPLICATION, WITHOUT LONG-TERM CURRENT USE OF INSULIN (H): Status: RESOLVED | Noted: 2025-02-02 | Resolved: 2025-02-02

## 2025-02-02 PROBLEM — Z96.1 PSEUDOPHAKIA OF BOTH EYES: Status: ACTIVE | Noted: 2022-01-05

## 2025-02-02 PROBLEM — M17.12 ARTHRITIS OF KNEE, LEFT: Status: ACTIVE | Noted: 2023-10-05

## 2025-02-02 PROBLEM — Z87.442 HISTORY OF KIDNEY STONES: Status: ACTIVE | Noted: 2025-02-02

## 2025-02-02 PROBLEM — E11.9 TYPE 2 DIABETES MELLITUS WITHOUT COMPLICATION, WITHOUT LONG-TERM CURRENT USE OF INSULIN (H): Status: ACTIVE | Noted: 2025-02-02

## 2025-02-02 PROBLEM — R31.29 MICROSCOPIC HEMATURIA: Status: ACTIVE | Noted: 2025-02-02

## 2025-02-02 PROBLEM — Z96.652 S/P TOTAL KNEE ARTHROPLASTY, LEFT: Status: ACTIVE | Noted: 2023-10-12

## 2025-02-02 PROBLEM — R73.03 PREDIABETES: Status: ACTIVE | Noted: 2025-02-02

## 2025-02-02 NOTE — PATIENT INSTRUCTIONS
Take ativan 2 mg night before visit and 2 mg one hour prior to appointment.  Take amoxicillin 875 mg twice a day for 7 days.  Schedule urinalysis and follow up with urology approximately 8/25/25 for microscopic blood in the urine and history of kidney stones   I have sent a message to the oncology team about rescheduling your oncology appointment with a female provider   Return urgently if any change in symptoms.   Schedule physical 5/18/25

## 2025-03-11 NOTE — TELEPHONE ENCOUNTER
Await lab results. Avoid tub baths. Avoid fragrant soaps. Avoid douching. Drink fresh squeezed lemon water. Woman probiotics daily.  Safe sex practices.    Please call and arrange for a face to face visit for assessment for a walker. Same day ok

## 2025-03-22 ENCOUNTER — HEALTH MAINTENANCE LETTER (OUTPATIENT)
Age: 75
End: 2025-03-22

## 2025-04-03 ENCOUNTER — LAB (OUTPATIENT)
Dept: INFUSION THERAPY | Facility: CLINIC | Age: 75
End: 2025-04-03
Payer: COMMERCIAL

## 2025-04-03 ENCOUNTER — ONCOLOGY VISIT (OUTPATIENT)
Dept: ONCOLOGY | Facility: CLINIC | Age: 75
End: 2025-04-03
Payer: COMMERCIAL

## 2025-04-03 ENCOUNTER — ANCILLARY PROCEDURE (OUTPATIENT)
Dept: MAMMOGRAPHY | Facility: CLINIC | Age: 75
End: 2025-04-03
Payer: COMMERCIAL

## 2025-04-03 VITALS
BODY MASS INDEX: 36.97 KG/M2 | OXYGEN SATURATION: 99 % | SYSTOLIC BLOOD PRESSURE: 108 MMHG | DIASTOLIC BLOOD PRESSURE: 66 MMHG | HEART RATE: 98 BPM | WEIGHT: 189.3 LBS

## 2025-04-03 DIAGNOSIS — Z91.89 POOR DENTAL HYGIENE: ICD-10-CM

## 2025-04-03 DIAGNOSIS — Z79.811 AROMATASE INHIBITOR USE: ICD-10-CM

## 2025-04-03 DIAGNOSIS — C50.211 MALIGNANT NEOPLASM OF UPPER-INNER QUADRANT OF RIGHT BREAST IN FEMALE, ESTROGEN RECEPTOR POSITIVE (H): ICD-10-CM

## 2025-04-03 DIAGNOSIS — M81.8 OTHER OSTEOPOROSIS WITHOUT CURRENT PATHOLOGICAL FRACTURE: ICD-10-CM

## 2025-04-03 DIAGNOSIS — Z12.31 ENCOUNTER FOR SCREENING MAMMOGRAM FOR BREAST CANCER: ICD-10-CM

## 2025-04-03 DIAGNOSIS — Z17.0 MALIGNANT NEOPLASM OF UPPER-INNER QUADRANT OF RIGHT BREAST IN FEMALE, ESTROGEN RECEPTOR POSITIVE (H): ICD-10-CM

## 2025-04-03 DIAGNOSIS — Z17.0 MALIGNANT NEOPLASM OF UPPER-INNER QUADRANT OF RIGHT BREAST IN FEMALE, ESTROGEN RECEPTOR POSITIVE (H): Primary | ICD-10-CM

## 2025-04-03 DIAGNOSIS — R31.29 MICROSCOPIC HEMATURIA: ICD-10-CM

## 2025-04-03 DIAGNOSIS — C50.211 MALIGNANT NEOPLASM OF UPPER-INNER QUADRANT OF RIGHT BREAST IN FEMALE, ESTROGEN RECEPTOR POSITIVE (H): Primary | ICD-10-CM

## 2025-04-03 DIAGNOSIS — E11.9 TYPE 2 DIABETES MELLITUS WITHOUT COMPLICATION, WITHOUT LONG-TERM CURRENT USE OF INSULIN (H): ICD-10-CM

## 2025-04-03 DIAGNOSIS — Z87.442 HISTORY OF KIDNEY STONES: ICD-10-CM

## 2025-04-03 LAB
ALBUMIN SERPL BCG-MCNC: 3.8 G/DL (ref 3.5–5.2)
ALBUMIN UR-MCNC: NEGATIVE MG/DL
ALP SERPL-CCNC: 124 U/L (ref 40–150)
ALT SERPL W P-5'-P-CCNC: 13 U/L (ref 0–50)
ANION GAP SERPL CALCULATED.3IONS-SCNC: 11 MMOL/L (ref 7–15)
APPEARANCE UR: CLEAR
AST SERPL W P-5'-P-CCNC: 24 U/L (ref 0–45)
BILIRUB SERPL-MCNC: 0.4 MG/DL
BILIRUB UR QL STRIP: NEGATIVE
BUN SERPL-MCNC: 8.8 MG/DL (ref 8–23)
CALCIUM SERPL-MCNC: 9.7 MG/DL (ref 8.8–10.4)
CHLORIDE SERPL-SCNC: 103 MMOL/L (ref 98–107)
COLOR UR AUTO: ABNORMAL
CREAT SERPL-MCNC: 0.72 MG/DL (ref 0.51–0.95)
EGFRCR SERPLBLD CKD-EPI 2021: 88 ML/MIN/1.73M2
GLUCOSE SERPL-MCNC: 107 MG/DL (ref 70–99)
GLUCOSE UR STRIP-MCNC: NEGATIVE MG/DL
HCO3 SERPL-SCNC: 26 MMOL/L (ref 22–29)
HGB UR QL STRIP: ABNORMAL
HOLD SPECIMEN: NORMAL
HOLD SPECIMEN: NORMAL
KETONES UR STRIP-MCNC: NEGATIVE MG/DL
LEUKOCYTE ESTERASE UR QL STRIP: NEGATIVE
NITRATE UR QL: NEGATIVE
PH UR STRIP: 6 [PH] (ref 5–7)
POTASSIUM SERPL-SCNC: 3.9 MMOL/L (ref 3.4–5.3)
PROT SERPL-MCNC: 7.2 G/DL (ref 6.4–8.3)
RBC #/AREA URNS AUTO: ABNORMAL /HPF
SKIP: ABNORMAL
SODIUM SERPL-SCNC: 140 MMOL/L (ref 135–145)
SP GR UR STRIP: 1.01 (ref 1–1.03)
SQUAMOUS #/AREA URNS AUTO: ABNORMAL /LPF
UROBILINOGEN UR STRIP-MCNC: NORMAL MG/DL
WBC #/AREA URNS AUTO: ABNORMAL /HPF

## 2025-04-03 PROCEDURE — 81001 URINALYSIS AUTO W/SCOPE: CPT

## 2025-04-03 PROCEDURE — 77067 SCR MAMMO BI INCL CAD: CPT | Mod: GC

## 2025-04-03 PROCEDURE — 77063 BREAST TOMOSYNTHESIS BI: CPT | Mod: GC

## 2025-04-03 PROCEDURE — 81003 URINALYSIS AUTO W/O SCOPE: CPT

## 2025-04-03 PROCEDURE — 80053 COMPREHEN METABOLIC PANEL: CPT

## 2025-04-03 PROCEDURE — G0463 HOSPITAL OUTPT CLINIC VISIT: HCPCS

## 2025-04-03 ASSESSMENT — PAIN SCALES - GENERAL: PAINLEVEL_OUTOF10: SEVERE PAIN (8)

## 2025-04-03 NOTE — RESULT ENCOUNTER NOTE
Priscilla,  Your urinalysis was normal.  The previously seen microscopic blood has resolved.  Please MyChart or call if you have any concerns or questions.   Sincerely,  Pat Reyes MD  (for BRAYDEN Faye who is out of the office today)

## 2025-04-03 NOTE — Clinical Note
4/3/2025      Marixa Ann  [address] [facility] [id number]      Dear Colleague,    Thank you for referring your patient, Marixa Ann, to the [facility]. Please see a copy of my visit note below.    Oncology Follow Up Visit: April 3, 2025    Oncologist: Dr. Laguerre / ELZA  PCP: Katie Hein    Reason for Visit: 6-month follow-up    Diagnosis: R) breast cancer, ER positive / HER2 negative   Marixa Ann is a 72 yo female who had abnormality at 2-4 oclock level of right breaston screening mammogram in 12/2018. Final review proved a 12 x 9 x14 mm invasive ductal carcinoma at 2 oclock to the right breast, [place] grade 1, ER/MS positive, Her2 negative with 0/3 SLN positive and edges free of disease. [id number]  Oncotype score = 4 or 3% risk of disease at 9 years with hormone therapy.     Treatment:  1/10/2019 Right Lumpectomy with SLN biopsy  3/11/2019 completed right breast radiation post lumpectomy.   - choice made to not use Tamoxifen due to risk for DVT (repeatedly elevated d-dimer as well as elevated ESR and CRP) so started pt on arimidex with close bone evaluation    Interval History:  Patient is seen in clinic today for 6-month review. Frustrated and anxious with numerous providers, has seen a different MD/ELZA each visit. Reports her chronic fatigue and ADHD have been worse the past few months and she is afraid her cancer has returned. But also admits she is not sleeping or eating well or physically active. Ongoing pain to her L) knee which was replaced in 10/2023. No additional questions or concerns.     Patient denies any of the following except if noted above: fevers, chills, difficulty with energy, vision or hearing changes, chest pain, dyspnea, abdominal pain, nausea, vomiting, diarrhea, constipation, urinary concerns, headaches, numbness, tingling, issues with sleep or mood. She also denies lumps, bumps, rashes or skin lesions, bleeding  or bruising issues.    Physical Exam:  /66 (BP Location: Left arm)   Pulse 98   Wt 85.9 kg (189 lb 4.8 oz)   LMP 05/31/2003 (Approximate)   SpO2 99%   BMI 36.97 kg/m       BP Readings from Last 6 Encounters:   04/03/25 108/66   09/06/24 (!) 132/90   05/17/24 115/67   02/20/24 116/77   02/20/24 116/77   01/12/24 119/82     Wt Readings from Last 6 Encounters:   04/03/25 85.9 kg (189 lb 4.8 oz)   08/12/24 81.6 kg (180 lb)   05/17/24 79.4 kg (175 lb)   02/20/24 78.9 kg (173 lb 15.1 oz)   02/20/24 78.9 kg (173 lb 14.4 oz)   01/12/24 78.9 kg (174 lb)      Constitutional: Anxious and tearful but pleasant and appropriately groomed.   ENT: PERRLA, sclera without erythema. Appropriate dentition.  Neck: Trachea midline, no adenopathy.   Resp: CTA, adequate depth and rate of respirations.   Cardiac: S1/S2, RRR, no murmurs.   Breasts: Bilateral breasts without palpable masses or tenderness, no nipple discharge. R) breast w/post surgical scarring.   Abdomen: BS active, abdomen soft and non-tender. No masses or organomegaly.   MS:  5/5 muscle strength, adequate ROM.   Skin: No rashes, lesions, or wounds on exposed skin.  Neuro: A/O x 4, sensation intact.   Lymph: No palpable anterior/posterior cervical, axillary, or supraclavicular nodes.   Psych: Appropriate mentation and affect.    Laboratory Results:   Results for orders placed or performed in visit on 04/03/25   Comprehensive metabolic panel     Status: Abnormal   Result Value Ref Range    Sodium 140 135 - 145 mmol/L    Potassium 3.9 3.4 - 5.3 mmol/L    Carbon Dioxide (CO2) 26 22 - 29 mmol/L    Anion Gap 11 7 - 15 mmol/L    Urea Nitrogen 8.8 8.0 - 23.0 mg/dL    Creatinine 0.72 0.51 - 0.95 mg/dL    GFR Estimate 88 >60 mL/min/1.73m2    Calcium 9.7 8.8 - 10.4 mg/dL    Chloride 103 98 - 107 mmol/L    Glucose 107 (H) 70 - 99 mg/dL    Alkaline Phosphatase 124 40 - 150 U/L    AST 24 0 - 45 U/L    ALT 13 0 - 50 U/L    Protein Total 7.2 6.4 - 8.3 g/dL    Albumin 3.8 3.5 -  5.2 g/dL    Bilirubin Total 0.4 <=1.2 mg/dL   Extra Tube     Status: None (In process)    Narrative    The following orders were created for panel order Extra Tube.  Procedure                               Abnormality         Status                     ---------                               -----------         ------                     Extra Serum Separator T...[[id number]]                      In process                 Extra Purple Top Tube[[id number]]                           In process                   Please view results for these tests on the individual orders.     I reviewed the above labs today.    Imaging:  DX Hip/Pelvis/Spine  Narrative: HISTORY:    Postmenopausal    COMPARISON:  8/24/2022    Age: 73 years.  Height: 61 inches  Weight: 180 pounds  Sex: Female  Ethnicity: White  Referring Provider: JEFF CRAIN    Image quality: Adequate    Lumbar spine T-score in region of L4 = -2.8   L4 percent change: -11.5%, significant     HIPS:  Mean total hip T-score: -1.8  Mean total hip percent change: -3.4%     Left femoral neck T-score = -2.6  Left femoral neck percent changed = -1.9%  Right femoral neck T-score= -2.6   Right femoral neck percent changed = -2.2%    Radius 33% T-score = -1.4  Radius 33% percent change: 6.2%     COMPARISON TO PRIOR:  Percent change at the L4 level is significant based on a 95%  confidence interval.    FRAX:  10 year probability of major osteoporotic fracture: 15.4%  10 year probability of hip fracture: 4.3%  The 10 year probability of fracture may be lower than reported if the  patient has received treatment. FRAX data should be disregarded in  patient's taking bisphosphonates.    World Health Organization definition of osteoporosis and osteopenia  for  women:   Normal: T-score at or above -1.0  Low Bone Mass (Osteopenia): T-score between -1.0 and -2.5.   Osteoporosis: T-score at or below -2.5   T-scores are reported for postmenopausal women and men over 50  years  of age.  Impression: IMPRESSION:  Osteoporosis. Consider follow-up DEXA in approximately 2  years.     EMILY RODAS MD         SYSTEM ID:  [id number]    I reviewed the above imaging report today.    Assessment and Plan:   Stage 1B Right Breast Cancer ER+[id number] s/p lumpectomy, radiation   - Completed 5-years use of anastrozole in 2024.   - Doing well health wise without new concerns.  - No concern for lymphedema or skin changes from previous radiation.  - No concern for reoccurrence based on review of labs and clinical exam.   - Mammogram today. Continue annually.   - RTC in 6 months for provider review / clinical exam, labs + prolia     Osteoporosis - lumbar spine, bilateral femoral neck  - Encouraged intake of calcium and vitamin D, weight bearing exercise as tolerated  - Continues w/prolia q6 months  - Repeat DEXA in 8/2026    Knee pain  - S/p L) knee replacement in 10/2023  - Recommended PT referral, patient declined     Pre-diabetes  - Reviewed healthy lifestyle changes  - Follow-up with PCP    Hx of non-melanoma skin cancer  - Continue follow-up with dermatology  - Continue adequate sun protection         Toma TUTTLE, CNP    Oncology Follow Up Visit: April 3, 2025    Oncologist: Dr. Laguerre / ELZA  PCP: Katie Hein    Reason for Visit: 6-month follow-up    Diagnosis: R) breast cancer, ER positive / HER2 negative   Marixa Ann is a 74 yo female who had abnormality at 2-4 oclock level of right breaston screening mammogram in 12/2018. Final review proved a 12 x 9 x14 mm invasive ductal carcinoma at 2 oclock to the right breast, [place] grade 1, ER/[place] positive, Her2 negative with 0/3 SLN positive and edges free of disease. [id number]  Oncotype score = 4 or 3% risk of disease at 9 years with hormone therapy.     Treatment:  1/10/2019 Right Lumpectomy with SLN biopsy  3/11/2019 completed right breast radiation post lumpectomy.   - choice made to not use Tamoxifen due to risk for DVT  "(repeatedly elevated d-dimer as well as elevated ESR and CRP) so started pt on arimidex with close bone evaluation    Interval History:  Patient is seen in clinic today for 6-month review. Abdominal pain that started a few days ago. Normally has a BM daily but has not yet today. Last was yesterday and it was formed / hard. Feels similar to previous diverticulitis in the past. Requesting abx. No N/V/D. No blood in stool. No urinary symptoms. Drinking a lot of water. Tried gas-x with minimal relief. Ate a \"chunky monkey\" and says it didn't sit well. No fevers or chills or other infectious s/s.     In regards to breast cancer, doing well but anxious for reoccurrence. Chronic fatigue is unchanged. No lymphedema or changes related to previous radiation. Had a tooth pulled a month ago and still has not healed. Appetite is good, no N/V/D. No new bone pains. No additional questions or concerns.      Patient denies any of the following except if noted above: fevers, chills, difficulty with energy, vision or hearing changes, chest pain, dyspnea, abdominal pain, nausea, vomiting, diarrhea, constipation, urinary concerns, headaches, numbness, tingling, issues with sleep or mood. She also denies lumps, bumps, rashes or skin lesions, bleeding or bruising issues.    Physical Exam:  /66 (BP Location: Left arm)   Pulse 98   Wt 85.9 kg (189 lb 4.8 oz)   LMP 05/31/2003 (Approximate)   SpO2 99%   BMI 36.97 kg/m       BP Readings from Last 6 Encounters:   04/03/25 108/66   09/06/24 (!) 132/90   05/17/24 115/67   02/20/24 116/77   02/20/24 116/77   01/12/24 119/82     Wt Readings from Last 6 Encounters:   04/03/25 85.9 kg (189 lb 4.8 oz)   08/12/24 81.6 kg (180 lb)   05/17/24 79.4 kg (175 lb)   02/20/24 78.9 kg (173 lb 15.1 oz)   02/20/24 78.9 kg (173 lb 14.4 oz)   01/12/24 78.9 kg (174 lb)      Constitutional: Anxious and tearful but pleasant and appropriately groomed.   ENT: PERRLA, sclera without erythema. Appropriate " dentition.  Neck: Trachea midline, no adenopathy.   Resp: CTA, adequate depth and rate of respirations.   Cardiac: S1/S2, RRR, no murmurs.   Breasts: Bilateral breasts without palpable masses or tenderness, no nipple discharge. R) breast w/post surgical scarring.   Abdomen: BS active, abdomen soft and non-tender. No masses or organomegaly.   MS:  5/5 muscle strength, adequate ROM.   Skin: No rashes, lesions, or wounds on exposed skin.  Neuro: A/O x 4, sensation intact.   Lymph: No palpable anterior/posterior cervical, axillary, or supraclavicular nodes.   Psych: Appropriate mentation and affect.    Laboratory Results:   Results for orders placed or performed in visit on 04/03/25   Comprehensive metabolic panel     Status: Abnormal   Result Value Ref Range    Sodium 140 135 - 145 mmol/L    Potassium 3.9 3.4 - 5.3 mmol/L    Carbon Dioxide (CO2) 26 22 - 29 mmol/L    Anion Gap 11 7 - 15 mmol/L    Urea Nitrogen 8.8 8.0 - 23.0 mg/dL    Creatinine 0.72 0.51 - 0.95 mg/dL    GFR Estimate 88 >60 mL/min/1.73m2    Calcium 9.7 8.8 - 10.4 mg/dL    Chloride 103 98 - 107 mmol/L    Glucose 107 (H) 70 - 99 mg/dL    Alkaline Phosphatase 124 40 - 150 U/L    AST 24 0 - 45 U/L    ALT 13 0 - 50 U/L    Protein Total 7.2 6.4 - 8.3 g/dL    Albumin 3.8 3.5 - 5.2 g/dL    Bilirubin Total 0.4 <=1.2 mg/dL   Extra Tube     Status: None (In process)    Narrative    The following orders were created for panel order Extra Tube.  Procedure                               Abnormality         Status                     ---------                               -----------         ------                     Extra Serum Separator T...[[id number]]                      In process                 Extra Purple Top Tube[[id number]]                           In process                   Please view results for these tests on the individual orders.     I reviewed the above labs today.    Imaging:  DX Hip/Pelvis/Spine  Narrative: HISTORY:     Postmenopausal    COMPARISON:  8/24/2022    Age: 73 years.  Height: 61 inches  Weight: 180 pounds  Sex: Female  Ethnicity: White  Referring Provider: JEFF CRAIN    Image quality: Adequate    Lumbar spine T-score in region of L4 = -2.8   L4 percent change: -11.5%, significant     HIPS:  Mean total hip T-score: -1.8  Mean total hip percent change: -3.4%     Left femoral neck T-score = -2.6  Left femoral neck percent changed = -1.9%  Right femoral neck T-score= -2.6   Right femoral neck percent changed = -2.2%    Radius 33% T-score = -1.4  Radius 33% percent change: 6.2%     COMPARISON TO PRIOR:  Percent change at the L4 level is significant based on a 95%  confidence interval.    FRAX:  10 year probability of major osteoporotic fracture: 15.4%  10 year probability of hip fracture: 4.3%  The 10 year probability of fracture may be lower than reported if the  patient has received treatment. FRAX data should be disregarded in  patient's taking bisphosphonates.    World Health Organization definition of osteoporosis and osteopenia  for  women:   Normal: T-score at or above -1.0  Low Bone Mass (Osteopenia): T-score between -1.0 and -2.5.   Osteoporosis: T-score at or below -2.5   T-scores are reported for postmenopausal women and men over 50 years  of age.  Impression: IMPRESSION:  Osteoporosis. Consider follow-up DEXA in approximately 2  years.     EMILY RODAS MD         SYSTEM ID:  [id number]    I reviewed the above imaging report today.    Assessment and Plan:   Stage 1B Right Breast Cancer ER+[id number] s/p lumpectomy, radiation   - Completed 5-years use of anastrozole in 2024.   - Recommended pt be evaluated in ED for abdominal pain. She refused and will follow-up with PCP.  - Labs reviewed and discussed.   - No concern for lymphedema or skin changes from previous radiation.  - No concern for reoccurrence based on review of labs and clinical exam.   - Mammogram today. Continue annually.   - RTC in  6 months for provider review / clinical exam, labs + prolia     Osteoporosis - lumbar spine, bilateral femoral neck  - Encouraged intake of calcium and vitamin D, weight bearing exercise as tolerated  - Continues w/prolia q6 months --> on hold until current dental issues are resolved, will require written approval from dental provider to resume prolia   - Repeat DEXA in 8/2026    Knee pain  - S/p L) knee replacement in 10/2023  - Recommended PT referral, patient declined     Pre-diabetes  - Reviewed healthy lifestyle changes  - Follow-up with PCP    Hx of non-melanoma skin cancer  - Continue follow-up with dermatology  - Continue adequate sun protection         Toma TUTTLE, CNP      Again, thank you for allowing me to participate in the care of your patient.        Sincerely,        MARRY Hidalgo CNP    Electronically signed

## 2025-04-03 NOTE — PROGRESS NOTES
"Oncology Follow Up Visit: April 3, 2025    Oncologist: Dr. Laguerre / ELZA  PCP: Katie Hein    Reason for Visit: 6-month follow-up    Diagnosis: R) breast cancer, ER positive / HER2 negative   Marixa Ann is a 72 yo female who had abnormality at 2-4 oclock level of right breaston screening mammogram in 12/2018. Final review proved a 12 x 9 x14 mm invasive ductal carcinoma at 2 oclock to the right breast, [place] grade 1, ER/[place] positive, Her2 negative with 0/3 SLN positive and edges free of disease. [id number]  Oncotype score = 4 or 3% risk of disease at 9 years with hormone therapy.     Treatment:  1/10/2019 Right Lumpectomy with SLN biopsy  3/11/2019 completed right breast radiation post lumpectomy.   - choice made to not use Tamoxifen due to risk for DVT (repeatedly elevated d-dimer as well as elevated ESR and CRP) so started pt on arimidex with close bone evaluation    Interval History:  Patient is seen in clinic today for 6-month review. Abdominal pain that started a few days ago. Normally has a BM daily but has not yet today. Last was yesterday and it was formed / hard. Feels similar to previous diverticulitis in the past. Requesting abx. No N/V/D. No blood in stool. No urinary symptoms. Drinking a lot of water. Tried gas-x with minimal relief. Ate a \"chunky monkey\" and says it didn't sit well. No fevers or chills or other infectious s/s.     In regards to breast cancer, doing well but anxious for reoccurrence. Chronic fatigue is unchanged. No lymphedema or changes related to previous radiation. Had a tooth pulled a month ago and still has not healed. Appetite is good, no N/V/D. No new bone pains. Significant stress w/recent diagnosis of cancer of . No additional questions or concerns.    Patient denies any of the following except if noted above: fevers, chills, difficulty with energy, vision or hearing changes, chest pain, dyspnea, abdominal pain, nausea, vomiting, diarrhea, constipation, " urinary concerns, headaches, numbness, tingling, issues with sleep or mood. She also denies lumps, bumps, rashes or skin lesions, bleeding or bruising issues.    Physical Exam:  /66 (BP Location: Left arm)   Pulse 98   Wt 85.9 kg (189 lb 4.8 oz)   LMP 05/31/2003 (Approximate)   SpO2 99%   BMI 36.97 kg/m       BP Readings from Last 6 Encounters:   04/03/25 108/66   09/06/24 (!) 132/90   05/17/24 115/67   02/20/24 116/77   02/20/24 116/77   01/12/24 119/82     Wt Readings from Last 6 Encounters:   04/03/25 85.9 kg (189 lb 4.8 oz)   08/12/24 81.6 kg (180 lb)   05/17/24 79.4 kg (175 lb)   02/20/24 78.9 kg (173 lb 15.1 oz)   02/20/24 78.9 kg (173 lb 14.4 oz)   01/12/24 78.9 kg (174 lb)      Constitutional: Anxious and tearful but pleasant and appropriately groomed.   ENT: PERRLA, sclera without erythema. Appropriate dentition.  Neck: Trachea midline, no adenopathy.   Resp: CTA, adequate depth and rate of respirations.   Cardiac: S1/S2, RRR, no murmurs.   Breasts: Bilateral breasts without palpable masses or tenderness, no nipple discharge. R) breast w/post surgical scarring. No lymphedema or skin discoloration.  Abdomen: BS active, abdomen soft and non-tender. No masses or organomegaly.   MS:  5/5 muscle strength, adequate ROM.   Skin: No rashes, lesions, or wounds on exposed skin.  Neuro: A/O x 4, sensation intact.   Lymph: No palpable anterior/posterior cervical, axillary, or supraclavicular nodes.   Psych: Appropriate mentation and affect.    Laboratory Results:   Results for orders placed or performed in visit on 04/03/25   Comprehensive metabolic panel     Status: Abnormal   Result Value Ref Range    Sodium 140 135 - 145 mmol/L    Potassium 3.9 3.4 - 5.3 mmol/L    Carbon Dioxide (CO2) 26 22 - 29 mmol/L    Anion Gap 11 7 - 15 mmol/L    Urea Nitrogen 8.8 8.0 - 23.0 mg/dL    Creatinine 0.72 0.51 - 0.95 mg/dL    GFR Estimate 88 >60 mL/min/1.73m2    Calcium 9.7 8.8 - 10.4 mg/dL    Chloride 103 98 - 107 mmol/L     Glucose 107 (H) 70 - 99 mg/dL    Alkaline Phosphatase 124 40 - 150 U/L    AST 24 0 - 45 U/L    ALT 13 0 - 50 U/L    Protein Total 7.2 6.4 - 8.3 g/dL    Albumin 3.8 3.5 - 5.2 g/dL    Bilirubin Total 0.4 <=1.2 mg/dL   Extra Tube     Status: None (In process)    Narrative    The following orders were created for panel order Extra Tube.  Procedure                               Abnormality         Status                     ---------                               -----------         ------                     Extra Serum Separator T...[[id number]]                      In process                 Extra Purple Top Tube[[id number]]                           In process                   Please view results for these tests on the individual orders.     I reviewed the above labs today.    Assessment and Plan:   Stage 1B Right Breast Cancer ER+[id number] s/p lumpectomy, radiation   - Completed 5-years use of anastrozole in 2024.   - Recommended pt be evaluated in ED for abdominal pain. She refused and will follow-up with PCP.  - Labs reviewed and discussed.   - No concern for lymphedema or skin changes from previous radiation.  - No concern for reoccurrence based on review of labs and clinical exam.   - Mammogram today. Continue annually.   - Can transition to yearly appointments w/labs and mammogram completed prior.    Osteoporosis - lumbar spine, bilateral femoral neck  - Encouraged intake of calcium and vitamin D, weight bearing exercise as tolerated  - Continues w/prolia q6 months --> on hold until current dental issues are resolved, will require written approval from dental provider to resume prolia   - Repeat DEXA in 8/2026    Knee pain  - S/p L) knee replacement in 10/2023  - Recommended PT referral, patient declined     Pre-diabetes  - Reviewed healthy lifestyle changes  - Follow-up with PCP    Hx of non-melanoma skin cancer  - Continue follow-up with dermatology  - Continue adequate sun protection         Toma  Lul TUTTLE, CNP

## 2025-04-03 NOTE — NURSING NOTE
Oncology Rooming Note    April 3, 2025 1:30 PM   Marixa Ann is a 74 year old female who presents for:    Chief Complaint   Patient presents with    Oncology Clinic Visit     Initial Vitals: Wt 85.9 kg (189 lb 4.8 oz)   LMP 05/31/2003 (Approximate)   BMI 36.97 kg/m   Estimated body mass index is 36.97 kg/m  as calculated from the following:    Height as of 9/6/24: 1.524 m (5').    Weight as of this encounter: 85.9 kg (189 lb 4.8 oz). Body surface area is 1.91 meters squared.  Severe Pain (8) Comment: Data Unavailable   Patient's last menstrual period was 05/31/2003 (approximate).  Allergies reviewed: Yes  Medications reviewed: Yes    Medications: Medication refills not needed today.  Pharmacy name entered into EPIC:    WRITTEN PRESCRIPTION REQUESTED  [id number] - [place], [place] - [address]    Frailty Screening:   Is the patient here for a new oncology consult visit in cancer care? 2. No    PHQ9:  Did this patient require a PHQ9?: No      Clinical concerns: Patient will discuss concerns with provider.       Kristie Westbrook MA

## 2025-04-17 ENCOUNTER — PATIENT OUTREACH (OUTPATIENT)
Dept: CARE COORDINATION | Facility: CLINIC | Age: 75
End: 2025-04-17
Payer: COMMERCIAL

## 2025-05-01 ENCOUNTER — PATIENT OUTREACH (OUTPATIENT)
Dept: CARE COORDINATION | Facility: CLINIC | Age: 75
End: 2025-05-01
Payer: COMMERCIAL

## 2025-06-25 ENCOUNTER — TELEPHONE (OUTPATIENT)
Dept: FAMILY MEDICINE | Facility: CLINIC | Age: 75
End: 2025-06-25
Payer: COMMERCIAL

## 2025-06-25 NOTE — TELEPHONE ENCOUNTER
Patient Quality Outreach    Patient is due for the following:   Physical Annual Wellness Visit    Action(s) Taken:   If patient calls back, schedule a Annual Wellness Visit    Type of outreach:    Sent [employer] message.    Questions for provider review:    None         Nati Cerrato MA  Chart routed to None.

## 2025-07-01 NOTE — PROGRESS NOTES
[facility] Dermatology Note  Encounter Date: Sep 13, 2021  Office Visit     Dermatology Problem List:  *Patient requests no males in the room for skin checks  Last skin check 9/14/21, q6 months until 2/23  1. History of NMSC   - BCC, right nasal sidewall, s/p MMS 2/24/21  2. History of benign biopsy  - Osteoma cutis, left temple, s/p punch bx 2/9/21     Family History: Not assessed.  Social History: Likes to garden. Uses combination of sunscreen and sun protective clothing diligently.  ____________________________________________    Assessment & Plan:    # History of BCC, R nasal sidewall. S/p Mohs and linear repair.   Wound has healed appropriately.   Continue sun avoidance and sun protection.   Could consider scar massage.     Follow-up: She will follow up with Dr. Snyder for skin cancer screening.     Scribe Disclosure:   I, Yamil Herrera, am serving as a scribe to document services personally performed by this physician, Dr. Dale Saini, based on data collection and the provider's statements to me.     Provider Disclosure:   The documentation recorded by the scribe accurately reflects the services I personally performed and the decisions made by me.    Dale Saini DO    Department of Dermatology  [facility]: Phone: [phone], Fax:[phone]  [facility]: Phone: [phone], Fax: [phone]    ____________________________________________    CC: Derm Problem (Priscilla is here today for spot check, has concern on left cheek)    HPI:  Ms. Marixa Ann is a(n) 70 year old female who presents today as a return patient for a spot check.    Last seen in a virtual visit on 3/10/21. At that time, a surgical site on her right nasal sidewall was healing well. Her last skin check was on 2/9/21 with Dr. Uribe.    Today, she has a spot of concern on her left cheek.  KW- please advise  -2nd PA was done in [facility] with updated note, however it was denied.  Printed denial letter and placed on your desk in [place].  Reason- does not have 1 approved co-morbidity, and BMI not greater than 35    We can (all outlined in denial letter):  -ask for a reconsideration  -can appeal  -ask for external review of decision       It has been pink at times and more dome shaped. She has also noticed tank pink spot on her left forearm. It may be getting bigger, but no other symptoms.     Patient is otherwise feeling well, without additional skin concerns.    Labs Reviewed:  N/A    Physical Exam:  Vitals: LMP 05/31/2003 (Approximate)   SKIN: Focused examination of face and forearms was performed.  - left cheek, pink minimally elevated papule.    Vascular qualities on dermoscopy  - left forearm, tan macule without significant pigment network on dermoscopy.   - R nasal bridge with linear surgical scar, minimal hypertrophy.     - No other lesions of concern on areas examined.     Medications:  Current Outpatient Medications   Medication     acetaminophen (TYLENOL) 325 MG tablet     ALPRAZolam (XANAX) 0.5 MG tablet     amphetamine-dextroamphetamine (ADDERALL) 15 MG tablet     anastrozole (ARIMIDEX) 1 MG tablet     B Complex Vitamins (VITAMIN B-COMPLEX PO)     calcium carbonate (TUMS) 500 MG chewable tablet     Calcium-Magnesium-Zinc 333-133-5 MG TABS per tablet     Cholecalciferol (VITAMIN D-3 PO)     cyclobenzaprine (FLEXERIL) 5 MG tablet     denosumab (PROLIA) 60 MG/ML SOSY injection     KRILL OIL PO     magnesium oxide 200 MG TABS     Multiple Vitamins-Minerals (MULTIVITAMIN PO)     mupirocin (BACTROBAN) 2 % external ointment     pantoprazole (PROTONIX) 40 MG EC tablet     vitamin B-12 (CYANOCOBALAMIN) 100 MCG tablet     Current Facility-Administered Medications   Medication     cross-Linked Hyaluronate (GEL-ONE) injection PRSY 30 mg     cross-Linked Hyaluronate (GEL-ONE) injection PRSY 30 mg     cross-Linked Hyaluronate (GEL-ONE) injection PRSY 30 mg     cross-Linked Hyaluronate (GEL-ONE) injection PRSY 30 mg     triamcinolone (KENALOG-40) injection 80 mg      Past Medical History:   Patient Active Problem List   Diagnosis     Moderate recurrent major depression (H)     Hyperlipidemia LDL goal <160     Chronic fatigue disorder     Liver lesion      Cystic disease of liver     Gastroesophageal reflux disease with esophagitis     Hiatal hernia     Age-related cataract of both eyes, unspecified age-related cataract type     Primary osteoarthritis of both knees     Attention deficit hyperactivity disorder (ADHD), predominantly inattentive type     Malignant neoplasm of upper-inner quadrant of right breast in female, estrogen receptor positive (H)     Thyroid nodule     Anxiety disorder, unspecified type     Osteoporosis     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     Aromatase inhibitor use     Cervical cancer screening     History of cholecystectomy     Past Medical History:   Diagnosis Date     ADHD      Breast cancer (H) 12/26/2018    Right Breast     Chronic fatigue      Fibromyalgia      Hard of hearing      Osteoporosis      S/P radiation therapy     4,256 cGy to right breast completed on 03/11/2019 - [facility] with Dr. Copeland

## 2025-07-05 ENCOUNTER — HEALTH MAINTENANCE LETTER (OUTPATIENT)
Age: 75
End: 2025-07-05

## 2025-08-07 DIAGNOSIS — N20.0 NEPHROLITHIASIS: Primary | ICD-10-CM

## 2025-09-03 ENCOUNTER — ANCILLARY PROCEDURE (OUTPATIENT)
Dept: CT IMAGING | Facility: CLINIC | Age: 75
End: 2025-09-03
Attending: NURSE PRACTITIONER
Payer: COMMERCIAL

## 2025-09-03 DIAGNOSIS — N20.0 NEPHROLITHIASIS: ICD-10-CM

## 2025-09-03 PROCEDURE — 74176 CT ABD & PELVIS W/O CONTRAST: CPT | Performed by: RADIOLOGY

## (undated) DEVICE — ESU ELEC BLADE 2.75" COATED/INSULATED E1455

## (undated) DEVICE — PACK MINOR SBA15MIFSE

## (undated) DEVICE — CLIP ETHICON LIGACLIP SM BLUE LT100

## (undated) DEVICE — DRSG STERI STRIP 1/2X4" R1547

## (undated) DEVICE — SU MONOCRYL 4-0 PS-2 18" UND Y496G

## (undated) DEVICE — SOL WATER IRRIG 1000ML BOTTLE 07139-09

## (undated) DEVICE — MARKER MARGIN MARKER STD 6 COLOR SGL USE MMS6

## (undated) DEVICE — SU VICRYL 3-0 SH 27" J316H

## (undated) DEVICE — GLOVE PROTEXIS W/NEU-THERA 7.5  2D73TE75

## (undated) DEVICE — PREP CHLORAPREP 26ML TINTED ORANGE  260815

## (undated) DEVICE — GLOVE PROTEXIS BLUE W/NEU-THERA 6.5  2D73EB65

## (undated) DEVICE — SUCTION TIP YANKAUER W/O VENT K86

## (undated) DEVICE — GLOVE PROTEXIS W/NEU-THERA 6.0  2D73TE60

## (undated) RX ORDER — GABAPENTIN 300 MG/1
CAPSULE ORAL
Status: DISPENSED
Start: 2019-01-10

## (undated) RX ORDER — ISOSULFAN BLUE 50 MG/5ML
INJECTION, SOLUTION SUBCUTANEOUS
Status: DISPENSED
Start: 2019-01-10

## (undated) RX ORDER — ONDANSETRON 2 MG/ML
INJECTION INTRAMUSCULAR; INTRAVENOUS
Status: DISPENSED
Start: 2019-01-10

## (undated) RX ORDER — BUPIVACAINE HYDROCHLORIDE 2.5 MG/ML
INJECTION, SOLUTION EPIDURAL; INFILTRATION; INTRACAUDAL
Status: DISPENSED
Start: 2019-01-10

## (undated) RX ORDER — ACETAMINOPHEN 325 MG/1
TABLET ORAL
Status: DISPENSED
Start: 2019-01-10

## (undated) RX ORDER — ONDANSETRON 4 MG/1
TABLET, ORALLY DISINTEGRATING ORAL
Status: DISPENSED
Start: 2019-01-10

## (undated) RX ORDER — FENTANYL CITRATE 50 UG/ML
INJECTION, SOLUTION INTRAMUSCULAR; INTRAVENOUS
Status: DISPENSED
Start: 2019-01-10

## (undated) RX ORDER — DEXAMETHASONE SODIUM PHOSPHATE 4 MG/ML
INJECTION, SOLUTION INTRA-ARTICULAR; INTRALESIONAL; INTRAMUSCULAR; INTRAVENOUS; SOFT TISSUE
Status: DISPENSED
Start: 2019-01-10

## (undated) RX ORDER — CEFAZOLIN SODIUM 2 G/100ML
INJECTION, SOLUTION INTRAVENOUS
Status: DISPENSED
Start: 2019-01-10

## (undated) RX ORDER — PROPOFOL 10 MG/ML
INJECTION, EMULSION INTRAVENOUS
Status: DISPENSED
Start: 2019-01-10

## (undated) RX ORDER — OXYCODONE HYDROCHLORIDE 5 MG/1
TABLET ORAL
Status: DISPENSED
Start: 2019-01-10